# Patient Record
Sex: FEMALE | Race: BLACK OR AFRICAN AMERICAN | Employment: OTHER | URBAN - METROPOLITAN AREA
[De-identification: names, ages, dates, MRNs, and addresses within clinical notes are randomized per-mention and may not be internally consistent; named-entity substitution may affect disease eponyms.]

---

## 2017-06-15 PROBLEM — E11.8 CONTROLLED TYPE 2 DIABETES MELLITUS WITH COMPLICATION, WITHOUT LONG-TERM CURRENT USE OF INSULIN (HCC): Status: ACTIVE | Noted: 2017-06-15

## 2017-06-15 PROBLEM — L84 CALLUS: Status: ACTIVE | Noted: 2017-06-15

## 2017-06-15 PROBLEM — B35.1 ONYCHOMYCOSIS: Status: ACTIVE | Noted: 2017-06-15

## 2017-07-12 PROBLEM — E10.9 COMPREHENSIVE DIABETIC FOOT EXAMINATION, TYPE 1 DM, ENCOUNTER FOR (HCC): Status: ACTIVE | Noted: 2017-07-12

## 2017-07-12 PROBLEM — Q66.89 CLAW TOE: Status: ACTIVE | Noted: 2017-07-12

## 2017-07-19 ENCOUNTER — APPOINTMENT (OUTPATIENT)
Dept: CT IMAGING | Age: 82
End: 2017-07-19
Attending: EMERGENCY MEDICINE
Payer: COMMERCIAL

## 2017-07-19 ENCOUNTER — APPOINTMENT (OUTPATIENT)
Dept: GENERAL RADIOLOGY | Age: 82
End: 2017-07-19
Attending: EMERGENCY MEDICINE
Payer: COMMERCIAL

## 2017-07-19 ENCOUNTER — HOSPITAL ENCOUNTER (EMERGENCY)
Age: 82
Discharge: HOME | End: 2017-07-19
Attending: EMERGENCY MEDICINE
Payer: COMMERCIAL

## 2017-07-19 VITALS
TEMPERATURE: 98.4 F | WEIGHT: 184 LBS | HEIGHT: 67 IN | BODY MASS INDEX: 28.88 KG/M2 | DIASTOLIC BLOOD PRESSURE: 85 MMHG | HEART RATE: 68 BPM | OXYGEN SATURATION: 98 % | RESPIRATION RATE: 16 BRPM | SYSTOLIC BLOOD PRESSURE: 180 MMHG

## 2017-07-19 DIAGNOSIS — R10.84 ABDOMINAL PAIN, GENERALIZED: Primary | ICD-10-CM

## 2017-07-19 LAB
ALBUMIN SERPL BCP-MCNC: 3.5 G/DL (ref 3.2–4.6)
ALBUMIN/GLOB SERPL: 1.2 {RATIO} (ref 1.2–3.5)
ALP SERPL-CCNC: 59 U/L (ref 50–136)
ALT SERPL-CCNC: 23 U/L (ref 12–65)
ANION GAP BLD CALC-SCNC: 6 MMOL/L (ref 7–16)
AST SERPL W P-5'-P-CCNC: 16 U/L (ref 15–37)
ATRIAL RATE: 70 BPM
BASOPHILS # BLD AUTO: 0 K/UL (ref 0–0.2)
BASOPHILS # BLD: 0 % (ref 0–2)
BILIRUB SERPL-MCNC: 0.5 MG/DL (ref 0.2–1.1)
BUN SERPL-MCNC: 27 MG/DL (ref 8–23)
CALCIUM SERPL-MCNC: 10.2 MG/DL (ref 8.3–10.4)
CALCULATED P AXIS, ECG09: 99 DEGREES
CALCULATED R AXIS, ECG10: -46 DEGREES
CALCULATED T AXIS, ECG11: 36 DEGREES
CHLORIDE SERPL-SCNC: 108 MMOL/L (ref 98–107)
CO2 SERPL-SCNC: 29 MMOL/L (ref 21–32)
CREAT SERPL-MCNC: 0.99 MG/DL (ref 0.6–1)
DIAGNOSIS, 93000: NORMAL
DIFFERENTIAL METHOD BLD: ABNORMAL
EOSINOPHIL # BLD: 0.1 K/UL (ref 0–0.8)
EOSINOPHIL NFR BLD: 1 % (ref 0.5–7.8)
ERYTHROCYTE [DISTWIDTH] IN BLOOD BY AUTOMATED COUNT: 13.6 % (ref 11.9–14.6)
GLOBULIN SER CALC-MCNC: 2.9 G/DL (ref 2.3–3.5)
GLUCOSE SERPL-MCNC: 199 MG/DL (ref 65–100)
HCT VFR BLD AUTO: 33 % (ref 35.8–46.3)
HGB BLD-MCNC: 10.6 G/DL (ref 11.7–15.4)
IMM GRANULOCYTES # BLD: 0 K/UL (ref 0–0.5)
IMM GRANULOCYTES NFR BLD AUTO: 0.2 % (ref 0–5)
LACTATE BLD-SCNC: 1.6 MMOL/L (ref 0.5–1.9)
LIPASE SERPL-CCNC: 201 U/L (ref 73–393)
LYMPHOCYTES # BLD AUTO: 25 % (ref 13–44)
LYMPHOCYTES # BLD: 2.2 K/UL (ref 0.5–4.6)
MCH RBC QN AUTO: 30 PG (ref 26.1–32.9)
MCHC RBC AUTO-ENTMCNC: 32.1 G/DL (ref 31.4–35)
MCV RBC AUTO: 93.5 FL (ref 79.6–97.8)
MONOCYTES # BLD: 0.7 K/UL (ref 0.1–1.3)
MONOCYTES NFR BLD AUTO: 8 % (ref 4–12)
NEUTS SEG # BLD: 5.9 K/UL (ref 1.7–8.2)
NEUTS SEG NFR BLD AUTO: 66 % (ref 43–78)
P-R INTERVAL, ECG05: 188 MS
PLATELET # BLD AUTO: 187 K/UL (ref 150–450)
PMV BLD AUTO: 10.6 FL (ref 10.8–14.1)
POTASSIUM SERPL-SCNC: 3.7 MMOL/L (ref 3.5–5.1)
PROT SERPL-MCNC: 6.4 G/DL (ref 6.3–8.2)
Q-T INTERVAL, ECG07: 410 MS
QRS DURATION, ECG06: 132 MS
QTC CALCULATION (BEZET), ECG08: 442 MS
RBC # BLD AUTO: 3.53 M/UL (ref 4.05–5.25)
SODIUM SERPL-SCNC: 143 MMOL/L (ref 136–145)
TROPONIN I BLD-MCNC: 0 NG/ML (ref 0–0.08)
TROPONIN I BLD-MCNC: 0.02 NG/ML (ref 0–0.08)
VENTRICULAR RATE, ECG03: 70 BPM
WBC # BLD AUTO: 8.9 K/UL (ref 4.3–11.1)

## 2017-07-19 PROCEDURE — 96374 THER/PROPH/DIAG INJ IV PUSH: CPT | Performed by: EMERGENCY MEDICINE

## 2017-07-19 PROCEDURE — 93005 ELECTROCARDIOGRAM TRACING: CPT | Performed by: EMERGENCY MEDICINE

## 2017-07-19 PROCEDURE — 74011000258 HC RX REV CODE- 258: Performed by: EMERGENCY MEDICINE

## 2017-07-19 PROCEDURE — 84484 ASSAY OF TROPONIN QUANT: CPT

## 2017-07-19 PROCEDURE — 80053 COMPREHEN METABOLIC PANEL: CPT | Performed by: EMERGENCY MEDICINE

## 2017-07-19 PROCEDURE — 74177 CT ABD & PELVIS W/CONTRAST: CPT

## 2017-07-19 PROCEDURE — 83605 ASSAY OF LACTIC ACID: CPT

## 2017-07-19 PROCEDURE — 99285 EMERGENCY DEPT VISIT HI MDM: CPT | Performed by: EMERGENCY MEDICINE

## 2017-07-19 PROCEDURE — 85025 COMPLETE CBC W/AUTO DIFF WBC: CPT | Performed by: EMERGENCY MEDICINE

## 2017-07-19 PROCEDURE — 74011250636 HC RX REV CODE- 250/636: Performed by: EMERGENCY MEDICINE

## 2017-07-19 PROCEDURE — 74011636320 HC RX REV CODE- 636/320: Performed by: EMERGENCY MEDICINE

## 2017-07-19 PROCEDURE — 83690 ASSAY OF LIPASE: CPT | Performed by: EMERGENCY MEDICINE

## 2017-07-19 PROCEDURE — 71010 XR CHEST PORT: CPT

## 2017-07-19 RX ORDER — ONDANSETRON 2 MG/ML
4 INJECTION INTRAMUSCULAR; INTRAVENOUS
Status: COMPLETED | OUTPATIENT
Start: 2017-07-19 | End: 2017-07-19

## 2017-07-19 RX ORDER — SODIUM CHLORIDE 0.9 % (FLUSH) 0.9 %
10 SYRINGE (ML) INJECTION
Status: COMPLETED | OUTPATIENT
Start: 2017-07-19 | End: 2017-07-19

## 2017-07-19 RX ORDER — POLYETHYLENE GLYCOL 3350 17 G/17G
17 POWDER, FOR SOLUTION ORAL DAILY
Qty: 119 G | Refills: 0 | Status: SHIPPED | OUTPATIENT
Start: 2017-07-19

## 2017-07-19 RX ADMIN — SODIUM CHLORIDE 100 ML: 900 INJECTION, SOLUTION INTRAVENOUS at 10:21

## 2017-07-19 RX ADMIN — ONDANSETRON 4 MG: 2 INJECTION INTRAMUSCULAR; INTRAVENOUS at 07:05

## 2017-07-19 RX ADMIN — DIATRIZOATE MEGLUMINE AND DIATRIZOATE SODIUM 15 ML: 600; 100 SOLUTION ORAL; RECTAL at 06:43

## 2017-07-19 RX ADMIN — Medication 10 ML: at 10:21

## 2017-07-19 RX ADMIN — IOPAMIDOL 100 ML: 755 INJECTION, SOLUTION INTRAVENOUS at 10:21

## 2017-07-19 NOTE — ED PROVIDER NOTES
HPI Comments: Presents with complaint of nausea and abdominal pain and distention. No diarrhea no vomiting. Started last evening. Complains of burping a lot. Patient is a 80 y.o. female presenting with abdominal pain. The history is provided by the patient. Abdominal Pain    This is a new problem. Episode onset: 2330. The problem occurs constantly. The problem has not changed since onset. The pain is located in the generalized abdominal region. The quality of the pain is aching, dull and cramping. The pain is moderate. Associated symptoms include belching and nausea. Pertinent negatives include no fever, no diarrhea, no flatus and no vomiting. Nothing worsens the pain. The pain is relieved by nothing. Her past medical history is significant for small bowel obstruction. The patient's surgical history includes appendectomy, cholecystectomy, hysterectomy and colectomy.        Past Medical History:   Diagnosis Date    Arrhythmia     palpitations 2/10-went to [facility]    Arthritis     L leg- fell 2008    Asthma     adult onset x 20 yrs    B12 deficiency 8/24/2012    Body mass index 37.0-37.9, adult 2/5/2014    CAD (coronary artery disease)     cardiac work up-[id number]/0909-neg-Holter    Controlled type 2 diabetes mellitus with microalbuminuria, without long-term current use of insulin ([address] 11/22/2016    COPD     Corns and callosities 12/19/2016    CRI (chronic renal insufficiency) 8/24/2012    Depression 6/1/2012    Dermatophytosis of nail 12/19/2016    Diabetes ([address]     type II- home tests fasting-112    DJD (degenerative joint disease) of hip     DM (diabetes mellitus) type II controlled with renal manifestation ([address] 7/16/2010    Encounter for long-term (current) use of other medications 1/8/2013    Essential hypertension 7/16/2010    Gastrointestinal disorder     GERD (gastroesophageal reflux disease)     Heart failure ([address]     Hiatal hernia 8/24/2012    HLD (hyperlipidemia) 1/8/2013    Neuropathy ([facility] 8/24/2012    Lower limbs     Obesity (BMI 30.0-39. 9) BMI-43.8    Other and unspecified hyperlipidemia 7/16/2010    Other chest pain 7/16/2010    Other ill-defined conditions     high cholesterol    Other ill-defined conditions     incont. urine    PAD (peripheral artery disease) ([facility] 8/24/2012    Retinal hemorrhage        Past Surgical History:   Procedure Laterality Date    ABDOMEN SURGERY PROC UNLISTED      HX APPENDECTOMY      HX CHOLECYSTECTOMY      HX CHOLECYSTECTOMY  2000    HX GI      small intestine obstruction    HX GYN      hyst    HX HEART CATHETERIZATION      HX HYSTERECTOMY      prolapse    HX INTRACRANIAL ANEURYSM REPAIR      HX INTRACRANIAL ANEURYSM REPAIR      HX ORTHOPAEDIC      right wrist 2010    HX OTHER SURGICAL      aneurysm clip-cerebral-2000    HX OTHER SURGICAL      cerebral aneurysm   Dr. Vargas Schneider         Family History:   Problem Relation Age of Onset    Cancer Mother     Diabetes Father     Diabetes Paternal Grandfather        Social History     Social History    Marital status: SINGLE     Spouse name: N/A    Number of children: N/A    Years of education: N/A     Occupational History    Not on file. Social History Main Topics    Smoking status: Never Smoker    Smokeless tobacco: Never Used    Alcohol use No    Drug use: No    Sexual activity: No     Other Topics Concern    Not on file     Social History Narrative     with four children         ALLERGIES: Review of patient's allergies indicates no known allergies. Review of Systems   Constitutional: Negative for chills and fever. Gastrointestinal: Positive for abdominal pain and nausea. Negative for diarrhea, flatus and vomiting. All other systems reviewed and are negative.       Vitals:    07/19/17 0552   BP: 156/68   Pulse: 74   Resp: 18   Temp: 98.3 °F (36.8 °C)   SpO2: 96%   Weight: 83.5 kg (184 lb)   Height: 5' 7\" (1.702 m)            Physical Exam   Constitutional: She is oriented to person, place, and time. She appears well-developed and well-nourished. No distress. HENT:   Head: Normocephalic and atraumatic. Neck: Normal range of motion. Neck supple. Cardiovascular: Normal rate and regular rhythm. Pulmonary/Chest: Effort normal and breath sounds normal. No respiratory distress. She has no wheezes. She has no rales. Abdominal: She exhibits distension. There is tenderness. There is no rebound and no guarding. Multiple wound/hernia     Musculoskeletal: Normal range of motion. She exhibits no edema. Neurological: She is alert and oriented to person, place, and time. No cranial nerve deficit. Skin: Skin is warm and dry. No rash noted. She is not diaphoretic. No erythema. Nursing note and vitals reviewed. MDM  Number of Diagnoses or Management Options  Abdominal pain, generalized:   Diagnosis management comments: CT neg. Tn x2 neg. Pt had BM here.   Home with miralax       Amount and/or Complexity of Data Reviewed  Clinical lab tests: ordered and reviewed    Risk of Complications, Morbidity, and/or Mortality  Presenting problems: high  Diagnostic procedures: moderate  Management options: moderate    Patient Progress  Patient progress: improved    ED Course       Procedures

## 2017-07-19 NOTE — ED TRIAGE NOTES
PT arrived to ED c/o abdominal pain since 1030 pm. PT states she has felt nauseous but no vomiting. EMS states PT began having chest tightness en route.  PT was given 324 mg ASA, 4 mg Zofran, 0.4 mg NTG x2,

## 2017-07-19 NOTE — DISCHARGE INSTRUCTIONS

## 2017-07-19 NOTE — ED NOTES
Discharge instructions given to pt. Pt verbalized understanding. RX in hand. Pt moved to hallway [facility].

## 2017-08-30 PROBLEM — L84 CORN: Status: ACTIVE | Noted: 2017-08-30

## 2018-02-01 ENCOUNTER — HOSPITAL ENCOUNTER (OUTPATIENT)
Dept: ULTRASOUND IMAGING | Age: 83
Discharge: HOME | End: 2018-02-01
Attending: FAMILY MEDICINE
Payer: COMMERCIAL

## 2018-02-01 DIAGNOSIS — M79.89 RIGHT LEG SWELLING: ICD-10-CM

## 2018-02-01 PROBLEM — F41.9 ANXIETY: Status: ACTIVE | Noted: 2018-02-01

## 2018-02-01 PROCEDURE — 93971 EXTREMITY STUDY: CPT

## 2018-02-02 NOTE — PROGRESS NOTES
Pt notified that her ultrasound did not show a clot. And to keep her leg elevated. And to make a earlier appointment if swelling is getting worse.

## 2018-02-02 NOTE — PROGRESS NOTES
Please inform patient/POA  that venous ultrasound result(s) did not show a clot. Keep leg elevated. Make earlier appointment if swelling getting worse.

## 2018-05-16 PROBLEM — E11.40 TYPE 2 DIABETES MELLITUS WITH DIABETIC NEUROPATHY (HCC): Status: ACTIVE | Noted: 2018-05-16

## 2018-07-27 ENCOUNTER — HOSPITAL ENCOUNTER (EMERGENCY)
Age: 83
Discharge: HOME | End: 2018-07-27
Attending: EMERGENCY MEDICINE
Payer: COMMERCIAL

## 2018-07-27 ENCOUNTER — APPOINTMENT (OUTPATIENT)
Dept: GENERAL RADIOLOGY | Age: 83
End: 2018-07-27
Attending: EMERGENCY MEDICINE
Payer: COMMERCIAL

## 2018-07-27 VITALS
HEART RATE: 77 BPM | DIASTOLIC BLOOD PRESSURE: 67 MMHG | OXYGEN SATURATION: 99 % | RESPIRATION RATE: 16 BRPM | SYSTOLIC BLOOD PRESSURE: 158 MMHG | TEMPERATURE: 97.9 F

## 2018-07-27 DIAGNOSIS — R55 SYNCOPE AND COLLAPSE: Primary | ICD-10-CM

## 2018-07-27 LAB
ALBUMIN SERPL-MCNC: 3.1 G/DL (ref 3.2–4.6)
ALBUMIN/GLOB SERPL: 0.9 {RATIO} (ref 1.2–3.5)
ALP SERPL-CCNC: 59 U/L (ref 50–136)
ALT SERPL-CCNC: 22 U/L (ref 12–65)
ANION GAP SERPL CALC-SCNC: 7 MMOL/L (ref 7–16)
APTT PPP: 27.1 SEC (ref 23.2–35.3)
AST SERPL-CCNC: 22 U/L (ref 15–37)
BASOPHILS # BLD: 0 K/UL (ref 0–0.2)
BASOPHILS NFR BLD: 0 % (ref 0–2)
BILIRUB SERPL-MCNC: 0.4 MG/DL (ref 0.2–1.1)
BUN SERPL-MCNC: 27 MG/DL (ref 8–23)
CALCIUM SERPL-MCNC: 10.4 MG/DL (ref 8.3–10.4)
CHLORIDE SERPL-SCNC: 107 MMOL/L (ref 98–107)
CO2 SERPL-SCNC: 29 MMOL/L (ref 21–32)
CREAT SERPL-MCNC: 1.04 MG/DL (ref 0.6–1)
DIFFERENTIAL METHOD BLD: ABNORMAL
EOSINOPHIL # BLD: 0 K/UL (ref 0–0.8)
EOSINOPHIL NFR BLD: 0 % (ref 0.5–7.8)
ERYTHROCYTE [DISTWIDTH] IN BLOOD BY AUTOMATED COUNT: 13.2 % (ref 11.9–14.6)
GLOBULIN SER CALC-MCNC: 3.5 G/DL (ref 2.3–3.5)
GLUCOSE SERPL-MCNC: 120 MG/DL (ref 65–100)
HCT VFR BLD AUTO: 33.1 % (ref 35.8–46.3)
HGB BLD-MCNC: 10.4 G/DL (ref 11.7–15.4)
IMM GRANULOCYTES # BLD: 0 K/UL (ref 0–0.5)
IMM GRANULOCYTES NFR BLD AUTO: 0 % (ref 0–5)
INR PPP: 1
LYMPHOCYTES # BLD: 1.8 K/UL (ref 0.5–4.6)
LYMPHOCYTES NFR BLD: 21 % (ref 13–44)
MAGNESIUM SERPL-MCNC: 2 MG/DL (ref 1.8–2.4)
MCH RBC QN AUTO: 30.1 PG (ref 26.1–32.9)
MCHC RBC AUTO-ENTMCNC: 31.4 G/DL (ref 31.4–35)
MCV RBC AUTO: 95.7 FL (ref 79.6–97.8)
MONOCYTES # BLD: 0.5 K/UL (ref 0.1–1.3)
MONOCYTES NFR BLD: 6 % (ref 4–12)
NEUTS SEG # BLD: 6.2 K/UL (ref 1.7–8.2)
NEUTS SEG NFR BLD: 73 % (ref 43–78)
PLATELET # BLD AUTO: 186 K/UL (ref 150–450)
PMV BLD AUTO: 10 FL (ref 10.8–14.1)
POTASSIUM SERPL-SCNC: 4.2 MMOL/L (ref 3.5–5.1)
PROT SERPL-MCNC: 6.6 G/DL (ref 6.3–8.2)
PROTHROMBIN TIME: 13.1 SEC (ref 11.5–14.5)
RBC # BLD AUTO: 3.46 M/UL (ref 4.05–5.25)
SODIUM SERPL-SCNC: 143 MMOL/L (ref 136–145)
WBC # BLD AUTO: 8.5 K/UL (ref 4.3–11.1)

## 2018-07-27 PROCEDURE — 85025 COMPLETE CBC W/AUTO DIFF WBC: CPT | Performed by: EMERGENCY MEDICINE

## 2018-07-27 PROCEDURE — 96360 HYDRATION IV INFUSION INIT: CPT | Performed by: EMERGENCY MEDICINE

## 2018-07-27 PROCEDURE — 85730 THROMBOPLASTIN TIME PARTIAL: CPT | Performed by: EMERGENCY MEDICINE

## 2018-07-27 PROCEDURE — 96361 HYDRATE IV INFUSION ADD-ON: CPT | Performed by: EMERGENCY MEDICINE

## 2018-07-27 PROCEDURE — 71045 X-RAY EXAM CHEST 1 VIEW: CPT

## 2018-07-27 PROCEDURE — 74011250636 HC RX REV CODE- 250/636: Performed by: EMERGENCY MEDICINE

## 2018-07-27 PROCEDURE — 99284 EMERGENCY DEPT VISIT MOD MDM: CPT | Performed by: EMERGENCY MEDICINE

## 2018-07-27 PROCEDURE — 85610 PROTHROMBIN TIME: CPT | Performed by: EMERGENCY MEDICINE

## 2018-07-27 PROCEDURE — 83735 ASSAY OF MAGNESIUM: CPT | Performed by: EMERGENCY MEDICINE

## 2018-07-27 PROCEDURE — 80053 COMPREHEN METABOLIC PANEL: CPT | Performed by: EMERGENCY MEDICINE

## 2018-07-27 RX ADMIN — SODIUM CHLORIDE 1000 ML: 900 INJECTION, SOLUTION INTRAVENOUS at 21:15

## 2018-07-27 NOTE — ED TRIAGE NOTES
Pt arrives EMS due to a syncopal at [address] Pt found laying on floor. A&O x4. Right bundle branch on 12 lead. Unknown if this is new. Pt states cardiac hx. , /60, HR 70s. Temp 99.0F. No meds given en route.

## 2018-07-28 ENCOUNTER — APPOINTMENT (OUTPATIENT)
Dept: GENERAL RADIOLOGY | Age: 83
DRG: 378 | End: 2018-07-28
Attending: INTERNAL MEDICINE
Payer: COMMERCIAL

## 2018-07-28 ENCOUNTER — HOSPITAL ENCOUNTER (INPATIENT)
Age: 83
LOS: 5 days | Discharge: HOME HEALTH | DRG: 378 | End: 2018-08-02
Attending: EMERGENCY MEDICINE | Admitting: FAMILY MEDICINE
Payer: COMMERCIAL

## 2018-07-28 ENCOUNTER — APPOINTMENT (OUTPATIENT)
Dept: NUCLEAR MEDICINE | Age: 83
DRG: 378 | End: 2018-07-28
Attending: NURSE PRACTITIONER
Payer: COMMERCIAL

## 2018-07-28 DIAGNOSIS — K92.2 ACUTE LOWER GI BLEEDING: Primary | ICD-10-CM

## 2018-07-28 PROBLEM — R55 SYNCOPE: Status: ACTIVE | Noted: 2018-07-28

## 2018-07-28 LAB
ALBUMIN SERPL-MCNC: 2.9 G/DL (ref 3.2–4.6)
ALBUMIN/GLOB SERPL: 1 {RATIO} (ref 1.2–3.5)
ALP SERPL-CCNC: 57 U/L (ref 50–136)
ALT SERPL-CCNC: 20 U/L (ref 12–65)
ANION GAP SERPL CALC-SCNC: 8 MMOL/L (ref 7–16)
AST SERPL-CCNC: 15 U/L (ref 15–37)
BASOPHILS # BLD: 0 K/UL (ref 0–0.2)
BASOPHILS NFR BLD: 0 % (ref 0–2)
BILIRUB SERPL-MCNC: 0.4 MG/DL (ref 0.2–1.1)
BUN SERPL-MCNC: 29 MG/DL (ref 8–23)
CALCIUM SERPL-MCNC: 9.5 MG/DL (ref 8.3–10.4)
CHLORIDE SERPL-SCNC: 109 MMOL/L (ref 98–107)
CO2 SERPL-SCNC: 27 MMOL/L (ref 21–32)
CREAT SERPL-MCNC: 1.11 MG/DL (ref 0.6–1)
DIFFERENTIAL METHOD BLD: ABNORMAL
EOSINOPHIL # BLD: 0 K/UL (ref 0–0.8)
EOSINOPHIL NFR BLD: 0 % (ref 0.5–7.8)
ERYTHROCYTE [DISTWIDTH] IN BLOOD BY AUTOMATED COUNT: 13.2 % (ref 11.9–14.6)
ERYTHROCYTE [DISTWIDTH] IN BLOOD BY AUTOMATED COUNT: 13.2 % (ref 11.9–14.6)
ERYTHROCYTE [DISTWIDTH] IN BLOOD BY AUTOMATED COUNT: 15.8 % (ref 11.9–14.6)
ERYTHROCYTE [DISTWIDTH] IN BLOOD BY AUTOMATED COUNT: 16.3 % (ref 11.9–14.6)
GLOBULIN SER CALC-MCNC: 2.9 G/DL (ref 2.3–3.5)
GLUCOSE SERPL-MCNC: 289 MG/DL (ref 65–100)
HCT VFR BLD AUTO: 27.3 % (ref 35.8–46.3)
HCT VFR BLD AUTO: 28.4 % (ref 35.8–46.3)
HCT VFR BLD AUTO: 28.5 % (ref 35.8–46.3)
HCT VFR BLD AUTO: 29.3 % (ref 35.8–46.3)
HGB BLD-MCNC: 8.4 G/DL (ref 11.7–15.4)
HGB BLD-MCNC: 9.1 G/DL (ref 11.7–15.4)
HGB BLD-MCNC: 9.2 G/DL (ref 11.7–15.4)
HGB BLD-MCNC: 9.3 G/DL (ref 11.7–15.4)
IMM GRANULOCYTES # BLD: 0 K/UL (ref 0–0.5)
IMM GRANULOCYTES NFR BLD AUTO: 0 % (ref 0–5)
LYMPHOCYTES # BLD: 1.7 K/UL (ref 0.5–4.6)
LYMPHOCYTES NFR BLD: 15 % (ref 13–44)
MCH RBC QN AUTO: 29.5 PG (ref 26.1–32.9)
MCH RBC QN AUTO: 29.5 PG (ref 26.1–32.9)
MCH RBC QN AUTO: 29.6 PG (ref 26.1–32.9)
MCH RBC QN AUTO: 30.4 PG (ref 26.1–32.9)
MCHC RBC AUTO-ENTMCNC: 30.8 G/DL (ref 31.4–35)
MCHC RBC AUTO-ENTMCNC: 31.7 G/DL (ref 31.4–35)
MCHC RBC AUTO-ENTMCNC: 32 G/DL (ref 31.4–35)
MCHC RBC AUTO-ENTMCNC: 32.3 G/DL (ref 31.4–35)
MCV RBC AUTO: 91.6 FL (ref 79.6–97.8)
MCV RBC AUTO: 92.2 FL (ref 79.6–97.8)
MCV RBC AUTO: 95.8 FL (ref 79.6–97.8)
MCV RBC AUTO: 95.8 FL (ref 79.6–97.8)
MONOCYTES # BLD: 0.6 K/UL (ref 0.1–1.3)
MONOCYTES NFR BLD: 5 % (ref 4–12)
NEUTS SEG # BLD: 9.5 K/UL (ref 1.7–8.2)
NEUTS SEG NFR BLD: 80 % (ref 43–78)
PLATELET # BLD AUTO: 154 K/UL (ref 150–450)
PLATELET # BLD AUTO: 157 K/UL (ref 150–450)
PLATELET # BLD AUTO: 164 K/UL (ref 150–450)
PLATELET # BLD AUTO: 187 K/UL (ref 150–450)
PMV BLD AUTO: 10.1 FL (ref 10.8–14.1)
PMV BLD AUTO: 10.2 FL (ref 10.8–14.1)
PMV BLD AUTO: 10.6 FL (ref 10.8–14.1)
PMV BLD AUTO: 10.6 FL (ref 10.8–14.1)
POTASSIUM SERPL-SCNC: 4 MMOL/L (ref 3.5–5.1)
PROT SERPL-MCNC: 5.8 G/DL (ref 6.3–8.2)
RBC # BLD AUTO: 2.85 M/UL (ref 4.05–5.25)
RBC # BLD AUTO: 3.06 M/UL (ref 4.05–5.25)
RBC # BLD AUTO: 3.08 M/UL (ref 4.05–5.25)
RBC # BLD AUTO: 3.11 M/UL (ref 4.05–5.25)
SODIUM SERPL-SCNC: 144 MMOL/L (ref 136–145)
WBC # BLD AUTO: 11.9 K/UL (ref 4.3–11.1)
WBC # BLD AUTO: 7.4 K/UL (ref 4.3–11.1)
WBC # BLD AUTO: 8.1 K/UL (ref 4.3–11.1)
WBC # BLD AUTO: 9.2 K/UL (ref 4.3–11.1)

## 2018-07-28 PROCEDURE — 65270000029 HC RM PRIVATE

## 2018-07-28 PROCEDURE — P9016 RBC LEUKOCYTES REDUCED: HCPCS | Performed by: EMERGENCY MEDICINE

## 2018-07-28 PROCEDURE — 85025 COMPLETE CBC W/AUTO DIFF WBC: CPT | Performed by: EMERGENCY MEDICINE

## 2018-07-28 PROCEDURE — 86900 BLOOD TYPING SEROLOGIC ABO: CPT | Performed by: EMERGENCY MEDICINE

## 2018-07-28 PROCEDURE — 97530 THERAPEUTIC ACTIVITIES: CPT

## 2018-07-28 PROCEDURE — 85027 COMPLETE CBC AUTOMATED: CPT | Performed by: FAMILY MEDICINE

## 2018-07-28 PROCEDURE — 74011250637 HC RX REV CODE- 250/637: Performed by: FAMILY MEDICINE

## 2018-07-28 PROCEDURE — 94640 AIRWAY INHALATION TREATMENT: CPT

## 2018-07-28 PROCEDURE — 77030032490 HC SLV COMPR SCD KNE COVD -B

## 2018-07-28 PROCEDURE — 74011000250 HC RX REV CODE- 250: Performed by: FAMILY MEDICINE

## 2018-07-28 PROCEDURE — A9560 TC99M LABELED RBC: HCPCS

## 2018-07-28 PROCEDURE — 73562 X-RAY EXAM OF KNEE 3: CPT

## 2018-07-28 PROCEDURE — 97162 PT EVAL MOD COMPLEX 30 MIN: CPT

## 2018-07-28 PROCEDURE — 36430 TRANSFUSION BLD/BLD COMPNT: CPT

## 2018-07-28 PROCEDURE — 96360 HYDRATION IV INFUSION INIT: CPT | Performed by: EMERGENCY MEDICINE

## 2018-07-28 PROCEDURE — 86923 COMPATIBILITY TEST ELECTRIC: CPT | Performed by: EMERGENCY MEDICINE

## 2018-07-28 PROCEDURE — 74011250636 HC RX REV CODE- 250/636: Performed by: EMERGENCY MEDICINE

## 2018-07-28 PROCEDURE — 94760 N-INVAS EAR/PLS OXIMETRY 1: CPT

## 2018-07-28 PROCEDURE — 36415 COLL VENOUS BLD VENIPUNCTURE: CPT | Performed by: FAMILY MEDICINE

## 2018-07-28 PROCEDURE — 99284 EMERGENCY DEPT VISIT MOD MDM: CPT | Performed by: EMERGENCY MEDICINE

## 2018-07-28 PROCEDURE — 80053 COMPREHEN METABOLIC PANEL: CPT | Performed by: EMERGENCY MEDICINE

## 2018-07-28 PROCEDURE — 30233N1 TRANSFUSION OF NONAUTOLOGOUS RED BLOOD CELLS INTO PERIPHERAL VEIN, PERCUTANEOUS APPROACH: ICD-10-PCS | Performed by: EMERGENCY MEDICINE

## 2018-07-28 PROCEDURE — 87086 URINE CULTURE/COLONY COUNT: CPT | Performed by: NURSE PRACTITIONER

## 2018-07-28 RX ORDER — ALBUTEROL SULFATE 0.83 MG/ML
2.5 SOLUTION RESPIRATORY (INHALATION)
Status: DISCONTINUED | OUTPATIENT
Start: 2018-07-28 | End: 2018-08-02 | Stop reason: HOSPADM

## 2018-07-28 RX ORDER — SODIUM CHLORIDE 9 MG/ML
250 INJECTION, SOLUTION INTRAVENOUS AS NEEDED
Status: DISCONTINUED | OUTPATIENT
Start: 2018-07-28 | End: 2018-08-02 | Stop reason: HOSPADM

## 2018-07-28 RX ORDER — SODIUM CHLORIDE 0.9 % (FLUSH) 0.9 %
5-10 SYRINGE (ML) INJECTION AS NEEDED
Status: DISCONTINUED | OUTPATIENT
Start: 2018-07-28 | End: 2018-08-02 | Stop reason: HOSPADM

## 2018-07-28 RX ORDER — BENAZEPRIL HYDROCHLORIDE 10 MG/1
40 TABLET ORAL DAILY
Status: DISCONTINUED | OUTPATIENT
Start: 2018-07-28 | End: 2018-08-02 | Stop reason: HOSPADM

## 2018-07-28 RX ORDER — PRAVASTATIN SODIUM 20 MG/1
40 TABLET ORAL
Status: DISCONTINUED | OUTPATIENT
Start: 2018-07-28 | End: 2018-08-02 | Stop reason: HOSPADM

## 2018-07-28 RX ORDER — GABAPENTIN 100 MG/1
100 CAPSULE ORAL 2 TIMES DAILY
Status: DISCONTINUED | OUTPATIENT
Start: 2018-07-28 | End: 2018-08-02 | Stop reason: HOSPADM

## 2018-07-28 RX ORDER — BUDESONIDE 0.5 MG/2ML
500 INHALANT ORAL
Status: DISCONTINUED | OUTPATIENT
Start: 2018-07-28 | End: 2018-08-02 | Stop reason: HOSPADM

## 2018-07-28 RX ORDER — SODIUM CHLORIDE 0.9 % (FLUSH) 0.9 %
5-10 SYRINGE (ML) INJECTION EVERY 8 HOURS
Status: DISCONTINUED | OUTPATIENT
Start: 2018-07-28 | End: 2018-08-02 | Stop reason: HOSPADM

## 2018-07-28 RX ORDER — HYDRALAZINE HYDROCHLORIDE 50 MG/1
50 TABLET, FILM COATED ORAL 3 TIMES DAILY
Status: DISCONTINUED | OUTPATIENT
Start: 2018-07-28 | End: 2018-08-02 | Stop reason: HOSPADM

## 2018-07-28 RX ORDER — ACETAMINOPHEN 325 MG/1
650 TABLET ORAL
Status: DISCONTINUED | OUTPATIENT
Start: 2018-07-28 | End: 2018-08-02 | Stop reason: HOSPADM

## 2018-07-28 RX ORDER — SODIUM CHLORIDE 9 MG/ML
50 INJECTION, SOLUTION INTRAVENOUS CONTINUOUS
Status: DISCONTINUED | OUTPATIENT
Start: 2018-07-28 | End: 2018-08-02 | Stop reason: HOSPADM

## 2018-07-28 RX ORDER — SERTRALINE HYDROCHLORIDE 50 MG/1
50 TABLET, FILM COATED ORAL DAILY
Status: DISCONTINUED | OUTPATIENT
Start: 2018-07-28 | End: 2018-08-02 | Stop reason: HOSPADM

## 2018-07-28 RX ORDER — FUROSEMIDE 20 MG/1
20 TABLET ORAL DAILY
Status: DISCONTINUED | OUTPATIENT
Start: 2018-07-28 | End: 2018-08-02 | Stop reason: HOSPADM

## 2018-07-28 RX ORDER — ONDANSETRON 2 MG/ML
4 INJECTION INTRAMUSCULAR; INTRAVENOUS
Status: DISCONTINUED | OUTPATIENT
Start: 2018-07-28 | End: 2018-08-02 | Stop reason: HOSPADM

## 2018-07-28 RX ORDER — POTASSIUM CHLORIDE 20 MEQ/1
20 TABLET, EXTENDED RELEASE ORAL DAILY
Status: DISCONTINUED | OUTPATIENT
Start: 2018-07-28 | End: 2018-08-02 | Stop reason: HOSPADM

## 2018-07-28 RX ORDER — LORAZEPAM 2 MG/ML
1 INJECTION INTRAMUSCULAR
Status: DISCONTINUED | OUTPATIENT
Start: 2018-07-28 | End: 2018-08-02 | Stop reason: HOSPADM

## 2018-07-28 RX ORDER — AMLODIPINE BESYLATE 10 MG/1
10 TABLET ORAL DAILY
Status: DISCONTINUED | OUTPATIENT
Start: 2018-07-28 | End: 2018-08-02 | Stop reason: HOSPADM

## 2018-07-28 RX ADMIN — ACETAMINOPHEN 650 MG: 325 TABLET ORAL at 05:21

## 2018-07-28 RX ADMIN — HYDRALAZINE HYDROCHLORIDE 50 MG: 50 TABLET, FILM COATED ORAL at 09:25

## 2018-07-28 RX ADMIN — ALBUTEROL SULFATE 2.5 MG: 2.5 SOLUTION RESPIRATORY (INHALATION) at 13:21

## 2018-07-28 RX ADMIN — BUDESONIDE 500 MCG: 0.5 INHALANT RESPIRATORY (INHALATION) at 07:00

## 2018-07-28 RX ADMIN — SERTRALINE HYDROCHLORIDE 50 MG: 50 TABLET ORAL at 09:25

## 2018-07-28 RX ADMIN — GABAPENTIN 100 MG: 100 CAPSULE ORAL at 09:25

## 2018-07-28 RX ADMIN — ALBUTEROL SULFATE 2.5 MG: 2.5 SOLUTION RESPIRATORY (INHALATION) at 21:20

## 2018-07-28 RX ADMIN — PRAVASTATIN SODIUM 40 MG: 20 TABLET ORAL at 22:11

## 2018-07-28 RX ADMIN — GABAPENTIN 100 MG: 100 CAPSULE ORAL at 18:51

## 2018-07-28 RX ADMIN — SODIUM CHLORIDE 125 ML/HR: 900 INJECTION, SOLUTION INTRAVENOUS at 01:51

## 2018-07-28 RX ADMIN — FUROSEMIDE 20 MG: 20 TABLET ORAL at 09:25

## 2018-07-28 RX ADMIN — Medication 10 ML: at 22:00

## 2018-07-28 RX ADMIN — HYDRALAZINE HYDROCHLORIDE 50 MG: 50 TABLET, FILM COATED ORAL at 22:11

## 2018-07-28 RX ADMIN — HYDRALAZINE HYDROCHLORIDE 50 MG: 50 TABLET, FILM COATED ORAL at 18:51

## 2018-07-28 RX ADMIN — POTASSIUM CHLORIDE 20 MEQ: 20 TABLET, EXTENDED RELEASE ORAL at 09:25

## 2018-07-28 RX ADMIN — AMLODIPINE BESYLATE 10 MG: 10 TABLET ORAL at 09:25

## 2018-07-28 RX ADMIN — BUDESONIDE 500 MCG: 0.5 INHALANT RESPIRATORY (INHALATION) at 21:20

## 2018-07-28 RX ADMIN — ALBUTEROL SULFATE 2.5 MG: 2.5 SOLUTION RESPIRATORY (INHALATION) at 07:01

## 2018-07-28 RX ADMIN — BENAZEPRIL HYDROCHLORIDE 40 MG: 10 TABLET, FILM COATED ORAL at 09:24

## 2018-07-28 NOTE — PROGRESS NOTES
Julio C Lujan NP informed that patient was incontinent of large maroon bloody stool. Also informed that patient is weak and that PT had difficulty getting her to stand with walker and ortho bp may not be possible just yet. Will continue to monitor.

## 2018-07-28 NOTE — IP AVS SNAPSHOT
[facility] 
 
 
 [address] [facility] [address] [facility] 
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 IS AT [address] [address] 
[phone] Jerome Darnell MD On 2018 2:15pm [address] 
[phone] Lana Vale MD On 2018 at 8:30 am, appointment will be at [facility] 15-30 early,bring all medications with you [address] [facility] [address] [facility] [address] 
[phone] Your Scheduled Appointments 2018  2:15 PM EDT Office Visit with Jerome Darnell MD  
[address]) [address] [id number] [phone] 2018 10:10 AM EDT  
LAB with [facility] [address]) [address] [place] [address] Place [address] [phone] Wednesday August 15, 2018  2:15 PM EDT Office Visit with Jerome Darnell MD  
[address]) [address] [address] [phone] Monday September 10, 2018  3:40 PM EDT SHORT with EDGARDO Brownlee [facility] [address] [place] [address]  
[phone] Discharge Orders None A check katrina indicates which time of day the medication should be taken. My Medications CHANGE how you take these medications Instructions Each Dose to Equal  
 Morning Noon Evening Bedtime  
 hydrALAZINE 50 mg tablet Commonly known as:  APRESOLINE What changed:  how much to take Take 2 Tabs by mouth three (3) times daily. 100 mg CONTINUE taking these medications Instructions Each Dose to Equal  
 Morning Noon Evening Bedtime  
 albuterol 90 mcg/actuation inhaler Commonly known as:  PROVENTIL HFA, VENTOLIN HFA, PROAIR HFA Take 2 Puffs by inhalation every six (6) hours as needed for Wheezing. 2 Puff  
    
  
   
   
  
   
  
 amLODIPine 10 mg tablet Commonly known as:  Bob Matthew Take 1 Tab by mouth daily. 10 mg  
    
  
   
   
   
  
 benazepril 40 mg tablet Commonly known as:  LOTENSIN Take 1 Tab by mouth daily. 40 mg Blood-Glucose Meter monitoring kit Pt uses true metrix meter and tests once daily dx code E11.29 CENTRUM SILVER PO Take  by mouth.  
     
  
   
   
   
  
 cyanocobalamin (vitamin B-12) 1,000 mcg/mL Kit  
   
 1,000 mcg by Injection route every month. 1000 mcg  
    
  
   
   
   
  
 fluticasone 50 mcg/actuation nasal spray Commonly known as:  Yaov Merles INSTILL 1 SPRAY IN EACH NOSTRIL NIGHTLY AS DIRECTED  
     
  
   
   
   
  
 fluticasone-salmeterol 250-50 mcg/dose diskus inhaler Commonly known as:  ADVAIR DISKUS Take 1 Puff by inhalation every twelve (12) hours. 1 Puff  
    
  
   
   
  
   
  
 furosemide 20 mg tablet Commonly known as:  LASIX Take 1 Tab by mouth daily. 20 mg  
    
  
   
   
   
  
 gabapentin 100 mg capsule Commonly known as:  NEURONTIN  
   
 TAKE 1 CAPSULE BY MOUTH TWICE DAILY  
     
  
   
   
  
   
  
 glucose blood VI test strips strip Commonly known as:  blood glucose test  
   
 Pt uses true metrix test strips. Pt tests once daily. Dx code E11.29  
     
  
   
   
   
  
 GLUCOTROL XL 2.5 mg CR tablet Generic drug:  glipiZIDE SR Take 2.5 mg by mouth daily. 2.5 mg Lancets Misc Pt tests three times daily Diagnosis Code: 250.00 LORazepam 0.5 mg tablet Commonly known as:  ATIVAN Take 1 Tab by mouth daily as needed for Anxiety. 0.5 mg  
    
  
   
   
   
  
 metFORMIN 500 mg tablet Commonly known as:  GLUCOPHAGE Take 1 Tab by mouth two (2) times daily (with meals). 500 mg  
    
  
   
   
  
   
  
 OTHER One shower chair  
     
  
   
   
   
  
 polyethylene glycol 17 gram/dose powder Commonly known as:  Kristen  Take 17 g by mouth daily. 1 tablespoon with 8 oz of water daily 17 g  
    
  
   
   
   
  
 potassium chloride 20 mEq tablet Commonly known as:  K-DUR, KLOR-CON  
   
 TAKE 1 TABLET BY MOUTH EVERY MORNING  
     
  
   
   
   
  
 * pravastatin 40 mg tablet Commonly known as:  PRAVACHOL  
   
 TAKE 1 TABLET BY MOUTH EVERY NIGHT AT BEDTIME  
     
   
   
   
  
  
 * pravastatin 40 mg tablet Commonly known as:  PRAVACHOL  
   
 TAKE 1 TABLET BY MOUTH EVERY NIGHT AT BEDTIME  
     
   
   
   
  
  
 PRESERVISION AREDS PO Take 1 Tab by mouth two (2) times a day. 1 Tab  
    
  
   
   
  
   
  
 sertraline 100 mg tablet Commonly known as:  ZOLOFT Take 0.5 Tabs by mouth daily. Indications: major depressive disorder, am  
 50 mg  
    
  
   
   
   
  
 * Notice: This list has 2 medication(s) that are the same as other medications prescribed for you. Read the directions carefully, and ask your doctor or other care provider to review them with you. STOP taking these medications aspirin 81 mg tablet Where to Get Your Medications Information on where to get these meds will be given to you by the nurse or doctor. ! Ask your nurse or doctor about these medications  
  hydrALAZINE 50 mg tablet Discharge Instructions None [facility] Announcement We are excited to announce that we are making your provider's discharge notes available to you in [facility]. You will see these notes when they are completed and signed by the physician that discharged you from your recent hospital stay. If you have any questions or concerns about any information you see in [facility], please call the Health Information Department where you were seen or reach out to your Primary Care Provider for more information about your plan of care. Introducing [facility]! Romayne Duster introduces [facility] patient portal. Now you can access parts of your medical record, email your doctor's office, and request medication refills online. 1. In your internet browser, go to [email or web address] 2. Click on the First Time User? Click Here link in the Sign In box. You will see the New Member Sign Up page. 3. Enter your [facility] Access Code exactly as it appears below. You will not need to use this code after youve completed the sign-up process. If you do not sign up before the expiration date, you must request a new code. · [facility] Access Code: [email or web address] Expires: 10/25/2018  6:31 PM 
 
4. Enter the last four digits of your Social Security Number (xxxx) and Date of Birth (mm/dd/yyyy) as indicated and click Submit. You will be taken to the next sign-up page. 5. Create a Oohlyt ID. This will be your [facility]e login ID and cannot be changed, so think of one that is secure and easy to remember. 6. Create a [facility]e password. You can change your password at any time. 7. Enter your Password Reset Question and Answer.  This can be used at a later time if you forget your password. 8. Enter your e-mail address. You will receive e-mail notification when new information is available in [address] 9. Click Sign Up. You can now view and download portions of your medical record. 10. Click the Download Summary menu link to download a portable copy of your medical information. If you have questions, please visit the Frequently Asked Questions section of the [employer] website. Remember, [employer] is NOT to be used for urgent needs. For medical emergencies, dial 911. Now available from your iPhone and Android! Introducing Edward Dejesus As a [employer] patient, I wanted to make you aware of our electronic visit tool called Edward Dejesus. CrossCurrent 24/7 allows you to connect within minutes with a medical provider 24 hours a day, seven days a week via a mobile device or tablet or logging into a secure website from your computer. You can access Edward Dejesus from anywhere in the [place]. A virtual visit might be right for you when you have a simple condition and feel like you just dont want to get out of bed, or cant get away from work for an appointment, when your regular [facility] provider is not available (evenings, weekends or holidays), or when youre out of town and need minor care. Electronic visits cost only $49 and if the CrossCurrent 24/7 provider determines a prescription is needed to treat your condition, one can be electronically transmitted to a nearby pharmacy*. Please take a moment to enroll today if you have not already done so. The enrollment process is free and takes just a few minutes. To enroll, please download the CrossCurrent 24/7 nathanael to your tablet or phone, or visit [email or web address] to enroll on your computer.    
And, as an [address] [place] [address] patient with a Merku account, the results of your visits will be scanned into your electronic medical record and your primary care provider will be able to view the scanned results. We urge you to continue to see your regular [facility] provider for your ongoing medical care. And while your primary care provider may not be the one available when you seek a Package Concierge virtual visit, the peace of mind you get from getting a real diagnosis real time can be priceless. For more information on Package [facility]cierge, view our Frequently Asked Questions (FAQs) at [email or web address]. Sincerely, 
 
Bridgett Licona MD 
Chief Medical Officer [address] *:  certain medications cannot be prescribed via Package [facility]cierge Providers Seen During Your Hospitalization Provider Specialty Primary office phone Miriam Andrea MD Emergency Medicine [phone] Katey Dc MD Family Practice [phone] Immunizations Administered for This Admission Name Date  
 TB Skin Test (PPD) Intradermal  Deferred (), 7/31/2018 Your Primary Care Physician (PCP) Primary Care Physician Office Phone Office Fax Barbara Flakita [phone] You are allergic to the following No active allergies Recent Documentation Height Weight BMI OB Status Smoking Status 1.702 m 83 kg 28.66 kg/m2 Hysterectomy Never Smoker Emergency Contacts Name Discharge Info Relation Home Work Mobile [facility] [address] CAREGIVER [3] Child [2] [phone] Tracie Darrian  Son [22] [phone] Diallo Ball  Other Relative [6] [phone] Patient Belongings The following personal items are in your possession at time of discharge: 
  Dental Appliances: Partials, Uppers  Visual Aid: At bedside, Glasses      Home Medications: None   Jewelry: Ring  Clothing: None    Other Valuables: None Discharge Instructions Attachments/References GI BLEED (ENGLISH) Patient Handouts Gastrointestinal Bleeding: Care Instructions Your Care Instructions The digestive or gastrointestinal tract goes from the mouth to the anus. It is often called the GI tract. Bleeding can happen anywhere in the GI tract. It may be caused by an ulcer, an infection, or cancer. It may also be caused by medicines such as aspirin or ibuprofen. Light bleeding may not cause any symptoms at first. But if you continue to bleed for a while, you may feel very weak or tired. Sudden, heavy bleeding means you need to see a doctor right away. This kind of bleeding can be very dangerous. But it can usually be cured or controlled. The doctor may do some tests to find the cause of your bleeding. Follow-up care is a key part of your treatment and safety. Be sure to make and go to all appointments, and call your doctor if you are having problems. It's also a good idea to know your test results and keep a list of the medicines you take. How can you care for yourself at home? · Be safe with medicines. Take your medicines exactly as prescribed. Call your doctor if you think you are having a problem with your medicine. You will get more details on the specific medicines your doctor prescribes. · Do not take aspirin or other anti-inflammatory medicines, such as naproxen (Aleve) or ibuprofen (Advil, Motrin), without talking to your doctor first. Ask your doctor if it is okay to use acetaminophen (Tylenol). · Do not drink alcohol. · The bleeding may make you lose iron. So it's important to eat foods that have a lot of iron. These include red meat, shellfish, poultry, and eggs. They also include beans, raisins, whole-grain breads, and leafy green vegetables. If you want help planning meals, you can make an appointment with a dietitian. When should you call for help? Call 911 anytime you think you may need emergency care. For example, call if: 
  · You have sudden, severe belly pain.   · You vomit blood or what looks like coffee grounds.  
  · You passed out (lost consciousness).  
  · Your stools are maroon or very bloody.  
 Call your doctor now or seek immediate medical care if: 
  · You are dizzy or lightheaded, or you feel like you may faint.  
  · Your stools are black and look like tar, or they have streaks of blood.  
  · You have belly pain.  
  · You vomit or have nausea.  
  · You have trouble swallowing, or it hurts when you swallow.  
 Watch closely for changes in your health, and be sure to contact your doctor if: 
  · You do not get better as expected. Where can you learn more? Go to http://nelia-mandy.info/. Enter N945 in the search box to learn more about \"Gastrointestinal Bleeding: Care Instructions. \" Current as of: November 20, 2017 Content Version: 11.7 © 8134-0343 [employer]. Care instructions adapted under license by [employer] (which disclaims liability or warranty for this information). If you have questions about a medical condition or this instruction, always ask your healthcare professional. [address] any warranty or liability for your use of this information. Please provide this summary of care documentation to your next provider. Signatures-by signing, you are acknowledging that this After Visit Summary has been reviewed with you and you have received a copy. Patient Signature:  ____________________________________________________________ Date:  ____________________________________________________________  
  
Sebastian Sport Provider Signature:  ____________________________________________________________ Date:  ____________________________________________________________

## 2018-07-28 NOTE — PROGRESS NOTES
Problem: Mobility Impaired (Adult and Pediatric) Goal: *Therapy Goal (Edit Goal, Insert Text) STG: 
(1.)Ms. Tanya Parks will move from supine to sit and sit to supine , scoot up and down and roll side to side with STAND BY ASSIST within 4 treatment day(s). (2.)Ms. Tanya Parks will transfer from bed to chair and chair to bed with STAND BY ASSIST using the least restrictive device within 4 treatment day(s). (3.)Ms. Tanya Parks will ambulate with STAND BY ASSIST for 250 feet with the least restrictive device within 4 treatment day(s). LTG: 
(1.)Ms. Tanya Parks will move from supine to sit and sit to supine , scoot up and down and roll side to side in bed with MODIFIED INDEPENDENCE within 7 treatment day(s). (2.)Ms. Tanya Parks will transfer from bed to chair and chair to bed with MODIFIED INDEPENDENCE using the least restrictive device within 7 treatment day(s). (3.)Ms. Tanya Parks will ambulate with MODIFIED INDEPENDENCE for 500 feet with the least restrictive device within 7 treatment day(s). ________________________________________________________________________________________________ PHYSICAL THERAPY: Initial Assessment, Treatment Day: Day of Assessment 7/28/2018 INPATIENT: Hospital Day: 1 Payor: Mala Pillai MMP / Plan: [place] [employer] MMP / Product Type: [employer] Medicare /  
  
NAME/AGE/GENDER: Kathlene Jung is a 80 y.o. female PRIMARY DIAGNOSIS: GI bleed <principal problem not specified> <principal problem not specified> 
  
  
ICD-10: Treatment Diagnosis:  
 · Generalized Muscle Weakness (M62.81) · Difficulty in walking, Not elsewhere classified (R26.2) · Other abnormalities of gait and mobility (R26.89) Precaution/Allergies: 
Review of patient's allergies indicates no known allergies. ASSESSMENT:  
 
Ms. Tanya Parks presents with decreased transfers, ambulation and mobility with above diagnosis.     She demonstrates transfers of MIN A x 1 out of bed to sit and then MIN A x 1 to stand. She is experiencing pain in in the left knee and has an xray pending. Then, she ambulates for 3 feet without complaints of increased pain and mild SOB with sidestepping then returned to seated edge of bed with return to supine MIN A x 1 and positioned. Skilled PT is indicated for patient safety and mobility progression during current acute care episode. This section established at most recent assessment PROBLEM LIST (Impairments causing functional limitations): 1. Decreased Strength 2. Decreased ADL/Functional Activities 3. Decreased Transfer Abilities 4. Decreased Ambulation Ability/Technique 5. Decreased Balance 6. Decreased Activity Tolerance 7. Decreased Pacing Skills 8. Decreased Flexibility/Joint Mobility 9. Decreased [place] with Home Exercise Program 
 INTERVENTIONS PLANNED: (Benefits and precautions of physical therapy have been discussed with the patient.) 1. Balance Exercise 2. Bed Mobility 3. Family Education 4. Gait Training 5. Home Exercise Program (HEP) 6. Range of Motion (ROM) 7. Therapeutic Activites 8. Therapeutic Exercise/Strengthening 9. Transfer Training TREATMENT PLAN: Frequency/Duration: 3-5 times a week for duration of hospital stay Rehabilitation Potential For Stated Goals: Good RECOMMENDED REHABILITATION/EQUIPMENT: (at time of discharge pending progress): Due to the probability of continued deficits (see above) this patient will likely need continued skilled physical therapy after discharge. Equipment:  
 Walkers, Type: Rolling Rheta Hark HISTORY:  
History of Present Injury/Illness (Reason for Referral): PER MD PELAEZ&P 
79 yo F with PMH of Type 2 DM, COPD, HTN, HLD, Vit B12 deficiency, CAD and Depression presents to ER with reports of gerhard bloody stool. Patient was just dismissed from ER last night when she initially presented after syncopal episode thought to be due to hypoglycemia.  Lab work and imagine was unremarkable and she was discharged from ER to home. On arrival to home she hard large blood BM and again felt lightheaded but did not lose consciousness. She returned to ER and gerhard blood noted on rectal exam. Hgb 9.3(10.4 last night). She denies any abd pain or previous episodes of hematochezia. She had colonoscopy a few years ago that was unremarkable. (please note: admission nurse documented bloody emesis but patient denies) 
  
Past Medical History/Comorbidities: Ms. Mala Branch  has a past medical history of Anxiety (2/1/2018); Arrhythmia; Arthritis; Asthma; B12 deficiency (8/24/2012); Body mass index 37.0-37.9, adult (2/5/2014); CAD (coronary artery disease); Controlled type 2 diabetes mellitus with microalbuminuria, without long-term current use of insulin ([facility] [address] (11/22/2016); COPD; Corns and callosities (12/19/2016); CRI (chronic renal insufficiency) (8/24/2012); Depression (6/1/2012); Dermatophytosis of nail (12/19/2016); Diabetes ([address] DJD (degenerative joint disease) of hip; DM (diabetes mellitus) type II controlled with renal manifestation ([address] (7/16/2010); Encounter for long-term (current) use of other medications (1/8/2013); Essential hypertension (7/16/2010); Gastrointestinal disorder; GERD (gastroesophageal reflux disease); Heart failure ([address] Hiatal hernia (8/24/2012); HLD (hyperlipidemia) (1/8/2013); Neuropathy (8/24/2012); Obesity (BMI 30.0-39.9) (BMI-43.8); Other and unspecified hyperlipidemia (7/16/2010); Other chest pain (7/16/2010); Other ill-defined conditions(799.89); Other ill-defined conditions(799.89); PAD (peripheral artery disease) ([address] (8/24/2012); and Retinal hemorrhage.   Ms. Mala Branch  has a past surgical history that includes pr abdomen surgery proc unlisted; hx appendectomy; hx cholecystectomy; hx other surgical; hx gyn; hx gi; hx orthopaedic; hx hysterectomy; hx cholecystectomy (2000); hx intracranial aneurysm repair; hx intracranial aneurysm repair; hx other surgical; hx heart catheterization; and hx endoscopy (02/27/2018). Social History/Living Environment:  
  pending Prior Level of Function/Work/Activity: 
Has been using a two wheel rolling walker at times. Dominant Side:  
      RIGHT Personal Factors:   
      Sex:  female Age:  80 y.o. Number of Personal Factors/Comorbidities that affect the Plan of Care: 1-2: MODERATE COMPLEXITY EXAMINATION:  
Most Recent Physical Functioning:  
Gross Assessment: 
AROM: Generally decreased, functional 
Strength: Generally decreased, functional 
Coordination: Generally decreased, functional 
Tone: Normal 
Sensation: Impaired Posture: 
Posture (WDL): Exceptions to [facility] Posture Assessment: Cervical, Forward head Balance: 
Sitting: Impaired Sitting - Static: Fair (occasional) Sitting - Dynamic: Fair (occasional) Standing: Impaired Standing - Static: Fair Standing - Dynamic : Fair Bed Mobility: 
Rolling: Minimum assistance Supine to Sit: Minimum assistance Sit to Supine: Minimum assistance Scooting: Minimum assistance Wheelchair Mobility: 
  
Transfers: 
Sit to Stand: Minimum assistance Stand to Sit: Minimum assistance Bed to Chair: Minimum assistance Gait: 
  
Base of Support: Center of gravity altered Speed/Airam: Delayed; Fluctuations; Pace decreased (<100 feet/min); Shuffled; Slow Step Length: Left shortened Swing Pattern: Left asymmetrical 
Stance: Left decreased;Time;Weight shift Gait Abnormalities: Decreased step clearance;Shuffling gait; Steppage gait; Step to gait;Trunk sway increased Distance (ft): 3 Feet (ft) Assistive Device: Walker, rolling Ambulation - Level of Assistance: Minimal assistance Interventions: Manual cues; Safety awareness training; Tactile cues; Verbal cues; Visual/Demos Body Structures Involved: 1. Bones 2. Joints 3. Muscles 4. Ligaments Body Functions Affected: 1. Neuromusculoskeletal 
2. Movement Related 3. Skin Related 4.  Metobolic/Endocrine Activities and Participation Affected: 1. General Tasks and Demands 2. Communication 3. Mobility 4. Self Care Number of elements that affect the Plan of Care: 3: MODERATE COMPLEXITY CLINICAL PRESENTATION:  
Presentation: Evolving clinical presentation with changing clinical characteristics: MODERATE COMPLEXITY CLINICAL DECISION MAKIN [facility] [address] [address] AM-PAC 6 Clicks Basic Mobility Inpatient Short Form How much difficulty does the patient currently have. .. Unable A Lot A Little None 1. Turning over in bed (including adjusting bedclothes, sheets and blankets)? [] 1   [] 2   [x] 3   [] 4  
2. Sitting down on and standing up from a chair with arms ( e.g., wheelchair, bedside commode, etc.)   [] 1   [] 2   [x] 3   [] 4  
3. Moving from lying on back to sitting on the side of the bed? [] 1   [] 2   [x] 3   [] 4 How much help from another person does the patient currently need. .. Total A Lot A Little None 4. Moving to and from a bed to a chair (including a wheelchair)? [] 1   [] 2   [x] 3   [] 4  
5. Need to walk in hospital room? [] 1   [] 2   [x] 3   [] 4  
6. Climbing 3-5 steps with a railing? [] 1   [] 2   [x] 3   [] 4  
© , Trustees of [address] [place] [address] [address] [address], under license to [employer]. All rights reserved Score:  Initial: 18 Most Recent: X (Date: -- ) Interpretation of Tool:  Represents activities that are increasingly more difficult (i.e. Bed mobility, Transfers, Gait). Score 24 23 22-20 19-15 14-10 9-7 6 Modifier CH CI CJ CK CL CM CN   
 
? Mobility - Walking and Moving Around:  
  - CURRENT STATUS: CK - 40%-59% impaired, limited or restricted  - GOAL STATUS: CJ - 20%-39% impaired, limited or restricted  - D/C STATUS:  ---------------To be determined--------------- Payor: Cory Bruner MMP / Plan: [place] [employer] MMP / Product Type: Managed Care Medicare /   
 
Medical Necessity:    
· Patient demonstrates good rehab potential due to higher previous functional level. Reason for Services/Other Comments: 
· Patient continues to require skilled intervention due to medical complications, patient unable to attend/participate in therapy as expected and debility and decreased mobility. Use of outcome tool(s) and clinical judgement create a POC that gives a: Questionable prediction of patient's progress: MODERATE COMPLEXITY  
  
 
 
 
TREATMENT:  
(In addition to Assessment/Re-Assessment sessions the following treatments were rendered) Pre-treatment Symptoms/Complaints:  0/10 without pain reported. Pain: Initial:  
Pain Intensity 1: 0  Post Session: 0/10 without pain reported. Therapeutic Activity: (    8 mins ):  Therapeutic activities including Bed transfers to improve mobility, strength, balance and coordination. Required minimal Manual cues; Safety awareness training; Tactile cues; Verbal cues; Visual/Demos to promote motor control of bilateral, lower extremity(s). Date: 
 Date: 
 Date: Activity/Exercise Parameters Parameters Parameters Braces/Orthotics/Lines/Etc:  
· O2 Device: Room air Treatment/Session Assessment:   
· Response to Treatment:  Improved transfers, ambulation and mobility · Interdisciplinary Collaboration:  
o Physical Therapist 
o Registered Nurse · After treatment position/precautions:  
o Supine in bed 
o Bed alarm/tab alert on 
o Bed/Chair-wheels locked 
o Bed in low position 
o Call light within reach 
o Family at bedside 
o Side rails x 3  
· Compliance with Program/Exercises: Will assess as treatment progresses. · Recommendations/Intent for next treatment session: \"Next visit will focus on advancements to more challenging activities, reduction in assistance provided and transfers, ambulation and mobility. \". Total Treatment Duration: PT Patient Time In/Time Out Time In: 8983 Time Out: 1400 Mojgan Felton PT

## 2018-07-28 NOTE — ED TRIAGE NOTES
PT arrived to ED c/o vomiting blood and rectal bleeding after coming home from the hospital. PT was seen and given a cardiac work up. PT denies any blood thinners but takes 81 mg ASA.  PT was given 4 mg Zofran IM

## 2018-07-28 NOTE — ED PROVIDER NOTES
Patient is a 80 y.o. female presenting with hypoglycemia. The history is provided by the patient. No  was used. Low Blood Sugar This is a new problem. The current episode started less than 1 hour ago. The problem has been resolved. Associated symptoms include weakness. Pertinent negatives include no confusion, no somnolence, no seizures, no agitation, no delusions, no hallucinations, no self-injury and no violence. Mental status baseline is normal.  Risk factors: age. Her past medical history is significant for hypertension. 70-year-old female presenting after a syncopal episode at [facility]. She states she felt hungry and was looking for a pack of crackers she wanted to eat when she became suddenly lightheaded and passed out. She did not have chest pain or shortness of breath. This is happening her a couple times before. Past Medical History:  
Diagnosis Date  Anxiety 2/1/2018  Arrhythmia   
 palpitations 2/10-went to ER-[facility]  Arthritis L leg- fell 2008  Asthma   
 adult onset x 20 yrs  B12 deficiency 8/24/2012  Body mass index 37.0-37.9, adult 2/5/2014  CAD (coronary artery disease)   
 cardiac work up-9/0909-neg-Holter  Controlled type 2 diabetes mellitus with microalbuminuria, without long-term current use of insulin ([address] 11/22/2016  COPD  Corns and callosities 12/19/2016  CRI (chronic renal insufficiency) 8/24/2012  Depression 6/1/2012  Dermatophytosis of nail 12/19/2016  Diabetes ([address] type II- home tests fasting-112  DJD (degenerative joint disease) of hip  DM (diabetes mellitus) type II controlled with renal manifestation ([address] 7/16/2010  Encounter for long-term (current) use of other medications 1/8/2013  Essential hypertension 7/16/2010  Gastrointestinal disorder  GERD (gastroesophageal reflux disease)  Heart failure ([address]  Hiatal hernia 8/24/2012  HLD (hyperlipidemia) 1/8/2013  Neuropathy 8/24/2012 Lower limbs  Obesity (BMI 30.0-39. 9) BMI-43.8  Other and unspecified hyperlipidemia 7/16/2010  Other chest pain 7/16/2010  Other ill-defined conditions(799.89)   
 high cholesterol  Other ill-defined conditions(799.89)   
 incont. urine  PAD (peripheral artery disease) ([facility] [address] 8/24/2012  Retinal hemorrhage Past Surgical History:  
Procedure Laterality Date [address] UNLISTED  HX APPENDECTOMY  HX CHOLECYSTECTOMY [place]  HX ENDOSCOPY  02/27/2018 Dr Deana Mackay  HX GI    
 small intestine obstruction  HX GYN    
 hyst  
 HX HEART CATHETERIZATION    
 HX HYSTERECTOMY prolapse  HX INTRACRANIAL ANEURYSM REPAIR    
 HX INTRACRANIAL ANEURYSM REPAIR    
 HX ORTHOPAEDIC    
 right wrist 2010  HX OTHER SURGICAL    
 aneurysm clip-cerebral-2000  HX OTHER SURGICAL    
 cerebral aneurysm   Dr. Umesh Delatorre Family History:  
Problem Relation Age of Onset  Cancer Mother  Diabetes Father  Diabetes Paternal Grandfather Social History Social History  Marital status: SINGLE Spouse name: N/A  
 Number of children: N/A  
 Years of education: N/A Occupational History  Not on file. Social History Main Topics  Smoking status: Never Smoker  Smokeless tobacco: Never Used  Alcohol use No  
 Drug use: No  
 Sexual activity: No  
 
Other Topics Concern  Not on file Social History Narrative  with four children ALLERGIES: Review of patient's allergies indicates no known allergies. Review of Systems Constitutional: Negative for fatigue and fever. Neurological: Positive for weakness. Negative for seizures. Psychiatric/Behavioral: Negative for agitation, confusion, hallucinations and self-injury. All other systems reviewed and are negative. Vitals:  
 07/27/18 1910 BP: 130/68 Pulse: 74 Resp: 16 Temp: 97.6 °F (36.4 °C) SpO2: 97% Physical Exam  
Constitutional: She is oriented to person, place, and time. She appears well-developed and well-nourished. HENT:  
Head: Normocephalic and atraumatic. Eyes: Conjunctivae and EOM are normal. Pupils are equal, round, and reactive to light. Neck: Normal range of motion. Neck supple. Cardiovascular: Normal rate and regular rhythm. Pulmonary/Chest: Effort normal and breath sounds normal.  
Abdominal: Soft. Bowel sounds are normal.  
Musculoskeletal: Normal range of motion. Neurological: She is alert and oriented to person, place, and time. Skin: Skin is warm and dry. MDM Number of Diagnoses or Management Options Syncope and collapse:  
Diagnosis management comments: 80-year-old female presenting after a syncopal episode. It sounds longer induced. The patient is back to baseline and her vital signs are normal now Ekg was performed and shows. ormal sinus rhythm rate 65 and a right bundle branch block pattern otherwise no acute ischemic changes Amount and/or Complexity of Data Reviewed Clinical lab tests: ordered and reviewed Tests in the radiology section of CPT®: ordered and reviewed Tests in the medicine section of CPT®: ordered and reviewed Decide to obtain previous medical records or to obtain history from someone other than the patient: yes Independent visualization of images, tracings, or specimens: yes Risk of Complications, Morbidity, and/or Mortality Presenting problems: high Diagnostic procedures: high Management options: high Patient Progress Patient progress: improved ED Course Comment By Time Patient's labs are mostly normal except for slight elevation in the patient's creatinine over baseline. This consistent with mild dehydration. Other vital signs, physical exam, and EKG are reassuring. Shannon Bueno MD 07/27 9976 Procedures

## 2018-07-28 NOTE — ED PROVIDER NOTES
HPI Comments: 70-year-old lady presents with concerns about bloody bowel movements that started after she got home from her earlier ER visit. Patient was seen here earlier this evening for a syncopal episode. She said at that time she was not having any bloody bowel movements. However, after she got home she said she got out of her daughter's truck and started to have what felt like diarrhea. He then realized it was grossly bloody  And after sitting there for a short while trying to collect herself he decided to come back to the emergency department. She said that she has not had any actual vomiting, even though the triage chief complaints is vomiting blood. She says it has been bloody bowel movements and no vomiting blood. She denies any abdominal pain with this and says she has no prior history of similar problems. Elements of this note were created using speech recognition software. As such, errors of speech recognition may be present. Patient is a 80 y.o. female presenting with vomiting. The history is provided by the patient. Vomiting Blood Pertinent negatives include no chills, no fever, no abdominal pain, no diarrhea, no headaches, no arthralgias, no myalgias, no cough and no headaches. Past Medical History:  
Diagnosis Date  Anxiety 2/1/2018  Arrhythmia   
 palpitations 2/10-went to ER[facility]  Arthritis L leg- fell 2008  Asthma   
 adult onset x 20 yrs  B12 deficiency 8/24/2012  Body mass index 37.0-37.9, adult 2/5/2014  CAD (coronary artery disease)   
 cardiac work up-9/0909-neg-Holter  Controlled type 2 diabetes mellitus with microalbuminuria, without long-term current use of insulin ([address] 11/22/2016  COPD  Corns and callosities 12/19/2016  CRI (chronic renal insufficiency) 8/24/2012  Depression 6/1/2012  Dermatophytosis of nail 12/19/2016  Diabetes ([address] type II- home tests fasting-112  DJD (degenerative joint disease) of hip  DM (diabetes mellitus) type II controlled with renal manifestation ([facility] [address] 7/16/2010  Encounter for long-term (current) use of other medications 1/8/2013  Essential hypertension 7/16/2010  Gastrointestinal disorder  GERD (gastroesophageal reflux disease)  Heart failure ([facility] [address]  Hiatal hernia 8/24/2012  HLD (hyperlipidemia) 1/8/2013  Neuropathy 8/24/2012 Lower limbs  Obesity (BMI 30.0-39. 9) BMI-43.8  Other and unspecified hyperlipidemia 7/16/2010  Other chest pain 7/16/2010  Other ill-defined conditions(799.89)   
 high cholesterol  Other ill-defined conditions(799.89)   
 incont. urine  PAD [facility]peripheral artery disease) [facility] 8/24/2012  Retinal hemorrhage Past Surgical History:  
Procedure Laterality Date [address] [place] UNLISTED  HX APPENDECTOMY  [facility]STECTOMY [facility]  HX ENDOSCOPY  02/27/2018 Dr Porter De La Paz  HX GI    
 small intestine obstruction  HX GYN    
 hyst  
 HX HEART CATHETERIZATION    
 HX HYSTERECTOMY prolapse  HX INTRACRANIAL ANEURYSM REPAIR    
 HX INTRACRANIAL ANEURYSM REPAIR    
 HX ORTHOPAEDIC    
 right wrist 2010  HX OTHER SURGICAL    
 aneurysm clip-cerebral-2000  HX OTHER SURGICAL    
 cerebral aneurysm   Dr. Izzy Cervantes Family History:  
Problem Relation Age of Onset  Cancer Mother  Diabetes Father  Diabetes Paternal Grandfather Social History Social History  Marital status: SINGLE Spouse name: N/A  
 Number of children: N/A  
 Years of education: N/A Occupational History  Not on file. Social History Main Topics  Smoking status: Never Smoker  Smokeless tobacco: Never Used  Alcohol use No  
 Drug use: No  
 Sexual activity: No  
 
Other Topics Concern  Not on file Social History Narrative  with four children ALLERGIES: Review of patient's allergies indicates no known allergies.  
 
Review of Systems Constitutional: Negative for chills, diaphoresis and fever. HENT: Negative for congestion, rhinorrhea and sore throat. Eyes: Negative for redness and visual disturbance. Respiratory: Negative for cough, chest tightness, shortness of breath and wheezing. Cardiovascular: Negative for chest pain and palpitations. Gastrointestinal: Positive for blood in stool. Negative for abdominal pain, diarrhea, nausea and vomiting. Endocrine: Negative for polydipsia and polyuria. Genitourinary: Negative for dysuria and hematuria. Musculoskeletal: Negative for arthralgias, myalgias and neck stiffness. Skin: Negative for rash. Allergic/Immunologic: Negative for environmental allergies and food allergies. Neurological: Negative for dizziness, weakness and headaches. Hematological: Negative for adenopathy. Does not bruise/bleed easily. Psychiatric/Behavioral: Negative for confusion and sleep disturbance. The patient is not nervous/anxious. Vitals:  
 07/28/18 0126 BP: 149/67 Pulse: 76 Resp: 16 Temp: 97.9 °F (36.6 °C) SpO2: 92% Weight: 83 kg (183 lb) Height: 5' 7\" (1.702 m) Physical Exam  
Constitutional: She is oriented to person, place, and time. She appears well-developed and well-nourished. HENT:  
Head: Normocephalic and atraumatic. Eyes: Conjunctivae and EOM are normal. Pupils are equal, round, and reactive to light. Neck: Normal range of motion. Cardiovascular: Normal rate and regular rhythm. Pulmonary/Chest: Effort normal and breath sounds normal. No respiratory distress. She has no wheezes. She has no rales. She exhibits no tenderness. Abdominal: Soft. Bowel sounds are normal. There is no rebound and no guarding. Genitourinary:  
Genitourinary Comments: Bloody stool with red blood, no melena Musculoskeletal: Normal range of motion. She exhibits no edema or tenderness. Lymphadenopathy:  
  She has no cervical adenopathy.   
Neurological: She is alert and oriented to person, place, and time. Skin: Skin is warm and dry. Psychiatric: She has a normal mood and affect. Nursing note and vitals reviewed. MDM Number of Diagnoses or Management Options Diagnosis management comments: I will recheck her hemoglobin and metabolic panel. I will type and screen her and give her some IV fluids. Patient's hemoglobin is down by about 10%. Therefore I will give her a  Blood transfusion. 3:00 AM 
I discussed the case with the hospitalist who kindly agreed to see the patient. ED Course Procedures

## 2018-07-28 NOTE — DISCHARGE INSTRUCTIONS
Laboratory values look reassuring. Probably were slightly dehydrated. Be sure to drink and eat plentytonight. Please follow-up with her doctor the next week for reevaluation. Fainting: Care Instructions  Your Care Instructions    When you faint, or pass out, you lose consciousness for a short time. A brief drop in blood flow to the brain often causes it. When you fall or lie down, more blood flows to your brain and you regain consciousness. Emotional stress, pain, or overheating-especially if you have been standing-can make you faint. In these cases, fainting is usually not serious. But fainting can be a sign of a more serious problem. Your doctor may want you to have more tests to rule out other causes. The treatment you need depends on the reason why you fainted. The doctor has checked you carefully, but problems can develop later. If you notice any problems or new symptoms, get medical treatment right away. Follow-up care is a key part of your treatment and safety. Be sure to make and go to all appointments, and call your doctor if you are having problems. It's also a good idea to know your test results and keep a list of the medicines you take. How can you care for yourself at home? · Drink plenty of fluids to prevent dehydration. If you have kidney, heart, or liver disease and have to limit fluids, talk with your doctor before you increase your fluid intake. When should you call for help? Call 911 anytime you think you may need emergency care. For example, call if:    · You have symptoms of a heart problem. These may include:  ¨ Chest pain or pressure. ¨ Severe trouble breathing. ¨ A fast or irregular heartbeat. ¨ Lightheadedness or sudden weakness. ¨ Coughing up pink, foamy mucus. ¨ Passing out. After you call 911, the  may tell you to chew 1 adult-strength or 2 to 4 low-dose aspirin. Wait for an ambulance. Do not try to drive yourself.     · You have symptoms of a stroke.  These may include:  ¨ Sudden numbness, tingling, weakness, or loss of movement in your face, arm, or leg, especially on only one side of your body. ¨ Sudden vision changes. ¨ Sudden trouble speaking. ¨ Sudden confusion or trouble understanding simple statements. ¨ Sudden problems with walking or balance. ¨ A sudden, severe headache that is different from past headaches.     · You passed out (lost consciousness) again.    Watch closely for changes in your health, and be sure to contact your doctor if:    · You do not get better as expected. Where can you learn more? Go to http://nelia-mandy.info/. Enter X291 in the search box to learn more about \"Fainting: Care Instructions. \"  Current as of: November 20, 2017  Content Version: 11.7  © 4195-2589 [employer]. Care instructions adapted under license by [employer] (which disclaims liability or warranty for this information). If you have questions about a medical condition or this instruction, always ask your healthcare professional. [address] any warranty or liability for your use of this information.

## 2018-07-28 NOTE — PROGRESS NOTES
Hospitalist Progress Note Admit Date:  2018  1:22 AM  
Name:  Radha Brantley Age:  80 y.o. 
:  1929 MRN:  [id number] PCP:  Russ Moore MD 
Treatment Team: Attending Provider: Leeanne Rice MD; Consulting Provider: Arabella Madison MD 
 
Subjective:  
Patient is an 79yo female with PMH of DMII, COPD, HTN, HLD, CAD, Depression, and Vitamin B12 deficiency, who reported to the ER with reports of gerhard bleeding with stools. Patient was brought to the ER earlier in the day with a syncopal episode, was diagnosed with hypoglycemia and discharged home. When patient returned home she had a large maroon stool and felt light headed and returned to the ER. Patient Hgb dropped from 10.4 to 9.3 from previous visit. Today patient denies stools, n/v, dizziness, Abdominal pain, CP, SOB. GI consulted. No endoscopies planned at this time, but if patient has another BRB in stool, wants a stat NM bleed scan to localize bleed. Patient did complain of left knee pain to GI, xray ordered. Objective:  
Patient Vitals for the past 24 hrs: 
 Temp Pulse Resp BP SpO2  
18 1127 98.3 °F (36.8 °C) 79 16 138/65 96 %  
18 0826 - 89 16 171/67 97 %  
18 0808 98.4 °F (36.9 °C) 87 16 163/62 97 %  
18 0733 98.3 °F (36.8 °C) 77 16 154/68 97 %  
18 0701 - - - - 98 %  
18 0645 98.4 °F (36.9 °C) 73 16 149/69 99 % 18 0545 98 °F (36.7 °C) 76 16 162/78 97 %  
18 0530 98 °F (36.7 °C) 78 16 167/69 97 %  
18 0420 98.2 °F (36.8 °C) 84 16 177/70 97 %  
18 0321 - 75 - 172/74 92 %  
18 0300 - 76 - 174/75 93 % 18 0126 97.9 °F (36.6 °C) 76 16 149/67 92 % Oxygen Therapy O2 Sat (%): 96 % (18 1127) Pulse via Oximetry: 74 beats per minute (18) O2 Device: Room air (18) Intake/Output Summary (Last 24 hours) at 18 1312 Last data filed at 18 9642 Gross per 24 hour Intake            164.2 ml Output 0 ml  
Net            164.2 ml General:    Well nourished. Alert. CV:   RRR. No murmur, rub, or gallop. Lungs:   CTAB. No wheezing, rhonchi, or rales. Room air Abdomen:   Soft, nontender, nondistended. Bowel sounds present. Extremities: Warm and dry. No cyanosis or edema. Left knee tenderness Skin:     No rashes or jaundice. Data Review: 
I have reviewed all labs, meds, telemetry events, and studies from the last 24 hours. Recent Results (from the past 24 hour(s)) CBC WITH AUTOMATED DIFF Collection Time: 07/27/18  8:53 PM  
Result Value Ref Range WBC 8.5 4.3 - 11.1 K/uL  
 RBC 3.46 (L) 4.05 - 5.25 M/uL  
 HGB 10.4 (L) 11.7 - 15.4 g/dL HCT 33.1 (L) 35.8 - 46.3 % MCV 95.7 79.6 - 97.8 FL  
 MCH 30.1 26.1 - 32.9 PG  
 MCHC 31.4 31.4 - 35.0 g/dL  
 RDW 13.2 11.9 - 14.6 % PLATELET 747 003 - 129 K/uL MPV 10.0 (L) 10.8 - 14.1 FL  
 DF AUTOMATED NEUTROPHILS 73 43 - 78 % LYMPHOCYTES 21 13 - 44 % MONOCYTES 6 4.0 - 12.0 % EOSINOPHILS 0 (L) 0.5 - 7.8 % BASOPHILS 0 0.0 - 2.0 % IMMATURE GRANULOCYTES 0 0.0 - 5.0 %  
 ABS. NEUTROPHILS 6.2 1.7 - 8.2 K/UL  
 ABS. LYMPHOCYTES 1.8 0.5 - 4.6 K/UL  
 ABS. MONOCYTES 0.5 0.1 - 1.3 K/UL  
 ABS. EOSINOPHILS 0.0 0.0 - 0.8 K/UL  
 ABS. BASOPHILS 0.0 0.0 - 0.2 K/UL  
 ABS. IMM. GRANS. 0.0 0.0 - 0.5 K/UL PROTHROMBIN TIME + INR Collection Time: 07/27/18  8:53 PM  
Result Value Ref Range Prothrombin time 13.1 11.5 - 14.5 sec INR 1.0    
PTT Collection Time: 07/27/18  8:53 PM  
Result Value Ref Range aPTT 27.1 23.2 - 35.3 SEC MAGNESIUM Collection Time: 07/27/18  8:53 PM  
Result Value Ref Range Magnesium 2.0 1.8 - 2.4 mg/dL METABOLIC PANEL, COMPREHENSIVE Collection Time: 07/27/18  8:53 PM  
Result Value Ref Range Sodium 143 136 - 145 mmol/L Potassium 4.2 3.5 - 5.1 mmol/L Chloride 107 98 - 107 mmol/L  
 CO2 29 21 - 32 mmol/L Anion gap 7 7 - 16 mmol/L Glucose 120 (H) 65 - 100 mg/dL  BUN 27 (H) 8 - 23 MG/DL Creatinine 1.04 (H) 0.6 - 1.0 MG/DL  
 GFR est AA >60 >60 ml/min/1.73m2 GFR est non-AA 53 (L) >60 ml/min/1.73m2 Calcium 10.4 8.3 - 10.4 MG/DL Bilirubin, total 0.4 0.2 - 1.1 MG/DL  
 ALT (SGPT) 22 12 - 65 U/L  
 AST (SGOT) 22 15 - 37 U/L Alk. phosphatase 59 50 - 136 U/L Protein, total 6.6 6.3 - 8.2 g/dL Albumin 3.1 (L) 3.2 - 4.6 g/dL Globulin 3.5 2.3 - 3.5 g/dL A-G Ratio 0.9 (L) 1.2 - 3.5    
CBC WITH AUTOMATED DIFF Collection Time: 07/28/18  1:43 AM  
Result Value Ref Range WBC 11.9 (H) 4.3 - 11.1 K/uL  
 RBC 3.06 (L) 4.05 - 5.25 M/uL HGB 9.3 (L) 11.7 - 15.4 g/dL HCT 29.3 (L) 35.8 - 46.3 % MCV 95.8 79.6 - 97.8 FL  
 MCH 30.4 26.1 - 32.9 PG  
 MCHC 31.7 31.4 - 35.0 g/dL  
 RDW 13.2 11.9 - 14.6 % PLATELET 588 928 - 804 K/uL MPV 10.6 (L) 10.8 - 14.1 FL  
 DF AUTOMATED NEUTROPHILS 80 (H) 43 - 78 % LYMPHOCYTES 15 13 - 44 % MONOCYTES 5 4.0 - 12.0 % EOSINOPHILS 0 (L) 0.5 - 7.8 % BASOPHILS 0 0.0 - 2.0 % IMMATURE GRANULOCYTES 0 0.0 - 5.0 %  
 ABS. NEUTROPHILS 9.5 (H) 1.7 - 8.2 K/UL  
 ABS. LYMPHOCYTES 1.7 0.5 - 4.6 K/UL  
 ABS. MONOCYTES 0.6 0.1 - 1.3 K/UL  
 ABS. EOSINOPHILS 0.0 0.0 - 0.8 K/UL  
 ABS. BASOPHILS 0.0 0.0 - 0.2 K/UL  
 ABS. IMM. GRANS. 0.0 0.0 - 0.5 K/UL METABOLIC PANEL, COMPREHENSIVE Collection Time: 07/28/18  1:43 AM  
Result Value Ref Range Sodium 144 136 - 145 mmol/L Potassium 4.0 3.5 - 5.1 mmol/L Chloride 109 (H) 98 - 107 mmol/L  
 CO2 27 21 - 32 mmol/L Anion gap 8 7 - 16 mmol/L Glucose 289 (H) 65 - 100 mg/dL BUN 29 (H) 8 - 23 MG/DL Creatinine 1.11 (H) 0.6 - 1.0 MG/DL  
 GFR est AA 60 (L) >60 ml/min/1.73m2 GFR est non-AA 49 (L) >60 ml/min/1.73m2 Calcium 9.5 8.3 - 10.4 MG/DL Bilirubin, total 0.4 0.2 - 1.1 MG/DL  
 ALT (SGPT) 20 12 - 65 U/L  
 AST (SGOT) 15 15 - 37 U/L Alk. phosphatase 57 50 - 136 U/L Protein, total 5.8 (L) 6.3 - 8.2 g/dL Albumin 2.9 (L) 3.2 - 4.6 g/dL  Globulin 2.9 2.3 - 3.5 g/dL A-G Ratio 1.0 (L) 1.2 - 3.5 TYPE & SCREEN Collection Time: 07/28/18  1:43 AM  
Result Value Ref Range Crossmatch Expiration 07/31/2018 ABO/Rh(D) AB POSITIVE Antibody screen NEG Unit number [id number] Blood component type RC LR Unit division 00 Status of unit ISSUED Crossmatch result Compatible CBC W/O DIFF Collection Time: 07/28/18  4:59 AM  
Result Value Ref Range WBC 9.2 4.3 - 11.1 K/uL  
 RBC 2.85 (L) 4.05 - 5.25 M/uL HGB 8.4 (L) 11.7 - 15.4 g/dL HCT 27.3 (L) 35.8 - 46.3 % MCV 95.8 79.6 - 97.8 FL  
 MCH 29.5 26.1 - 32.9 PG  
 MCHC 30.8 (L) 31.4 - 35.0 g/dL  
 RDW 13.2 11.9 - 14.6 % PLATELET 550 080 - 184 K/uL MPV 10.1 (L) 10.8 - 14.1 FL  
CBC W/O DIFF Collection Time: 07/28/18 10:47 AM  
Result Value Ref Range WBC 7.4 4.3 - 11.1 K/uL  
 RBC 3.11 (L) 4.05 - 5.25 M/uL HGB 9.2 (L) 11.7 - 15.4 g/dL HCT 28.5 (L) 35.8 - 46.3 % MCV 91.6 79.6 - 97.8 FL  
 MCH 29.6 26.1 - 32.9 PG  
 MCHC 32.3 31.4 - 35.0 g/dL  
 RDW 15.8 (H) 11.9 - 14.6 % PLATELET 767 281 - 921 K/uL MPV 10.2 (L) 10.8 - 14.1 FL All Micro Results None Current Meds: 
Current Facility-Administered Medications Medication Dose Route Frequency  0.9% sodium chloride infusion  100 mL/hr IntraVENous CONTINUOUS  
 0.9% sodium chloride infusion 250 mL  250 mL IntraVENous PRN  
 sodium chloride (NS) flush 5-10 mL  5-10 mL IntraVENous Q8H  
 sodium chloride (NS) flush 5-10 mL  5-10 mL IntraVENous PRN  
 ondansetron (ZOFRAN) injection 4 mg  4 mg IntraVENous Q4H PRN  
 LORazepam (ATIVAN) injection 1 mg  1 mg IntraVENous Q6H PRN  
 0.9% sodium chloride infusion 250 mL  250 mL IntraVENous PRN  
 acetaminophen (TYLENOL) tablet 650 mg  650 mg Oral Q6H PRN  
 albuterol (PROVENTIL VENTOLIN) nebulizer solution 2.5 mg  2.5 mg Nebulization Q6H PRN  
 amLODIPine (NORVASC) tablet 10 mg  10 mg Oral DAILY  benazepril (LOTENSIN) tablet 40 mg 40 mg Oral DAILY  gabapentin (NEURONTIN) capsule 100 mg  100 mg Oral BID  hydrALAZINE (APRESOLINE) tablet 50 mg  50 mg Oral TID  pravastatin (PRAVACHOL) tablet 40 mg  40 mg Oral QHS  sertraline (ZOLOFT) tablet 50 mg  50 mg Oral DAILY  furosemide (LASIX) tablet 20 mg  20 mg Oral DAILY  potassium chloride (K-DUR, KLOR-CON) SR tablet 20 mEq  20 mEq Oral DAILY  budesonide (PULMICORT) 500 mcg/2 ml nebulizer suspension  500 mcg Nebulization BID RT And  
 albuterol (PROVENTIL VENTOLIN) nebulizer solution 2.5 mg  2.5 mg Nebulization Q6HWA RT Other Studies (last 24 hours): Xr Chest [facility] Result Date: 7/27/2018 Portable chest: History: Syncope. Comparison: 07/19/2017 Findings: A single view of the chest was obtained at 2008 hours. The cardiac and mediastinal silhouette are normal in size and configuration. The lungs and pleural spaces are clear. The pulmonary vascularity is within normal limits. Impression: Unremarkable portable chest radiograph Assessment and Plan:  
 
Hospital Problems as of 7/28/2018  Date Reviewed: 6/5/2018 Codes Class Noted - Resolved POA  
 GI bleed ICD-10-CM: K92.2 ICD-9-CM: 578.9  7/28/2018 - Present Unknown Syncope ICD-10-CM: R55 
ICD-9-CM: 780.2  7/28/2018 - Present Unknown Type 2 diabetes mellitus with diabetic neuropathy (HCC) ICD-10-CM: E11.40 ICD-9-CM: 250.60, 357.2  5/16/2018 - Present Yes Essential hypertension ICD-10-CM: I10 
ICD-9-CM: 401.9  7/16/2010 - Present Yes GERD (gastroesophageal reflux disease) (Chronic) ICD-10-CM: K21.9 ICD-9-CM: 530.81  7/16/2010 - Present Yes PLAN:   
 
GI Bleed -Monitor H&H 
-If BRB in stool, order stat NM scan 
-Clear liquid diet 
-1 unit PRBC 7/28 Syncope 
-telemetry 
-fall precautions -PT/OT 
-UA & CS 
-Orthostatic blood pressures -IVF DMII -HgbA1c 6.4 
-SSI 
 
HTN 
-Continue home medications DC planning/Dispo:  Home with daughter.  HH? 
DVT ppx: SCD's Plan of care discussed with Dr. Karri Reyna Signed: 
Phoenix De Leon NP

## 2018-07-28 NOTE — IP AVS SNAPSHOT
Yasmeen Acosta 
 
 
 [address] 
[phone] Patient: Royer Toledo MRN: [id number] :1929 A check katrina indicates which time of day the medication should be taken. My Medications CHANGE how you take these medications Instructions Each Dose to Equal  
 Morning Noon Evening Bedtime  
 hydrALAZINE 50 mg tablet Commonly known as:  APRESOLINE What changed:  how much to take Take 2 Tabs by mouth three (3) times daily. 100 mg CONTINUE taking these medications Instructions Each Dose to Equal  
 Morning Noon Evening Bedtime  
 albuterol 90 mcg/actuation inhaler Commonly known as:  PROVENTIL HFA, VENTOLIN HFA, PROAIR HFA Take 2 Puffs by inhalation every six (6) hours as needed for Wheezing. 2 Puff  
    
  
   
   
  
   
  
 amLODIPine 10 mg tablet Commonly known as:  Joshua Presser Take 1 Tab by mouth daily. 10 mg  
    
  
   
   
   
  
 benazepril 40 mg tablet Commonly known as:  LOTENSIN Take 1 Tab by mouth daily. 40 mg Blood-Glucose Meter monitoring kit Pt uses true metrix meter and tests once daily dx code E11.29 CENTRUM SILVER PO Take  by mouth.  
     
  
   
   
   
  
 cyanocobalamin (vitamin B-12) 1,000 mcg/mL Kit  
   
 1,000 mcg by Injection route every month. 1000 mcg  
    
  
   
   
   
  
 fluticasone 50 mcg/actuation nasal spray Commonly known as:  Euna Bradly INSTILL 1 SPRAY IN EACH NOSTRIL NIGHTLY AS DIRECTED  
     
  
   
   
   
  
 fluticasone-salmeterol 250-50 mcg/dose diskus inhaler Commonly known as:  ADVAIR DISKUS Take 1 Puff by inhalation every twelve (12) hours. 1 Puff  
    
  
   
   
  
   
  
 furosemide 20 mg tablet Commonly known as:  LASIX Take 1 Tab by mouth daily.   
 20 mg  
    
  
   
   
   
  
 gabapentin 100 mg capsule Commonly known as:  NEURONTIN  
   
 TAKE 1 CAPSULE BY MOUTH TWICE DAILY  
     
  
   
   
  
   
  
 glucose blood VI test strips strip Commonly known as:  blood glucose test  
   
 Pt uses true metrix test strips. Pt tests once daily. Dx code E11.29  
     
  
   
   
   
  
 GLUCOTROL XL 2.5 mg CR tablet Generic drug:  glipiZIDE SR Take 2.5 mg by mouth daily. 2.5 mg Lancets Misc Pt tests three times daily Diagnosis Code: 250.00 LORazepam 0.5 mg tablet Commonly known as:  ATIVAN Take 1 Tab by mouth daily as needed for Anxiety. 0.5 mg  
    
  
   
   
   
  
 metFORMIN 500 mg tablet Commonly known as:  GLUCOPHAGE Take 1 Tab by mouth two (2) times daily (with meals). 500 mg  
    
  
   
   
  
   
  
 OTHER One shower chair  
     
  
   
   
   
  
 polyethylene glycol 17 gram/dose powder Commonly known as:  Moya Alex Take 17 g by mouth daily. 1 tablespoon with 8 oz of water daily 17 g  
    
  
   
   
   
  
 potassium chloride 20 mEq tablet Commonly known as:  K-DUR, KLOR-CON  
   
 TAKE 1 TABLET BY MOUTH EVERY MORNING  
     
  
   
   
   
  
 * pravastatin 40 mg tablet Commonly known as:  PRAVACHOL  
   
 TAKE 1 TABLET BY MOUTH EVERY NIGHT AT BEDTIME  
     
   
   
   
  
  
 * pravastatin 40 mg tablet Commonly known as:  PRAVACHOL  
   
 TAKE 1 TABLET BY MOUTH EVERY NIGHT AT BEDTIME  
     
   
   
   
  
  
 PRESERVISION AREDS PO Take 1 Tab by mouth two (2) times a day. 1 Tab  
    
  
   
   
  
   
  
 sertraline 100 mg tablet Commonly known as:  ZOLOFT Take 0.5 Tabs by mouth daily. Indications: major depressive disorder, am  
 50 mg  
    
  
   
   
   
  
 * Notice: This list has 2 medication(s) that are the same as other medications prescribed for you.  Read the directions carefully, and ask your doctor or other care provider to review them with you. STOP taking these medications   
 aspirin 81 mg tablet Where to Get Your Medications Information on where to get these meds will be given to you by the nurse or doctor. ! Ask your nurse or doctor about these medications  
  hydrALAZINE 50 mg tablet

## 2018-07-28 NOTE — ED NOTES
Pt provided with pillow and blanket upon request. Family member at bedside. Vital signs stable and this time and no signs of distress noted. PT denies need for further comfort measures at this time.

## 2018-07-28 NOTE — CONSULTS
[facility] Consult Note       Primary GI Physician: Dr. Cornelio Ray    Referring Physician:  Dr. Cornelio Ray Date:  7/28/2018    Admit Date:  7/28/2018    Chief Complaint:  GIB    Subjective:     History of Present Illness:  Patient is a 80 y.o. female with PMH of Type 2 DM, COPD, HTN, HLD, Vit B12 deficiency, CAD, Depression, colon polyps with sigmoid resection, diverticulosis (and -itis), and esophageal strictures who is seen in consultation at the request of Dr. Yasmeen Rosales for LGIB. Patient had a syncopal episode and fell in [employer] and apparently was transported to the ER where she was diagnosed with hypoglycemia and discharged home. Unfortunately, a short time later she had a large bloody maroon stool associated with lightheadedness and returned to the ER  Where gerhard blood was noted on rectal exam. Hgb 9.3 (10.4 last night) on admission and 8.4 now. She had one episode of N/V without bleeding per pt and daughter at bedside. She denies abdominal pain and has not had any recent change in bowel habits prior to this event. She is an established pt of Dr. Dennie Francis and has hx of sigmoid resection due to large benign polyp. She also has hx of diverticulosis on last colonoscopy in 2007. She has chronic dysphagia and underwent EGD with dilation Feb 2018. She no longer has dysphagia. She denies any further N/V or bleeding.  BP is stable.        PMH:  Past Medical History:   Diagnosis Date    Anxiety 2/1/2018    Arrhythmia     palpitations 2/10-went to ER-[facility]    Arthritis     L leg- fell 2008    Asthma     adult onset x 20 yrs    B12 deficiency 8/24/2012    Body mass index 37.0-37.9, adult 2/5/2014    CAD (coronary artery disease)     cardiac work up-9/0909-neg-Holter    Controlled type 2 diabetes mellitus with microalbuminuria, without long-term current use of insulin ([facility] [address] 11/22/2016    COPD     Corns and callosities 12/19/2016    CRI (chronic renal insufficiency) 8/24/2012    Depression 6/1/2012    Dermatophytosis of nail 12/19/2016    Diabetes ([facility] [address]     type II- home tests fasting-112    DJD (degenerative joint disease) of hip     DM (diabetes mellitus) type II controlled with renal manifestation ([address] 7/16/2010    Encounter for long-term (current) use of other medications 1/8/2013    Essential hypertension 7/16/2010    Gastrointestinal disorder     GERD (gastroesophageal reflux disease)     Heart failure ([facility] [address]     Hiatal hernia 8/24/2012    HLD (hyperlipidemia) 1/8/2013    Neuropathy 8/24/2012    Lower limbs     Obesity (BMI 30.0-39. 9) BMI-43.8    Other and unspecified hyperlipidemia 7/16/2010    Other chest pain 7/16/2010    Other ill-defined conditions(799.89)     high cholesterol    Other ill-defined conditions(799.89)     incont. urine    PAD (peripheral artery disease) ([facility] [address] 8/24/2012    Retinal hemorrhage        PSH:  Past Surgical History:   Procedure Laterality Date    ABDOMEN SURGERY PROC UNLISTED      HX APPENDECTOMY      HX CHOLECYSTECTOMY      HX CHOLECYSTECTOMY  2000    HX ENDOSCOPY  02/27/2018    Dr Delta Fischer    HX GI      small intestine obstruction    HX GYN      hyst    HX HEART CATHETERIZATION      HX HYSTERECTOMY      prolapse    HX INTRACRANIAL ANEURYSM REPAIR      HX INTRACRANIAL ANEURYSM REPAIR      HX ORTHOPAEDIC      right wrist 2010    HX OTHER SURGICAL      aneurysm clip-cerebral-2000    HX OTHER SURGICAL      cerebral aneurysm   Dr. Jean-Claude Navarrete       Allergies:  No Known Allergies    Home Medications:  Prior to Admission medications    Medication Sig Start Date End Date Taking?  Authorizing Provider   fluticasone (FLONASE) 50 mcg/actuation nasal spray INSTILL 1 SPRAY IN EACH NOSTRIL NIGHTLY AS DIRECTED 7/25/18   Mary Fernando MD   potassium chloride (K-DUR, KLOR-CON) 20 mEq tablet TAKE 1 TABLET BY MOUTH EVERY MORNING 7/10/18   Mary Fernando MD   sertraline (ZOLOFT) 100 mg tablet TAKE 1/2 TABLET BY MOUTH DAILY 6/28/18   Brian Tovar MD cyanocobalamin, vitamin B-12, 1,000 mcg/mL kit 1,000 mcg by Injection route every month. 5/16/18   Mary Fernando MD   glipiZIDE SR (GLUCOTROL XL) 2.5 mg CR tablet TAKE 1 TABLET BY MOUTH EVERY DAY AFTER BREAKFAST 5/16/18   Mary Fernando MD   furosemide (LASIX) 20 mg tablet Take 1 Tab by mouth daily. 5/16/18   Mary Fernando MD   potassium chloride (K-DUR, KLOR-CON) 20 mEq tablet TAKE 1 TABLET BY MOUTH EVERY MORNING 5/16/18   Mary Fernando MD   pravastatin (PRAVACHOL) 40 mg tablet TAKE 1 TABLET BY MOUTH EVERY NIGHT AT BEDTIME 5/16/18   Mary Fernando MD   albuterol (PROVENTIL HFA, VENTOLIN HFA, PROAIR HFA) 90 mcg/actuation inhaler Take 2 Puffs by inhalation every six (6) hours as needed for Wheezing. 5/16/18   Mary Fernando MD   LORazepam (ATIVAN) 0.5 mg tablet Take 1 Tab by mouth daily as needed for Anxiety. 5/16/18   Jerome Darnell MD   sertraline (ZOLOFT) 100 mg tablet Take 0.5 Tabs by mouth daily. Indications: major depressive disorder, am 2/1/18   Jerome Darnell MD   hydrALAZINE (APRESOLINE) 50 mg tablet Take 1 Tab by mouth three (3) times daily. 2/1/18   Jerome Darnell MD   fluticasone-salmeterol (ADVAIR DISKUS) 250-50 mcg/dose diskus inhaler Take 1 Puff by inhalation every twelve (12) hours. 2/1/18   Mary Fernando MD   benazepril (LOTENSIN) 40 mg tablet Take 1 Tab by mouth daily. 2/1/18   Mary Fernando MD   amLODIPine (NORVASC) 10 mg tablet Take 1 Tab by mouth daily.  2/1/18   Jerome Darnell MD   pravastatin (PRAVACHOL) 40 mg tablet TAKE 1 TABLET BY MOUTH EVERY NIGHT AT BEDTIME 2/1/18   Mary Fernando MD   gabapentin (NEURONTIN) 100 mg capsule TAKE 1 CAPSULE BY MOUTH TWICE DAILY 2/1/18   Mary Fernando MD   fluticasone (FLONASE) 50 mcg/actuation nasal spray USE IN EACH NOSTRIL NIGHTLY AS DIRECTED 2/1/18   Mary Fernando MD   Blood-Glucose Meter monitoring kit Pt uses true metrix meter and tests once daily dx code E11.29 1/23/18   Jerome Darnell MD   metFORMIN (GLUCOPHAGE) 500 mg tablet Take 1 Tab by mouth two (2) times daily (with meals). 10/25/17   Mary Fernando MD   polyethylene glycol (MIRALAX) 17 gram/dose powder Take 17 g by mouth daily. 1 tablespoon with 8 oz of water daily 7/19/17   Babs Duran MD   FOLIC ACID/MULTIVIT-MIN/LUTEIN (CENTRUM SILVER PO) Take  by mouth. Historical Provider   OTHER One shower chair 4/19/17   Lupe Suarez MD   VIT A/VIT C/VIT E/ZINC/COPPER (PRESERVISION AREDS PO) Take 1 Tab by mouth two (2) times a day. Historical Provider   glucose blood VI test strips (BLOOD GLUCOSE TEST) strip Pt uses true metrix test strips. Pt tests once daily. Dx code E11.29 6/27/16   Lupe Suarez MD   Lancets Misc Pt tests three times daily Diagnosis Code: 250.00 7/15/13   Mary Fernando MD   ASPIRIN 81 mg Tab Take  by mouth daily.  Indications: 2am 2 pm-to stop now 5/27/10-    Historical Provider       JOSE ARMANDO IBARRA [facility] Medications:  Current Facility-Administered Medications   Medication Dose Route Frequency    0.9% sodium chloride infusion  100 mL/hr IntraVENous CONTINUOUS    0.9% sodium chloride infusion 250 mL  250 mL IntraVENous PRN    sodium chloride (NS) flush 5-10 mL  5-10 mL IntraVENous Q8H    sodium chloride (NS) flush 5-10 mL  5-10 mL IntraVENous PRN    ondansetron (ZOFRAN) injection 4 mg  4 mg IntraVENous Q4H PRN    LORazepam (ATIVAN) injection 1 mg  1 mg IntraVENous Q6H PRN    0.9% sodium chloride infusion 250 mL  250 mL IntraVENous PRN    acetaminophen (TYLENOL) tablet 650 mg  650 mg Oral Q6H PRN    albuterol (PROVENTIL VENTOLIN) nebulizer solution 2.5 mg  2.5 mg Nebulization Q6H PRN    amLODIPine (NORVASC) tablet 10 mg  10 mg Oral DAILY    benazepril (LOTENSIN) tablet 40 mg  40 mg Oral DAILY    gabapentin (NEURONTIN) capsule 100 mg  100 mg Oral BID    hydrALAZINE (APRESOLINE) tablet 50 mg  50 mg Oral TID    pravastatin (PRAVACHOL) tablet 40 mg  40 mg Oral QHS    sertraline (ZOLOFT) tablet 50 mg  50 mg Oral DAILY    furosemide (LASIX) tablet 20 mg  20 mg Oral DAILY    potassium chloride (K-DUR, KLOR-CON) SR tablet 20 mEq  20 mEq Oral DAILY    budesonide (PULMICORT) 500 mcg/2 ml nebulizer suspension  500 mcg Nebulization BID RT    And    albuterol (PROVENTIL VENTOLIN) nebulizer solution 2.5 mg  2.5 mg Nebulization Q6HWA RT       Social History:  Social History   Substance Use Topics    Smoking status: Never Smoker    Smokeless tobacco: Never Used    Alcohol use No       Pt denies any history of drug use, blood transfusions, or tattoos. Family History:  Family History   Problem Relation Age of Onset    Cancer Mother     Diabetes Father     Diabetes Paternal Grandfather        Review of Systems:  A detailed 10 system ROS is obtained, with pertinent positives as listed above. All others are negative. Diet:  NPO    Objective:     Physical Exam:  Vitals:  Visit Vitals    /62    Pulse 87    Temp 98.4 °F (36.9 °C)    Resp 16    Ht 5' 7\" (1.702 m)    Wt 83 kg (183 lb)    SpO2 97%    BMI 28.66 kg/m2     Gen:  Pt is alert, cooperative, no acute distress  Skin:  Extremities and face reveal no rashes. HEENT: Sclerae anicteric. Extra-occular muscles are intact. No oral ulcers. No abnormal pigmentation of the lips. The neck is supple. Cardiovascular: Regular rate and rhythm. No murmurs, gallops, or rubs. Respiratory:  Comfortable breathing with no accessory muscle use. Clear breath sounds anteriorly with no wheezes, rales, or rhonchi. GI:  Abdomen nondistended, soft, and minimally tender w/o rebound. Normal active bowel sounds. No enlargement of the liver or spleen. No masses palpable. Rectal:  Deferred  Musculoskeletal:  No pitting edema of the lower legs. Neurological:  Gross memory appears intact. Patient is alert and oriented. Psychiatric:  Mood appears appropriate with judgement intact. Lymphatic:  No cervical or supraclavicular adenopathy.     Laboratory:    Recent Labs      07/28/18   0459  07/28/18   0143  07/27/18 2053   WBC  9.2  11.9* 8. 5   HGB  8.4*  9.3*  10.4*   HCT  27.3*  29.3*  33.1*   PLT  164  187  186   MCV  95.8  95.8  95.7   NA   --   144  143   K   --   4.0  4.2   CL   --   109*  107   CO2   --   27  29   BUN   --   29*  27*   CREA   --   1.11*  1.04*   CA   --   9.5  10.4   MG   --    --   2.0   GLU   --   289*  120*   AP   --   57  59   SGOT   --   15  22   ALT   --   20  22   TBILI   --   0.4  0.4   ALB   --   2.9*  3.1*   TP   --   5.8*  6.6   PTP   --    --   13.1   INR   --    --   1.0   APTT   --    --   27.1          Assessment:     Active Problems:    Essential hypertension (7/16/2010)      GERD (gastroesophageal reflux disease) (7/16/2010)      Type 2 diabetes mellitus with diabetic neuropathy (HCC) (5/16/2018)      GI bleed (7/28/2018)      Syncope (7/28/2018)    80 y.o. female with PMH of Type 2 DM, COPD, HTN, HLD, Vit B12 deficiency, CAD, Depression, colon polyps with sigmoid resection, diverticulosis (and -itis), and esophageal strictures who is seen in consultation at the request of Dr. Kan Ramsey for probable diverticular bleed. Hgb 10.4 initially and now 8.4. No further overt bleed since admission. Plan:     - monitor H/H and transfuse prn  - if further marroon or BRB in stool, will need STAT NM bleed scan to localize bleed  - clear liquid diet for now. - no endoscopies planned at this time. [place], [place]      Patient is seen and examined in collaboration with Dr. Familia Vivar. Assessment and plan as per Dr. Familia Vivar.

## 2018-07-28 NOTE — H&P
HOSPITALIST H&P/CONSULT 
NAME:  Arnie Mccabe Age:  80 y.o. 
:   1929 MRN:   [id number] PCP: Mamta Dorsey MD 
Consulting MD: Treatment Team: Attending Provider: Soco Brown MD; Primary Nurse: Sina Reynoso HPI:  
81 yo F with PMH of Type 2 DM, COPD, HTN, HLD, Vit B12 deficiency, CAD and Depression presents to ER with reports of gerhard bloody stool. Patient was just dismissed from ER last night when she initially presented after syncopal episode thought to be due to hypoglycemia. Lab work and imagine was unremarkable and she was discharged from ER to home. On arrival to home she hard large blood BM and again felt lightheaded but did not lose consciousness. She returned to ER and gerhard blood noted on rectal exam. Hgb 9.3(10.4 last night). She denies any abd pain or previous episodes of hematochezia. She had colonoscopy a few years ago that was unremarkable. (please note: admission nurse documented bloody emesis but patient denies) Complete ROS done and is as stated in HPI or otherwise negative Past Medical History:  
Diagnosis Date  Anxiety 2018  Arrhythmia   
 palpitations 2/10-went to ER-[facility]  Arthritis L leg- fell   Asthma   
 adult onset x 20 yrs  B12 deficiency 2012  Body mass index 37.0-37.9, adult 2014  CAD (coronary artery disease)   
 cardiac work up-09-neg-Holter  Controlled type 2 diabetes mellitus with microalbuminuria, without long-term current use of insulin ([address] 2016  COPD  Corns and callosities 2016  CRI (chronic renal insufficiency) 2012  Depression 2012  Dermatophytosis of nail 2016  Diabetes ([address] type II- home tests fasting-112  DJD (degenerative joint disease) of hip  DM (diabetes mellitus) type II controlled with renal manifestation ([address] 2010  Encounter for long-term (current) use of other medications 2013  Essential hypertension 2010  Gastrointestinal disorder  GERD (gastroesophageal reflux disease)  Heart failure ([facility] [address]  Hiatal hernia 8/24/2012  HLD (hyperlipidemia) 1/8/2013  Neuropathy 8/24/2012 Lower limbs  Obesity (BMI 30.0-39. 9) BMI-43.8  Other and unspecified hyperlipidemia 7/16/2010  Other chest pain 7/16/2010  Other ill-defined conditions(799.89)   
 high cholesterol  Other ill-defined conditions(799.89)   
 incont. urine  PAD (peripheral artery disease) ([facility] [address] 8/24/2012  Retinal hemorrhage Past Surgical History:  
Procedure Laterality Date [address] UNLISTED  HX APPENDECTOMY  [facility]  HX ENDOSCOPY  02/27/2018 Dr Homer Melo  HX GI    
 small intestine obstruction  HX GYN    
 hyst  
 HX HEART CATHETERIZATION    
 HX HYSTERECTOMY prolapse  HX INTRACRANIAL ANEURYSM REPAIR    
 HX INTRACRANIAL ANEURYSM REPAIR    
 HX ORTHOPAEDIC    
 right wrist 2010  HX OTHER SURGICAL    
 aneurysm clip-cerebral-2000  HX OTHER SURGICAL    
 cerebral aneurysm   Dr. Tobin Hughes Prior to Admission Medications Prescriptions Last Dose Informant Patient Reported? Taking? ASPIRIN 81 mg Tab   Yes No  
Sig: Take  by mouth daily. Indications: 2am 2 pm-to stop now 5/27/10- Blood-Glucose Meter monitoring kit   No No  
Sig: Pt uses true metrix meter and tests once daily dx code G64.31  
FOLIC ACID/MULTIVIT-MIN/LUTEIN (CENTRUM SILVER PO)   Yes No  
Sig: Take  by mouth. LORazepam (ATIVAN) 0.5 mg tablet   No No  
Sig: Take 1 Tab by mouth daily as needed for Anxiety. Lancets Misc   No No  
Sig: Pt tests three times daily Diagnosis Code: 250.00 OTHER   No No  
Sig: One shower chair VIT A/VIT C/VIT E/ZINC/COPPER (PRESERVISION AREDS PO)   Yes No  
Sig: Take 1 Tab by mouth two (2) times a day.   
albuterol (PROVENTIL HFA, VENTOLIN HFA, PROAIR HFA) 90 mcg/actuation inhaler   No No  
Sig: Take 2 Puffs by inhalation every six (6) hours as needed for Wheezing. amLODIPine (NORVASC) 10 mg tablet   No No  
Sig: Take 1 Tab by mouth daily. benazepril (LOTENSIN) 40 mg tablet   No No  
Sig: Take 1 Tab by mouth daily. cyanocobalamin, vitamin B-12, 1,000 mcg/mL kit   No No  
Si,000 mcg by Injection route every month. fluticasone (FLONASE) 50 mcg/actuation nasal spray   No No  
Sig: USE IN EACH NOSTRIL NIGHTLY AS DIRECTED  
fluticasone (FLONASE) 50 mcg/actuation nasal spray   No No  
Sig: INSTILL 1 SPRAY IN EACH NOSTRIL NIGHTLY AS DIRECTED  
fluticasone-salmeterol (ADVAIR DISKUS) 250-50 mcg/dose diskus inhaler   No No  
Sig: Take 1 Puff by inhalation every twelve (12) hours. furosemide (LASIX) 20 mg tablet   No No  
Sig: Take 1 Tab by mouth daily. gabapentin (NEURONTIN) 100 mg capsule   No No  
Sig: TAKE 1 CAPSULE BY MOUTH TWICE DAILY  
glipiZIDE SR (GLUCOTROL XL) 2.5 mg CR tablet   No No  
Sig: TAKE 1 TABLET BY MOUTH EVERY DAY AFTER BREAKFAST  
glucose blood VI test strips (BLOOD GLUCOSE TEST) strip   No No  
Sig: Pt uses true metrix test strips. Pt tests once daily. Dx code E11.29  
hydrALAZINE (APRESOLINE) 50 mg tablet   No No  
Sig: Take 1 Tab by mouth three (3) times daily. metFORMIN (GLUCOPHAGE) 500 mg tablet   No No  
Sig: Take 1 Tab by mouth two (2) times daily (with meals). polyethylene glycol (MIRALAX) 17 gram/dose powder   No No  
Sig: Take 17 g by mouth daily. 1 tablespoon with 8 oz of water daily  
potassium chloride (K-DUR, KLOR-CON) 20 mEq tablet   No No  
Sig: TAKE 1 TABLET BY MOUTH EVERY MORNING  
potassium chloride (K-DUR, KLOR-CON) 20 mEq tablet   No No  
Sig: TAKE 1 TABLET BY MOUTH EVERY MORNING  
pravastatin (PRAVACHOL) 40 mg tablet   No No  
Sig: TAKE 1 TABLET BY MOUTH EVERY NIGHT AT BEDTIME  
pravastatin (PRAVACHOL) 40 mg tablet   No No  
Sig: TAKE 1 TABLET BY MOUTH EVERY NIGHT AT BEDTIME  
sertraline (ZOLOFT) 100 mg tablet   No No  
Sig: Take 0.5 Tabs by mouth daily.  Indications: major depressive disorder, am  
sertraline (ZOLOFT) 100 mg tablet   No No  
Sig: TAKE 1/2 TABLET BY MOUTH DAILY Facility-Administered Medications Last Administration Doses Remaining  
cyanocobalamin (VITAMIN B12) injection 1,000 mcg 2015 11:00 AM   
cyanocobalamin (VITAMIN B12) injection 1,000 mcg 2015 11:00 AM   
cyanocobalamin (VITAMIN B12) injection 1,000 mcg 12/10/2015  4:00 PM   
cyanocobalamin (VITAMIN B12) injection 1,000 mcg 2016 11:00 AM   
cyanocobalamin (VITAMIN B12) injection 1,000 mcg 2016  1:00 PM   
cyanocobalamin (VITAMIN B12) injection 1,000 mcg 2016  5:00 PM   
  
 
No Known Allergies Social History Substance Use Topics  Smoking status: Never Smoker  Smokeless tobacco: Never Used  Alcohol use No  
  
Family History Problem Relation Age of Onset  Cancer Mother  Diabetes Father  Diabetes Paternal Grandfather Objective:  
 
Visit Vitals  /75  Pulse 76  Temp 97.9 °F (36.6 °C)  Resp 16  
 Ht 5' 7\" (1.702 m)  Wt 83 kg (183 lb)  SpO2 93%  BMI 28.66 kg/m2 Temp (24hrs), Av.9 °F (36.6 °C), Min:97.9 °F (36.6 °C), Max:97.9 °F (36.6 °C) Oxygen Therapy O2 Sat (%): 93 % (18 0300) Pulse via Oximetry: 76 beats per minute (18 0300) O2 Device: Room air (18 0126) Physical Exam: 
General:    Alert, cooperative, no distress, appears stated age. Head:   Normocephalic, without obvious abnormality, atraumatic. Lungs:   Clear to auscultation bilaterally. No Wheezing or Rhonchi. No rales. Heart:   Regular rate and rhythm,  no murmur, rub or gallop. Abdomen:   Soft, non-tender. Not distended. Bowel sounds normal.  
Extremities: No cyanosis. Skin:     Texture, turgor normal.   
Neurologic: Alert and oriented x 3, no focal deficits Data Review:  
Recent Results (from the past 24 hour(s)) CBC WITH AUTOMATED DIFF Collection Time: 18  8:53 PM  
Result Value Ref Range  WBC 8.5 4.3 - 11.1 K/uL  
 RBC 3.46 (L) 4.05 - 5.25 M/uL  
 HGB 10.4 (L) 11.7 - 15.4 g/dL HCT 33.1 (L) 35.8 - 46.3 % MCV 95.7 79.6 - 97.8 FL  
 MCH 30.1 26.1 - 32.9 PG  
 MCHC 31.4 31.4 - 35.0 g/dL  
 RDW 13.2 11.9 - 14.6 % PLATELET 143 697 - 647 K/uL MPV 10.0 (L) 10.8 - 14.1 FL  
 DF AUTOMATED NEUTROPHILS 73 43 - 78 % LYMPHOCYTES 21 13 - 44 % MONOCYTES 6 4.0 - 12.0 % EOSINOPHILS 0 (L) 0.5 - 7.8 % BASOPHILS 0 0.0 - 2.0 % IMMATURE GRANULOCYTES 0 0.0 - 5.0 %  
 ABS. NEUTROPHILS 6.2 1.7 - 8.2 K/UL  
 ABS. LYMPHOCYTES 1.8 0.5 - 4.6 K/UL  
 ABS. MONOCYTES 0.5 0.1 - 1.3 K/UL  
 ABS. EOSINOPHILS 0.0 0.0 - 0.8 K/UL  
 ABS. BASOPHILS 0.0 0.0 - 0.2 K/UL  
 ABS. IMM. GRANS. 0.0 0.0 - 0.5 K/UL PROTHROMBIN TIME + INR Collection Time: 07/27/18  8:53 PM  
Result Value Ref Range Prothrombin time 13.1 11.5 - 14.5 sec INR 1.0    
PTT Collection Time: 07/27/18  8:53 PM  
Result Value Ref Range aPTT 27.1 23.2 - 35.3 SEC MAGNESIUM Collection Time: 07/27/18  8:53 PM  
Result Value Ref Range Magnesium 2.0 1.8 - 2.4 mg/dL METABOLIC PANEL, COMPREHENSIVE Collection Time: 07/27/18  8:53 PM  
Result Value Ref Range Sodium 143 136 - 145 mmol/L Potassium 4.2 3.5 - 5.1 mmol/L Chloride 107 98 - 107 mmol/L  
 CO2 29 21 - 32 mmol/L Anion gap 7 7 - 16 mmol/L Glucose 120 (H) 65 - 100 mg/dL BUN 27 (H) 8 - 23 MG/DL Creatinine 1.04 (H) 0.6 - 1.0 MG/DL  
 GFR est AA >60 >60 ml/min/1.73m2 GFR est non-AA 53 (L) >60 ml/min/1.73m2 Calcium 10.4 8.3 - 10.4 MG/DL Bilirubin, total 0.4 0.2 - 1.1 MG/DL  
 ALT (SGPT) 22 12 - 65 U/L  
 AST (SGOT) 22 15 - 37 U/L Alk. phosphatase 59 50 - 136 U/L Protein, total 6.6 6.3 - 8.2 g/dL Albumin 3.1 (L) 3.2 - 4.6 g/dL Globulin 3.5 2.3 - 3.5 g/dL A-G Ratio 0.9 (L) 1.2 - 3.5    
CBC WITH AUTOMATED DIFF Collection Time: 07/28/18  1:43 AM  
Result Value Ref Range WBC 11.9 (H) 4.3 - 11.1 K/uL  
 RBC 3.06 (L) 4.05 - 5.25 M/uL  HGB 9.3 (L) 11.7 - 15.4 g/dL  
 HCT 29.3 (L) 35.8 - 46.3 % MCV 95.8 79.6 - 97.8 FL  
 MCH 30.4 26.1 - 32.9 PG  
 MCHC 31.7 31.4 - 35.0 g/dL  
 RDW 13.2 11.9 - 14.6 % PLATELET 885 443 - 471 K/uL MPV 10.6 (L) 10.8 - 14.1 FL  
 DF AUTOMATED NEUTROPHILS 80 (H) 43 - 78 % LYMPHOCYTES 15 13 - 44 % MONOCYTES 5 4.0 - 12.0 % EOSINOPHILS 0 (L) 0.5 - 7.8 % BASOPHILS 0 0.0 - 2.0 % IMMATURE GRANULOCYTES 0 0.0 - 5.0 %  
 ABS. NEUTROPHILS 9.5 (H) 1.7 - 8.2 K/UL  
 ABS. LYMPHOCYTES 1.7 0.5 - 4.6 K/UL  
 ABS. MONOCYTES 0.6 0.1 - 1.3 K/UL  
 ABS. EOSINOPHILS 0.0 0.0 - 0.8 K/UL  
 ABS. BASOPHILS 0.0 0.0 - 0.2 K/UL  
 ABS. IMM. GRANS. 0.0 0.0 - 0.5 K/UL METABOLIC PANEL, COMPREHENSIVE Collection Time: 07/28/18  1:43 AM  
Result Value Ref Range Sodium 144 136 - 145 mmol/L Potassium 4.0 3.5 - 5.1 mmol/L Chloride 109 (H) 98 - 107 mmol/L  
 CO2 27 21 - 32 mmol/L Anion gap 8 7 - 16 mmol/L Glucose 289 (H) 65 - 100 mg/dL BUN 29 (H) 8 - 23 MG/DL Creatinine 1.11 (H) 0.6 - 1.0 MG/DL  
 GFR est AA 60 (L) >60 ml/min/1.73m2 GFR est non-AA 49 (L) >60 ml/min/1.73m2 Calcium 9.5 8.3 - 10.4 MG/DL Bilirubin, total 0.4 0.2 - 1.1 MG/DL  
 ALT (SGPT) 20 12 - 65 U/L  
 AST (SGOT) 15 15 - 37 U/L Alk. phosphatase 57 50 - 136 U/L Protein, total 5.8 (L) 6.3 - 8.2 g/dL Albumin 2.9 (L) 3.2 - 4.6 g/dL Globulin 2.9 2.3 - 3.5 g/dL A-G Ratio 1.0 (L) 1.2 - 3.5 Imaging [facility] [place] No results found. Assessment and Plan: Active Hospital Problems Diagnosis Date Noted  GI bleed 07/28/2018  Syncope 07/28/2018  Type 2 diabetes mellitus with diabetic neuropathy ([facility] [address] 05/16/2018  Essential hypertension 07/16/2010  GERD (gastroesophageal reflux disease) 07/16/2010 PLAN 
·  Admit to medical unit w/ Telemetry · Type and cross 1 unit PRBCs · Follow CBC q6 hrs · GI consult in am 
· NPO except meds and sips · PT/OT · Fall precautions Anticipated discharge: >2 midnights Signed By: Colt Kwon MD   
 July 28, 2018

## 2018-07-28 NOTE — ED NOTES
I have reviewed discharge instructions with the patient and caregiver. The patient and caregiver verbalized understanding. Patient left ED via Discharge Method: wheelchair to Home with family. Opportunity for questions and clarification provided. Patient given 0 scripts. To continue your aftercare when you leave the hospital, you may receive an automated call from our care team to check in on how you are doing. This is a free service and part of our promise to provide the best care and service to meet your aftercare needs.  If you have questions, or wish to unsubscribe from this service please call [phone]. Thank you for Choosing our [facility] Emergency Department.

## 2018-07-29 LAB
ANION GAP SERPL CALC-SCNC: 7 MMOL/L (ref 7–16)
BASOPHILS # BLD: 0 K/UL (ref 0–0.2)
BASOPHILS NFR BLD: 0 % (ref 0–2)
BUN SERPL-MCNC: 23 MG/DL (ref 8–23)
CALCIUM SERPL-MCNC: 8.6 MG/DL (ref 8.3–10.4)
CHLORIDE SERPL-SCNC: 115 MMOL/L (ref 98–107)
CO2 SERPL-SCNC: 26 MMOL/L (ref 21–32)
CREAT SERPL-MCNC: 0.84 MG/DL (ref 0.6–1)
DIFFERENTIAL METHOD BLD: ABNORMAL
EOSINOPHIL # BLD: 0.1 K/UL (ref 0–0.8)
EOSINOPHIL NFR BLD: 1 % (ref 0.5–7.8)
ERYTHROCYTE [DISTWIDTH] IN BLOOD BY AUTOMATED COUNT: 16 % (ref 11.9–14.6)
ERYTHROCYTE [DISTWIDTH] IN BLOOD BY AUTOMATED COUNT: 16 % (ref 11.9–14.6)
ERYTHROCYTE [DISTWIDTH] IN BLOOD BY AUTOMATED COUNT: 16.3 % (ref 11.9–14.6)
GLUCOSE SERPL-MCNC: 165 MG/DL (ref 65–100)
HCT VFR BLD AUTO: 25.5 % (ref 35.8–46.3)
HCT VFR BLD AUTO: 27.1 % (ref 35.8–46.3)
HCT VFR BLD AUTO: 28.4 % (ref 35.8–46.3)
HGB BLD-MCNC: 8.1 G/DL (ref 11.7–15.4)
HGB BLD-MCNC: 8.6 G/DL (ref 11.7–15.4)
HGB BLD-MCNC: 9 G/DL (ref 11.7–15.4)
IMM GRANULOCYTES # BLD: 0 K/UL (ref 0–0.5)
IMM GRANULOCYTES NFR BLD AUTO: 0 % (ref 0–5)
LYMPHOCYTES # BLD: 1.6 K/UL (ref 0.5–4.6)
LYMPHOCYTES NFR BLD: 30 % (ref 13–44)
MCH RBC QN AUTO: 29.3 PG (ref 26.1–32.9)
MCH RBC QN AUTO: 29.5 PG (ref 26.1–32.9)
MCH RBC QN AUTO: 29.6 PG (ref 26.1–32.9)
MCHC RBC AUTO-ENTMCNC: 31.7 G/DL (ref 31.4–35)
MCHC RBC AUTO-ENTMCNC: 31.7 G/DL (ref 31.4–35)
MCHC RBC AUTO-ENTMCNC: 31.8 G/DL (ref 31.4–35)
MCV RBC AUTO: 92.5 FL (ref 79.6–97.8)
MCV RBC AUTO: 92.8 FL (ref 79.6–97.8)
MCV RBC AUTO: 93.1 FL (ref 79.6–97.8)
MONOCYTES # BLD: 0.6 K/UL (ref 0.1–1.3)
MONOCYTES NFR BLD: 12 % (ref 4–12)
NEUTS SEG # BLD: 3 K/UL (ref 1.7–8.2)
NEUTS SEG NFR BLD: 57 % (ref 43–78)
PLATELET # BLD AUTO: 124 K/UL (ref 150–450)
PLATELET # BLD AUTO: 125 K/UL (ref 150–450)
PLATELET # BLD AUTO: 134 K/UL (ref 150–450)
PMV BLD AUTO: 10.1 FL (ref 10.8–14.1)
PMV BLD AUTO: 10.5 FL (ref 10.8–14.1)
PMV BLD AUTO: 9.7 FL (ref 10.8–14.1)
POTASSIUM SERPL-SCNC: 3.7 MMOL/L (ref 3.5–5.1)
RBC # BLD AUTO: 2.74 M/UL (ref 4.05–5.25)
RBC # BLD AUTO: 2.92 M/UL (ref 4.05–5.25)
RBC # BLD AUTO: 3.07 M/UL (ref 4.05–5.25)
SODIUM SERPL-SCNC: 148 MMOL/L (ref 136–145)
WBC # BLD AUTO: 5.2 K/UL (ref 4.3–11.1)
WBC # BLD AUTO: 6 K/UL (ref 4.3–11.1)
WBC # BLD AUTO: 6.6 K/UL (ref 4.3–11.1)

## 2018-07-29 PROCEDURE — 74011250637 HC RX REV CODE- 250/637: Performed by: FAMILY MEDICINE

## 2018-07-29 PROCEDURE — 36415 COLL VENOUS BLD VENIPUNCTURE: CPT | Performed by: FAMILY MEDICINE

## 2018-07-29 PROCEDURE — 77030020263 HC SOL INJ SOD CL0.9% LFCR 1000ML

## 2018-07-29 PROCEDURE — 74011000250 HC RX REV CODE- 250: Performed by: FAMILY MEDICINE

## 2018-07-29 PROCEDURE — 85025 COMPLETE CBC W/AUTO DIFF WBC: CPT | Performed by: FAMILY MEDICINE

## 2018-07-29 PROCEDURE — 94640 AIRWAY INHALATION TREATMENT: CPT

## 2018-07-29 PROCEDURE — C9113 INJ PANTOPRAZOLE SODIUM, VIA: HCPCS | Performed by: INTERNAL MEDICINE

## 2018-07-29 PROCEDURE — 94760 N-INVAS EAR/PLS OXIMETRY 1: CPT

## 2018-07-29 PROCEDURE — 80048 BASIC METABOLIC PNL TOTAL CA: CPT | Performed by: FAMILY MEDICINE

## 2018-07-29 PROCEDURE — 74011000250 HC RX REV CODE- 250: Performed by: INTERNAL MEDICINE

## 2018-07-29 PROCEDURE — 65270000029 HC RM PRIVATE

## 2018-07-29 PROCEDURE — 85027 COMPLETE CBC AUTOMATED: CPT | Performed by: FAMILY MEDICINE

## 2018-07-29 PROCEDURE — 74011250636 HC RX REV CODE- 250/636: Performed by: INTERNAL MEDICINE

## 2018-07-29 RX ADMIN — HYDRALAZINE HYDROCHLORIDE 50 MG: 50 TABLET, FILM COATED ORAL at 08:56

## 2018-07-29 RX ADMIN — SODIUM CHLORIDE 40 MG: 9 INJECTION, SOLUTION INTRAMUSCULAR; INTRAVENOUS; SUBCUTANEOUS at 08:55

## 2018-07-29 RX ADMIN — HYDRALAZINE HYDROCHLORIDE 50 MG: 50 TABLET, FILM COATED ORAL at 17:15

## 2018-07-29 RX ADMIN — POTASSIUM CHLORIDE 20 MEQ: 20 TABLET, EXTENDED RELEASE ORAL at 08:56

## 2018-07-29 RX ADMIN — GABAPENTIN 100 MG: 100 CAPSULE ORAL at 17:15

## 2018-07-29 RX ADMIN — SERTRALINE HYDROCHLORIDE 50 MG: 50 TABLET ORAL at 08:56

## 2018-07-29 RX ADMIN — AMLODIPINE BESYLATE 10 MG: 10 TABLET ORAL at 08:56

## 2018-07-29 RX ADMIN — ALBUTEROL SULFATE 2.5 MG: 2.5 SOLUTION RESPIRATORY (INHALATION) at 20:18

## 2018-07-29 RX ADMIN — BUDESONIDE 500 MCG: 0.5 INHALANT RESPIRATORY (INHALATION) at 20:17

## 2018-07-29 RX ADMIN — HYDRALAZINE HYDROCHLORIDE 50 MG: 50 TABLET, FILM COATED ORAL at 21:40

## 2018-07-29 RX ADMIN — GABAPENTIN 100 MG: 100 CAPSULE ORAL at 08:56

## 2018-07-29 RX ADMIN — BENAZEPRIL HYDROCHLORIDE 40 MG: 10 TABLET, FILM COATED ORAL at 08:56

## 2018-07-29 RX ADMIN — BUDESONIDE 500 MCG: 0.5 INHALANT RESPIRATORY (INHALATION) at 07:41

## 2018-07-29 RX ADMIN — PRAVASTATIN SODIUM 40 MG: 20 TABLET ORAL at 21:40

## 2018-07-29 RX ADMIN — FUROSEMIDE 20 MG: 20 TABLET ORAL at 08:57

## 2018-07-29 RX ADMIN — ALBUTEROL SULFATE 2.5 MG: 2.5 SOLUTION RESPIRATORY (INHALATION) at 07:41

## 2018-07-29 NOTE — PROGRESS NOTES
END OF SHIFT NOTE: 
 
INTAKE/OUTPUT 
07/28 0701 - 07/29 0700 In: 1098.2 [I.V.:934] Out: 200 [Urine:200] Voiding: YES Catheter: NO 
Drain:   
 
 
 
 
 
Flatus: Patient does have flatus present. Stool:  0 occurrences. Characteristics: 
Stool Assessment Stool Appearance: Bloody Emesis: 0 occurrences. Characteristics: VITAL SIGNS Patient Vitals for the past 12 hrs: 
 Temp Pulse Resp BP SpO2  
07/29/18 1458 98.8 °F (37.1 °C) 83 16 176/58 97 %  
07/29/18 1420 - - - - 98 %  
07/29/18 1054 98.8 °F (37.1 °C) 79 16 189/58 96 %  
07/29/18 0741 - - - - 98 % Pain Assessment Pain Intensity 1: 0 (07/28/18 2000) Pain Location 1: Knee Pain Intervention(s) 1: Medication (see MAR) Patient Stated Pain Goal: 0 Ambulating No 
 
Shift report given to oncoming nurse at the bedside. seen on consult by Ortho. Has voided large amount incont.  
 
Silverio Bruno RN

## 2018-07-29 NOTE — PROGRESS NOTES
Hospitalist Progress Note Admit Date:  2018  1:22 AM  
Name:  Angelica Eubanks Age:  80 y.o. 
:  1929 MRN:  [id number] PCP:  Mame Fletcher MD 
Treatment Team: Attending Provider: Heidi Cannon MD; Consulting Provider: Constantin Kendall MD 
 
Subjective:  
Patient is an 81yo female with PMH of DMII, COPD, HTN, HLD, CAD, Depression, and Vitamin B12 deficiency, who reported to the ER with reports of gerhard bleeding with stools. Patient was brought to the ER earlier in the day with a syncopal episode, was diagnosed with hypoglycemia and discharged home. When patient returned home she had a large maroon stool and felt light headed and returned to the ER. NM bleed scan  revealed positive GI bleed. GI continuing with conservative management. Continue to monitor H/H q 8 and transfuse to keep Hgb between 8-9. Hgb 8.6. Patient denies any stools overnight. Denies n/v/d, abdominal pain, CP, SOB. Complaints of left  knee pain. Left knee XR Showed nondisplaced patellar fracture. Ortho consulted. Objective:  
 
Patient Vitals for the past 24 hrs: 
 Temp Pulse Resp BP SpO2  
18 1054 98.8 °F (37.1 °C) 79 16 189/58 96 %  
18 0741 - - - - 98 %  
18 0640 98.2 °F (36.8 °C) 72 16 154/68 97 %  
18 0418 98.9 °F (37.2 °C) 75 18 133/68 97 %  
18 0003 98.4 °F (36.9 °C) 83 18 123/56 95 %  
18 2211 - 82 - 132/62 -  
18 2120 - - - - 94 %  
18 1321 - - - - 95 % Oxygen Therapy O2 Sat (%): 96 % (18 1054) Pulse via Oximetry: 73 beats per minute (18 0741) O2 Device: Room air (18 0741) Intake/Output Summary (Last 24 hours) at 18 1135 Last data filed at 18 7079 Gross per 24 hour Intake              934 ml Output              200 ml Net              734 ml General:    Well nourished. Alert.  elderly CV:   RRR. No murmur, rub, or gallop. Lungs:   CTAB. No wheezing, rhonchi, or rales. Room air Abdomen:   Soft, nontender, nondistended. Bowel sounds present. Extremities: Warm and dry. No cyanosis or edema. Left knee tenderness and edematous Skin:     No rashes or jaundice. Data Review: 
I have reviewed all labs, meds, telemetry events, and studies from the last 24 hours. Recent Results (from the past 24 hour(s)) CBC W/O DIFF Collection Time: 07/28/18  3:05 PM  
Result Value Ref Range WBC 8.1 4.3 - 11.1 K/uL  
 RBC 3.08 (L) 4.05 - 5.25 M/uL HGB 9.1 (L) 11.7 - 15.4 g/dL HCT 28.4 (L) 35.8 - 46.3 % MCV 92.2 79.6 - 97.8 FL  
 MCH 29.5 26.1 - 32.9 PG  
 MCHC 32.0 31.4 - 35.0 g/dL  
 RDW 16.3 (H) 11.9 - 14.6 % PLATELET 900 659 - 692 K/uL MPV 10.6 (L) 10.8 - 14.1 FL  
CULTURE, URINE Collection Time: 07/28/18 10:08 PM  
Result Value Ref Range Special Requests: NO SPECIAL REQUESTS Culture result:     
  NO GROWTH AFTER SHORT PERIOD OF INCUBATION. FURTHER RESULTS TO FOLLOW AFTER OVERNIGHT INCUBATION. METABOLIC PANEL, BASIC Collection Time: 07/29/18  5:01 AM  
Result Value Ref Range Sodium 148 (H) 136 - 145 mmol/L Potassium 3.7 3.5 - 5.1 mmol/L Chloride 115 (H) 98 - 107 mmol/L  
 CO2 26 21 - 32 mmol/L Anion gap 7 7 - 16 mmol/L Glucose 165 (H) 65 - 100 mg/dL BUN 23 8 - 23 MG/DL Creatinine 0.84 0.6 - 1.0 MG/DL  
 GFR est AA >60 >60 ml/min/1.73m2 GFR est non-AA >60 >60 ml/min/1.73m2 Calcium 8.6 8.3 - 10.4 MG/DL  
CBC WITH AUTOMATED DIFF Collection Time: 07/29/18  5:01 AM  
Result Value Ref Range WBC 5.2 4.3 - 11.1 K/uL  
 RBC 2.74 (L) 4.05 - 5.25 M/uL HGB 8.1 (L) 11.7 - 15.4 g/dL HCT 25.5 (L) 35.8 - 46.3 % MCV 93.1 79.6 - 97.8 FL  
 MCH 29.6 26.1 - 32.9 PG  
 MCHC 31.8 31.4 - 35.0 g/dL  
 RDW 16.3 (H) 11.9 - 14.6 % PLATELET 816 (L) 520 - 450 K/uL MPV 10.5 (L) 10.8 - 14.1 FL  
 DF AUTOMATED NEUTROPHILS 57 43 - 78 % LYMPHOCYTES 30 13 - 44 % MONOCYTES 12 4.0 - 12.0 % EOSINOPHILS 1 0.5 - 7.8 % BASOPHILS 0 0.0 - 2.0 %  IMMATURE GRANULOCYTES 0 0.0 - 5.0 %  
 ABS. NEUTROPHILS 3.0 1.7 - 8.2 K/UL  
 ABS. LYMPHOCYTES 1.6 0.5 - 4.6 K/UL  
 ABS. MONOCYTES 0.6 0.1 - 1.3 K/UL  
 ABS. EOSINOPHILS 0.1 0.0 - 0.8 K/UL  
 ABS. BASOPHILS 0.0 0.0 - 0.2 K/UL  
 ABS. IMM. GRANS. 0.0 0.0 - 0.5 K/UL  
CBC W/O DIFF Collection Time: 07/29/18  9:39 AM  
Result Value Ref Range WBC 6.0 4.3 - 11.1 K/uL  
 RBC 2.92 (L) 4.05 - 5.25 M/uL HGB 8.6 (L) 11.7 - 15.4 g/dL HCT 27.1 (L) 35.8 - 46.3 % MCV 92.8 79.6 - 97.8 FL  
 MCH 29.5 26.1 - 32.9 PG  
 MCHC 31.7 31.4 - 35.0 g/dL  
 RDW 16.0 (H) 11.9 - 14.6 % PLATELET 987 (L) 242 - 450 K/uL MPV 9.7 (L) 10.8 - 14.1 FL All Micro Results Procedure Component Value Units Date/Time Mark Ndiaye [[id number]] Collected:  07/28/18 2208 Order Status:  Completed Specimen:  Urine from Clean catch Updated:  07/29/18 5563 Special Requests: NO SPECIAL REQUESTS Culture result:      
  NO GROWTH AFTER SHORT PERIOD OF INCUBATION. FURTHER RESULTS TO FOLLOW AFTER OVERNIGHT INCUBATION. Current Meds: 
Current Facility-Administered Medications Medication Dose Route Frequency  0.9% sodium chloride infusion  100 mL/hr IntraVENous CONTINUOUS  
 0.9% sodium chloride infusion 250 mL  250 mL IntraVENous PRN  
 sodium chloride (NS) flush 5-10 mL  5-10 mL IntraVENous Q8H  
 sodium chloride (NS) flush 5-10 mL  5-10 mL IntraVENous PRN  
 ondansetron (ZOFRAN) injection 4 mg  4 mg IntraVENous Q4H PRN  
 LORazepam (ATIVAN) injection 1 mg  1 mg IntraVENous Q6H PRN  
 0.9% sodium chloride infusion 250 mL  250 mL IntraVENous PRN  
 acetaminophen (TYLENOL) tablet 650 mg  650 mg Oral Q6H PRN  
 albuterol (PROVENTIL VENTOLIN) nebulizer solution 2.5 mg  2.5 mg Nebulization Q6H PRN  
 amLODIPine (NORVASC) tablet 10 mg  10 mg Oral DAILY  benazepril (LOTENSIN) tablet 40 mg  40 mg Oral DAILY  gabapentin (NEURONTIN) capsule 100 mg  100 mg Oral BID  hydrALAZINE (APRESOLINE) tablet 50 mg  50 mg Oral TID  pravastatin (PRAVACHOL) tablet 40 mg  40 mg Oral QHS  sertraline (ZOLOFT) tablet 50 mg  50 mg Oral DAILY  furosemide (LASIX) tablet 20 mg  20 mg Oral DAILY  potassium chloride (K-DUR, KLOR-CON) SR tablet 20 mEq  20 mEq Oral DAILY  budesonide (PULMICORT) 500 mcg/2 ml nebulizer suspension  500 mcg Nebulization BID RT And  
 albuterol (PROVENTIL VENTOLIN) nebulizer solution 2.5 mg  2.5 mg Nebulization Q6HWA RT  
 pantoprazole (PROTONIX) 40 mg in sodium chloride 0.9% 10 mL injection  40 mg IntraVENous DAILY Other Studies (last 24 hours): 
Nm Acute Gi Bleed Scan Result Date: 7/28/2018 Nuclear medicine GI bleeding scan HISTORY: Blood in stool. TECHNIQUE: Patient was administered 26.0 mCi of technetium 99m labeled red blood cells. Imaging of the abdomen and pelvis was performed for 60 minutes. COMPARISON: None. Correlation is made to the CT scan of the abdomen and pelvis 07/19/2017. FINDINGS: There is abnormal accumulation of radiotracer activity immediately after imaging within the upper abdomen primarily at the midline. This faintly progresses antegrade within bowel loops. The anatomy based on recent CT scanning suggests that this is arising from the stomach although an additional possibility would be the transverse colon although this is less likely. IMPRESSION: Positive GI bleeding study likely involving the stomach with transverse colon a possible but less likely consideration. Xr Knee Lt 3 V Result Date: 7/28/2018 Left knee series HISTORY: Pain and difficulty weightbearing after fall. 3 views of the left knee were obtained. Comparison was made to the prior exam dated 12/23/2007. FINDINGS: There is a nondisplaced fracture at the midpole the patella. There is a moderate-sized joint effusion. The joint spaces are well-preserved. There is no bony destruction. There is diffuse osteopenia. Vascular calcifications are present.   
 
IMPRESSION: Nondisplaced patellar fracture. Assessment and Plan:  
 
Hospital Problems as of 7/29/2018  Date Reviewed: 6/5/2018 Codes Class Noted - Resolved POA  
 GI bleed ICD-10-CM: K92.2 ICD-9-CM: 578.9  7/28/2018 - Present Unknown Syncope ICD-10-CM: R55 
ICD-9-CM: 780.2  7/28/2018 - Present Unknown Type 2 diabetes mellitus with diabetic neuropathy (HCC) ICD-10-CM: E11.40 ICD-9-CM: 250.60, 357.2  5/16/2018 - Present Yes Essential hypertension ICD-10-CM: I10 
ICD-9-CM: 401.9  7/16/2010 - Present Yes GERD (gastroesophageal reflux disease) (Chronic) ICD-10-CM: K21.9 ICD-9-CM: 530.81  7/16/2010 - Present Yes PLAN:   
 
GI Bleed -Monitor H&H, keep Hgb 8-9 
-NM scan 7/28 
-Clear liquid diet 
-1 unit PRBC 7/28 Syncope 
-telemetry 
-fall precautions -PT/OT 
-UA & CS 
-Orthostatic blood pressures -IVF DMII -HgbA1c 6.4 
-SSI 
 
HTN 
-Continue home medications Left knee pain - nondisplaced patellar fracture 
-consult ortho DC planning/Dispo: STR? 
DVT ppx: SCD's  
 
Plan of care discussed with Dr. True Crane Signed: 
Basim Richardson NP

## 2018-07-29 NOTE — CONSULTS
[address]    Chucho Medeiros  MR#: [id number]  : 1929  ACCOUNT #: [de-identified]   DATE OF SERVICE: 2018    CONSULTING SERVICE:  Hospitalists. REASON FOR CONSULTATION:  Left nondisplaced patellar fracture. ACUTE COMPLAINT:  Left knee pain status post fall. HISTORY OF PRESENT ILLNESS:  The patient is an 31-year-old female who presented to [facility] ED yesterday after experiencing a syncopal episode. She was noted to have hypoglycemia and was discharged to home. She experienced a large maroon-colored stool when she returned home and felt lightheaded it prompted her to return to the ED where she was noted to have a significant interval drop in her hemoglobin from the day before. Nuclear medicine bleeding scan from  revealed positive GI bleed as well. The patient also reported upon admission that she had fell 2 days ago and landed on her left knee and subsequently developed left knee pain and swelling. Left knee x-rays were obtained, which revealed a nondisplaced mid pole patella fracture. Orthopedic service has been consulted for further evaluation and management of left patella fracture. The patient reports that the left knee pain is mild in severity at rest.  Denies any other new complaints. REVIEW OF SYSTEMS:  As per HPI, otherwise 10-point review of systems is negative. PAST MEDICAL HISTORY:  Significant for anxiety, arrhythmia, asthma, B12 deficiency, CAD, type 2 DM, COPD, depression, GERD, heart failure, hiatal hernia, dyslipidemia, neuropathy, PAD, retinal hemorrhage, obesity. PAST SURGICAL HISTORY:  Significant for appendectomy, cholecystectomy, bowel resection, hysterectomy, intracranial aneurysm repair. SOCIAL HISTORY:  Denies tobacco, alcohol or illicit drug use. ACTIVE MEDICATION LIST:  Reviewed. ALLERGIES:  NO KNOWN DRUG ALLERGIES. FAMILY HISTORY:  Noncontributory.     PHYSICAL EXAMINATION:    VITAL SIGNS:  Temperature is 98.8 degrees Fahrenheit, blood pressure is 176/58, pulse rate 83, respiratory rate 16, O2 saturation is 97% on room air. HEENT:  Normocephalic, atraumatic. Pupils are equally round and reactive to light. Extraocular motion intact. CARDIOVASCULAR:  No clubbing, cyanosis or edema present. MUSCULOSKELETAL:  Examination of left lower extremity reveals overlying skin intact and in good condition. No gross deformity present. The patient is able to actively raise left leg with the knee in full extension. There was palpable tenderness to palpation over the patella. There is no palpable defect present. Extensor mechanism is intact. Left tibial rotation is smooth and painless. NEUROLOGIC:  Alert and oriented x3. Cranial nerves II-XII are intact. Light touch sensation and motor function intact throughout bilateral lower extremities. SKIN:  Warm, dry, normal turgor. PSYCHIATRIC:  Appropriate mood and affect. LABORATORY DATA:  Left knee AP and lateral and oblique views obtained on 07/20/2018, which reveal acute nondisplaced transverse patellar fracture. CBC:  Hemoglobin is 9.0, hematocrit is 28.4. White blood cell count 6+6, platelets 204,090. BMP:  Sodium was 140, potassium is 3.7, chloride 115, CO2 26, BUN 23, creatinine 0.84, glucose 165. IMPRESSION:  Left nondisplaced transverse patellar fracture, left knee pain status post fall. PLAN:  Reviewed left knee x-ray findings with the patient, as well as with Dr. Lucas Fagan and it is felt that continued nonoperative management is appropriate at this time. We will have the patient's left lower extremity placed in a knee immobilizer which she is to wear to maintain knee in full extension when ambulating and weightbear as tolerated on the left lower extremity with the knee immobilizer in place. Continue acute pain management. Continue elevation of left lower extremity.   Apply cold therapy as needed to help alleviate knee joint swelling. The patient will need to follow up with Dr. Amber Robb in 2 weeks for recheck and repeat left knee x-rays and will sign off at this time but please reconsult if needed. We appreciate being allowed to participate in the care of this patient.       MICHAEL Jackson / FORD  D: 07/29/2018 17:44     T: 07/29/2018 19:51  JOB #: [id number]

## 2018-07-29 NOTE — PROGRESS NOTES
OCCUPATIONAL THERAPY NOTE: Held assessment/treatment this AM secondary to findings of \"nondisplaced patellar fracture\" on L and consult made to orthopedic surgery. Patient also with GI bleed per chart. Await recommendations from orthopedic surgery piror to initiating OT. Sheila Rodríguez.  OTTO Boo, OTR/L

## 2018-07-29 NOTE — PROGRESS NOTES
GI DAILY PROGRESS NOTE Admit Date:  7/28/2018 Today's Date:  7/29/2018 CC:  [facility] Subjective:  
 
Patient denies abdominal pain, N/V, or further bleeding to her knowledge. No overt bleeding overnight. hgb 8.1 down from 9.1 yesterday. NM bleed scan positive for bleed either stomach or transverse colon. Medications:  
Current Facility-Administered Medications Medication Dose Route Frequency  0.9% sodium chloride infusion  100 mL/hr IntraVENous CONTINUOUS  
 0.9% sodium chloride infusion 250 mL  250 mL IntraVENous PRN  
 sodium chloride (NS) flush 5-10 mL  5-10 mL IntraVENous Q8H  
 sodium chloride (NS) flush 5-10 mL  5-10 mL IntraVENous PRN  
 ondansetron (ZOFRAN) injection 4 mg  4 mg IntraVENous Q4H PRN  
 LORazepam (ATIVAN) injection 1 mg  1 mg IntraVENous Q6H PRN  
 0.9% sodium chloride infusion 250 mL  250 mL IntraVENous PRN  
 acetaminophen (TYLENOL) tablet 650 mg  650 mg Oral Q6H PRN  
 albuterol (PROVENTIL VENTOLIN) nebulizer solution 2.5 mg  2.5 mg Nebulization Q6H PRN  
 amLODIPine (NORVASC) tablet 10 mg  10 mg Oral DAILY  benazepril (LOTENSIN) tablet 40 mg  40 mg Oral DAILY  gabapentin (NEURONTIN) capsule 100 mg  100 mg Oral BID  hydrALAZINE (APRESOLINE) tablet 50 mg  50 mg Oral TID  pravastatin (PRAVACHOL) tablet 40 mg  40 mg Oral QHS  sertraline (ZOLOFT) tablet 50 mg  50 mg Oral DAILY  furosemide (LASIX) tablet 20 mg  20 mg Oral DAILY  potassium chloride (K-DUR, KLOR-CON) SR tablet 20 mEq  20 mEq Oral DAILY  budesonide (PULMICORT) 500 mcg/2 ml nebulizer suspension  500 mcg Nebulization BID RT And  
 albuterol (PROVENTIL VENTOLIN) nebulizer solution 2.5 mg  2.5 mg Nebulization Q6HWA RT  
 pantoprazole (PROTONIX) 40 mg in sodium chloride 0.9% 10 mL injection  40 mg IntraVENous DAILY Review of Systems: ROS was obtained, with pertinent positives as listed above. No chest pain or SOB. Diet:   
 
Objective:  
Vitals: 
Visit Vitals  /68  Pulse 72  Temp 98.2 °F (36.8 °C)  Resp 16  
 Ht 5' 7\" (1.702 m)  Wt 83 kg (183 lb)  SpO2 98%  BMI 28.66 kg/m2 Intake/Output: 
  
07/27 1901 - 07/29 0700 In: 1098.2 [I.V.:934] Out: 200 [Urine:200] Exam: 
General appearance: alert, cooperative, no distress Lungs: clear to auscultation bilaterally anteriorly Heart: regular rate and rhythm Abdomen: soft, non-tender. Bowel sounds normal. No masses, no organomegaly Extremities: extremities normal, atraumatic, no cyanosis or edema Neuro:  alert and oriented Data Review (Labs):   
Recent Labs  
   07/29/18 
 0501  07/28/18 
 1505  07/28/18 
 1047  07/28/18 
 0459  07/28/18 
 0143  07/27/18 
 2053 WBC  5.2  8.1  7.4  9.2  11.9*  8.5 HGB  8.1*  9.1*  9.2*  8.4*  9.3*  10.4* HCT  25.5*  28.4*  28.5*  27.3*  29.3*  33.1*  
PLT  124*  157  154  164  187  186 MCV  93.1  92.2  91.6  95.8  95.8  95.7 NA  148*   --    --    --   144  143  
K  3.7   --    --    --   4.0  4.2 CL  115*   --    --    --   109*  107 CO2  26   --    --    --   27  29 BUN  23   --    --    --   29*  27* CREA  0.84   --    --    --   1.11*  1.04* CA  8.6   --    --    --   9.5  10.4 MG   --    --    --    --    --   2.0  
GLU  165*   --    --    --   289*  120* AP   --    --    --    --   57  59 SGOT   --    --    --    --   15  22 ALT   --    --    --    --   20  22 TBILI   --    --    --    --   0.4  0.4 ALB   --    --    --    --   2.9*  3.1* TP   --    --    --    --   5.8*  6.6 PTP   --    --    --    --    --   13.1 INR   --    --    --    --    --   1.0 APTT   --    --    --    --    --   27.1 NM bleed scan 7/28: IMPRESSION: Positive GI bleeding study likely involving the stomach with transverse colon a possible but less likely consideration. Assessment:  
 
Active Problems: 
  Essential hypertension (7/16/2010) GERD (gastroesophageal reflux disease) (7/16/2010)   Type 2 diabetes mellitus with diabetic neuropathy ([facility] [address] (5/16/2018) GI bleed (7/28/2018) Syncope (7/28/2018) 80 y.o. female with PMH of Type 2 DM, COPD, HTN, HLD, Vit B12 deficiency, CAD, Depression, colon polyps with sigmoid resection, diverticulosis (and -itis), and esophageal strictures who is seen in consultation at the request of Dr. Janet Andino for probable diverticular bleed. Hgb 10.4 initially and now 8.1. No further overt bleed since admission. However, NM bleed scan positive as above - suggests source is stomach. Clinically, this is a diverticular bleed as she presented with painless hematochezia and has had no N/V/ hematemesis.  
  
 
Plan:  
 
- continue conservative management 
- monitor H/H q 8 
- transfuse prn to keep hgb  8-9 
- clear [facility] and examined in collaboration with Dr. Amelia Urena. Assessment and plan as per Dr. Amelia Urena.

## 2018-07-29 NOTE — PROGRESS NOTES
07/29/18 1420 Oxygen Therapy O2 Sat (%) 98 % Pulse via Oximetry 74 beats per minute O2 Device Room air

## 2018-07-30 ENCOUNTER — HOME HEALTH ADMISSION (OUTPATIENT)
Dept: HOME HEALTH SERVICES | Facility: HOME HEALTH | Age: 83
End: 2018-07-30
Payer: COMMERCIAL

## 2018-07-30 LAB
ANION GAP SERPL CALC-SCNC: 5 MMOL/L (ref 7–16)
BUN SERPL-MCNC: 13 MG/DL (ref 8–23)
CALCIUM SERPL-MCNC: 8.6 MG/DL (ref 8.3–10.4)
CHLORIDE SERPL-SCNC: 116 MMOL/L (ref 98–107)
CO2 SERPL-SCNC: 27 MMOL/L (ref 21–32)
CREAT SERPL-MCNC: 0.71 MG/DL (ref 0.6–1)
ERYTHROCYTE [DISTWIDTH] IN BLOOD BY AUTOMATED COUNT: 15.3 % (ref 11.9–14.6)
ERYTHROCYTE [DISTWIDTH] IN BLOOD BY AUTOMATED COUNT: 15.3 % (ref 11.9–14.6)
ERYTHROCYTE [DISTWIDTH] IN BLOOD BY AUTOMATED COUNT: 15.5 % (ref 11.9–14.6)
GLUCOSE SERPL-MCNC: 167 MG/DL (ref 65–100)
HCT VFR BLD AUTO: 26.2 % (ref 35.8–46.3)
HCT VFR BLD AUTO: 28.8 % (ref 35.8–46.3)
HCT VFR BLD AUTO: 30.5 % (ref 35.8–46.3)
HGB BLD-MCNC: 8.4 G/DL (ref 11.7–15.4)
HGB BLD-MCNC: 9.3 G/DL (ref 11.7–15.4)
HGB BLD-MCNC: 9.5 G/DL (ref 11.7–15.4)
MCH RBC QN AUTO: 29.3 PG (ref 26.1–32.9)
MCH RBC QN AUTO: 29.8 PG (ref 26.1–32.9)
MCH RBC QN AUTO: 30.1 PG (ref 26.1–32.9)
MCHC RBC AUTO-ENTMCNC: 31.1 G/DL (ref 31.4–35)
MCHC RBC AUTO-ENTMCNC: 32.1 G/DL (ref 31.4–35)
MCHC RBC AUTO-ENTMCNC: 32.3 G/DL (ref 31.4–35)
MCV RBC AUTO: 92.9 FL (ref 79.6–97.8)
MCV RBC AUTO: 93.2 FL (ref 79.6–97.8)
MCV RBC AUTO: 94.1 FL (ref 79.6–97.8)
PLATELET # BLD AUTO: 137 K/UL (ref 150–450)
PLATELET # BLD AUTO: 140 K/UL (ref 150–450)
PLATELET # BLD AUTO: 150 K/UL (ref 150–450)
PMV BLD AUTO: 10.5 FL (ref 10.8–14.1)
PMV BLD AUTO: 9.9 FL (ref 10.8–14.1)
PMV BLD AUTO: 9.9 FL (ref 10.8–14.1)
POTASSIUM SERPL-SCNC: 3 MMOL/L (ref 3.5–5.1)
POTASSIUM SERPL-SCNC: 3.5 MMOL/L (ref 3.5–5.1)
RBC # BLD AUTO: 2.82 M/UL (ref 4.05–5.25)
RBC # BLD AUTO: 3.09 M/UL (ref 4.05–5.25)
RBC # BLD AUTO: 3.24 M/UL (ref 4.05–5.25)
SODIUM SERPL-SCNC: 148 MMOL/L (ref 136–145)
WBC # BLD AUTO: 5.5 K/UL (ref 4.3–11.1)
WBC # BLD AUTO: 6.5 K/UL (ref 4.3–11.1)
WBC # BLD AUTO: 6.9 K/UL (ref 4.3–11.1)

## 2018-07-30 PROCEDURE — 84132 ASSAY OF SERUM POTASSIUM: CPT | Performed by: NURSE PRACTITIONER

## 2018-07-30 PROCEDURE — 74011250636 HC RX REV CODE- 250/636: Performed by: FAMILY MEDICINE

## 2018-07-30 PROCEDURE — 97530 THERAPEUTIC ACTIVITIES: CPT

## 2018-07-30 PROCEDURE — 65270000029 HC RM PRIVATE

## 2018-07-30 PROCEDURE — 85027 COMPLETE CBC AUTOMATED: CPT | Performed by: FAMILY MEDICINE

## 2018-07-30 PROCEDURE — 74011250637 HC RX REV CODE- 250/637: Performed by: FAMILY MEDICINE

## 2018-07-30 PROCEDURE — 97535 SELF CARE MNGMENT TRAINING: CPT

## 2018-07-30 PROCEDURE — 80048 BASIC METABOLIC PNL TOTAL CA: CPT | Performed by: NURSE PRACTITIONER

## 2018-07-30 PROCEDURE — 77030020263 HC SOL INJ SOD CL0.9% LFCR 1000ML

## 2018-07-30 PROCEDURE — 94640 AIRWAY INHALATION TREATMENT: CPT

## 2018-07-30 PROCEDURE — 36415 COLL VENOUS BLD VENIPUNCTURE: CPT | Performed by: NURSE PRACTITIONER

## 2018-07-30 PROCEDURE — 74011250636 HC RX REV CODE- 250/636: Performed by: NURSE PRACTITIONER

## 2018-07-30 PROCEDURE — 74011000250 HC RX REV CODE- 250: Performed by: INTERNAL MEDICINE

## 2018-07-30 PROCEDURE — C9113 INJ PANTOPRAZOLE SODIUM, VIA: HCPCS | Performed by: INTERNAL MEDICINE

## 2018-07-30 PROCEDURE — L1830 KO IMMOB CANVAS LONG PRE OTS: HCPCS

## 2018-07-30 PROCEDURE — 74011250636 HC RX REV CODE- 250/636: Performed by: INTERNAL MEDICINE

## 2018-07-30 PROCEDURE — 74011000250 HC RX REV CODE- 250: Performed by: FAMILY MEDICINE

## 2018-07-30 PROCEDURE — 94760 N-INVAS EAR/PLS OXIMETRY 1: CPT

## 2018-07-30 PROCEDURE — 97165 OT EVAL LOW COMPLEX 30 MIN: CPT

## 2018-07-30 RX ORDER — POTASSIUM CHLORIDE 14.9 MG/ML
20 INJECTION INTRAVENOUS
Status: COMPLETED | OUTPATIENT
Start: 2018-07-30 | End: 2018-07-31

## 2018-07-30 RX ORDER — GLIPIZIDE 2.5 MG/1
2.5 TABLET, EXTENDED RELEASE ORAL DAILY
COMMUNITY
End: 2018-12-13 | Stop reason: SDUPTHER

## 2018-07-30 RX ADMIN — BENAZEPRIL HYDROCHLORIDE 40 MG: 10 TABLET, FILM COATED ORAL at 08:54

## 2018-07-30 RX ADMIN — ONDANSETRON 4 MG: 2 INJECTION, SOLUTION INTRAMUSCULAR; INTRAVENOUS at 09:39

## 2018-07-30 RX ADMIN — ALBUTEROL SULFATE 2.5 MG: 2.5 SOLUTION RESPIRATORY (INHALATION) at 08:48

## 2018-07-30 RX ADMIN — POTASSIUM CHLORIDE 20 MEQ: 20 TABLET, EXTENDED RELEASE ORAL at 08:53

## 2018-07-30 RX ADMIN — PRAVASTATIN SODIUM 40 MG: 20 TABLET ORAL at 22:19

## 2018-07-30 RX ADMIN — SODIUM CHLORIDE 100 ML/HR: 900 INJECTION, SOLUTION INTRAVENOUS at 15:42

## 2018-07-30 RX ADMIN — Medication 5 ML: at 15:41

## 2018-07-30 RX ADMIN — SODIUM CHLORIDE 100 ML/HR: 900 INJECTION, SOLUTION INTRAVENOUS at 23:49

## 2018-07-30 RX ADMIN — AMLODIPINE BESYLATE 10 MG: 10 TABLET ORAL at 08:54

## 2018-07-30 RX ADMIN — GABAPENTIN 100 MG: 100 CAPSULE ORAL at 08:53

## 2018-07-30 RX ADMIN — HYDRALAZINE HYDROCHLORIDE 50 MG: 50 TABLET, FILM COATED ORAL at 22:19

## 2018-07-30 RX ADMIN — Medication 10 ML: at 06:08

## 2018-07-30 RX ADMIN — SERTRALINE HYDROCHLORIDE 50 MG: 50 TABLET ORAL at 08:54

## 2018-07-30 RX ADMIN — GABAPENTIN 100 MG: 100 CAPSULE ORAL at 17:46

## 2018-07-30 RX ADMIN — BUDESONIDE 500 MCG: 0.5 INHALANT RESPIRATORY (INHALATION) at 19:20

## 2018-07-30 RX ADMIN — SODIUM CHLORIDE 40 MG: 9 INJECTION, SOLUTION INTRAMUSCULAR; INTRAVENOUS; SUBCUTANEOUS at 08:53

## 2018-07-30 RX ADMIN — ALBUTEROL SULFATE 2.5 MG: 2.5 SOLUTION RESPIRATORY (INHALATION) at 15:02

## 2018-07-30 RX ADMIN — ALBUTEROL SULFATE 2.5 MG: 2.5 SOLUTION RESPIRATORY (INHALATION) at 19:20

## 2018-07-30 RX ADMIN — HYDRALAZINE HYDROCHLORIDE 50 MG: 50 TABLET, FILM COATED ORAL at 15:41

## 2018-07-30 RX ADMIN — POTASSIUM CHLORIDE 20 MEQ: 200 INJECTION, SOLUTION INTRAVENOUS at 08:53

## 2018-07-30 RX ADMIN — HYDRALAZINE HYDROCHLORIDE 50 MG: 50 TABLET, FILM COATED ORAL at 08:53

## 2018-07-30 RX ADMIN — POTASSIUM CHLORIDE 20 MEQ: 200 INJECTION, SOLUTION INTRAVENOUS at 11:19

## 2018-07-30 RX ADMIN — BUDESONIDE 500 MCG: 0.5 INHALANT RESPIRATORY (INHALATION) at 08:48

## 2018-07-30 RX ADMIN — FUROSEMIDE 20 MG: 20 TABLET ORAL at 08:53

## 2018-07-30 NOTE — PROGRESS NOTES
Order received, chart reviewed. Per ortho consult, pt is to have knee immobilizer for mobility. Knee immobilizer has been ordered but has not yet arrived; will follow up later for evaluation.   
 
Italo Kruse, OT

## 2018-07-30 NOTE — PROGRESS NOTES
Spoke to Ms. Rosa Garcia and her daughter Ms. Noris Hubbard (cell [phone]) in room 237 about discharge planning. Daughter lives with patient, and helps out with her care. Ms. Rosa Garcia uses a cane and also rolling walker at home (has both). Agreed to M[facility]  13. OT and PT. Order, referral, and face to face placed into [facility]/CC.

## 2018-07-30 NOTE — PROGRESS NOTES
Hospitalist Progress Note Admit Date:  2018  1:22 AM  
Name:  Ion Sullivan Age:  80 y.o. 
:  1929 MRN:  [id number] PCP:  Zofia Yu MD 
Treatment Team: Attending Provider: True Bernal MD; Consulting Provider: Brendan Titus MD; Consulting Provider: Xiomara Hart MD; Care Manager: Fay Killian RN Subjective:  
Patient is an 81yo female with PMH of DMII, COPD, HTN, HLD, CAD, Depression, and Vitamin B12 deficiency, who reported to the ER with reports of gerhard bleeding with stools. Patient was brought to the ER earlier in the day with a syncopal episode, was diagnosed with hypoglycemia and discharged home. When patient returned home she had a large maroon stool and felt light headed and returned to the ER. NM  scan  revealed positive GI bleed. GI continuing with conservative management. Continue to monitor H/H q 8 and transfuse to keep Hgb between 8-9. Hgb 9.3. Patient denies any stools overnight. Denies n/v/d, abdominal pain, CP, SOB. Complaints of left  knee pain. Left knee XR Showed nondisplaced patellar fracture. Ortho consulted. Conservative management with knee immobilizer. Patient to follow up as OP. Objective:  
 
Patient Vitals for the past 24 hrs: 
 Temp Pulse Resp BP SpO2  
18 0849 - - - - 98 % 18 0654 98.4 °F (36.9 °C) 68 16 162/72 96 % 18 0417 98.9 °F (37.2 °C) 73 17 158/68 99 % 18 2358 98.9 °F (37.2 °C) 74 16 142/55 95 %  
18 2140 - 85 - 148/62 -  
18 - - - - 97 %  
18 1959 99 °F (37.2 °C) 74 16 146/67 98 %  
18 1458 98.8 °F (37.1 °C) 83 16 176/58 97 %  
18 1420 - - - - 98 % Oxygen Therapy O2 Sat (%): 98 % (18 0849) Pulse via Oximetry: 85 beats per minute (18 0849) O2 Device: Room air (18 0850) Intake/Output Summary (Last 24 hours) at 18 1108 Last data filed at 18 1947 Gross per 24 hour Intake             2210 ml Output 0 ml  
Net             2210 ml General:    Well nourished. Alert.  elderly CV:   RRR. No murmur, rub, or gallop. Lungs:   CTAB. No wheezing, rhonchi, or rales. Room air Abdomen:   Soft, nontender, nondistended. Bowel sounds present. Extremities: Warm and dry. No cyanosis or edema. Left knee tenderness and edematous Skin:     No rashes or jaundice. Data Review: 
I have reviewed all labs, meds, telemetry events, and studies from the last 24 hours. Recent Results (from the past 24 hour(s)) CBC W/O DIFF Collection Time: 07/29/18  4:09 PM  
Result Value Ref Range WBC 6.6 4.3 - 11.1 K/uL  
 RBC 3.07 (L) 4.05 - 5.25 M/uL HGB 9.0 (L) 11.7 - 15.4 g/dL HCT 28.4 (L) 35.8 - 46.3 % MCV 92.5 79.6 - 97.8 FL  
 MCH 29.3 26.1 - 32.9 PG  
 MCHC 31.7 31.4 - 35.0 g/dL  
 RDW 16.0 (H) 11.9 - 14.6 % PLATELET 515 (L) 002 - 450 K/uL MPV 10.1 (L) 10.8 - 14.1 FL  
CBC W/O DIFF Collection Time: 07/30/18  4:55 AM  
Result Value Ref Range WBC 5.5 4.3 - 11.1 K/uL  
 RBC 2.82 (L) 4.05 - 5.25 M/uL HGB 8.4 (L) 11.7 - 15.4 g/dL HCT 26.2 (L) 35.8 - 46.3 % MCV 92.9 79.6 - 97.8 FL  
 MCH 29.8 26.1 - 32.9 PG  
 MCHC 32.1 31.4 - 35.0 g/dL  
 RDW 15.5 (H) 11.9 - 14.6 % PLATELET 855 (L) 588 - 450 K/uL MPV 9.9 (L) 10.8 - 16.1 FL  
METABOLIC PANEL, BASIC Collection Time: 07/30/18  4:55 AM  
Result Value Ref Range Sodium 148 (H) 136 - 145 mmol/L Potassium 3.0 (L) 3.5 - 5.1 mmol/L Chloride 116 (H) 98 - 107 mmol/L  
 CO2 27 21 - 32 mmol/L Anion gap 5 (L) 7 - 16 mmol/L Glucose 167 (H) 65 - 100 mg/dL BUN 13 8 - 23 MG/DL Creatinine 0.71 0.6 - 1.0 MG/DL  
 GFR est AA >60 >60 ml/min/1.73m2 GFR est non-AA >60 >60 ml/min/1.73m2 Calcium 8.6 8.3 - 10.4 MG/DL  
CBC W/O DIFF Collection Time: 07/30/18  9:48 AM  
Result Value Ref Range WBC 6.9 4.3 - 11.1 K/uL  
 RBC 3.09 (L) 4.05 - 5.25 M/uL HGB 9.3 (L) 11.7 - 15.4 g/dL HCT 28.8 (L) 35.8 - 46.3 %  MCV 93.2 79.6 - 97.8 FL  
 MCH 30.1 26.1 - 32.9 PG  
 MCHC 32.3 31.4 - 35.0 g/dL  
 RDW 15.3 (H) 11.9 - 14.6 % PLATELET 666 (L) 532 - 450 K/uL MPV 10.5 (L) 10.8 - 14.1 FL All Micro Results Procedure Component Value Units Date/Time Dominique Rollins [[id number]] Collected:  07/28/18 2203 Order Status:  Completed Specimen:  Urine from Clean catch Updated:  07/30/18 1222 Special Requests: NO SPECIAL REQUESTS Culture result:      
  >100,000 COLONIES/mL MIXED SKIN NGOZI ISOLATED Current Meds: 
Current Facility-Administered Medications Medication Dose Route Frequency  potassium chloride 20 mEq in 100 ml IVPB  20 mEq IntraVENous Q2H  
 0.9% sodium chloride infusion  100 mL/hr IntraVENous CONTINUOUS  
 0.9% sodium chloride infusion 250 mL  250 mL IntraVENous PRN  
 sodium chloride (NS) flush 5-10 mL  5-10 mL IntraVENous Q8H  
 sodium chloride (NS) flush 5-10 mL  5-10 mL IntraVENous PRN  
 ondansetron (ZOFRAN) injection 4 mg  4 mg IntraVENous Q4H PRN  
 LORazepam (ATIVAN) injection 1 mg  1 mg IntraVENous Q6H PRN  
 0.9% sodium chloride infusion 250 mL  250 mL IntraVENous PRN  
 acetaminophen (TYLENOL) tablet 650 mg  650 mg Oral Q6H PRN  
 albuterol (PROVENTIL VENTOLIN) nebulizer solution 2.5 mg  2.5 mg Nebulization Q6H PRN  
 amLODIPine (NORVASC) tablet 10 mg  10 mg Oral DAILY  benazepril (LOTENSIN) tablet 40 mg  40 mg Oral DAILY  gabapentin (NEURONTIN) capsule 100 mg  100 mg Oral BID  hydrALAZINE (APRESOLINE) tablet 50 mg  50 mg Oral TID  pravastatin (PRAVACHOL) tablet 40 mg  40 mg Oral QHS  sertraline (ZOLOFT) tablet 50 mg  50 mg Oral DAILY  furosemide (LASIX) tablet 20 mg  20 mg Oral DAILY  potassium chloride (K-DUR, KLOR-CON) SR tablet 20 mEq  20 mEq Oral DAILY  budesonide (PULMICORT) 500 mcg/2 ml nebulizer suspension  500 mcg Nebulization BID RT  And  
 albuterol (PROVENTIL VENTOLIN) nebulizer solution 2.5 mg  2.5 mg Nebulization Q6HWA RT  
 pantoprazole (PROTONIX) 40 mg in sodium chloride 0.9% 10 mL injection  40 mg IntraVENous DAILY Other Studies (last 24 hours): No results found. Assessment and Plan:  
 
Hospital Problems as of 7/30/2018  Date Reviewed: 6/5/2018 Codes Class Noted - Resolved POA  
 GI bleed ICD-10-CM: K92.2 ICD-9-CM: 578.9  7/28/2018 - Present Unknown Syncope ICD-10-CM: R55 
ICD-9-CM: 780.2  7/28/2018 - Present Unknown Type 2 diabetes mellitus with diabetic neuropathy (HCC) ICD-10-CM: E11.40 ICD-9-CM: 250.60, 357.2  5/16/2018 - Present Yes Essential hypertension ICD-10-CM: I10 
ICD-9-CM: 401.9  7/16/2010 - Present Yes GERD (gastroesophageal reflux disease) (Chronic) ICD-10-CM: K21.9 ICD-9-CM: 530.81  7/16/2010 - Present Yes PLAN:   
 
GI Bleed -Monitor H&H, keep Hgb 8-9 
-NM scan 7/28 
-Clear liquid diet 
-1 unit PRBC 7/28 
-GI consulting Syncope 
-telemetry 
-fall precautions -PT/OT 
-UA & CS UCx - NGTD 
-Orthostatic blood pressures -IVF DMII -HgbA1c 6.4 
-SSI 
 
HTN 
-Continue home medications Left knee pain - nondisplaced patellar fracture 
-consult ortho 
-Knee immobilizer ordered and patient to f/u with Dr. Ivan Don in 2 weeks for repeat xray and evaluation DC planning/Dispo: STR? 
DVT ppx: SCD's  
 
Plan of care discussed with Dr. Esau Matthews Signed: 
Isa Grace NP

## 2018-07-30 NOTE — PROGRESS NOTES
Problem: Self Care Deficits Care Plan (Adult) Goal: *Acute Goals and Plan of Care (Insert Text) 1. Pt will toilet with SBA 2. Pt will complete functional mobility for ADLs with SBA 3. Pt will complete lower body dressing with SBA using AE as needed 4. Pt will complete grooming and hygiene at sink with SBA 5. Pt will demonstrate independence with HEP to promote increased BUE strength and functional use for ADLs Timeframe: 7 days OCCUPATIONAL THERAPY: Initial Assessment, Treatment Day: Day of Assessment and AM 7/30/2018 INPATIENT: Hospital Day: 3 Payor: Idris Norton MMP / Plan: [place] [employer] MMP / Product Type: Managed Care Medicare /  
  
NAME/AGE/GENDER: Kelly Huang is a 80 y.o. female PRIMARY DIAGNOSIS:  GI bleed <principal problem not specified> <principal problem not specified> 
  
  
ICD-10: Treatment Diagnosis:  
 · Generalized Muscle Weakness (M62.81) · History of falling (Z91.81) Precautions/Allergies: 
  WBAT, knee immobilizer, falls Review of patient's allergies indicates no known allergies. ASSESSMENT:  
 
Ms. Jannette Law was admitted with weakness due to GI bleed and anemia, is s/p recent fall from syncopal episode resulting in knee pain. Orthopedic workup was + non-displaced L patella fracture, per ortho consult LLE is WBAT with immobilizer for mobility. Pt lives with her daughter in a one level house with a walk in shower with a shower chair, grab bars, and has an elevated toilet. Pt was independent with ADLs and with most IADLs although she does not drive, uses a cane for mobility, owns a RW. Pt's daughter does not work and is available to assist. This session, pt presented supine in bed with immobilizer in place, very pleasant and agreeable to OT session. Pt transferred to the edge of the bed with CGA, required min assistance to don socks due to decreased balance and LLE ROM with immobilizer.  Pt required min assistance to stand, CGA- min assist for mobility in room with cues for use of RW and overall transfer technique. Pt educated on technique to approach and leave sink using RW, on positioning at sink, and on safe hand placement for fall prevention during ADLs and brushed her teeth and washed her face standing at the sink with CGA for balance. At the end of the session, pt remained up in the chair with needs in reach. Pt presented below her functional baseline and would benefit from skilled OT services to address deficits. This section established at most recent assessment PROBLEM LIST (Impairments causing functional limitations): 1. Decreased Strength 2. Decreased ADL/Functional Activities 3. Decreased Transfer Abilities 4. Decreased Balance 5. Decreased Activity Tolerance INTERVENTIONS PLANNED: (Benefits and precautions of occupational therapy have been discussed with the patient.) 1. Activities of daily living training 2. Adaptive equipment training 3. Balance training 4. Donning&doffing training 5. Therapeutic activity 6. Therapeutic exercise TREATMENT PLAN: Frequency/Duration: Follow patient 3 times/ week to address above goals. Rehabilitation Potential For Stated Goals: Good RECOMMENDED REHABILITATION/EQUIPMENT: (at time of discharge pending progress): Due to the probability of continued deficits (see above) this patient will likely need continued skilled occupational therapy after discharge. Equipment:  
 Labels That Talk OCCUPATIONAL PROFILE AND HISTORY:  
History of Present Injury/Illness (Reason for Referral): 
See H&P Past Medical History/Comorbidities: Ms. Manasa Steel  has a past medical history of Anxiety (2/1/2018); Arrhythmia; Arthritis; Asthma; B12 deficiency (8/24/2012); Body mass index 37.0-37.9, adult (2/5/2014); CAD (coronary artery disease);  Controlled type 2 diabetes mellitus with microalbuminuria, without long-term current use of insulin ([facility] (11/22/2016); COPD; Corns and callosities (12/19/2016); CRI (chronic renal insufficiency) (8/24/2012); Depression (6/1/2012); Dermatophytosis of nail (12/19/2016); Diabetes ([facility] DJD (degenerative joint disease) of hip; DM (diabetes mellitus) type II controlled with renal manifestation ([facility] (7/16/2010); Encounter for long-term (current) use of other medications (1/8/2013); Essential hypertension (7/16/2010); Gastrointestinal disorder; GERD (gastroesophageal reflux disease); Heart failure ([facility] Hiatal hernia (8/24/2012); HLD (hyperlipidemia) (1/8/2013); Neuropathy (8/24/2012); Obesity (BMI 30.0-39.9) (BMI-43.8); Other and unspecified hyperlipidemia (7/16/2010); Other chest pain (7/16/2010); Other ill-defined conditions(799.89); Other ill-defined conditions(799.89); PAD (peripheral artery disease) ([facility] (8/24/2012); and Retinal hemorrhage. Ms. Rayray Gann  has a past surgical history that includes pr abdomen surgery proc unlisted; hx appendectomy; hx cholecystectomy; hx other surgical; hx gyn; hx gi; hx orthopaedic; hx hysterectomy; hx cholecystectomy (2000); hx intracranial aneurysm repair; hx intracranial aneurysm repair; hx other surgical; hx heart catheterization; and hx endoscopy (02/27/2018). Social History/Living Environment:  
Home Environment: Private residence # Steps to Enter: 2 One/Two Story Residence: One story Living Alone: No 
Support Systems: Child(edward), Family member(s) Patient Expects to be Discharged to[de-identified] Private residence Current DME Used/Available at Home: Cane, straight, Grab bars, Raised toilet seat, Shower chair, Walker, rolling Tub or Shower Type: Shower Prior Level of Function/Work/Activity: 
Pt lives with her daughter in a one level house with a walk in shower with a shower chair, grab bars, and has an elevated toilet.  Pt was independent with ADLs and with most IADLs although she does not drive, uses a cane for mobility, owns a RW. Pt's daughter does not work and is available to assist.  
  
Number of Personal Factors/Comorbidities that affect the Plan of Care: Brief history (0):  LOW COMPLEXITY ASSESSMENT OF OCCUPATIONAL PERFORMANCE[de-identified]  
Activities of Daily Living:  
Basic ADLs (From Assessment) Complex ADLs (From Assessment) Feeding: Independent Oral Facial Hygiene/Grooming: Contact guard assistance Bathing: Minimum assistance Upper Body Dressing: Setup Lower Body Dressing: Minimum assistance Toileting: Contact guard assistance Instrumental ADL Meal Preparation: Moderate assistance Homemaking: Maximum assistance Medication Management: Setup Grooming/Bathing/Dressing Activities of Daily Living Grooming Grooming Assistance:  (standing at sink) Washing Face: Contact guard assistance Brushing Teeth: Contact guard assistance Cognitive Retraining Safety/Judgement: Awareness of environment; Fall prevention Bed/Mat Mobility Supine to Sit: Contact guard assistance Sit to Stand: Minimum assistance Bed to Chair: Contact guard assistance;Minimum assistance Scooting: Contact guard assistance Most Recent Physical Functioning:  
Gross Assessment: 
AROM: Within functional limits Strength: Generally decreased, functional 
Coordination: Within functional limits Tone: Normal 
Sensation: Impaired (numbness at fingertips) Posture: 
Posture (WDL): Exceptions to [facility] Posture Assessment: Cervical, Forward head Balance: 
Sitting: Impaired Sitting - Static: Good (unsupported) Sitting - Dynamic: Fair (occasional) Standing: Impaired Standing - Static: Fair Standing - Dynamic : Fair Bed Mobility: 
Supine to Sit: Contact guard assistance Scooting: Contact guard assistance Wheelchair Mobility: 
  
Transfers: 
Sit to Stand: Minimum assistance Stand to Sit: Minimum assistance Bed to Chair: Contact guard assistance;Minimum assistance Patient Vitals for the past 6 hrs: 
 BP SpO2 Pulse 07/30/18 0849 - 98 % -  
07/30/18 1130 150/65 95 % 77 Mental Status Neurologic State: Alert Orientation Level: Oriented X4 Cognition: Appropriate decision making, Follows commands Perception: Appears intact Perseveration: No perseveration noted Safety/Judgement: Awareness of environment, Fall prevention Physical Skills Involved: 
1. Balance 2. Strength 3. Activity Tolerance Cognitive Skills Affected (resulting in the inability to perform in a timely and safe manner): 1. none Psychosocial Skills Affected: 1. Habits/Routines 2. Environmental Adaptation Number of elements that affect the Plan of Care: 3-5:  MODERATE COMPLEXITY CLINICAL DECISION MAKIN[address] [place] [address] AM-PAC 6 Clicks Daily Activity Inpatient Short Form How much help from another person does the patient currently need. .. Total A Lot A Little None 1. Putting on and taking off regular lower body clothing? [] 1   [] 2   [x] 3   [] 4  
2. Bathing (including washing, rinsing, drying)? [] 1   [] 2   [x] 3   [] 4  
3. Toileting, which includes using toilet, bedpan or urinal?   [] 1   [] 2   [x] 3   [] 4  
4. Putting on and taking off regular upper body clothing? [] 1   [] 2   [] 3   [x] 4  
5. Taking care of personal grooming such as brushing teeth? [] 1   [] 2   [] 3   [x] 4  
6. Eating meals? [] 1   [] 2   [] 3   [x] 4  
© , Trustees of [address] [place] [address], under license to [employer]. All rights reserved Score:  Initial: 21 Most Recent: X (Date: -- ) Interpretation of Tool:  Represents activities that are increasingly more difficult (i.e. Bed mobility, Transfers, Gait). Score 24 23 22-20 19-15 14-10 9-7 6 Modifier CH CI CJ CK CL CM CN   
 
? Self Care:  
  - CURRENT STATUS: CJ - 20%-39% impaired, limited or restricted  - GOAL STATUS: CI - 1%-19% impaired, limited or restricted  - D/C STATUS:  ---------------To be determined--------------- Payor: Anthony Becker MMP / Plan: [facility] [employer] MMP / Product Type: Managed Care Medicare /   
 
Medical Necessity:    
· Patient is expected to demonstrate progress in strength, balance and functional technique to increase independence with ADLs. Reason for Services/Other Comments: 
· Patient continues to require skilled intervention due to decreased ADLs and functinal mobility from baseline. Use of outcome tool(s) and clinical judgement create a POC that gives a: LOW COMPLEXITY  
 
 
 
TREATMENT:  
(In addition to Assessment/Re-Assessment sessions the following treatments were rendered) Pre-treatment Symptoms/Complaints:   
Pain: Initial:  
Pain Intensity 1: 0  Post Session:  0 Self Care: (10 min): Procedure(s) (per grid) utilized to improve and/or restore self-care/home management as related to dressing and grooming. Required minimal verbal and   cueing to facilitate activities of daily living skills and compensatory activities. Assessment/Reassessment Braces/Orthotics/Lines/Etc:  
· IV 
· O2 Device: Room air Treatment/Session Assessment:   
· Response to Treatment:  No adverse reaction · Interdisciplinary Collaboration:  
o Occupational Therapist 
o Registered Nurse · After treatment position/precautions:  
o Up in chair 
o Bed/Chair-wheels locked 
o Call light within reach 
o RN notified · Compliance with Program/Exercises: Will assess as treatment progresses. · Recommendations/Intent for next treatment session: \"Next visit will focus on advancements to more challenging activities and reduction in assistance provided\". Total Treatment Duration: OT Patient Time In/Time Out Time In: 2411 Time Out: 0313 Keith Washburn OT

## 2018-07-30 NOTE — PROGRESS NOTES
[facility] - INPATIENT Face to Face Encounter Patients Name: Sedonia Pallas    YOB: 1929 Ordering Physician: Dr. Daniela Vail Primary Diagnosis: GI bleed Date of Face to Face:   7/30/2018 Face to Face Encounter findings are related to primary reason for home care:   yes. 1. I certify that the patient needs intermittent care as follows: physical therapy: strengthening, stretching/ROM, gait/stair training, balance training and pt/caregiver education 
occupational therapy:  ADL safety (ie. cooking, bathing, dressing), ROM and pt/caregiver education 2. I certify that this patient is homebound, that is: 1) patient requires the use of a cane and walker device, special transportation, or assistance of another to leave the home; or 2) patient's condition makes leaving the home medically contraindicated; and 3) patient has a normal inability to leave the home and leaving the home requires considerable and taxing effort. Patient may leave the home for infrequent and short duration for medical reasons, and occasional absences for non-medical reasons. Homebound status is due to the following functional limitations: Patient with strength deficits limiting the performance of all ADL's without caregiver assistance or the use of an assistive device. 3. I certify that this patient is under my care and that I, or a nurse practitioner or  [id number], or clinical nurse specialist, or certified nurse midwife, working with me, had a Face-to-Face Encounter that meets the physician Face-to-Face Encounter requirements. The following are the clinical findings from the [address] [place] [address] encounter that support the need for skilled services and is a summary of the encounter: see hospital chart See hospital chart Kosta James RN 
7/30/2018 THE FOLLOWING TO BE COMPLETED BY THE COMMUNITY PHYSICIAN: 
 
I concur with the findings described above from the F2F encounter that this patient is homebound and in need of a skilled service. Certifying Physician: _____________________________________ Printed Certifying Physician Name: _____________________________________ Date: _________________

## 2018-07-30 NOTE — PROGRESS NOTES
Non displaced patella fracture. No extensor issues but needs a knee immobilizer for comfort. Appt Dr. Tanisha Pressley in follow up 2-3 weeks

## 2018-07-30 NOTE — PROGRESS NOTES
Problem: Mobility Impaired (Adult and Pediatric) Goal: *Therapy Goal (Edit Goal, Insert Text) STG: 
(1.)Ms. Mel Skinner will move from supine to sit and sit to supine , scoot up and down and roll side to side with STAND BY ASSIST within 4 treatment day(s). (2.)Ms. Mel Skinner will transfer from bed to chair and chair to bed with STAND BY ASSIST using the least restrictive device within 4 treatment day(s). (3.)Ms. Mel Skinner will ambulate with STAND BY ASSIST for 250 feet with the least restrictive device within 4 treatment day(s). LTG: 
(1.)Ms. Mel Skinner will move from supine to sit and sit to supine , scoot up and down and roll side to side in bed with MODIFIED INDEPENDENCE within 7 treatment day(s). (2.)Ms. Mel Skinner will transfer from bed to chair and chair to bed with MODIFIED INDEPENDENCE using the least restrictive device within 7 treatment day(s). (3.)Ms. Mel Skinner will ambulate with MODIFIED INDEPENDENCE for 500 feet with the least restrictive device within 7 treatment day(s). ________________________________________________________________________________________________ PHYSICAL THERAPY: Daily Note, Treatment Day: 1st, PM 7/30/2018 INPATIENT: Hospital Day: 3 Payor: Roger Briggs MMP / Plan: [place] [employer] MMP / Product Type: [employer] Medicare /  
  
NAME/AGE/GENDER: Monica Patel is a 80 y.o. female PRIMARY DIAGNOSIS: GI bleed <principal problem not specified> <principal problem not specified> 
  
  
ICD-10: Treatment Diagnosis:  
 · Generalized Muscle Weakness (M62.81) · Difficulty in walking, Not elsewhere classified (R26.2) · Other abnormalities of gait and mobility (R26.89) Precaution/Allergies: 
Review of patient's allergies indicates no known allergies. ASSESSMENT:  
 
Ms. Mel Skinner got her knee immobilizer this morning. Was diagnosed with a patellar fracture non displaced.   She was up in the chair on arrival.  She was able to perform sit to stand with verbal cues and contact guard. Gait 45 ft with contact guard to supervision. Sit to supine with supervision and verbal cues. She really is doing well and had no pain in her knee. She seems to think the knee immobilizer makes her feel better. She understands  That in order for her patella to heal she needs to keep is straight. Adjusted her KI once she was back to bed and suggested that it will slide she will just have to adjust it throughout the day. She tells me she has a rolling walker at home. She would benefit from home health PT at time of discharge. This section established at most recent assessment PROBLEM LIST (Impairments causing functional limitations): 1. Decreased Strength 2. Decreased ADL/Functional Activities 3. Decreased Transfer Abilities 4. Decreased Ambulation Ability/Technique 5. Decreased Balance 6. Decreased Activity Tolerance 7. Decreased Pacing Skills 8. Decreased Flexibility/Joint Mobility 9. Decreased [place] with Home Exercise Program 
 INTERVENTIONS PLANNED: (Benefits and precautions of physical therapy have been discussed with the patient.) 1. Balance Exercise 2. Bed Mobility 3. Family Education 4. Gait Training 5. Home Exercise Program (HEP) 6. Range of Motion (ROM) 7. Therapeutic Activites 8. Therapeutic Exercise/Strengthening 9. Transfer Training TREATMENT PLAN: Frequency/Duration: 3-5 times a week for duration of hospital stay Rehabilitation Potential For Stated Goals: Good RECOMMENDED REHABILITATION/EQUIPMENT: (at time of discharge pending progress): Due to the probability of continued deficits (see above) this patient will likely need continued skilled physical therapy after discharge. Equipment:  
 Walkers, Type: Rolling [address] HISTORY:  
History of Present Injury/Illness (Reason for Referral): PER MD H&P 
81 yo F with PMH of Type 2 DM, COPD, HTN, HLD, Vit B12 deficiency, CAD and Depression presents to ER with reports of gerhard bloody stool. Patient was just dismissed from ER last night when she initially presented after syncopal episode thought to be due to hypoglycemia. Lab work and imagine was unremarkable and she was discharged from ER to home. On arrival to home she hard large blood BM and again felt lightheaded but did not lose consciousness. She returned to ER and gehrard blood noted on rectal exam. Hgb 9.3(10.4 last night). She denies any abd pain or previous episodes of hematochezia. She had colonoscopy a few years ago that was unremarkable. (please note: admission nurse documented bloody emesis but patient denies) 
  
Past Medical History/Comorbidities: Ms. Dilcia Ruelas  has a past medical history of Anxiety (2/1/2018); Arrhythmia; Arthritis; Asthma; B12 deficiency (8/24/2012); Body mass index 37.0-37.9, adult (2/5/2014); CAD (coronary artery disease); Controlled type 2 diabetes mellitus with microalbuminuria, without long-term current use of insulin ([address] (11/22/2016); COPD; Corns and callosities (12/19/2016); CRI (chronic renal insufficiency) (8/24/2012); Depression (6/1/2012); Dermatophytosis of nail (12/19/2016); Diabetes ([address] DJD (degenerative joint disease) of hip; DM (diabetes mellitus) type II controlled with renal manifestation ([address] (7/16/2010); Encounter for long-term (current) use of other medications (1/8/2013); Essential hypertension (7/16/2010); Gastrointestinal disorder; GERD (gastroesophageal reflux disease); Heart failure ([address] Hiatal hernia (8/24/2012); HLD (hyperlipidemia) (1/8/2013); Neuropathy (8/24/2012); Obesity (BMI 30.0-39.9) (BMI-43.8); Other and unspecified hyperlipidemia (7/16/2010); Other chest pain (7/16/2010); Other ill-defined conditions(799.89); Other ill-defined conditions(799.89); PAD (peripheral artery disease) ([address] (8/24/2012); and Retinal hemorrhage.   Ms. Dilcia Ruelas  has a past surgical history that includes pr abdomen surgery proc unlisted; hx appendectomy; hx cholecystectomy; hx other surgical; hx gyn; hx gi; hx orthopaedic; hx hysterectomy; hx cholecystectomy (2000); hx intracranial aneurysm repair; hx intracranial aneurysm repair; hx other surgical; hx heart catheterization; and hx endoscopy (02/27/2018). Social History/Living Environment:  
Home Environment: Private residence # Steps to Enter: 2 One/Two Story Residence: One story Living Alone: No 
Support Systems: Child(edward), Family member(s) Patient Expects to be Discharged to[de-identified] Private residence Current DME Used/Available at Home: Cane, straight, Grab bars, Raised toilet seat, Shower chair, Walker, rolling Tub or Shower Type: Shower pending Prior Level of Function/Work/Activity: 
Has been using a two wheel rolling walker at times. Dominant Side:  
      RIGHT Personal Factors:   
      Sex:  female Age:  80 y.o. Number of Personal Factors/Comorbidities that affect the Plan of Care: 1-2: MODERATE COMPLEXITY EXAMINATION:  
Most Recent Physical Functioning:  
Gross Assessment: 
  
         
  
Posture: 
  
Balance: 
Sitting - Static: Good (unsupported) Sitting - Dynamic: Good (unsupported) Standing: With support Standing - Static: Good Standing - Dynamic : Fair Bed Mobility: 
Sit to Supine: Additional time; Adaptive equipment;Stand-by assistance Scooting: Supervision Wheelchair Mobility: 
  
Transfers: 
Sit to Stand: Stand-by assistance; Additional time (verbal cues for process) Stand to Sit: Stand-by assistance; Additional time (verbal cues) Gait: 
  
Speed/Airam: Slow Step Length: Right shortened;Left shortened Swing Pattern: Left asymmetrical 
Distance (ft): 45 Feet (ft) Assistive Device: Walker, rolling Ambulation - Level of Assistance: Contact guard assistance Body Structures Involved: 1. Bones 2. Joints 3. Muscles 4. Ligaments Body Functions Affected: 1. Neuromusculoskeletal 
2. Movement Related 3. Skin Related 4.  Metobolic/Endocrine Activities and Participation Affected: 1. General Tasks and Demands 2. Communication 3. Mobility 4. Self Care Number of elements that affect the Plan of Care: 3: MODERATE COMPLEXITY CLINICAL PRESENTATION:  
Presentation: Evolving clinical presentation with changing clinical characteristics: MODERATE COMPLEXITY CLINICAL DECISION MAKING:  
[facility] [employer] AM-PAC 6 Clicks Basic Mobility Inpatient Short Form How much difficulty does the patient currently have. .. Unable A Lot A Little None 1. Turning over in bed (including adjusting bedclothes, sheets and blankets)? [] 1   [] 2   [x] 3   [] 4  
2. Sitting down on and standing up from a chair with arms ( e.g., wheelchair, bedside commode, etc.)   [] 1   [] 2   [x] 3   [] 4  
3. Moving from lying on back to sitting on the side of the bed? [] 1   [] 2   [x] 3   [] 4 How much help from another person does the patient currently need. .. Total A Lot A Little None 4. Moving to and from a bed to a chair (including a wheelchair)? [] 1   [] 2   [x] 3   [] 4  
5. Need to walk in hospital room? [] 1   [] 2   [x] 3   [] 4  
6. Climbing 3-5 steps with a railing? [] 1   [] 2   [x] 3   [] 4  
© 2007, Trustees of [facility] [employer], under license to [employer]. All rights reserved Score:  Initial: 18 Most Recent: X (Date: -- ) Interpretation of Tool:  Represents activities that are increasingly more difficult (i.e. Bed mobility, Transfers, Gait). Score 24 23 22-20 19-15 14-10 9-7 6 Modifier CH CI CJ CK CL CM CN   
 
? Mobility - Walking and Moving Around:  
  - CURRENT STATUS: CK - 40%-59% impaired, limited or restricted  - GOAL STATUS: CJ - 20%-39% impaired, limited or restricted  - D/C STATUS:  ---------------To be determined--------------- Payor: Alejandro Read MMP / Plan: [place] [employer] MMP / Product Type: [employer] Medicare /   
 
Medical Necessity:    
· Patient demonstrates good rehab potential due to higher previous functional level. Reason for Services/Other Comments: 
· Patient continues to require skilled intervention due to medical complications, patient unable to attend/participate in therapy as expected and debility and decreased mobility. Use of outcome tool(s) and clinical judgement create a POC that gives a: Questionable prediction of patient's progress: MODERATE COMPLEXITY  
  
 
 
 
TREATMENT:  
(In addition to Assessment/Re-Assessment sessions the following treatments were rendered) Pre-treatment Symptoms/Complaints:  No complaints. Pain: Initial:  
Pain Intensity 1: 0  Post Session: tired from the day but otherwise no complaints. .    
 
Therapeutic Activity: (    25 mins ):  Therapeutic activities including Bed transfers to improve mobility, strength, balance and coordination. Required minimal   to promote motor control of bilateral, lower extremity(s). Date: 
 Date: 
 Date: Activity/Exercise Parameters Parameters Parameters Braces/Orthotics/Lines/Etc:  
· O2 Device: Room air Treatment/Session Assessment:   
· Response to Treatment:  Improved transfers, ambulation and mobility · Interdisciplinary Collaboration:  
o Physical Therapist 
o Registered Nurse · After treatment position/precautions:  
o Supine in bed 
o Bed alarm/tab alert on 
o Bed/Chair-wheels locked 
o Bed in low position 
o Call light within reach 
o Family at bedside 
o Side rails x 3  
· Compliance with Program/Exercises: Will assess as treatment progresses. · Recommendations/Intent for next treatment session: \"Next visit will focus on advancements to more challenging activities, reduction in assistance provided and transfers, ambulation and mobility. \". Total Treatment Duration: PT Patient Time In/Time Out Time In: 0117 Time Out: 1330 Jeffery Roach, PT

## 2018-07-30 NOTE — PROGRESS NOTES
GI DAILY PROGRESS NOTE Admit Date:  7/28/2018 Today's Date:  7/30/2018 CC:  [facility] Subjective:  
 
Patient denies any recurrent/ongoing GI bleeding. No c/o abdominal pain, N/V. She is tolerating clear liquid diet. Medications:  
Current Facility-Administered Medications Medication Dose Route Frequency  0.9% sodium chloride infusion  100 mL/hr IntraVENous CONTINUOUS  
 0.9% sodium chloride infusion 250 mL  250 mL IntraVENous PRN  
 sodium chloride (NS) flush 5-10 mL  5-10 mL IntraVENous Q8H  
 sodium chloride (NS) flush 5-10 mL  5-10 mL IntraVENous PRN  
 ondansetron (ZOFRAN) injection 4 mg  4 mg IntraVENous Q4H PRN  
 LORazepam (ATIVAN) injection 1 mg  1 mg IntraVENous Q6H PRN  
 0.9% sodium chloride infusion 250 mL  250 mL IntraVENous PRN  
 acetaminophen (TYLENOL) tablet 650 mg  650 mg Oral Q6H PRN  
 albuterol (PROVENTIL VENTOLIN) nebulizer solution 2.5 mg  2.5 mg Nebulization Q6H PRN  
 amLODIPine (NORVASC) tablet 10 mg  10 mg Oral DAILY  benazepril (LOTENSIN) tablet 40 mg  40 mg Oral DAILY  gabapentin (NEURONTIN) capsule 100 mg  100 mg Oral BID  hydrALAZINE (APRESOLINE) tablet 50 mg  50 mg Oral TID  pravastatin (PRAVACHOL) tablet 40 mg  40 mg Oral QHS  sertraline (ZOLOFT) tablet 50 mg  50 mg Oral DAILY  furosemide (LASIX) tablet 20 mg  20 mg Oral DAILY  potassium chloride (K-DUR, KLOR-CON) SR tablet 20 mEq  20 mEq Oral DAILY  budesonide (PULMICORT) 500 mcg/2 ml nebulizer suspension  500 mcg Nebulization BID RT And  
 albuterol (PROVENTIL VENTOLIN) nebulizer solution 2.5 mg  2.5 mg Nebulization Q6HWA RT  
 pantoprazole (PROTONIX) 40 mg in sodium chloride 0.9% 10 mL injection  40 mg IntraVENous DAILY Review of Systems: ROS was obtained, with pertinent positives as listed above. No chest pain or SOB. Diet:  Clear liquids Objective:  
Vitals: 
Visit Vitals  /71  Pulse 72  Temp 99.1 °F (37.3 °C)  Resp 16  
 Ht 5' 7\" (1.702 m)  Wt 83 kg (183 lb)  SpO2 96%  BMI 28.66 kg/m2 Intake/Output: 
07/30 0701 - 07/30 1900 In: 995 [I.V.:995] Out: -  
07/28 1901 - 07/30 0700 In: 3415 [I.V.:3144] Out: 200 [Urine:200] Exam: 
General appearance: alert, cooperative, no distress Lungs: clear to auscultation bilaterally anteriorly Heart: regular rate and rhythm Abdomen: soft, Non-tender. Bowel sounds normal. No masses, no organomegaly Neuro:  alert and oriented Data Review (Labs):   
Recent Labs  
   07/30/18 9633 0859  07/30/18 
 3502  07/30/18 
 9028  07/29/18 
 1609  07/29/18 
 7195  07/29/18 
 0501  07/28/18 
 1505  07/28/18 
 1047  07/28/18 
 0459  07/28/18 
 0143  07/27/18 
 2053 WBC  6.5  6.9  5.5  6.6  6.0  5.2  8.1  7.4  9.2  11.9*  8.5 HGB  9.5*  9.3*  8.4*  9.0*  8.6*  8.1*  9.1*  9.2*  8.4*  9.3*  10.4* HCT  30.5*  28.8*  26.2*  28.4*  27.1*  25.5*  28.4*  28.5*  27.3*  29.3*  33.1*  
PLT  150  140*  137*  134*  125*  124*  157  154  164  187  186 MCV  94.1  93.2  92.9  92.5  92.8  93.1  92.2  91.6  95.8  95.8  95.7 NA   --    --   148*   --    --   148*   --    --    --   144  143  
K  3.5   --   3.0*   --    --   3.7   --    --    --   4.0  4.2 CL   --    --   116*   --    --   115*   --    --    --   109*  107 CO2   --    --   27   --    --   26   --    --    --   27  29 BUN   --    --   13   --    --   23   --    --    --   29*  27* CREA   --    --   0.71   --    --   0.84   --    --    --   1.11*  1.04* CA   --    --   8.6   --    --   8.6   --    --    --   9.5  10.4 MG   --    --    --    --    --    --    --    --    --    --   2.0  
GLU   --    --   167*   --    --   165*   --    --    --   289*  120* AP   --    --    --    --    --    --    --    --    --   57  59 SGOT   --    --    --    --    --    --    --    --    --   15  22 ALT   --    --    --    --    --    --    --    --    --   20  22 TBILI   --    --    --    --    --    --    --    --    --   0.4  0.4 ALB   --    -- --    --    --    --    --    --    --   2.9*  3.1* TP   --    --    --    --    --    --    --    --    --   5.8*  6.6 PTP   --    --    --    --    --    --    --    --    --    --   13.1 INR   --    --    --    --    --    --    --    --    --    --   1.0 APTT   --    --    --    --    --    --    --    --    --    --   27.1 NM bleed scan 7/28: IMPRESSION: Positive GI bleeding study likely involving the stomach with transverse colon a possible but less likely consideration. Assessment:  
 
Active Problems: 
  Essential hypertension (7/16/2010) GERD (gastroesophageal reflux disease) (7/16/2010) Type 2 diabetes mellitus with diabetic neuropathy [address] [address] (5/16/2018) GI bleed (7/28/2018) Syncope (7/28/2018) 80 y.o. female with PMH of Type 2 DM, COPD, HTN, HLD, Vit B12 deficiency, CAD, Depression, colon polyps with sigmoid resection, diverticulosis (and -itis), and esophageal strictures who is seen in consultation at the request of Dr. Luc Mcconnell for probable diverticular bleed. Hgb 10.4 initially and now 8.1. No further overt bleed since admission. However, NM bleed scan positive as above - suggests source is stomach. Clinically, this is a diverticular bleed as she presented with painless hematochezia and has had no N/V/ hematemesis. H/H stable. Last colonoscopy in 2007 with diverticulosis of colon.  
  
 
Plan:  
 
-Continue to follow for recurrent GI bleeding 
-Continue to monitor H/H q 8 with transfusion pRBC prn to keep hgb  8-9 
-In setting of stable H/H without ongoing active GIB ok to advance diet gradually to full liquid diet. MICHAEL Mora 
 
GI--patient seen and examined. Hb stable without overt bleeding. Agree with advancing diet.   Hopefully home in the AM.  Thanks Catrina Lema MD

## 2018-07-31 LAB
ANION GAP SERPL CALC-SCNC: 9 MMOL/L (ref 7–16)
BACTERIA SPEC CULT: NORMAL
BACTERIA SPEC CULT: NORMAL
BUN SERPL-MCNC: 11 MG/DL (ref 8–23)
CALCIUM SERPL-MCNC: 8.3 MG/DL (ref 8.3–10.4)
CHLORIDE SERPL-SCNC: 114 MMOL/L (ref 98–107)
CO2 SERPL-SCNC: 25 MMOL/L (ref 21–32)
CREAT SERPL-MCNC: 0.83 MG/DL (ref 0.6–1)
ERYTHROCYTE [DISTWIDTH] IN BLOOD BY AUTOMATED COUNT: 15.2 % (ref 11.9–14.6)
ERYTHROCYTE [DISTWIDTH] IN BLOOD BY AUTOMATED COUNT: 15.3 % (ref 11.9–14.6)
ERYTHROCYTE [DISTWIDTH] IN BLOOD BY AUTOMATED COUNT: 15.4 % (ref 11.9–14.6)
GLUCOSE BLD STRIP.AUTO-MCNC: 145 MG/DL (ref 65–100)
GLUCOSE BLD STRIP.AUTO-MCNC: 219 MG/DL (ref 65–100)
GLUCOSE BLD STRIP.AUTO-MCNC: 68 MG/DL (ref 65–100)
GLUCOSE SERPL-MCNC: 185 MG/DL (ref 65–100)
HCT VFR BLD AUTO: 25 % (ref 35.8–46.3)
HCT VFR BLD AUTO: 29.1 % (ref 35.8–46.3)
HCT VFR BLD AUTO: 29.7 % (ref 35.8–46.3)
HGB BLD-MCNC: 7.8 G/DL (ref 11.7–15.4)
HGB BLD-MCNC: 9.1 G/DL (ref 11.7–15.4)
HGB BLD-MCNC: 9.5 G/DL (ref 11.7–15.4)
MAGNESIUM SERPL-MCNC: 1.5 MG/DL (ref 1.8–2.4)
MCH RBC QN AUTO: 29.4 PG (ref 26.1–32.9)
MCH RBC QN AUTO: 29.7 PG (ref 26.1–32.9)
MCH RBC QN AUTO: 29.7 PG (ref 26.1–32.9)
MCHC RBC AUTO-ENTMCNC: 31.2 G/DL (ref 31.4–35)
MCHC RBC AUTO-ENTMCNC: 31.3 G/DL (ref 31.4–35)
MCHC RBC AUTO-ENTMCNC: 32 G/DL (ref 31.4–35)
MCV RBC AUTO: 92.8 FL (ref 79.6–97.8)
MCV RBC AUTO: 94.3 FL (ref 79.6–97.8)
MCV RBC AUTO: 95.1 FL (ref 79.6–97.8)
PLATELET # BLD AUTO: 137 K/UL (ref 150–450)
PLATELET # BLD AUTO: 137 K/UL (ref 150–450)
PLATELET # BLD AUTO: 155 K/UL (ref 150–450)
PMV BLD AUTO: 10 FL (ref 10.8–14.1)
PMV BLD AUTO: 10.4 FL (ref 10.8–14.1)
PMV BLD AUTO: 10.4 FL (ref 10.8–14.1)
POTASSIUM SERPL-SCNC: 3.2 MMOL/L (ref 3.5–5.1)
RBC # BLD AUTO: 2.65 M/UL (ref 4.05–5.25)
RBC # BLD AUTO: 3.06 M/UL (ref 4.05–5.25)
RBC # BLD AUTO: 3.2 M/UL (ref 4.05–5.25)
SERVICE CMNT-IMP: NORMAL
SODIUM SERPL-SCNC: 148 MMOL/L (ref 136–145)
WBC # BLD AUTO: 4.6 K/UL (ref 4.3–11.1)
WBC # BLD AUTO: 5.9 K/UL (ref 4.3–11.1)
WBC # BLD AUTO: 6.5 K/UL (ref 4.3–11.1)

## 2018-07-31 PROCEDURE — 74011000302 HC RX REV CODE- 302: Performed by: FAMILY MEDICINE

## 2018-07-31 PROCEDURE — P9016 RBC LEUKOCYTES REDUCED: HCPCS | Performed by: EMERGENCY MEDICINE

## 2018-07-31 PROCEDURE — 36415 COLL VENOUS BLD VENIPUNCTURE: CPT | Performed by: FAMILY MEDICINE

## 2018-07-31 PROCEDURE — 83735 ASSAY OF MAGNESIUM: CPT | Performed by: NURSE PRACTITIONER

## 2018-07-31 PROCEDURE — 94640 AIRWAY INHALATION TREATMENT: CPT

## 2018-07-31 PROCEDURE — 82962 GLUCOSE BLOOD TEST: CPT

## 2018-07-31 PROCEDURE — 77030020263 HC SOL INJ SOD CL0.9% LFCR 1000ML

## 2018-07-31 PROCEDURE — 74011250636 HC RX REV CODE- 250/636: Performed by: NURSE PRACTITIONER

## 2018-07-31 PROCEDURE — 80048 BASIC METABOLIC PNL TOTAL CA: CPT | Performed by: NURSE PRACTITIONER

## 2018-07-31 PROCEDURE — 36430 TRANSFUSION BLD/BLD COMPNT: CPT

## 2018-07-31 PROCEDURE — 74011636637 HC RX REV CODE- 636/637: Performed by: NURSE PRACTITIONER

## 2018-07-31 PROCEDURE — 85027 COMPLETE CBC AUTOMATED: CPT | Performed by: FAMILY MEDICINE

## 2018-07-31 PROCEDURE — 77030039270 HC TU BLD FLTR CARD -A

## 2018-07-31 PROCEDURE — 74011250637 HC RX REV CODE- 250/637: Performed by: NURSE PRACTITIONER

## 2018-07-31 PROCEDURE — 65270000029 HC RM PRIVATE

## 2018-07-31 PROCEDURE — 94760 N-INVAS EAR/PLS OXIMETRY 1: CPT

## 2018-07-31 PROCEDURE — 74011250637 HC RX REV CODE- 250/637: Performed by: FAMILY MEDICINE

## 2018-07-31 PROCEDURE — C9113 INJ PANTOPRAZOLE SODIUM, VIA: HCPCS | Performed by: INTERNAL MEDICINE

## 2018-07-31 PROCEDURE — 74011250636 HC RX REV CODE- 250/636: Performed by: INTERNAL MEDICINE

## 2018-07-31 PROCEDURE — 74011000250 HC RX REV CODE- 250: Performed by: INTERNAL MEDICINE

## 2018-07-31 PROCEDURE — 86580 TB INTRADERMAL TEST: CPT | Performed by: FAMILY MEDICINE

## 2018-07-31 PROCEDURE — 74011000250 HC RX REV CODE- 250: Performed by: FAMILY MEDICINE

## 2018-07-31 RX ORDER — SODIUM CHLORIDE 9 MG/ML
250 INJECTION, SOLUTION INTRAVENOUS AS NEEDED
Status: DISCONTINUED | OUTPATIENT
Start: 2018-07-31 | End: 2018-08-02 | Stop reason: HOSPADM

## 2018-07-31 RX ORDER — POTASSIUM CHLORIDE 20 MEQ/1
40 TABLET, EXTENDED RELEASE ORAL
Status: COMPLETED | OUTPATIENT
Start: 2018-07-31 | End: 2018-07-31

## 2018-07-31 RX ORDER — MAGNESIUM SULFATE HEPTAHYDRATE 40 MG/ML
2 INJECTION, SOLUTION INTRAVENOUS ONCE
Status: COMPLETED | OUTPATIENT
Start: 2018-07-31 | End: 2018-07-31

## 2018-07-31 RX ORDER — INSULIN LISPRO 100 [IU]/ML
INJECTION, SOLUTION INTRAVENOUS; SUBCUTANEOUS
Status: DISCONTINUED | OUTPATIENT
Start: 2018-07-31 | End: 2018-08-02 | Stop reason: HOSPADM

## 2018-07-31 RX ORDER — POTASSIUM CHLORIDE 14.9 MG/ML
20 INJECTION INTRAVENOUS
Status: DISPENSED | OUTPATIENT
Start: 2018-07-31 | End: 2018-07-31

## 2018-07-31 RX ADMIN — FUROSEMIDE 20 MG: 20 TABLET ORAL at 09:17

## 2018-07-31 RX ADMIN — HYDRALAZINE HYDROCHLORIDE 50 MG: 50 TABLET, FILM COATED ORAL at 15:49

## 2018-07-31 RX ADMIN — POTASSIUM CHLORIDE 20 MEQ: 200 INJECTION, SOLUTION INTRAVENOUS at 15:48

## 2018-07-31 RX ADMIN — HYDRALAZINE HYDROCHLORIDE 50 MG: 50 TABLET, FILM COATED ORAL at 09:17

## 2018-07-31 RX ADMIN — Medication 10 ML: at 15:50

## 2018-07-31 RX ADMIN — ALBUTEROL SULFATE 2.5 MG: 2.5 SOLUTION RESPIRATORY (INHALATION) at 21:02

## 2018-07-31 RX ADMIN — SODIUM CHLORIDE 40 MG: 9 INJECTION, SOLUTION INTRAMUSCULAR; INTRAVENOUS; SUBCUTANEOUS at 09:17

## 2018-07-31 RX ADMIN — Medication 5 ML: at 22:46

## 2018-07-31 RX ADMIN — GABAPENTIN 100 MG: 100 CAPSULE ORAL at 09:17

## 2018-07-31 RX ADMIN — POTASSIUM CHLORIDE 40 MEQ: 20 TABLET, EXTENDED RELEASE ORAL at 09:16

## 2018-07-31 RX ADMIN — TUBERCULIN PURIFIED PROTEIN DERIVATIVE 5 UNITS: 5 INJECTION INTRADERMAL at 13:58

## 2018-07-31 RX ADMIN — BUDESONIDE 500 MCG: 0.5 INHALANT RESPIRATORY (INHALATION) at 21:02

## 2018-07-31 RX ADMIN — PRAVASTATIN SODIUM 40 MG: 20 TABLET ORAL at 22:45

## 2018-07-31 RX ADMIN — SERTRALINE HYDROCHLORIDE 50 MG: 50 TABLET ORAL at 09:17

## 2018-07-31 RX ADMIN — ALBUTEROL SULFATE 2.5 MG: 2.5 SOLUTION RESPIRATORY (INHALATION) at 07:44

## 2018-07-31 RX ADMIN — MAGNESIUM SULFATE IN WATER 2 G: 40 INJECTION, SOLUTION INTRAVENOUS at 14:04

## 2018-07-31 RX ADMIN — BUDESONIDE 500 MCG: 0.5 INHALANT RESPIRATORY (INHALATION) at 07:44

## 2018-07-31 RX ADMIN — GABAPENTIN 100 MG: 100 CAPSULE ORAL at 17:26

## 2018-07-31 RX ADMIN — ALBUTEROL SULFATE 2.5 MG: 2.5 SOLUTION RESPIRATORY (INHALATION) at 13:46

## 2018-07-31 RX ADMIN — INSULIN LISPRO 4 UNITS: 100 INJECTION, SOLUTION INTRAVENOUS; SUBCUTANEOUS at 17:30

## 2018-07-31 RX ADMIN — AMLODIPINE BESYLATE 10 MG: 10 TABLET ORAL at 09:17

## 2018-07-31 RX ADMIN — HYDRALAZINE HYDROCHLORIDE 50 MG: 50 TABLET, FILM COATED ORAL at 22:45

## 2018-07-31 RX ADMIN — BENAZEPRIL HYDROCHLORIDE 40 MG: 10 TABLET, FILM COATED ORAL at 09:17

## 2018-07-31 NOTE — PROGRESS NOTES
Problem: Mobility Impaired (Adult and Pediatric) Goal: *Therapy Goal (Edit Goal, Insert Text) STG: 
(1.)Ms. Cardenasdric Cones will move from supine to sit and sit to supine , scoot up and down and roll side to side with STAND BY ASSIST within 4 treatment day(s). (2.)Ms. Cardenasdric Cones will transfer from bed to chair and chair to bed with STAND BY ASSIST using the least restrictive device within 4 treatment day(s). (3.)Ms. Cardenasdric Cones will ambulate with STAND BY ASSIST for 250 feet with the least restrictive device within 4 treatment day(s). LTG: 
(1.)Ms. Scott Cones will move from supine to sit and sit to supine , scoot up and down and roll side to side in bed with MODIFIED INDEPENDENCE within 7 treatment day(s). (2.)Ms. Cardenasdric Cones will transfer from bed to chair and chair to bed with MODIFIED INDEPENDENCE using the least restrictive device within 7 treatment day(s). (3.)Ms. Cardenasdric Cones will ambulate with MODIFIED INDEPENDENCE for 500 feet with the least restrictive device within 7 treatment day(s). ________________________________________________________________________________________________ Physical Therapy Note: 
 
RN stated patient is getting blood this morning to hold PT. Will check back on patient later as time and schedule permit. Thank you.  
 
Yaritza Cuenca, PTA

## 2018-07-31 NOTE — PROGRESS NOTES
END OF SHIFT NOTE: 
 
INTAKE/OUTPUT 
07/29 0701 - 07/30 0700 In: 7269 [I.V.:2210] Out: -  
Voiding: YES Catheter: NO 
Drain:   
 
 
 
 
 
Flatus: Patient does have flatus present. Stool:  0 occurrences. Characteristics: 
Stool Assessment Stool Appearance: Bloody Emesis: 0 occurrences. Characteristics: VITAL SIGNS Patient Vitals for the past 12 hrs: 
 Temp Pulse Resp BP SpO2  
07/30/18 1920 - - - - 97 % 07/30/18 1503 - - - - 96 % 07/30/18 1458 99.1 °F (37.3 °C) 72 16 148/71 97 % 07/30/18 1130 99.1 °F (37.3 °C) 77 16 150/65 95 % 07/30/18 0849 - - - - 98 % Pain Assessment Pain Intensity 1: 0 (07/30/18 1540) Pain Location 1: Knee Pain Intervention(s) 1: Medication (see MAR) Patient Stated Pain Goal: 0 Ambulating Yes with left knee immobilizer on Shift report given to oncoming nurse at the bedside. Willam Moreno

## 2018-07-31 NOTE — PROGRESS NOTES
Hospitalist Progress Note Admit Date:  2018  1:22 AM  
Name:  Kelly Huang Age:  80 y.o. 
:  1929 MRN:  [id number] PCP:  Eugenie Benítez MD 
Treatment Team: Attending Provider: Antonina Madrid MD; Consulting Provider: Lidia Chang MD; Consulting Provider: Audrey Lopez MD; Care Manager: Cheyenne Pedraza RN; Utilization Review: Sydnee Conklin Subjective:  
Patient is an 81yo female with PMH of DMII, COPD, HTN, HLD, CAD, Depression, and Vitamin B12 deficiency, who reported to the ER with reports of gerhard bleeding with stools. Patient was brought to the ER earlier in the day with a syncopal episode, was diagnosed with hypoglycemia and discharged home. When patient returned home she had a large maroon stool and felt light headed and returned to the ER. NM  scan  revealed positive GI bleed. GI continuing with conservative management. Continue to monitor H/H q 8 and transfuse to keep Hgb between 8-9. Hgb 7.8. 1 unit PRBC ordered. Patient denies having any stools, n/v, abdominal pain. Ortho consulted  for patella fracuture. Knee immobilizer in place. Patient to f/u Dr. Yves Guzman as OP. Objective:  
 
Patient Vitals for the past 24 hrs: 
 Temp Pulse Resp BP SpO2  
18 1145 98.9 °F (37.2 °C) 90 16 151/60 97 % 18 1047 98.9 °F (37.2 °C) 84 16 158/62 97 % 18 0744 - - - - 97 % 18 0646 99.1 °F (37.3 °C) 76 16 159/53 97 % 18 0300 98.5 °F (36.9 °C) 75 16 129/60 96 % 18 2300 98.2 °F (36.8 °C) 85 16 148/55 96 % 18 2219 - (!) 114 - 130/60 -  
18 1950 98.4 °F (36.9 °C) 82 16 146/67 95 % 18 1920 - - - - 97 % 18 1503 - - - - 96 % 18 1458 99.1 °F (37.3 °C) 72 16 148/71 97 % Oxygen Therapy O2 Sat (%): 97 % (18 1145) Pulse via Oximetry: 69 beats per minute (18 0744) O2 Device: Room air (18 0744) Intake/Output Summary (Last 24 hours) at 18 1224 Last data filed at 18 9135 
 Gross per 24 hour Intake             1810 ml Output                0 ml Net             1810 ml General:    Well nourished. Alert.  elderly CV:   RRR. No murmur, rub, or gallop. Lungs:   CTAB. No wheezing, rhonchi, or rales. Room air Abdomen:   Soft, nontender, nondistended. Bowel sounds present. Extremities: Warm and dry. No cyanosis or edema. Left knee tenderness and edematous Skin:     No rashes or jaundice. Data Review: 
I have reviewed all labs, meds, telemetry events, and studies from the last 24 hours. Recent Results (from the past 24 hour(s)) CBC W/O DIFF Collection Time: 07/30/18  2:39 PM  
Result Value Ref Range WBC 6.5 4.3 - 11.1 K/uL  
 RBC 3.24 (L) 4.05 - 5.25 M/uL HGB 9.5 (L) 11.7 - 15.4 g/dL HCT 30.5 (L) 35.8 - 46.3 % MCV 94.1 79.6 - 97.8 FL  
 MCH 29.3 26.1 - 32.9 PG  
 MCHC 31.1 (L) 31.4 - 35.0 g/dL  
 RDW 15.3 (H) 11.9 - 14.6 % PLATELET 500 833 - 994 K/uL MPV 9.9 (L) 10.8 - 14.1 FL  
POTASSIUM Collection Time: 07/30/18  2:39 PM  
Result Value Ref Range Potassium 3.5 3.5 - 5.1 mmol/L  
CBC W/O DIFF Collection Time: 07/31/18  3:44 AM  
Result Value Ref Range WBC 4.6 4.3 - 11.1 K/uL  
 RBC 2.65 (L) 4.05 - 5.25 M/uL HGB 7.8 (L) 11.7 - 15.4 g/dL HCT 25.0 (L) 35.8 - 46.3 % MCV 94.3 79.6 - 97.8 FL  
 MCH 29.4 26.1 - 32.9 PG  
 MCHC 31.2 (L) 31.4 - 35.0 g/dL  
 RDW 15.2 (H) 11.9 - 14.6 % PLATELET 458 (L) 979 - 450 K/uL MPV 10.4 (L) 10.8 - 08.5 FL  
METABOLIC PANEL, BASIC Collection Time: 07/31/18  3:44 AM  
Result Value Ref Range Sodium 148 (H) 136 - 145 mmol/L Potassium 3.2 (L) 3.5 - 5.1 mmol/L Chloride 114 (H) 98 - 107 mmol/L  
 CO2 25 21 - 32 mmol/L Anion gap 9 7 - 16 mmol/L Glucose 185 (H) 65 - 100 mg/dL BUN 11 8 - 23 MG/DL Creatinine 0.83 0.6 - 1.0 MG/DL  
 GFR est AA >60 >60 ml/min/1.73m2 GFR est non-AA >60 >60 ml/min/1.73m2 Calcium 8.3 8.3 - 10.4 MG/DL MAGNESIUM  Collection Time: 07/31/18  3:44 AM  
Result Value Ref Range Magnesium 1.5 (L) 1.8 - 2.4 mg/dL CBC W/O DIFF Collection Time: 07/31/18  9:59 AM  
Result Value Ref Range WBC 6.5 4.3 - 11.1 K/uL  
 RBC 3.06 (L) 4.05 - 5.25 M/uL HGB 9.1 (L) 11.7 - 15.4 g/dL HCT 29.1 (L) 35.8 - 46.3 % MCV 95.1 79.6 - 97.8 FL  
 MCH 29.7 26.1 - 32.9 PG  
 MCHC 31.3 (L) 31.4 - 35.0 g/dL  
 RDW 15.3 (H) 11.9 - 14.6 % PLATELET 742 340 - 738 K/uL MPV 10.0 (L) 10.8 - 14.1 FL All Micro Results Procedure Component Value Units Date/Time Lawerence Lies [[id number]] Collected:  07/28/18 2208 Order Status:  Completed Specimen:  Urine from Clean catch Updated:  07/31/18 9579 Special Requests: NO SPECIAL REQUESTS Culture result:      
  >100,000 COLONIES/mL MIXED SKIN NGOZI ISOLATED THREE OR MORE TYPES OF ORGANISMS ARE PRESENT. THIS IS INDICATIVE OF CONTAMINATION DUE TO IMPROPER COLLECTION TECHNIQUE. PLEASE REPEAT COLLECTION UNLESS PATIENT HAS STARTED ANTIBIOTIC TREATMENT. Current Meds: 
Current Facility-Administered Medications Medication Dose Route Frequency  0.9% sodium chloride infusion 250 mL  250 mL IntraVENous PRN  
 tuberculin injection 5 Units  5 Units IntraDERMal ONCE  
 0.9% sodium chloride infusion 250 mL  250 mL IntraVENous PRN  
 0.9% sodium chloride infusion  100 mL/hr IntraVENous CONTINUOUS  
 0.9% sodium chloride infusion 250 mL  250 mL IntraVENous PRN  
 sodium chloride (NS) flush 5-10 mL  5-10 mL IntraVENous Q8H  
 sodium chloride (NS) flush 5-10 mL  5-10 mL IntraVENous PRN  
 ondansetron (ZOFRAN) injection 4 mg  4 mg IntraVENous Q4H PRN  
 LORazepam (ATIVAN) injection 1 mg  1 mg IntraVENous Q6H PRN  
 0.9% sodium chloride infusion 250 mL  250 mL IntraVENous PRN  
 acetaminophen (TYLENOL) tablet 650 mg  650 mg Oral Q6H PRN  
 albuterol (PROVENTIL VENTOLIN) nebulizer solution 2.5 mg  2.5 mg Nebulization Q6H PRN  
 amLODIPine (NORVASC) tablet 10 mg  10 mg Oral DAILY  benazepril (LOTENSIN) tablet 40 mg  40 mg Oral DAILY  gabapentin (NEURONTIN) capsule 100 mg  100 mg Oral BID  hydrALAZINE (APRESOLINE) tablet 50 mg  50 mg Oral TID  pravastatin (PRAVACHOL) tablet 40 mg  40 mg Oral QHS  sertraline (ZOLOFT) tablet 50 mg  50 mg Oral DAILY  furosemide (LASIX) tablet 20 mg  20 mg Oral DAILY  potassium chloride (K-DUR, KLOR-CON) SR tablet 20 mEq  20 mEq Oral DAILY  budesonide (PULMICORT) 500 mcg/2 ml nebulizer suspension  500 mcg Nebulization BID RT And  
 albuterol (PROVENTIL VENTOLIN) nebulizer solution 2.5 mg  2.5 mg Nebulization Q6HWA RT  
 pantoprazole (PROTONIX) 40 mg in sodium chloride 0.9% 10 mL injection  40 mg IntraVENous DAILY Other Studies (last 24 hours): No results found. Assessment and Plan:  
 
Hospital Problems as of 7/31/2018  Date Reviewed: 6/5/2018 Codes Class Noted - Resolved POA  
 GI bleed ICD-10-CM: K92.2 ICD-9-CM: 578.9  7/28/2018 - Present Unknown Syncope ICD-10-CM: R55 
ICD-9-CM: 780.2  7/28/2018 - Present Unknown Type 2 diabetes mellitus with diabetic neuropathy (HCC) ICD-10-CM: E11.40 ICD-9-CM: 250.60, 357.2  5/16/2018 - Present Yes Essential hypertension ICD-10-CM: I10 
ICD-9-CM: 401.9  7/16/2010 - Present Yes GERD (gastroesophageal reflux disease) (Chronic) ICD-10-CM: K21.9 ICD-9-CM: 530.81  7/16/2010 - Present Yes PLAN:   
 
GI Bleed -Monitor H&H, keep Hgb 8-9 
-NM scan 7/28 
-Clear liquid diet 
-1 unit PRBC 7/28, 7/31 
-GI consulting- conservative management Syncope 
-telemetry 
-fall precautions -PT/OT 
-UA & CS UCx - NGTD 
-Orthostatic blood pressures 
-decrease IVF 
 
DMII -HgbA1c 6.4 
-SSI 
 
HTN 
-Continue home medications Left knee pain - nondisplaced patellar fracture 
-consult ortho 
-Knee immobilizer ordered and patient to f/u with Dr. Nely Vargas in 2 weeks for repeat xray and evaluation DC planning/Dispo: STR? 
DVT ppx: SCD's Plan of care discussed with Dr. José Miguel Villafuerte Signed: 
Lisette Keenan NP

## 2018-07-31 NOTE — PROGRESS NOTES
GI DAILY PROGRESS NOTE Admit Date:  7/28/2018 Today's Date:  7/31/2018 CC:  LGIB Subjective:  
 
Patient denies any recurrent/ongoing GI bleeding. No BM since this past Friday. No c/o abdominal pain, N/V. She is tolerating full liquid diet. Medications:  
Current Facility-Administered Medications Medication Dose Route Frequency  0.9% sodium chloride infusion 250 mL  250 mL IntraVENous PRN  
 tuberculin injection 5 Units  5 Units IntraDERMal ONCE  
 0.9% sodium chloride infusion  100 mL/hr IntraVENous CONTINUOUS  
 0.9% sodium chloride infusion 250 mL  250 mL IntraVENous PRN  
 sodium chloride (NS) flush 5-10 mL  5-10 mL IntraVENous Q8H  
 sodium chloride (NS) flush 5-10 mL  5-10 mL IntraVENous PRN  
 ondansetron (ZOFRAN) injection 4 mg  4 mg IntraVENous Q4H PRN  
 LORazepam (ATIVAN) injection 1 mg  1 mg IntraVENous Q6H PRN  
 0.9% sodium chloride infusion 250 mL  250 mL IntraVENous PRN  
 acetaminophen (TYLENOL) tablet 650 mg  650 mg Oral Q6H PRN  
 albuterol (PROVENTIL VENTOLIN) nebulizer solution 2.5 mg  2.5 mg Nebulization Q6H PRN  
 amLODIPine (NORVASC) tablet 10 mg  10 mg Oral DAILY  benazepril (LOTENSIN) tablet 40 mg  40 mg Oral DAILY  gabapentin (NEURONTIN) capsule 100 mg  100 mg Oral BID  hydrALAZINE (APRESOLINE) tablet 50 mg  50 mg Oral TID  pravastatin (PRAVACHOL) tablet 40 mg  40 mg Oral QHS  sertraline (ZOLOFT) tablet 50 mg  50 mg Oral DAILY  furosemide (LASIX) tablet 20 mg  20 mg Oral DAILY  potassium chloride (K-DUR, KLOR-CON) SR tablet 20 mEq  20 mEq Oral DAILY  budesonide (PULMICORT) 500 mcg/2 ml nebulizer suspension  500 mcg Nebulization BID RT And  
 albuterol (PROVENTIL VENTOLIN) nebulizer solution 2.5 mg  2.5 mg Nebulization Q6HWA RT  
 pantoprazole (PROTONIX) 40 mg in sodium chloride 0.9% 10 mL injection  40 mg IntraVENous DAILY Review of Systems: ROS was obtained, with pertinent positives as listed above.   No chest pain or SOB. 
 
Diet:  Full liquids Objective:  
Vitals: 
Visit Vitals  /53  Pulse 76  Temp 99.1 °F (37.3 °C)  Resp 16  
 Ht 5' 7\" (1.702 m)  Wt 83 kg (183 lb)  SpO2 97%  BMI 28.66 kg/m2 Intake/Output: 
  
07/29 1901 - 07/31 0700 In: 7470 [I.V.:3223] Out: 0 Exam: 
General appearance: alert, cooperative, no distress Lungs: clear to auscultation bilaterally anteriorly Heart: regular rate and rhythm Abdomen: soft, Non-tender. Bowel sounds normal. No masses, no organomegaly Neuro:  alert and oriented Data Review (Labs):   
Recent Labs  
   07/31/18 
 0344  07/30/18 9633 0859  07/30/18 
 4967  07/30/18 
 0455  07/29/18 
 1609  07/29/18 
 0939  07/29/18 
 0501  07/28/18 
 1505  07/28/18 
 1047 WBC  4.6  6.5  6.9  5.5  6.6  6.0  5.2  8.1  7.4 HGB  7.8*  9.5*  9.3*  8.4*  9.0*  8.6*  8.1*  9.1*  9.2* HCT  25.0*  30.5*  28.8*  26.2*  28.4*  27.1*  25.5*  28.4*  28.5*  
PLT  137*  150  140*  137*  134*  125*  124*  157  154 MCV  94.3  94.1  93.2  92.9  92.5  92.8  93.1  92.2  91.6 NA  148*   --    --   148*   --    --   148*   --    --   
K  3.2*  3.5   --   3.0*   --    --   3.7   --    --   
CL  114*   --    --   116*   --    --   115*   --    --   
CO2  25   --    --   27   --    --   26   --    --   
BUN  11   --    --   13   --    --   23   --    --   
CREA  0.83   --    --   0.71   --    --   0.84   --    --   
CA  8.3   --    --   8.6   --    --   8.6   --    --   
MG  1.5*   --    --    --    --    --    --    --    --   
GLU  185*   --    --   167*   --    --   165*   --    --   
 
NM bleed scan 7/28: IMPRESSION: Positive GI bleeding study likely involving the stomach with transverse colon a possible but less likely consideration. Assessment:  
 
Active Problems: 
  Essential hypertension (7/16/2010) GERD (gastroesophageal reflux disease) (7/16/2010) Type 2 diabetes mellitus with diabetic neuropathy ([facility] (5/16/2018) GI bleed (7/28/2018)   Syncope (7/28/2018) 80 y.o. female with PMH of Type 2 DM, COPD, HTN, HLD, Vit B12 deficiency, CAD, Depression, colon polyps with sigmoid resection, diverticulosis (and -itis), and esophageal strictures who was seen in consultation at the request of Dr. Kevin Rees for probable diverticular bleed. Hgb 10.4 initially and has been overall stable since with Hgb ranging from mid 8 to low/mid 9s. This AM Hgb dropped to 7.8 from 9.5 though without recurrent overt GI bleeding. ?Dilutional effect vs. Slow steady blood loss. NM bleed scan positive as above - suggests source is stomach. Clinically, this is a diverticular bleed as she presented with painless hematochezia and has had no N/V/ hematemesis. Last colonoscopy in 2007 with diverticulosis of colon.  
  
 
Plan:  
 
-Continue to follow for recurrent GI bleeding 
-Transfuse 1u pRBC and follow response. Continue to monitor H/H q 8 with transfusion pRBC prn to keep hgb  8-9 
-Full liquid diet for now. If H/H stable after transfusion and no recurrent overt GIB would gradually advance as tolerated. Yuliya Rojo [place] 
 
GI--No overt bleeding; Hb improved after transfusion. Hopefully home soon.   Thanks Isatu Clifford MD

## 2018-08-01 LAB
ABO + RH BLD: NORMAL
ANION GAP SERPL CALC-SCNC: 10 MMOL/L (ref 7–16)
BLD PROD TYP BPU: NORMAL
BLD PROD TYP BPU: NORMAL
BLOOD GROUP ANTIBODIES SERPL: NORMAL
BPU ID: NORMAL
BPU ID: NORMAL
BUN SERPL-MCNC: 9 MG/DL (ref 8–23)
CALCIUM SERPL-MCNC: 8.5 MG/DL (ref 8.3–10.4)
CHLORIDE SERPL-SCNC: 115 MMOL/L (ref 98–107)
CO2 SERPL-SCNC: 25 MMOL/L (ref 21–32)
CREAT SERPL-MCNC: 0.79 MG/DL (ref 0.6–1)
CROSSMATCH RESULT,%XM: NORMAL
CROSSMATCH RESULT,%XM: NORMAL
ERYTHROCYTE [DISTWIDTH] IN BLOOD BY AUTOMATED COUNT: 15.7 % (ref 11.9–14.6)
ERYTHROCYTE [DISTWIDTH] IN BLOOD BY AUTOMATED COUNT: 15.7 % (ref 11.9–14.6)
ERYTHROCYTE [DISTWIDTH] IN BLOOD BY AUTOMATED COUNT: 15.8 % (ref 11.9–14.6)
ERYTHROCYTE [DISTWIDTH] IN BLOOD BY AUTOMATED COUNT: 15.8 % (ref 11.9–14.6)
GLUCOSE BLD STRIP.AUTO-MCNC: 110 MG/DL (ref 65–100)
GLUCOSE BLD STRIP.AUTO-MCNC: 163 MG/DL (ref 65–100)
GLUCOSE BLD STRIP.AUTO-MCNC: 170 MG/DL (ref 65–100)
GLUCOSE BLD STRIP.AUTO-MCNC: 178 MG/DL (ref 65–100)
GLUCOSE SERPL-MCNC: 146 MG/DL (ref 65–100)
HCT VFR BLD AUTO: 28.9 % (ref 35.8–46.3)
HCT VFR BLD AUTO: 29.2 % (ref 35.8–46.3)
HCT VFR BLD AUTO: 31.7 % (ref 35.8–46.3)
HCT VFR BLD AUTO: 34.2 % (ref 35.8–46.3)
HGB BLD-MCNC: 10.4 G/DL (ref 11.7–15.4)
HGB BLD-MCNC: 11 G/DL (ref 11.7–15.4)
HGB BLD-MCNC: 9.2 G/DL (ref 11.7–15.4)
HGB BLD-MCNC: 9.3 G/DL (ref 11.7–15.4)
MAGNESIUM SERPL-MCNC: 2 MG/DL (ref 1.8–2.4)
MCH RBC QN AUTO: 29.4 PG (ref 26.1–32.9)
MCH RBC QN AUTO: 29.5 PG (ref 26.1–32.9)
MCH RBC QN AUTO: 29.7 PG (ref 26.1–32.9)
MCH RBC QN AUTO: 30.3 PG (ref 26.1–32.9)
MCHC RBC AUTO-ENTMCNC: 31.8 G/DL (ref 31.4–35)
MCHC RBC AUTO-ENTMCNC: 31.8 G/DL (ref 31.4–35)
MCHC RBC AUTO-ENTMCNC: 32.2 G/DL (ref 31.4–35)
MCHC RBC AUTO-ENTMCNC: 32.8 G/DL (ref 31.4–35)
MCV RBC AUTO: 92.4 FL (ref 79.6–97.8)
MCV RBC AUTO: 92.6 FL (ref 79.6–97.8)
MM INDURATION POC: NORMAL 0 MM (ref 0–5)
PLATELET # BLD AUTO: 127 K/UL (ref 150–450)
PLATELET # BLD AUTO: 130 K/UL (ref 150–450)
PLATELET # BLD AUTO: 135 K/UL (ref 150–450)
PLATELET # BLD AUTO: 150 K/UL (ref 150–450)
PMV BLD AUTO: 10 FL (ref 10.8–14.1)
PMV BLD AUTO: 10 FL (ref 10.8–14.1)
PMV BLD AUTO: 10.4 FL (ref 10.8–14.1)
PMV BLD AUTO: 9.8 FL (ref 10.8–14.1)
POTASSIUM SERPL-SCNC: 3.3 MMOL/L (ref 3.5–5.1)
PPD POC: NORMAL NEGATIVE
RBC # BLD AUTO: 3.12 M/UL (ref 4.05–5.25)
RBC # BLD AUTO: 3.16 M/UL (ref 4.05–5.25)
RBC # BLD AUTO: 3.43 M/UL (ref 4.05–5.25)
RBC # BLD AUTO: 3.7 M/UL (ref 4.05–5.25)
SODIUM SERPL-SCNC: 150 MMOL/L (ref 136–145)
SPECIMEN EXP DATE BLD: NORMAL
STATUS OF UNIT,%ST: NORMAL
STATUS OF UNIT,%ST: NORMAL
UNIT DIVISION, %UDIV: 0
UNIT DIVISION, %UDIV: 0
WBC # BLD AUTO: 5.8 K/UL (ref 4.3–11.1)
WBC # BLD AUTO: 6.8 K/UL (ref 4.3–11.1)

## 2018-08-01 PROCEDURE — 74011000250 HC RX REV CODE- 250: Performed by: INTERNAL MEDICINE

## 2018-08-01 PROCEDURE — 82962 GLUCOSE BLOOD TEST: CPT

## 2018-08-01 PROCEDURE — 74011000250 HC RX REV CODE- 250: Performed by: FAMILY MEDICINE

## 2018-08-01 PROCEDURE — 74011250636 HC RX REV CODE- 250/636: Performed by: INTERNAL MEDICINE

## 2018-08-01 PROCEDURE — 97530 THERAPEUTIC ACTIVITIES: CPT

## 2018-08-01 PROCEDURE — 83735 ASSAY OF MAGNESIUM: CPT | Performed by: NURSE PRACTITIONER

## 2018-08-01 PROCEDURE — C9113 INJ PANTOPRAZOLE SODIUM, VIA: HCPCS | Performed by: INTERNAL MEDICINE

## 2018-08-01 PROCEDURE — 74011250637 HC RX REV CODE- 250/637: Performed by: INTERNAL MEDICINE

## 2018-08-01 PROCEDURE — 74011636637 HC RX REV CODE- 636/637: Performed by: NURSE PRACTITIONER

## 2018-08-01 PROCEDURE — 65270000029 HC RM PRIVATE

## 2018-08-01 PROCEDURE — 36415 COLL VENOUS BLD VENIPUNCTURE: CPT | Performed by: FAMILY MEDICINE

## 2018-08-01 PROCEDURE — 74011250636 HC RX REV CODE- 250/636: Performed by: NURSE PRACTITIONER

## 2018-08-01 PROCEDURE — 80048 BASIC METABOLIC PNL TOTAL CA: CPT | Performed by: NURSE PRACTITIONER

## 2018-08-01 PROCEDURE — 94640 AIRWAY INHALATION TREATMENT: CPT

## 2018-08-01 PROCEDURE — 97110 THERAPEUTIC EXERCISES: CPT

## 2018-08-01 PROCEDURE — 94760 N-INVAS EAR/PLS OXIMETRY 1: CPT

## 2018-08-01 PROCEDURE — 85027 COMPLETE CBC AUTOMATED: CPT | Performed by: FAMILY MEDICINE

## 2018-08-01 PROCEDURE — 74011250637 HC RX REV CODE- 250/637: Performed by: FAMILY MEDICINE

## 2018-08-01 PROCEDURE — 77030020263 HC SOL INJ SOD CL0.9% LFCR 1000ML

## 2018-08-01 RX ORDER — POTASSIUM CHLORIDE 20 MEQ/1
20 TABLET, EXTENDED RELEASE ORAL
Status: COMPLETED | OUTPATIENT
Start: 2018-08-01 | End: 2018-08-01

## 2018-08-01 RX ADMIN — ALBUTEROL SULFATE 2.5 MG: 2.5 SOLUTION RESPIRATORY (INHALATION) at 14:09

## 2018-08-01 RX ADMIN — SERTRALINE HYDROCHLORIDE 50 MG: 50 TABLET ORAL at 08:20

## 2018-08-01 RX ADMIN — POTASSIUM CHLORIDE 20 MEQ: 20 TABLET, EXTENDED RELEASE ORAL at 08:19

## 2018-08-01 RX ADMIN — GABAPENTIN 100 MG: 100 CAPSULE ORAL at 08:20

## 2018-08-01 RX ADMIN — HYDRALAZINE HYDROCHLORIDE 50 MG: 50 TABLET, FILM COATED ORAL at 21:09

## 2018-08-01 RX ADMIN — AMLODIPINE BESYLATE 10 MG: 10 TABLET ORAL at 08:20

## 2018-08-01 RX ADMIN — INSULIN LISPRO 2 UNITS: 100 INJECTION, SOLUTION INTRAVENOUS; SUBCUTANEOUS at 08:20

## 2018-08-01 RX ADMIN — HYDRALAZINE HYDROCHLORIDE 50 MG: 50 TABLET, FILM COATED ORAL at 16:16

## 2018-08-01 RX ADMIN — INSULIN LISPRO 2 UNITS: 100 INJECTION, SOLUTION INTRAVENOUS; SUBCUTANEOUS at 17:28

## 2018-08-01 RX ADMIN — ALBUTEROL SULFATE 2.5 MG: 2.5 SOLUTION RESPIRATORY (INHALATION) at 07:11

## 2018-08-01 RX ADMIN — BUDESONIDE 500 MCG: 0.5 INHALANT RESPIRATORY (INHALATION) at 20:54

## 2018-08-01 RX ADMIN — Medication 5 ML: at 06:32

## 2018-08-01 RX ADMIN — POTASSIUM CHLORIDE 20 MEQ: 20 TABLET, EXTENDED RELEASE ORAL at 14:19

## 2018-08-01 RX ADMIN — HYDRALAZINE HYDROCHLORIDE 50 MG: 50 TABLET, FILM COATED ORAL at 08:19

## 2018-08-01 RX ADMIN — FUROSEMIDE 20 MG: 20 TABLET ORAL at 08:19

## 2018-08-01 RX ADMIN — PRAVASTATIN SODIUM 40 MG: 20 TABLET ORAL at 21:09

## 2018-08-01 RX ADMIN — BUDESONIDE 500 MCG: 0.5 INHALANT RESPIRATORY (INHALATION) at 07:11

## 2018-08-01 RX ADMIN — ALBUTEROL SULFATE 2.5 MG: 2.5 SOLUTION RESPIRATORY (INHALATION) at 20:54

## 2018-08-01 RX ADMIN — SODIUM CHLORIDE 50 ML/HR: 900 INJECTION, SOLUTION INTRAVENOUS at 08:23

## 2018-08-01 RX ADMIN — BENAZEPRIL HYDROCHLORIDE 40 MG: 10 TABLET, FILM COATED ORAL at 08:20

## 2018-08-01 RX ADMIN — INSULIN LISPRO 2 UNITS: 100 INJECTION, SOLUTION INTRAVENOUS; SUBCUTANEOUS at 14:19

## 2018-08-01 RX ADMIN — Medication 10 ML: at 14:26

## 2018-08-01 RX ADMIN — GABAPENTIN 100 MG: 100 CAPSULE ORAL at 17:28

## 2018-08-01 RX ADMIN — SODIUM CHLORIDE 40 MG: 9 INJECTION, SOLUTION INTRAMUSCULAR; INTRAVENOUS; SUBCUTANEOUS at 08:25

## 2018-08-01 NOTE — PROGRESS NOTES
END OF SHIFT NOTE: 
 
INTAKE/OUTPUT 
07/31 0701 - 08/01 0700 In: 1722.8 [I.V.:1379] Out: -  
Voiding: YES, incontinent briefs Catheter: NO 
Drain:   
 
 
 
 
 
Flatus: Patient does have flatus present. Stool:  0 occurrences. Characteristics: 
Stool Assessment Stool Appearance: Bloody Emesis: 0 occurrences. Characteristics: VITAL SIGNS Patient Vitals for the past 12 hrs: 
 Temp Pulse Resp BP SpO2  
08/01/18 1505 98.2 °F (36.8 °C) 80 18 172/80 98 % 08/01/18 1409 - - - - 96 % 08/01/18 1205 98.4 °F (36.9 °C) 82 18 184/70 97 % Pain Assessment Pain Intensity 1: 0 (08/01/18 1015) Pain Location 1: Knee Pain Intervention(s) 1: Medication (see MAR) Patient Stated Pain Goal: 0 Ambulating Yes, with PT today Shift report given to oncoming nurse at the bedside.  
 
Carolin Livingston, RN

## 2018-08-01 NOTE — PROGRESS NOTES
Problem: Mobility Impaired (Adult and Pediatric) Goal: *Therapy Goal (Edit Goal, Insert Text) STG: 
(1.)Ms. Eliana Cunningham will move from supine to sit and sit to supine , scoot up and down and roll side to side with STAND BY ASSIST within 4 treatment day(s). (2.)Ms. Eliana Cunningham will transfer from bed to chair and chair to bed with STAND BY ASSIST using the least restrictive device within 4 treatment day(s). (3.)Ms. Eliana Cunningham will ambulate with STAND BY ASSIST for 250 feet with the least restrictive device within 4 treatment day(s). LTG: 
(1.)Ms. Eliana Cunningham will move from supine to sit and sit to supine , scoot up and down and roll side to side in bed with MODIFIED INDEPENDENCE within 7 treatment day(s). (2.)Ms. Eliana Cunningham will transfer from bed to chair and chair to bed with MODIFIED INDEPENDENCE using the least restrictive device within 7 treatment day(s). (3.)Ms. Eliana Cunningham will ambulate with MODIFIED INDEPENDENCE for 500 feet with the least restrictive device within 7 treatment day(s). ________________________________________________________________________________________________ PHYSICAL THERAPY: Daily Note, Treatment Day: 2nd, AM 8/1/2018 INPATIENT: Hospital Day: 5 Payor: Sadie Jane MMP / Plan: [place] [employer] MMP / Product Type: [employer] Care Medicare /  
  
NAME/AGE/GENDER: Samanta Kamara is a 80 y.o. female PRIMARY DIAGNOSIS: GI bleed <principal problem not specified> <principal problem not specified> 
  
  
ICD-10: Treatment Diagnosis:  
 · Generalized Muscle Weakness (M62.81) · Difficulty in walking, Not elsewhere classified (R26.2) · Other abnormalities of gait and mobility (R26.89) Precaution/Allergies: 
Review of patient's allergies indicates no known allergies. ASSESSMENT:  
 
Ms. Eliana Cunningham presents lying supine in bed with her knee immobilizer on. She is agreeable to treatment.  She rolled left and right to be cleaned up prior to performing supine to sit transfer with verbal cues and SBA. She then ambulated into hallway with rolling walker and additional time. She increased her ambulation distance this treatment with slow yolanda and an antalgic gait pattern. She returned to room and was instructed in seated exercises. Overall good progress noted with mobility. Will continue PT efforts. This section established at most recent assessment PROBLEM LIST (Impairments causing functional limitations): 1. Decreased Strength 2. Decreased ADL/Functional Activities 3. Decreased Transfer Abilities 4. Decreased Ambulation Ability/Technique 5. Decreased Balance 6. Decreased Activity Tolerance 7. Decreased Pacing Skills 8. Decreased Flexibility/Joint Mobility 9. Decreased [place] with Home Exercise Program 
 INTERVENTIONS PLANNED: (Benefits and precautions of physical therapy have been discussed with the patient.) 1. Balance Exercise 2. Bed Mobility 3. Family Education 4. Gait Training 5. Home Exercise Program (HEP) 6. Range of Motion (ROM) 7. Therapeutic Activites 8. Therapeutic Exercise/Strengthening 9. Transfer Training TREATMENT PLAN: Frequency/Duration: 3-5 times a week for duration of hospital stay Rehabilitation Potential For Stated Goals: Good RECOMMENDED REHABILITATION/EQUIPMENT: (at time of discharge pending progress): Due to the probability of continued deficits (see above) this patient will likely need continued skilled physical therapy after discharge. Equipment:  
 Walkers, Type: Rolling Brenda Chon HISTORY:  
History of Present Injury/Illness (Reason for Referral): PER MD HIvoneP 
79 yo F with PMH of Type 2 DM, COPD, HTN, HLD, Vit B12 deficiency, CAD and Depression presents to ER with reports of gerhard bloody stool. Patient was just dismissed from ER last night when she initially presented after syncopal episode thought to be due to hypoglycemia.  Lab work and imagine was unremarkable and she was discharged from ER to home. On arrival to home she hard large blood BM and again felt lightheaded but did not lose consciousness. She returned to ER and gerhard blood noted on rectal exam. Hgb 9.3(10.4 last night). She denies any abd pain or previous episodes of hematochezia. She had colonoscopy a few years ago that was unremarkable. (please note: admission nurse documented bloody emesis but patient denies) 
  
Past Medical History/Comorbidities: Ms. Amaya Fam  has a past medical history of Anxiety (2/1/2018); Arrhythmia; Arthritis; Asthma; B12 deficiency (8/24/2012); Body mass index 37.0-37.9, adult (2/5/2014); CAD (coronary artery disease); Controlled type 2 diabetes mellitus with microalbuminuria, without long-term current use of insulin ([address] (11/22/2016); COPD; Corns and callosities (12/19/2016); CRI (chronic renal insufficiency) (8/24/2012); Depression (6/1/2012); Dermatophytosis of nail (12/19/2016); Diabetes ([address] DJD (degenerative joint disease) of hip; DM (diabetes mellitus) type II controlled with renal manifestation ([address] (7/16/2010); Encounter for long-term (current) use of other medications (1/8/2013); Essential hypertension (7/16/2010); Gastrointestinal disorder; GERD (gastroesophageal reflux disease); Heart failure ([address] Hiatal hernia (8/24/2012); HLD (hyperlipidemia) (1/8/2013); Neuropathy (8/24/2012); Obesity (BMI 30.0-39.9) (BMI-43.8); Other and unspecified hyperlipidemia (7/16/2010); Other chest pain (7/16/2010); Other ill-defined conditions(799.89); Other ill-defined conditions(799.89); PAD (peripheral artery disease) ([address] (8/24/2012); and Retinal hemorrhage.   Ms. Amaya Fam  has a past surgical history that includes pr abdomen surgery proc unlisted; hx appendectomy; hx cholecystectomy; hx other surgical; hx gyn; hx gi; hx orthopaedic; hx hysterectomy; hx cholecystectomy (2000); hx intracranial aneurysm repair; hx intracranial aneurysm repair; hx other surgical; hx heart catheterization; and hx endoscopy (02/27/2018). Social History/Living Environment:  
Home Environment: Private residence # Steps to Enter: 2 One/Two Story Residence: One story Living Alone: No 
Support Systems: Child(edward), Family member(s) Patient Expects to be Discharged to[de-identified] Private residence Current DME Used/Available at Home: Cane, straight, Grab bars, Raised toilet seat, Shower chair, Walker, rolling Tub or Shower Type: Shower pending Prior Level of Function/Work/Activity: 
Has been using a two wheel rolling walker at times. Dominant Side:  
      RIGHT Personal Factors:   
      Sex:  female Age:  80 y.o. Number of Personal Factors/Comorbidities that affect the Plan of Care: 1-2: MODERATE COMPLEXITY EXAMINATION:  
Most Recent Physical Functioning:  
Gross Assessment: 
  
         
  
Posture: 
Posture (WDL): Exceptions to [facility] Posture Assessment: Forward head, Rounded shoulders Balance: 
Sitting: Intact Sitting - Static: Good (unsupported) Sitting - Dynamic: Good (unsupported) Standing: Impaired Standing - Static: Good Standing - Dynamic : Fair Bed Mobility: 
Supine to Sit: Stand-by assistance; Additional time Sit to Supine: Stand-by assistance; Additional time Scooting: Supervision Wheelchair Mobility: 
  
Transfers: 
Sit to Stand: Stand-by assistance; Additional time Stand to Sit: Stand-by assistance; Additional time Bed to Chair: Contact guard assistance Gait: 
  
   
  
Body Structures Involved: 1. Bones 2. Joints 3. Muscles 4. Ligaments Body Functions Affected: 1. Neuromusculoskeletal 
2. Movement Related 3. Skin Related 4. Metobolic/Endocrine Activities and Participation Affected: 1. General Tasks and Demands 2. Communication 3. Mobility 4. Self Care Number of elements that affect the Plan of Care: 3: MODERATE COMPLEXITY CLINICAL PRESENTATION:  
Presentation: Evolving clinical presentation with changing clinical characteristics: MODERATE COMPLEXITY CLINICAL DECISION MAKING:  
[employer] AM-[employer]AC 6 Clicks Basic Mobility Inpatient Short Form How much difficulty does the patient currently have. .. Unable A Lot A Little None 1. Turning over in bed (including adjusting bedclothes, sheets and blankets)? [] 1   [] 2   [x] 3   [] 4  
2. Sitting down on and standing up from a chair with arms ( e.g., wheelchair, bedside commode, etc.)   [] 1   [] 2   [x] 3   [] 4  
3. Moving from lying on back to sitting on the side of the bed? [] 1   [] 2   [x] 3   [] 4 How much help from another person does the patient currently need. .. Total A Lot A Little None 4. Moving to and from a bed to a chair (including a wheelchair)? [] 1   [] 2   [x] 3   [] 4  
5. Need to walk in hospital room? [] 1   [] 2   [x] 3   [] 4  
6. Climbing 3-5 steps with a railing? [] 1   [] 2   [x] 3   [] 4  
© 2007, Trustees of [facility] [employer], under license to [employer]. All rights reserved Score:  Initial: 18 Most Recent: X (Date: -- ) Interpretation of Tool:  Represents activities that are increasingly more difficult (i.e. Bed mobility, Transfers, Gait). Score 24 23 22-20 19-15 14-10 9-7 6 Modifier CH CI CJ CK CL CM CN   
 
? Mobility - Walking and Moving Around:  
  - CURRENT STATUS: CK - 40%-59% impaired, limited or restricted  - GOAL STATUS: CJ - 20%-39% impaired, limited or restricted  - D/C STATUS:  ---------------To be determined--------------- Payor: Sabino Alvar[employer] MMP / Plan: [email or web address] MMP / Product Type: [employer] Medicare /   
 
Medical Necessity:    
· Patient demonstrates good rehab potential due to higher previous functional level. Reason for Services/Other Comments: 
· Patient continues to require skilled intervention due to medical complications, patient unable to attend/participate in therapy as expected and debility and decreased mobility.   
Use of outcome tool(s) and clinical judgement create a POC that gives a: Questionable prediction of patient's progress: MODERATE COMPLEXITY  
  
 
 
 
TREATMENT:  
(In addition to Assessment/Re-Assessment sessions the following treatments were rendered) Pre-treatment Symptoms/Complaints:  No complaints. Pain: Initial:  
Pain Intensity 1: 0  Post Session: No complaints noted this treatment Therapeutic Activity: (    15 mins ):  Therapeutic activities including Bed transfers, Chair transfers and ambulation on level surfaces to improve mobility, strength, balance and coordination. Required minimal verbal cues   to promote motor control of bilateral, lower extremity(s). Therapeutic Exercise: (15 Minutes):  Exercises per grid below to improve mobility, strength and balance. Required minimal visual and verbal cues to promote proper body breathing techniques. Progressed repetitions and complexity of movement as indicated. Date: 
8-1-18 Date: 
 Date: Activity/Exercise Parameters Parameters Parameters Seated ankle pumps with feet elevated x10 Quad set x10 Hip abduction with feet elevated x10 Shoulder flexion x10 Shoulder horizontal abduction x10 SLR x5    
     
 
 
 
Braces/Orthotics/Lines/Etc:  
· O2 Device: Room air Treatment/Session Assessment:   
· Response to Treatment:  Improved transfers, ambulation and mobility · Interdisciplinary Collaboration:  
o Physical Therapy Assistant 
o Registered Nurse · After treatment position/precautions:  
o Up in chair 
o Bed/Chair-wheels locked 
o Call light within reach 
o RN notified · Compliance with Program/Exercises: Will assess as treatment progresses. · Recommendations/Intent for next treatment session: \"Next visit will focus on advancements to more challenging activities, reduction in assistance provided and transfers, ambulation and mobility. \". Total Treatment Duration: PT Patient Time In/Time Out Time In: 5298 Time Out: 1045 Sushant Cortes PTA

## 2018-08-01 NOTE — PROGRESS NOTES
END OF SHIFT NOTE: 
 
INTAKE/OUTPUT 
07/30 0701 - 07/31 0700 In: 5664 [I.V.:1810] Out: 0 Voiding: YES Catheter: NO 
Drain:   
 
 
 
 
 
Flatus: Patient does have flatus present. Stool:  0 occurrences. Characteristics: 
Stool Assessment Stool Appearance: Bloody Emesis: 0 occurrences. Characteristics: VITAL SIGNS Patient Vitals for the past 12 hrs: 
 Temp Pulse Resp BP SpO2  
07/31/18 1516 98.5 °F (36.9 °C) 79 16 147/60 94 % 07/31/18 1346 - - - - 97 % 07/31/18 1345 98.5 °F (36.9 °C) 80 16 164/67 97 % 07/31/18 1257 99.6 °F (37.6 °C) 82 16 159/73 97 % 07/31/18 1145 98.9 °F (37.2 °C) 90 16 151/60 97 % 07/31/18 1047 98.9 °F (37.2 °C) 84 16 158/62 97 % Pain Assessment Pain Intensity 1: 0 (07/30/18 1540) Pain Location 1: Knee Pain Intervention(s) 1: Medication (see MAR) Patient Stated Pain Goal: 0 Ambulating No 
 
Shift report given to oncoming nurse at the bedside. Received one unit of prbc's without difficulty today. Family at bedside.  
 
Shakir Christine RN

## 2018-08-01 NOTE — DISCHARGE SUMMARY
Hospitalist Discharge Summary     Patient ID:  Thu Ann  [id number]  42 y.o.  1/28/1929  Admit date: 7/28/2018  1:22 AM  Discharge date and time: 8/1/2018  Attending: Dilcia Fountain MD  PCP:  Mercedes Montgomery MD  Treatment Team: Attending Provider: Dilcia Fountain MD; Consulting Provider: Liz Christine MD; Consulting Provider: Nathanael Nissen, MD; Care Manager: Case Duarte RN; Utilization Review: Aura Erickson    Principal Diagnosis <principal problem not specified>   Active Problems:    Essential hypertension (7/16/2010)      GERD (gastroesophageal reflux disease) (7/16/2010)      Type 2 diabetes mellitus with diabetic neuropathy [address] (5/16/2018)      GI bleed (7/28/2018)      Syncope (7/28/2018)             Hospital Course:  Please refer to the admission H&P for details of presentation. In summary, the patient is a 79 yo female with PMH of DMII, COPD, HTN, HLD, CAD, Depression, and Vitamin B12 deficiency, who presented to the ER 07/28 with reports of gerhard bleeding with stools. Pt was brought to the ER earlier in the day with a syncopal episode, was diagnosed with hypoglycemia and discharged home. When patient returned home she had a large maroon stool and felt light headed so she returned to the ER. NM scan 07/28 revealed positive GI bleed. GI felt likely diverticular bleed and recommended monitoring. Pt transfused 1 unit PRBC. Patient denies having any stools, n/v, abdominal pain and is tolerating meals without problems. She is now stable for d/c home, stable H&H. Her ASA remains on hold and she should follow up with GI in 1-2 weeks. Additionally, ortho consulted 7/30 for patella fracuture. Knee immobilizer in place. Patient to f/u Dr. Amrita Chadwick in 1-2 weeks. She will have home health PT, OT, RN. Significant Diagnostic Studies:     Left knee x ray IMPRESSION: Nondisplaced patellar fracture.     Push endoscopy IMPRESSION: Positive GI bleeding study likely involving the stomach with  transverse colon a possible but less likely consideration. Labs: Results:       Chemistry Recent Labs      08/01/18   0323  07/31/18   0344  07/30/18   1439  07/30/18   0455   GLU  146*  185*   --   167*   NA  150*  148*   --   148*   K  3.3*  3.2*  3.5  3.0*   CL  115*  114*   --   116*   CO2  25  25   --   27   BUN  9  11   --   13   CREA  0.79  0.83   --   0.71   CA  8.5  8.3   --   8.6   AGAP  10  9   --   5*      CBC w/Diff Recent Labs      08/01/18   1504  08/01/18   0856  08/01/18   0323   WBC  6.8  5.8  5.8   RBC  3.70*  3.43*  3.12*   HGB  11.0*  10.4*  9.2*   HCT  34.2*  31.7*  28.9*   PLT  150  127*  135*      Cardiac Enzymes No results for input(s): CPK, CKND1, SIRIA in the last 72 hours. No lab exists for component: CKRMB, TROIP   Coagulation No results for input(s): PTP, INR, APTT in the last 72 hours. No lab exists for component: INREXT    Lipid Panel Lab Results   Component Value Date/Time    Cholesterol, total 153 05/02/2018 01:30 PM    HDL Cholesterol 63 05/02/2018 01:30 PM    LDL, calculated 74 05/02/2018 01:30 PM    VLDL, calculated 16 05/02/2018 01:30 PM    Triglyceride 80 05/02/2018 01:30 PM    CHOL/HDL Ratio 2.8 02/22/2017 09:54 AM      BNP No results for input(s): BNPP in the last 72 hours. Liver Enzymes No results for input(s): TP, ALB, TBIL, AP, SGOT, GPT in the last 72 hours. No lab exists for component: DBIL   Thyroid Studies Lab Results   Component Value Date/Time    TSH 2.470 10/18/2017 10:07 AM            Discharge Exam:  Visit Vitals    /70    Pulse 82    Temp 98.4 °F (36.9 °C)    Resp 18    Ht 5' 7\" (1.702 m)    Wt 83 kg (183 lb)    SpO2 96%    BMI 28.66 kg/m2     General appearance: alert, cooperative, no distress, appears stated age  Lungs: clear to auscultation bilaterally  Heart: regular rate and rhythm, S1, S2 normal, no murmur, click, rub or gallop  Abdomen: soft, non-tender.  Bowel sounds normal. No masses,  no organomegaly  Extremities: no cyanosis or edema  Neurologic: Grossly normal    Disposition: Home   Discharge Condition: stable  Patient Instructions:   Current Discharge Medication List      CONTINUE these medications which have NOT CHANGED    Details   glipiZIDE SR (GLUCOTROL XL) 2.5 mg CR tablet Take 2.5 mg by mouth daily. fluticasone (FLONASE) 50 mcg/actuation nasal spray INSTILL 1 SPRAY IN EACH NOSTRIL NIGHTLY AS DIRECTED  Qty: 1 Bottle, Refills: 3    Comments: **Patient requests 90 days supply**      potassium chloride (K-DUR, KLOR-CON) 20 mEq tablet TAKE 1 TABLET BY MOUTH EVERY MORNING  Qty: 90 Tab, Refills: 0      cyanocobalamin, vitamin B-12, 1,000 mcg/mL kit 1,000 mcg by Injection route every month. Qty: 1 Kit, Refills: 12      furosemide (LASIX) 20 mg tablet Take 1 Tab by mouth daily. Qty: 90 Tab, Refills: 3      !! pravastatin (PRAVACHOL) 40 mg tablet TAKE 1 TABLET BY MOUTH EVERY NIGHT AT BEDTIME  Qty: 90 Tab, Refills: 3      albuterol (PROVENTIL HFA, VENTOLIN HFA, PROAIR HFA) 90 mcg/actuation inhaler Take 2 Puffs by inhalation every six (6) hours as needed for Wheezing. Qty: 1 Inhaler, Refills: 3      LORazepam (ATIVAN) 0.5 mg tablet Take 1 Tab by mouth daily as needed for Anxiety. Qty: 30 Tab, Refills: 2    Associated Diagnoses: Anxiety      sertraline (ZOLOFT) 100 mg tablet Take 0.5 Tabs by mouth daily. Indications: major depressive disorder, am  Qty: 90 Tab, Refills: 3      hydrALAZINE (APRESOLINE) 50 mg tablet Take 1 Tab by mouth three (3) times daily. Qty: 270 Tab, Refills: 3      fluticasone-salmeterol (ADVAIR DISKUS) 250-50 mcg/dose diskus inhaler Take 1 Puff by inhalation every twelve (12) hours. Qty: 3 Inhaler, Refills: 3      benazepril (LOTENSIN) 40 mg tablet Take 1 Tab by mouth daily. Qty: 90 Tab, Refills: 3      amLODIPine (NORVASC) 10 mg tablet Take 1 Tab by mouth daily.   Qty: 90 Tab, Refills: 3      !! pravastatin (PRAVACHOL) 40 mg tablet TAKE 1 TABLET BY MOUTH EVERY NIGHT AT BEDTIME  Qty: 90 Tab, Refills: 3      gabapentin (NEURONTIN) 100 mg capsule TAKE 1 CAPSULE BY MOUTH TWICE DAILY  Qty: 180 Cap, Refills: 3      Blood-Glucose Meter monitoring kit Pt uses true metrix meter and tests once daily dx code E11.29  Qty: 1 Kit, Refills: 0      metFORMIN (GLUCOPHAGE) 500 mg tablet Take 1 Tab by mouth two (2) times daily (with meals). Qty: 180 Tab, Refills: 3      polyethylene glycol (MIRALAX) 17 gram/dose powder Take 17 g by mouth daily. 1 tablespoon with 8 oz of water daily  Qty: 119 g, Refills: 0      FOLIC ACID/MULTIVIT-MIN/LUTEIN (CENTRUM SILVER PO) Take  by mouth. OTHER One shower chair  Qty: 1 Each, Refills: 0      VIT A/VIT C/VIT E/ZINC/COPPER (PRESERVISION AREDS PO) Take 1 Tab by mouth two (2) times a day. glucose blood VI test strips (BLOOD GLUCOSE TEST) strip Pt uses true metrix test strips. Pt tests once daily. Dx code E11.29  Qty: 100 Strip, Refills: 11      Lancets Misc Pt tests three times daily Diagnosis Code: 250.00  Qty: 1 Package, Refills: 11       !! - Potential duplicate medications found. Please discuss with provider.       STOP taking these medications       ASPIRIN 81 mg Tab Comments:   Reason for Stopping:             Activity: Regular, as tolerated   Diet: DIET FULL LIQUID    Follow-up    PCP in 1-2 weeks for hospital follow up including H&H check, patellar fx follow up  Dr. Desirae Anderson for patellar fx follow up  [facility] in 1[facility]2 weeks       Follow-up Information     Follow up With Details Comments Annita Torres MD  [place] [address] [place] [address]      Geraldo Peñaloza MD   [address] [place] [address]  [phone]              Time spent to discharge patient greater than 30 minutes  Signed:  Earlene Jauregui NP  8/1/2018  3:40 PM

## 2018-08-01 NOTE — PROGRESS NOTES
GI DAILY PROGRESS NOTE Admit Date:  7/28/2018 Today's Date:  8/1/2018 CC:  [facility] Subjective:  
 
Patient feels well. Still denies any recurrent/ongoing GI bleeding. No BM since this past Friday though passing good amount of flatus. No c/o abdominal pain, N/V. She is tolerating po intake. Discharge home planned for today though held due to some complaint of weakness- now planning d/c in AM. Medications:  
Current Facility-Administered Medications Medication Dose Route Frequency  0.9% sodium chloride infusion 250 mL  250 mL IntraVENous PRN  
 0.9% sodium chloride infusion 250 mL  250 mL IntraVENous PRN  
 insulin lispro (HUMALOG) injection   SubCUTAneous AC&HS  
 0.9% sodium chloride infusion  50 mL/hr IntraVENous CONTINUOUS  
 0.9% sodium chloride infusion 250 mL  250 mL IntraVENous PRN  
 sodium chloride (NS) flush 5-10 mL  5-10 mL IntraVENous Q8H  
 sodium chloride (NS) flush 5-10 mL  5-10 mL IntraVENous PRN  
 ondansetron (ZOFRAN) injection 4 mg  4 mg IntraVENous Q4H PRN  
 LORazepam (ATIVAN) injection 1 mg  1 mg IntraVENous Q6H PRN  
 0.9% sodium chloride infusion 250 mL  250 mL IntraVENous PRN  
 acetaminophen (TYLENOL) tablet 650 mg  650 mg Oral Q6H PRN  
 albuterol (PROVENTIL VENTOLIN) nebulizer solution 2.5 mg  2.5 mg Nebulization Q6H PRN  
 amLODIPine (NORVASC) tablet 10 mg  10 mg Oral DAILY  benazepril (LOTENSIN) tablet 40 mg  40 mg Oral DAILY  gabapentin (NEURONTIN) capsule 100 mg  100 mg Oral BID  hydrALAZINE (APRESOLINE) tablet 50 mg  50 mg Oral TID  pravastatin (PRAVACHOL) tablet 40 mg  40 mg Oral QHS  sertraline (ZOLOFT) tablet 50 mg  50 mg Oral DAILY  furosemide (LASIX) tablet 20 mg  20 mg Oral DAILY  potassium chloride (K-DUR, KLOR-CON) SR tablet 20 mEq  20 mEq Oral DAILY  budesonide (PULMICORT) 500 mcg/2 ml nebulizer suspension  500 mcg Nebulization BID RT  And  
 albuterol (PROVENTIL VENTOLIN) nebulizer solution 2.5 mg  2.5 mg Nebulization Q6HWA RT  
 pantoprazole (PROTONIX) 40 mg in sodium chloride 0.9% 10 mL injection  40 mg IntraVENous DAILY Review of Systems: ROS was obtained, with pertinent positives as listed above. No chest pain or SOB. Diet:  Full liquids Objective:  
Vitals: 
Visit Vitals  /80  Pulse 80  Temp 98.2 °F (36.8 °C)  Resp 18  Ht 5' 7\" (1.702 m)  Wt 83 kg (183 lb)  SpO2 98%  BMI 28.66 kg/m2 Intake/Output: 
08/01 0701 - 08/01 1900 In: 187 [I.V.:187] Out: -  
07/30 1901 - 08/01 0700 In: 2537.8 [I.V.:2194] Out: 0 Exam: 
General appearance: alert, cooperative, no distress Lungs: clear to auscultation bilaterally anteriorly Heart: regular rate and rhythm Abdomen: soft, Non-tender. Bowel sounds normal. No masses, no organomegaly Neuro:  alert and oriented Data Review (Labs):   
Recent Labs 08/01/18 
 1504  08/01/18 
 5800  08/01/18 
 3514  08/01/18 
 0008  07/31/18 
 1544  07/31/18 
 5815  07/31/18 
 0344  07/30/18 9633 0859  07/30/18 
 3632  07/30/18 
 5300 WBC  6.8  5.8  5.8  5.8  5.9  6.5  4.6  6.5  6.9  5.5 HGB  11.0*  10.4*  9.2*  9.3*  9.5*  9.1*  7.8*  9.5*  9.3*  8.4* HCT  34.2*  31.7*  28.9*  29.2*  29.7*  29.1*  25.0*  30.5*  28.8*  26.2*  
PLT  150  127*  135*  130*  137*  155  137*  150  140*  137* MCV  92.4  92.4  92.6  92.4  92.8  95.1  94.3  94.1  93.2  92.9 NA   --    --   150*   --    --    --   148*   --    --   148* K   --    --   3.3*   --    --    --   3.2*  3.5   --   3.0*  
CL   --    --   115*   --    --    --   114*   --    --   116* CO2   --    --   25   --    --    --   25   --    --   27 BUN   --    --   9   --    --    --   11   --    --   13  
CREA   --    --   0.79   --    --    --   0.83   --    --   0.71 CA   --    --   8.5   --    --    --   8.3   --    --   8.6 MG   --    --   2.0   --    --    --   1.5*   --    --    --   
GLU   --    --   146*   --    --    --   185*   --    --   167* NM bleed scan 7/28: IMPRESSION: Positive GI bleeding study likely involving the stomach with transverse colon a possible but less likely consideration. Assessment:  
 
Active Problems: 
  Essential hypertension (7/16/2010) GERD (gastroesophageal reflux disease) (7/16/2010) Type 2 diabetes mellitus with diabetic neuropathy ([facility] [address] (5/16/2018) GI bleed (7/28/2018) Syncope (7/28/2018) 80 y.o. female with PMH of Type 2 DM, COPD, HTN, HLD, Vit B12 deficiency, CAD, Depression, colon polyps with sigmoid resection, diverticulosis (and -itis), and esophageal strictures who was seen in consultation at the request of Dr. Urbano Yun for probable diverticular bleed. Hgb 10.4 initially and has been overall stable since with Hgb ranging from mid 8 to low/mid 9s. This AM Hgb dropped to 7.8 from 9.5 on 7/31 though on repeat was back up to 9.1. She was transfused 2u pRBC 7/31 and Hgb now improved at 11.0. No recurrent GI bleeding noted. NM bleed scan positive as above - suggests source is stomach. Clinically, this is a diverticular bleed as she presented with painless hematochezia and has had no N/V/ hematemesis. Last colonoscopy in 2007 with diverticulosis of colon.  
  
 
Plan:  
 
-Agree with discharge home in AM 
-In the meantime continue to follow for overt GI bleeding and monitor trend of H/H 
-OK to continue gradual advancement in diet. Nothing further from GI standpoint. MICHAEL Patel 
 
GI--agree with above. Discharge plans noted. Please call if needed.   Thanks Major Sanderson MD

## 2018-08-01 NOTE — PROGRESS NOTES
Bedside shift change report given to Joshua Ortez RN (oncoming nurse) by Bishop Andres RN (offgoing nurse). Report included the following information SBAR, Kardex, Procedure Summary, Intake/Output, MAR, Recent Results and Med Rec Status.

## 2018-08-01 NOTE — PROGRESS NOTES
OT NOTE: 
 
Charts reviewed. Pt just finished working with PT and politely declined OT tx session. Will re-attempt at later time/date as schedule allows. Thanks, [facility], OTR/L

## 2018-08-02 VITALS
HEART RATE: 77 BPM | BODY MASS INDEX: 28.72 KG/M2 | TEMPERATURE: 98.9 F | OXYGEN SATURATION: 97 % | SYSTOLIC BLOOD PRESSURE: 155 MMHG | DIASTOLIC BLOOD PRESSURE: 64 MMHG | RESPIRATION RATE: 17 BRPM | WEIGHT: 183 LBS | HEIGHT: 67 IN

## 2018-08-02 LAB
ANION GAP SERPL CALC-SCNC: 8 MMOL/L (ref 7–16)
BUN SERPL-MCNC: 6 MG/DL (ref 8–23)
CALCIUM SERPL-MCNC: 9.4 MG/DL (ref 8.3–10.4)
CHLORIDE SERPL-SCNC: 113 MMOL/L (ref 98–107)
CO2 SERPL-SCNC: 28 MMOL/L (ref 21–32)
CREAT SERPL-MCNC: 0.72 MG/DL (ref 0.6–1)
GLUCOSE BLD STRIP.AUTO-MCNC: 254 MG/DL (ref 65–100)
GLUCOSE SERPL-MCNC: 138 MG/DL (ref 65–100)
HCT VFR BLD AUTO: 32.7 % (ref 35.8–46.3)
HGB BLD-MCNC: 10.4 G/DL (ref 11.7–15.4)
POTASSIUM SERPL-SCNC: 3.5 MMOL/L (ref 3.5–5.1)
SODIUM SERPL-SCNC: 149 MMOL/L (ref 136–145)

## 2018-08-02 PROCEDURE — 74011250637 HC RX REV CODE- 250/637: Performed by: FAMILY MEDICINE

## 2018-08-02 PROCEDURE — 74011000250 HC RX REV CODE- 250: Performed by: INTERNAL MEDICINE

## 2018-08-02 PROCEDURE — 36415 COLL VENOUS BLD VENIPUNCTURE: CPT

## 2018-08-02 PROCEDURE — C9113 INJ PANTOPRAZOLE SODIUM, VIA: HCPCS | Performed by: INTERNAL MEDICINE

## 2018-08-02 PROCEDURE — 74011250636 HC RX REV CODE- 250/636: Performed by: INTERNAL MEDICINE

## 2018-08-02 PROCEDURE — 74011250636 HC RX REV CODE- 250/636: Performed by: NURSE PRACTITIONER

## 2018-08-02 PROCEDURE — 82962 GLUCOSE BLOOD TEST: CPT

## 2018-08-02 PROCEDURE — 94640 AIRWAY INHALATION TREATMENT: CPT

## 2018-08-02 PROCEDURE — 77030020263 HC SOL INJ SOD CL0.9% LFCR 1000ML

## 2018-08-02 PROCEDURE — 85018 HEMOGLOBIN: CPT

## 2018-08-02 PROCEDURE — 80048 BASIC METABOLIC PNL TOTAL CA: CPT

## 2018-08-02 PROCEDURE — 74011636637 HC RX REV CODE- 636/637: Performed by: NURSE PRACTITIONER

## 2018-08-02 PROCEDURE — 74011000250 HC RX REV CODE- 250: Performed by: FAMILY MEDICINE

## 2018-08-02 PROCEDURE — 94760 N-INVAS EAR/PLS OXIMETRY 1: CPT

## 2018-08-02 RX ORDER — HYDRALAZINE HYDROCHLORIDE 50 MG/1
100 TABLET, FILM COATED ORAL 3 TIMES DAILY
Qty: 60 TAB | Refills: 3 | Status: SHIPPED | OUTPATIENT
Start: 2018-08-02 | End: 2019-04-18 | Stop reason: SDUPTHER

## 2018-08-02 RX ORDER — HYDRALAZINE HYDROCHLORIDE 25 MG/1
25 TABLET, FILM COATED ORAL ONCE
Status: DISCONTINUED | OUTPATIENT
Start: 2018-08-02 | End: 2018-08-02 | Stop reason: HOSPADM

## 2018-08-02 RX ADMIN — BENAZEPRIL HYDROCHLORIDE 40 MG: 10 TABLET, FILM COATED ORAL at 08:48

## 2018-08-02 RX ADMIN — FUROSEMIDE 20 MG: 20 TABLET ORAL at 08:47

## 2018-08-02 RX ADMIN — HYDRALAZINE HYDROCHLORIDE 50 MG: 50 TABLET, FILM COATED ORAL at 08:46

## 2018-08-02 RX ADMIN — BUDESONIDE 500 MCG: 0.5 INHALANT RESPIRATORY (INHALATION) at 07:31

## 2018-08-02 RX ADMIN — AMLODIPINE BESYLATE 10 MG: 10 TABLET ORAL at 08:47

## 2018-08-02 RX ADMIN — SODIUM CHLORIDE 50 ML/HR: 900 INJECTION, SOLUTION INTRAVENOUS at 05:42

## 2018-08-02 RX ADMIN — POTASSIUM CHLORIDE 20 MEQ: 20 TABLET, EXTENDED RELEASE ORAL at 08:47

## 2018-08-02 RX ADMIN — SERTRALINE HYDROCHLORIDE 50 MG: 50 TABLET ORAL at 08:47

## 2018-08-02 RX ADMIN — SODIUM CHLORIDE 40 MG: 9 INJECTION, SOLUTION INTRAMUSCULAR; INTRAVENOUS; SUBCUTANEOUS at 08:48

## 2018-08-02 RX ADMIN — GABAPENTIN 100 MG: 100 CAPSULE ORAL at 08:47

## 2018-08-02 RX ADMIN — INSULIN LISPRO 6 UNITS: 100 INJECTION, SOLUTION INTRAVENOUS; SUBCUTANEOUS at 12:12

## 2018-08-02 RX ADMIN — ALBUTEROL SULFATE 2.5 MG: 2.5 SOLUTION RESPIRATORY (INHALATION) at 07:31

## 2018-08-02 NOTE — PROGRESS NOTES
Hospitalist Progress Note 2018 Admit Date: 2018  1:22 AM  
NAME: Kelsy German :  1929 MRN:  [id number] Attending: Rhiannon Siegel MD 
PCP:  Kavon Bauer MD 
 
SUBJECTIVE:  
 
Pt is a 81 yo female with PMH of DMII, COPD, HTN, HLD, CAD, Depression, and Vitamin B12 deficiency, who presented to the ER  with reports of gerhard bleeding with stools. Pt was brought to the ER earlier in the day with a syncopal episode, was diagnosed with hypoglycemia and discharged home. When patient returned home she had a large maroon stool and felt light headed so she returned to the ER. NM scan  revealed positive GI bleed. GI consulted, felt likely diverticular bleed and recommended monitoring. Pt transfused 1 unit PRBC. Patient denies having any stools, n/v, abdominal pain and is tolerating meals without problems. She is now stable for d/c home, stable H&H. Her ASA remains on hold and she should follow up with GI in 1-2 weeks. Additionally, ortho consulted  for patella fracuture. Knee immobilizer in place. Patient to f/u Dr. Marcia Bro in 1-2 weeks. She will have home health PT, OT, RN. Pt reports she is ready for d/c home. Her daughter lives with her and helps her around the house. Hemodynamically stable. Review of Systems negative with exception of pertinent positives noted above PHYSICAL EXAM  
 
Visit Vitals  /66  Pulse 76  Temp 98.3 °F (36.8 °C)  Resp 17  Ht 5' 7\" (1.702 m)  Wt 83 kg (183 lb)  SpO2 97%  BMI 28.66 kg/m2 Temp (24hrs), Av.3 °F (36.8 °C), Min:97.9 °F (36.6 °C), Max:98.4 °F (36.9 °C) Oxygen Therapy O2 Sat (%): 97 % (18) Pulse via Oximetry: 73 beats per minute (18) O2 Device: Room air (18) Intake/Output Summary (Last 24 hours) at 18 Last data filed at 18 [id number] Gross per 24 hour Intake              816 ml Output                0 ml Net              816 ml  
 General: No acute distress   
Lungs:  CTA Bilaterally. Heart:  Regular rate and rhythm,  No murmur, rub, or gallop Abdomen: Soft, Non distended, Non tender, Positive bowel sounds Extremities: No cyanosis, clubbing or edema Neurologic:  No focal deficits ASSESSMENT Active Hospital Problems Diagnosis Date Noted  GI bleed 07/28/2018  Syncope 07/28/2018  Type 2 diabetes mellitus with diabetic neuropathy ([facility] [address] 05/16/2018  Essential hypertension 07/16/2010  GERD (gastroesophageal reflux disease) 07/16/2010 A/P: 
 
GI Bleed- [place] diverticular. Resolved, Hgb now stable. ASA on hold. Pt to follow up with GI in 2 weeks. Left patellar fx- Knee immobilzer in place. Follow up with Tyron Knox. HTN- SBP 140s-170s in past 24 hours. Cont Norvasc, Lotensin (both maxed out) and increase Apresoline to 100 mg TID  
 
DC planning- Okay to d/c home today Signed By: Chance Silva NP August 2, 2018

## 2018-08-03 ENCOUNTER — HOME CARE VISIT (OUTPATIENT)
Dept: SCHEDULING | Facility: HOME HEALTH | Age: 83
End: 2018-08-03
Payer: COMMERCIAL

## 2018-08-03 VITALS
TEMPERATURE: 98 F | OXYGEN SATURATION: 98 % | SYSTOLIC BLOOD PRESSURE: 158 MMHG | RESPIRATION RATE: 19 BRPM | HEART RATE: 72 BPM | DIASTOLIC BLOOD PRESSURE: 78 MMHG

## 2018-08-03 PROCEDURE — G0151 HHCP-SERV OF PT,EA 15 MIN: HCPCS

## 2018-08-03 PROCEDURE — 3331090001 HH PPS REVENUE CREDIT

## 2018-08-03 PROCEDURE — 400013 HH SOC

## 2018-08-03 PROCEDURE — 3331090002 HH PPS REVENUE DEBIT

## 2018-08-04 PROCEDURE — 3331090002 HH PPS REVENUE DEBIT

## 2018-08-04 PROCEDURE — 3331090001 HH PPS REVENUE CREDIT

## 2018-08-05 PROCEDURE — 3331090001 HH PPS REVENUE CREDIT

## 2018-08-05 PROCEDURE — 3331090002 HH PPS REVENUE DEBIT

## 2018-08-06 ENCOUNTER — HOME CARE VISIT (OUTPATIENT)
Dept: SCHEDULING | Facility: HOME HEALTH | Age: 83
End: 2018-08-06
Payer: COMMERCIAL

## 2018-08-06 VITALS
HEART RATE: 79 BPM | TEMPERATURE: 98.1 F | RESPIRATION RATE: 16 BRPM | DIASTOLIC BLOOD PRESSURE: 70 MMHG | SYSTOLIC BLOOD PRESSURE: 144 MMHG

## 2018-08-06 PROCEDURE — 3331090002 HH PPS REVENUE DEBIT

## 2018-08-06 PROCEDURE — 3331090001 HH PPS REVENUE CREDIT

## 2018-08-06 PROCEDURE — G0152 HHCP-SERV OF OT,EA 15 MIN: HCPCS

## 2018-08-07 PROCEDURE — 3331090001 HH PPS REVENUE CREDIT

## 2018-08-07 PROCEDURE — 3331090002 HH PPS REVENUE DEBIT

## 2018-08-08 ENCOUNTER — HOME CARE VISIT (OUTPATIENT)
Dept: SCHEDULING | Facility: HOME HEALTH | Age: 83
End: 2018-08-08
Payer: COMMERCIAL

## 2018-08-08 VITALS
HEART RATE: 80 BPM | RESPIRATION RATE: 16 BRPM | DIASTOLIC BLOOD PRESSURE: 62 MMHG | OXYGEN SATURATION: 94 % | SYSTOLIC BLOOD PRESSURE: 170 MMHG | TEMPERATURE: 98.2 F

## 2018-08-08 PROCEDURE — G0157 HHC PT ASSISTANT EA 15: HCPCS

## 2018-08-08 PROCEDURE — 3331090002 HH PPS REVENUE DEBIT

## 2018-08-08 PROCEDURE — 3331090001 HH PPS REVENUE CREDIT

## 2018-08-09 PROCEDURE — 3331090001 HH PPS REVENUE CREDIT

## 2018-08-09 PROCEDURE — 3331090002 HH PPS REVENUE DEBIT

## 2018-08-10 ENCOUNTER — HOME CARE VISIT (OUTPATIENT)
Dept: SCHEDULING | Facility: HOME HEALTH | Age: 83
End: 2018-08-10
Payer: COMMERCIAL

## 2018-08-10 VITALS
RESPIRATION RATE: 16 BRPM | SYSTOLIC BLOOD PRESSURE: 152 MMHG | TEMPERATURE: 97.9 F | OXYGEN SATURATION: 97 % | HEART RATE: 80 BPM | DIASTOLIC BLOOD PRESSURE: 58 MMHG

## 2018-08-10 PROCEDURE — 3331090001 HH PPS REVENUE CREDIT

## 2018-08-10 PROCEDURE — 3331090002 HH PPS REVENUE DEBIT

## 2018-08-10 PROCEDURE — G0157 HHC PT ASSISTANT EA 15: HCPCS

## 2018-08-11 PROCEDURE — 3331090001 HH PPS REVENUE CREDIT

## 2018-08-11 PROCEDURE — 3331090002 HH PPS REVENUE DEBIT

## 2018-08-12 PROCEDURE — 3331090002 HH PPS REVENUE DEBIT

## 2018-08-12 PROCEDURE — 3331090001 HH PPS REVENUE CREDIT

## 2018-08-13 ENCOUNTER — HOME CARE VISIT (OUTPATIENT)
Dept: SCHEDULING | Facility: HOME HEALTH | Age: 83
End: 2018-08-13
Payer: COMMERCIAL

## 2018-08-13 VITALS
SYSTOLIC BLOOD PRESSURE: 175 MMHG | DIASTOLIC BLOOD PRESSURE: 80 MMHG | OXYGEN SATURATION: 96 % | TEMPERATURE: 98.1 F | HEART RATE: 85 BPM

## 2018-08-13 PROCEDURE — G0158 HHC OT ASSISTANT EA 15: HCPCS

## 2018-08-13 PROCEDURE — 3331090001 HH PPS REVENUE CREDIT

## 2018-08-13 PROCEDURE — 3331090002 HH PPS REVENUE DEBIT

## 2018-08-14 PROCEDURE — 3331090001 HH PPS REVENUE CREDIT

## 2018-08-14 PROCEDURE — 3331090002 HH PPS REVENUE DEBIT

## 2018-08-15 ENCOUNTER — HOME CARE VISIT (OUTPATIENT)
Dept: SCHEDULING | Facility: HOME HEALTH | Age: 83
End: 2018-08-15
Payer: COMMERCIAL

## 2018-08-15 ENCOUNTER — HOME CARE VISIT (OUTPATIENT)
Dept: HOME HEALTH SERVICES | Facility: HOME HEALTH | Age: 83
End: 2018-08-15
Payer: COMMERCIAL

## 2018-08-15 PROCEDURE — 3331090001 HH PPS REVENUE CREDIT

## 2018-08-15 PROCEDURE — 3331090002 HH PPS REVENUE DEBIT

## 2018-08-15 PROCEDURE — G0152 HHCP-SERV OF OT,EA 15 MIN: HCPCS

## 2018-08-16 ENCOUNTER — HOME CARE VISIT (OUTPATIENT)
Dept: SCHEDULING | Facility: HOME HEALTH | Age: 83
End: 2018-08-16
Payer: COMMERCIAL

## 2018-08-16 PROCEDURE — 3331090002 HH PPS REVENUE DEBIT

## 2018-08-16 PROCEDURE — 3331090001 HH PPS REVENUE CREDIT

## 2018-08-16 PROCEDURE — 3331090003 HH PPS REVENUE ADJ

## 2018-08-21 ENCOUNTER — APPOINTMENT (OUTPATIENT)
Dept: GENERAL RADIOLOGY | Age: 83
DRG: 683 | End: 2018-08-21
Attending: EMERGENCY MEDICINE
Payer: COMMERCIAL

## 2018-08-21 ENCOUNTER — HOSPITAL ENCOUNTER (INPATIENT)
Age: 83
LOS: 5 days | Discharge: HOME HEALTH | DRG: 683 | End: 2018-08-26
Attending: EMERGENCY MEDICINE | Admitting: HOSPITALIST
Payer: COMMERCIAL

## 2018-08-21 DIAGNOSIS — D64.9 SYMPTOMATIC ANEMIA: ICD-10-CM

## 2018-08-21 DIAGNOSIS — Z60.9 DECREASED SOCIAL INTERACTION: ICD-10-CM

## 2018-08-21 DIAGNOSIS — N17.9 ACUTE KIDNEY INJURY (HCC): Primary | ICD-10-CM

## 2018-08-21 LAB
ALBUMIN SERPL-MCNC: 3 G/DL (ref 3.2–4.6)
ALBUMIN/GLOB SERPL: 0.7 {RATIO} (ref 1.2–3.5)
ALP SERPL-CCNC: 83 U/L (ref 50–136)
ALT SERPL-CCNC: 30 U/L (ref 12–65)
ANION GAP SERPL CALC-SCNC: 13 MMOL/L (ref 7–16)
APPEARANCE UR: ABNORMAL
AST SERPL-CCNC: 58 U/L (ref 15–37)
BACTERIA URNS QL MICRO: ABNORMAL /HPF
BASOPHILS # BLD: 0 K/UL
BASOPHILS NFR BLD: 0 % (ref 0–2)
BILIRUB SERPL-MCNC: 0.4 MG/DL (ref 0.2–1.1)
BILIRUB UR QL: NEGATIVE
BUN SERPL-MCNC: 24 MG/DL (ref 8–23)
CALCIUM SERPL-MCNC: 9.9 MG/DL (ref 8.3–10.4)
CASTS URNS QL MICRO: 0 /LPF
CHLORIDE SERPL-SCNC: 109 MMOL/L (ref 98–107)
CK SERPL-CCNC: 64 U/L (ref 21–215)
CO2 SERPL-SCNC: 22 MMOL/L (ref 21–32)
COLOR UR: YELLOW
CREAT SERPL-MCNC: 1.73 MG/DL (ref 0.6–1)
DIFFERENTIAL METHOD BLD: ABNORMAL
EOSINOPHIL # BLD: 0 K/UL
EOSINOPHIL NFR BLD: 1 % (ref 0.5–7.8)
EPI CELLS #/AREA URNS HPF: 0 /HPF
ERYTHROCYTE [DISTWIDTH] IN BLOOD BY AUTOMATED COUNT: 14.4 %
FERRITIN SERPL-MCNC: 371 NG/ML (ref 8–388)
GLOBULIN SER CALC-MCNC: 4.1 G/DL (ref 2.3–3.5)
GLUCOSE BLD STRIP.AUTO-MCNC: 115 MG/DL (ref 65–100)
GLUCOSE SERPL-MCNC: 263 MG/DL (ref 65–100)
GLUCOSE UR STRIP.AUTO-MCNC: NEGATIVE MG/DL
HCT VFR BLD AUTO: 28.1 % (ref 35.8–46.3)
HCT VFR BLD AUTO: 28.4 % (ref 35.8–46.3)
HGB BLD-MCNC: 8.6 G/DL (ref 11.7–15.4)
HGB BLD-MCNC: 8.7 G/DL (ref 11.7–15.4)
HGB RETIC QN AUTO: 33 PG (ref 29–35)
HGB UR QL STRIP: NEGATIVE
IMM GRANULOCYTES # BLD: 0.1 K/UL
IMM GRANULOCYTES NFR BLD AUTO: 1 % (ref 0–5)
IMM RETICS NFR: 15 % (ref 3–15.9)
IRON SATN MFR SERPL: 24 %
IRON SERPL-MCNC: 58 UG/DL (ref 35–150)
IRON SERPL-MCNC: 60 UG/DL (ref 35–150)
KETONES UR QL STRIP.AUTO: NEGATIVE MG/DL
LEUKOCYTE ESTERASE UR QL STRIP.AUTO: ABNORMAL
LYMPHOCYTES # BLD: 1.8 K/UL
LYMPHOCYTES NFR BLD: 30 % (ref 13–44)
MAGNESIUM SERPL-MCNC: 2.1 MG/DL (ref 1.8–2.4)
MCH RBC QN AUTO: 31.6 PG (ref 26.1–32.9)
MCHC RBC AUTO-ENTMCNC: 31 G/DL (ref 31.4–35)
MCV RBC AUTO: 102.2 FL (ref 79.6–97.8)
MONOCYTES # BLD: 0.5 K/UL
MONOCYTES NFR BLD: 8 % (ref 4–12)
NEUTS SEG # BLD: 3.6 K/UL
NEUTS SEG NFR BLD: 60 % (ref 43–78)
NITRITE UR QL STRIP.AUTO: NEGATIVE
NRBC # BLD: 0 K/UL (ref 0–0.2)
PH UR STRIP: 7 [PH] (ref 5–9)
PHOSPHATE SERPL-MCNC: 1.8 MG/DL (ref 2.3–3.7)
PLATELET # BLD AUTO: 195 K/UL (ref 150–450)
PMV BLD AUTO: 11.6 FL (ref 9.4–12.3)
POTASSIUM SERPL-SCNC: 5.5 MMOL/L (ref 3.5–5.1)
PROT SERPL-MCNC: 7.1 G/DL (ref 6.3–8.2)
PROT UR STRIP-MCNC: NEGATIVE MG/DL
RBC # BLD AUTO: 2.75 M/UL (ref 4.05–5.2)
RBC #/AREA URNS HPF: ABNORMAL /HPF
RETICS # AUTO: 0.12 M/UL (ref 0.03–0.1)
RETICS/RBC NFR AUTO: 4.2 % (ref 0.3–2)
SODIUM SERPL-SCNC: 144 MMOL/L (ref 136–145)
SP GR UR REFRACTOMETRY: 1.01 (ref 1–1.02)
TIBC SERPL-MCNC: 244 UG/DL (ref 250–450)
TRANSFERRIN SERPL-MCNC: 135 MG/DL (ref 202–364)
TROPONIN I SERPL-MCNC: <0.02 NG/ML (ref 0.02–0.05)
UROBILINOGEN UR QL STRIP.AUTO: 0.2 EU/DL (ref 0.2–1)
WBC # BLD AUTO: 6 K/UL (ref 4.3–11.1)
WBC URNS QL MICRO: >100 /HPF

## 2018-08-21 PROCEDURE — C9113 INJ PANTOPRAZOLE SODIUM, VIA: HCPCS | Performed by: HOSPITALIST

## 2018-08-21 PROCEDURE — 74011636637 HC RX REV CODE- 636/637: Performed by: HOSPITALIST

## 2018-08-21 PROCEDURE — 86870 RBC ANTIBODY IDENTIFICATION: CPT

## 2018-08-21 PROCEDURE — 83540 ASSAY OF IRON: CPT

## 2018-08-21 PROCEDURE — 36415 COLL VENOUS BLD VENIPUNCTURE: CPT

## 2018-08-21 PROCEDURE — 81001 URINALYSIS AUTO W/SCOPE: CPT

## 2018-08-21 PROCEDURE — 84484 ASSAY OF TROPONIN QUANT: CPT

## 2018-08-21 PROCEDURE — 80053 COMPREHEN METABOLIC PANEL: CPT

## 2018-08-21 PROCEDURE — 84466 ASSAY OF TRANSFERRIN: CPT

## 2018-08-21 PROCEDURE — 74011000302 HC RX REV CODE- 302: Performed by: HOSPITALIST

## 2018-08-21 PROCEDURE — 85018 HEMOGLOBIN: CPT

## 2018-08-21 PROCEDURE — 74011000250 HC RX REV CODE- 250: Performed by: EMERGENCY MEDICINE

## 2018-08-21 PROCEDURE — 86920 COMPATIBILITY TEST SPIN: CPT

## 2018-08-21 PROCEDURE — 83735 ASSAY OF MAGNESIUM: CPT

## 2018-08-21 PROCEDURE — 82962 GLUCOSE BLOOD TEST: CPT

## 2018-08-21 PROCEDURE — C9113 INJ PANTOPRAZOLE SODIUM, VIA: HCPCS | Performed by: EMERGENCY MEDICINE

## 2018-08-21 PROCEDURE — 82728 ASSAY OF FERRITIN: CPT

## 2018-08-21 PROCEDURE — 74011000250 HC RX REV CODE- 250: Performed by: HOSPITALIST

## 2018-08-21 PROCEDURE — 86905 BLOOD TYPING RBC ANTIGENS: CPT

## 2018-08-21 PROCEDURE — 93005 ELECTROCARDIOGRAM TRACING: CPT | Performed by: EMERGENCY MEDICINE

## 2018-08-21 PROCEDURE — 71046 X-RAY EXAM CHEST 2 VIEWS: CPT

## 2018-08-21 PROCEDURE — 74011250636 HC RX REV CODE- 250/636: Performed by: HOSPITALIST

## 2018-08-21 PROCEDURE — 85046 RETICYTE/HGB CONCENTRATE: CPT

## 2018-08-21 PROCEDURE — 86901 BLOOD TYPING SEROLOGIC RH(D): CPT

## 2018-08-21 PROCEDURE — 74011250637 HC RX REV CODE- 250/637: Performed by: HOSPITALIST

## 2018-08-21 PROCEDURE — 94640 AIRWAY INHALATION TREATMENT: CPT

## 2018-08-21 PROCEDURE — 82550 ASSAY OF CK (CPK): CPT

## 2018-08-21 PROCEDURE — 94760 N-INVAS EAR/PLS OXIMETRY 1: CPT

## 2018-08-21 PROCEDURE — 65660000000 HC RM CCU STEPDOWN

## 2018-08-21 PROCEDURE — 96374 THER/PROPH/DIAG INJ IV PUSH: CPT | Performed by: EMERGENCY MEDICINE

## 2018-08-21 PROCEDURE — 85025 COMPLETE CBC W/AUTO DIFF WBC: CPT

## 2018-08-21 PROCEDURE — 74011250636 HC RX REV CODE- 250/636: Performed by: EMERGENCY MEDICINE

## 2018-08-21 PROCEDURE — 99285 EMERGENCY DEPT VISIT HI MDM: CPT | Performed by: EMERGENCY MEDICINE

## 2018-08-21 PROCEDURE — 84100 ASSAY OF PHOSPHORUS: CPT

## 2018-08-21 PROCEDURE — 86580 TB INTRADERMAL TEST: CPT | Performed by: HOSPITALIST

## 2018-08-21 RX ORDER — ZOLPIDEM TARTRATE 5 MG/1
5 TABLET ORAL
Status: DISCONTINUED | OUTPATIENT
Start: 2018-08-21 | End: 2018-08-26 | Stop reason: HOSPADM

## 2018-08-21 RX ORDER — SODIUM CHLORIDE 0.9 % (FLUSH) 0.9 %
5-10 SYRINGE (ML) INJECTION AS NEEDED
Status: DISCONTINUED | OUTPATIENT
Start: 2018-08-21 | End: 2018-08-26 | Stop reason: HOSPADM

## 2018-08-21 RX ORDER — ALBUTEROL SULFATE 0.83 MG/ML
2.5 SOLUTION RESPIRATORY (INHALATION)
Status: DISCONTINUED | OUTPATIENT
Start: 2018-08-21 | End: 2018-08-26 | Stop reason: HOSPADM

## 2018-08-21 RX ORDER — ADHESIVE BANDAGE
30 BANDAGE TOPICAL DAILY PRN
Status: DISCONTINUED | OUTPATIENT
Start: 2018-08-21 | End: 2018-08-26 | Stop reason: HOSPADM

## 2018-08-21 RX ORDER — PRAVASTATIN SODIUM 20 MG/1
40 TABLET ORAL
Status: DISCONTINUED | OUTPATIENT
Start: 2018-08-21 | End: 2018-08-26 | Stop reason: HOSPADM

## 2018-08-21 RX ORDER — SODIUM,POTASSIUM PHOSPHATES 280-250MG
1 POWDER IN PACKET (EA) ORAL ONCE
Status: COMPLETED | OUTPATIENT
Start: 2018-08-21 | End: 2018-08-21

## 2018-08-21 RX ORDER — ALBUTEROL SULFATE 90 UG/1
2 AEROSOL, METERED RESPIRATORY (INHALATION)
Status: DISCONTINUED | OUTPATIENT
Start: 2018-08-21 | End: 2018-08-21

## 2018-08-21 RX ORDER — INSULIN GLARGINE 100 [IU]/ML
10 INJECTION, SOLUTION SUBCUTANEOUS
Status: DISCONTINUED | OUTPATIENT
Start: 2018-08-21 | End: 2018-08-26 | Stop reason: HOSPADM

## 2018-08-21 RX ORDER — GABAPENTIN 100 MG/1
100 CAPSULE ORAL 2 TIMES DAILY
Status: DISCONTINUED | OUTPATIENT
Start: 2018-08-21 | End: 2018-08-26 | Stop reason: HOSPADM

## 2018-08-21 RX ORDER — AMLODIPINE BESYLATE 10 MG/1
10 TABLET ORAL DAILY
Status: DISCONTINUED | OUTPATIENT
Start: 2018-08-22 | End: 2018-08-26 | Stop reason: HOSPADM

## 2018-08-21 RX ORDER — BUDESONIDE 0.5 MG/2ML
500 INHALANT ORAL
Status: DISCONTINUED | OUTPATIENT
Start: 2018-08-21 | End: 2018-08-26 | Stop reason: HOSPADM

## 2018-08-21 RX ORDER — INSULIN LISPRO 100 [IU]/ML
INJECTION, SOLUTION INTRAVENOUS; SUBCUTANEOUS
Status: DISCONTINUED | OUTPATIENT
Start: 2018-08-21 | End: 2018-08-26 | Stop reason: HOSPADM

## 2018-08-21 RX ORDER — FLUTICASONE PROPIONATE 50 MCG
1 SPRAY, SUSPENSION (ML) NASAL DAILY
Status: DISCONTINUED | OUTPATIENT
Start: 2018-08-22 | End: 2018-08-26 | Stop reason: HOSPADM

## 2018-08-21 RX ORDER — HYDRALAZINE HYDROCHLORIDE 25 MG/1
25 TABLET, FILM COATED ORAL
Status: DISCONTINUED | OUTPATIENT
Start: 2018-08-21 | End: 2018-08-26 | Stop reason: HOSPADM

## 2018-08-21 RX ORDER — POLYETHYLENE GLYCOL 3350 17 G/17G
17 POWDER, FOR SOLUTION ORAL DAILY
Status: DISCONTINUED | OUTPATIENT
Start: 2018-08-22 | End: 2018-08-22 | Stop reason: CLARIF

## 2018-08-21 RX ORDER — SODIUM CHLORIDE 0.9 % (FLUSH) 0.9 %
5-10 SYRINGE (ML) INJECTION EVERY 8 HOURS
Status: DISCONTINUED | OUTPATIENT
Start: 2018-08-21 | End: 2018-08-26 | Stop reason: HOSPADM

## 2018-08-21 RX ORDER — LORAZEPAM 0.5 MG/1
0.5 TABLET ORAL
Status: DISCONTINUED | OUTPATIENT
Start: 2018-08-21 | End: 2018-08-26 | Stop reason: HOSPADM

## 2018-08-21 RX ORDER — SODIUM CHLORIDE 9 MG/ML
100 INJECTION, SOLUTION INTRAVENOUS CONTINUOUS
Status: DISCONTINUED | OUTPATIENT
Start: 2018-08-21 | End: 2018-08-22

## 2018-08-21 RX ORDER — HYDRALAZINE HYDROCHLORIDE 20 MG/ML
10 INJECTION INTRAMUSCULAR; INTRAVENOUS
Status: DISCONTINUED | OUTPATIENT
Start: 2018-08-21 | End: 2018-08-26 | Stop reason: HOSPADM

## 2018-08-21 RX ORDER — HYDROCODONE BITARTRATE AND ACETAMINOPHEN 5; 325 MG/1; MG/1
1 TABLET ORAL
Status: DISCONTINUED | OUTPATIENT
Start: 2018-08-21 | End: 2018-08-26 | Stop reason: HOSPADM

## 2018-08-21 RX ORDER — ACETAMINOPHEN 325 MG/1
650 TABLET ORAL
Status: DISCONTINUED | OUTPATIENT
Start: 2018-08-21 | End: 2018-08-26 | Stop reason: HOSPADM

## 2018-08-21 RX ORDER — HYDRALAZINE HYDROCHLORIDE 50 MG/1
100 TABLET, FILM COATED ORAL 3 TIMES DAILY
Status: DISCONTINUED | OUTPATIENT
Start: 2018-08-21 | End: 2018-08-26 | Stop reason: HOSPADM

## 2018-08-21 RX ORDER — SERTRALINE HYDROCHLORIDE 50 MG/1
50 TABLET, FILM COATED ORAL DAILY
Status: DISCONTINUED | OUTPATIENT
Start: 2018-08-22 | End: 2018-08-26 | Stop reason: HOSPADM

## 2018-08-21 RX ORDER — DIPHENHYDRAMINE HCL 25 MG
25 CAPSULE ORAL
Status: DISCONTINUED | OUTPATIENT
Start: 2018-08-21 | End: 2018-08-26 | Stop reason: HOSPADM

## 2018-08-21 RX ORDER — ONDANSETRON 2 MG/ML
4 INJECTION INTRAMUSCULAR; INTRAVENOUS
Status: DISCONTINUED | OUTPATIENT
Start: 2018-08-21 | End: 2018-08-26 | Stop reason: HOSPADM

## 2018-08-21 RX ORDER — NALOXONE HYDROCHLORIDE 0.4 MG/ML
0.4 INJECTION, SOLUTION INTRAMUSCULAR; INTRAVENOUS; SUBCUTANEOUS AS NEEDED
Status: DISCONTINUED | OUTPATIENT
Start: 2018-08-21 | End: 2018-08-26 | Stop reason: HOSPADM

## 2018-08-21 RX ADMIN — INSULIN GLARGINE 10 UNITS: 100 INJECTION, SOLUTION SUBCUTANEOUS at 21:09

## 2018-08-21 RX ADMIN — Medication 10 ML: at 21:12

## 2018-08-21 RX ADMIN — Medication 10 ML: at 21:23

## 2018-08-21 RX ADMIN — POTASSIUM & SODIUM PHOSPHATES POWDER PACK 280-160-250 MG 1 PACKET: 280-160-250 PACK at 21:12

## 2018-08-21 RX ADMIN — TUBERCULIN PURIFIED PROTEIN DERIVATIVE 5 UNITS: 5 INJECTION, SOLUTION INTRADERMAL at 21:19

## 2018-08-21 RX ADMIN — BUDESONIDE 500 MCG: 0.5 INHALANT RESPIRATORY (INHALATION) at 21:12

## 2018-08-21 RX ADMIN — SODIUM CHLORIDE 1000 ML: 900 INJECTION, SOLUTION INTRAVENOUS at 17:47

## 2018-08-21 RX ADMIN — SODIUM CHLORIDE 40 MG: 9 INJECTION, SOLUTION INTRAMUSCULAR; INTRAVENOUS; SUBCUTANEOUS at 21:10

## 2018-08-21 RX ADMIN — SODIUM CHLORIDE 100 ML/HR: 900 INJECTION, SOLUTION INTRAVENOUS at 19:06

## 2018-08-21 RX ADMIN — PRAVASTATIN SODIUM 40 MG: 20 TABLET ORAL at 21:11

## 2018-08-21 RX ADMIN — ALBUTEROL SULFATE 2.5 MG: 2.5 SOLUTION RESPIRATORY (INHALATION) at 21:12

## 2018-08-21 RX ADMIN — HYDRALAZINE HYDROCHLORIDE 100 MG: 50 TABLET, FILM COATED ORAL at 21:10

## 2018-08-21 RX ADMIN — GABAPENTIN 100 MG: 100 CAPSULE ORAL at 21:11

## 2018-08-21 RX ADMIN — SODIUM CHLORIDE 40 MG: 9 INJECTION, SOLUTION INTRAMUSCULAR; INTRAVENOUS; SUBCUTANEOUS at 17:48

## 2018-08-21 SDOH — SOCIAL STABILITY - SOCIAL INSECURITY: PROBLEM RELATED TO SOCIAL ENVIRONMENT, UNSPECIFIED: Z60.9

## 2018-08-21 NOTE — IP AVS SNAPSHOT
You Alberto 
 
 
 [address] [facility] 
[phone] Patient: Kelsy German MRN: [id number] About your hospitalization You were admitted on:  2018 You last received care in the:  [facility] 6 MED SURG You were discharged on:  2018 Why you were hospitalized Your primary diagnosis was:  Davis (Acute Kidney Injury) (Hcc) Your diagnoses also included:  Type 2 Diabetes Mellitus With Diabetic Neuropathy (Hcc), Essential Hypertension, Copd (Chronic Obstructive Pulmonary Disease) (Hcc), Dnr (Do Not Resuscitate) Follow-up Information Follow up With Details Comments Contact Info Kavon Bauer MD  Call on Monday to schedule a follow up for labs this week  [address] [facility] [address],[address] 
[phone] Orson Primrose, MD  follow up as instructed  [address] [facility] [address] [facility] [address] 
[phone] [facility]  please call the office on monday and schedule a follow up appt to be seen in a week or [address] [place] [address] Dakota Pablowoo Mcdonnell [address] [phone] Your Scheduled Appointments 2018 10:00 AM EDT  
LAB with Democracia [address] [facility]) [address] [id number] [phone] 2018  2:00 PM EDT Office Visit with Kavon Bauer MD  
[facility]) [facility] [id number] [phone] Monday September 10, 2018  3:40 PM EDT SHORT with EDGARDO Diallo [facility]) [address]  
[phone] Discharge Orders None A check katrina indicates which time of day the medication should be taken. My Medications START taking these medications Instructions Each Dose to Equal  
 Morning Noon Evening Bedtime  
 cefpodoxime 100 mg tablet Commonly known as:  Polo Angles Your next dose is: Take as directed Take 1 Tab by mouth two (2) times a day for 5 doses. 100 mg  
    
   
   
   
  
 pantoprazole 20 mg tablet Commonly known as:  PROTONIX Your next dose is: Take as directed Take 1 Tab by mouth daily. 20 mg CHANGE how you take these medications Instructions Each Dose to Equal  
 Morning Noon Evening Bedtime  
 pravastatin 40 mg tablet Commonly known as:  PRAVACHOL What changed:  Another medication with the same name was removed. Continue taking this medication, and follow the directions you see here. TAKE 1 TABLET BY MOUTH EVERY NIGHT AT BEDTIME CONTINUE taking these medications Instructions Each Dose to Equal  
 Morning Noon Evening Bedtime  
 amLODIPine 10 mg tablet Commonly known as:  Elizabeth Fraction Take 1 Tab by mouth daily. 10 mg  
    
  
   
   
   
  
 benazepril 40 mg tablet Commonly known as:  LOTENSIN Your next dose is: Take as directed Take 1 Tab by mouth daily. 40 mg Blood-Glucose Meter monitoring kit Your next dose is: Take as directed Pt uses true metrix meter and tests once daily dx code E11.29 CENTRUM SILVER PO Your next dose is: Take as directed Take  by mouth.  
     
   
   
   
  
 cyanocobalamin (vitamin B-12) 1,000 mcg/mL Kit Your next dose is: Take as directed 1,000 mcg by Injection route every month. 1000 mcg  
    
   
   
   
  
 fluticasone 50 mcg/actuation nasal spray Commonly known as:  Joshua Sacks Your next dose is: Take as directed INSTILL 1 SPRAY IN EACH NOSTRIL NIGHTLY AS DIRECTED  
     
   
   
   
  
 fluticasone-salmeterol 250-50 mcg/dose diskus inhaler Commonly known as:  ADVAIR DISKUS  
 Your next dose is: Take as directed Take 1 Puff by inhalation every twelve (12) hours. 1 Puff  
    
   
   
   
  
 furosemide 20 mg tablet Commonly known as:  LASIX Your next dose is: Take as directed Take 1 Tab by mouth daily. 20 mg  
    
   
   
   
  
 gabapentin 100 mg capsule Commonly known as:  NEURONTIN Your next dose is: Take as directed TAKE 1 CAPSULE BY MOUTH TWICE DAILY  
     
   
   
   
  
 glucose blood VI test strips strip Commonly known as:  blood glucose test  
Your next dose is: Take as directed Pt uses true metrix test strips. Pt tests once daily. Dx code E11.29  
     
   
   
   
  
 GLUCOTROL XL 2.5 mg CR tablet Generic drug:  glipiZIDE SR Your next dose is: Take as directed Take 2.5 mg by mouth daily. 2.5 mg  
    
   
   
   
  
 hydrALAZINE 50 mg tablet Commonly known as:  APRESOLINE Take 2 Tabs by mouth three (3) times daily. 100 mg Lancets Misc Your next dose is: Take as directed Pt tests three times daily Diagnosis Code: 250.00 LORazepam 0.5 mg tablet Commonly known as:  ATIVAN Your next dose is: Take on as needed schedule Take 1 Tab by mouth daily as needed for Anxiety. 0.5 mg  
    
   
   
   
  
 metFORMIN 500 mg tablet Commonly known as:  GLUCOPHAGE Your next dose is: Take as directed Take 1 Tab by mouth two (2) times daily (with meals). 500 mg  
    
   
   
   
  
 OTHER Your next dose is: Take as directed One shower chair  
     
   
   
   
  
 polyethylene glycol 17 gram/dose powder Commonly known as:  Nils Hemphill Your next dose is: Take as directed Take 17 g by mouth daily. 1 tablespoon with 8 oz of water daily 17 g  
    
   
   
   
  
 potassium chloride 20 mEq tablet Commonly known as:  K-DUR, KLOR-CON  
   
 TAKE 1 TABLET BY MOUTH EVERY MORNING  
     
   
   
   
  
  
 PRESERVISION AREDS PO Your next dose is: Take as directed Take 1 Tab by mouth two (2) times a day. 1 Tab  
    
   
   
   
  
 sertraline 100 mg tablet Commonly known as:  ZOLOFT Take 0.5 Tabs by mouth daily. Indications: major depressive disorder, am  
 50 mg VENTOLIN HFA 90 mcg/actuation inhaler Generic drug:  albuterol Your next dose is: Take as directed INHALE 2 PUFFS BY MOUTH EVERY 6 HOURS AS NEEDED FOR WHEEZING Where to Get Your Medications Information on where to get these meds will be given to you by the nurse or doctor. ! Ask your nurse or doctor about these medications  
  cefpodoxime 100 mg tablet  
 pantoprazole 20 mg tablet Discharge Instructions DISCHARGE SUMMARY from Nurse PATIENT INSTRUCTIONS: 
 
 
F-face looks uneven A-arms unable to move or move unevenly S-speech slurred or non-existent T-time-call 911 as soon as signs and symptoms begin-DO NOT go Back to bed or wait to see if you get better-TIME IS BRAIN. Warning Signs of HEART ATTACK Call 911 if you have these symptoms: 
? Chest discomfort. Most heart attacks involve discomfort in the center of the chest that lasts more than a few minutes, or that goes away and comes back. It can feel like uncomfortable pressure, squeezing, fullness, or pain. ? Discomfort in other areas of the upper body. Symptoms can include pain or discomfort in one or both arms, the back, neck, jaw, or stomach. ? Shortness of breath with or without chest discomfort. ? Other signs may include breaking out in a cold sweat, nausea, or lightheadedness. Don't wait more than five minutes to call [address]! Fast action can save your life. Calling 911 is almost always the fastest way to get lifesaving treatment. Emergency Medical Services staff can begin treatment when they arrive  up to an hour sooner than if someone gets to the hospital by car. The discharge information has been reviewed with the patient. The patient verbalized understanding. Discharge medications reviewed with the patient and appropriate educational materials and side effects teaching were provided. ___________________________________________________________________________________________________________________________________ Acute Kidney Injury: Care Instructions Your Care Instructions Acute kidney injury (MIHAELA) is a sudden decrease in kidney function. This can happen over a period of hours, days or, in some cases, weeks. MIHAELA used to be called acute renal failure, but kidney failure doesn't always happen with MIHAELA. Common causes of MIHAELA are dehydration, blood loss, and medicines. When MIHAELA happens, the kidneys have trouble removing waste and excess fluids from the body. The waste and fluids build up and become harmful. Kidney function may return to normal if the cause of MIHAELA is treated quickly. Your chance of a full recovery depends on what caused the problem, how quickly the cause was treated, and what other medical problems you have. A machine may be used to help your kidneys remove waste and fluids for a short period of time. This is called dialysis. Follow-up care is a key part of your treatment and safety. Be sure to make and go to all appointments, and call your doctor if you are having problems. It's also a good idea to know your test results and keep a list of the medicines you take. How can you care for yourself at home? · Talk to your doctor about how much fluid you should drink. · Eat a balanced diet.  Talk to your doctor or a dietitian about what type of diet may be best for you. You may need to limit sodium, potassium, and phosphorus. · If you need dialysis, follow the instructions and schedule for dialysis that your doctor gives you. · Do not smoke. Smoking can make your condition worse. If you need help quitting, talk to your doctor about stop-smoking programs and medicines. These can increase your chances of quitting for good. · Do not drink alcohol. · Review all of your medicines with your doctor. Do not take any medicines, including nonsteroidal anti-inflammatory drugs (NSAIDs) such as ibuprofen (Advil, Motrin) or naproxen (Aleve), unless your doctor says it is safe for you to do so. · Make sure that anyone treating you for any health problem knows that you have had MIHAELA. When should you call for help? Call 911 anytime you think you may need emergency care. For example, call if: 
  · You passed out (lost consciousness).  
 Call your doctor now or seek immediate medical care if: 
  · You have new or worse nausea and vomiting.  
  · You have much less urine than normal, or you have no urine.  
  · You are feeling confused or cannot think clearly.  
  · You have new or more blood in your urine.  
  · You have new swelling.  
  · You are dizzy or lightheaded, or feel like you may faint.  
 Watch closely for changes in your health, and be sure to contact your doctor if: 
  · You do not get better as expected. Where can you learn more? Go to http://nelia-mandy.info/. Enter V334 in the search box to learn more about \"Acute Kidney Injury: Care Instructions. \" Current as of: May 12, 2017 Content Version: 11.7 © 1785-6212 [employer]. Care instructions adapted under license by [employer] (which disclaims liability or warranty for this information).  If you have questions about a medical condition or this instruction, always ask your healthcare professional. [employer] disclaims any warranty or liability for your use of this information. [facility] Announcement We are excited to announce that we are making your provider's discharge notes available to you in [facility]. You will see these notes when they are completed and signed by the physician that discharged you from your recent hospital stay. If you have any questions or concerns about any information you see in [facility], please call the Health Information Department where you were seen or reach out to your Primary Care Provider for more information about your plan of care. Introducing [facility]! Michelle Ledbetter introduces [facility] patient portal. Now you can access parts of your medical record, email your doctor's office, and request medication refills online. 1. In your internet browser, go to [email or web address] 2. Click on the First Time User? Click Here link in the Sign In box. You will see the New Member Sign Up page. 3. Enter your [facility] Access Code exactly as it appears below. You will not need to use this code after youve completed the sign-up process. If you do not sign up before the expiration date, you must request a new code. · [facility] Access Code: [email or web address] Expires: 10/25/2018  6:31 PM 
 
4. Enter the last four digits of your Social Security Number (xxxx) and Date of Birth (mm/dd/yyyy) as indicated and click Submit. You will be taken to the next sign-up page. 5. Create a [facility] ID. This will be your [facility] login ID and cannot be changed, so think of one that is secure and easy to remember. 6. Create a [facility] password. You can change your password at any time. 7. Enter your Password Reset Question and Answer. This can be used at a later time if you forget your password. 8. Enter your e-mail address. You will receive e-mail notification when new information is available in [address] 9. Click Sign Up. You can now view and download portions of your medical record. 10. Click the Download Summary menu link to download a portable copy of your medical information. If you have questions, please visit the Frequently Asked Questions section of the [email or web address] website. Remember, Invup is NOT to be used for urgent needs. For medical emergencies, dial 911. Now available from your iPhone and Android! Introducing Edward Dejesus As a [facility] patient, I wanted to make you aware of our electronic visit tool called Edward Dejesus. [facility] 24/7 allows you to connect within minutes with a medical provider 24 hours a day, seven days a week via a mobile device or tablet or logging into a secure website from your computer. You can access Edward Dejesus from anywhere in the [place]. A virtual visit might be right for you when you have a simple condition and feel like you just dont want to get out of bed, or cant get away from work for an appointment, when your regular [facility] provider is not available (evenings, weekends or holidays), or when youre out of town and need minor care. Electronic visits cost only $49 and if the [facility] 24/7 provider determines a prescription is needed to treat your condition, one can be electronically transmitted to a nearby pharmacy*. Please take a moment to enroll today if you have not already done so. The enrollment process is free and takes just a few minutes. To enroll, please download the [facility] 24/7 [email or web address] to your tablet or phone, or visit [email or web address] to enroll on your computer. And, as an [address] [place] [address] patient with a [employer] account, the results of your visits will be scanned into your electronic medical record and your primary care provider will be able to view the scanned results.    
We urge you to continue to see your regular [facility] provider for your ongoing medical care. And while your primary care provider may not be the one available when you seek a Edward Mendozafin virtual visit, the peace of mind you get from getting a real diagnosis real time can be priceless. For more information on Edward Mendozafin, view our Frequently Asked Questions (FAQs) at [email or web address]. Sincerely, 
 
Shilpa Hoffmann MD 
Chief Medical Officer Jenny Avila *:  certain medications cannot be prescribed via clickTRUEdangeloPazien Unresulted Labs-Please follow up with your PCP about these lab tests Order Current Status SOLUBLE TRANSFERRIN RECEPTOR In process Providers Seen During Your Hospitalization Provider Specialty Primary office phone Palmer Fish MD Emergency Medicine [phone] Delbert Hernandez MD Internal Medicine [phone] Immunizations Administered for This Admission Name Date  
 TB Skin Test (PPD) Intradermal  Deferred (), 8/21/2018 Your Primary Care Physician (PCP) Primary Care Physician Office Phone Office Fax Zakia Schwartz [phone] You are allergic to the following No active allergies Recent Documentation Height Weight BMI OB Status Smoking Status 1.702 m 85.6 kg 29.55 kg/m2 Hysterectomy Never Smoker Emergency Contacts Name Discharge Info Relation Home Work Mobile [facility] [address] CAREGIVER [3] Child [2] [phone] Parag Martines  Son [22] [phone] Garfield Francis  Other Relative [6] [phone] Patient Belongings The following personal items are in your possession at time of discharge: 
     Visual Aid: Glasses             Clothing: With patient Please provide this summary of care documentation to your next provider. Signatures-by signing, you are acknowledging that this After Visit Summary has been reviewed with you and you have received a copy. Patient Signature:  ____________________________________________________________ Date:  ____________________________________________________________  
  
Ruston Charter Provider Signature:  ____________________________________________________________ Date:  ____________________________________________________________

## 2018-08-21 NOTE — H&P
H&P      Patient: Thu Ann               Sex: female             MRN: [id number]      YOB: 1929      Age:  80 y.o. Chief Complaint:  Dizziness    HPI     This is a 66-year-old lady with history of multiple medical problems listed below in past medical history, including hypertension, diabetes, COPD, CAD, anxiety disorder, B12 deficiency, coronary artery disease, came to the emergency room with complaints of dizziness. Patient has not been eating or drinking much over the last 1 week. She was also having some pressure and burning with urination over the last 1 week. No fevers or chills or chest pain or shortness of breath. No diarrhea. No nausea or vomitings. Has decreased appetite and has not been eating much. She started having dizziness since this morning, moderate in severity, worse with standing and walking, somewhat better with lying down. No associated chest pain or shortness of breath. Patient did not fall. On initial evaluation in the emergency room she was found to be in acute renal failure, also has anemia, so she was being admitted for further evaluation and treatment. Patient denies any bleeding per rectum over the last few days. Also denies black stools. She has been taking Lasix even though she was not drinking enough. Review of Systems  Comprehensive 10 point ROS is done, and pertinent positives & negatives per HPI, rest of them are negative.     Past Medical History:   Diagnosis Date    Anxiety 2/1/2018    Arrhythmia     palpitations 2/10-went to [facility]    Arthritis     L leg- fell 2008    Asthma     adult onset x 20 yrs    B12 deficiency 8/24/2012    Body mass index 37.0-37.9, adult 2/5/2014    CAD (coronary artery disease)     cardiac work up-9/0909-neg-Holter    Controlled type 2 diabetes mellitus with microalbuminuria, without long-term current use of insulin ([facility] [address] 11/22/2016    COPD     Corns and callosities 12/19/2016    CRI (chronic renal insufficiency) 8/24/2012    Depression 6/1/2012    Dermatophytosis of nail 12/19/2016    Diabetes ([facility] [address]     type II- home tests fasting-112    DJD (degenerative joint disease) of hip     DM (diabetes mellitus) type II controlled with renal manifestation ([facility] [address] 7/16/2010    Encounter for long-term (current) use of other medications 1/8/2013    Essential hypertension 7/16/2010    Gastrointestinal disorder     GERD (gastroesophageal reflux disease)     Heart failure ([facility] [address]     Hiatal hernia 8/24/2012    HLD (hyperlipidemia) 1/8/2013    Neuropathy 8/24/2012    Lower limbs     Obesity (BMI 30.0-39. 9) BMI-43.8    Other and unspecified hyperlipidemia 7/16/2010    Other chest pain 7/16/2010    Other ill-defined conditions(799.89)     high cholesterol    Other ill-defined conditions(799.89)     incont.  urine    PAD (peripheral artery disease) ([facility] [address] 8/24/2012    Retinal hemorrhage        Past Surgical History:   Procedure Laterality Date    ABDOMEN SURGERY PROC UNLISTED      HX APPENDECTOMY      HX CHOLECYSTECTOMY      HX CHOLECYSTECTOMY  2000    HX ENDOSCOPY  02/27/2018    Dr Dwight Mata HX GI      small intestine obstruction    HX GYN      hyst    HX HEART CATHETERIZATION      HX HYSTERECTOMY      prolapse    HX INTRACRANIAL ANEURYSM REPAIR      HX INTRACRANIAL ANEURYSM REPAIR      HX ORTHOPAEDIC      right wrist 2010    HX OTHER SURGICAL      aneurysm clip-cerebral-2000    HX OTHER SURGICAL      cerebral aneurysm   Dr. Shivani Lee       Family History   Problem Relation Age of Onset    Cancer Mother     Diabetes Father     Diabetes Paternal Grandfather        Social History     Social History    Marital status: SINGLE     Spouse name: N/A    Number of children: N/A    Years of education: N/A     Social History Main Topics    Smoking status: Never Smoker    Smokeless tobacco: Never Used    Alcohol use No    Drug use: No    Sexual activity: No     Other Topics Concern    Not on file     Social History Narrative     with four children       No Known Allergies    Prior to Admission medications    Medication Sig Start Date End Date Taking? Authorizing Provider   VENTOLIN HFA 90 mcg/actuation inhaler INHALE 2 PUFFS BY MOUTH EVERY 6 HOURS AS NEEDED FOR WHEEZING 8/20/18   Mary Fernando MD   LORazepam (ATIVAN) 0.5 mg tablet Take 1 Tab by mouth daily as needed for Anxiety. 8/20/18   Shantell Melara MD   hydrALAZINE (APRESOLINE) 50 mg tablet Take 2 Tabs by mouth three (3) times daily. 8/2/18   Renan Pham NP   glipiZIDE SR (GLUCOTROL XL) 2.5 mg CR tablet Take 2.5 mg by mouth daily. Historical Provider   fluticasone (FLONASE) 50 mcg/actuation nasal spray INSTILL 1 SPRAY IN EACH NOSTRIL NIGHTLY AS DIRECTED 7/25/18   Mary Fernando MD   potassium chloride (K-DUR, KLOR-CON) 20 mEq tablet TAKE 1 TABLET BY MOUTH EVERY MORNING 7/10/18   Mary Fernando MD   cyanocobalamin, vitamin B-12, 1,000 mcg/mL kit 1,000 mcg by Injection route every month. 5/16/18   Shantell Melara MD   furosemide (LASIX) 20 mg tablet Take 1 Tab by mouth daily. 5/16/18   Mary Fernando MD   pravastatin (PRAVACHOL) 40 mg tablet TAKE 1 TABLET BY MOUTH EVERY NIGHT AT BEDTIME 5/16/18   Mary Fernando MD   sertraline (ZOLOFT) 100 mg tablet Take 0.5 Tabs by mouth daily. Indications: major depressive disorder, am 2/1/18   Shantell Melara MD   fluticasone-salmeterol (ADVAIR DISKUS) 250-50 mcg/dose diskus inhaler Take 1 Puff by inhalation every twelve (12) hours. 2/1/18   Mary Fernando MD   benazepril (LOTENSIN) 40 mg tablet Take 1 Tab by mouth daily. 2/1/18   Mary Fernando MD   amLODIPine (NORVASC) 10 mg tablet Take 1 Tab by mouth daily.  2/1/18   Shantell Melara MD   pravastatin (PRAVACHOL) 40 mg tablet TAKE 1 TABLET BY MOUTH EVERY NIGHT AT BEDTIME  Patient not taking: Reported on 8/3/2018 2/1/18   Shantell Melara MD   gabapentin (NEURONTIN) 100 mg capsule TAKE 1 CAPSULE BY MOUTH TWICE DAILY 2/1/18   Shantell Melara MD Blood-Glucose Meter monitoring kit Pt uses true metrix meter and tests once daily dx code E11.29 18   Mary Fernando MD   metFORMIN (GLUCOPHAGE) 500 mg tablet Take 1 Tab by mouth two (2) times daily (with meals). 10/25/17   Mary Fernando MD   polyethylene glycol (MIRALAX) 17 gram/dose powder Take 17 g by mouth daily. 1 tablespoon with 8 oz of water daily 17   Moreno Betancur MD   FOLIC ACID/MULTIVIT-MIN/LUTEIN (CENTRUM SILVER PO) Take  by mouth. Historical Provider   OTHER One shower chair 17   Adrien Corrales MD   VIT A/VIT C/VIT E/ZINC/COPPER (PRESERVISION AREDS PO) Take 1 Tab by mouth two (2) times a day. Historical Provider   glucose blood VI test strips (BLOOD GLUCOSE TEST) strip Pt uses true metrix test strips. Pt tests once daily. Dx code E11.29 16   Adrien Corrales MD   Lancets Misc Pt tests three times daily Diagnosis Code: 250.00 7/15/13   Adrien Corrales MD         Physical Exam     Visit Vitals    /69 (BP 1 Location: Left arm)    Pulse 74    Temp 98.4 °F (36.9 °C)    Resp 16    Ht 5' 7\" (1.702 m)    Wt 83 kg (183 lb)    SpO2 97%    BMI 28.66 kg/m2      Temp (24hrs), Av.2 °F (36.8 °C), Min:98 °F (36.7 °C), Max:98.4 °F (36.9 °C)    Oxygen Therapy  O2 Sat (%): 97 % (18)  Pulse via Oximetry: 74 beats per minute (18)  O2 Device: Room air (18)  No intake or output data in the 24 hours ending 18     General: Conscious, No acute distress  Eyes:  MERRY, No pallor/icterus    HENT:             Oral Mucosa is dry, No sinus tenderness  Neck:               Supple, No JVD  Lungs:  CTA Bilaterally, No significant wheezing  Heart:  S1 S2 regular  Abdomen: Soft, Positive bowel sounds, NTND, No guarding/rigidity/rebound tend.   Extremities:    pedal edema+  Neurologic:  AAOX3, No acute FND, Motor: LUE: 5/5, LLE: 5/5, RUE: 5/5, RLE: 5/5  Skin:                No acute rashes  Musculoskeletal: No Acute findings  Psych: Appropriate mood    LAB  Recent Results (from the past 24 hour(s))   CBC WITH AUTOMATED DIFF    Collection Time: 08/21/18  3:09 PM   Result Value Ref Range    WBC 6.0 4.3 - 11.1 K/uL    RBC 2.75 (L) 4.05 - 5.2 M/uL    HGB 8.7 (L) 11.7 - 15.4 g/dL    HCT 28.1 (L) 35.8 - 46.3 %    .2 (H) 79.6 - 97.8 FL    MCH 31.6 26.1 - 32.9 PG    MCHC 31.0 (L) 31.4 - 35.0 g/dL    RDW 14.4 %    PLATELET 039 389 - 597 K/uL    MPV 11.6 9.4 - 12.3 FL    ABSOLUTE NRBC 0.00 0.0 - 0.2 K/uL    DF AUTOMATED      NEUTROPHILS 60 43 - 78 %    LYMPHOCYTES 30 13 - 44 %    MONOCYTES 8 4.0 - 12.0 %    EOSINOPHILS 1 0.5 - 7.8 %    BASOPHILS 0 0.0 - 2.0 %    IMMATURE GRANULOCYTES 1 0.0 - 5.0 %    ABS. NEUTROPHILS 3.6 K/UL    ABS. LYMPHOCYTES 1.8 K/UL    ABS. MONOCYTES 0.5 K/UL    ABS. EOSINOPHILS 0.0 K/UL    ABS. BASOPHILS 0.0 K/UL    ABS. IMM. GRANS. 0.1 K/UL   METABOLIC PANEL, COMPREHENSIVE    Collection Time: 08/21/18  3:09 PM   Result Value Ref Range    Sodium 144 136 - 145 mmol/L    Potassium 5.5 (H) 3.5 - 5.1 mmol/L    Chloride 109 (H) 98 - 107 mmol/L    CO2 22 21 - 32 mmol/L    Anion gap 13 7 - 16 mmol/L    Glucose 263 (H) 65 - 100 mg/dL    BUN 24 (H) 8 - 23 MG/DL    Creatinine 1.73 (H) 0.6 - 1.0 MG/DL    GFR est AA 36 (L) >60 ml/min/1.73m2    GFR est non-AA 29 (L) >60 ml/min/1.73m2    Calcium 9.9 8.3 - 10.4 MG/DL    Bilirubin, total 0.4 0.2 - 1.1 MG/DL    ALT (SGPT) 30 12 - 65 U/L    AST (SGOT) 58 (H) 15 - 37 U/L    Alk.  phosphatase 83 50 - 136 U/L    Protein, total 7.1 6.3 - 8.2 g/dL    Albumin 3.0 (L) 3.2 - 4.6 g/dL    Globulin 4.1 (H) 2.3 - 3.5 g/dL    A-G Ratio 0.7 (L) 1.2 - 3.5     TROPONIN I    Collection Time: 08/21/18  3:09 PM   Result Value Ref Range    Troponin-I, Qt. <0.02 (L) 0.02 - 0.05 NG/ML   EKG, 12 LEAD, INITIAL    Collection Time: 08/21/18  4:44 PM   Result Value Ref Range    Ventricular Rate 84 BPM    Atrial Rate 84 BPM    P-R Interval 198 ms    QRS Duration 128 ms    Q-T Interval 364 ms    QTC Calculation (Bezet) 430 ms    Calculated P Axis 74 degrees    Calculated R Axis -36 degrees    Calculated T Axis 76 degrees    Diagnosis       !! AGE AND GENDER SPECIFIC ECG ANALYSIS !! Sinus rhythm with occasional and consecutive Premature ventricular complexes  Left axis deviation  Right bundle branch block  Inferior infarct (cited on or before 12-JUL-2014)  Anterior infarct (cited on or before 12-JUL-2014)  Abnormal ECG  When compared with ECG of 19-JUL-2017 05:59,  Premature ventricular complexes are now Present     MAGNESIUM    Collection Time: 08/21/18  5:09 PM   Result Value Ref Range    Magnesium 2.1 1.8 - 2.4 mg/dL   PHOSPHORUS    Collection Time: 08/21/18  5:09 PM   Result Value Ref Range    Phosphorus 1.8 (L) 2.3 - 3.7 MG/DL       IMAGING:     No results found. All Micro Results     None          EKG: personally reviewed and shows NSR with chronic RBBB  CXR: Personally reviewed and shows No acute findings    Assessment/Plan   1. Dizziness, likely secondary to hypovolemia and dehydration. Will stop diuretics, started on normal saline, monitor. Check orthostatics. 2.  Acute kidney injury, likely secondary to decreased p.o. intake, dehydration and use of Lasix and benazepril might have contributed also. Stop Lasix and benazepril for now, keep her on IV fluids and monitor creatinine. 3.  Type 2 diabetes, non-insulin-dependent, with hyperglycemia, hold p.o. medications including metformin and glipizide. Place her on Lantus and insulin sliding scale, check A1c and adjust insulin dose according to the blood sugars. 4.  Chronic COPD, no acute exacerbation. 5.  Mild hyperkalemia, keep her on IV fluids and monitor. 6.  Anemia, concern for GI bleed monitor hemoglobin. ,  Transfuse as needed. For now will keep her on IV Protonix once a day as she denies any bleeding per rectum. Also check iron studies.     7.  Physical deconditioning and debility: PT/OT evaluation in a.m.    8.  Hypertension, continue blood pressure medications except for Lasix and benazepril, use as needed hydralazine. 9.  History of coronary artery disease: Hold aspirin secondary to concern for GI bleed. 10.  Recent left patella fracture. Conservative management as per orthopedic surgery on last admission. Physical therapy, knee immobilizer and pain management. Patient is admitted to inpatient status, needs more than 2 midnights of hospital stay considering acute renal failure, possible GI bleed and multiple comorbid conditions in a elderly patient. All the pertinent initiated studies and treatment plan was discussed with the patient. High-risk patient secondary to multiple comorbid conditions and acute renal failure, DNR status. SCDs for DVT prophylaxis. Patient is DNR.     Nima Clemens MD  August 21, 2018

## 2018-08-21 NOTE — PROGRESS NOTES
TRANSFER - IN REPORT:    Verbal report received from [place] (name) on Antwan Moya  being received from ED(unit) for routine progression of care      Report consisted of patients Situation, Background, Assessment and   Recommendations(SBAR). Information from the following report(s) SBAR was reviewed with the receiving nurse. Opportunity for questions and clarification was provided. Assessment completed upon patients arrival to unit and care assumed.

## 2018-08-21 NOTE — PROGRESS NOTES
08/21/18 1852   Dual Skin Pressure Injury Assessment   Dual Skin Pressure Injury Assessment WDL   Second Care Provider (Based on [address] [place] [address]) Malaika Cary RN   pt morgan no breakdown at the sacrum or heels. Scar on the front abdomen from hernia repair. Pt states she fell one week ago and hurt left knee but no scars noted. Glasses present, top partial teeth present.

## 2018-08-21 NOTE — ED PROVIDER NOTES
Patient is a 80 y.o. female presenting with fatigue. The history is provided by the patient and the EMS personnel. Fatigue   This is a recurrent problem. The current episode started more than 2 days ago. The problem has been gradually worsening. There was no focality noted. Primary symptoms include loss of balance and memory loss. Pertinent negatives include no focal weakness, no loss of sensation, no slurred speech, no movement disorder, no mental status change and no unresponsiveness. There has been no fever. Associated symptoms include nausea. Pertinent negatives include no shortness of breath, no chest pain, no vomiting, no altered mental status and no headaches. Associated medical issues do not include trauma or seizures. Past Medical History:   Diagnosis Date    Anxiety 2/1/2018    Arrhythmia     palpitations 2/10-went to [facility]    Arthritis     L leg- fell 2008    Asthma     adult onset x 20 yrs    B12 deficiency 8/24/2012    Body mass index 37.0-37.9, adult 2/5/2014    CAD (coronary artery disease)     cardiac work up-9/0909-neg-Holter    Controlled type 2 diabetes mellitus with microalbuminuria, without long-term current use of insulin ([address] 11/22/2016    COPD     Corns and callosities 12/19/2016    CRI (chronic renal insufficiency) 8/24/2012    Depression 6/1/2012    Dermatophytosis of nail 12/19/2016    Diabetes ([address]     type II- home tests fasting-112    DJD (degenerative joint disease) of hip     DM (diabetes mellitus) type II controlled with renal manifestation ([address] 7/16/2010    Encounter for long-term (current) use of other medications 1/8/2013    Essential hypertension 7/16/2010    Gastrointestinal disorder     GERD (gastroesophageal reflux disease)     Heart failure ([address]     Hiatal hernia 8/24/2012    HLD (hyperlipidemia) 1/8/2013    Neuropathy 8/24/2012    Lower limbs     Obesity (BMI 30.0-39. 9) BMI-43.8    Other and unspecified hyperlipidemia 7/16/2010    Other chest pain 7/16/2010    Other ill-defined conditions(799.89)     high cholesterol    Other ill-defined conditions(799.89)     incont. urine    PAD (peripheral artery disease) ([facility] [address] 8/24/2012    Retinal hemorrhage        Past Surgical History:   Procedure Laterality Date    ABDOMEN SURGERY PROC UNLISTED      HX APPENDECTOMY      HX CHOLECYSTECTOMY      HX CHOLECYSTECTOMY  2000    HX ENDOSCOPY  02/27/2018    Dr Jordon Jett    HX GI      small intestine obstruction    HX GYN      hyst    HX HEART CATHETERIZATION      HX HYSTERECTOMY      prolapse    HX INTRACRANIAL ANEURYSM REPAIR      HX INTRACRANIAL ANEURYSM REPAIR      HX ORTHOPAEDIC      right wrist 2010    HX OTHER SURGICAL      aneurysm clip-cerebral-2000    HX OTHER SURGICAL      cerebral aneurysm   Dr. Fuad Leal         Family History:   Problem Relation Age of Onset    Cancer Mother     Diabetes Father     Diabetes Paternal Grandfather        Social History     Social History    Marital status: SINGLE     Spouse name: N/A    Number of children: N/A    Years of education: N/A     Occupational History    Not on file. Social History Main Topics    Smoking status: Never Smoker    Smokeless tobacco: Never Used    Alcohol use No    Drug use: No    Sexual activity: No     Other Topics Concern    Not on file     Social History Narrative     with four children         ALLERGIES: Review of patient's allergies indicates no known allergies. Review of Systems   Constitutional: Positive for fatigue. Negative for chills and fever. HENT: Negative for congestion, ear pain and rhinorrhea. Eyes: Negative for photophobia and discharge. Respiratory: Negative for cough and shortness of breath. Cardiovascular: Negative for chest pain and palpitations. Gastrointestinal: Positive for nausea. Negative for abdominal pain, constipation, diarrhea and vomiting. Endocrine: Negative for cold intolerance and heat intolerance. Genitourinary: Negative for dysuria and flank pain. Musculoskeletal: Negative for arthralgias, myalgias and neck pain. Skin: Negative for rash and wound. Allergic/Immunologic: Negative for environmental allergies and food allergies. Neurological: Positive for loss of balance. Negative for focal weakness, syncope and headaches. Hematological: Negative for adenopathy. Does not bruise/bleed easily. Psychiatric/Behavioral: Positive for memory loss. Negative for dysphoric mood. The patient is not nervous/anxious. All other systems reviewed and are negative. Vitals:    08/21/18 1500 08/21/18 1635   BP: (!) 207/72 160/71   Pulse: 95 78   Resp: 18    Temp: 98 °F (36.7 °C)    SpO2: 100% 97%   Weight: 83 kg (183 lb)    Height: 5' 7\" (1.702 m)             Physical Exam   Constitutional: She is oriented to person, place, and time. She appears well-developed and well-nourished. She appears distressed. HENT:   Head: Normocephalic and atraumatic. Mouth/Throat: Oropharynx is clear and moist.   Eyes: Conjunctivae and EOM are normal. Pupils are equal, round, and reactive to light. Right eye exhibits no discharge. Left eye exhibits no discharge. No scleral icterus. Neck: Normal range of motion. Neck supple. No thyromegaly present. Cardiovascular: Normal rate, regular rhythm, normal heart sounds and intact distal pulses. Exam reveals no gallop and no friction rub. No murmur heard. Pulmonary/Chest: Effort normal and breath sounds normal. No respiratory distress. She has no wheezes. She exhibits no tenderness. Abdominal: Soft. Bowel sounds are normal. She exhibits no distension. There is no hepatosplenomegaly. There is no tenderness. There is no rebound and no guarding. No hernia. Musculoskeletal: Normal range of motion. She exhibits no edema or tenderness. Neurological: She is alert and oriented to person, place, and time. No cranial nerve deficit. She exhibits normal muscle tone.    Skin: Skin is warm and dry. No rash noted. She is not diaphoretic. No erythema. Psychiatric: She has a normal mood and affect. Her speech is normal. Judgment and thought content normal. She is slowed. She exhibits abnormal recent memory. Nursing note and vitals reviewed. MDM  Number of Diagnoses or Management Options  Acute kidney injury [facility]): new and requires workup  Decreased social interaction: new and requires workup  Symptomatic anemia: new and requires workup  Diagnosis management comments: Differential diagnosis  Dehydration, GI bleed, recurrent, esophagitis, gastritis,, peptic ulcer disease         Amount and/or Complexity of Data Reviewed  Clinical lab tests: ordered and reviewed  Tests in the radiology section of CPT®: ordered and reviewed  Tests in the medicine section of CPT®: ordered and reviewed  Review and summarize past medical records: yes  Discuss the patient with other providers: yes  Independent visualization of images, tracings, or specimens: yes    Risk of Complications, Morbidity, and/or Mortality  Presenting problems: moderate  Diagnostic procedures: moderate  Management options: moderate  General comments: Elements of this note have been dictated via voice recognition software. Text and phrases may be limited by the accuracy of the software. The chart has been reviewed, but errors may still be present.       Patient Progress  Patient progress: stable        ED Course       Procedures

## 2018-08-21 NOTE — ED NOTES
TRANSFER - OUT REPORT:    Verbal report given to 6th floor RN  on Ramon Torres  being transferred to [facility]  (unit) for routine progression of care       Report consisted of patients Situation, Background, Assessment and   Recommendations(SBAR). Information from the following report(s) SBAR, Kardex, ED Summary, Procedure Summary, Intake/Output, MAR, Recent Results and Cardiac Rhythm NSR w/ BBB was reviewed with the receiving nurse. Lines:   Peripheral IV 08/21/18 Left Hand (Active)   Site Assessment Clean, dry, & intact 8/21/2018  4:37 PM   Phlebitis Assessment 0 8/21/2018  4:37 PM   Infiltration Assessment 0 8/21/2018  4:37 PM   Dressing Status Clean, dry, & intact 8/21/2018  4:37 PM   Hub Color/Line Status Blue 8/21/2018  4:37 PM        Opportunity for questions and clarification was provided.       Patient transported with:   [employer]
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
## 2018-08-22 PROBLEM — E11.40 TYPE 2 DIABETES MELLITUS WITH DIABETIC NEUROPATHY (HCC): Chronic | Status: ACTIVE | Noted: 2018-05-16

## 2018-08-22 PROBLEM — J44.9 COPD (CHRONIC OBSTRUCTIVE PULMONARY DISEASE) (HCC): Chronic | Status: ACTIVE | Noted: 2018-08-22

## 2018-08-22 PROBLEM — Z66 DNR (DO NOT RESUSCITATE): Chronic | Status: ACTIVE | Noted: 2018-08-22

## 2018-08-22 LAB
ANION GAP SERPL CALC-SCNC: 11 MMOL/L (ref 7–16)
ATRIAL RATE: 84 BPM
BASOPHILS # BLD: 0 K/UL
BASOPHILS NFR BLD: 0 % (ref 0–2)
BUN SERPL-MCNC: 19 MG/DL (ref 8–23)
CALCIUM SERPL-MCNC: 9.3 MG/DL (ref 8.3–10.4)
CALCULATED P AXIS, ECG09: 74 DEGREES
CALCULATED R AXIS, ECG10: -36 DEGREES
CALCULATED T AXIS, ECG11: 76 DEGREES
CHLORIDE SERPL-SCNC: 112 MMOL/L (ref 98–107)
CO2 SERPL-SCNC: 24 MMOL/L (ref 21–32)
CREAT SERPL-MCNC: 1.29 MG/DL (ref 0.6–1)
DIAGNOSIS, 93000: NORMAL
DIFFERENTIAL METHOD BLD: ABNORMAL
EOSINOPHIL # BLD: 0 K/UL
EOSINOPHIL NFR BLD: 0 % (ref 0.5–7.8)
ERYTHROCYTE [DISTWIDTH] IN BLOOD BY AUTOMATED COUNT: 14.2 %
EST. AVERAGE GLUCOSE BLD GHB EST-MCNC: 108 MG/DL
GLUCOSE BLD STRIP.AUTO-MCNC: 105 MG/DL (ref 65–100)
GLUCOSE BLD STRIP.AUTO-MCNC: 145 MG/DL (ref 65–100)
GLUCOSE BLD STRIP.AUTO-MCNC: 256 MG/DL (ref 65–100)
GLUCOSE BLD STRIP.AUTO-MCNC: 287 MG/DL (ref 65–100)
GLUCOSE SERPL-MCNC: 106 MG/DL (ref 65–100)
HBA1C MFR BLD: 5.4 % (ref 4.8–6)
HCT VFR BLD AUTO: 25 % (ref 35.8–46.3)
HCT VFR BLD AUTO: 25 % (ref 35.8–46.3)
HCT VFR BLD AUTO: 25.4 % (ref 35.8–46.3)
HGB BLD-MCNC: 7.5 G/DL (ref 11.7–15.4)
HGB BLD-MCNC: 7.6 G/DL (ref 11.7–15.4)
HGB BLD-MCNC: 7.8 G/DL (ref 11.7–15.4)
IMM GRANULOCYTES # BLD: 0 K/UL
IMM GRANULOCYTES NFR BLD AUTO: 0 % (ref 0–5)
LYMPHOCYTES # BLD: 1.3 K/UL
LYMPHOCYTES NFR BLD: 25 % (ref 13–44)
MAGNESIUM SERPL-MCNC: 2 MG/DL (ref 1.8–2.4)
MCH RBC QN AUTO: 30.5 PG (ref 26.1–32.9)
MCHC RBC AUTO-ENTMCNC: 30 G/DL (ref 31.4–35)
MCV RBC AUTO: 101.6 FL (ref 79.6–97.8)
MM INDURATION POC: 0 MM (ref 0–5)
MONOCYTES # BLD: 0.5 K/UL
MONOCYTES NFR BLD: 10 % (ref 4–12)
NEUTS SEG # BLD: 3.3 K/UL
NEUTS SEG NFR BLD: 64 % (ref 43–78)
NRBC # BLD: 0 K/UL (ref 0–0.2)
P-R INTERVAL, ECG05: 198 MS
PHOSPHATE SERPL-MCNC: 2.9 MG/DL (ref 2.3–3.7)
PLATELET # BLD AUTO: 207 K/UL (ref 150–450)
PMV BLD AUTO: 10.6 FL (ref 9.4–12.3)
POTASSIUM SERPL-SCNC: 3.5 MMOL/L (ref 3.5–5.1)
PPD POC: NEGATIVE NEGATIVE
Q-T INTERVAL, ECG07: 364 MS
QRS DURATION, ECG06: 128 MS
QTC CALCULATION (BEZET), ECG08: 430 MS
RBC # BLD AUTO: 2.46 M/UL (ref 4.05–5.2)
SODIUM SERPL-SCNC: 147 MMOL/L (ref 136–145)
TSH SERPL DL<=0.005 MIU/L-ACNC: 0.89 UIU/ML (ref 0.36–3.74)
VENTRICULAR RATE, ECG03: 84 BPM
VIT B12 SERPL-MCNC: 2068 PG/ML (ref 193–986)
WBC # BLD AUTO: 5.1 K/UL (ref 4.3–11.1)

## 2018-08-22 PROCEDURE — 82607 VITAMIN B-12: CPT

## 2018-08-22 PROCEDURE — 97165 OT EVAL LOW COMPLEX 30 MIN: CPT

## 2018-08-22 PROCEDURE — 83735 ASSAY OF MAGNESIUM: CPT

## 2018-08-22 PROCEDURE — C9113 INJ PANTOPRAZOLE SODIUM, VIA: HCPCS | Performed by: HOSPITALIST

## 2018-08-22 PROCEDURE — 87088 URINE BACTERIA CULTURE: CPT

## 2018-08-22 PROCEDURE — 97161 PT EVAL LOW COMPLEX 20 MIN: CPT

## 2018-08-22 PROCEDURE — 74011250636 HC RX REV CODE- 250/636: Performed by: HOSPITALIST

## 2018-08-22 PROCEDURE — 94760 N-INVAS EAR/PLS OXIMETRY 1: CPT

## 2018-08-22 PROCEDURE — 77030020250 HC SOL INJ D 5% LFCR 1000ML BG LF

## 2018-08-22 PROCEDURE — 94640 AIRWAY INHALATION TREATMENT: CPT

## 2018-08-22 PROCEDURE — 74011250637 HC RX REV CODE- 250/637: Performed by: HOSPITALIST

## 2018-08-22 PROCEDURE — 65660000000 HC RM CCU STEPDOWN

## 2018-08-22 PROCEDURE — 77030032490 HC SLV COMPR SCD KNE COVD -B

## 2018-08-22 PROCEDURE — 84443 ASSAY THYROID STIM HORMONE: CPT

## 2018-08-22 PROCEDURE — 85025 COMPLETE CBC W/AUTO DIFF WBC: CPT

## 2018-08-22 PROCEDURE — 74011000258 HC RX REV CODE- 258: Performed by: NURSE PRACTITIONER

## 2018-08-22 PROCEDURE — 97535 SELF CARE MNGMENT TRAINING: CPT

## 2018-08-22 PROCEDURE — 74011250636 HC RX REV CODE- 250/636: Performed by: NURSE PRACTITIONER

## 2018-08-22 PROCEDURE — 84100 ASSAY OF PHOSPHORUS: CPT

## 2018-08-22 PROCEDURE — 87186 SC STD MICRODIL/AGAR DIL: CPT

## 2018-08-22 PROCEDURE — 97110 THERAPEUTIC EXERCISES: CPT

## 2018-08-22 PROCEDURE — 80048 BASIC METABOLIC PNL TOTAL CA: CPT

## 2018-08-22 PROCEDURE — 82962 GLUCOSE BLOOD TEST: CPT

## 2018-08-22 PROCEDURE — 74011636637 HC RX REV CODE- 636/637: Performed by: HOSPITALIST

## 2018-08-22 PROCEDURE — 36415 COLL VENOUS BLD VENIPUNCTURE: CPT

## 2018-08-22 PROCEDURE — 85014 HEMATOCRIT: CPT

## 2018-08-22 PROCEDURE — 83036 HEMOGLOBIN GLYCOSYLATED A1C: CPT

## 2018-08-22 PROCEDURE — 87086 URINE CULTURE/COLONY COUNT: CPT

## 2018-08-22 PROCEDURE — 74011000250 HC RX REV CODE- 250: Performed by: HOSPITALIST

## 2018-08-22 RX ORDER — DEXTROSE MONOHYDRATE 50 MG/ML
100 INJECTION, SOLUTION INTRAVENOUS CONTINUOUS
Status: DISCONTINUED | OUTPATIENT
Start: 2018-08-22 | End: 2018-08-26 | Stop reason: HOSPADM

## 2018-08-22 RX ORDER — POLYETHYLENE GLYCOL 3350 17 G/17G
17 POWDER, FOR SOLUTION ORAL DAILY
Status: DISCONTINUED | OUTPATIENT
Start: 2018-08-22 | End: 2018-08-26 | Stop reason: HOSPADM

## 2018-08-22 RX ADMIN — ALBUTEROL SULFATE 2.5 MG: 2.5 SOLUTION RESPIRATORY (INHALATION) at 15:53

## 2018-08-22 RX ADMIN — CEFTRIAXONE SODIUM 1 G: 1 INJECTION, POWDER, FOR SOLUTION INTRAMUSCULAR; INTRAVENOUS at 10:00

## 2018-08-22 RX ADMIN — Medication 10 ML: at 14:00

## 2018-08-22 RX ADMIN — Medication 10 ML: at 06:00

## 2018-08-22 RX ADMIN — BUDESONIDE 500 MCG: 0.5 INHALANT RESPIRATORY (INHALATION) at 19:54

## 2018-08-22 RX ADMIN — HYDRALAZINE HYDROCHLORIDE 100 MG: 50 TABLET, FILM COATED ORAL at 20:53

## 2018-08-22 RX ADMIN — FLUTICASONE PROPIONATE 1 SPRAY: 50 SPRAY, METERED NASAL at 09:18

## 2018-08-22 RX ADMIN — POLYETHYLENE GLYCOL 3350 17 G: 17 POWDER, FOR SOLUTION ORAL at 09:17

## 2018-08-22 RX ADMIN — INSULIN LISPRO 6 UNITS: 100 INJECTION, SOLUTION INTRAVENOUS; SUBCUTANEOUS at 16:46

## 2018-08-22 RX ADMIN — BUDESONIDE 500 MCG: 0.5 INHALANT RESPIRATORY (INHALATION) at 07:14

## 2018-08-22 RX ADMIN — HYDRALAZINE HYDROCHLORIDE 100 MG: 50 TABLET, FILM COATED ORAL at 16:17

## 2018-08-22 RX ADMIN — INSULIN GLARGINE 10 UNITS: 100 INJECTION, SOLUTION SUBCUTANEOUS at 20:55

## 2018-08-22 RX ADMIN — INSULIN LISPRO 4 UNITS: 100 INJECTION, SOLUTION INTRAVENOUS; SUBCUTANEOUS at 20:56

## 2018-08-22 RX ADMIN — ALBUTEROL SULFATE 2.5 MG: 2.5 SOLUTION RESPIRATORY (INHALATION) at 19:54

## 2018-08-22 RX ADMIN — SODIUM CHLORIDE 40 MG: 9 INJECTION, SOLUTION INTRAMUSCULAR; INTRAVENOUS; SUBCUTANEOUS at 20:25

## 2018-08-22 RX ADMIN — ALBUTEROL SULFATE 2.5 MG: 2.5 SOLUTION RESPIRATORY (INHALATION) at 07:14

## 2018-08-22 RX ADMIN — DEXTROSE MONOHYDRATE 100 ML/HR: 5 INJECTION, SOLUTION INTRAVENOUS at 20:26

## 2018-08-22 RX ADMIN — SERTRALINE HYDROCHLORIDE 50 MG: 50 TABLET ORAL at 09:16

## 2018-08-22 RX ADMIN — GABAPENTIN 100 MG: 100 CAPSULE ORAL at 09:16

## 2018-08-22 RX ADMIN — AMLODIPINE BESYLATE 10 MG: 10 TABLET ORAL at 09:14

## 2018-08-22 RX ADMIN — HYDRALAZINE HYDROCHLORIDE 100 MG: 50 TABLET, FILM COATED ORAL at 09:16

## 2018-08-22 RX ADMIN — GABAPENTIN 100 MG: 100 CAPSULE ORAL at 18:02

## 2018-08-22 RX ADMIN — DEXTROSE MONOHYDRATE 100 ML/HR: 5 INJECTION, SOLUTION INTRAVENOUS at 12:11

## 2018-08-22 RX ADMIN — PRAVASTATIN SODIUM 40 MG: 20 TABLET ORAL at 20:52

## 2018-08-22 NOTE — PROGRESS NOTES
Hourly rounds performed on this pt. All pt needs are met at this time. Bed in lowest position and locked, call light in reach, side railsx2. Pt slept all throughout night. Report given to oncoming nurse. 24hr PPD needs to be read today at [id number].

## 2018-08-22 NOTE — MED STUDENT NOTES
*ATTENTION:  This note has been created by a medical student for educational purposes only. Please do not refer to the content of this note for clinical decision-making, billing, or other purposes. Please see attending physicians note to obtain clinical information on this patient. *       Medical Student Consult Note    Admit Date: 8/20/18  Consult Date: 8/21/18  Chief Complaint: Anemia, GIB    HPI      From previous note: The pt is an 80-year-old lady with history of multiple medical problems listed below in past medical history, including hypertension, diabetes, COPD, CAD, anxiety disorder, B12 deficiency, coronary artery disease, came to the emergency room with complaints of dizziness. Patient has not been eating or drinking much over the last 1 week. She was also having some pressure and burning with urination over the last 1 week. Urinalysis has since come + for bacteria. No fevers or chills or chest pain or shortness of breath. No diarrhea. No nausea or vomiting. Has decreased appetite and has not been eating much. She started having dizziness since this morning, moderate in severity, worse with standing and walking, somewhat better with lying down. No associated chest pain or shortness of breath. Patient did not fall. On initial evaluation in the emergency room she was found to be in acute renal failure, also has anemia, so she was being admitted for further evaluation and treatment. Patient denies any bleeding per rectum over the last few days. Also denies black stools. She had been taking Lasix even though she had been drinking less.  She has no abdominal pain, or tenderness, excepting some pressure felt over the bladder on admission.        Review of Systems  Comprehensive 10 point ROS is done, and pertinent positives & negatives per HPI, rest of them are negative.          Past Medical History:   Diagnosis Date    Anxiety 2/1/2018    Arrhythmia       palpitations 2/10-went to [facility]   [facility] [place] Arth[facility]       L leg- fell 2008    Asthma       adult onset x 20 yrs    B12 deficiency 8/24/2012    Body mass index 37.0-37.9, adult 2/5/2014    CAD (coronary artery disease)       cardiac work [id number]    Controlled type 2 diabetes mellitus with microalbuminuria, without long-term current use of insulin ([address] 11/22/2016    COPD      Corns and callosities 12/19/2016    CRI (chronic renal insufficiency) 8/24/2012    Depression 6/1/2012    Dermatophytosis of nail 12/19/2016    Diabetes ([address]       type II- home tests fasting-112    DJD (degenerative joint disease) of hip      DM (diabetes mellitus) type II controlled with renal manifestation ([address] 7/16/2010    Encounter for long-term (current) use of other medications 1/8/2013    Essential hypertension 7/16/2010    Gastrointestinal disorder      GERD (gastroesophageal reflux disease)      Heart failure (HCC)      Hiatal hernia 8/24/2012    HLD (hyperlipidemia) 1/8/2013    Neuropathy 8/24/2012     Lower limbs     Obesity (BMI 30.0-39. 9) BMI-43.8    Other and unspecified hyperlipidemia 7/16/2010    Other chest pain 7/16/2010    Other ill-defined conditions(799.89)       high cholesterol    Other ill-defined conditions(799.89)       incont.  urine    PAD (peripheral artery disease) ([address] 8/24/2012    Retinal hemorrhage                 Past Surgical History:   Procedure Laterality Date    ABDOMEN SURGERY PROC UNLISTED        HX APPENDECTOMY        HX CHOLECYSTECTOMY        HX CHOLECYSTECTOMY   2000    HX ENDOSCOPY   02/27/2018     Dr Barillas Shoulder    HX GI         small intestine obstruction    HX GYN         hyst    HX HEART CATHETERIZATION        HX HYSTERECTOMY         prolapse    HX INTRACRANIAL ANEURYSM REPAIR        HX INTRACRANIAL ANEURYSM REPAIR        HX ORTHOPAEDIC         right wrist 2010    HX OTHER SURGICAL         aneurysm clip-cerebral-2000    HX OTHER SURGICAL         cerebral aneurysm   Dr. Jan Monge       Family History   Problem Relation Age of Onset    Cancer Mother      Diabetes Father      Diabetes Paternal Grandfather            Social History                Social History    Marital status: SINGLE       Spouse name: N/A    Number of children: N/A    Years of education: N/A           Social History Main Topics    Smoking status: Never Smoker    Smokeless tobacco: Never Used    Alcohol use No    Drug use: No    Sexual activity: No      Other Topics Concern    Not on file          Social History Narrative      with four children            No Known Allergies             Prior to Admission medications    Medication Sig Start Date End Date Taking? Authorizing Provider   VENTOLIN HFA 90 mcg/actuation inhaler INHALE 2 PUFFS BY MOUTH EVERY 6 HOURS AS NEEDED FOR WHEEZING 8/20/18     Mary Fernando MD   LORazepam (ATIVAN) 0.5 mg tablet Take 1 Tab by mouth daily as needed for Anxiety. 8/20/18     Mary Melendrez MD   hydrALAZINE (APRESOLINE) 50 mg tablet Take 2 Tabs by mouth three (3) times daily. 8/2/18     Aide De La Rosa NP   glipiZIDE SR (GLUCOTROL XL) 2.5 mg CR tablet Take 2.5 mg by mouth daily.       Historical Provider   fluticasone (FLONASE) 50 mcg/actuation nasal spray INSTILL 1 SPRAY IN EACH NOSTRIL NIGHTLY AS DIRECTED 7/25/18     Mary Fernando MD   potassium chloride (K-DUR, KLOR-CON) 20 mEq tablet TAKE 1 TABLET BY MOUTH EVERY MORNING 7/10/18     Mary Fernando MD   cyanocobalamin, vitamin B-12, 1,000 mcg/mL kit 1,000 mcg by Injection route every month. 5/16/18     Mary Melendrez MD   furosemide (LASIX) 20 mg tablet Take 1 Tab by mouth daily. 5/16/18     Mary Fernando MD   pravastatin (PRAVACHOL) 40 mg tablet TAKE 1 TABLET BY MOUTH EVERY NIGHT AT BEDTIME 5/16/18     Mary Fernando MD   sertraline (ZOLOFT) 100 mg tablet Take 0.5 Tabs by mouth daily.  Indications: major depressive disorder, am 2/1/18     Mary Melendrez MD   fluticasone-salmeterol (ADVAIR DISKUS) 250-50 mcg/dose diskus inhaler Take 1 Puff by inhalation every twelve (12) hours. 18     Mary Fernando MD   benazepril (LOTENSIN) 40 mg tablet Take 1 Tab by mouth daily. 18     Mary Fernando MD   amLODIPine (NORVASC) 10 mg tablet Take 1 Tab by mouth daily. 18     Mary Fernando MD   pravastatin (PRAVACHOL) 40 mg tablet TAKE 1 TABLET BY MOUTH EVERY NIGHT AT BEDTIME  Patient not taking: Reported on 8/3/2018 2/1/18     Mary Fernando MD   gabapentin (NEURONTIN) 100 mg capsule TAKE 1 CAPSULE BY MOUTH TWICE DAILY 18     Mary Fernando MD   Blood-Glucose Meter monitoring kit Pt uses true metrix meter and tests once daily dx code E11.29 18     Mary Fernando MD   metFORMIN (GLUCOPHAGE) 500 mg tablet Take 1 Tab by mouth two (2) times daily (with meals). 10/25/17     Mary Fernando MD   polyethylene glycol (MIRALAX) 17 gram/dose powder Take 17 g by mouth daily. 1 tablespoon with 8 oz of water daily 17     Toney Douglas MD   FOLIC ACID/MULTIVIT-MIN/LUTEIN (CENTRUM SILVER PO) Take  by mouth.       Historical Provider   OTHER One shower chair 17     Serena Fernando MD   VIT A/VIT C/VIT E/ZINC/COPPER (PRESERVISION AREDS PO) Take 1 Tab by mouth two (2) times a day.       Historical Provider   glucose blood VI test strips (BLOOD GLUCOSE TEST) strip Pt uses true metrix test strips. Pt tests once daily.  Dx code E11.29 16     Jennifer Casarez MD   Lancets Misc Pt tests three times daily Diagnosis Code: 250.00 7/15/13     Mary Dawn MD            Physical Exam           Visit Vitals    /69 (BP 1 Location: Left arm)    Pulse 74    Temp 98.4 °F (36.9 °C)    Resp 16    Ht 5' 7\" (1.702 m)    Wt 83 kg (183 lb)    SpO2 97%    BMI 28.66 kg/m2      Temp (24hrs), Av.2 °F (36.8 °C), Min:98 °F (36.7 °C), Max:98.4 °F (36.9 °C)     Oxygen Therapy  O2 Sat (%): 97 % (18)  Pulse via Oximetry: 74 beats per minute (18)  O2 Device: Room air (18)  No intake or output data in the 24 hours ending 08/21/18 1837      General:                    Conscious, No acute distress  Eyes:                                   MERRY, No pallor/icterus                                          HENT:             Oral Mucosa is dry, No sinus tenderness  Neck:               Supple, No JVD  Lungs:                                 CTA Bilaterally, No significant wheezing  Heart:                                  S1 S2 regular  Abdomen:                  Soft, Positive bowel sounds, NTND, No guarding/rigidity/rebound tend. Extremities:    pedal edema+  Neurologic:                AAOX3  Skin:                No acute rashes  Musculoskeletal: No Acute findings, left patellar fracture healing well. Psych:             Appropriate mood     LAB   Recent Results    Recent Results (from the past 24 hour(s))   CBC WITH AUTOMATED DIFF     Collection Time: 08/21/18  3:09 PM   Result Value Ref Range     WBC 6.0 4.3 - 11.1 K/uL     RBC 2.75 (L) 4.05 - 5.2 M/uL     HGB 8.7 (L) 11.7 - 15.4 g/dL     HCT 28.1 (L) 35.8 - 46.3 %     .2 (H) 79.6 - 97.8 FL     MCH 31.6 26.1 - 32.9 PG     MCHC 31.0 (L) 31.4 - 35.0 g/dL     RDW 14.4 %     PLATELET 137 414 - 972 K/uL     MPV 11.6 9.4 - 12.3 FL     ABSOLUTE NRBC 0.00 0.0 - 0.2 K/uL     DF AUTOMATED        NEUTROPHILS 60 43 - 78 %     LYMPHOCYTES 30 13 - 44 %     MONOCYTES 8 4.0 - 12.0 %     EOSINOPHILS 1 0.5 - 7.8 %     BASOPHILS 0 0.0 - 2.0 %     IMMATURE GRANULOCYTES 1 0.0 - 5.0 %     ABS. NEUTROPHILS 3.6 K/UL     ABS. LYMPHOCYTES 1.8 K/UL     ABS. MONOCYTES 0.5 K/UL     ABS. EOSINOPHILS 0.0 K/UL     ABS. BASOPHILS 0.0 K/UL     ABS. IMM.  GRANS. 0.1 K/UL   METABOLIC PANEL, COMPREHENSIVE     Collection Time: 08/21/18  3:09 PM   Result Value Ref Range     Sodium 144 136 - 145 mmol/L     Potassium 5.5 (H) 3.5 - 5.1 mmol/L     Chloride 109 (H) 98 - 107 mmol/L     CO2 22 21 - 32 mmol/L     Anion gap 13 7 - 16 mmol/L     Glucose 263 (H) 65 - 100 mg/dL     BUN 24 (H) 8 - 23 MG/DL     Creatinine 1.73 (H) 0.6 - 1.0 MG/DL     GFR est AA 36 (L) >60 ml/min/1.73m2     GFR est non-AA 29 (L) >60 ml/min/1.73m2     Calcium 9.9 8.3 - 10.4 MG/DL     Bilirubin, total 0.4 0.2 - 1.1 MG/DL     ALT (SGPT) 30 12 - 65 U/L     AST (SGOT) 58 (H) 15 - 37 U/L     Alk. phosphatase 83 50 - 136 U/L     Protein, total 7.1 6.3 - 8.2 g/dL     Albumin 3.0 (L) 3.2 - 4.6 g/dL     Globulin 4.1 (H) 2.3 - 3.5 g/dL     A-G Ratio 0.7 (L) 1.2 - 3.5     TROPONIN I     Collection Time: 08/21/18  3:09 PM   Result Value Ref Range     Troponin-I, Qt. <0.02 (L) 0.02 - 0.05 NG/ML   EKG, 12 LEAD, INITIAL     Collection Time: 08/21/18  4:44 PM   Result Value Ref Range     Ventricular Rate 84 BPM     Atrial Rate 84 BPM     P-R Interval 198 ms     QRS Duration 128 ms     Q-T Interval 364 ms     QTC Calculation (Bezet) 430 ms     Calculated P Axis 74 degrees     Calculated R Axis -36 degrees     Calculated T Axis 76 degrees     Diagnosis           !! AGE AND GENDER SPECIFIC ECG ANALYSIS !! Sinus rhythm with occasional and consecutive Premature ventricular complexes  Left axis deviation  Right bundle branch block  Inferior infarct (cited on or before 12-JUL-2014)  Anterior infarct (cited on or before 12-JUL-2014)  Abnormal ECG  When compared with ECG of 19-JUL-2017 05:59,  Premature ventricular complexes are now Present   MAGNESIUM     Collection Time: 08/21/18  5:09 PM   Result Value Ref Range     Magnesium 2.1 1.8 - 2.4 mg/dL   PHOSPHORUS     Collection Time: 08/21/18  5:09 PM   Result Value Ref Range     Phosphorus 1.8 (L) 2.3 - 3.7 MG/DL            IMAGING: (from previous recent hospitalization) 7/28/18     Nuclear medicine GI bleeding scan     HISTORY: Blood in stool.     TECHNIQUE: Patient was administered 26.0 mCi of technetium 99m labeled red blood  cells. Imaging of the abdomen and pelvis was performed for 60 minutes.     COMPARISON: None.  Correlation is made to the CT scan of the abdomen and pelvis  07/19/2017.     FINDINGS: There is abnormal accumulation of radiotracer activity immediately  after imaging within the upper abdomen primarily at the midline. This faintly  progresses antegrade within bowel loops. The anatomy based on recent CT scanning  suggests that this is arising from the stomach although an additional  possibility would be the transverse colon although this is less likely. IMPRESSION: Positive GI bleeding study likely involving the stomach with  transverse colon a possible but less likely consideration.     EKG: personally reviewed and shows NSR with chronic RBBB  CXR: Personally reviewed and shows No acute findings     Assessment/Plan      Anemia, continuing issue: Hospitalized 2 weeks ago: clinically presented as diverticular bleed, but nuclear med suggested stomach as source (see above)  1. Monitor H/H,  Consider transfusion if Hgb <8, possible GI rebleed. *8/22/18 morning labs show Hgb 7.5, transfusion likely needed. 2. Monitor for signs of acute bleed. 3. Agree with IV protonix and d/c of blood thinners. 4. If concern for acute bleed continues, consider EGD, although pt is noted to be at high risk with cardiopulmonary history. Maggi Poster VCOM-CC, OMS-III       Patient seen and examined. No gross bleeding. Has a macrocytic anemia. Monitor hgb. If no improvement then consider EGD etc, but patient would be high risk for endoscopy.   Stan Ceron MD

## 2018-08-22 NOTE — PROGRESS NOTES
CM met with pt at bedside. She lives in her own home-her daughter lives with her. Pt reports no stairs in the home. DME in the home include a cane and walker. Pt reports being independent but no longer drives. Her daughter transports her to all necessary appts. Pt agreeable to working with PT/OT while in the hospital;; however, pt declined [facility] services. CM will follow recommendations and readdress if needed. PCP and insurance verified. Best times for appts: any day during the afternoons. [phone]. DC date not known at this time. CM will follow. Pt plans to DC home. Care Management Interventions  PCP Verified by CM:  Yes  Transition of Care Consult (CM Consult): Discharge Planning  Physical Therapy Consult: Yes  Occupational Therapy Consult: Yes  Current Support Network: Own Home  Confirm Follow Up Transport: Family  Plan discussed with Pt/Family/Caregiver: Yes  Freedom of Choice Offered: Yes  Discharge Location  Discharge Placement: Home

## 2018-08-22 NOTE — PROGRESS NOTES
Progress Note    2018  Admit Date: 2018  4:19 PM   NAME: Nelson Munoz   :  1929   MRN:  [id number]   Attending: Morgan Johnson MD  PCP:  Robyn Sosa MD  Treatment Team: Attending Provider: Morgan Johnson MD; Consulting Provider: Lynda oRsa MD; Care Manager: Andrew Paget, [facility] [place]    DNR   SUBJECTIVE:   Ms. Chante Curiel is a 79 yo lady with PMH of HTN, asthma, CKD, DM II, GI bleed, CAD who presented with c/o dizziness, decreased oral intake X1 week, still taking her lasix, pressure and burning with urination. Reports dizziness is worse with standing and better lying down. She was found to have a UTI, MIHAELA. Also found to have anemia with hbg 8.7 down from 10.4 approx 3 weeks ago. Does denies rectal bleeding or black stools GI consulted. Hgb down to 7.5 today. Hypernatremic today. Creatinine improved after IVF. Hyperkalemia improved. Denies CP, SOB, n/v/d, abd pain.           Past Medical History:   Diagnosis Date    Anxiety 2018    Arrhythmia     palpitations 2/10-went to [facility]    Arthritis     L leg- fell     Asthma     adult onset x 20 yrs    B12 deficiency 2012    Body mass index 37.0-37.9, adult 2014    CAD (coronary artery disease)     cardiac work up-[id number]    Controlled type 2 diabetes mellitus with microalbuminuria, without long-term current use of insulin ([address] 2016    COPD     Corns and callosities 2016    CRI (chronic renal insufficiency) 2012    Depression 2012    Dermatophytosis of nail 2016    Diabetes ([address]     type II- home tests fasting-112    DJD (degenerative joint disease) of hip     DM (diabetes mellitus) type II controlled with renal manifestation ([address] 2010    Encounter for long-term (current) use of other medications 2013    Essential hypertension 2010    Gastrointestinal disorder     GERD (gastroesophageal reflux disease)     Heart failure ([address]     Hiatal hernia 8/24/2012    HLD (hyperlipidemia) 1/8/2013    Neuropathy 8/24/2012    Lower limbs     Obesity (BMI 30.0-39. 9) BMI-43.8    Other and unspecified hyperlipidemia 7/16/2010    Other chest pain 7/16/2010    Other ill-defined conditions(799.89)     high cholesterol    Other ill-defined conditions(799.89)     incont. urine    PAD (peripheral artery disease) ([facility] 8/24/2012    Retinal hemorrhage      Recent Results (from the past 24 hour(s))   CBC WITH AUTOMATED DIFF    Collection Time: 08/21/18  3:09 PM   Result Value Ref Range    WBC 6.0 4.3 - 11.1 K/uL    RBC 2.75 (L) 4.05 - 5.2 M/uL    HGB 8.7 (L) 11.7 - 15.4 g/dL    HCT 28.1 (L) 35.8 - 46.3 %    .2 (H) 79.6 - 97.8 FL    MCH 31.6 26.1 - 32.9 PG    MCHC 31.0 (L) 31.4 - 35.0 g/dL    RDW 14.4 %    PLATELET 509 495 - 758 K/uL    MPV 11.6 9.4 - 12.3 FL    ABSOLUTE NRBC 0.00 0.0 - 0.2 K/uL    DF AUTOMATED      NEUTROPHILS 60 43 - 78 %    LYMPHOCYTES 30 13 - 44 %    MONOCYTES 8 4.0 - 12.0 %    EOSINOPHILS 1 0.5 - 7.8 %    BASOPHILS 0 0.0 - 2.0 %    IMMATURE GRANULOCYTES 1 0.0 - 5.0 %    ABS. NEUTROPHILS 3.6 K/UL    ABS. LYMPHOCYTES 1.8 K/UL    ABS. MONOCYTES 0.5 K/UL    ABS. EOSINOPHILS 0.0 K/UL    ABS. BASOPHILS 0.0 K/UL    ABS. IMM. GRANS. 0.1 K/UL   METABOLIC PANEL, COMPREHENSIVE    Collection Time: 08/21/18  3:09 PM   Result Value Ref Range    Sodium 144 136 - 145 mmol/L    Potassium 5.5 (H) 3.5 - 5.1 mmol/L    Chloride 109 (H) 98 - 107 mmol/L    CO2 22 21 - 32 mmol/L    Anion gap 13 7 - 16 mmol/L    Glucose 263 (H) 65 - 100 mg/dL    BUN 24 (H) 8 - 23 MG/DL    Creatinine 1.73 (H) 0.6 - 1.0 MG/DL    GFR est AA 36 (L) >60 ml/min/1.73m2    GFR est non-AA 29 (L) >60 ml/min/1.73m2    Calcium 9.9 8.3 - 10.4 MG/DL    Bilirubin, total 0.4 0.2 - 1.1 MG/DL    ALT (SGPT) 30 12 - 65 U/L    AST (SGOT) 58 (H) 15 - 37 U/L    Alk.  phosphatase 83 50 - 136 U/L    Protein, total 7.1 6.3 - 8.2 g/dL    Albumin 3.0 (L) 3.2 - 4.6 g/dL    Globulin 4.1 (H) 2.3 - 3.5 g/dL    A-G Ratio 0.7 (L) 1.2 - 3.5     TROPONIN I    Collection Time: 08/21/18  3:09 PM   Result Value Ref Range    Troponin-I, Qt. <0.02 (L) 0.02 - 0.05 NG/ML   EKG, 12 LEAD, INITIAL    Collection Time: 08/21/18  4:44 PM   Result Value Ref Range    Ventricular Rate 84 BPM    Atrial Rate 84 BPM    P-R Interval 198 ms    QRS Duration 128 ms    Q-T Interval 364 ms    QTC Calculation (Bezet) 430 ms    Calculated P Axis 74 degrees    Calculated R Axis -36 degrees    Calculated T Axis 76 degrees    Diagnosis       !! AGE AND GENDER SPECIFIC ECG ANALYSIS !!   Sinus rhythm with occasional and consecutive Premature ventricular complexes  Left axis deviation  Right bundle branch block  Inferior infarct (cited on or before 12-JUL-2014)  Anterior infarct (cited on or before 12-JUL-2014)  Abnormal ECG  When compared with ECG of 19-JUL-2017 05:59,  Premature ventricular complexes are now Present     MAGNESIUM    Collection Time: 08/21/18  5:09 PM   Result Value Ref Range    Magnesium 2.1 1.8 - 2.4 mg/dL   PHOSPHORUS    Collection Time: 08/21/18  5:09 PM   Result Value Ref Range    Phosphorus 1.8 (L) 2.3 - 3.7 MG/DL   TYPE & SCREEN    Collection Time: 08/21/18  5:09 PM   Result Value Ref Range    Crossmatch Expiration 08/24/2018     ABO/Rh(D) AB POSITIVE     Antibody screen POS     Antibody ID ANTI-E     ANTIGEN TYPING E NEGATIVE,    URINALYSIS W/ RFLX MICROSCOPIC    Collection Time: 08/21/18  6:00 PM   Result Value Ref Range    Color YELLOW      Appearance CLOUDY      Specific gravity 1.008 1.001 - 1.023      pH (UA) 7.0 5.0 - 9.0      Protein NEGATIVE  NEG mg/dL    Glucose NEGATIVE  mg/dL    Ketone NEGATIVE  NEG mg/dL    Bilirubin NEGATIVE  NEG      Blood NEGATIVE  NEG      Urobilinogen 0.2 0.2 - 1.0 EU/dL    Nitrites NEGATIVE  NEG      Leukocyte Esterase LARGE (A) NEG      WBC >100 (H) 0 /hpf    RBC 0-3 0 /hpf    Epithelial cells 0 0 /hpf    Bacteria 4+ (H) 0 /hpf    Casts 0 0 /lpf   GLUCOSE, POC    Collection Time: 08/21/18  7:24 PM Result Value Ref Range    Glucose (POC) 115 (H) 65 - 100 mg/dL   CK    Collection Time: 08/21/18  9:25 PM   Result Value Ref Range    CK 64 21 - 215 U/L   FERRITIN    Collection Time: 08/21/18  9:25 PM   Result Value Ref Range    Ferritin 371 8 - 388 NG/ML   IRON    Collection Time: 08/21/18  9:25 PM   Result Value Ref Range    Iron 60 35 - 150 ug/dL   TRANSFERRIN SATURATION    Collection Time: 08/21/18  9:25 PM   Result Value Ref Range    Iron 58 35 - 150 ug/dL    TIBC 244 (L) 250 - 450 ug/dL    Transferrin Saturation 24 >20 %   TRANSFERRIN    Collection Time: 08/21/18  9:25 PM   Result Value Ref Range    Transferrin 135 (L) 202 - 364 mg/dL   RETICULOCYTE COUNT    Collection Time: 08/21/18  9:25 PM   Result Value Ref Range    Reticulocyte count 4.2 (H) 0.3 - 2.0 %    Absolute Retic Cnt. 0.1162 (H) 0.026 - 0.095 M/ul    Immature Retic Fraction 15.0 3.0 - 15.9 %    Retic Hgb Conc. 33 29 - 35 pg   HGB & HCT    Collection Time: 08/21/18  9:25 PM   Result Value Ref Range    HGB 8.6 (L) 11.7 - 15.4 g/dL    HCT 28.4 (L) 35.8 - 46.3 %   CBC WITH AUTOMATED DIFF    Collection Time: 08/22/18  6:01 AM   Result Value Ref Range    WBC 5.1 4.3 - 11.1 K/uL    RBC 2.46 (L) 4.05 - 5.2 M/uL    HGB 7.5 (L) 11.7 - 15.4 g/dL    HCT 25.0 (L) 35.8 - 46.3 %    .6 (H) 79.6 - 97.8 FL    MCH 30.5 26.1 - 32.9 PG    MCHC 30.0 (L) 31.4 - 35.0 g/dL    RDW 14.2 %    PLATELET 363 627 - 536 K/uL    MPV 10.6 9.4 - 12.3 FL    ABSOLUTE NRBC 0.00 0.0 - 0.2 K/uL    DF AUTOMATED      NEUTROPHILS 64 43 - 78 %    LYMPHOCYTES 25 13 - 44 %    MONOCYTES 10 4.0 - 12.0 %    EOSINOPHILS 0 (L) 0.5 - 7.8 %    BASOPHILS 0 0.0 - 2.0 %    IMMATURE GRANULOCYTES 0 0.0 - 5.0 %    ABS. NEUTROPHILS 3.3 K/UL    ABS. LYMPHOCYTES 1.3 K/UL    ABS. MONOCYTES 0.5 K/UL    ABS. EOSINOPHILS 0.0 K/UL    ABS. BASOPHILS 0.0 K/UL    ABS. IMM.  GRANS. 0.0 K/UL   METABOLIC PANEL, BASIC    Collection Time: 08/22/18  6:01 AM   Result Value Ref Range    Sodium 147 (H) 136 - 145 mmol/L    Potassium 3.5 3.5 - 5.1 mmol/L    Chloride 112 (H) 98 - 107 mmol/L    CO2 24 21 - 32 mmol/L    Anion gap 11 7 - 16 mmol/L    Glucose 106 (H) 65 - 100 mg/dL    BUN 19 8 - 23 MG/DL    Creatinine 1.29 (H) 0.6 - 1.0 MG/DL    GFR est AA 50 (L) >60 ml/min/1.73m2    GFR est non-AA 41 (L) >60 ml/min/1.73m2    Calcium 9.3 8.3 - 10.4 MG/DL   MAGNESIUM    Collection Time: 08/22/18  6:01 AM   Result Value Ref Range    Magnesium 2.0 1.8 - 2.4 mg/dL   PHOSPHORUS    Collection Time: 08/22/18  6:01 AM   Result Value Ref Range    Phosphorus 2.9 2.3 - 3.7 MG/DL   TSH 3RD GENERATION    Collection Time: 08/22/18  6:01 AM   Result Value Ref Range    TSH 0.888 0.358 - 3.740 uIU/mL   VITAMIN B12    Collection Time: 08/22/18  6:01 AM   Result Value Ref Range    Vitamin B12 2068 (H) 193 - 986 pg/mL   HEMOGLOBIN A1C WITH EAG    Collection Time: 08/22/18  6:03 AM   Result Value Ref Range    Hemoglobin A1c 5.4 4.8 - 6.0 %    Est. average glucose 108 mg/dL   GLUCOSE, POC    Collection Time: 08/22/18  7:42 AM   Result Value Ref Range    Glucose (POC) 105 (H) 65 - 100 mg/dL   HGB & HCT    Collection Time: 08/22/18 10:33 AM   Result Value Ref Range    HGB 7.8 (L) 11.7 - 15.4 g/dL    HCT 25.4 (L) 35.8 - 46.3 %   GLUCOSE, POC    Collection Time: 08/22/18 11:53 AM   Result Value Ref Range    Glucose (POC) 145 (H) 65 - 100 mg/dL     No Known Allergies  Current Facility-Administered Medications   Medication Dose Route Frequency Provider Last Rate Last Dose    polyethylene glycol (MIRALAX) packet 17 g  17 g Oral DAILY Delbert Hernandez MD   17 g at 08/22/18 6393    cefTRIAXone (ROCEPHIN) 1 g in 0.9% sodium chloride (MBP/ADV) 50 mL  1 g IntraVENous Q24H Andrea Stark  mL/hr at 08/22/18 1000 1 g at 08/22/18 1000    dextrose 5% infusion  100 mL/hr IntraVENous CONTINUOUS Andrea Stark,  mL/hr at 08/22/18 1211 100 mL/hr at 08/22/18 1211    amLODIPine (NORVASC) tablet 10 mg  10 mg Oral DAILY Delbert Hernanedz MD   10 mg at 08/22/18 0914    fluticasone (FLONASE) 50 mcg/actuation nasal spray 1 Spray  1 Spray Both Nostrils DAILY Walter Nair MD   1 [place] at 08/22/18 0490    gabapentin (NEURONTIN) capsule 100 mg  100 mg Oral BID Walter Nair MD   100 mg at 08/22/18 5178    hydrALAZINE (APRESOLINE) tablet 100 mg  100 mg Oral TID Walter Nair MD   100 mg at 08/22/18 0916    LORazepam (ATIVAN) tablet 0.5 mg  0.5 mg Oral DAILY PRN Walter Nair MD        pravastatin (PRAVACHOL) tablet 40 mg  40 mg Oral QHS Walter Nair MD   40 mg at 08/21/18 2111    sertraline (ZOLOFT) tablet 50 mg  50 mg Oral DAILY Walter Nair MD   50 mg at 08/22/18 0916    hydrALAZINE (APRESOLINE) 20 mg/mL injection 10 mg  10 mg IntraVENous Q6H PRN Walter Nair MD        hydrALAZINE (APRESOLINE) tablet 25 mg  25 mg Oral Q6H PRN Walter Nair MD        pantoprazole (PROTONIX) 40 mg in sodium chloride 0.9% 10 mL injection  40 mg IntraVENous Q24H Walter Nair MD   40 mg at 08/21/18 2110    sodium chloride (NS) flush 5-10 mL  5-10 mL IntraVENous Q8H Walter Nair MD   10 mL at 08/22/18 0600    sodium chloride (NS) flush 5-10 mL  5-10 mL IntraVENous PRN Walter aNir MD   10 mL at 08/21/18 2123    tuberculin injection 5 Units  5 Units IntraDERMal ONCE Walter Nair MD   5 Units at 08/21/18 2119    acetaminophen (TYLENOL) tablet 650 mg  650 mg Oral Q4H PRN Walter Nair MD        HYDROcodone-acetaminophen (NORCO) 5-325 mg per tablet 1 Tab  1 Tab Oral Q4H PRSANDEE Nair MD        naloxone [facility]) injection 0.4 mg  0.4 mg IntraVENous PRSANDEE Nair MD        diphenhydrAMINE (BENADRYL) capsule 25 mg  25 mg Oral Q4H PRN Walter Nair MD        ondansetron [facility]) injection 4 mg  4 mg IntraVENous Q4H PRN Walter Nair MD        magnesium hydroxide (MILK OF MAGNESIA) 400 mg/5 mL oral suspension 30 mL  30 mL Oral DAILY PRN Walter Nair MD        zolpidem (AMBIEN) tablet 5 mg  5 mg Oral QHS PRSANDEE Nair MD        insulin lispro (HUMALOG) injection   SubCUTAneous AC&HS Nnamdi Crowder MD   Stopped at 18 2200    insulin glargine (LANTUS) injection 10 Units  10 Units SubCUTAneous QHS Nnamdi Crowder MD   10 Units at 18 210    budesonide (PULMICORT) 500 mcg/2 ml nebulizer suspension  500 mcg Nebulization BID RT Nnamdi Crowder MD   500 mcg at 18 6387    And    albuterol (PROVENTIL VENTOLIN) nebulizer solution 2.5 mg  2.5 mg Nebulization Q6HWA RT Nnamdi Crowder MD   2.5 mg at 18 6095       Review of Systems negative with exception of pertinent positives noted above  PHYSICAL EXAM     Visit Vitals    /65 (BP 1 Location: Left arm, BP Patient Position: Sitting)    Pulse 74    Temp 98.7 °F (37.1 °C)    Resp 17    Ht 5' 7\" (1.702 m)    Wt 84.6 kg (186 lb 6.4 oz)    SpO2 97%    BMI 29.19 kg/m2      Temp (24hrs), Av.2 °F (36.8 °C), Min:97.6 °F (36.4 °C), Max:98.8 °F (37.1 °C)    Oxygen Therapy  O2 Sat (%): 97 % (18 1155)  Pulse via Oximetry: 79 beats per minute (18 0718)  O2 Device: Room air (18 1155)    Intake/Output Summary (Last 24 hours) at 18 1227  Last data filed at 18 1908   Gross per 24 hour   Intake                0 ml   Output              300 ml   Net             -300 ml      General: No acute distress    Lungs: CTA bilaterally. Resp even and non labored  Heart:  S1S2 present without murmurs rubs gallops. RRR. No edema  Abdomen: Soft, non tender, non distended. BS present  Extremities: Moves ext spontaneously. No cyanosis  Neurologic:  A/O X4. No focal deficits    Results summary of Diagnostic Studies/Procedures copied from within [facility] EMR:        [address] Problems    Diagnosis Date Noted    MIHAELA (acute kidney injury) ([address] 2018     Plan:    Dizziness secondary to hypovolemia and dehydration:  Holding lasix. Stop NS and start D5 due to hypernatremia. MIHAELA:  Improving. Stop NS, start D5 due to hypernatremia.   Holding lasix, ACE-I, metformin. BMP in AM    DM II:  Holding metformin and glipizide. On lantus and SSI. A1C 5.4    Anemia:  GI consulted. Transfuse as needed. Continue protonix.  hemoccult stool     HTN:  Chronic, controlled. Continue current regimen    CAD Hx:  Holding ASA due to concern for GI bleed    Recent left patella fx:  Conservative management per Ortho Surgery last admission. PT, knee immobilizer, pain management    Hypernatremia:  Stop NS, start D5. BMP in AM    Hyperkalemia:  Resolved. BMP in AM    UTI:  Send urine cx.  Start rocephin       Notes, labs, VS, diagnostic testing reviewed  Time spent with pt 20 min      DVT Prophylaxis:   Plan of Care Discussed with: Supervising MD  Dr. Sherman Salas, care team, pt   Micaela Wellington NP

## 2018-08-22 NOTE — PROGRESS NOTES
Problem: Mobility Impaired (Adult and Pediatric)  Goal: *Acute Goals and Plan of Care (Insert Text)  LTG:  (1.)Ms. Lex Mcmahon will move from supine to sit and sit to supine , scoot up and down and roll side to side INDEPENDENTLY with bed flat within 7 treatment day(s). (2.)Ms. Lex Mcmahon will transfer from bed to chair and chair to bed with MODIFIED INDEPENDENCE using the least restrictive device within 7 treatment day(s). (3.)Ms. Lex Mcmahon will ambulate with MODIFIED INDEPENDENCE for 200 feet with the least restrictive device within 7 treatment day(s). (4.)Ms. Lex Mcmahon will independently perform LE exercises per HEP for 15 minutes to improve strength and mobility within 7 days. ________________________________________________________________________________________________    PHYSICAL THERAPY: Initial Assessment, AM 8/22/2018  INPATIENT: Hospital Day: 2  Payor: Анна [employer] MMP / Plan: [place] [employer] MMP / Product Type: Managed Care Medicare /      NAME/AGE/GENDER: Radha Brantley is a 80 y.o. female   PRIMARY DIAGNOSIS: MIHAELA (acute kidney injury) ([facility] <principal problem not specified> <principal problem not specified>        ICD-10: Treatment Diagnosis:    · Generalized Muscle Weakness (M62.81)  · Difficulty in walking, Not elsewhere classified (R26.2)  · Other abnormalities of gait and mobility (R26.89)  · History of falling (Z91.81)   Precaution/Allergies:  Review of patient's allergies indicates no known allergies. ASSESSMENT:     Ms. Lex Mcmahon is a very pleasant 80year old female admitted from home for acute kidney injury. She lives with daughter and is typically fairly mobile with cane; has been using walker for the past few weeks after fall and left patella fx (non surgical). She presents sitting up in chair without complaints and is agreeable to therapy assessment. Is current with [facility] PT and has great understanding of transfer techniques and safety with all mobility.  Ambulates 100 ft in room and hallway with CGA and walker, min cueing for upright posture and walker management. Returned to room after activity and up to chair for break then performs below LE exercises with great participation. Ms. Dilcia Ruelas appears to be functioning close to baseline with strength and mobility; possibly slightly weaker due to hospital stay and decreased activity. Would benefit from continued therapy during acute care stay address below deficits and maximize strength, safety, and independence. This section established at most recent assessment   PROBLEM LIST (Impairments causing functional limitations):  1. Decreased Strength  2. Decreased Transfer Abilities  3. Decreased Ambulation Ability/Technique  4. Decreased Balance  5. Decreased Activity Tolerance   INTERVENTIONS PLANNED: (Benefits and precautions of physical therapy have been discussed with the patient.)  1. Balance Exercise  2. Bed Mobility  3. Gait Training  4. Home Exercise Program (HEP)  5. Therapeutic Activites  6. Therapeutic Exercise/Strengthening  7. Transfer Training     TREATMENT PLAN: Frequency/Duration: 3 times a week for duration of hospital stay  Rehabilitation Potential For Stated Goals: Good     RECOMMENDED REHABILITATION/EQUIPMENT: (at time of discharge pending progress): Due to the probability of continued deficits (see above) this patient will likely need continued skilled physical therapy after discharge. Equipment:    None at this time              HISTORY:   History of Present Injury/Illness (Reason for Referral):  Per H&P, \"This is a 80-year-old lady with history of multiple medical problems listed below in past medical history, including hypertension, diabetes, COPD, CAD, anxiety disorder, B12 deficiency, coronary artery disease, came to the emergency room with complaints of dizziness. Patient has not been eating or drinking much over the last 1 week.   She was also having some pressure and burning with urination over the last 1 week. No fevers or chills or chest pain or shortness of breath. No diarrhea. No nausea or vomitings. Has decreased appetite and has not been eating much. She started having dizziness since this morning, moderate in severity, worse with standing and walking, somewhat better with lying down. No associated chest pain or shortness of breath. Patient did not fall. On initial evaluation in the emergency room she was found to be in acute renal failure, also has anemia, so she was being admitted for further evaluation and treatment. Patient denies any bleeding per rectum over the last few days. Also denies black stools. She has been taking Lasix even though she was not drinking enough\"    Past Medical History/Comorbidities:   Ms. Jacquelyn Cronin  has a past medical history of Anxiety (2/1/2018); Arrhythmia; Arthritis; Asthma; B12 deficiency (8/24/2012); Body mass index 37.0-37.9, adult (2/5/2014); CAD (coronary artery disease); Controlled type 2 diabetes mellitus with microalbuminuria, without long-term current use of insulin ([address] (11/22/2016); COPD; Corns and callosities (12/19/2016); CRI (chronic renal insufficiency) (8/24/2012); Depression (6/1/2012); Dermatophytosis of nail (12/19/2016); Diabetes ([address] DJD (degenerative joint disease) of hip; DM (diabetes mellitus) type II controlled with renal manifestation ([address] (7/16/2010); Encounter for long-term (current) use of other medications (1/8/2013); Essential hypertension (7/16/2010); Gastrointestinal disorder; GERD (gastroesophageal reflux disease); Heart failure ([address] Hiatal hernia (8/24/2012); HLD (hyperlipidemia) (1/8/2013); Neuropathy (8/24/2012); Obesity (BMI 30.0-39.9) (BMI-43.8); Other and unspecified hyperlipidemia (7/16/2010); Other chest pain (7/16/2010); Other ill-defined conditions(799.89); Other ill-defined conditions(799.89); PAD (peripheral artery disease) ([address] (8/24/2012); and Retinal hemorrhage.   Ms. Jacquelyn Cronin  has a past surgical history that includes pr abdomen surgery proc unlisted; hx appendectomy; hx cholecystectomy; hx other surgical; hx gyn; hx gi; hx orthopaedic; hx hysterectomy; hx cholecystectomy (2000); hx intracranial aneurysm repair; hx intracranial aneurysm repair; hx other surgical; hx heart catheterization; and hx endoscopy (02/27/2018). Social History/Living Environment:   Home Environment: Private residence  # Steps to Enter: 1  Rails to Enter: Yes  Hand Rails : Right  One/Two Story Residence: One story  Living Alone: No  Support Systems: Child(edward), Family member(s)  Patient Expects to be Discharged to[de-identified] Private residence  Current DME Used/Available at Home: [employer] Grumbling, straight, Walker, rolling  Tub or Shower Type: Shower  Prior Level of Function/Work/Activity:  Lives with daughter in single story home with 1 step. Mod I with cane or walker; walker mostly after recent L patella fx. Daughter with pt during the day. Number of Personal Factors/Comorbidities that affect the Plan of Care: 1-2: MODERATE COMPLEXITY   EXAMINATION:   Most Recent Physical Functioning:   Gross Assessment:  AROM: Within functional limits  Strength: Generally decreased, functional  Coordination: Within functional limits  Sensation: Intact               Posture:  Posture (WDL): Exceptions to WDL  Posture Assessment: Forward head, Rounded shoulders, Trunk flexion  Balance:  Sitting: Intact  Standing: Impaired  Standing - Static: Fair (+)  Standing - Dynamic : Fair (+) Bed Mobility:     Wheelchair Mobility:     Transfers:  Sit to Stand: Stand-by assistance  Stand to Sit: Stand-by assistance  Bed to Chair: Contact guard assistance;Stand-by assistance  Interventions: Verbal cues; Safety awareness training  Gait:     Base of Support: Center of gravity altered  Speed/Airam: Pace decreased (<100 feet/min)  Step Length: Left shortened;Right shortened  Gait Abnormalities: Trunk sway increased  Distance (ft): 100 Feet (ft)  Assistive Device: Walker, rolling  Ambulation - Level of Assistance: Contact guard assistance  Interventions: Verbal cues; Safety awareness training      Body Structures Involved:  1. Muscles Body Functions Affected:  1. Movement Related Activities and Participation Affected:  1. General Tasks and Demands  2. Mobility  3. Domestic Life  4. Community, Social and [place]   Number of elements that affect the Plan of Care: 4+: HIGH COMPLEXITY   CLINICAL PRESENTATION:   Presentation: Stable and uncomplicated: LOW COMPLEXITY   CLINICAL DECISION MAKIN [facility] Inpatient Short Form  How much difficulty does the patient currently have. .. Unable A Lot A Little None   1. Turning over in bed (including adjusting bedclothes, sheets and blankets)? [] 1   [] 2   [] 3   [x] 4   2. Sitting down on and standing up from a chair with arms ( e.g., wheelchair, bedside commode, etc.)   [] 1   [] 2   [] 3   [x] 4   3. Moving from lying on back to sitting on the side of the bed? [] 1   [] 2   [] 3   [x] 4   How much help from another person does the patient currently need. .. Total A Lot A Little None   4. Moving to and from a bed to a chair (including a wheelchair)? [] 1   [] 2   [x] 3   [] 4   5. Need to walk in hospital room? [] 1   [] 2   [x] 3   [] 4   6. Climbing 3-5 steps with a railing? [] 1   [x] 2   [] 3   [] 4   © , Trustees of [address] [place] [address], under license to [employer]. All rights reserved      Score:  Initial: 20 Most Recent: X (Date: -- )    Interpretation of Tool:  Represents activities that are increasingly more difficult (i.e. Bed mobility, Transfers, Gait). Score 24 23 22-20 19-15 14-10 9-7 6     Modifier CH CI CJ CK CL CM CN      ?  Mobility - Walking and Moving Around:     - CURRENT STATUS: CJ - 20%-39% impaired, limited or restricted    - GOAL STATUS: CI - 1%-19% impaired, limited or restricted    - D/C STATUS:  ---------------To be determined---------------  Payor: Preeth[employer]eller [employer] MMP / Plan: [place] [employer] MMP / Product Type: Managed Care Medicare /      Medical Necessity:     · Patient demonstrates good rehab potential due to higher previous functional level. Reason for Services/Other Comments:  · Patient continues to demonstrate capacity to improve strength, balance, activity tolerance which will increase independence and increase safety. Use of outcome tool(s) and clinical judgement create a POC that gives a: Clear prediction of patient's progress: LOW COMPLEXITY            TREATMENT:   (In addition to Assessment/Re-Assessment sessions the following treatments were rendered)   Pre-treatment Symptoms/Complaints:  \"thank you so much \"  Pain: Initial:   Pain Intensity 1: 0  Post Session:  0/10     Therapeutic Exercise: (10 Minutes):  Exercises per grid below to improve mobility, strength and balance. Required minimal visual and verbal cues to promote proper body mechanics and exercise form. Progressed range, repetitions and complexity of movement as indicated. Date:  8/22/18 Date:   Date:     Activity/Exercise Parameters Parameters Parameters   Ankle pumps 15x AB     Seated marching 15x AB     LAQ 15x AB     Seated hip aBd 15x AB                             Braces/Orthotics/Lines/Etc:   · IV  · O2 Device: Room air  Treatment/Session Assessment:    · Response to Treatment:  Pt performs mobility with SBA  · Interdisciplinary Collaboration:   o Physical Therapist  o Registered Nurse  · After treatment position/precautions:   o Up in chair  o Bed/Chair-wheels locked  o Bed in low position  o Call light within reach   · Compliance with Program/Exercises: Will assess as treatment progresses. · Recommendations/Intent for next treatment session: \"Next visit will focus on advancements to more challenging activities and reduction in assistance provided\".   Total Treatment Duration:  PT Patient Time In/Time Out  Time In: 1015  Time Out: [address], [facility]

## 2018-08-22 NOTE — CONSULTS
[facility] Consult Note       Primary GI Physician: Nicole Humphrey    Referring Physician: Esthela Robins    Consult Date:  8/22/2018    Admit Date:  8/21/2018    Chief Complaint:  anemia    Subjective:     41-year-old female with history of multiple medical problems as listed admitted for  dizziness. Frida Clarke associated chest pain or shortness of breath. On initial evaluation in the emergency room she was found to be in acute renal failure, also was anemic. Patient denies any bleeding per rectum over the last few days. Steffi Kilpatrick denies black stools.   She has no abdominal pain, or tenderness, excepting some pressure felt over the bladder.     Has a hx of  colon polyps with sigmoid resection, diverticulosis (and -itis), and esophageal strictures. Seen in July by Dr Loren Arredondo and felt to be a diverticular bleed even though the tagged study was positive in the stomach. Last colonoscopy was in 2007. Felt given her age and medical hx that an EGD and colonoscopy were not warranted as she stabilized.     PMH:  Past Medical History:   Diagnosis Date    Anxiety 2/1/2018    Arrhythmia     palpitations 2/10-went to [facility]    Arthritis     L leg- fell 2008    Asthma     adult onset x 20 yrs    B12 deficiency 8/24/2012    Body mass index 37.0-37.9, adult 2/5/2014    CAD (coronary artery disease)     cardiac work [id number]    Controlled type 2 diabetes mellitus with microalbuminuria, without long-term current use of insulin ([address] 11/22/2016    COPD     Corns and callosities 12/19/2016    CRI (chronic renal insufficiency) 8/24/2012    Depression 6/1/2012    Dermatophytosis of nail 12/19/2016    Diabetes ([address]     type II- home tests fasting-112    DJD (degenerative joint disease) of hip     DM (diabetes mellitus) type II controlled with renal manifestation ([address] 7/16/2010    Encounter for long-term (current) use of other medications 1/8/2013    Essential hypertension 7/16/2010    Gastrointestinal disorder     GERD (gastroesophageal reflux disease)     Heart failure ([facility]     Hiatal hernia 8/24/2012    HLD (hyperlipidemia) 1/8/2013    Neuropathy 8/24/2012    Lower limbs     Obesity (BMI 30.0-39. 9) BMI-43.8    Other and unspecified hyperlipidemia 7/16/2010    Other chest pain 7/16/2010    Other ill-defined conditions(799.89)     high cholesterol    Other ill-defined conditions(799.89)     incont. urine    PAD (peripheral artery disease) ([facility] [address] [facility] 8/24/2012    Retinal hemorrhage        PSH:  Past Surgical History:   Procedure Laterality Date    ABDOMEN SURGERY PROC UNLISTED      HX APPENDECTOMY      HX CHOLECYSTECTOMY      HX CHOLECYSTECTOMY  2000    HX ENDOSCOPY  02/27/2018    Dr Claudette Hunting    HX GI      small intestine obstruction    HX GYN      hyst    HX HEART CATHETERIZATION      HX HYSTERECTOMY      prolapse    HX INTRACRANIAL ANEURYSM REPAIR      HX INTRACRANIAL ANEURYSM REPAIR      HX ORTHOPAEDIC      right wrist 2010    HX OTHER SURGICAL      aneurysm clip-cerebral-2000    HX OTHER SURGICAL      cerebral aneurysm   Dr. Jonathon Suazo       Allergies:  No Known Allergies    Home Medications:  Prior to Admission medications    Medication Sig Start Date End Date Taking? Authorizing Provider   VENTOLIN HFA 90 mcg/actuation inhaler INHALE 2 PUFFS BY MOUTH EVERY 6 HOURS AS NEEDED FOR WHEEZING 8/20/18   Mary Fernando MD   LORazepam (ATIVAN) 0.5 mg tablet Take 1 Tab by mouth daily as needed for Anxiety. 8/20/18   Amirah Echols MD   hydrALAZINE (APRESOLINE) 50 mg tablet Take 2 Tabs by mouth three (3) times daily. 8/2/18   Maribel Bailon, NP   glipiZIDE SR (GLUCOTROL XL) 2.5 mg CR tablet Take 2.5 mg by mouth daily.     Historical Provider   fluticasone (FLONASE) 50 mcg/actuation nasal spray INSTILL 1 SPRAY IN EACH NOSTRIL NIGHTLY AS DIRECTED 7/25/18   Mary Fernando MD   potassium chloride (K-DUR, KLOR-CON) 20 mEq tablet TAKE 1 TABLET BY MOUTH EVERY MORNING 7/10/18   Amirah Echols MD cyanocobalamin, vitamin B-12, 1,000 mcg/mL kit 1,000 mcg by Injection route every month. 5/16/18   Russ Moore MD   furosemide (LASIX) 20 mg tablet Take 1 Tab by mouth daily. 5/16/18   Mary Fernando MD   pravastatin (PRAVACHOL) 40 mg tablet TAKE 1 TABLET BY MOUTH EVERY NIGHT AT BEDTIME 5/16/18   Mary Fernando MD   sertraline (ZOLOFT) 100 mg tablet Take 0.5 Tabs by mouth daily. Indications: major depressive disorder, am 2/1/18   Russ Moore MD   fluticasone-salmeterol (ADVAIR DISKUS) 250-50 mcg/dose diskus inhaler Take 1 Puff by inhalation every twelve (12) hours. 2/1/18   Mary Fernando MD   benazepril (LOTENSIN) 40 mg tablet Take 1 Tab by mouth daily. 2/1/18   Mary Fernando MD   amLODIPine (NORVASC) 10 mg tablet Take 1 Tab by mouth daily. 2/1/18   Russ Moore MD   pravastatin (PRAVACHOL) 40 mg tablet TAKE 1 TABLET BY MOUTH EVERY NIGHT AT BEDTIME  Patient not taking: Reported on 8/3/2018 2/1/18   Mary Fernando MD   gabapentin (NEURONTIN) 100 mg capsule TAKE 1 CAPSULE BY MOUTH TWICE DAILY 2/1/18   Russ Moore MD   Blood-Glucose Meter monitoring kit Pt uses true metrix meter and tests once daily dx code E11.29 1/23/18   Russ Moore MD   metFORMIN (GLUCOPHAGE) 500 mg tablet Take 1 Tab by mouth two (2) times daily (with meals). 10/25/17   Mary Fernando MD   polyethylene glycol (MIRALAX) 17 gram/dose powder Take 17 g by mouth daily. 1 tablespoon with 8 oz of water daily 7/19/17   Jaylen Styles MD   FOLIC ACID/MULTIVIT-MIN/LUTEIN (CENTRUM SILVER PO) Take  by mouth. Historical Provider   OTHER One shower chair 4/19/17   Russ Moore MD   VIT A/VIT C/VIT E/ZINC/COPPER (PRESERVISION AREDS PO) Take 1 Tab by mouth two (2) times a day. Historical Provider   glucose blood VI test strips (BLOOD GLUCOSE TEST) strip Pt uses true metrix test strips. Pt tests once daily.  Dx code E11.29 6/27/16   Russ Moore MD   Lancets [facility] Pt tests three times daily Diagnosis Code: 250.00 7/15/13   Mary Fernando, MD       [facility] Medications:  Current Facility-Administered Medications   Medication Dose Route Frequency    polyethylene glycol (MIRALAX) packet 17 g  17 g Oral DAILY    cefTRIAXone (ROCEPHIN) 1 g in 0.9% sodium chloride (MBP/ADV) 50 mL  1 g IntraVENous Q24H    dextrose 5% infusion  100 mL/hr IntraVENous CONTINUOUS    amLODIPine (NORVASC) tablet 10 mg  10 mg Oral DAILY    fluticasone (FLONASE) 50 mcg/actuation nasal spray 1 Spray  1 Spray Both Nostrils DAILY    gabapentin (NEURONTIN) capsule 100 mg  100 mg Oral BID    hydrALAZINE (APRESOLINE) tablet 100 mg  100 mg Oral TID    LORazepam (ATIVAN) tablet 0.5 mg  0.5 mg Oral DAILY PRN    pravastatin (PRAVACHOL) tablet 40 mg  40 mg Oral QHS    sertraline (ZOLOFT) tablet 50 mg  50 mg Oral DAILY    hydrALAZINE (APRESOLINE) 20 mg/mL injection 10 mg  10 mg IntraVENous Q6H PRN    hydrALAZINE (APRESOLINE) tablet 25 mg  25 mg Oral Q6H PRN    pantoprazole (PROTONIX) 40 mg in sodium chloride 0.9% 10 mL injection  40 mg IntraVENous Q24H    sodium chloride (NS) flush 5-10 mL  5-10 mL IntraVENous Q8H    sodium chloride (NS) flush 5-10 mL  5-10 mL IntraVENous PRN    tuberculin injection 5 Units  5 Units IntraDERMal ONCE    acetaminophen (TYLENOL) tablet 650 mg  650 mg Oral Q4H PRN    HYDROcodone-acetaminophen (NORCO) 5-325 mg per tablet 1 Tab  1 Tab Oral Q4H PRN    naloxone (NARCAN) injection 0.4 mg  0.4 mg IntraVENous PRN    diphenhydrAMINE (BENADRYL) capsule 25 mg  25 mg Oral Q4H PRN    ondansetron (ZOFRAN) injection 4 mg  4 mg IntraVENous Q4H PRN    magnesium hydroxide (MILK OF MAGNESIA) 400 mg/5 mL oral suspension 30 mL  30 mL Oral DAILY PRN    zolpidem (AMBIEN) tablet 5 mg  5 mg Oral QHS PRN    insulin lispro (HUMALOG) injection   SubCUTAneous AC&HS    insulin glargine (LANTUS) injection 10 Units  10 Units SubCUTAneous QHS    budesonide (PULMICORT) 500 mcg/2 ml nebulizer suspension  500 mcg Nebulization BID RT    And    albuterol (PROVENTIL VENTOLIN) nebulizer solution 2.5 mg  2.5 mg Nebulization Q6HWA RT       Social History:  Social History   Substance Use Topics    Smoking status: Never Smoker    Smokeless tobacco: Never Used    Alcohol use No       Pt denies any history of drug use, blood transfusions, or tattoos. Family History:  Family History   Problem Relation Age of Onset    Cancer Mother     Diabetes Father     Diabetes Paternal Grandfather        Review of Systems:  A detailed 10 system ROS is obtained, with pertinent positives as listed above. All others are negative. Diet:      Objective:     Physical Exam:  Vitals:  Visit Vitals    /66 (BP 1 Location: Left arm, BP Patient Position: At rest)    Pulse 81    Temp 98.6 °F (37 °C)    Resp 18    Ht 5' 7\" (1.702 m)    Wt 84.6 kg (186 lb 6.4 oz)    SpO2 97%    BMI 29.19 kg/m2     Gen:  Pt is alert, cooperative, no acute distress  Skin:  Extremities and face reveal no rashes. HEENT: Sclerae anicteric. Extra-occular muscles are intact. No oral ulcers. No abnormal pigmentation of the lips. The neck is supple. Cardiovascular: Regular rate and rhythm. No murmurs, gallops, or rubs. Respiratory:  Comfortable breathing with no accessory muscle use. Clear breath sounds anteriorly with no wheezes, rales, or rhonchi. GI:  Abdomen nondistended, soft, and nontender. Normal active bowel sounds. No enlargement of the liver or spleen. No masses palpable. Rectal:  Deferred  Musculoskeletal:  No pitting edema of the lower legs. Neurological:  Gross memory appears intact. Patient is alert and oriented. Psychiatric:  Mood appears appropriate with judgement intact.     Laboratory:    Recent Labs      08/22/18   1808  08/22/18   1033  08/22/18   0601  08/21/18   2125  08/21/18   1709  08/21/18   1509   WBC   --    --   5.1   --    --   6.0   HGB  7.6*  7.8*  7.5*  8.6*   --   8.7*   HCT  25.0*  25.4*  25.0*  28.4*   --   28.1*   PLT   --    --   207   --    --   195   MCV   -- --   101.6*   --    --   102.2*   NA   --    --   147*   --    --   144   K   --    --   3.5   --    --   5.5*   CL   --    --   112*   --    --   109*   CO2   --    --   24   --    --   22   BUN   --    --   19   --    --   24*   CREA   --    --   1.29*   --    --   1.73*   CA   --    --   9.3   --    --   9.9   MG   --    --   2.0   --   2.1   --    GLU   --    --   106*   --    --   263*   AP   --    --    --    --    --   83   SGOT   --    --    --    --    --   58*   ALT   --    --    --    --    --   30   TBILI   --    --    --    --    --   0.4   ALB   --    --    --    --    --   3.0*   TP   --    --    --    --    --   7.1          Assessment:     Principal Problem:    MIHAELA (acute kidney injury) ([facility] (8/21/2018)    Active Problems:    Essential hypertension (7/16/2010)      Type 2 diabetes mellitus with diabetic neuropathy ([facility] (5/16/2018)      COPD (chronic obstructive pulmonary disease) ([facility] (8/22/2018)      DNR (do not resuscitate) (8/22/2018)        Plan:     Heme test stools  Consider non gi sources for her macrocytic anemia  Re evaluate sx and labs in am and consider EGD initially if indicated    CHRISTIANO Mckeon MD

## 2018-08-22 NOTE — PROGRESS NOTES
Interdisciplinary Rounds completed 08/22/18. Nursing, Case Management, Physician and PT present. Plan of care reviewed and updated.

## 2018-08-22 NOTE — PROGRESS NOTES
Problem: Self Care Deficits Care Plan (Adult)  Goal: *Acute Goals and Plan of Care (Insert Text)  1. Patient will complete lower body bathing and dressing with supervision and adaptive equipment as needed. 2. Patient will complete toileting with supervision. 3. Patient will tolerate 30 minutes of OT treatment with 1-2 rest breaks to increase activity tolerance for ADLs. 4. Patient will complete functional transfers with supervision and adaptive equipment as needed. 5. Patient will complete functional mobility for household distances with supervision and appropriate safety awareness. Timeframe: 7 visits       OCCUPATIONAL THERAPY: Initial Assessment, Treatment Day: 1st and AM 8/22/2018  INPATIENT: Hospital Day: 2  Payor: Josr Tavares MMP / Plan: [place] [employer] MMP / Product Type: Managed Care Medicare /      NAME/AGE/GENDER: Jorge Hearn is a 80 y.o. female   PRIMARY DIAGNOSIS:  MIHAELA (acute kidney injury) ([facility] [address] <principal problem not specified> <principal problem not specified>        ICD-10: Treatment Diagnosis:    · Generalized Muscle Weakness (M62.81)  · Other lack of cordination (R27.8)  · History of falling (Z91.81)  · Dizziness and Giddiness (R42)   Precautions/Allergies:     Review of patient's allergies indicates no known allergies. ASSESSMENT:     Ms. Dannie Givens was admitted with MIHAELA. Pt lives with her daughter in a single story home with a walk-in shower and modified independent at baseline. This session, pt presented supine in bed. Pt is oriented x 4 and is extremely pleasant. Pt reports she is feeling much better this am. Pt reports that her daughter is home with her during the day. Pt admits to one recent fall in July in which she sustained a R \"knee cap\" fx. Pt reports occasional swelling in her knee but states she elevates it and that helps. Pt has no c/o pain today. Pt needed CGA for bed mobility.  Pt stood with minimal assistance with walker and completed functional mobility in the room. Pt stood at sink to brush teeth with supervision to CGA. Pt completed toileting using BSC needing moderate assistance for clothing management. Pt demonstrated deficits in activity tolerance and dynamic balance impacting ADLs. At the end of the session, pt was left up in the chair with needs in reach. Pt presented below functional baseline and would benefit from skilled OT services to address deficits. This section established at most recent assessment   PROBLEM LIST (Impairments causing functional limitations):  1. Decreased Strength  2. Decreased ADL/Functional Activities  3. Decreased Transfer Abilities  4. Decreased Ambulation Ability/Technique  5. Decreased Balance  6. Decreased Activity Tolerance  7. Decreased Flexibility/Joint Mobility  8. Edema/Girth  9. Decreased [place] with Home Exercise Program   INTERVENTIONS PLANNED: (Benefits and precautions of occupational therapy have been discussed with the patient.)  1. Activities of daily living training  2. Adaptive equipment training  3. Balance training  4. Clothing management  5. Donning&doffing training  6. Neuromuscular re-eduation  7. Therapeutic activity  8. Therapeutic exercise     TREATMENT PLAN: Frequency/Duration: Follow patient 3 times per week to address above goals. Rehabilitation Potential For Stated Goals: Excellent     RECOMMENDED REHABILITATION/EQUIPMENT: (at time of discharge pending progress): Due to the probability of continued deficits (see above) this patient will likely need continued skilled occupational therapy after discharge. (TBD on progress)  Equipment:    TBD              OCCUPATIONAL PROFILE AND HISTORY:   History of Present Injury/Illness (Reason for Referral):  See H&P  Past Medical History/Comorbidities:   Ms. Tarik Hirsch  has a past medical history of Anxiety (2/1/2018); Arrhythmia; Arthritis; Asthma; B12 deficiency (8/24/2012);  Body mass index 37.0-37.9, adult (2/5/2014); CAD (coronary artery disease); Controlled type 2 diabetes mellitus with microalbuminuria, without long-term current use of insulin ([facility] [address] (11/22/2016); COPD; Corns and callosities (12/19/2016); CRI (chronic renal insufficiency) (8/24/2012); Depression (6/1/2012); Dermatophytosis of nail (12/19/2016); Diabetes ([facility] [address] DJD (degenerative joint disease) of hip; DM (diabetes mellitus) type II controlled with renal manifestation ([facility] [address] (7/16/2010); Encounter for long-term (current) use of other medications (1/8/2013); Essential hypertension (7/16/2010); Gastrointestinal disorder; GERD (gastroesophageal reflux disease); Heart failure ([facility] [address] Hiatal hernia (8/24/2012); HLD (hyperlipidemia) (1/8/2013); Neuropathy (8/24/2012); Obesity (BMI 30.0-39.9) (BMI-43.8); Other and unspecified hyperlipidemia (7/16/2010); Other chest pain (7/16/2010); Other ill-defined conditions(799.89); Other ill-defined conditions(799.89); PAD (peripheral artery disease) ([facility] (8/24/2012); and Retinal hemorrhage. Ms. Miles Dillon  has a past surgical history that includes pr abdomen surgery proc unlisted; hx appendectomy; hx cholecystectomy; hx other surgical; hx gyn; hx gi; hx orthopaedic; hx hysterectomy; hx cholecystectomy (2000); hx intracranial aneurysm repair; hx intracranial aneurysm repair; hx other surgical; hx heart catheterization; and hx endoscopy (02/27/2018). Social History/Living Environment:   Home Environment: Private residence  # Steps to Enter: 1  Rails to Enter: Yes  Hand Rails : Right  One/Two Story Residence: One story  Living Alone: No  Support Systems: Child(edward), Family member(s)  Patient Expects to be Discharged to[de-identified] Private residence  Current DME Used/Available at Home: Khadijah Maria, shimon, Commode, bedside, Shower chair, Walker, rollator  Tub or Shower Type: Shower  Prior Level of Function/Work/Activity:  Pt's daughter lives with her. Pt reports she is modified independent with ADL and uses a cane for functional mobility. Pt had one recent fall. Personal Factors:          Age:  80 y.o. Social Background:  Hx of recent fall        Other factors that influence how disability is experienced by the patient:  Multiple co-morbidities    Number of Personal Factors/Comorbidities that affect the Plan of Care: Extensive review of physical, cognitive, and psychosocial performance (3+):  HIGH COMPLEXITY   ASSESSMENT OF OCCUPATIONAL PERFORMANCE[de-identified]   Activities of Daily Living:   Basic ADLs (From Assessment) Complex ADLs (From Assessment)   Feeding: Setup  Oral Facial Hygiene/Grooming: Supervision  Bathing: Minimum assistance  Upper Body Dressing: Stand-by assistance  Lower Body Dressing: Moderate assistance  Toileting: Moderate assistance Instrumental ADL  Meal Preparation: Moderate assistance  Homemaking: Maximum assistance   Grooming/Bathing/Dressing Activities of Daily Living   Grooming  Brushing Teeth: Supervision/set-up Cognitive Retraining  Safety/Judgement: Awareness of environment; Fall prevention           Toileting  Toileting Assistance:  Moderate assistance  Bladder Hygiene: Contact guard assistance  Clothing Management: Moderate assistance  Cues: Tactile cues provided;Verbal cues provided     Functional Transfers  Toilet Transfer : Minimum assistance     Bed/Mat Mobility  Rolling: Contact guard assistance  Supine to Sit: Contact guard assistance  Sit to Stand: Minimum assistance  Bed to Chair: Contact guard assistance  Scooting: Contact guard assistance       Most Recent Physical Functioning:   Gross Assessment:  AROM: Generally decreased, functional  Strength: Generally decreased, functional  Coordination: Generally decreased, functional  Sensation: Intact               Posture:     Balance:  Sitting: Intact  Standing: Impaired  Standing - Static: Fair  Standing - Dynamic : Fair Bed Mobility:  Rolling: Contact guard assistance  Supine to Sit: Contact guard assistance  Scooting: Contact guard assistance  Wheelchair Mobility:     Transfers:  Sit to Stand: Minimum assistance  Stand to Sit: Minimum assistance  Bed to Chair: Contact guard assistance            Patient Vitals for the past 6 hrs:   BP BP Patient Position SpO2 Pulse   18 0718 - - 97 % -   18 0744 152/63 At rest 95 % 90       Mental Status  Neurologic State: Alert  Orientation Level: Oriented X4  Cognition: Appropriate decision making, Appropriate for age attention/concentration, Follows commands  Perception: Appears intact  Perseveration: No perseveration noted  Safety/Judgement: Awareness of environment, Fall prevention                          Physical Skills Involved:  1. Range of Motion  2. Balance  3. Strength  4. Activity Tolerance  5. Edema Cognitive Skills Affected (resulting in the inability to perform in a timely and safe manner):  1. None Psychosocial Skills Affected:  1. Habits/Routines  2. Self-Awareness   Number of elements that affect the Plan of Care: 5+:  HIGH COMPLEXITY   CLINICAL DECISION MAKIN[address] [place] [address] AM-PAC 6 Clicks   Daily Activity Inpatient Short Form  How much help from another person does the patient currently need. .. Total A Lot A Little None   1. Putting on and taking off regular lower body clothing? [] 1   [x] 2   [] 3   [] 4   2. Bathing (including washing, rinsing, drying)? [] 1   [] 2   [x] 3   [] 4   3. Toileting, which includes using toilet, bedpan or urinal?   [] 1   [x] 2   [] 3   [] 4   4. Putting on and taking off regular upper body clothing? [] 1   [] 2   [x] 3   [] 4   5. Taking care of personal grooming such as brushing teeth? [] 1   [] 2   [x] 3   [] 4   6. Eating meals? [] 1   [] 2   [x] 3   [] 4   © , Trustees of [address] [place] [address], under license to [employer]. All rights reserved      Score:  Initial: 16 Most Recent: X (Date: -- )    Interpretation of Tool:  Represents activities that are increasingly more difficult (i.e. Bed mobility, Transfers, Gait).    Score 24 23 22-20 19-15 14-10 9-7 6     Modifier CH CI CJ CK CL CM CN      ? Self Care:     - CURRENT STATUS: CK - 40%-59% impaired, limited or restricted    - GOAL STATUS: CJ - 20%-39% impaired, limited or restricted    - D/C STATUS:  ---------------To be determined---------------  Payor: [employer] MMP / Plan[place] [employer] MMP / Product Type: REAL [place] Care Medicare /      Medical Necessity:     · Patient demonstrates excellent rehab potential due to higher previous functional level. Reason for Services/Other Comments:  · Patient continues to require skilled intervention due to decreased independence with ADL. Use of outcome tool(s) and clinical judgement create a POC that gives a: LOW COMPLEXITY         TREATMENT:   (In addition to Assessment/Re-Assessment sessions the following treatments were rendered)     Pre-treatment Symptoms/Complaints:    Pain: Initial:   Pain Intensity 1: 0  Post Session:  0/10     Self Care: (15): Procedure(s) (per grid) utilized to improve and/or restore self-care/home management as related to toileting and grooming. Required minimal to moderate visual, verbal and tactile cueing to facilitate activities of daily living skills and compensatory activities. Braces/Orthotics/Lines/Etc:   · IV  · O2 Device: Room air  Treatment/Session Assessment:    · Response to Treatment:  Pt tolerated well without complaint. · Interdisciplinary Collaboration:   o Occupational Therapist  o Registered Nurse  · After treatment position/precautions:   o Up in chair  o Bed/Chair-wheels locked  o Call light within reach  o RN notified  o Nurse at bedside   · Compliance with Program/Exercises: Will assess as treatment progresses. · Recommendations/Intent for next treatment session: \"Next visit will focus on advancements to more challenging activities and reduction in assistance provided\".   Total Treatment Duration:  OT Patient Time In/Time Out  Time In: 0937  Time Out: [address], OT

## 2018-08-23 ENCOUNTER — ANESTHESIA (OUTPATIENT)
Dept: ENDOSCOPY | Age: 83
DRG: 683 | End: 2018-08-23
Payer: COMMERCIAL

## 2018-08-23 ENCOUNTER — ANESTHESIA EVENT (OUTPATIENT)
Dept: ENDOSCOPY | Age: 83
DRG: 683 | End: 2018-08-23
Payer: COMMERCIAL

## 2018-08-23 ENCOUNTER — HOME HEALTH ADMISSION (OUTPATIENT)
Dept: HOME HEALTH SERVICES | Facility: HOME HEALTH | Age: 83
End: 2018-08-23
Payer: COMMERCIAL

## 2018-08-23 LAB
ANION GAP SERPL CALC-SCNC: 10 MMOL/L (ref 7–16)
BASOPHILS # BLD: 0 K/UL (ref 0–0.2)
BASOPHILS NFR BLD: 0 % (ref 0–2)
BUN SERPL-MCNC: 16 MG/DL (ref 8–23)
CALCIUM SERPL-MCNC: 9.1 MG/DL (ref 8.3–10.4)
CHLORIDE SERPL-SCNC: 111 MMOL/L (ref 98–107)
CO2 SERPL-SCNC: 26 MMOL/L (ref 21–32)
CREAT SERPL-MCNC: 1.24 MG/DL (ref 0.6–1)
DIFFERENTIAL METHOD BLD: ABNORMAL
EOSINOPHIL # BLD: 0.1 K/UL (ref 0–0.8)
EOSINOPHIL NFR BLD: 1 % (ref 0.5–7.8)
ERYTHROCYTE [DISTWIDTH] IN BLOOD BY AUTOMATED COUNT: 13.9 %
GLUCOSE BLD STRIP.AUTO-MCNC: 122 MG/DL (ref 65–100)
GLUCOSE BLD STRIP.AUTO-MCNC: 132 MG/DL (ref 65–100)
GLUCOSE BLD STRIP.AUTO-MCNC: 141 MG/DL (ref 65–100)
GLUCOSE BLD STRIP.AUTO-MCNC: 146 MG/DL (ref 65–100)
GLUCOSE BLD STRIP.AUTO-MCNC: 255 MG/DL (ref 65–100)
GLUCOSE SERPL-MCNC: 137 MG/DL (ref 65–100)
HCT VFR BLD AUTO: 23.5 % (ref 35.8–46.3)
HCT VFR BLD AUTO: 25 % (ref 35.8–46.3)
HCT VFR BLD AUTO: 25.9 % (ref 35.8–46.3)
HCT VFR BLD AUTO: 27.8 % (ref 35.8–46.3)
HGB BLD-MCNC: 7.1 G/DL (ref 11.7–15.4)
HGB BLD-MCNC: 7.7 G/DL (ref 11.7–15.4)
HGB BLD-MCNC: 8 G/DL (ref 11.7–15.4)
HGB BLD-MCNC: 8.3 G/DL (ref 11.7–15.4)
IMM GRANULOCYTES # BLD: 0 K/UL (ref 0–0.5)
IMM GRANULOCYTES NFR BLD AUTO: 0 % (ref 0–5)
LYMPHOCYTES # BLD: 1.4 K/UL (ref 0.5–4.6)
LYMPHOCYTES NFR BLD: 23 % (ref 13–44)
MCH RBC QN AUTO: 31.3 PG (ref 26.1–32.9)
MCHC RBC AUTO-ENTMCNC: 30.9 G/DL (ref 31.4–35)
MCV RBC AUTO: 101.2 FL (ref 79.6–97.8)
MM INDURATION POC: NORMAL 0MM (ref 0–5)
MONOCYTES # BLD: 0.6 K/UL (ref 0.1–1.3)
MONOCYTES NFR BLD: 11 % (ref 4–12)
NEUTS SEG # BLD: 3.9 K/UL (ref 1.7–8.2)
NEUTS SEG NFR BLD: 65 % (ref 43–78)
NRBC # BLD: 0 K/UL (ref 0–0.2)
PLATELET # BLD AUTO: 204 K/UL (ref 150–450)
PMV BLD AUTO: 10.3 FL (ref 9.4–12.3)
POTASSIUM SERPL-SCNC: 3.4 MMOL/L (ref 3.5–5.1)
PPD POC: NORMAL NEGATIVE
RBC # BLD AUTO: 2.56 M/UL (ref 4.05–5.2)
SODIUM SERPL-SCNC: 147 MMOL/L (ref 136–145)
WBC # BLD AUTO: 6 K/UL (ref 4.3–11.1)

## 2018-08-23 PROCEDURE — 94640 AIRWAY INHALATION TREATMENT: CPT

## 2018-08-23 PROCEDURE — 65660000000 HC RM CCU STEPDOWN

## 2018-08-23 PROCEDURE — 85014 HEMATOCRIT: CPT

## 2018-08-23 PROCEDURE — 94760 N-INVAS EAR/PLS OXIMETRY 1: CPT

## 2018-08-23 PROCEDURE — 74011000258 HC RX REV CODE- 258: Performed by: NURSE PRACTITIONER

## 2018-08-23 PROCEDURE — 0DJ08ZZ INSPECTION OF UPPER INTESTINAL TRACT, VIA NATURAL OR ARTIFICIAL OPENING ENDOSCOPIC: ICD-10-PCS | Performed by: INTERNAL MEDICINE

## 2018-08-23 PROCEDURE — 74011000250 HC RX REV CODE- 250: Performed by: HOSPITALIST

## 2018-08-23 PROCEDURE — 85025 COMPLETE CBC W/AUTO DIFF WBC: CPT

## 2018-08-23 PROCEDURE — 74011250637 HC RX REV CODE- 250/637: Performed by: HOSPITALIST

## 2018-08-23 PROCEDURE — 77030019605

## 2018-08-23 PROCEDURE — 74011250636 HC RX REV CODE- 250/636: Performed by: HOSPITALIST

## 2018-08-23 PROCEDURE — 74011636637 HC RX REV CODE- 636/637: Performed by: HOSPITALIST

## 2018-08-23 PROCEDURE — 80048 BASIC METABOLIC PNL TOTAL CA: CPT

## 2018-08-23 PROCEDURE — 82962 GLUCOSE BLOOD TEST: CPT

## 2018-08-23 PROCEDURE — 74011250636 HC RX REV CODE- 250/636: Performed by: NURSE PRACTITIONER

## 2018-08-23 PROCEDURE — 74011250636 HC RX REV CODE- 250/636

## 2018-08-23 PROCEDURE — 77030020250 HC SOL INJ D 5% LFCR 1000ML BG LF

## 2018-08-23 PROCEDURE — 76040000025: Performed by: INTERNAL MEDICINE

## 2018-08-23 PROCEDURE — 74011250636 HC RX REV CODE- 250/636: Performed by: INTERNAL MEDICINE

## 2018-08-23 PROCEDURE — 74011250637 HC RX REV CODE- 250/637: Performed by: INTERNAL MEDICINE

## 2018-08-23 PROCEDURE — 77030011256 HC DRSG MEPILEX <16IN NO BORD MOLN -A

## 2018-08-23 PROCEDURE — 36415 COLL VENOUS BLD VENIPUNCTURE: CPT

## 2018-08-23 PROCEDURE — C9113 INJ PANTOPRAZOLE SODIUM, VIA: HCPCS | Performed by: HOSPITALIST

## 2018-08-23 PROCEDURE — 76060000031 HC ANESTHESIA FIRST 0.5 HR: Performed by: INTERNAL MEDICINE

## 2018-08-23 RX ORDER — PROPOFOL 10 MG/ML
INJECTION, EMULSION INTRAVENOUS AS NEEDED
Status: DISCONTINUED | OUTPATIENT
Start: 2018-08-23 | End: 2018-08-23 | Stop reason: HOSPADM

## 2018-08-23 RX ORDER — SODIUM CHLORIDE, SODIUM LACTATE, POTASSIUM CHLORIDE, CALCIUM CHLORIDE 600; 310; 30; 20 MG/100ML; MG/100ML; MG/100ML; MG/100ML
1000 INJECTION, SOLUTION INTRAVENOUS CONTINUOUS
Status: DISCONTINUED | OUTPATIENT
Start: 2018-08-23 | End: 2018-08-23 | Stop reason: HOSPADM

## 2018-08-23 RX ORDER — POTASSIUM CHLORIDE 20 MEQ/1
40 TABLET, EXTENDED RELEASE ORAL
Status: COMPLETED | OUTPATIENT
Start: 2018-08-23 | End: 2018-08-23

## 2018-08-23 RX ORDER — PROPOFOL 10 MG/ML
INJECTION, EMULSION INTRAVENOUS
Status: DISCONTINUED | OUTPATIENT
Start: 2018-08-23 | End: 2018-08-23 | Stop reason: HOSPADM

## 2018-08-23 RX ADMIN — CEFTRIAXONE SODIUM 1 G: 1 INJECTION, POWDER, FOR SOLUTION INTRAMUSCULAR; INTRAVENOUS at 10:15

## 2018-08-23 RX ADMIN — Medication 10 ML: at 13:16

## 2018-08-23 RX ADMIN — DEXTROSE MONOHYDRATE 100 ML/HR: 5 INJECTION, SOLUTION INTRAVENOUS at 10:13

## 2018-08-23 RX ADMIN — Medication 10 ML: at 05:27

## 2018-08-23 RX ADMIN — SODIUM CHLORIDE, SODIUM LACTATE, POTASSIUM CHLORIDE, AND CALCIUM CHLORIDE 1000 ML: 600; 310; 30; 20 INJECTION, SOLUTION INTRAVENOUS at 16:11

## 2018-08-23 RX ADMIN — BUDESONIDE 500 MCG: 0.5 INHALANT RESPIRATORY (INHALATION) at 07:34

## 2018-08-23 RX ADMIN — ALBUTEROL SULFATE 2.5 MG: 2.5 SOLUTION RESPIRATORY (INHALATION) at 20:51

## 2018-08-23 RX ADMIN — PROPOFOL 100 MCG/KG/MIN: 10 INJECTION, EMULSION INTRAVENOUS at 16:43

## 2018-08-23 RX ADMIN — PRAVASTATIN SODIUM 40 MG: 20 TABLET ORAL at 21:07

## 2018-08-23 RX ADMIN — HYDRALAZINE HYDROCHLORIDE 100 MG: 50 TABLET, FILM COATED ORAL at 10:16

## 2018-08-23 RX ADMIN — GABAPENTIN 100 MG: 100 CAPSULE ORAL at 10:16

## 2018-08-23 RX ADMIN — FLUTICASONE PROPIONATE 1 SPRAY: 50 SPRAY, METERED NASAL at 10:14

## 2018-08-23 RX ADMIN — POTASSIUM CHLORIDE 40 MEQ: 20 TABLET, EXTENDED RELEASE ORAL at 18:38

## 2018-08-23 RX ADMIN — SODIUM CHLORIDE 40 MG: 9 INJECTION, SOLUTION INTRAMUSCULAR; INTRAVENOUS; SUBCUTANEOUS at 20:59

## 2018-08-23 RX ADMIN — PROPOFOL 20 MG: 10 INJECTION, EMULSION INTRAVENOUS at 16:43

## 2018-08-23 RX ADMIN — HYDRALAZINE HYDROCHLORIDE 100 MG: 50 TABLET, FILM COATED ORAL at 21:08

## 2018-08-23 RX ADMIN — PROPOFOL 30 MG: 10 INJECTION, EMULSION INTRAVENOUS at 16:44

## 2018-08-23 RX ADMIN — INSULIN LISPRO 6 UNITS: 100 INJECTION, SOLUTION INTRAVENOUS; SUBCUTANEOUS at 21:09

## 2018-08-23 RX ADMIN — ALBUTEROL SULFATE 2.5 MG: 2.5 SOLUTION RESPIRATORY (INHALATION) at 07:34

## 2018-08-23 RX ADMIN — INSULIN GLARGINE 10 UNITS: 100 INJECTION, SOLUTION SUBCUTANEOUS at 21:08

## 2018-08-23 RX ADMIN — BUDESONIDE 500 MCG: 0.5 INHALANT RESPIRATORY (INHALATION) at 20:51

## 2018-08-23 RX ADMIN — Medication 10 ML: at 21:04

## 2018-08-23 RX ADMIN — GABAPENTIN 100 MG: 100 CAPSULE ORAL at 18:39

## 2018-08-23 RX ADMIN — HYDRALAZINE HYDROCHLORIDE 100 MG: 50 TABLET, FILM COATED ORAL at 18:38

## 2018-08-23 RX ADMIN — ALBUTEROL SULFATE 2.5 MG: 2.5 SOLUTION RESPIRATORY (INHALATION) at 13:57

## 2018-08-23 RX ADMIN — AMLODIPINE BESYLATE 10 MG: 10 TABLET ORAL at 10:17

## 2018-08-23 NOTE — ANESTHESIA PREPROCEDURE EVALUATION
Anesthetic History   No history of anesthetic complications            Review of Systems / Medical History  Patient summary reviewed and pertinent labs reviewed    Pulmonary    COPD      Smoker  Asthma        Neuro/Psych              Cardiovascular    Hypertension      CHF    CAD, PAD and hyperlipidemia    Exercise tolerance: <4 METS  Comments: Nl stress test and EF (68%) in 2010   GI/Hepatic/Renal     GERD: well controlled    Renal disease: CRI  Hiatal hernia     Endo/Other    Diabetes: well controlled, type 2    Morbid obesity, arthritis and anemia (7.7)     Other Findings   Comments: DJD  Depression  Anxiety    Admitted with dizziness/anemia. Hx diverticulosis and GI bleed. Physical Exam    Airway  Mallampati: II  TM Distance: 4 - 6 cm  Neck ROM: normal range of motion   Mouth opening: Normal     Cardiovascular    Rhythm: regular  Rate: normal    Murmur: Grade 3, Aortic area     Dental    Dentition: Upper partial plate and Poor dentition     Pulmonary  Breath sounds clear to auscultation               Abdominal  GI exam deferred       Other Findings            Anesthetic Plan    ASA: 3  Anesthesia type: total IV anesthesia          Induction: Intravenous  Anesthetic plan and risks discussed with: Patient      Will treat as if severe AS.

## 2018-08-23 NOTE — PROGRESS NOTES
Hourly rounds performed;all pt needs met. Pt reports no pain this shift, however noticed abrasion to buttocks; Allevyn and zinc paste applied to area. Will continue to monitor. Pt sat on toilet for a little while, but had no success with having a BM. Will monitor for am hgb results. Bed in low position and call light/ personal items within reach. Will continue to monitor and give bedside shift report to oncoming day shift nurse.

## 2018-08-23 NOTE — PROGRESS NOTES
Interdisciplinary Rounds completed 08/23/18. Nursing, Case Management, Physician and PT present. Plan of care reviewed and updated.     Should be ready for discharge in the am.

## 2018-08-23 NOTE — PROGRESS NOTES
TRANSFER - OUT REPORT:    Verbal report given to NEMO Pozo(name) on Aide Lewis  being transferred to GI(unit) for ordered procedure       Report consisted of patients Situation, Background, Assessment and   Recommendations(SBAR). Information from the following report(s) SBAR, Kardex, [facility] and Recent Results was reviewed with the receiving nurse. Lines:   Peripheral IV 08/21/18 Left Hand (Active)   Site Assessment Clean, dry, & intact 8/23/2018  3:10 AM   Phlebitis Assessment 0 8/23/2018  3:10 AM   Infiltration Assessment 0 8/23/2018  3:10 AM   Dressing Status Clean, dry, & intact 8/23/2018  3:10 AM   Dressing Type Tape;Transparent 8/23/2018  3:10 AM   Hub Color/Line Status Infusing;Flushed;Patent 8/23/2018  3:10 AM        Opportunity for questions and clarification was provided.       Patient transported with:   [place]

## 2018-08-23 NOTE — PROGRESS NOTES
TRANSFER - OUT REPORT:    Verbal report given to Lety Schmitz on [address]  being transferred to [address] [place] [address] for routine post - op       Report consisted of patients Situation, Background, Assessment and   Recommendations(SBAR). Information from the following report(s) SBAR was reviewed with the receiving nurse. Lines:   Peripheral IV 08/21/18 Left Hand (Active)   Site Assessment Clean, dry, & intact 8/23/2018  1:00 PM   Phlebitis Assessment 0 8/23/2018  1:00 PM   Infiltration Assessment 0 8/23/2018  1:00 PM   Dressing Status Clean, dry, & intact 8/23/2018  1:00 PM   Dressing Type Transparent 8/23/2018  1:00 PM   Hub Color/Line Status Capped 8/23/2018  1:00 PM   Alcohol Cap Used No 8/23/2018  1:00 PM        Opportunity for questions and clarification was provided.

## 2018-08-23 NOTE — PROGRESS NOTES
Doctor notified hgb at 7.1 no orders to cross match or to transfuse at this time. Will monitor hgb and hct for now per doctor.  CBC ordered for am.

## 2018-08-23 NOTE — H&P
Date of Surgery Update:  Ramon Torres was seen and examined. History and physical has been reviewed. The patient has been examined.  There have been no significant clinical changes since the completion of the originally dated History and Physical.    Signed By: Ricardo Daniels MD     August 23, 2018 4:35 PM

## 2018-08-23 NOTE — PROGRESS NOTES
Pt tolerated soft GI diet after Egd. Resting in bed, no distress noted. Hourly rounds completed this shift.

## 2018-08-23 NOTE — PROGRESS NOTES
[facility]  Face to Face Encounter    Patients Name: Tova Kearney    YOB: 1929    Ordering Physician: Dr. Page Mo    Primary Diagnosis: Anemia, unspecified type [D64.9]    Date of Face to Face:   8/23/2018                                  Face to Face Encounter findings are related to primary reason for home care:   yes. 1. I certify that the patient needs intermittent care as follows: skilled nursing care:  skilled observation/assessment, patient education  physical therapy: strengthening, stretching/ROM, transfer training, gait/stair training, balance training and pt/caregiver education  occupational therapy:  ADL safety (ie. cooking, bathing, dressing), ROM and pt/caregiver education    2. I certify that this patient is homebound, that is: 1) patient requires the use of a walker device, special transportation, or assistance of another to leave the home; or 2) patient's condition makes leaving the home medically contraindicated; and 3) patient has a normal inability to leave the home and leaving the home requires considerable and taxing effort. Patient may leave the home for infrequent and short duration for medical reasons, and occasional absences for non-medical reasons. Homebound status is due to the following functional limitations: Patient with strength deficits limiting the performance of all ADL's without caregiver assistance or the use of an assistive device. 3. I certify that this patient is under my care and that I, or a nurse practitioner or  [id number], or clinical nurse specialist, or certified nurse midwife, working with me, had a Face-to-Face Encounter that meets the physician Face-to-Face Encounter requirements.   The following are the clinical findings from the [address] [place] [address] encounter that support the need for skilled services and is a summary of the encounter: see hospital chart      See summary of the patient's illness      Joy Castano LMSW  8/23/2018      THE FOLLOWING TO BE COMPLETED BY THE COMMUNITY PHYSICIAN:    I concur with the findings described above from the F2F encounter that this patient is homebound and in need of a skilled service.     Certifying Physician: _____________________________________      Printed Certifying Physician Name: _____________________________________    Date: _________________

## 2018-08-23 NOTE — ANESTHESIA POSTPROCEDURE EVALUATION
Post-Anesthesia Evaluation and Assessment    Patient: Choco Blakely MRN: [id number]  SSN: [id number]    YOB: 1929  Age: 80 y.o. Sex: female       Cardiovascular Function/Vital Signs  Visit Vitals    /63    Pulse 73    Temp 36 °C (96.8 °F)    Resp 16    Ht 5' 7\" (1.702 m)    Wt 84.3 kg (185 lb 14.4 oz)    SpO2 95%    BMI 29.12 kg/m2       Patient is status post No value filed. anesthesia for Procedure(s):  ESOPHAGOGASTRODUODENOSCOPY (EGD) . Nausea/Vomiting: None    Postoperative hydration reviewed and adequate. Pain:  Pain Scale 1: Numeric (0 - 10) (08/23/18 1704)  Pain Intensity 1: 0 (08/23/18 1704)   Managed    Neurological Status:   Neuro  Neurologic State: Alert (08/23/18 0800)  Orientation Level: Oriented X4 (08/23/18 0800)  Cognition: Appropriate decision making; Appropriate for age attention/concentration; Follows commands (08/22/18 1000)   At baseline    Mental Status and Level of Consciousness: Arousable    Pulmonary Status:   O2 Device: Nasal cannula (08/23/18 1704)   Adequate oxygenation and airway patent    Complications related to anesthesia: None    Post-anesthesia assessment completed.  No concerns    Signed By: Irvin Hagan MD     August 23, 2018

## 2018-08-23 NOTE — PROGRESS NOTES
TRANSFER - IN REPORT:    Verbal report received from Ida(name) on Royer [place]edo  being received from 1(unit) for routine progression of care      Report consisted of patients Situation, Background, Assessment and   Recommendations(SBAR). Information from the following report(s) Kardex was reviewed with the receiving nurse. Opportunity for questions and clarification was provided. Assessment completed upon patients arrival to unit and care assumed.

## 2018-08-23 NOTE — PROGRESS NOTES
GI DAILY PROGRESS NOTE    Admit Date:  8/21/2018    Today's Date:  8/23/2018    CC:  Anemia    Subjective:     Patient : Patient denies any N&V, GERD, or abdominal pain. Denies any overt bleeding.      Medications:   Current Facility-Administered Medications   Medication Dose Route Frequency    polyethylene glycol (MIRALAX) packet 17 g  17 g Oral DAILY    cefTRIAXone (ROCEPHIN) 1 g in 0.9% sodium chloride (MBP/ADV) 50 mL  1 g IntraVENous Q24H    dextrose 5% infusion  100 mL/hr IntraVENous CONTINUOUS    amLODIPine (NORVASC) tablet 10 mg  10 mg Oral DAILY    fluticasone (FLONASE) 50 mcg/actuation nasal spray 1 Spray  1 Spray Both Nostrils DAILY    gabapentin (NEURONTIN) capsule 100 mg  100 mg Oral BID    hydrALAZINE (APRESOLINE) tablet 100 mg  100 mg Oral TID    LORazepam (ATIVAN) tablet 0.5 mg  0.5 mg Oral DAILY PRN    pravastatin (PRAVACHOL) tablet 40 mg  40 mg Oral QHS    sertraline (ZOLOFT) tablet 50 mg  50 mg Oral DAILY    hydrALAZINE (APRESOLINE) 20 mg/mL injection 10 mg  10 mg IntraVENous Q6H PRN    hydrALAZINE (APRESOLINE) tablet 25 mg  25 mg Oral Q6H PRN    pantoprazole (PROTONIX) 40 mg in sodium chloride 0.9% 10 mL injection  40 mg IntraVENous Q24H    sodium chloride (NS) flush 5-10 mL  5-10 mL IntraVENous Q8H    sodium chloride (NS) flush 5-10 mL  5-10 mL IntraVENous PRN    acetaminophen (TYLENOL) tablet 650 mg  650 mg Oral Q4H PRN    HYDROcodone-acetaminophen (NORCO) 5-325 mg per tablet 1 Tab  1 Tab Oral Q4H PRN    naloxone (NARCAN) injection 0.4 mg  0.4 mg IntraVENous PRN    diphenhydrAMINE (BENADRYL) capsule 25 mg  25 mg Oral Q4H PRN    ondansetron (ZOFRAN) injection 4 mg  4 mg IntraVENous Q4H PRN    magnesium hydroxide (MILK OF MAGNESIA) 400 mg/5 mL oral suspension 30 mL  30 mL Oral DAILY PRN    zolpidem (AMBIEN) tablet 5 mg  5 mg Oral QHS PRN    insulin lispro (HUMALOG) injection   SubCUTAneous AC&HS    insulin glargine (LANTUS) injection 10 Units  10 Units SubCUTAneous QHS    budesonide (PULMICORT) 500 mcg/2 ml nebulizer suspension  500 mcg Nebulization BID RT    And    albuterol (PROVENTIL VENTOLIN) nebulizer solution 2.5 mg  2.5 mg Nebulization Q6HWA RT       Review of Systems:  ROS was obtained, with pertinent positives as listed above. No chest pain or SOB. Diet:  NPO    Objective:   Vitals:  Visit Vitals    /68 (BP 1 Location: Left arm, BP Patient Position: At rest)    Pulse 74    Temp 98.3 °F (36.8 °C)    Resp 20    Ht 5' 7\" (1.702 m)    Wt 84.3 kg (185 lb 14.4 oz)    SpO2 96%    BMI 29.12 kg/m2     Intake/Output:     08/21 1901 - 08/23 0700  In: -   Out: 300 [Urine:300]  Exam:  General appearance: alert, cooperative, no distress  Lungs: clear to auscultation bilaterally anteriorly SLIGHTLY DIMINISHED IN BASES  Heart: regular rate and rhythm GRADE II/VI MURMUR  Abdomen: soft, non-tender.  Bowel sounds normal. No masses, no organomegaly  Extremities: extremities normal, atraumatic, no cyanosis or edema  Neuro:  alert and oriented x4    Data Review (Labs):    Recent Labs      08/23/18   0618  08/23/18   0233  08/22/18   1808  08/22/18   1033  08/22/18   0601  08/21/18   2125  08/21/18   1709  08/21/18   1509   WBC  6.0   --    --    --   5.1   --    --   6.0   HGB  8.0*  7.1*  7.6*  7.8*  7.5*  8.6*   --   8.7*   HCT  25.9*  23.5*  25.0*  25.4*  25.0*  28.4*   --   28.1*   PLT  204   --    --    --   207   --    --   195   MCV  101.2*   --    --    --   101.6*   --    --   102.2*   NA  147*   --    --    --   147*   --    --   144   K  3.4*   --    --    --   3.5   --    --   5.5*   CL  111*   --    --    --   112*   --    --   109*   CO2  26   --    --    --   24   --    --   22   BUN  16   --    --    --   19   --    --   24*   CREA  1.24*   --    --    --   1.29*   --    --   1.73*   CA  9.1   --    --    --   9.3   --    --   9.9   MG   --    --    --    --   2.0   --   2.1   --    GLU  137*   --    --    --   106*   --    --   263*   AP   --    --    --    -- --    --    --   83   SGOT   --    --    --    --    --    --    --   58*   ALT   --    --    --    --    --    --    --   30   TBILI   --    --    --    --    --    --    --   0.4   ALB   --    --    --    --    --    --    --   3.0*   TP   --    --    --    --    --    --    --   7.1       Assessment:     Principal Problem:    MIHAELA (acute kidney injury) ([facility] (8/21/2018)    Active Problems:    Essential hypertension (7/16/2010)      Type 2 diabetes mellitus with diabetic neuropathy ([facility] (5/16/2018)      COPD (chronic obstructive pulmonary disease) ([facility] (8/22/2018)      DNR (do not resuscitate) (8/22/2018)    80year old female patient we are following for macrocytic anemia, continuing issue: Hospitalized 2 weeks ago: clinically presented as diverticular bleed, but nuclear med suggested stomach as source. Hgb stable today at 8.0 (7.1) without overt bleeding. Plan for EGD to assess for possible PUD, AVM, Dieulafoy lesion, etc.     Plan: 1. Plan for EGD today with Dr. Danita High. Risks and benefits discussed with patient to include risk of infection, bleeding, perforation, anesthesia, and possible mortality. Patient verbalized understanding and wishes to proceed with EGD. 2.Keep NPO until procedure. 3.continue Pantoprazole 40mg IV every 24 hours  4. Serial H&H and transfuse as needed. Rafal Benton.  Saira Landry in collaboration with Dr. Dre Mcknight  [facility]

## 2018-08-23 NOTE — PROCEDURES
ESOPHAGOGASTRODUODENOSCOPY    ESOPHAGOGASTRODUODENOSCOPY    DATE of PROCEDURE: 8/23/2018    PT NAME: Merle Gil     [id number]  Indication:  Anemia, recent ? GIB, RBC tagged study with + tracer  in stomach  MEDICATION:   TIVA    INSTRUMENT: GIF  H190    SPECIAL PROCEDURE: none  BLOOD LOSS- 0   SPEC- none    PROCEDURE:  Standard EGD to the second portion of the duodenum. ASSESSMENT:  1. No bleeding or source identified  2. Hiatal hernia, 3 cm  3. Very mild gastritis    PLAN:   1. Consider non gi sources for her macrocytic anemia. ? Mixed anemia. 2. Unless bleeding would favor not doing colonoscopy secondary to medical problems.     Jayla Mason MD

## 2018-08-24 LAB
ANION GAP SERPL CALC-SCNC: 9 MMOL/L (ref 7–16)
BILIRUB DIRECT SERPL-MCNC: <0.1 MG/DL
BILIRUB INDIRECT SERPL-MCNC: NORMAL MG/DL (ref 0–1.1)
BILIRUB SERPL-MCNC: 0.2 MG/DL (ref 0.2–1.1)
BUN SERPL-MCNC: 16 MG/DL (ref 8–23)
CALCIUM SERPL-MCNC: 9 MG/DL (ref 8.3–10.4)
CHLORIDE SERPL-SCNC: 110 MMOL/L (ref 98–107)
CO2 SERPL-SCNC: 26 MMOL/L (ref 21–32)
CREAT SERPL-MCNC: 1.4 MG/DL (ref 0.6–1)
FOLATE SERPL-MCNC: 29.5 NG/ML (ref 3.1–17.5)
GLUCOSE BLD STRIP.AUTO-MCNC: 146 MG/DL (ref 65–100)
GLUCOSE BLD STRIP.AUTO-MCNC: 190 MG/DL (ref 65–100)
GLUCOSE BLD STRIP.AUTO-MCNC: 205 MG/DL (ref 65–100)
GLUCOSE BLD STRIP.AUTO-MCNC: 275 MG/DL (ref 65–100)
GLUCOSE BLD STRIP.AUTO-MCNC: 63 MG/DL (ref 65–100)
GLUCOSE SERPL-MCNC: 194 MG/DL (ref 65–100)
HAPTOGLOB SERPL-MCNC: 146 MG/DL (ref 30–200)
HCT VFR BLD AUTO: 25.2 % (ref 35.8–46.3)
HCT VFR BLD AUTO: 25.8 % (ref 35.8–46.3)
HGB BLD-MCNC: 7.7 G/DL (ref 11.7–15.4)
HGB BLD-MCNC: 8 G/DL (ref 11.7–15.4)
HGB RETIC QN AUTO: 33 PG (ref 29–35)
IMM RETICS NFR: 18.4 % (ref 3–15.9)
LDH SERPL L TO P-CCNC: 358 U/L (ref 110–210)
POTASSIUM SERPL-SCNC: 3.6 MMOL/L (ref 3.5–5.1)
RETICS # AUTO: 0.11 M/UL (ref 0.03–0.1)
RETICS/RBC NFR AUTO: 3.9 % (ref 0.3–2)
SODIUM SERPL-SCNC: 145 MMOL/L (ref 136–145)

## 2018-08-24 PROCEDURE — 94760 N-INVAS EAR/PLS OXIMETRY 1: CPT

## 2018-08-24 PROCEDURE — C9113 INJ PANTOPRAZOLE SODIUM, VIA: HCPCS | Performed by: HOSPITALIST

## 2018-08-24 PROCEDURE — 82746 ASSAY OF FOLIC ACID SERUM: CPT

## 2018-08-24 PROCEDURE — 97530 THERAPEUTIC ACTIVITIES: CPT

## 2018-08-24 PROCEDURE — 74011250637 HC RX REV CODE- 250/637: Performed by: HOSPITALIST

## 2018-08-24 PROCEDURE — 74011250636 HC RX REV CODE- 250/636: Performed by: HOSPITALIST

## 2018-08-24 PROCEDURE — 30233N1 TRANSFUSION OF NONAUTOLOGOUS RED BLOOD CELLS INTO PERIPHERAL VEIN, PERCUTANEOUS APPROACH: ICD-10-PCS | Performed by: NURSE PRACTITIONER

## 2018-08-24 PROCEDURE — 82248 BILIRUBIN DIRECT: CPT

## 2018-08-24 PROCEDURE — 74011636637 HC RX REV CODE- 636/637: Performed by: HOSPITALIST

## 2018-08-24 PROCEDURE — 83615 LACTATE (LD) (LDH) ENZYME: CPT

## 2018-08-24 PROCEDURE — 99222 1ST HOSP IP/OBS MODERATE 55: CPT | Performed by: INTERNAL MEDICINE

## 2018-08-24 PROCEDURE — 85046 RETICYTE/HGB CONCENTRATE: CPT

## 2018-08-24 PROCEDURE — 80048 BASIC METABOLIC PNL TOTAL CA: CPT

## 2018-08-24 PROCEDURE — 82962 GLUCOSE BLOOD TEST: CPT

## 2018-08-24 PROCEDURE — 74011250636 HC RX REV CODE- 250/636: Performed by: NURSE PRACTITIONER

## 2018-08-24 PROCEDURE — 74011000250 HC RX REV CODE- 250: Performed by: HOSPITALIST

## 2018-08-24 PROCEDURE — 65660000000 HC RM CCU STEPDOWN

## 2018-08-24 PROCEDURE — 82668 ASSAY OF ERYTHROPOIETIN: CPT

## 2018-08-24 PROCEDURE — P9016 RBC LEUKOCYTES REDUCED: HCPCS

## 2018-08-24 PROCEDURE — 86902 BLOOD TYPE ANTIGEN DONOR EA: CPT

## 2018-08-24 PROCEDURE — 36430 TRANSFUSION BLD/BLD COMPNT: CPT

## 2018-08-24 PROCEDURE — 97110 THERAPEUTIC EXERCISES: CPT

## 2018-08-24 PROCEDURE — 85014 HEMATOCRIT: CPT

## 2018-08-24 PROCEDURE — 36415 COLL VENOUS BLD VENIPUNCTURE: CPT

## 2018-08-24 PROCEDURE — 77030020250 HC SOL INJ D 5% LFCR 1000ML BG LF

## 2018-08-24 PROCEDURE — 74011000258 HC RX REV CODE- 258: Performed by: NURSE PRACTITIONER

## 2018-08-24 PROCEDURE — 84238 ASSAY NONENDOCRINE RECEPTOR: CPT

## 2018-08-24 PROCEDURE — 83010 ASSAY OF HAPTOGLOBIN QUANT: CPT

## 2018-08-24 PROCEDURE — 94640 AIRWAY INHALATION TREATMENT: CPT

## 2018-08-24 RX ORDER — SODIUM CHLORIDE 9 MG/ML
250 INJECTION, SOLUTION INTRAVENOUS AS NEEDED
Status: DISCONTINUED | OUTPATIENT
Start: 2018-08-24 | End: 2018-08-26 | Stop reason: HOSPADM

## 2018-08-24 RX ADMIN — INSULIN LISPRO 4 UNITS: 100 INJECTION, SOLUTION INTRAVENOUS; SUBCUTANEOUS at 17:13

## 2018-08-24 RX ADMIN — HYDRALAZINE HYDROCHLORIDE 100 MG: 50 TABLET, FILM COATED ORAL at 21:40

## 2018-08-24 RX ADMIN — HYDRALAZINE HYDROCHLORIDE 100 MG: 50 TABLET, FILM COATED ORAL at 17:08

## 2018-08-24 RX ADMIN — INSULIN LISPRO 6 UNITS: 100 INJECTION, SOLUTION INTRAVENOUS; SUBCUTANEOUS at 22:56

## 2018-08-24 RX ADMIN — ALBUTEROL SULFATE 2.5 MG: 2.5 SOLUTION RESPIRATORY (INHALATION) at 08:41

## 2018-08-24 RX ADMIN — Medication 10 ML: at 15:30

## 2018-08-24 RX ADMIN — ALBUTEROL SULFATE 2.5 MG: 2.5 SOLUTION RESPIRATORY (INHALATION) at 13:31

## 2018-08-24 RX ADMIN — PRAVASTATIN SODIUM 40 MG: 20 TABLET ORAL at 21:40

## 2018-08-24 RX ADMIN — INSULIN LISPRO 2 UNITS: 100 INJECTION, SOLUTION INTRAVENOUS; SUBCUTANEOUS at 11:21

## 2018-08-24 RX ADMIN — POLYETHYLENE GLYCOL 3350 17 G: 17 POWDER, FOR SOLUTION ORAL at 09:09

## 2018-08-24 RX ADMIN — BUDESONIDE 500 MCG: 0.5 INHALANT RESPIRATORY (INHALATION) at 19:45

## 2018-08-24 RX ADMIN — HYDRALAZINE HYDROCHLORIDE 100 MG: 50 TABLET, FILM COATED ORAL at 09:10

## 2018-08-24 RX ADMIN — GABAPENTIN 100 MG: 100 CAPSULE ORAL at 17:08

## 2018-08-24 RX ADMIN — FLUTICASONE PROPIONATE 1 SPRAY: 50 SPRAY, METERED NASAL at 09:09

## 2018-08-24 RX ADMIN — SODIUM CHLORIDE 40 MG: 9 INJECTION, SOLUTION INTRAMUSCULAR; INTRAVENOUS; SUBCUTANEOUS at 21:40

## 2018-08-24 RX ADMIN — SERTRALINE HYDROCHLORIDE 50 MG: 50 TABLET ORAL at 09:10

## 2018-08-24 RX ADMIN — LORAZEPAM 0.5 MG: 0.5 TABLET ORAL at 16:02

## 2018-08-24 RX ADMIN — CEFTRIAXONE SODIUM 1 G: 1 INJECTION, POWDER, FOR SOLUTION INTRAMUSCULAR; INTRAVENOUS at 09:09

## 2018-08-24 RX ADMIN — INSULIN GLARGINE 10 UNITS: 100 INJECTION, SOLUTION SUBCUTANEOUS at 21:41

## 2018-08-24 RX ADMIN — BUDESONIDE 500 MCG: 0.5 INHALANT RESPIRATORY (INHALATION) at 08:41

## 2018-08-24 RX ADMIN — ALBUTEROL SULFATE 2.5 MG: 2.5 SOLUTION RESPIRATORY (INHALATION) at 19:45

## 2018-08-24 RX ADMIN — GABAPENTIN 100 MG: 100 CAPSULE ORAL at 09:10

## 2018-08-24 RX ADMIN — AMLODIPINE BESYLATE 10 MG: 10 TABLET ORAL at 09:10

## 2018-08-24 RX ADMIN — Medication 10 ML: at 21:47

## 2018-08-24 RX ADMIN — Medication 10 ML: at 06:08

## 2018-08-24 NOTE — PROGRESS NOTES
Pt did not have BM this shift. Hourly rounds performed during this shift; all needs met at this time. Bed in low/locked position and call light, personal items within reach. Will continue to monitor and give bedside shift report to oncoming day shift nurse.

## 2018-08-24 NOTE — PROGRESS NOTES
GI DAILY PROGRESS NOTE    Admit Date:  8/21/2018    Today's Date:  8/24/2018    CC:  Anemia    Subjective:     Patient: Denies any melena, N&V, GERD, abdominal pain. No previous hematology evaluation. ESOPHAGOGASTRODUODENOSCOPY     DATE of PROCEDURE: 8/23/2018     PT NAME: Antwan Moya     [id number]  Indication:  Anemia, recent ? GIB, RBC tagged study with + tracer  in stomach  MEDICATION:   TIVA     INSTRUMENT: GIF  H190     SPECIAL PROCEDURE: none  BLOOD LOSS- 0   SPEC- none     PROCEDURE:  Standard EGD to the second portion of the duodenum.     ASSESSMENT:  1. No bleeding or source identified  2. Hiatal hernia, 3 cm  3. Very mild gastritis     PLAN:   1. Consider non gi sources for her macrocytic anemia. ? Mixed anemia.    2. Unless bleeding would favor not doing colonoscopy secondary to medical problems.     Tayo Flores MD       Medications:   Current Facility-Administered Medications   Medication Dose Route Frequency    polyethylene glycol (MIRALAX) packet 17 g  17 g Oral DAILY    cefTRIAXone (ROCEPHIN) 1 g in 0.9% sodium chloride (MBP/ADV) 50 mL  1 g IntraVENous Q24H    dextrose 5% infusion  100 mL/hr IntraVENous CONTINUOUS    amLODIPine (NORVASC) tablet 10 mg  10 mg Oral DAILY    fluticasone (FLONASE) 50 mcg/actuation nasal spray 1 Spray  1 Spray Both Nostrils DAILY    gabapentin (NEURONTIN) capsule 100 mg  100 mg Oral BID    hydrALAZINE (APRESOLINE) tablet 100 mg  100 mg Oral TID    LORazepam (ATIVAN) tablet 0.5 mg  0.5 mg Oral DAILY PRN    pravastatin (PRAVACHOL) tablet 40 mg  40 mg Oral QHS    sertraline (ZOLOFT) tablet 50 mg  50 mg Oral DAILY    hydrALAZINE (APRESOLINE) 20 mg/mL injection 10 mg  10 mg IntraVENous Q6H PRN    hydrALAZINE (APRESOLINE) tablet 25 mg  25 mg Oral Q6H PRN    pantoprazole (PROTONIX) 40 mg in sodium chloride 0.9% 10 mL injection  40 mg IntraVENous Q24H    sodium chloride (NS) flush 5-10 mL  5-10 mL IntraVENous Q8H    sodium chloride (NS) flush 5-10 mL 5-10 mL IntraVENous PRN    acetaminophen (TYLENOL) tablet 650 mg  650 mg Oral Q4H PRN    HYDROcodone-acetaminophen (NORCO) 5-325 mg per tablet 1 Tab  1 Tab Oral Q4H PRN    naloxone (NARCAN) injection 0.4 mg  0.4 mg IntraVENous PRN    diphenhydrAMINE (BENADRYL) capsule 25 mg  25 mg Oral Q4H PRN    ondansetron (ZOFRAN) injection 4 mg  4 mg IntraVENous Q4H PRN    magnesium hydroxide (MILK OF MAGNESIA) 400 mg/5 mL oral suspension 30 mL  30 mL Oral DAILY PRN    zolpidem (AMBIEN) tablet 5 mg  5 mg Oral QHS PRN    insulin lispro (HUMALOG) injection   SubCUTAneous AC&HS    insulin glargine (LANTUS) injection 10 Units  10 Units SubCUTAneous QHS    budesonide (PULMICORT) 500 mcg/2 ml nebulizer suspension  500 mcg Nebulization BID RT    And    albuterol (PROVENTIL VENTOLIN) nebulizer solution 2.5 mg  2.5 mg Nebulization Q6HWA RT       Review of Systems:  ROS was obtained, with pertinent positives as listed above. No chest pain or SOB. Diet:  GI soft diet    Objective:   Vitals:  Visit Vitals    /79    Pulse 81    Temp 98.5 °F (36.9 °C)    Resp 20    Ht 5' 7\" (1.702 m)    Wt 85.4 kg (188 lb 3.2 oz)    SpO2 100%    BMI 29.48 kg/m2     Intake/Output:  08/24 0701 - 08/24 1900  In: 360 [P.O.:360]  Out: -   08/22 1901 - 08/24 0700  In: 340 [P.O.:240; I.V.:100]  Out: 0   Exam:  General appearance: alert, cooperative, no distress SITTING UP IN CHAIR  Lungs: clear to auscultation bilaterally anteriorly  Heart: GRADE II/VI MURMUR  Abdomen: soft, non-tender.  Bowel sounds normal. No masses, no organomegaly  Extremities: extremities normal, atraumatic, no cyanosis or edema  Neuro:  alert and oriented X4    Data Review (Labs):    Recent Labs      08/24/18   0543  08/23/18   1826  08/23/18   1058  08/23/18   0618  08/23/18   0233  08/22/18   1808  08/22/18   1033  08/22/18   0601  08/21/18   2125  08/21/18   1709  08/21/18   1509   WBC   --    --    --   6.0   --    --    --   5.1   --    --   6.0   HGB  7.7* 8. 3*  7.7*  8.0*  7.1*  7.6*  7.8*  7.5*  8.6*   --   8.7*   HCT  25.2*  27.8*  25.0*  25.9*  23.5*  25.0*  25.4*  25.0*  28.4*   --   28.1*   PLT   --    --    --   204   --    --    --   207   --    --   195   MCV   --    --    --   101.2*   --    --    --   101.6*   --    --   102.2*   NA   --    --    --   147*   --    --    --   147*   --    --   144   K   --    --    --   3.4*   --    --    --   3.5   --    --   5.5*   CL   --    --    --   111*   --    --    --   112*   --    --   109*   CO2   --    --    --   26   --    --    --   24   --    --   22   BUN   --    --    --   16   --    --    --   19   --    --   24*   CREA   --    --    --   1.24*   --    --    --   1.29*   --    --   1.73*   CA   --    --    --   9.1   --    --    --   9.3   --    --   9.9   MG   --    --    --    --    --    --    --   2.0   --   2.1   --    GLU   --    --    --   137*   --    --    --   106*   --    --   263*   AP   --    --    --    --    --    --    --    --    --    --   83   SGOT   --    --    --    --    --    --    --    --    --    --   58*   ALT   --    --    --    --    --    --    --    --    --    --   30   TBILI   --    --    --    --    --    --    --    --    --    --   0.4   ALB   --    --    --    --    --    --    --    --    --    --   3.0*   TP   --    --    --    --    --    --    --    --    --    --   7.1       Assessment:     Principal Problem:    MIHAELA (acute kidney injury) ([facility] (8/21/2018)    Active Problems:    Essential hypertension (7/16/2010)      Type 2 diabetes mellitus with diabetic neuropathy (HCC) (5/16/2018)      COPD (chronic obstructive pulmonary disease) ([facility] (8/22/2018)      DNR (do not resuscitate) (8/22/2018)       80year old female patient we are following for macrocytic anemia, continuing issue: Hospitalized 2 weeks ago: clinically presented as diverticular bleed, but nuclear med suggested stomach as source. Hgb down again today without any overt bleeding at 7.7 (8.3).  EGD on 8/23/18 by Dr. Teresa Montgomery revealed mild gastritis and 3cm hiatal hernia, but no obvious source of bleeding. Her anemia is macrocytic so likely not a GI source. Will consult hematology. Plan: 1. Continue serial H&H and transfuse as needed  2. Hematology consult  3. Change pantoprazole to 40 mg one po daily  4. We will sign off. Please call for any questions. Teofilo Moise. Zuly Sanchez in collaboration with Dr. Teresa Montgomery  [facility]    No active gi bleeding. Wonder if anemia is non gi related. Hematology seeing. We will s/o and be available if needed.   Teresa Montgomery MD

## 2018-08-24 NOTE — PROGRESS NOTES
1) In accordance with Medicare guidelines, the Medicare Outpatient Observation Notice was provided to the patient. Oral explanation was provided and all questions answered. Signed document placed in the medical record under media tab. Copy provided to patient. Care manager notified. 2) In accordance with Medicare Guidelines, a copy of the Important Letter from Medicare was presented to the patient in anticipation of expected discharge within 48 hours. An oral explanation was provided and all questions were answered. Patient signed one copy of the Important Letter from Medicare which was placed in the patient's medical chart, and a copy of the Important Letter from Medicare was provided to the patient. Care Managers were notified. Care manager notified.

## 2018-08-24 NOTE — CONSULTS
[facility]        Inpatient Hematology / Oncology Consult Note    Reason for Consult:  Anemia, unspecified type [D64.9]  Referring Physician:  Renetta Dominguez MD    History of Present Illness:  Ms. Marilu Mancia is a 80 y.o. female admitted on 8/21/2018 with a primary diagnosis of The primary encounter diagnosis was Acute kidney injury ([address] Diagnoses of Symptomatic anemia and Decreased social interaction were also pertinent to this visit. Imtiaz Tineo Her PMH includes HTN, DM, COPD, CAD, anxiety, and vitamin B12 deficiency. She presented to ED with c/o dizziness. She reported that she had not been eating or drinking well over the past week. She continued to take Lasix despite this. She also c/o dysuria x 1 week. On admission, Hgb 8.7, down from 10.4 three weeks ago. Cr 1.73 (b/l around 0.7-0.8). UCx +Klebsiella, on Rocephin. GI was consulted d/t her history of colon polyps with sigmoid resection, diverticulosis, diverticulitis, and esophageal strictures. Tagged RBC scan + in stomach. EGD was performed yesterday, 8/23 with no bleeding or source identified. Iron 60, TIBC 244, TS 24, Ferr 371, Vit B12 2068. We were consulted for evaluation of her macrocytic anemia. Review of Systems:  Constitutional Denies fever, chills, weight loss, appetite changes, fatigue, night sweats. HEENT Denies trauma, blurry vision, hearing loss, ear pain, nosebleeds, sore throat, neck pain and ear discharge. Skin Denies lesions or rashes. Lungs Denies dyspnea, cough, sputum production or hemoptysis. Cardiovascular Denies chest pain, palpitations, or lower extremity edema. Gastrointestinal Denies nausea, vomiting, changes in bowel habits, bloody or black stools, abdominal pain.  +dysuria. Denies frequency or hesitancy of urination. Neuro +dizziness. Denies headaches, visual changes or ataxia. Denies tingling, tremors, sensory change, speech change, focal weakness or headaches.      Hematology Denies easy bruising or bleeding, denies gingival bleeding or epistaxis. Endo +DM. Denies heat/cold intolerance, denies thyroid abnormalities. MSK Denies back pain, arthralgias, myalgias or frequent falls. Psychiatric/Behavioral +Hx anxiety/depression. Denies substance abuse. The patient is not nervous/anxious. No Known Allergies  Past Medical History:   Diagnosis Date    Anxiety 2/1/2018    Arrhythmia     palpitations 2/10-went to [facility]    Arthritis     L leg- fell 2008    Asthma     adult onset x 20 yrs    B12 deficiency 8/24/2012    Body mass index 37.0-37.9, adult 2/5/2014    CAD (coronary artery disease)     cardiac work up-9/0909-neg-Holter    Controlled type 2 diabetes mellitus with microalbuminuria, without long-term current use of insulin ([address] 11/22/2016    COPD     Corns and callosities 12/19/2016    CRI (chronic renal insufficiency) 8/24/2012    Depression 6/1/2012    Dermatophytosis of nail 12/19/2016    Diabetes ([address]     type II- home tests fasting-112    DJD (degenerative joint disease) of hip     DM (diabetes mellitus) type II controlled with renal manifestation ([address] 7/16/2010    Encounter for long-term (current) use of other medications 1/8/2013    Essential hypertension 7/16/2010    Gastrointestinal disorder     GERD (gastroesophageal reflux disease)     Heart failure ([address]     Hiatal hernia 8/24/2012    HLD (hyperlipidemia) 1/8/2013    Neuropathy 8/24/2012    Lower limbs     Obesity (BMI 30.0-39. 9) BMI-43.8    Other and unspecified hyperlipidemia 7/16/2010    Other chest pain 7/16/2010    Other ill-defined conditions(799.89)     high cholesterol    Other ill-defined conditions(799.89)     incont.  urine    PAD (peripheral artery disease) ([address] 8/24/2012    Retinal hemorrhage      Past Surgical History:   Procedure Laterality Date    ABDOMEN SURGERY PROC UNLISTED      HX APPENDECTOMY      HX CHOLECYSTECTOMY      HX CHOLECYSTECTOMY  2000    HX ENDOSCOPY  02/27/2018    Dr Allegra Tilley    HX GI      small intestine obstruction    HX GYN      hyst    HX HEART CATHETERIZATION      HX HYSTERECTOMY      prolapse    HX INTRACRANIAL ANEURYSM REPAIR      HX INTRACRANIAL ANEURYSM REPAIR      HX ORTHOPAEDIC      right wrist 2010    HX OTHER SURGICAL      aneurysm clip-cerebral-2000    HX OTHER SURGICAL      cerebral aneurysm   Dr. Meño Cano     Family History   Problem Relation Age of Onset    Cancer Mother     Diabetes Father     Diabetes Paternal Grandfather      Social History     Social History    Marital status: SINGLE     Spouse name: N/A    Number of children: N/A    Years of education: N/A     Occupational History    Not on file.      Social History Main Topics    Smoking status: Never Smoker    Smokeless tobacco: Never Used    Alcohol use No    Drug use: No    Sexual activity: No     Other Topics Concern    Not on file     Social History Narrative     with four children     Current Facility-Administered Medications   Medication Dose Route Frequency Provider Last Rate Last Dose    polyethylene glycol (MIRALAX) packet 17 g  17 g Oral DAILY Ata Nielsen MD   17 g at 08/24/18 0909    cefTRIAXone (ROCEPHIN) 1 g in 0.9% sodium chloride (MBP/ADV) 50 mL  1 g IntraVENous Q24H Lydia Hodge  mL/hr at 08/24/18 0909 1 g at 08/24/18 5130    dextrose 5% infusion  100 mL/hr IntraVENous CONTINUOUS Lydia Hodge NP   Stopped at 08/23/18 1700    amLODIPine (NORVASC) tablet 10 mg  10 mg Oral DAILY Ata Nielsen MD   10 mg at 08/24/18 0910    fluticasone (FLONASE) 50 mcg/actuation nasal spray 1 Spray  1 Spray Both Nostrils DAILY Ata Nielsen MD   1 Binger at 08/24/18 0909    gabapentin (NEURONTIN) capsule 100 mg  100 mg Oral BID Ata Nielsen MD   100 mg at 08/24/18 0910    hydrALAZINE (APRESOLINE) tablet 100 mg  100 mg Oral TID Ata Nielsen MD   100 mg at 08/24/18 0910    LORazepam (ATIVAN) tablet 0.5 mg  0.5 mg Oral DAILY PRN Magaly Hughes MD        pravastatin (PRAVACHOL) tablet 40 mg  40 mg Oral QHS Magaly Hughes MD   40 mg at 08/23/18 2107    sertraline (ZOLOFT) tablet 50 mg  50 mg Oral DAILY Magaly Hughes MD   50 mg at 08/24/18 0910    hydrALAZINE (APRESOLINE) 20 mg/mL injection 10 mg  10 mg IntraVENous Q6H PRN Magaly Hughes MD        hydrALAZINE (APRESOLINE) tablet 25 mg  25 mg Oral Q6H PRN Magaly Hughes MD        pantoprazole (PROTONIX) 40 mg in sodium chloride 0.9% 10 mL injection  40 mg IntraVENous Q24H Magaly Hughes MD   40 mg at 08/23/18 2059    sodium chloride (NS) flush 5-10 mL  5-10 mL IntraVENous Q8H Magaly Hughes MD   10 mL at 08/24/18 6309    sodium chloride (NS) flush 5-10 mL  5-10 mL IntraVENous PRN Magaly Hughes MD   10 mL at 08/21/18 2123    acetaminophen (TYLENOL) tablet 650 mg  650 mg Oral Q4H PRN Magaly Hughes MD        HYDROcodone-acetaminophen (NORCO) 5-325 mg per tablet 1 Tab  1 Tab Oral Q4H PRN Magaly Hughes MD        naloxone [facility]) injection 0.4 mg  0.4 mg IntraVENous PRN Magaly Hughes MD        diphenhydrAMINE (BENADRYL) capsule 25 mg  25 mg Oral Q4H PRN Magaly Hughes MD        ondansetron [facility]) injection 4 mg  4 mg IntraVENous Q4H PRN Magaly Hughes MD        magnesium hydroxide (MILK OF MAGNESIA) 400 mg/5 mL oral suspension 30 mL  30 mL Oral DAILY PRN Magaly Hughes MD        zolpidem (AMBIEN) tablet 5 mg  5 mg Oral QHS PRN Magaly Hughes MD        insulin lispro (HUMALOG) injection   SubCUTAneous AC&HS Magaly Hughes MD   2 Units at 08/24/18 1121    insulin glargine (LANTUS) injection 10 Units  10 Units SubCUTAneous QHS Magaly Hughes MD   10 Units at 08/23/18 2108    budesonide (PULMICORT) 500 mcg/2 ml nebulizer suspension  500 mcg Nebulization BID RT Magaly Hughes MD   500 mcg at 08/24/18 0841    And    albuterol (PROVENTIL VENTOLIN) nebulizer solution 2.5 mg  2.5 mg Nebulization Q6HWA RT Magaly Hughes MD   2.5 mg at 08/24/18 0841       OBJECTIVE:  Patient Nirav Staff for the past 8 hrs:   BP Temp Pulse Resp SpO2   18 1124 154/69 98.2 °F (36.8 °C) 85 19 94 %   18 0841 - - - - 100 %   18 0714 155/79 98.5 °F (36.9 °C) 81 20 95 %     Temp (24hrs), Av.2 °F (36.8 °C), Min:96.8 °F (36 °C), Max:98.8 °F (37.1 °C)    701 -  190  In: 360 [P.O.:360]  Out: 1     Physical Exam:  Constitutional: Well developed, well nourished female in no acute distress, sitting comfortably in the bedside chair. HEENT: Normocephalic and atraumatic. Oropharynx is clear, mucous membranes are moist.  Extraocular muscles are intact. Sclerae anicteric. Neck supple without JVD. No thyromegaly present. Skin Warm and dry. No bruising and no rash noted. No erythema. No pallor. Respiratory Lungs are clear to auscultation bilaterally without wheezes, rales or rhonchi, normal air exchange without accessory muscle use. CVS Normal rate, regular rhythm and normal S1 and S2. No murmurs, gallops, or rubs. Abdomen Soft, nontender and nondistended, normoactive bowel sounds. No palpable mass. No hepatosplenomegaly. Neuro Grossly nonfocal with no obvious sensory or motor deficits. MSK Normal range of motion in general.  No edema and no tenderness. Psych Appropriate mood and affect.         Labs:    Recent Results (from the past 24 hour(s))   GLUCOSE, POC    Collection Time: 18  3:00 PM   Result Value Ref Range    Glucose (POC) 132 (H) 65 - 100 mg/dL   GLUCOSE, POC    Collection Time: 18  3:53 PM   Result Value Ref Range    Glucose (POC) 122 (H) 65 - 100 mg/dL   HGB & HCT    Collection Time: 18  6:26 PM   Result Value Ref Range    HGB 8.3 (L) 11.7 - 15.4 g/dL    HCT 27.8 (L) 35.8 - 46.3 %   GLUCOSE, POC    Collection Time: 18  8:31 PM   Result Value Ref Range    Glucose (POC) 255 (H) 65 - 100 mg/dL   HGB & HCT    Collection Time: 18  5:43 AM   Result Value Ref Range    HGB 7.7 (L) 11.7 - 15.4 g/dL    HCT 25.2 (L) 35.8 - 46.3 %   GLUCOSE, POC    Collection Time: 08/24/18  7:17 AM   Result Value Ref Range    Glucose (POC) 63 (L) 65 - 100 mg/dL   GLUCOSE, POC    Collection Time: 08/24/18  8:03 AM   Result Value Ref Range    Glucose (POC) 146 (H) 65 - 100 mg/dL   GLUCOSE, POC    Collection Time: 08/24/18 11:08 AM   Result Value Ref Range    Glucose (POC) 190 (H) 65 - 100 mg/dL       Imaging:  XR CHEST PA LAT [[id number]] Collected: 08/21/18 1811      Order Status: Completed Updated: 08/21/18 1944     Narrative:       History: Chest Pain    Two views chest    COMPARISON: 7/27/2018    Findings: COPD changes again noted with flattening of the diaphragms on the  lateral projection. The cardiac silhouette, and mediastinal contour, and osseous  structures are stable.       Impression:       Impression: Stable two-view chest. No acute abnormality.          ASSESSMENT:  Problem List  Date Reviewed: 6/5/2018          Codes Class Noted    COPD (chronic obstructive pulmonary disease) ([facility] (Chronic) ICD-10-CM: J44.9  ICD-9-CM: 247  8/22/2018        DNR (do not resuscitate) (Chronic) ICD-10-CM: Z66  ICD-9-CM: V49.86  8/22/2018        * (Principal)MIHAELA (acute kidney injury) ([facility] ICD-10-CM: N17.9  ICD-9-CM: 584.9  8/21/2018        GI bleed ICD-10-CM: K92.2  ICD-9-CM: 578.9  7/28/2018        Syncope ICD-10-CM: R55  ICD-9-CM: 780.2  7/28/2018        Type 2 diabetes mellitus with diabetic neuropathy (HCC) (Chronic) ICD-10-CM: E11.40  ICD-9-CM: 250.60, 357.2  5/16/2018        Anxiety ICD-10-CM: F41.9  ICD-9-CM: 300.00  2/1/2018        Corn ICD-10-CM: L84  ICD-9-CM: 564  8/30/2017        Claw toe ICD-10-CM: F87.02  ICD-9-CM: 754.71  7/12/2017        Comprehensive diabetic foot examination, type 1 DM, encounter for [facility]) ICD-10-CM: E10.9  ICD-9-CM: 250.01  7/12/2017        Onychomycosis ICD-10-CM: B35.1  ICD-9-CM: 110.1  6/15/2017        Callus ICD-10-CM: L84  ICD-9-CM: 700  6/15/2017        Controlled type 2 diabetes mellitus with microalbuminuria, without long-term current use of insulin ([facility] ICD-10-CM: E11.29, R80.9  ICD-9-CM: 250.40, 791.0  11/22/2016        Hearing loss ICD-10-CM: H91.90  ICD-9-CM: 389.9  10/19/2015        DJD (degenerative joint disease) of hip ICD-10-CM: M16.9  ICD-9-CM: 715.95  Unknown        Body mass index 37.0-37.9, adult ICD-10-CM: Z68.37  ICD-9-CM: V85.37  2/5/2014        HLD (hyperlipidemia) ICD-10-CM: E78.5  ICD-9-CM: 272.4  1/8/2013        Encounter for long-term (current) use of other medications ICD-10-CM: Z79.899  ICD-9-CM: V58.69  1/8/2013        B12 deficiency ICD-10-CM: E53.8  ICD-9-CM: 266.2  8/24/2012        Hiatal hernia ICD-10-CM: K44.9  ICD-9-CM: 553.3  8/24/2012        Neuropathy ICD-10-CM: G62.9  ICD-9-CM: 355.9  8/24/2012    Overview Signed 8/24/2012 12:56 PM by Gerardo Joyce     Lower limbs             PAD (peripheral artery disease) ([facility] ICD-10-CM: I73.9  ICD-9-CM: 443.9  8/24/2012        Depression ICD-10-CM: F32.9  ICD-9-CM: 110  6/1/2012        Asthma ICD-10-CM: J45.909  ICD-9-CM: 493.90  6/1/2012        Other chest pain ICD-10-CM: R07.89  ICD-9-CM: 786.59  7/16/2010        Essential hypertension (Chronic) ICD-10-CM: I10  ICD-9-CM: 401.9  7/16/2010        GERD (gastroesophageal reflux disease) (Chronic) ICD-10-CM: K21.9  ICD-9-CM: 530.81  7/16/2010        Other and unspecified hyperlipidemia (Chronic) ICD-10-CM: E78.5  ICD-9-CM: 272.4  7/16/2010                RECOMMENDATIONS:  Anemia  - Tagged RBC scan + in stomach. EGD did not reveal bleeding or source. No plans for colonoscopy unless actively bleeding.  -Check sTfR, EPO, folate, and hemolysis labs    UTI  - UCx +Klebsiella, on Rocephin    Lab studies and imaging studies were personally reviewed. Thank you for allowing us to participate in the care of Ms. Ashley Winn.          Asya Vega NP   [facility]  [address] [place] [address]  Office : [phone]  Fax : [phone]

## 2018-08-24 NOTE — PROGRESS NOTES
Problem: Mobility Impaired (Adult and Pediatric)  Goal: *Acute Goals and Plan of Care (Insert Text)  LTG:  (1.)Ms. Verlin Koyanagi will move from supine to sit and sit to supine , scoot up and down and roll side to side INDEPENDENTLY with bed flat within 7 treatment day(s). (2.)Ms. Verlin Koyanagi will transfer from bed to chair and chair to bed with MODIFIED INDEPENDENCE using the least restrictive device within 7 treatment day(s). (3.)Ms. Verlin Koyanagi will ambulate with MODIFIED INDEPENDENCE for 200 feet with the least restrictive device within 7 treatment day(s). (4.)Ms. Verlin Koyanagi will independently perform LE exercises per HEP for 15 minutes to improve strength and mobility within 7 days. ________________________________________________________________________________________________    PHYSICAL THERAPY: Daily Note, Treatment Day: 1st, AM 8/24/2018  INPATIENT: Hospital Day: 4  Payor: Tarsha Shahid MMP / Plan: [place] [employer] MMP / Product Type: [employer] Medicare /      NAME/AGE/GENDER: Ramon Torres is a 80 y.o. female   PRIMARY DIAGNOSIS: Anemia, unspecified type [D64.9] MIHAELA (acute kidney injury) ([facility] MIHAELA (acute kidney injury) ([facility]  Procedure(s) (LRB):  ESOPHAGOGASTRODUODENOSCOPY (EGD)  (N/A)  1 Day Post-Op  ICD-10: Treatment Diagnosis:    · Generalized Muscle Weakness (M62.81)  · Difficulty in walking, Not elsewhere classified (R26.2)  · Other abnormalities of gait and mobility (R26.89)  · History of falling (Z91.81)   Precaution/Allergies:  Review of patient's allergies indicates no known allergies. ASSESSMENT:     Ms. Verlin Koyanagi is a very pleasant 80year old female admitted from home for acute kidney injury. She lives with daughter and is typically fairly mobile with cane; has been using walker for the past few weeks after fall and left patella fx (non surgical). 8/24/18: Presents sitting up in chair with no complaints; feels well and is agreeable to therapy treatment.  Performs sit-stand transfers with SBA. Great transfer mechanics and safety awareness. Ambulates 100 ft in room and hallway with walker, CGA-SBA. Step-to pattern at times due to left patella fx however no loss of balance noted or assistance needed. Returned to room for rest break then performs below exercises with great participation and feels well. Left sitting up with needs in reach. Progressing well with mobility and activity tolerance. Anticipate return home with [facility] PT. This section established at most recent assessment   PROBLEM LIST (Impairments causing functional limitations):  1. Decreased Strength  2. Decreased Transfer Abilities  3. Decreased Ambulation Ability/Technique  4. Decreased Balance  5. Decreased Activity Tolerance   INTERVENTIONS PLANNED: (Benefits and precautions of physical therapy have been discussed with the patient.)  1. Balance Exercise  2. Bed Mobility  3. Gait Training  4. Home Exercise Program (HEP)  5. Therapeutic Activites  6. Therapeutic Exercise/Strengthening  7. Transfer Training     TREATMENT PLAN: Frequency/Duration: 3 times a week for duration of hospital stay  Rehabilitation Potential For Stated Goals: Good     RECOMMENDED REHABILITATION/EQUIPMENT: (at time of discharge pending progress): Due to the probability of continued deficits (see above) this patient will likely need continued skilled physical therapy after discharge. Equipment:    None at this time              HISTORY:   History of Present Injury/Illness (Reason for Referral):  Per H&P, \"This is a 80-year-old lady with history of multiple medical problems listed below in past medical history, including hypertension, diabetes, COPD, CAD, anxiety disorder, B12 deficiency, coronary artery disease, came to the emergency room with complaints of dizziness. Patient has not been eating or drinking much over the last 1 week. She was also having some pressure and burning with urination over the last 1 week.   No fevers or chills or chest pain or shortness of breath. No diarrhea. No nausea or vomitings. Has decreased appetite and has not been eating much. She started having dizziness since this morning, moderate in severity, worse with standing and walking, somewhat better with lying down. No associated chest pain or shortness of breath. Patient did not fall. On initial evaluation in the emergency room she was found to be in acute renal failure, also has anemia, so she was being admitted for further evaluation and treatment. Patient denies any bleeding per rectum over the last few days. Also denies black stools. She has been taking Lasix even though she was not drinking enough\"    Past Medical History/Comorbidities:   Ms. Jacquelyn Cronin  has a past medical history of Anxiety (2/1/2018); Arrhythmia; Arthritis; Asthma; B12 deficiency (8/24/2012); Body mass index 37.0-37.9, adult (2/5/2014); CAD (coronary artery disease); Controlled type 2 diabetes mellitus with microalbuminuria, without long-term current use of insulin ([address] (11/22/2016); COPD; Corns and callosities (12/19/2016); CRI (chronic renal insufficiency) (8/24/2012); Depression (6/1/2012); Dermatophytosis of nail (12/19/2016); Diabetes ([address] DJD (degenerative joint disease) of hip; DM (diabetes mellitus) type II controlled with renal manifestation ([address] (7/16/2010); Encounter for long-term (current) use of other medications (1/8/2013); Essential hypertension (7/16/2010); Gastrointestinal disorder; GERD (gastroesophageal reflux disease); Heart failure ([address] Hiatal hernia (8/24/2012); HLD (hyperlipidemia) (1/8/2013); Neuropathy (8/24/2012); Obesity (BMI 30.0-39.9) (BMI-43.8); Other and unspecified hyperlipidemia (7/16/2010); Other chest pain (7/16/2010); Other ill-defined conditions(799.89); Other ill-defined conditions(799.89); PAD (peripheral artery disease) ([address] (8/24/2012); and Retinal hemorrhage.   Ms. Jacquelyn Cronin  has a past surgical history that includes pr abdomen surgery proc unlisted; hx appendectomy; hx cholecystectomy; hx other surgical; hx gyn; hx gi; hx orthopaedic; hx hysterectomy; hx cholecystectomy (2000); hx intracranial aneurysm repair; hx intracranial aneurysm repair; hx other surgical; hx heart catheterization; and hx endoscopy (02/27/2018). Social History/Living Environment:   Home Environment: Private residence  # Steps to Enter: 1  Rails to Enter: Yes  Hand Rails : Right  One/Two Story Residence: One story  Living Alone: No  Support Systems: Child(edward), Family member(s)  Patient Expects to be Discharged to[de-identified] Private residence  Current DME Used/Available at Home: Walker, rollator, Cane, straight  Tub or Shower Type: Shower  Prior Level of Function/Work/Activity:  Lives with daughter in single story home with 1 step. Mod I with cane or walker; walker mostly after recent L patella fx. Daughter with pt during the day. Number of Personal Factors/Comorbidities that affect the Plan of Care: 1-2: MODERATE COMPLEXITY   EXAMINATION:   Most Recent Physical Functioning:   Gross Assessment:  AROM: Within functional limits  Strength: Generally decreased, functional  Coordination: Within functional limits  Sensation: Intact               Posture:  Posture (WDL): Exceptions to WDL  Posture Assessment: Forward head, Rounded shoulders, Trunk flexion  Balance:  Sitting: Intact  Standing: Impaired  Standing - Static: Good  Standing - Dynamic : Fair Bed Mobility:     Wheelchair Mobility:     Transfers:  Sit to Stand: Stand-by assistance  Stand to Sit: Stand-by assistance  Bed to Chair: Contact guard assistance  Interventions: Verbal cues; Safety awareness training  Duration: 15 Minutes  Gait:     Base of Support: Center of gravity altered  Speed/Airam: Pace decreased (<100 feet/min); Slow  Step Length: Left shortened;Right shortened  Gait Abnormalities: Trunk sway increased; Step to gait  Distance (ft): 100 Feet (ft)  Assistive Device: Walker, rolling  Ambulation - Level of Assistance: Contact guard assistance;Stand-by assistance  Interventions: Verbal cues; Safety awareness training      Body Structures Involved:  1. Muscles Body Functions Affected:  1. Movement Related Activities and Participation Affected:  1. General Tasks and Demands  2. Mobility  3. Domestic Life  4. Community, Social and [place]   Number of elements that affect the Plan of Care: 4+: HIGH COMPLEXITY   CLINICAL PRESENTATION:   Presentation: Stable and uncomplicated: LOW COMPLEXITY   CLINICAL DECISION MAKIN [facility] Inpatient Short Form  How much difficulty does the patient currently have. .. Unable A Lot A Little None   1. Turning over in bed (including adjusting bedclothes, sheets and blankets)? [] 1   [] 2   [] 3   [x] 4   2. Sitting down on and standing up from a chair with arms ( e.g., wheelchair, bedside commode, etc.)   [] 1   [] 2   [] 3   [x] 4   3. Moving from lying on back to sitting on the side of the bed? [] 1   [] 2   [] 3   [x] 4   How much help from another person does the patient currently need. .. Total A Lot A Little None   4. Moving to and from a bed to a chair (including a wheelchair)? [] 1   [] 2   [x] 3   [] 4   5. Need to walk in hospital room? [] 1   [] 2   [x] 3   [] 4   6. Climbing 3-5 steps with a railing? [] 1   [x] 2   [] 3   [] 4   © , Trustees of [address] [place] [address], under license to [employer]. All rights reserved      Score:  Initial: 20 Most Recent: X (Date: -- )    Interpretation of Tool:  Represents activities that are increasingly more difficult (i.e. Bed mobility, Transfers, Gait). Score 24 23 22-20 19-15 14-10 9-7 6     Modifier CH CI CJ CK CL CM CN      ?  Mobility - Walking and Moving Around:     - CURRENT STATUS: CJ - 20%-39% impaired, limited or restricted    - GOAL STATUS: CI - 1%-19% impaired, limited or restricted    - D/C STATUS:  ---------------To be determined---------------  Payor: [employer]CARE MMP / Plan[place] [employer]ARE MMP / Product Type: Managed Care Medicare /      Medical Necessity:     · Patient demonstrates good rehab potential due to higher previous functional level. Reason for Services/Other Comments:  · Patient continues to demonstrate capacity to improve strength, balance, activity tolerance which will increase independence and increase safety. Use of outcome tool(s) and clinical judgement create a POC that gives a: Clear prediction of patient's progress: LOW COMPLEXITY            TREATMENT:   (In addition to Assessment/Re-Assessment sessions the following treatments were rendered)   Pre-treatment Symptoms/Complaints:  \"that was very good, I feel better\"  Pain: Initial:   Pain Intensity 1: 0  Post Session:  0/10     Therapeutic Activity: (  15 Minutes ):  Therapeutic activities including Chair transfers and Ambulation on level ground to improve mobility, strength, balance and activity tolerance. Required minimal Verbal cues; Safety awareness training to promote static and dynamic balance in standing and promote motor control of bilateral, lower extremity(s). Therapeutic Exercise: (9 Minutes):  Exercises per grid below to improve mobility, strength and balance. Required minimal visual and verbal cues to promote proper body mechanics and exercise form. Progressed range, repetitions and complexity of movement as indicated.      Date:  8/22/18 Date:  8/24/18 Date:     Activity/Exercise Parameters Parameters Parameters   Ankle pumps 15x AB 15x AB    Seated marching 15x AB 15x AB    LAQ 15x AB 15x AB    Seated hip aBd 15x AB 15x AB    Seated UE punches  15x AB    Bicep curls  15x AB                Braces/Orthotics/Lines/Etc:   · O2 Device: Room air  Treatment/Session Assessment:    · Response to Treatment:  Good progress and participation with mobility, exercises  · Interdisciplinary Collaboration:   o Physical Therapist  o Registered Nurse  · After treatment position/precautions:   o Up in chair  o Bed/Chair-wheels locked  o Bed in low position  o Call light within reach   · Compliance with Program/Exercises: Will assess as treatment progresses. · Recommendations/Intent for next treatment session: \"Next visit will focus on advancements to more challenging activities and reduction in assistance provided\".   Total Treatment Duration:  PT Patient Time In/Time Out  Time In: 0906  Time Out: [address]

## 2018-08-24 NOTE — PROGRESS NOTES
Hospitalist Progress Note    2018  Admit Date: 2018  4:19 PM   NAME: Radha Brantley   :  1929   MRN:  [id number]   Attending: Matty Nielsen MD  PCP:  Russ Moore MD    SUBJECTIVE:     Pt is a 81 yo lady with PMH of HTN, asthma, CKD, DM II, GI bleed, CAD who presented with c/o dizziness, decreased oral intake x 1 week, still taking her lasix, pressure and burning with urination. Reports dizziness is worse with standing and better lying down. She was found to have a UTI, MIHAELA, hypernatremic. Pt started on D5 with improvement. Also found to have anemia with hbg now 7.7 down from 10.4 approx 3 weeks ago. Does denies rectal bleeding or black stools GI consulted. Benign abd- eating and having BMs without issues. Recent NM scan showed gastric bleed but EGD without any findings. Hematology now following. This afternoon pt is sitting up in the chair. She denies abd tenderness, reports eating without problems. Discussed Hgb 7.7 and blood transfusion. We discussed home once Hgb stable. Review of Systems negative with exception of pertinent positives noted above  PHYSICAL EXAM     Visit Vitals    /69    Pulse 85    Temp 98.2 °F (36.8 °C)    Resp 19    Ht 5' 7\" (1.702 m)    Wt 85.4 kg (188 lb 3.2 oz)    SpO2 94%    BMI 29.48 kg/m2      Temp (24hrs), Av.2 °F (36.8 °C), Min:96.8 °F (36 °C), Max:98.8 °F (37.1 °C)    Oxygen Therapy  O2 Sat (%): 94 % (18 1124)  Pulse via Oximetry: 71 beats per minute (18 0841)  O2 Device: Room air (18 0906)    Intake/Output Summary (Last 24 hours) at 18 1255  Last data filed at 18 1125   Gross per 24 hour   Intake              700 ml   Output                1 ml   Net              699 ml      General: No acute distress    Lungs:  CTA Bilaterally.    Heart:  Regular rate and rhythm,  No murmur, rub, or gallop  Abdomen: Soft, Non distended, Non tender, Positive bowel sounds  Extremities: No cyanosis, clubbing or edema  Neurologic:  No focal deficits    ASSESSMENT      Active Hospital Problems    Diagnosis Date Noted    COPD (chronic obstructive pulmonary disease) ([facility] 08/22/2018    DNR (do not resuscitate) 08/22/2018    MIHAELA (acute kidney injury) ([facility] 08/21/2018    Type 2 diabetes mellitus with diabetic neuropathy ([facility] 05/16/2018    Essential hypertension 07/16/2010     A/P:     MIHAELA- Improving. Lasix remains on hold. Daily BMP. UTI- Culture with Klebsiella pneumoniae, susceptibility repeating. Cont Rocephin D 4. Hypernatremia- Cont D5. BMP pending. Anemia- Recent NM scan pos for gastric bleed but EGD without findings of source of bleed. Cont Protonix. Hematology consulted to help work up non GI causes of anemia. Retic count elevated- hemolytic anemia? This can likely be continued as outpatient work up if Hgb can stabilize. Hgb 7.7, transfuse 1 unit PRBCs. Recent patellar fracture- Cont PT/OT.        DC planning- SW following for home with hh (appears pt has declinded this but I recommend home health PT and nursing)     DVT Prophylaxis: SCDs      Signed By: Rona Do NP     August 24, 2018

## 2018-08-24 NOTE — PROGRESS NOTES
Hourly rounds completed, all needs met. Pt is currently in bed with blood transfusion administering. Will report to oncoming nurse.

## 2018-08-24 NOTE — PROGRESS NOTES
OT note:  Pt back in bed with RN at her side to insert IV. Will re-attempt at a later date.   Modesto Adam

## 2018-08-24 NOTE — PROGRESS NOTES
Interdisciplinary Rounds completed 08/24/18. Nursing, Case Management, Physician and PT present. Plan of care reviewed and updated. Unit of blood today and possible discharge in am with follow up with hematology outpatient, although pt said \"no rush\" on discharge.

## 2018-08-25 LAB
ABO + RH BLD: NORMAL
ANION GAP SERPL CALC-SCNC: 8 MMOL/L (ref 7–16)
ANTIGENS PRESENT RBC DONR: NORMAL
BACTERIA SPEC CULT: ABNORMAL
BASOPHILS # BLD: 0 K/UL (ref 0–0.2)
BASOPHILS NFR BLD: 0 % (ref 0–2)
BLD PROD TYP BPU: NORMAL
BLOOD BANK CMNT PATIENT-IMP: NORMAL
BLOOD GROUP ANTIBODIES SERPL: NORMAL
BLOOD GROUP ANTIBODIES SERPL: NORMAL
BPU ID: NORMAL
BUN SERPL-MCNC: 18 MG/DL (ref 8–23)
CALCIUM SERPL-MCNC: 9 MG/DL (ref 8.3–10.4)
CHLORIDE SERPL-SCNC: 110 MMOL/L (ref 98–107)
CO2 SERPL-SCNC: 26 MMOL/L (ref 21–32)
CREAT SERPL-MCNC: 1.2 MG/DL (ref 0.6–1)
CROSSMATCH RESULT,%XM: NORMAL
DIFFERENTIAL METHOD BLD: ABNORMAL
EOSINOPHIL # BLD: 0.1 K/UL (ref 0–0.8)
EOSINOPHIL NFR BLD: 2 % (ref 0.5–7.8)
EPO SERPL-ACNC: 27.5 MIU/ML (ref 2.6–18.5)
ERYTHROCYTE [DISTWIDTH] IN BLOOD BY AUTOMATED COUNT: 14.4 %
GLUCOSE BLD STRIP.AUTO-MCNC: 120 MG/DL (ref 65–100)
GLUCOSE BLD STRIP.AUTO-MCNC: 221 MG/DL (ref 65–100)
GLUCOSE BLD STRIP.AUTO-MCNC: 239 MG/DL (ref 65–100)
GLUCOSE BLD STRIP.AUTO-MCNC: 268 MG/DL (ref 65–100)
GLUCOSE SERPL-MCNC: 161 MG/DL (ref 65–100)
HCT VFR BLD AUTO: 27.9 % (ref 35.8–46.3)
HGB BLD-MCNC: 8.7 G/DL (ref 11.7–15.4)
IMM GRANULOCYTES # BLD: 0 K/UL (ref 0–0.5)
IMM GRANULOCYTES NFR BLD AUTO: 0 % (ref 0–5)
LYMPHOCYTES # BLD: 1.2 K/UL (ref 0.5–4.6)
LYMPHOCYTES NFR BLD: 20 % (ref 13–44)
MCH RBC QN AUTO: 31.2 PG (ref 26.1–32.9)
MCHC RBC AUTO-ENTMCNC: 31.2 G/DL (ref 31.4–35)
MCV RBC AUTO: 100 FL (ref 79.6–97.8)
MONOCYTES # BLD: 0.8 K/UL (ref 0.1–1.3)
MONOCYTES NFR BLD: 13 % (ref 4–12)
NEUTS SEG # BLD: 3.7 K/UL (ref 1.7–8.2)
NEUTS SEG NFR BLD: 64 % (ref 43–78)
NRBC # BLD: 0 K/UL (ref 0–0.2)
PLATELET # BLD AUTO: 177 K/UL (ref 150–450)
PMV BLD AUTO: 10.4 FL (ref 9.4–12.3)
POTASSIUM SERPL-SCNC: 3.7 MMOL/L (ref 3.5–5.1)
RBC # BLD AUTO: 2.79 M/UL (ref 4.05–5.2)
SERVICE CMNT-IMP: ABNORMAL
SODIUM SERPL-SCNC: 144 MMOL/L (ref 136–145)
SPECIMEN EXP DATE BLD: NORMAL
STATUS OF UNIT,%ST: NORMAL
UNIT DIVISION, %UDIV: 0
WBC # BLD AUTO: 5.8 K/UL (ref 4.3–11.1)

## 2018-08-25 PROCEDURE — 82962 GLUCOSE BLOOD TEST: CPT

## 2018-08-25 PROCEDURE — 74011000258 HC RX REV CODE- 258: Performed by: NURSE PRACTITIONER

## 2018-08-25 PROCEDURE — 77030020250 HC SOL INJ D 5% LFCR 1000ML BG LF

## 2018-08-25 PROCEDURE — 74011636637 HC RX REV CODE- 636/637: Performed by: HOSPITALIST

## 2018-08-25 PROCEDURE — 36415 COLL VENOUS BLD VENIPUNCTURE: CPT

## 2018-08-25 PROCEDURE — 65660000000 HC RM CCU STEPDOWN

## 2018-08-25 PROCEDURE — 74011250636 HC RX REV CODE- 250/636: Performed by: HOSPITALIST

## 2018-08-25 PROCEDURE — 74011250636 HC RX REV CODE- 250/636: Performed by: NURSE PRACTITIONER

## 2018-08-25 PROCEDURE — 80048 BASIC METABOLIC PNL TOTAL CA: CPT

## 2018-08-25 PROCEDURE — 74011000250 HC RX REV CODE- 250: Performed by: HOSPITALIST

## 2018-08-25 PROCEDURE — 94640 AIRWAY INHALATION TREATMENT: CPT

## 2018-08-25 PROCEDURE — C9113 INJ PANTOPRAZOLE SODIUM, VIA: HCPCS | Performed by: HOSPITALIST

## 2018-08-25 PROCEDURE — 94760 N-INVAS EAR/PLS OXIMETRY 1: CPT

## 2018-08-25 PROCEDURE — 99232 SBSQ HOSP IP/OBS MODERATE 35: CPT | Performed by: INTERNAL MEDICINE

## 2018-08-25 PROCEDURE — 74011250637 HC RX REV CODE- 250/637: Performed by: HOSPITALIST

## 2018-08-25 PROCEDURE — 85025 COMPLETE CBC W/AUTO DIFF WBC: CPT

## 2018-08-25 RX ADMIN — INSULIN GLARGINE 10 UNITS: 100 INJECTION, SOLUTION SUBCUTANEOUS at 21:36

## 2018-08-25 RX ADMIN — POLYETHYLENE GLYCOL 3350 17 G: 17 POWDER, FOR SOLUTION ORAL at 09:14

## 2018-08-25 RX ADMIN — DEXTROSE MONOHYDRATE 100 ML/HR: 5 INJECTION, SOLUTION INTRAVENOUS at 12:11

## 2018-08-25 RX ADMIN — Medication 10 ML: at 14:47

## 2018-08-25 RX ADMIN — BUDESONIDE 500 MCG: 0.5 INHALANT RESPIRATORY (INHALATION) at 07:30

## 2018-08-25 RX ADMIN — SODIUM CHLORIDE 40 MG: 9 INJECTION, SOLUTION INTRAMUSCULAR; INTRAVENOUS; SUBCUTANEOUS at 21:37

## 2018-08-25 RX ADMIN — ALBUTEROL SULFATE 2.5 MG: 2.5 SOLUTION RESPIRATORY (INHALATION) at 20:10

## 2018-08-25 RX ADMIN — INSULIN LISPRO 6 UNITS: 100 INJECTION, SOLUTION INTRAVENOUS; SUBCUTANEOUS at 21:32

## 2018-08-25 RX ADMIN — INSULIN LISPRO 4 UNITS: 100 INJECTION, SOLUTION INTRAVENOUS; SUBCUTANEOUS at 12:07

## 2018-08-25 RX ADMIN — CEFTRIAXONE SODIUM 1 G: 1 INJECTION, POWDER, FOR SOLUTION INTRAMUSCULAR; INTRAVENOUS at 09:15

## 2018-08-25 RX ADMIN — ALBUTEROL SULFATE 2.5 MG: 2.5 SOLUTION RESPIRATORY (INHALATION) at 13:41

## 2018-08-25 RX ADMIN — DEXTROSE MONOHYDRATE 100 ML/HR: 5 INJECTION, SOLUTION INTRAVENOUS at 03:25

## 2018-08-25 RX ADMIN — INSULIN LISPRO 4 UNITS: 100 INJECTION, SOLUTION INTRAVENOUS; SUBCUTANEOUS at 17:10

## 2018-08-25 RX ADMIN — PRAVASTATIN SODIUM 40 MG: 20 TABLET ORAL at 21:28

## 2018-08-25 RX ADMIN — ALBUTEROL SULFATE 2.5 MG: 2.5 SOLUTION RESPIRATORY (INHALATION) at 07:31

## 2018-08-25 RX ADMIN — BUDESONIDE 500 MCG: 0.5 INHALANT RESPIRATORY (INHALATION) at 20:10

## 2018-08-25 RX ADMIN — Medication 10 ML: at 21:40

## 2018-08-25 RX ADMIN — SERTRALINE HYDROCHLORIDE 50 MG: 50 TABLET ORAL at 09:14

## 2018-08-25 RX ADMIN — Medication 10 ML: at 05:12

## 2018-08-25 RX ADMIN — DEXTROSE MONOHYDRATE 100 ML/HR: 5 INJECTION, SOLUTION INTRAVENOUS at 23:09

## 2018-08-25 RX ADMIN — FLUTICASONE PROPIONATE 1 SPRAY: 50 SPRAY, METERED NASAL at 09:20

## 2018-08-25 RX ADMIN — HYDRALAZINE HYDROCHLORIDE 100 MG: 50 TABLET, FILM COATED ORAL at 21:27

## 2018-08-25 RX ADMIN — GABAPENTIN 100 MG: 100 CAPSULE ORAL at 09:14

## 2018-08-25 RX ADMIN — GABAPENTIN 100 MG: 100 CAPSULE ORAL at 17:07

## 2018-08-25 RX ADMIN — HYDRALAZINE HYDROCHLORIDE 100 MG: 50 TABLET, FILM COATED ORAL at 09:14

## 2018-08-25 RX ADMIN — AMLODIPINE BESYLATE 10 MG: 10 TABLET ORAL at 09:14

## 2018-08-25 RX ADMIN — HYDRALAZINE HYDROCHLORIDE 100 MG: 50 TABLET, FILM COATED ORAL at 17:07

## 2018-08-25 NOTE — PROGRESS NOTES
[address] Hematology Oncology    Inpatient Hematology / Oncology Progress Note      Admission Date: 2018  4:19 PM  Reason for Admission/Hospital Course: Anemia, unspecified type [D64.9]      24 Hour Events:  Feeling well overall. No new complaints. ROS:  Constitutional: Negative for fever, chills, weakness, malaise, fatigue. CV: Negative for chest pain, palpitations, edema. Respiratory: Negative for dyspnea, cough, wheezing. GI: Negative for nausea, abdominal pain, diarrhea. 10 point review of systems is otherwise negative with the exception of the elements mentioned above in the HPI. No Known Allergies    OBJECTIVE:  Patient Vitals for the past 8 hrs:   BP Temp Pulse Resp SpO2 Height Weight   18 0731 - - - - 97 % - -   18 0720 141/88 98.5 °F (36.9 °C) 76 18 96 % - -   18 0536 - - - - - 5' 7\" (1.702 m) 188 lb 3.2 oz (85.4 kg)   18 0405 143/55 98.3 °F (36.8 °C) 77 18 100 % - -     Temp (24hrs), Av.4 °F (36.9 °C), Min:98.1 °F (36.7 °C), Max:98.9 °F (37.2 °C)     0701 -  1900  In: Torres Ray [P.O.:240; I.V.:1650]  Out: -     Physical Exam:  Constitutional: Well developed, well nourished female in no acute distress, sitting comfortably in the hospital bed. HEENT: Normocephalic and atraumatic. Oropharynx is clear, mucous membranes are moist.  Pupils are equal, round, and reactive to light. Extraocular muscles are intact. Sclerae anicteric. Neck supple without JVD. No thyromegaly present. Lymph node   No palpable submandibular, cervical, supraclavicular, axillary or inguinal lymph nodes. Skin Warm and dry. No bruising and no rash noted. No erythema. No pallor. Respiratory Lungs are clear to auscultation bilaterally without wheezes, rales or rhonchi, normal air exchange without accessory muscle use. CVS Normal rate, regular rhythm and normal S1 and S2. No murmurs, gallops, or rubs.    Abdomen Soft, nontender and nondistended, normoactive bowel sounds. No palpable mass. No hepatosplenomegaly. Neuro Grossly nonfocal with no obvious sensory or motor deficits. MSK Normal range of motion in general.  No edema and no tenderness. Psych Appropriate mood and affect. Labs:    Recent Labs      08/25/18   0530  08/24/18   1449  08/24/18   0543   08/23/18   0618   WBC  5.8   --    --    --   6.0   RBC  2.79*   --    --    --   2.56*   HGB  8.7*  8.0*  7.7*   < >  8.0*   HCT  27.9*  25.8*  25.2*   < >  25.9*   MCV  100.0*   --    --    --   101.2*   MCH  31.2   --    --    --   31.3   MCHC  31.2*   --    --    --   30.9*   RDW  14.4   --    --    --   13.9   PLT  177   --    --    --   204   GRANS  64   --    --    --   65   LYMPH  20   --    --    --   23   MONOS  13*   --    --    --   11   EOS  2   --    --    --   1   BASOS  0   --    --    --   0   IG  0   --    --    --   0   DF  AUTOMATED   --    --    --   AUTOMATED   ANEU  3.7   --    --    --   3.9   ABL  1.2   --    --    --   1.4   ABM  0.8   --    --    --   0.6   DELL  0.1   --    --    --   0.1   ABB  0.0   --    --    --   0.0   AIG  0.0   --    --    --   0.0    < > = values in this interval not displayed. Recent Labs      08/25/18   0530  08/24/18   1449  08/23/18   0618   NA  144  145  147*   K  3.7  3.6  3.4*   CL  110*  110*  111*   CO2  26  26  26   AGAP  8  9  10   GLU  161*  194*  137*   BUN  18  16  16   CREA  1.20*  1.40*  1.24*   GFRAA  54*  46*  52*   GFRNA  45*  38*  43*   CA  9.0  9.0  9.1         Imaging:  XR CHEST PA LAT [[id number]] Collected: 08/21/18 1811      Order Status: Completed Updated: 08/21/18 1944     Narrative:       History: Chest Pain    Two views chest    COMPARISON: 7/27/2018    Findings: COPD changes again noted with flattening of the diaphragms on the  lateral projection.  The cardiac silhouette, and mediastinal contour, and osseous  structures are stable.       Impression:       Impression: Stable two-view chest. No acute abnormality.          ASSESSMENT:    Problem List  Date Reviewed: 6/5/2018          Codes Class Noted    COPD (chronic obstructive pulmonary disease) ([facility] (Chronic) ICD-10-CM: J44.9  ICD-9-CM: 671  8/22/2018        DNR (do not resuscitate) (Chronic) ICD-10-CM: Z66  ICD-9-CM: V49.86  8/22/2018        * (Principal)MIHAELA (acute kidney injury) ([facility] ICD-10-CM: N17.9  ICD-9-CM: 584.9  8/21/2018        GI bleed ICD-10-CM: K92.2  ICD-9-CM: 578.9  7/28/2018        Syncope ICD-10-CM: R55  ICD-9-CM: 780.2  7/28/2018        Type 2 diabetes mellitus with diabetic neuropathy (HCC) (Chronic) ICD-10-CM: E11.40  ICD-9-CM: 250.60, 357.2  5/16/2018        Anxiety ICD-10-CM: F41.9  ICD-9-CM: 300.00  2/1/2018        Corn ICD-10-CM: L84  ICD-9-CM: 232  8/30/2017        Claw toe ICD-10-CM: T63.52  ICD-9-CM: 754.71  7/12/2017        Comprehensive diabetic foot examination, type 1 DM, encounter for [facility]) ICD-10-CM: E10.9  ICD-9-CM: 250.01  7/12/2017        Onychomycosis ICD-10-CM: B35.1  ICD-9-CM: 110.1  6/15/2017        Callus ICD-10-CM: L84  ICD-9-CM: 700  6/15/2017        Controlled type 2 diabetes mellitus with microalbuminuria, without long-term current use of insulin (HCC) ICD-10-CM: E11.29, R80.9  ICD-9-CM: 250.40, 791.0  11/22/2016        Hearing loss ICD-10-CM: H91.90  ICD-9-CM: 389.9  10/19/2015        DJD (degenerative joint disease) of hip ICD-10-CM: M16.9  ICD-9-CM: 715.95  Unknown        Body mass index 37.0-37.9, adult ICD-10-CM: Z68.37  ICD-9-CM: V85.37  2/5/2014        HLD (hyperlipidemia) ICD-10-CM: E78.5  ICD-9-CM: 272.4  1/8/2013        Encounter for long-term (current) use of other medications ICD-10-CM: Z79.899  ICD-9-CM: V58.69  1/8/2013        B12 deficiency ICD-10-CM: E53.8  ICD-9-CM: 266.2  8/24/2012        Hiatal hernia ICD-10-CM: K44.9  ICD-9-CM: 553.3  8/24/2012        Neuropathy ICD-10-CM: G62.9  ICD-9-CM: 355.9  8/24/2012    Overview Signed 8/24/2012 12:56 PM by Haily Joyce     Lower limbs             PAD (peripheral artery disease) ([facility] ICD-10-CM: I73.9  ICD-9-CM: 443.9  8/24/2012        Depression ICD-10-CM: F32.9  ICD-9-CM: 589  6/1/2012        Asthma ICD-10-CM: J45.909  ICD-9-CM: 493.90  6/1/2012        Other chest pain ICD-10-CM: R07.89  ICD-9-CM: 786.59  7/16/2010        Essential hypertension (Chronic) ICD-10-CM: I10  ICD-9-CM: 401.9  7/16/2010        GERD (gastroesophageal reflux disease) (Chronic) ICD-10-CM: K21.9  ICD-9-CM: 530.81  7/16/2010        Other and unspecified hyperlipidemia (Chronic) ICD-10-CM: E78.5  ICD-9-CM: 272.4  7/16/2010              81 y/o with history of dizziness admitted for MIHAELA, Hgb 8.0 from 10-11 at baseline. Tagged RBC scan showed gastric bleed but EGD negative, currently on serial H/H per GI. Anemia likely multifactorial including GI blood loss, renal impairment, possibly OSITO - check sTfR. No hemolysis, B12/folate adequate. PLAN:  Anemia  - Tagged RBC scan + in stomach. EGD did not reveal bleeding or source. No plans for colonoscopy unless actively bleeding.  -Check sTfR, EPO, folate, and hemolysis labs. 08/25 Hgb improved to 8.7. No hemolysis, B12/folate adequate. Awaiting sTfR.      UTI  - UCx +Klebsiella, on Rocephin      Lab studies and imaging studies were personally reviewed. Thank you for allowing us to participate in the care of Ms. Gallo Hernandez.     [facility] LISA Fernandez [place] [facility]  [address] [address]  Office : [phone]  Fax : [phone]

## 2018-08-25 NOTE — PROGRESS NOTES
Hospitalist Progress Note    Subjective:   Daily Progress Note: 8/25/2018 8:57 AM    Patient presented to ER 8/21 with dizziness, anorexia, urinary burning, fatigue, nausea,  x 2 days, loss of balance and memory loss. Blood pressure on admission 207/72. Found in MIHAELA, anemia. UTI began rocephin.   8/22:  GI in, no black stools. History of colon polyps, sigmoid resection, diverticulosis, esophageal strictures. Tagged study in July per [facility], felt to be diverticular bleed though the tagged study was positive in the stomach. Colonoscopy 2007.    8/23:  Hgb: 7.1. EGD with Karli, hiatal hernia 3 cm, very mild gastritis. No bleeding source identified. 8/24: Tolerated GI soft. RBC tagged study with + tracer in stomach. Hematology in. Urine culture with Klebsiella. 8/25:  Continue rocephin or po antibiotics for total of 7 days. Feeling much better. Home tomorrow if ok with Hematology and GI. Heme Onc waiting for sTfR    ADDITIONAL HISTORY:  Anxiety, palpitations, B12 deficiency, asthma, CAD, NIDDM, COPD, depression, hypertension, GERD, heart failure, hyperlipidemia, PAD, intracranial aneurysm repair.    Current Facility-Administered Medications   Medication Dose Route Frequency    0.9% sodium chloride infusion 250 mL  250 mL IntraVENous PRN    polyethylene glycol (MIRALAX) packet 17 g  17 g Oral DAILY    cefTRIAXone (ROCEPHIN) 1 g in 0.9% sodium chloride (MBP/ADV) 50 mL  1 g IntraVENous Q24H    dextrose 5% infusion  100 mL/hr IntraVENous CONTINUOUS    amLODIPine (NORVASC) tablet 10 mg  10 mg Oral DAILY    fluticasone (FLONASE) 50 mcg/actuation nasal spray 1 Spray  1 Spray Both Nostrils DAILY    gabapentin (NEURONTIN) capsule 100 mg  100 mg Oral BID    hydrALAZINE (APRESOLINE) tablet 100 mg  100 mg Oral TID    LORazepam (ATIVAN) tablet 0.5 mg  0.5 mg Oral DAILY PRN    pravastatin (PRAVACHOL) tablet 40 mg  40 mg Oral QHS    sertraline (ZOLOFT) tablet 50 mg  50 mg Oral DAILY    hydrALAZINE (APRESOLINE) 20 mg/mL injection 10 mg  10 mg IntraVENous Q6H PRN    hydrALAZINE (APRESOLINE) tablet 25 mg  25 mg Oral Q6H PRN    pantoprazole (PROTONIX) 40 mg in sodium chloride 0.9% 10 mL injection  40 mg IntraVENous Q24H    sodium chloride (NS) flush 5-10 mL  5-10 mL IntraVENous Q8H    sodium chloride (NS) flush 5-10 mL  5-10 mL IntraVENous PRN    acetaminophen (TYLENOL) tablet 650 mg  650 mg Oral Q4H PRN    HYDROcodone-acetaminophen (NORCO) 5-325 mg per tablet 1 Tab  1 Tab Oral Q4H PRN    naloxone (NARCAN) injection 0.4 mg  0.4 mg IntraVENous PRN    diphenhydrAMINE (BENADRYL) capsule 25 mg  25 mg Oral Q4H PRN    ondansetron (ZOFRAN) injection 4 mg  4 mg IntraVENous Q4H PRN    magnesium hydroxide (MILK OF MAGNESIA) 400 mg/5 mL oral suspension 30 mL  30 mL Oral DAILY PRN    zolpidem (AMBIEN) tablet 5 mg  5 mg Oral QHS PRN    insulin lispro (HUMALOG) injection   SubCUTAneous AC&HS    insulin glargine (LANTUS) injection 10 Units  10 Units SubCUTAneous QHS    budesonide (PULMICORT) 500 mcg/2 ml nebulizer suspension  500 mcg Nebulization BID RT    And    albuterol (PROVENTIL VENTOLIN) nebulizer solution 2.5 mg  2.5 mg Nebulization Q6HWA RT        Review of Systems  A comprehensive review of systems was negative except for that written in the HPI.     Objective:     Visit Vitals    /88    Pulse 76    Temp 98.5 °F (36.9 °C)    Resp 18    Ht 5' 7\" (1.702 m)    Wt 85.4 kg (188 lb 3.2 oz)    SpO2 97%    BMI 29.48 kg/m2      O2 Device: Room air    Temp (24hrs), Av.4 °F (36.9 °C), Min:98.1 °F (36.7 °C), Max:98.9 °F (37.2 °C)          1901 -  0700  In: 600 [P.O.:600]  Out: 3 [Urine:2]    General appearance: oriented and alert, cooperative, feeling much better  Head: Normocephalic, without obvious abnormality, atraumatic  Neck: supple, symmetrical, trachea midline, no adenopathy,   Lungs: clear to auscultation bilaterally  Heart: regular rate and rhythm, S1, S2 normal, no murmur, click, rub or gallop  Abdomen: soft, non-tender.  Bowel sounds normal. No masses,  no organomegaly  Extremities: extremities normal, atraumatic, no cyanosis or edema  Skin: Skin color, texture, turgor normal. No rashes or lesions  Neurologic: Grossly normal    Additional comments: Notes,orders, test results, vitals reviewed    Data Review    Recent Results (from the past 24 hour(s))   GLUCOSE, POC    Collection Time: 08/24/18 11:08 AM   Result Value Ref Range    Glucose (POC) 190 (H) 65 - 100 mg/dL   ERYTHROPOIETIN    Collection Time: 08/24/18 12:46 PM   Result Value Ref Range    Erythropoietin 27.5 (H) 2.6 - 18.5 mIU/mL   LD    Collection Time: 08/24/18 12:46 PM   Result Value Ref Range     (H) 110 - 210 U/L   BILIRUBIN, FRACTIONATED    Collection Time: 08/24/18 12:46 PM   Result Value Ref Range    Bilirubin, total 0.2 0.2 - 1.1 MG/DL    Bilirubin, direct <0.1 <0.4 MG/DL    Bilirubin, indirect Cannot be calculated 0.0 - 1.1 MG/DL   RETICULOCYTE COUNT    Collection Time: 08/24/18 12:46 PM   Result Value Ref Range    Reticulocyte count 3.9 (H) 0.3 - 2.0 %    Absolute Retic Cnt. 0.1057 (H) 0.026 - 0.095 M/ul    Immature Retic Fraction 18.4 (H) 3.0 - 15.9 %    Retic Hgb Conc. 33 29 - 35 pg   HAPTOGLOBIN    Collection Time: 08/24/18 12:46 PM   Result Value Ref Range    Haptoglobin 146 30 - 200 mg/dL   FOLATE    Collection Time: 08/24/18 12:46 PM   Result Value Ref Range    Folate 29.5 (H) 3.1 - 17.5 ng/mL   HGB & HCT    Collection Time: 08/24/18  2:49 PM   Result Value Ref Range    HGB 8.0 (L) 11.7 - 15.4 g/dL    HCT 25.8 (L) 35.8 - 02.1 %   METABOLIC PANEL, BASIC    Collection Time: 08/24/18  2:49 PM   Result Value Ref Range    Sodium 145 136 - 145 mmol/L    Potassium 3.6 3.5 - 5.1 mmol/L    Chloride 110 (H) 98 - 107 mmol/L    CO2 26 21 - 32 mmol/L    Anion gap 9 7 - 16 mmol/L    Glucose 194 (H) 65 - 100 mg/dL    BUN 16 8 - 23 MG/DL    Creatinine 1.40 (H) 0.6 - 1.0 MG/DL    GFR est AA 46 (L) >60 ml/min/1.73m2    GFR est non-AA 38 (L) >60 ml/min/1.73m2    Calcium 9.0 8.3 - 10.4 MG/DL   GLUCOSE, POC    Collection Time: 08/24/18  3:34 PM   Result Value Ref Range    Glucose (POC) 205 (H) 65 - 100 mg/dL   GLUCOSE, POC    Collection Time: 08/24/18  9:33 PM   Result Value Ref Range    Glucose (POC) 275 (H) 65 - 100 mg/dL   CBC WITH AUTOMATED DIFF    Collection Time: 08/25/18  5:30 AM   Result Value Ref Range    WBC 5.8 4.3 - 11.1 K/uL    RBC 2.79 (L) 4.05 - 5.2 M/uL    HGB 8.7 (L) 11.7 - 15.4 g/dL    HCT 27.9 (L) 35.8 - 46.3 %    .0 (H) 79.6 - 97.8 FL    MCH 31.2 26.1 - 32.9 PG    MCHC 31.2 (L) 31.4 - 35.0 g/dL    RDW 14.4 %    PLATELET 331 364 - 874 K/uL    MPV 10.4 9.4 - 12.3 FL    ABSOLUTE NRBC 0.00 0.0 - 0.2 K/uL    DF AUTOMATED      NEUTROPHILS 64 43 - 78 %    LYMPHOCYTES 20 13 - 44 %    MONOCYTES 13 (H) 4.0 - 12.0 %    EOSINOPHILS 2 0.5 - 7.8 %    BASOPHILS 0 0.0 - 2.0 %    IMMATURE GRANULOCYTES 0 0.0 - 5.0 %    ABS. NEUTROPHILS 3.7 1.7 - 8.2 K/UL    ABS. LYMPHOCYTES 1.2 0.5 - 4.6 K/UL    ABS. MONOCYTES 0.8 0.1 - 1.3 K/UL    ABS. EOSINOPHILS 0.1 0.0 - 0.8 K/UL    ABS. BASOPHILS 0.0 0.0 - 0.2 K/UL    ABS. IMM. GRANS. 0.0 0.0 - 0.5 K/UL   METABOLIC PANEL, BASIC    Collection Time: 08/25/18  5:30 AM   Result Value Ref Range    Sodium 144 136 - 145 mmol/L    Potassium 3.7 3.5 - 5.1 mmol/L    Chloride 110 (H) 98 - 107 mmol/L    CO2 26 21 - 32 mmol/L    Anion gap 8 7 - 16 mmol/L    Glucose 161 (H) 65 - 100 mg/dL    BUN 18 8 - 23 MG/DL    Creatinine 1.20 (H) 0.6 - 1.0 MG/DL    GFR est AA 54 (L) >60 ml/min/1.73m2    GFR est non-AA 45 (L) >60 ml/min/1.73m2    Calcium 9.0 8.3 - 10.4 MG/DL   GLUCOSE, POC    Collection Time: 08/25/18  7:24 AM   Result Value Ref Range    Glucose (POC) 120 (H) 65 - 100 mg/dL       8/21:  CXR:  COPD changes again noted with flattening of the diaphragms on the lateral projection. The cardiac silhouette, and mediastinal contour, and osseous structures are stable.    Impression: Stable two-view chest. No acute abnormality.      Assessment/Plan:   MIHAELA : Multifactorial     Improving, lasix on hold   Essential hypertension    Home meds  Acute UTI with Klebsiella   Day 5 Rocephin, continue abx total of 7 days  NIDDM: A1C: 5.4   Continue SSI   Continue lantus  COPD    aerosols  DNR     Home when ok with specialists  Not a good candidate for home insulin    Care Plan discussed with: Patient/Family and Nurse    Signed By: Amara Maria NP     August 25, 2018

## 2018-08-26 VITALS
HEART RATE: 75 BPM | RESPIRATION RATE: 18 BRPM | WEIGHT: 188.7 LBS | HEIGHT: 67 IN | BODY MASS INDEX: 29.62 KG/M2 | DIASTOLIC BLOOD PRESSURE: 64 MMHG | OXYGEN SATURATION: 97 % | TEMPERATURE: 98.2 F | SYSTOLIC BLOOD PRESSURE: 184 MMHG

## 2018-08-26 LAB
ANION GAP SERPL CALC-SCNC: 9 MMOL/L (ref 7–16)
BASOPHILS # BLD: 0 K/UL (ref 0–0.2)
BASOPHILS NFR BLD: 1 % (ref 0–2)
BUN SERPL-MCNC: 17 MG/DL (ref 8–23)
CALCIUM SERPL-MCNC: 9.2 MG/DL (ref 8.3–10.4)
CHLORIDE SERPL-SCNC: 110 MMOL/L (ref 98–107)
CO2 SERPL-SCNC: 26 MMOL/L (ref 21–32)
CREAT SERPL-MCNC: 1.22 MG/DL (ref 0.6–1)
DIFFERENTIAL METHOD BLD: ABNORMAL
EOSINOPHIL # BLD: 0.1 K/UL (ref 0–0.8)
EOSINOPHIL NFR BLD: 2 % (ref 0.5–7.8)
ERYTHROCYTE [DISTWIDTH] IN BLOOD BY AUTOMATED COUNT: 13.9 %
GLUCOSE BLD STRIP.AUTO-MCNC: 135 MG/DL (ref 65–100)
GLUCOSE BLD STRIP.AUTO-MCNC: 250 MG/DL (ref 65–100)
GLUCOSE SERPL-MCNC: 146 MG/DL (ref 65–100)
HCT VFR BLD AUTO: 28 % (ref 35.8–46.3)
HGB BLD-MCNC: 8.7 G/DL (ref 11.7–15.4)
IMM GRANULOCYTES # BLD: 0 K/UL (ref 0–0.5)
IMM GRANULOCYTES NFR BLD AUTO: 1 % (ref 0–5)
LYMPHOCYTES # BLD: 1 K/UL (ref 0.5–4.6)
LYMPHOCYTES NFR BLD: 24 % (ref 13–44)
MAGNESIUM SERPL-MCNC: 2.2 MG/DL (ref 1.8–2.4)
MCH RBC QN AUTO: 31.4 PG (ref 26.1–32.9)
MCHC RBC AUTO-ENTMCNC: 31.1 G/DL (ref 31.4–35)
MCV RBC AUTO: 101.1 FL (ref 79.6–97.8)
MONOCYTES # BLD: 0.6 K/UL (ref 0.1–1.3)
MONOCYTES NFR BLD: 14 % (ref 4–12)
NEUTS SEG # BLD: 2.5 K/UL (ref 1.7–8.2)
NEUTS SEG NFR BLD: 59 % (ref 43–78)
NRBC # BLD: 0 K/UL (ref 0–0.2)
PLATELET # BLD AUTO: 149 K/UL (ref 150–450)
PMV BLD AUTO: 10.2 FL (ref 9.4–12.3)
POTASSIUM SERPL-SCNC: 3.5 MMOL/L (ref 3.5–5.1)
RBC # BLD AUTO: 2.77 M/UL (ref 4.05–5.2)
SODIUM SERPL-SCNC: 145 MMOL/L (ref 136–145)
WBC # BLD AUTO: 4.2 K/UL (ref 4.3–11.1)

## 2018-08-26 PROCEDURE — 74011250637 HC RX REV CODE- 250/637: Performed by: HOSPITALIST

## 2018-08-26 PROCEDURE — 83735 ASSAY OF MAGNESIUM: CPT

## 2018-08-26 PROCEDURE — 85025 COMPLETE CBC W/AUTO DIFF WBC: CPT

## 2018-08-26 PROCEDURE — 82962 GLUCOSE BLOOD TEST: CPT

## 2018-08-26 PROCEDURE — 36415 COLL VENOUS BLD VENIPUNCTURE: CPT

## 2018-08-26 PROCEDURE — 74011000250 HC RX REV CODE- 250: Performed by: HOSPITALIST

## 2018-08-26 PROCEDURE — 74011250636 HC RX REV CODE- 250/636: Performed by: NURSE PRACTITIONER

## 2018-08-26 PROCEDURE — 74011000258 HC RX REV CODE- 258: Performed by: NURSE PRACTITIONER

## 2018-08-26 PROCEDURE — 97530 THERAPEUTIC ACTIVITIES: CPT

## 2018-08-26 PROCEDURE — 80048 BASIC METABOLIC PNL TOTAL CA: CPT

## 2018-08-26 PROCEDURE — 97110 THERAPEUTIC EXERCISES: CPT

## 2018-08-26 RX ORDER — PANTOPRAZOLE SODIUM 20 MG/1
20 TABLET, DELAYED RELEASE ORAL DAILY
Qty: 30 TAB | Refills: 0 | Status: SHIPPED | OUTPATIENT
Start: 2018-08-26 | End: 2018-10-09 | Stop reason: SDUPTHER

## 2018-08-26 RX ORDER — CEFPODOXIME PROXETIL 200 MG/1
100 TABLET, FILM COATED ORAL
Status: DISCONTINUED | OUTPATIENT
Start: 2018-08-26 | End: 2018-08-26 | Stop reason: HOSPADM

## 2018-08-26 RX ORDER — CEFPODOXIME PROXETIL 100 MG/1
100 TABLET, FILM COATED ORAL 2 TIMES DAILY
Qty: 5 TAB | Refills: 0 | Status: SHIPPED | OUTPATIENT
Start: 2018-08-26 | End: 2018-08-29

## 2018-08-26 RX ADMIN — GABAPENTIN 100 MG: 100 CAPSULE ORAL at 09:01

## 2018-08-26 RX ADMIN — CEFTRIAXONE SODIUM 1 G: 1 INJECTION, POWDER, FOR SOLUTION INTRAMUSCULAR; INTRAVENOUS at 09:01

## 2018-08-26 RX ADMIN — FLUTICASONE PROPIONATE 1 SPRAY: 50 SPRAY, METERED NASAL at 09:00

## 2018-08-26 RX ADMIN — Medication 10 ML: at 05:23

## 2018-08-26 RX ADMIN — SERTRALINE HYDROCHLORIDE 50 MG: 50 TABLET ORAL at 09:02

## 2018-08-26 RX ADMIN — AMLODIPINE BESYLATE 10 MG: 10 TABLET ORAL at 09:01

## 2018-08-26 RX ADMIN — POLYETHYLENE GLYCOL 3350 17 G: 17 POWDER, FOR SOLUTION ORAL at 09:01

## 2018-08-26 RX ADMIN — HYDRALAZINE HYDROCHLORIDE 100 MG: 50 TABLET, FILM COATED ORAL at 09:01

## 2018-08-26 NOTE — PROGRESS NOTES
Care Management Interventions  PCP Verified by CM:  Eleuterio Dos Santos MD)  Mode of Transport at Discharge:  (family)  Transition of Care Consult (CM Consult): [address]: Yes  Discharge Durable Medical Equipment: No  Physical Therapy Consult: Yes  Occupational Therapy Consult: Yes  Speech Therapy Consult: No  Current Support Network: Own Home, Family Lives Nearby  Confirm Follow Up Transport: Family  Plan discussed with Pt/Family/Caregiver: Yes  Freedom of Choice Offered: Yes  Discharge Location  Discharge Placement: Home with home health

## 2018-08-26 NOTE — DISCHARGE INSTRUCTIONS
DISCHARGE SUMMARY from Nurse    PATIENT INSTRUCTIONS:    After general anesthesia or intravenous sedation, for 24 hours or while taking prescription Narcotics:  · Limit your activities  · Do not drive and operate hazardous machinery  · Do not make important personal or business decisions  · Do  not drink alcoholic beverages  · If you have not urinated within 8 hours after discharge, please contact your surgeon on call. Report the following to your surgeon:  · Excessive pain, swelling, redness or odor of or around the surgical area  · Temperature over 100.5  · Nausea and vomiting lasting longer than 4 hours or if unable to take medications  · Any signs of decreased circulation or nerve impairment to extremity: change in color, persistent  numbness, tingling, coldness or increase pain  · Any questions    What to do at Home:  Recommended activity: Activity as tolerated,     If you experience any of the following symptoms fever greater then 100.5, pain unrelieved by medication, increase in shortness of breath, please follow up with primary care doctor. *  Please give a list of your current medications to your Primary Care Provider. *  Please update this list whenever your medications are discontinued, doses are      changed, or new medications (including over-the-counter products) are added. *  Please carry medication information at all times in case of emergency situations. These are general instructions for a healthy lifestyle:    No smoking/ No tobacco products/ Avoid exposure to second hand smoke  Surgeon General's Warning:  Quitting smoking now greatly reduces serious risk to your health.     Obesity, smoking, and sedentary lifestyle greatly increases your risk for illness    A healthy diet, regular physical exercise & weight monitoring are important for maintaining a healthy lifestyle    You may be retaining fluid if you have a history of heart failure or if you experience any of the following symptoms:  Weight gain of 3 pounds or more overnight or 5 pounds in a week, increased swelling in our hands or feet or shortness of breath while lying flat in bed. Please call your doctor as soon as you notice any of these symptoms; do not wait until your next office visit. Recognize signs and symptoms of STROKE:    F-face looks uneven    A-arms unable to move or move unevenly    S-speech slurred or non-existent    T-time-call 911 as soon as signs and symptoms begin-DO NOT go       Back to bed or wait to see if you get better-TIME IS BRAIN. Warning Signs of HEART ATTACK     Call 911 if you have these symptoms:   Chest discomfort. Most heart attacks involve discomfort in the center of the chest that lasts more than a few minutes, or that goes away and comes back. It can feel like uncomfortable pressure, squeezing, fullness, or pain.  Discomfort in other areas of the upper body. Symptoms can include pain or discomfort in one or both arms, the back, neck, jaw, or stomach.  Shortness of breath with or without chest discomfort.  Other signs may include breaking out in a cold sweat, nausea, or lightheadedness. Don't wait more than five minutes to call 911 - MINUTES MATTER! Fast action can save your life. Calling 911 is almost always the fastest way to get lifesaving treatment. Emergency Medical Services staff can begin treatment when they arrive -- up to an hour sooner than if someone gets to the hospital by car. The discharge information has been reviewed with the patient. The patient verbalized understanding. Discharge medications reviewed with the patient and appropriate educational materials and side effects teaching were provided. ___________________________________________________________________________________________________________________________________     Acute Kidney Injury: Care Instructions  Your Care Instructions    Acute kidney injury (MIHAELA) is a sudden decrease in kidney function. This can happen over a period of hours, days or, in some cases, weeks. MIHAELA used to be called acute renal failure, but kidney failure doesn't always happen with MIHAELA. Common causes of MIHAELA are dehydration, blood loss, and medicines. When MIHAELA happens, the kidneys have trouble removing waste and excess fluids from the body. The waste and fluids build up and become harmful. Kidney function may return to normal if the cause of MIHAELA is treated quickly. Your chance of a full recovery depends on what caused the problem, how quickly the cause was treated, and what other medical problems you have. A machine may be used to help your kidneys remove waste and fluids for a short period of time. This is called dialysis. Follow-up care is a key part of your treatment and safety. Be sure to make and go to all appointments, and call your doctor if you are having problems. It's also a good idea to know your test results and keep a list of the medicines you take. How can you care for yourself at home? · Talk to your doctor about how much fluid you should drink. · Eat a balanced diet. Talk to your doctor or a dietitian about what type of diet may be best for you. You may need to limit sodium, potassium, and phosphorus. · If you need dialysis, follow the instructions and schedule for dialysis that your doctor gives you. · Do not smoke. Smoking can make your condition worse. If you need help quitting, talk to your doctor about stop-smoking programs and medicines. These can increase your chances of quitting for good. · Do not drink alcohol. · Review all of your medicines with your doctor. Do not take any medicines, including nonsteroidal anti-inflammatory drugs (NSAIDs) such as ibuprofen (Advil, Motrin) or naproxen (Aleve), unless your doctor says it is safe for you to do so. · Make sure that anyone treating you for any health problem knows that you have had MIHAELA. When should you call for help?   Call 911 anytime you think you may need emergency care. For example, call if:    · You passed out (lost consciousness).    Call your doctor now or seek immediate medical care if:    · You have new or worse nausea and vomiting.     · You have much less urine than normal, or you have no urine.     · You are feeling confused or cannot think clearly.     · You have new or more blood in your urine.     · You have new swelling.     · You are dizzy or lightheaded, or feel like you may faint.    Watch closely for changes in your health, and be sure to contact your doctor if:    · You do not get better as expected. Where can you learn more? Go to http://nelia-mandy.info/. Enter C616 in the search box to learn more about \"Acute Kidney Injury: Care Instructions. \"  Current as of: May 12, 2017  Content Version: 11.7  © 7161-1550 [employer]. Care instructions adapted under license by [employer] (which disclaims liability or warranty for this information). If you have questions about a medical condition or this instruction, always ask your healthcare professional. [address] any warranty or liability for your use of this information.

## 2018-08-26 NOTE — DISCHARGE SUMMARY
Hospitalist Discharge Summary     Admit Date:  2018  4:19 PM   Name:  Merle Gil   Age:  80 y.o.  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 PAD    Hospital Course:  Patient presented to ER  with dizziness, anorexia, urinary burning, fatigue, nausea,  x 2 days, loss of balance and memory loss. Blood pressure on admission 207/72. Found in MIHAELA, anemia. UTI discovered,  began rocephin. On , GI in. History of colon polyps, sigmoid resection, diverticulosis, esophageal strictures. Tagged study in July per [facility], felt to be diverticular bleed though the tagged study was positive in the stomach. Colonoscopy . On  Hgb down to 7.1, transfused with one unit PRBC, general condition improved. EGD with [place], hiatal hernia 3 cm, very mild gastritis. No bleeding source identified. : Tolerated GI soft diet. RBC tagged study with + tracer in stomach. Hematology in. Urine culture with Klebsiella, lind susceptible. Will begin vantin, first dose given prior to discharge. Continue anttibiotics for 7 days total. Feeling much better, wants to go home. Phoned Oncology, they will follow up outpatient, have made appointment.   Patient is cautioned to rise from seated position slowly and have labs rechecked this week with Dr Nic Correia for follow up and labs.       Follow up instructions and discharge meds at bottom of this note. Plan was discussed with RN, patient, family, Oncology. All questions answered. Patient was stable at time of discharge. Diagnostic Imaging/Tests:   CXR:  8/21/2018:  COPD changes again noted with flattening of the diaphragm on the lateral projection. The cardiac silhouette, and mediastinal contour, and osseous structures are stable. Impression: Stable two-view chest. No acute abnormality.      All Micro Results     Procedure Component Value Units Date/Time    CULTURE, URINE [[id number]]  (Abnormal)  (Susceptibility) Collected:  08/22/18 0930    Order Status:  Completed Specimen:  Urine from Clean catch Updated:  08/25/18 0631     Special Requests: NO SPECIAL REQUESTS        Culture result:         >100,000 COLONIES/mL KLEBSIELLA PNEUMONIAE (A)        Labs: Results:       BMP, Mg, Phos Recent Labs      08/26/18   0536  08/25/18   0530  08/24/18   1449   NA  145  144  145   K  3.5  3.7  3.6   CL  110*  110*  110*   CO2  26  26  26   AGAP  9  8  9   BUN  17  18  16   CREA  1.22*  1.20*  1.40*   CA  9.2  9.0  9.0   GLU  146*  161*  194*   MG  2.2   --    --       CBC Recent Labs      08/26/18   0536  08/25/18   0530  08/24/18   1449   WBC  4.2*  5.8   --    RBC  2.77*  2.79*   --    HGB  8.7*  8.7*  8.0*   HCT  28.0*  27.9*  25.8*   PLT  149*  177   --    GRANS  59  64   --    LYMPH  24  20   --    EOS  2  2   --    MONOS  14*  13*   --    BASOS  1  0   --    IG  1  0   --    ANEU  2.5  3.7   --    ABL  1.0  1.2   --    DELL  0.1  0.1   --    ABM  0.6  0.8   --    ABB  0.0  0.0   --    AIG  0.0  0.0   --       Cardiac Testing Lab Results   Component Value Date/Time    BNP 38 09/10/2016 10:15 AM    BNP 54 04/19/2016 03:30 PM    BNP 22 03/01/2012 02:53 PM    CK 64 08/21/2018 09:25 PM    CK 38 07/16/2010 04:05 PM    CK - MB 0.7 07/16/2010 04:05 PM    CK-MB Index 1.8 07/16/2010 04:05 PM    Troponin-I <0.05 03/01/2012 02:53 PM    Troponin-I, Qt. <0.02 (L) 08/21/2018 03:09 PM    Troponin-I, Qt. <0.04 (L) 07/17/2010 06:52 AM    Troponin-I, Qt. <0.04 (L) 07/16/2010 04:05 PM      Coagulation Tests Lab Results   Component Value Date/Time    Prothrombin time 13.1 07/27/2018 08:53 PM    Prothrombin time 10.4 03/01/2012 02:08 PM    Prothrombin time 9.6 09/06/2009 12:55 PM    INR 1.0 07/27/2018 08:53 PM    INR 1.0 03/01/2012 02:08 PM    INR 0.9 09/06/2009 12:55 PM    aPTT 27.1 07/27/2018 08:53 PM    aPTT 27.3 07/12/2014 02:30 PM    aPTT 23.3 (L) 03/01/2012 02:08 PM      A1c Lab Results   Component Value Date/Time    Hemoglobin A1c 5.4 08/22/2018 06:03 AM    Hemoglobin A1c 6.4 (H) 05/02/2018 01:30 PM    Hemoglobin A1c CANCELED 05/02/2018 11:10 AM      Lipid Panel Lab Results   Component Value Date/Time    Cholesterol, total 153 05/02/2018 01:30 PM    HDL Cholesterol 63 05/02/2018 01:30 PM    LDL, calculated 74 05/02/2018 01:30 PM    VLDL, calculated 16 05/02/2018 01:30 PM    Triglyceride 80 05/02/2018 01:30 PM    CHOL/HDL Ratio 2.8 02/22/2017 09:54 AM      Thyroid Panel Lab Results   Component Value Date/Time    TSH 0.888 08/22/2018 06:01 AM    TSH 2.470 10/18/2017 10:07 AM        Most Recent UA Lab Results   Component Value Date/Time    Color YELLOW 08/21/2018 06:00 PM    Appearance CLOUDY 08/21/2018 06:00 PM    Specific gravity 1.008 08/21/2018 06:00 PM    pH (UA) 7.0 08/21/2018 06:00 PM    Protein NEGATIVE  08/21/2018 06:00 PM    Glucose NEGATIVE  08/21/2018 06:00 PM    Ketone NEGATIVE  08/21/2018 06:00 PM    Bilirubin NEGATIVE  08/21/2018 06:00 PM    Blood NEGATIVE  08/21/2018 06:00 PM    Urobilinogen 0.2 08/21/2018 06:00 PM    Nitrites NEGATIVE  08/21/2018 06:00 PM    Leukocyte Esterase LARGE (A) 08/21/2018 06:00 PM        No Known Allergies  Immunization History   Administered Date(s) Administered    Influenza High Dose Vaccine PF 09/14/2016, 10/25/2017    Influenza Vaccine 11/05/2013, 09/16/2014, 10/19/2015    Influenza Vaccine PF 09/22/2015    Influenza Vaccine Whole 01/01/2011    Pneumococcal Conjugate (PCV-13) 07/14/2015    Pneumococcal Vaccine (Unspecified Type) 01/01/2006    TB Skin Test (PPD) Intradermal 07/31/2018, 08/21/2018    Tdap 11/04/2014    ZZZ-RETIRED (DO NOT USE) Pneumococcal Vaccine (Unspecified Type) 01/01/2006       All Labs from Last 24 Hrs:  Recent Results (from the past 24 hour(s))   GLUCOSE, POC    Collection Time: 08/25/18 11:12 AM   Result Value Ref Range    Glucose (POC) 221 (H) 65 - 100 mg/dL   GLUCOSE, POC    Collection Time: 08/25/18  3:31 PM   Result Value Ref Range    Glucose (POC) 239 (H) 65 - 100 mg/dL   GLUCOSE, POC    Collection Time: 08/25/18  8:29 PM   Result Value Ref Range    Glucose (POC) 268 (H) 65 - 100 mg/dL   CBC WITH AUTOMATED DIFF    Collection Time: 08/26/18  5:36 AM   Result Value Ref Range    WBC 4.2 (L) 4.3 - 11.1 K/uL    RBC 2.77 (L) 4.05 - 5.2 M/uL    HGB 8.7 (L) 11.7 - 15.4 g/dL    HCT 28.0 (L) 35.8 - 46.3 %    .1 (H) 79.6 - 97.8 FL    MCH 31.4 26.1 - 32.9 PG    MCHC 31.1 (L) 31.4 - 35.0 g/dL    RDW 13.9 %    PLATELET 148 (L) 786 - 450 K/uL    MPV 10.2 9.4 - 12.3 FL    ABSOLUTE NRBC 0.00 0.0 - 0.2 K/uL    DF AUTOMATED      NEUTROPHILS 59 43 - 78 %    LYMPHOCYTES 24 13 - 44 %    MONOCYTES 14 (H) 4.0 - 12.0 %    EOSINOPHILS 2 0.5 - 7.8 %    BASOPHILS 1 0.0 - 2.0 %    IMMATURE GRANULOCYTES 1 0.0 - 5.0 %    ABS. NEUTROPHILS 2.5 1.7 - 8.2 K/UL    ABS. LYMPHOCYTES 1.0 0.5 - 4.6 K/UL    ABS. MONOCYTES 0.6 0.1 - 1.3 K/UL    ABS. EOSINOPHILS 0.1 0.0 - 0.8 K/UL    ABS. BASOPHILS 0.0 0.0 - 0.2 K/UL    ABS. IMM.  GRANS. 0.0 0.0 - 0.5 K/UL   METABOLIC PANEL, BASIC    Collection Time: 08/26/18  5:36 AM   Result Value Ref Range    Sodium 145 136 - 145 mmol/L    Potassium 3.5 3.5 - 5.1 mmol/L    Chloride 110 (H) 98 - 107 mmol/L    CO2 26 21 - 32 mmol/L    Anion gap 9 7 - 16 mmol/L    Glucose 146 (H) 65 - 100 mg/dL    BUN 17 8 - 23 MG/DL    Creatinine 1.22 (H) 0.6 - 1.0 MG/DL    GFR est AA 53 (L) >60 ml/min/1.73m2    GFR est non-AA 44 (L) >60 ml/min/1.73m2    Calcium 9.2 8.3 - 10.4 MG/DL   MAGNESIUM    Collection Time: 08/26/18  5:36 AM   Result Value Ref Range    Magnesium 2.2 1.8 - 2.4 mg/dL   GLUCOSE, POC    Collection Time: 08/26/18  7:08 AM   Result Value Ref Range    Glucose (POC) 135 (H) 65 - 100 mg/dL       Discharge Exam:  Patient Vitals for the past 24 hrs:   Temp Pulse Resp BP SpO2   08/26/18 0706 98.3 °F (36.8 °C) 69 18 147/56 94 %   08/26/18 0424 98.8 °F (37.1 °C) 80 18 137/68 96 %   08/25/18 2306 99.3 °F (37.4 °C) 76 19 164/66 94 %   08/25/18 2010 - - - - 95 %   08/25/18 1907 99.2 °F (37.3 °C) 81 18 158/57 97 %   08/25/18 1445 98.6 °F (37 °C) 79 18 127/52 95 %   08/25/18 1341 - - - - 95 %   08/25/18 1128 98.7 °F (37.1 °C) 71 18 138/66 96 %     Oxygen Therapy  O2 Sat (%): 94 % (08/26/18 0706)  Pulse via Oximetry: 72 beats per minute (08/25/18 2010)  O2 Device: Room air (08/25/18 2010)    Intake/Output Summary (Last 24 hours) at 08/26/18 1027  Last data filed at 08/25/18 2306   Gross per 24 hour   Intake             1519 ml   Output                0 ml   Net             1519 ml       General:    Well nourished. Alert. Eyes:   Normal sclera. Extraocular movements intact. ENT:  Normocephalic, atraumatic. Hard of hearing. Moist mucous membranes  CV:   Regular rate and rhythm. No murmur, rub, or gallop. Lungs:  Clear to auscultation bilaterally. No wheezing, rhonchi, or rales. Abdomen: Soft, nontender, nondistended. Bowel sounds normal.   Extremities: Warm and dry. No cyanosis or edema. Neurologic: CN II-XII grossly intact. Sensation intact. Skin:     No rashes or jaundice. Psych:  Normal mood and affect. Discharge Info:   Current Discharge Medication List      START taking these medications    Details   cefpodoxime (VANTIN) 100 mg tablet Take 1 Tab by mouth two (2) times a day for 5 doses.   Qty: 5 Tab, Refills: 0      pantoprazole (PROTONIX) 20 mg tablet Take 1 Tab by mouth daily. Qty: 30 Tab, Refills: 0         CONTINUE these medications which have NOT CHANGED    Details   VENTOLIN HFA 90 mcg/actuation inhaler INHALE 2 PUFFS BY MOUTH EVERY 6 HOURS AS NEEDED FOR WHEEZING  Qty: 1 Inhaler, Refills: 12      LORazepam (ATIVAN) 0.5 mg tablet Take 1 Tab by mouth daily as needed for Anxiety. Qty: 16 Tab, Refills: 0    Associated Diagnoses: Anxiety      hydrALAZINE (APRESOLINE) 50 mg tablet Take 2 Tabs by mouth three (3) times daily. Qty: 60 Tab, Refills: 3      glipiZIDE SR (GLUCOTROL XL) 2.5 mg CR tablet Take 2.5 mg by mouth daily. fluticasone (FLONASE) 50 mcg/actuation nasal spray INSTILL 1 SPRAY IN EACH NOSTRIL NIGHTLY AS DIRECTED  Qty: 1 Bottle, Refills: 3    Comments: **Patient requests 90 days supply**      potassium chloride (K-DUR, KLOR-CON) 20 mEq tablet TAKE 1 TABLET BY MOUTH EVERY MORNING  Qty: 90 Tab, Refills: 0      cyanocobalamin, vitamin B-12, 1,000 mcg/mL kit 1,000 mcg by Injection route every month. Qty: 1 Kit, Refills: 12      furosemide (LASIX) 20 mg tablet Take 1 Tab by mouth daily. Qty: 90 Tab, Refills: 3      sertraline (ZOLOFT) 100 mg tablet Take 0.5 Tabs by mouth daily. Indications: major depressive disorder, am  Qty: 90 Tab, Refills: 3      fluticasone-salmeterol (ADVAIR DISKUS) 250-50 mcg/dose diskus inhaler Take 1 Puff by inhalation every twelve (12) hours. Qty: 3 Inhaler, Refills: 3      benazepril (LOTENSIN) 40 mg tablet Take 1 Tab by mouth daily. Qty: 90 Tab, Refills: 3      amLODIPine (NORVASC) 10 mg tablet Take 1 Tab by mouth daily.   Qty: 90 Tab, Refills: 3      pravastatin (PRAVACHOL) 40 mg tablet TAKE 1 TABLET BY MOUTH EVERY NIGHT AT BEDTIME  Qty: 90 Tab, Refills: 3      gabapentin (NEURONTIN) 100 mg capsule TAKE 1 CAPSULE BY MOUTH TWICE DAILY  Qty: 180 Cap, Refills: 3      Blood-Glucose Meter monitoring kit Pt uses true metrix meter and tests once daily dx code E11.29  Qty: 1 Kit, Refills: 0      metFORMIN (GLUCOPHAGE) 500 mg tablet Take 1 Tab by mouth two (2) times daily (with meals). Qty: 180 Tab, Refills: 3      polyethylene glycol (MIRALAX) 17 gram/dose powder Take 17 g by mouth daily. 1 tablespoon with 8 oz of water daily  Qty: 119 g, Refills: 0      FOLIC ACID/MULTIVIT-MIN/LUTEIN (CENTRUM SILVER PO) Take  by mouth. OTHER One shower chair  Qty: 1 Each, Refills: 0      VIT A/VIT C/VIT E/ZINC/COPPER (PRESERVISION AREDS PO) Take 1 Tab by mouth two (2) times a day. glucose blood VI test strips (BLOOD GLUCOSE TEST) strip Pt uses true metrix test strips. Pt tests once daily. Dx code E11.29  Qty: 100 Strip, Refills: 11      Lancets Misc Pt tests three times daily Diagnosis Code: 250.00  Qty: 1 Package, Refills: 11           Disposition: Home with home health    Activity:   As tolerated and discussed with PT  Diet: DIET GI SOFT No options chosen    Follow-up Appointments   Procedures    FOLLOW UP VISIT Appointment in: One Month Will schedule a one month apt with Dr. Marilyn Nogueira at the cancer center.  FOLLOW UP VISIT Appointment in: Other (Specify) FOLLOW UP WITH DR LOREDO THIS WEEK FOR LABS. CALL ON Monday FOR APPOINTMENT . FOLLOW UP WITH HEMATOLOGY/ONCOLOGY AS INDICATED BY THEM. FOLLOW UP WITH DR LOREDO THIS WEEK FOR LABS. CALL ON Monday FOR APPOINTMENT . FOLLOW UP WITH HEMATOLOGY/ONCOLOGY AS INDICATED BY THEM.  FOLLOW UP VISIT Appointment in: Other (Specify) Follow up with Gi as indicated by them. If they did not specify, call on Monday, tell them you were here and need a follow up . Time spent in patient discharge planning and coordination 45 minutes.     Signed:  Maikol Lopez NP

## 2018-08-26 NOTE — PROGRESS NOTES
Problem: Mobility Impaired (Adult and Pediatric)  Goal: *Acute Goals and Plan of Care (Insert Text)  LTG:  (1.)Ms. Gallo Hernandez will move from supine to sit and sit to supine , scoot up and down and roll side to side INDEPENDENTLY with bed flat within 7 treatment day(s). (2.)Ms. Gallo Hernandez will transfer from bed to chair and chair to bed with MODIFIED INDEPENDENCE using the least restrictive device within 7 treatment day(s). (3.)Ms. Gallo Hernandez will ambulate with MODIFIED INDEPENDENCE for 200 feet with the least restrictive device within 7 treatment day(s). (4.)Ms. Gallo Hernandez will independently perform LE exercises per HEP for 15 minutes to improve strength and mobility within 7 days. ________________________________________________________________________________________________    PHYSICAL THERAPY: Daily Note, Treatment Day: 2nd, AM 8/26/2018  INPATIENT: Hospital Day: 6  Payor: [employer] MMP / Plan: [place] [employer] / Product Type: Managed Care Medicare /      NAME/AGE/GENDER: Kelsy German is a 80 y.o. female   PRIMARY DIAGNOSIS: Anemia, unspecified type [D64.9] MIHAELA (acute kidney injury) ([facility] [address] MIHAELA (acute kidney injury) ([facility] [address]  Procedure(s) (LRB):  ESOPHAGOGASTRODUODENOSCOPY (EGD)  (N/A)  3 Days Post-Op  ICD-10: Treatment Diagnosis:    · Generalized Muscle Weakness (M62.81)  · Difficulty in walking, Not elsewhere classified (R26.2)  · Other abnormalities of gait and mobility (R26.89)  · History of falling (Z91.81)   Precaution/Allergies:  Review of patient's allergies indicates no known allergies. ASSESSMENT:     Ms. Gallo Hernandez is a very pleasant 80year old female admitted from home for acute kidney injury. She lives with daughter and is typically fairly mobile with cane; has been using walker for the past few weeks after fall and left patella fx (non surgical). 8/26/18: Pt sitting up in chair without complaints. Eager to walk.  Able to increase gait distance today, ambulating 170 ft in hallway with walker and SBA-supervision. Slow but steady gait noted with one standing rest break. Pt feels much improved compared to previous treatment. Returned to room for a short seated rest break then performs below LE exercises and was able to increase reps with great participation. Anticipate return home with [facility]. This section established at most recent assessment   PROBLEM LIST (Impairments causing functional limitations):  1. Decreased Strength  2. Decreased Transfer Abilities  3. Decreased Ambulation Ability/Technique  4. Decreased Balance  5. Decreased Activity Tolerance   INTERVENTIONS PLANNED: (Benefits and precautions of physical therapy have been discussed with the patient.)  1. Balance Exercise  2. Bed Mobility  3. Gait Training  4. Home Exercise Program (HEP)  5. Therapeutic Activites  6. Therapeutic Exercise/Strengthening  7. Transfer Training     TREATMENT PLAN: Frequency/Duration: 3 times a week for duration of hospital stay  Rehabilitation Potential For Stated Goals: Good     RECOMMENDED REHABILITATION/EQUIPMENT: (at time of discharge pending progress): Due to the probability of continued deficits (see above) this patient will likely need continued skilled physical therapy after discharge. Equipment:    None at this time              HISTORY:   History of Present Injury/Illness (Reason for Referral):  Per H&P, \"This is a 80-year-old lady with history of multiple medical problems listed below in past medical history, including hypertension, diabetes, COPD, CAD, anxiety disorder, B12 deficiency, coronary artery disease, came to the emergency room with complaints of dizziness. Patient has not been eating or drinking much over the last 1 week. She was also having some pressure and burning with urination over the last 1 week. No fevers or chills or chest pain or shortness of breath. No diarrhea. No nausea or vomitings. Has decreased appetite and has not been eating much.   She started having dizziness since this morning, moderate in severity, worse with standing and walking, somewhat better with lying down. No associated chest pain or shortness of breath. Patient did not fall. On initial evaluation in the emergency room she was found to be in acute renal failure, also has anemia, so she was being admitted for further evaluation and treatment. Patient denies any bleeding per rectum over the last few days. Also denies black stools. She has been taking Lasix even though she was not drinking enough\"    Past Medical History/Comorbidities:   Ms. Callie Skinner  has a past medical history of Anxiety (2/1/2018); Arrhythmia; Arthritis; Asthma; B12 deficiency (8/24/2012); Body mass index 37.0-37.9, adult (2/5/2014); CAD (coronary artery disease); Controlled type 2 diabetes mellitus with microalbuminuria, without long-term current use of insulin ([facility] [address] (11/22/2016); COPD; Corns and callosities (12/19/2016); CRI (chronic renal insufficiency) (8/24/2012); Depression (6/1/2012); Dermatophytosis of nail (12/19/2016); Diabetes ([address] DJD (degenerative joint disease) of hip; DM (diabetes mellitus) type II controlled with renal manifestation ([address] (7/16/2010); Encounter for long-term (current) use of other medications (1/8/2013); Essential hypertension (7/16/2010); Gastrointestinal disorder; GERD (gastroesophageal reflux disease); Heart failure ([address] Hiatal hernia (8/24/2012); HLD (hyperlipidemia) (1/8/2013); Neuropathy (8/24/2012); Obesity (BMI 30.0-39.9) (BMI-43.8); Other and unspecified hyperlipidemia (7/16/2010); Other chest pain (7/16/2010); Other ill-defined conditions(799.89); Other ill-defined conditions(799.89); PAD (peripheral artery disease) ([address] (8/24/2012); and Retinal hemorrhage.   Ms. Callie Skinner  has a past surgical history that includes pr abdomen surgery proc unlisted; hx appendectomy; hx cholecystectomy; hx other surgical; hx gyn; hx gi; hx orthopaedic; hx hysterectomy; hx cholecystectomy (2000); hx intracranial aneurysm repair; hx intracranial aneurysm repair; hx other surgical; hx heart catheterization; and hx endoscopy (02/27/2018). Social History/Living Environment:   Home Environment: Private residence  # Steps to Enter: 1  Rails to Enter: Yes  Hand Rails : Right  One/Two Story Residence: One story  Living Alone: No  Support Systems: Child(edward), Family member(s)  Patient Expects to be Discharged to[de-identified] Private residence  Current DME Used/Available at Home: Walker, rollator, Cane, straight  Tub or Shower Type: Shower  Prior Level of Function/Work/Activity:  Lives with daughter in single story home with 1 step. Mod I with cane or walker; walker mostly after recent L patella fx. Daughter with pt during the day. Number of Personal Factors/Comorbidities that affect the Plan of Care: 1-2: MODERATE COMPLEXITY   EXAMINATION:   Most Recent Physical Functioning:   Gross Assessment:  AROM: Within functional limits  Strength: Generally decreased, functional  Coordination: Within functional limits  Sensation: Intact               Posture:  Posture (WDL): Exceptions to WDL  Posture Assessment: Forward head, Rounded shoulders, Trunk flexion  Balance:  Sitting: Intact  Standing: Impaired  Standing - Static: Good  Standing - Dynamic : Fair (+ to good with walker) Bed Mobility:     Wheelchair Mobility:     Transfers:  Sit to Stand: Supervision  Stand to Sit: Supervision  Bed to Chair: Supervision  Interventions: Verbal cues; Safety awareness training  Duration: 15 Minutes  Gait:     Base of Support: Center of gravity altered  Speed/Airam: Pace decreased (<100 feet/min); Slow  Step Length: Left shortened;Right shortened  Gait Abnormalities: Trunk sway increased  Distance (ft): 170 Feet (ft)  Assistive Device: Walker, rolling  Ambulation - Level of Assistance: Stand-by assistance  Interventions: Verbal cues; Safety awareness training      Body Structures Involved:  1. Muscles Body Functions Affected:  1.  Movement Related Activities and Participation Affected:  1. General Tasks and Demands  2. Mobility  3. Domestic Life  4. Community, Social and [place]   Number of elements that affect the Plan of Care: 4+: HIGH COMPLEXITY   CLINICAL PRESENTATION:   Presentation: Stable and uncomplicated: LOW COMPLEXITY   CLINICAL DECISION MAKIN [facility] Mobility Inpatient Short Form  How much difficulty does the patient currently have. .. Unable A Lot A Little None   1. Turning over in bed (including adjusting bedclothes, sheets and blankets)? [] 1   [] 2   [] 3   [x] 4   2. Sitting down on and standing up from a chair with arms ( e.g., wheelchair, bedside commode, etc.)   [] 1   [] 2   [] 3   [x] 4   3. Moving from lying on back to sitting on the side of the bed? [] 1   [] 2   [] 3   [x] 4   How much help from another person does the patient currently need. .. Total A Lot A Little None   4. Moving to and from a bed to a chair (including a wheelchair)? [] 1   [] 2   [x] 3   [] 4   5. Need to walk in hospital room? [] 1   [] 2   [x] 3   [] 4   6. Climbing 3-5 steps with a railing? [] 1   [x] 2   [] 3   [] 4   © , Trustees of [facility] [employer], under license to [employer]. All rights reserved      Score:  Initial: 20 Most Recent: X (Date: -- )    Interpretation of Tool:  Represents activities that are increasingly more difficult (i.e. Bed mobility, Transfers, Gait). Score 24 23 22-20 19-15 14-10 9-7 6     Modifier CH CI CJ CK CL CM CN      ?  Mobility - Walking and Moving Around:     - CURRENT STATUS: CJ - 20%-39% impaired, limited or restricted    - GOAL STATUS: CI - 1%-19% impaired, limited or restricted    - D/C STATUS:  ---------------To be determined---------------  Payor: [employer] / Plan: [place] [employer] MMP / Product Type: Managed Care Medicare /      Medical Necessity:     · Patient demonstrates good rehab potential due to higher previous functional level. Reason for Services/Other Comments:  · Patient continues to demonstrate capacity to improve strength, balance, activity tolerance which will increase independence and increase safety. Use of outcome tool(s) and clinical judgement create a POC that gives a: Clear prediction of patient's progress: LOW COMPLEXITY            TREATMENT:   (In addition to Assessment/Re-Assessment sessions the following treatments were rendered)   Pre-treatment Symptoms/Complaints:  \"that was very good, I feel better\"  Pain: Initial:   Pain Intensity 1: 0  Post Session:  0/10     Therapeutic Activity: (  15 Minutes ):  Therapeutic activities including Chair transfers and Ambulation on level ground to improve mobility, strength, balance and activity tolerance. Required minimal Verbal cues; Safety awareness training to promote static and dynamic balance in standing and promote motor control of bilateral, lower extremity(s). Therapeutic Exercise: (10 Minutes):  Exercises per grid below to improve mobility, strength and balance. Required minimal visual and verbal cues to promote proper body mechanics and exercise form. Progressed range, repetitions and complexity of movement as indicated. Date:  8/22/18 Date:  8/24/18 Date:  8/26/18   Activity/Exercise Parameters Parameters Parameters   Ankle pumps 15x AB 15x AB 20x AB   Seated marching 15x AB 15x AB 20x AB   LAQ 15x AB 15x AB 20x AB   Seated hip aBd 15x AB 15x AB 20x AB   Seated UE punches  15x AB    Bicep curls  15x AB                Braces/Orthotics/Lines/Etc:   · O2 Device: Room air  Treatment/Session Assessment:    · Response to Treatment:  Good progress and participation  · Interdisciplinary Collaboration:   o Physical Therapist  o Registered Nurse  · After treatment position/precautions:   o Up in chair  o Bed/Chair-wheels locked  o Bed in low position  o Call light within reach   · Compliance with Program/Exercises:  Will assess as treatment progresses. · Recommendations/Intent for next treatment session: \"Next visit will focus on advancements to more challenging activities and reduction in assistance provided\".   Total Treatment Duration:  PT Patient Time In/Time Out  Time In: 1025  Time Out: 89570 Jah Garcia DPT

## 2018-08-26 NOTE — PROGRESS NOTES
Gave discharge instructions to the pt. The pt voiced a clear understanding. Iv removed and site dressed. Pt has been instructed to call ride.

## 2018-08-27 ENCOUNTER — HOME CARE VISIT (OUTPATIENT)
Dept: SCHEDULING | Facility: HOME HEALTH | Age: 83
End: 2018-08-27
Payer: COMMERCIAL

## 2018-08-27 ENCOUNTER — PATIENT OUTREACH (OUTPATIENT)
Dept: CASE MANAGEMENT | Age: 83
End: 2018-08-27

## 2018-08-27 LAB — TRANSFERRIN SERPL-SCNC: 27.1 NMOL/L (ref 12.2–27.3)

## 2018-08-27 PROCEDURE — 3331090001 HH PPS REVENUE CREDIT

## 2018-08-27 PROCEDURE — 400013 HH SOC

## 2018-08-27 PROCEDURE — G0299 HHS/HOSPICE OF RN EA 15 MIN: HCPCS

## 2018-08-27 PROCEDURE — 3331090002 HH PPS REVENUE DEBIT

## 2018-08-27 NOTE — PROGRESS NOTES
Care Coordinator spoke with patient's daughter Mrs. Trixie Jorge who informed Care Coordinator that patient Mrs. Gabrielle Reinoso is not available due to home health nurse visit. Care Coordinator will plan follow up call within 24 hours. This note will not be viewable in [address]

## 2018-08-28 ENCOUNTER — PATIENT OUTREACH (OUTPATIENT)
Dept: CASE MANAGEMENT | Age: 83
End: 2018-08-28

## 2018-08-28 PROCEDURE — 3331090002 HH PPS REVENUE DEBIT

## 2018-08-28 PROCEDURE — 3331090001 HH PPS REVENUE CREDIT

## 2018-08-28 NOTE — PROGRESS NOTES
Care Coordinator left voicemail message on home # [phone], requesting a return call from patient. Care Coordinator will make final attempt to reach patient for Transitions of Care Outreach in 5 business days. This note will not be viewable in [address]

## 2018-08-29 ENCOUNTER — HOME CARE VISIT (OUTPATIENT)
Dept: SCHEDULING | Facility: HOME HEALTH | Age: 83
End: 2018-08-29
Payer: COMMERCIAL

## 2018-08-29 VITALS
DIASTOLIC BLOOD PRESSURE: 80 MMHG | TEMPERATURE: 97.8 F | HEART RATE: 83 BPM | OXYGEN SATURATION: 96 % | SYSTOLIC BLOOD PRESSURE: 174 MMHG

## 2018-08-29 VITALS
DIASTOLIC BLOOD PRESSURE: 70 MMHG | HEART RATE: 75 BPM | OXYGEN SATURATION: 99 % | TEMPERATURE: 99 F | RESPIRATION RATE: 20 BRPM | SYSTOLIC BLOOD PRESSURE: 154 MMHG

## 2018-08-29 PROCEDURE — G0152 HHCP-SERV OF OT,EA 15 MIN: HCPCS

## 2018-08-29 PROCEDURE — 3331090001 HH PPS REVENUE CREDIT

## 2018-08-29 PROCEDURE — 3331090002 HH PPS REVENUE DEBIT

## 2018-08-30 ENCOUNTER — HOME CARE VISIT (OUTPATIENT)
Dept: SCHEDULING | Facility: HOME HEALTH | Age: 83
End: 2018-08-30
Payer: COMMERCIAL

## 2018-08-30 PROCEDURE — 3331090002 HH PPS REVENUE DEBIT

## 2018-08-30 PROCEDURE — 3331090001 HH PPS REVENUE CREDIT

## 2018-08-31 ENCOUNTER — HOME CARE VISIT (OUTPATIENT)
Dept: SCHEDULING | Facility: HOME HEALTH | Age: 83
End: 2018-08-31
Payer: COMMERCIAL

## 2018-08-31 VITALS
RESPIRATION RATE: 18 BRPM | SYSTOLIC BLOOD PRESSURE: 128 MMHG | DIASTOLIC BLOOD PRESSURE: 68 MMHG | TEMPERATURE: 97.7 F | HEART RATE: 76 BPM

## 2018-08-31 PROCEDURE — 3331090001 HH PPS REVENUE CREDIT

## 2018-08-31 PROCEDURE — G0151 HHCP-SERV OF PT,EA 15 MIN: HCPCS

## 2018-08-31 PROCEDURE — 3331090002 HH PPS REVENUE DEBIT

## 2018-09-01 PROCEDURE — 3331090001 HH PPS REVENUE CREDIT

## 2018-09-01 PROCEDURE — 3331090002 HH PPS REVENUE DEBIT

## 2018-09-02 PROCEDURE — 3331090001 HH PPS REVENUE CREDIT

## 2018-09-02 PROCEDURE — 3331090002 HH PPS REVENUE DEBIT

## 2018-09-03 PROCEDURE — 3331090001 HH PPS REVENUE CREDIT

## 2018-09-03 PROCEDURE — 3331090002 HH PPS REVENUE DEBIT

## 2018-09-04 ENCOUNTER — HOME CARE VISIT (OUTPATIENT)
Dept: SCHEDULING | Facility: HOME HEALTH | Age: 83
End: 2018-09-04
Payer: COMMERCIAL

## 2018-09-04 VITALS
SYSTOLIC BLOOD PRESSURE: 164 MMHG | OXYGEN SATURATION: 98 % | HEART RATE: 72 BPM | DIASTOLIC BLOOD PRESSURE: 62 MMHG | TEMPERATURE: 97.7 F | RESPIRATION RATE: 14 BRPM

## 2018-09-04 PROCEDURE — 3331090002 HH PPS REVENUE DEBIT

## 2018-09-04 PROCEDURE — 3331090001 HH PPS REVENUE CREDIT

## 2018-09-04 PROCEDURE — G0157 HHC PT ASSISTANT EA 15: HCPCS

## 2018-09-05 ENCOUNTER — HOME CARE VISIT (OUTPATIENT)
Dept: HOME HEALTH SERVICES | Facility: HOME HEALTH | Age: 83
End: 2018-09-05
Payer: COMMERCIAL

## 2018-09-05 ENCOUNTER — HOME CARE VISIT (OUTPATIENT)
Dept: SCHEDULING | Facility: HOME HEALTH | Age: 83
End: 2018-09-05
Payer: COMMERCIAL

## 2018-09-05 PROCEDURE — 3331090002 HH PPS REVENUE DEBIT

## 2018-09-05 PROCEDURE — 3331090001 HH PPS REVENUE CREDIT

## 2018-09-06 ENCOUNTER — HOME CARE VISIT (OUTPATIENT)
Dept: SCHEDULING | Facility: HOME HEALTH | Age: 83
End: 2018-09-06
Payer: COMMERCIAL

## 2018-09-06 ENCOUNTER — HOME CARE VISIT (OUTPATIENT)
Dept: HOME HEALTH SERVICES | Facility: HOME HEALTH | Age: 83
End: 2018-09-06
Payer: COMMERCIAL

## 2018-09-06 PROCEDURE — 3331090002 HH PPS REVENUE DEBIT

## 2018-09-06 PROCEDURE — 3331090001 HH PPS REVENUE CREDIT

## 2018-09-07 ENCOUNTER — HOME CARE VISIT (OUTPATIENT)
Dept: HOME HEALTH SERVICES | Facility: HOME HEALTH | Age: 83
End: 2018-09-07
Payer: COMMERCIAL

## 2018-09-07 PROCEDURE — 3331090002 HH PPS REVENUE DEBIT

## 2018-09-07 PROCEDURE — 3331090001 HH PPS REVENUE CREDIT

## 2018-09-08 PROCEDURE — 3331090002 HH PPS REVENUE DEBIT

## 2018-09-08 PROCEDURE — 3331090001 HH PPS REVENUE CREDIT

## 2018-09-09 PROCEDURE — 3331090001 HH PPS REVENUE CREDIT

## 2018-09-09 PROCEDURE — 3331090002 HH PPS REVENUE DEBIT

## 2018-09-10 PROCEDURE — 3331090002 HH PPS REVENUE DEBIT

## 2018-09-10 PROCEDURE — 3331090001 HH PPS REVENUE CREDIT

## 2018-09-11 ENCOUNTER — HOME CARE VISIT (OUTPATIENT)
Dept: SCHEDULING | Facility: HOME HEALTH | Age: 83
End: 2018-09-11

## 2018-09-11 PROCEDURE — 3331090002 HH PPS REVENUE DEBIT

## 2018-09-11 PROCEDURE — 3331090001 HH PPS REVENUE CREDIT

## 2018-09-11 PROCEDURE — 3331090003 HH PPS REVENUE ADJ

## 2018-09-12 PROCEDURE — 3331090001 HH PPS REVENUE CREDIT

## 2018-09-12 PROCEDURE — 3331090002 HH PPS REVENUE DEBIT

## 2018-09-26 ENCOUNTER — HOSPITAL ENCOUNTER (OUTPATIENT)
Dept: LAB | Age: 83
Discharge: HOME | End: 2018-09-26
Payer: COMMERCIAL

## 2018-09-26 DIAGNOSIS — D64.9 ANEMIA, UNSPECIFIED TYPE: ICD-10-CM

## 2018-09-26 LAB
ALBUMIN SERPL-MCNC: 3.4 G/DL (ref 3.2–4.6)
ALBUMIN/GLOB SERPL: 1 {RATIO} (ref 1.2–3.5)
ALP SERPL-CCNC: 75 U/L (ref 50–136)
ALT SERPL-CCNC: 19 U/L (ref 12–65)
ANION GAP SERPL CALC-SCNC: 8 MMOL/L (ref 7–16)
AST SERPL-CCNC: 12 U/L (ref 15–37)
BASOPHILS # BLD: 0 K/UL (ref 0–0.2)
BASOPHILS NFR BLD: 1 % (ref 0–2)
BILIRUB SERPL-MCNC: 0.3 MG/DL (ref 0.2–1.1)
BUN SERPL-MCNC: 26 MG/DL (ref 8–23)
CALCIUM SERPL-MCNC: 10.3 MG/DL (ref 8.3–10.4)
CHLORIDE SERPL-SCNC: 109 MMOL/L (ref 98–107)
CO2 SERPL-SCNC: 28 MMOL/L (ref 21–32)
CREAT SERPL-MCNC: 1.28 MG/DL (ref 0.6–1)
DIFFERENTIAL METHOD BLD: ABNORMAL
EOSINOPHIL # BLD: 0.1 K/UL (ref 0–0.8)
EOSINOPHIL NFR BLD: 2 % (ref 0.5–7.8)
ERYTHROCYTE [DISTWIDTH] IN BLOOD BY AUTOMATED COUNT: 12.9 % (ref 11.9–14.6)
FERRITIN SERPL-MCNC: 119 NG/ML (ref 8–388)
GLOBULIN SER CALC-MCNC: 3.4 G/DL (ref 2.3–3.5)
GLUCOSE SERPL-MCNC: 111 MG/DL (ref 65–100)
HCT VFR BLD AUTO: 30.6 % (ref 35.8–46.3)
HGB BLD-MCNC: 9.5 G/DL (ref 11.7–15.4)
IMM GRANULOCYTES # BLD: 0 K/UL (ref 0–0.5)
IMM GRANULOCYTES NFR BLD AUTO: 0 % (ref 0–5)
IRON SATN MFR SERPL: 22 %
IRON SERPL-MCNC: 52 UG/DL (ref 35–150)
LYMPHOCYTES # BLD: 1.5 K/UL (ref 0.5–4.6)
LYMPHOCYTES NFR BLD: 24 % (ref 13–44)
MCH RBC QN AUTO: 30.4 PG (ref 26.1–32.9)
MCHC RBC AUTO-ENTMCNC: 31 G/DL (ref 31.4–35)
MCV RBC AUTO: 98.1 FL (ref 79.6–97.8)
MONOCYTES # BLD: 0.5 K/UL (ref 0.1–1.3)
MONOCYTES NFR BLD: 9 % (ref 4–12)
NEUTS SEG # BLD: 4 K/UL (ref 1.7–8.2)
NEUTS SEG NFR BLD: 65 % (ref 43–78)
NRBC # BLD: 0 K/UL (ref 0–0.2)
PLATELET # BLD AUTO: 149 K/UL (ref 150–450)
PMV BLD AUTO: 10.6 FL (ref 9.4–12.3)
POTASSIUM SERPL-SCNC: 3.8 MMOL/L (ref 3.5–5.1)
PROT SERPL-MCNC: 6.8 G/DL (ref 6.3–8.2)
RBC # BLD AUTO: 3.12 M/UL (ref 4.05–5.25)
SODIUM SERPL-SCNC: 145 MMOL/L (ref 136–145)
TIBC SERPL-MCNC: 232 UG/DL (ref 250–450)
WBC # BLD AUTO: 6.2 K/UL (ref 4.3–11.1)

## 2018-09-26 PROCEDURE — 85025 COMPLETE CBC W/AUTO DIFF WBC: CPT

## 2018-09-26 PROCEDURE — 80053 COMPREHEN METABOLIC PANEL: CPT

## 2018-09-26 PROCEDURE — 36415 COLL VENOUS BLD VENIPUNCTURE: CPT

## 2018-09-26 PROCEDURE — 83540 ASSAY OF IRON: CPT

## 2018-09-26 PROCEDURE — 82728 ASSAY OF FERRITIN: CPT

## 2019-01-08 ENCOUNTER — HOSPITAL ENCOUNTER (OUTPATIENT)
Dept: LAB | Age: 84
Discharge: HOME | End: 2019-01-08
Payer: COMMERCIAL

## 2019-01-08 DIAGNOSIS — D64.9 ANEMIA, UNSPECIFIED TYPE: ICD-10-CM

## 2019-01-08 LAB
ALBUMIN SERPL-MCNC: 3.5 G/DL (ref 3.2–4.6)
ALBUMIN/GLOB SERPL: 1 {RATIO} (ref 1.2–3.5)
ALP SERPL-CCNC: 70 U/L (ref 50–136)
ALT SERPL-CCNC: 22 U/L (ref 12–65)
ANION GAP SERPL CALC-SCNC: 7 MMOL/L (ref 7–16)
AST SERPL-CCNC: 12 U/L (ref 15–37)
BASOPHILS # BLD: 0 K/UL (ref 0–0.2)
BASOPHILS NFR BLD: 0 % (ref 0–2)
BILIRUB SERPL-MCNC: 0.5 MG/DL (ref 0.2–1.1)
BUN SERPL-MCNC: 27 MG/DL (ref 8–23)
CALCIUM SERPL-MCNC: 9.8 MG/DL (ref 8.3–10.4)
CHLORIDE SERPL-SCNC: 107 MMOL/L (ref 98–107)
CO2 SERPL-SCNC: 29 MMOL/L (ref 21–32)
CREAT SERPL-MCNC: 1.14 MG/DL (ref 0.6–1)
DIFFERENTIAL METHOD BLD: ABNORMAL
EOSINOPHIL # BLD: 0.1 K/UL (ref 0–0.8)
EOSINOPHIL NFR BLD: 2 % (ref 0.5–7.8)
ERYTHROCYTE [DISTWIDTH] IN BLOOD BY AUTOMATED COUNT: 12.9 % (ref 11.9–14.6)
FERRITIN SERPL-MCNC: 83 NG/ML (ref 8–388)
GLOBULIN SER CALC-MCNC: 3.5 G/DL (ref 2.3–3.5)
GLUCOSE SERPL-MCNC: 118 MG/DL (ref 65–100)
HCT VFR BLD AUTO: 32 % (ref 35.8–46.3)
HGB BLD-MCNC: 9.9 G/DL (ref 11.7–15.4)
IMM GRANULOCYTES # BLD: 0 K/UL (ref 0–0.5)
IMM GRANULOCYTES NFR BLD AUTO: 0 % (ref 0–5)
IRON SATN MFR SERPL: 31 %
IRON SERPL-MCNC: 87 UG/DL (ref 35–150)
LYMPHOCYTES # BLD: 1.8 K/UL (ref 0.5–4.6)
LYMPHOCYTES NFR BLD: 26 % (ref 13–44)
MCH RBC QN AUTO: 30.4 PG (ref 26.1–32.9)
MCHC RBC AUTO-ENTMCNC: 30.9 G/DL (ref 31.4–35)
MCV RBC AUTO: 98.2 FL (ref 79.6–97.8)
MONOCYTES # BLD: 0.6 K/UL (ref 0.1–1.3)
MONOCYTES NFR BLD: 9 % (ref 4–12)
NEUTS SEG # BLD: 4.4 K/UL (ref 1.7–8.2)
NEUTS SEG NFR BLD: 63 % (ref 43–78)
NRBC # BLD: 0 K/UL (ref 0–0.2)
PLATELET # BLD AUTO: 178 K/UL (ref 150–450)
PMV BLD AUTO: 10.1 FL (ref 9.4–12.3)
POTASSIUM SERPL-SCNC: 3.9 MMOL/L (ref 3.5–5.1)
PROT SERPL-MCNC: 7 G/DL (ref 6.3–8.2)
RBC # BLD AUTO: 3.26 M/UL (ref 4.05–5.25)
SODIUM SERPL-SCNC: 143 MMOL/L (ref 136–145)
TIBC SERPL-MCNC: 282 UG/DL (ref 250–450)
WBC # BLD AUTO: 6.9 K/UL (ref 4.3–11.1)

## 2019-01-08 PROCEDURE — 83540 ASSAY OF IRON: CPT

## 2019-01-08 PROCEDURE — 36415 COLL VENOUS BLD VENIPUNCTURE: CPT

## 2019-01-08 PROCEDURE — 80053 COMPREHEN METABOLIC PANEL: CPT

## 2019-01-08 PROCEDURE — 82728 ASSAY OF FERRITIN: CPT

## 2019-01-08 PROCEDURE — 85025 COMPLETE CBC W/AUTO DIFF WBC: CPT

## 2019-04-18 PROBLEM — N18.30 CKD (CHRONIC KIDNEY DISEASE) STAGE 3, GFR 30-59 ML/MIN (HCC): Status: ACTIVE | Noted: 2019-04-18

## 2019-07-16 ENCOUNTER — HOSPITAL ENCOUNTER (OUTPATIENT)
Dept: LAB | Age: 84
Discharge: HOME | End: 2019-07-16
Payer: COMMERCIAL

## 2019-07-16 DIAGNOSIS — D64.9 ANEMIA, UNSPECIFIED TYPE: ICD-10-CM

## 2019-07-16 LAB
ALBUMIN SERPL-MCNC: 3.5 G/DL (ref 3.2–4.6)
ALBUMIN/GLOB SERPL: 1.2 {RATIO} (ref 1.2–3.5)
ALP SERPL-CCNC: 64 U/L (ref 50–136)
ALT SERPL-CCNC: 22 U/L (ref 12–65)
ANION GAP SERPL CALC-SCNC: 6 MMOL/L (ref 7–16)
AST SERPL-CCNC: 12 U/L (ref 15–37)
BASOPHILS # BLD: 0 K/UL (ref 0–0.2)
BASOPHILS NFR BLD: 0 % (ref 0–2)
BILIRUB SERPL-MCNC: 0.3 MG/DL (ref 0.2–1.1)
BUN SERPL-MCNC: 41 MG/DL (ref 8–23)
CALCIUM SERPL-MCNC: 10.3 MG/DL (ref 8.3–10.4)
CHLORIDE SERPL-SCNC: 109 MMOL/L (ref 98–107)
CO2 SERPL-SCNC: 27 MMOL/L (ref 21–32)
CREAT SERPL-MCNC: 1.57 MG/DL (ref 0.6–1)
DIFFERENTIAL METHOD BLD: ABNORMAL
EOSINOPHIL # BLD: 0 K/UL (ref 0–0.8)
EOSINOPHIL NFR BLD: 0 % (ref 0.5–7.8)
ERYTHROCYTE [DISTWIDTH] IN BLOOD BY AUTOMATED COUNT: 13 % (ref 11.9–14.6)
FERRITIN SERPL-MCNC: 65 NG/ML (ref 8–388)
GLOBULIN SER CALC-MCNC: 2.9 G/DL (ref 2.3–3.5)
GLUCOSE SERPL-MCNC: 161 MG/DL (ref 65–100)
HCT VFR BLD AUTO: 29.7 % (ref 35.8–46.3)
HGB BLD-MCNC: 9.2 G/DL (ref 11.7–15.4)
IMM GRANULOCYTES # BLD AUTO: 0 K/UL (ref 0–0.5)
IMM GRANULOCYTES NFR BLD AUTO: 0 % (ref 0–5)
IRON SATN MFR SERPL: 13 %
IRON SERPL-MCNC: 36 UG/DL (ref 35–150)
LYMPHOCYTES # BLD: 1.7 K/UL (ref 0.5–4.6)
LYMPHOCYTES NFR BLD: 24 % (ref 13–44)
MCH RBC QN AUTO: 30.6 PG (ref 26.1–32.9)
MCHC RBC AUTO-ENTMCNC: 31 G/DL (ref 31.4–35)
MCV RBC AUTO: 98.7 FL (ref 79.6–97.8)
MONOCYTES # BLD: 0.6 K/UL (ref 0.1–1.3)
MONOCYTES NFR BLD: 8 % (ref 4–12)
NEUTS SEG # BLD: 4.7 K/UL (ref 1.7–8.2)
NEUTS SEG NFR BLD: 67 % (ref 43–78)
NRBC # BLD: 0 K/UL (ref 0–0.2)
PLATELET # BLD AUTO: 170 K/UL (ref 150–450)
PMV BLD AUTO: 9.8 FL (ref 9.4–12.3)
POTASSIUM SERPL-SCNC: 4.5 MMOL/L (ref 3.5–5.1)
PROT SERPL-MCNC: 6.4 G/DL (ref 6.3–8.2)
RBC # BLD AUTO: 3.01 M/UL (ref 4.05–5.25)
SODIUM SERPL-SCNC: 142 MMOL/L (ref 136–145)
TIBC SERPL-MCNC: 272 UG/DL (ref 250–450)
WBC # BLD AUTO: 7 K/UL (ref 4.3–11.1)

## 2019-07-16 PROCEDURE — 82668 ASSAY OF ERYTHROPOIETIN: CPT

## 2019-07-16 PROCEDURE — 36415 COLL VENOUS BLD VENIPUNCTURE: CPT

## 2019-07-16 PROCEDURE — 80053 COMPREHEN METABOLIC PANEL: CPT

## 2019-07-16 PROCEDURE — 82728 ASSAY OF FERRITIN: CPT

## 2019-07-16 PROCEDURE — 86334 IMMUNOFIX E-PHORESIS SERUM: CPT

## 2019-07-16 PROCEDURE — 84238 ASSAY NONENDOCRINE RECEPTOR: CPT

## 2019-07-16 PROCEDURE — 84165 PROTEIN E-PHORESIS SERUM: CPT

## 2019-07-16 PROCEDURE — 85025 COMPLETE CBC W/AUTO DIFF WBC: CPT

## 2019-07-16 PROCEDURE — 83540 ASSAY OF IRON: CPT

## 2019-07-17 LAB
ALBUMIN SERPL ELPH-MCNC: 3.62 G/DL (ref 3.2–5.6)
ALBUMIN/GLOB SERPL: 1.3 {RATIO}
ALPHA1 GLOB SERPL ELPH-MCNC: 0.19 G/DL (ref 0.1–0.4)
ALPHA2 GLOB SERPL ELPH-MCNC: 0.72 G/DL (ref 0.4–1.2)
B-GLOBULIN SERPL QL ELPH: 1.06 G/DL (ref 0.6–1.3)
EPO SERPL-ACNC: 8.6 MIU/ML (ref 2.6–18.5)
GAMMA GLOB MFR SERPL ELPH: 0.82 G/DL (ref 0.5–1.6)
IGA SERPL-MCNC: 233 MG/DL (ref 85–499)
IGG SERPL-MCNC: 731 MG/DL (ref 610–1616)
IGM SERPL-MCNC: 41 MG/DL (ref 35–242)
M PROTEIN SERPL ELPH-MCNC: NORMAL G/DL
PROT PATTERN SERPL ELPH-IMP: NORMAL
PROT PATTERN SPEC IFE-IMP: NORMAL
PROT SERPL-MCNC: 6.4 G/DL (ref 6.3–8.2)
TRANSFERRIN SERPL-SCNC: 20.3 NMOL/L (ref 12.2–27.3)

## 2019-08-06 ENCOUNTER — APPOINTMENT (OUTPATIENT)
Dept: CT IMAGING | Age: 84
End: 2019-08-06
Attending: EMERGENCY MEDICINE
Payer: COMMERCIAL

## 2019-08-06 ENCOUNTER — APPOINTMENT (OUTPATIENT)
Dept: GENERAL RADIOLOGY | Age: 84
End: 2019-08-06
Attending: EMERGENCY MEDICINE
Payer: COMMERCIAL

## 2019-08-06 ENCOUNTER — HOSPITAL ENCOUNTER (EMERGENCY)
Age: 84
Discharge: HOME | End: 2019-08-07
Attending: EMERGENCY MEDICINE
Payer: COMMERCIAL

## 2019-08-06 DIAGNOSIS — R53.83 FATIGUE, UNSPECIFIED TYPE: ICD-10-CM

## 2019-08-06 DIAGNOSIS — R51.9 NONINTRACTABLE HEADACHE, UNSPECIFIED CHRONICITY PATTERN, UNSPECIFIED HEADACHE TYPE: Primary | ICD-10-CM

## 2019-08-06 LAB
ALBUMIN SERPL-MCNC: 3.7 G/DL (ref 3.2–4.6)
ALBUMIN/GLOB SERPL: 1.1 {RATIO} (ref 1.2–3.5)
ALP SERPL-CCNC: 74 U/L (ref 50–136)
ALT SERPL-CCNC: 18 U/L (ref 12–65)
ANION GAP SERPL CALC-SCNC: 9 MMOL/L (ref 7–16)
AST SERPL-CCNC: 13 U/L (ref 15–37)
BACTERIA URNS QL MICRO: NORMAL /HPF
BASOPHILS # BLD: 0 K/UL (ref 0–0.2)
BASOPHILS NFR BLD: 0 % (ref 0–2)
BILIRUB SERPL-MCNC: 0.3 MG/DL (ref 0.2–1.1)
BUN SERPL-MCNC: 36 MG/DL (ref 8–23)
CALCIUM SERPL-MCNC: 10.1 MG/DL (ref 8.3–10.4)
CASTS URNS QL MICRO: NORMAL /LPF
CHLORIDE SERPL-SCNC: 107 MMOL/L (ref 98–107)
CO2 SERPL-SCNC: 26 MMOL/L (ref 21–32)
CREAT SERPL-MCNC: 1.47 MG/DL (ref 0.6–1)
DIFFERENTIAL METHOD BLD: ABNORMAL
EOSINOPHIL # BLD: 0 K/UL (ref 0–0.8)
EOSINOPHIL NFR BLD: 0 % (ref 0.5–7.8)
EPI CELLS #/AREA URNS HPF: 0 /HPF
ERYTHROCYTE [DISTWIDTH] IN BLOOD BY AUTOMATED COUNT: 12.6 % (ref 11.9–14.6)
GLOBULIN SER CALC-MCNC: 3.4 G/DL (ref 2.3–3.5)
GLUCOSE SERPL-MCNC: 245 MG/DL (ref 65–100)
HCT VFR BLD AUTO: 33.1 % (ref 35.8–46.3)
HGB BLD-MCNC: 10.3 G/DL (ref 11.7–15.4)
IMM GRANULOCYTES # BLD AUTO: 0.1 K/UL (ref 0–0.5)
IMM GRANULOCYTES NFR BLD AUTO: 1 % (ref 0–5)
LACTATE BLD-SCNC: 1.8 MMOL/L (ref 0.5–1.9)
LYMPHOCYTES # BLD: 1.9 K/UL (ref 0.5–4.6)
LYMPHOCYTES NFR BLD: 29 % (ref 13–44)
MCH RBC QN AUTO: 30.9 PG (ref 26.1–32.9)
MCHC RBC AUTO-ENTMCNC: 31.1 G/DL (ref 31.4–35)
MCV RBC AUTO: 99.4 FL (ref 79.6–97.8)
MONOCYTES # BLD: 0.6 K/UL (ref 0.1–1.3)
MONOCYTES NFR BLD: 9 % (ref 4–12)
NEUTS SEG # BLD: 4.1 K/UL (ref 1.7–8.2)
NEUTS SEG NFR BLD: 61 % (ref 43–78)
NRBC # BLD: 0 K/UL (ref 0–0.2)
PLATELET # BLD AUTO: 167 K/UL (ref 150–450)
PMV BLD AUTO: 10.4 FL (ref 9.4–12.3)
POTASSIUM SERPL-SCNC: 4 MMOL/L (ref 3.5–5.1)
PROT SERPL-MCNC: 7.1 G/DL (ref 6.3–8.2)
RBC # BLD AUTO: 3.33 M/UL (ref 4.05–5.2)
RBC #/AREA URNS HPF: NORMAL /HPF
SODIUM SERPL-SCNC: 142 MMOL/L (ref 136–145)
TROPONIN I SERPL-MCNC: <0.02 NG/ML (ref 0.02–0.05)
WBC # BLD AUTO: 6.7 K/UL (ref 4.3–11.1)
WBC URNS QL MICRO: NORMAL /HPF

## 2019-08-06 PROCEDURE — 87088 URINE BACTERIA CULTURE: CPT

## 2019-08-06 PROCEDURE — 87086 URINE CULTURE/COLONY COUNT: CPT

## 2019-08-06 PROCEDURE — 74011250637 HC RX REV CODE- 250/637: Performed by: EMERGENCY MEDICINE

## 2019-08-06 PROCEDURE — 81003 URINALYSIS AUTO W/O SCOPE: CPT | Performed by: EMERGENCY MEDICINE

## 2019-08-06 PROCEDURE — 71046 X-RAY EXAM CHEST 2 VIEWS: CPT

## 2019-08-06 PROCEDURE — 83605 ASSAY OF LACTIC ACID: CPT

## 2019-08-06 PROCEDURE — 84484 ASSAY OF TROPONIN QUANT: CPT

## 2019-08-06 PROCEDURE — 93005 ELECTROCARDIOGRAM TRACING: CPT | Performed by: EMERGENCY MEDICINE

## 2019-08-06 PROCEDURE — 85025 COMPLETE CBC W/AUTO DIFF WBC: CPT

## 2019-08-06 PROCEDURE — 87186 SC STD MICRODIL/AGAR DIL: CPT

## 2019-08-06 PROCEDURE — 70450 CT HEAD/BRAIN W/O DYE: CPT

## 2019-08-06 PROCEDURE — 99285 EMERGENCY DEPT VISIT HI MDM: CPT | Performed by: EMERGENCY MEDICINE

## 2019-08-06 PROCEDURE — 80053 COMPREHEN METABOLIC PANEL: CPT

## 2019-08-06 PROCEDURE — 81015 MICROSCOPIC EXAM OF URINE: CPT

## 2019-08-06 RX ORDER — CEPHALEXIN 500 MG/1
500 CAPSULE ORAL 4 TIMES DAILY
Qty: 28 CAP | Refills: 0 | Status: SHIPPED | OUTPATIENT
Start: 2019-08-06 | End: 2019-08-13

## 2019-08-06 RX ORDER — CEPHALEXIN 500 MG/1
500 CAPSULE ORAL
Status: COMPLETED | OUTPATIENT
Start: 2019-08-06 | End: 2019-08-06

## 2019-08-06 RX ADMIN — CEPHALEXIN 500 MG: 500 CAPSULE ORAL at 22:07

## 2019-08-06 NOTE — ED TRIAGE NOTES
Patient arrives via [facility] from home. Patient called out for headache. EMS found patient tachypneic. RACE negative. Hx anxiety. Patient reports \"daughter stresses her out. \"  160/68, initially 220/78. Patient reports daughter Tyron He not treat her with compassion and she is unhappy at home. \"  Patient alert and oriented x4, appears in no acute distress. Patient denies headache, denies shortness of breath, does not appear anxious at this time. Patient reports neuropathy in legs.

## 2019-08-06 NOTE — ED PROVIDER NOTES
Patient is a 79 yo female who presents with headache. States she had a headache at home and is tired. States really she just does not get along with her daughter and that she stresses her out and that this plays into why she came to the emergency department. She denies any chest pain, no SOB, no abdominal pain, no nausea or vomiting, no further complaints. States headache has improved, however states now just tired. Past Medical History:   Diagnosis Date    Anxiety 2/1/2018    Arrhythmia     palpitations 2/10-went to [facility]    Arthritis     L leg- fell 2008    Asthma     adult onset x 20 yrs    B12 deficiency 8/24/2012    Body mass index 37.0-37.9, adult 2/5/2014    CAD (coronary artery disease)     cardiac work up-9/0909-neg-Holter    Controlled type 2 diabetes mellitus with microalbuminuria, without long-term current use of insulin ([address] 11/22/2016    COPD     Corns and callosities 12/19/2016    CRI (chronic renal insufficiency) 8/24/2012    Depression 6/1/2012    Dermatophytosis of nail 12/19/2016    Diabetes ([address]     type II- home tests fasting-112    DJD (degenerative joint disease) of hip     DM (diabetes mellitus) type II controlled with renal manifestation ([address] 7/16/2010    Encounter for long-term (current) use of other medications 1/8/2013    Essential hypertension 7/16/2010    Gastrointestinal disorder     GERD (gastroesophageal reflux disease)     Heart failure ([address]     Hiatal hernia 8/24/2012    HLD (hyperlipidemia) 1/8/2013    Neuropathy 8/24/2012    Lower limbs     Obesity (BMI 30.0-39. 9) BMI-43.8    Other and unspecified hyperlipidemia 7/16/2010    Other chest pain 7/16/2010    Other ill-defined conditions(799.89)     high cholesterol    Other ill-defined conditions(799.89)     incont.  urine    PAD (peripheral artery disease) ([address] 8/24/2012    Retinal hemorrhage        Past Surgical History:   Procedure Laterality Date    ABDOMEN SURGERY PROC UNLISTED  HX APPENDECTOMY      HX CHOLECYSTECTOMY      HX CHOLECYSTECTOMY  2000    HX ENDOSCOPY  02/27/2018    Dr Rene Morrell HX GI      small intestine obstruction    HX GYN      hyst    HX HEART CATHETERIZATION      HX HYSTERECTOMY      prolapse    HX INTRACRANIAL ANEURYSM REPAIR      HX INTRACRANIAL ANEURYSM REPAIR      HX ORTHOPAEDIC      right wrist 2010    HX OTHER SURGICAL      aneurysm clip-cerebral-2000    HX OTHER SURGICAL      cerebral aneurysm   Dr. Jean-Claude Wong         Family History:   Problem Relation Age of Onset   Yeni Larch Cancer Mother     Diabetes Father     Diabetes Paternal Grandfather        Social History     Socioeconomic History    Marital status: SINGLE     Spouse name: Not on file    Number of children: Not on file    Years of education: Not on file    Highest education level: Not on file   Occupational History    Not on file   Social Needs    Financial resource strain: Not on file    Food insecurity:     Worry: Not on file     Inability: Not on file    Transportation needs:     Medical: Not on file     Non-medical: Not on file   Tobacco Use    Smoking status: Never Smoker    Smokeless tobacco: Never Used   Substance and Sexual Activity    Alcohol use: No    Drug use: No    Sexual activity: Never   Lifestyle    Physical activity:     Days per week: Not on file     Minutes per session: Not on file    Stress: Not on file   Relationships    Social connections:     Talks on phone: Not on file     Gets together: Not on file     Attends Restorationist service: Not on file     Active member of club or organization: Not on file     Attends meetings of clubs or organizations: Not on file     Relationship status: Not on file    Intimate partner violence:     Fear of current or ex partner: Not on file     Emotionally abused: Not on file     Physically abused: Not on file     Forced sexual activity: Not on file   Other Topics Concern    Not on file   Social History Narrative     with four children         ALLERGIES: Patient has no known allergies. Review of Systems   Constitutional: Positive for fatigue. Negative for chills and fever. HENT: Negative for rhinorrhea and sore throat. Eyes: Negative for visual disturbance. Respiratory: Negative for cough and shortness of breath. Cardiovascular: Negative for chest pain and leg swelling. Gastrointestinal: Negative for abdominal pain, diarrhea, nausea and vomiting. Genitourinary: Negative for dysuria. Musculoskeletal: Negative for back pain and neck pain. Skin: Negative for rash. Neurological: Positive for headaches. Negative for weakness. Psychiatric/Behavioral: The patient is not nervous/anxious. Vitals:    08/06/19 1813 08/06/19 1818 08/06/19 1821   BP: (!) 208/101 197/84 197/84   Pulse:  73    Resp:  16    Temp:  98.6 °F (37 °C)    SpO2: 97% 97%    Weight:  82.1 kg (181 lb)    Height:  5' 3.75\" (1.619 m)             Physical Exam   Constitutional: She is oriented to person, place, and time. She appears well-developed and well-nourished. HENT:   Head: Normocephalic. Right Ear: External ear normal.   Left Ear: External ear normal.   Eyes: Pupils are equal, round, and reactive to light. Conjunctivae and EOM are normal.   Neck: Normal range of motion. Neck supple. No tracheal deviation present. Cardiovascular: Normal rate, regular rhythm, normal heart sounds and intact distal pulses. No murmur heard. Pulmonary/Chest: Effort normal and breath sounds normal. No respiratory distress. Abdominal: Soft. There is no tenderness. Musculoskeletal: Normal range of motion. Neurological: She is alert and oriented to person, place, and time. No cranial nerve deficit. Cn 2-12 fully intact, strength and sensation 5/5 in all extremities, negative pronator drift, visual fields fully intact, EOMI, finger to nose normal. no focal deficits appreciated. Skin: No rash noted. Nursing note and vitals reviewed. MDM  Number of Diagnoses or Management Options     Amount and/or Complexity of Data Reviewed  Clinical lab tests: ordered and reviewed  Tests in the radiology section of CPT®: ordered and reviewed  Tests in the medicine section of CPT®: ordered and reviewed  Review and summarize past medical records: yes    Risk of Complications, Morbidity, and/or Mortality  Presenting problems: high  Diagnostic procedures: high  Management options: high    Patient Progress  Patient progress: stable         Procedures  Recent Results (from the past 12 hour(s))   TROPONIN I    Collection Time: 08/06/19  6:25 PM   Result Value Ref Range    Troponin-I, Qt. <0.02 (L) 0.02 - 0.05 NG/ML   CBC WITH AUTOMATED DIFF    Collection Time: 08/06/19  6:27 PM   Result Value Ref Range    WBC 6.7 4.3 - 11.1 K/uL    RBC 3.33 (L) 4.05 - 5.2 M/uL    HGB 10.3 (L) 11.7 - 15.4 g/dL    HCT 33.1 (L) 35.8 - 46.3 %    MCV 99.4 (H) 79.6 - 97.8 FL    MCH 30.9 26.1 - 32.9 PG    MCHC 31.1 (L) 31.4 - 35.0 g/dL    RDW 12.6 11.9 - 14.6 %    PLATELET 513 358 - 489 K/uL    MPV 10.4 9.4 - 12.3 FL    ABSOLUTE NRBC 0.00 0.0 - 0.2 K/uL    DF AUTOMATED      NEUTROPHILS 61 43 - 78 %    LYMPHOCYTES 29 13 - 44 %    MONOCYTES 9 4.0 - 12.0 %    EOSINOPHILS 0 (L) 0.5 - 7.8 %    BASOPHILS 0 0.0 - 2.0 %    IMMATURE GRANULOCYTES 1 0.0 - 5.0 %    ABS. NEUTROPHILS 4.1 1.7 - 8.2 K/UL    ABS. LYMPHOCYTES 1.9 0.5 - 4.6 K/UL    ABS. MONOCYTES 0.6 0.1 - 1.3 K/UL    ABS. EOSINOPHILS 0.0 0.0 - 0.8 K/UL    ABS. BASOPHILS 0.0 0.0 - 0.2 K/UL    ABS. IMM.  GRANS. 0.1 0.0 - 0.5 K/UL   METABOLIC PANEL, COMPREHENSIVE    Collection Time: 08/06/19  6:27 PM   Result Value Ref Range    Sodium 142 136 - 145 mmol/L    Potassium 4.0 3.5 - 5.1 mmol/L    Chloride 107 98 - 107 mmol/L    CO2 26 21 - 32 mmol/L    Anion gap 9 7 - 16 mmol/L    Glucose 245 (H) 65 - 100 mg/dL    BUN 36 (H) 8 - 23 MG/DL    Creatinine 1.47 (H) 0.6 - 1.0 MG/DL    GFR est AA 43 (L) >60 ml/min/1.73m2    GFR est non-AA 35 (L) >60 ml/min/1.73m2    Calcium 10.1 8.3 - 10.4 MG/DL    Bilirubin, total 0.3 0.2 - 1.1 MG/DL    ALT (SGPT) 18 12 - 65 U/L    AST (SGOT) 13 (L) 15 - 37 U/L    Alk. phosphatase 74 50 - 136 U/L    Protein, total 7.1 6.3 - 8.2 g/dL    Albumin 3.7 3.2 - 4.6 g/dL    Globulin 3.4 2.3 - 3.5 g/dL    A-G Ratio 1.1 (L) 1.2 - 3.5     URINE MICROSCOPIC    Collection Time: 08/06/19  7:13 PM   Result Value Ref Range    WBC 20-50 0 /hpf    RBC 0-3 0 /hpf    Epithelial cells 0 0 /hpf    Bacteria TRACE 0 /hpf    Casts 0-3 0 /lpf   POC LACTIC ACID    Collection Time: 08/06/19  9:18 PM   Result Value Ref Range    Lactic Acid (POC) 1.80 0.5 - 1.9 mmol/L     Xr Chest Pa Lat    Result Date: 8/6/2019  EXAM: XR CHEST PA LAT INDICATION: fatigue COMPARISON: None. FINDINGS: PA and lateral radiographs of the chest demonstrate COPD with minimal linear atelectasis of the left lower lobe. The cardiac and mediastinal contours and pulmonary vascularity are normal. The bones and soft tissues are within normal limits. IMPRESSION: COPD with minimal linear atelectasis of left lower lobe. Ct Head Wo Cont    Result Date: 8/6/2019  CT of the head without contrast. CLINICAL INDICATION: Headache, anxiety PROCEDURE: Serial thin section axial images are obtained from the cranial vertex through the skull base without the administration of intravenous contrast. Radiation dose reduction techniques were used for this study. Our CT scanners use one or all of the following: Automated exposure control, adjusted of the mA and/or kV according to patient size, iterative reconstruction COMPARISON: Head CT dated 4/19/2016. FINDINGS: There is no acute intracranial hemorrhage, mass, or mass effect. No abnormal extra-axial fluid collections identified. There is no hydrocephalus. The basilar cisterns are widely patent. Encephalomalacia is again noted in the right frontal lobe in keeping with old infarct.  Moderate patchy subcortical, deep, and paravertebral white matter hypoattenuation indicative of chronic microangiopathic disease with mild bilateral atrophy. The patient is status post a right pterional craniotomy. No skull fracture or aggressive osseous lesion noted. The mastoid air cells and included paranasal sinuses are clear. IMPRESSION: 1. No acute intracranial abnormality. 2. 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 3. Moderate chronic white matter microangiopathic disease. Note, acute/subacute CVA cannot be excluded given the severity of the underlying white matter disease. 79 yo female with headache and fatigue:    Patient is VERY well appearing, and in NAD and states reason she came primarily is due to not feeling safe with her daughter who has \"no compassion\". She does have mild UTI however no admission criteria so will hold in ED and social work will evaluate tomorrow for placement. Will place on keflex for UTI.

## 2019-08-07 VITALS
TEMPERATURE: 98.6 F | DIASTOLIC BLOOD PRESSURE: 77 MMHG | RESPIRATION RATE: 16 BRPM | OXYGEN SATURATION: 98 % | HEART RATE: 68 BPM | WEIGHT: 181 LBS | HEIGHT: 64 IN | BODY MASS INDEX: 30.9 KG/M2 | SYSTOLIC BLOOD PRESSURE: 175 MMHG

## 2019-08-07 LAB
ATRIAL RATE: 68 BPM
CALCULATED P AXIS, ECG09: 56 DEGREES
CALCULATED R AXIS, ECG10: -41 DEGREES
CALCULATED T AXIS, ECG11: 36 DEGREES
DIAGNOSIS, 93000: NORMAL
P-R INTERVAL, ECG05: 198 MS
Q-T INTERVAL, ECG07: 394 MS
QRS DURATION, ECG06: 140 MS
QTC CALCULATION (BEZET), ECG08: 418 MS
VENTRICULAR RATE, ECG03: 68 BPM
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
## 2019-08-09 LAB
BACTERIA SPEC CULT: ABNORMAL
SERVICE CMNT-IMP: ABNORMAL

## 2019-08-13 ENCOUNTER — HOSPITAL ENCOUNTER (EMERGENCY)
Age: 84
Discharge: HOME | End: 2019-08-13
Attending: EMERGENCY MEDICINE
Payer: COMMERCIAL

## 2019-08-13 ENCOUNTER — APPOINTMENT (OUTPATIENT)
Dept: GENERAL RADIOLOGY | Age: 84
End: 2019-08-13
Attending: EMERGENCY MEDICINE
Payer: COMMERCIAL

## 2019-08-13 ENCOUNTER — APPOINTMENT (OUTPATIENT)
Dept: CT IMAGING | Age: 84
End: 2019-08-13
Attending: EMERGENCY MEDICINE
Payer: COMMERCIAL

## 2019-08-13 VITALS
DIASTOLIC BLOOD PRESSURE: 68 MMHG | RESPIRATION RATE: 18 BRPM | BODY MASS INDEX: 30.9 KG/M2 | OXYGEN SATURATION: 97 % | HEIGHT: 64 IN | WEIGHT: 181 LBS | SYSTOLIC BLOOD PRESSURE: 166 MMHG | HEART RATE: 83 BPM | TEMPERATURE: 98.1 F

## 2019-08-13 DIAGNOSIS — S09.90XA INJURY OF HEAD, INITIAL ENCOUNTER: Primary | ICD-10-CM

## 2019-08-13 DIAGNOSIS — S20.211A CONTUSION OF RIBS, RIGHT, INITIAL ENCOUNTER: ICD-10-CM

## 2019-08-13 PROCEDURE — 70450 CT HEAD/BRAIN W/O DYE: CPT

## 2019-08-13 PROCEDURE — 71046 X-RAY EXAM CHEST 2 VIEWS: CPT

## 2019-08-13 PROCEDURE — 99283 EMERGENCY DEPT VISIT LOW MDM: CPT | Performed by: EMERGENCY MEDICINE

## 2019-08-13 RX ORDER — TRAMADOL HYDROCHLORIDE 50 MG/1
50 TABLET ORAL
Qty: 12 TAB | Refills: 0 | Status: SHIPPED | OUTPATIENT
Start: 2019-08-13 | End: 2019-08-16

## 2019-08-13 NOTE — DISCHARGE INSTRUCTIONS
Be very careful when taking Ultram for pain. It can cause drowsiness and lightheadedness. It can increase your risks for fall. Please follow-up with your primary care doctor for checkup. Use incentive spirometer to keep your lungs expanded to decrease risk of pneumonia and collapsed lungs. Return if any emergency.

## 2019-08-13 NOTE — ED NOTES
I have reviewed discharge instructions with the patient. The patient verbalized understanding. Patient left ED via Discharge Method: wheelchair to Home with son. Opportunity for questions and clarification provided. Patient given 1 scripts. To continue your aftercare when you leave the hospital, you may receive an automated call from our care team to check in on how you are doing. This is a free service and part of our promise to provide the best care and service to meet your aftercare needs.  If you have questions, or wish to unsubscribe from this service please call [phone]. Thank you for Choosing our [facility] Emergency Department.

## 2019-08-13 NOTE — ED NOTES
Report received from Dani Holbrook [facility] and care assumed of pt at this time.

## 2019-08-13 NOTE — ED TRIAGE NOTES
Pt arrives via EMS from PCP. Pt complains of right sided rib pain following a slip and fall at home on Friday. States she hit right side of head. Denies LOC blood thinners.

## 2019-08-13 NOTE — ED PROVIDER NOTES
HPI:  22-year-old female here status post a fall on Friday. Stated she was getting up from her chair to reach for her cane when she lost balance and fell over hitting the right side of the head right arm right shoulder the right hip on the ground. Since then has had some achiness in the right side of the head as well as pain in the right lateral ribs. Worsened with movement of the shoulder in the chest.  Rotating body also cause it to hurt. Denies any nausea vomiting. Denies any chest pain or shortness of breath before or after the fall. She is not on a blood thinner. Has taken Tylenol at home without improvement. No abdominal pain, bloody stool. ROS  Constitutional: No fever, no chills  Skin: no rash  Eye: No vision changes  ENMT: No sore throat  Respiratory: No shortness of breath, no cough  Cardiovascular: + chest pain, no palpitations  Gastrointestinal: No vomiting, no nausea, no diarrhea, no abdominal pain  : No dysuria  MSK: No back pain, no muscle pain, no joint pain  Neuro: + headache, no change in mental status, no numbness, no tingling, no weakness  Psych:   Endocrine:   All other review of systems positive per history of present illness and the above otherwise negative or noncontributory.     Visit Vitals  /68   Pulse 83   Temp 98.1 °F (36.7 °C)   Resp 18   Ht 5' 4\" (1.626 m)   Wt 82.1 kg (181 lb)   SpO2 97%   BMI 31.07 kg/m²     Past Medical History:   Diagnosis Date    Anxiety 2/1/2018    Arrhythmia     palpitations 2/10-went to [facility]    Arthritis     L leg- fell 2008    Asthma     adult onset x 20 yrs    B12 deficiency 8/24/2012    Body mass index 37.0-37.9, adult 2/5/2014    CAD (coronary artery disease)     cardiac work up-9/0909-neg-Holter    Controlled type 2 diabetes mellitus with microalbuminuria, without long-term current use of insulin ([facility] 11/22/2016    COPD     Corns and callosities 12/19/2016    CRI (chronic renal insufficiency) 8/24/2012    Depression 6/1/2012  Dermatophytosis of nail 12/19/2016    Diabetes ([facility]     type II- home tests fasting-112    DJD (degenerative joint disease) of hip     DM (diabetes mellitus) type II controlled with renal manifestation ([facility] 7/16/2010    Encounter for long-term (current) use of other medications 1/8/2013    Essential hypertension 7/16/2010    Gastrointestinal disorder     GERD (gastroesophageal reflux disease)     Heart failure ([facility]     Hiatal hernia 8/24/2012    HLD (hyperlipidemia) 1/8/2013    Neuropathy 8/24/2012    Lower limbs     Obesity (BMI 30.0-39. 9) BMI-43.8    Other and unspecified hyperlipidemia 7/16/2010    Other chest pain 7/16/2010    Other ill-defined conditions(799.89)     high cholesterol    Other ill-defined conditions(799.89)     incont. urine    PAD (peripheral artery disease) ([facility] 8/24/2012    Retinal hemorrhage      Past Surgical History:   Procedure Laterality Date    ABDOMEN SURGERY PROC UNLISTED      HX APPENDECTOMY      HX CHOLECYSTECTOMY      HX CHOLECYSTECTOMY  2000    HX ENDOSCOPY  02/27/2018    Dr Nandini Horner    HX GI      small intestine obstruction    HX GYN      hyst    HX HEART CATHETERIZATION      HX HYSTERECTOMY      prolapse    HX INTRACRANIAL ANEURYSM REPAIR      HX INTRACRANIAL ANEURYSM REPAIR      HX ORTHOPAEDIC      right wrist 2010    HX OTHER SURGICAL      aneurysm clip-cerebral-2000    HX OTHER SURGICAL      cerebral aneurysm   Dr. Kenna Lara     Prior to Admission Medications   Prescriptions Last Dose Informant Patient Reported? Taking? Blood-Glucose Meter monitoring kit   No No   Sig: Pt uses true metrix meter and tests once daily dx code N05.62   FOLIC ACID/MULTIVIT-MIN/LUTEIN (CENTRUM SILVER PO)   Yes No   Sig: Take  by mouth. LORazepam (ATIVAN) 0.5 mg tablet   No No   Sig: Take 1 Tab by mouth daily as needed for Anxiety.    Lancets Misc   No No   Sig: Pt tests three times daily Diagnosis Code: 250.00   OTHER   No No   Sig: One shower chair VENTOLIN HFA 90 mcg/actuation inhaler   No No   Sig: INHALE 2 PUFFS BY MOUTH EVERY 6 HOURS AS NEEDED FOR WHEEZING   VIT A/VIT C/VIT E/ZINC/COPPER (PRESERVISION AREDS PO)   Yes No   Sig: Take 1 Tab by mouth two (2) times a day. amLODIPine (NORVASC) 10 mg tablet   No No   Sig: Take 1 Tab by mouth daily. benazepril (LOTENSIN) 40 mg tablet   No No   Sig: TAKE 1 TABLET BY MOUTH DAILY   cephALEXin (KEFLEX) 500 mg capsule   No No   Sig: Take 1 Cap by mouth four (4) times daily for 7 days. cephALEXin (KEFLEX) 500 mg capsule   No No   Sig: Take 1 Cap by mouth four (4) times daily for 7 days. cyanocobalamin, vitamin B-12, 1,000 mcg/mL kit   No No   Si,000 mcg by Injection route every month. fluticasone (FLONASE) 50 mcg/actuation nasal spray   No No   Sig: INSTILL 1 SPRAY IN EACH NOSTRIL NIGHTLY AS DIRECTED   fluticasone propion-salmeterol (ADVAIR DISKUS) 250-50 mcg/dose diskus inhaler   No No   Sig: Take 1 Puff by inhalation every twelve (12) hours. furosemide (LASIX) 20 mg tablet   No No   Sig: Take 1 Tab by mouth daily. gabapentin (NEURONTIN) 100 mg capsule   No No   Sig: TAKE 1 CAPSULE BY MOUTH TWICE DAILY   glipiZIDE SR (GLUCOTROL XL) 2.5 mg CR tablet   No No   Sig: TAKE 1 TABLET BY MOUTH EVERY DAY AFTER BREAKFAST   glucose blood VI test strips (TRUE METRIX GLUCOSE TEST STRIP) strip   No No   Sig: TEST ONCE DAILY dx code E11.29   hydrALAZINE (APRESOLINE) 50 mg tablet   No No   Sig: Take 2 Tabs by mouth three (3) times daily. metFORMIN (GLUCOPHAGE) 500 mg tablet   No No   Sig: Take 1 Tab by mouth two (2) times daily (with meals). metFORMIN (GLUCOPHAGE) 500 mg tablet   No No   Sig: TAKE 1 TABLET BY MOUTH TWICE DAILY WITH MEALS   metFORMIN (GLUCOPHAGE) 500 mg tablet   No No   Sig: TAKE 1 TABLET BY MOUTH TWICE DAILY WITH MEALS   pantoprazole (PROTONIX) 20 mg tablet   No No   Sig: Take 1 Tab by mouth daily. polyethylene glycol (MIRALAX) 17 gram/dose powder   No No   Sig: Take 17 g by mouth daily.  1 tablespoon with 8 oz of water daily   potassium chloride (K-DUR, KLOR-CON) 20 mEq tablet   No No   Sig: TAKE 1 TABLET BY MOUTH EVERY MORNING   pravastatin (PRAVACHOL) 40 mg tablet   No No   Sig: TAKE 1 TABLET BY MOUTH EVERY NIGHT AT BEDTIME   sertraline (ZOLOFT) 100 mg tablet   No No   Sig: Take 0.5 Tabs by mouth daily. Facility-Administered Medications: None         Adult Exam   General: alert, no acute distress  Head: normocephalic, atraumatic. No hematoma  ENT: moist mucous membranes  Neck: supple, non-tender; full range of motion  Cardiovascular: regular rate and rhythm, normal peripheral perfusion, no edema. Equal pulses   Chest wall: No paradoxical chest wall movement. Tenderness to palpation over the inferior lateral right ribs with tenderness to palpation. Respiratory:  normal respirations; no wheezing, rales or rhonchi  Gastrointestinal: soft, non-tender; no rebound or guarding, no peritoneal signs, no distension  Back: non-tender, full range of motion  Musculoskeletal: normal range of motion, normal strength, no gross deformities  Neurological: alert and oriented x 4, no gross focal deficits; normal speech  Psychiatric: cooperative; appropriate mood and affect    MDM:  Lungs are clear. Pain worsened with deep inspiration. Pain reproducible in the right inferior ribs. Likely contusion. Chest x-ray obtained without signs of pneumothorax. No obvious fracture noted on chest x-ray. Awaiting CT scan of the head due to age group with some mild persistent headache status post head injury 3 days ago. No pain at the C, T, L-spine. 4:50 PM  CT unremarkable. No acute intracranial hemorrhage. Old and chronic changes noted. We will give Ultram.  Spoke with patient about increased risk of falling while taking Ultram.  She is aware. She is otherwise stable for discharge at this time with recommendation of follow-up with primary care physician. Short course of Ultram will be given.   Incentive spirometer teaching will be given by nursing staff. CT Results  (Last 48 hours)               08/13/19 1635  CT HEAD WO CONT Final result    Impression:  IMPRESSION:  Negative for acute intracranial abnormality. Chronic changes. Narrative:  CT HEAD WITHOUT CONTRAST. INDICATION: Head injury sustained in fall. COMPARISON: August 2019. TECHNIQUE:   5 mm axial scans from the skull base to the vertex. Our CT   scanners use one or more of the following:  Automated exposure control,   adjustment of the mA and or kV according to patient size, iterative   reconstruction. FINDINGS:  No acute intraparenchymal hemorrhage or abnormal extra-axial fluid   collection. The ventricles are normal size. 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 Extensive white matter low attenuation is present, nonspecific, likely chronic   small vessel disease. No midline shift or mass effect. Included portion of the   paranasal sinuses and orbits grossly unremarkable. Old right craniotomy. Xr Chest Pa Lat    Result Date: 8/13/2019  PA LATERAL CHEST  8/13/2019 2:43 PM HISTORY:  fall COMPARISON: August 6, 2019 FINDINGS:  The heart size is mildly enlarged. There is no lobar consolidation, pleural effusions or pulmonary edema. Mild thoracic scoliosis is present. There are old right-sided fracture deformities. Lower thoracic and lumbar degenerative spondylosis are present. IMPRESSION: No consolidation. No results found for this or any previous visit (from the past 24 hour(s)). Dragon voice recognition software was used to create this note. Although the note has been reviewed and corrected where necessary, additional errors may have been overlooked and remain in the text.

## 2019-10-17 ENCOUNTER — PATIENT OUTREACH (OUTPATIENT)
Dept: CASE MANAGEMENT | Age: 84
End: 2019-10-17

## 2019-10-17 NOTE — PROGRESS NOTES
TYLER CUEVAS in receipt of referral from Dr. Lyssa Hinton. TYLER CUEVAS outreached to pt today. She was not able to speak freely. Daughter in the home. Pt has TYLER CUEVAS's phone # and will call back when she can talk. This note will not be viewable in [address]

## 2019-10-18 ENCOUNTER — HOSPITAL ENCOUNTER (OUTPATIENT)
Dept: LAB | Age: 84
Discharge: HOME | End: 2019-10-18
Payer: COMMERCIAL

## 2019-10-18 DIAGNOSIS — D64.9 ANEMIA, UNSPECIFIED TYPE: ICD-10-CM

## 2019-10-18 LAB
ALBUMIN SERPL-MCNC: 3.6 G/DL (ref 3.2–4.6)
ALBUMIN/GLOB SERPL: 1.1 {RATIO} (ref 1.2–3.5)
ALP SERPL-CCNC: 72 U/L (ref 50–136)
ALT SERPL-CCNC: 18 U/L (ref 12–65)
ANION GAP SERPL CALC-SCNC: 8 MMOL/L (ref 7–16)
AST SERPL-CCNC: 13 U/L (ref 15–37)
BASOPHILS # BLD: 0 K/UL (ref 0–0.2)
BASOPHILS NFR BLD: 0 % (ref 0–2)
BILIRUB SERPL-MCNC: 0.4 MG/DL (ref 0.2–1.1)
BUN SERPL-MCNC: 27 MG/DL (ref 8–23)
CALCIUM SERPL-MCNC: 10 MG/DL (ref 8.3–10.4)
CHLORIDE SERPL-SCNC: 111 MMOL/L (ref 98–107)
CO2 SERPL-SCNC: 26 MMOL/L (ref 21–32)
CREAT SERPL-MCNC: 1.29 MG/DL (ref 0.6–1)
DIFFERENTIAL METHOD BLD: ABNORMAL
EOSINOPHIL # BLD: 0 K/UL (ref 0–0.8)
EOSINOPHIL NFR BLD: 0 % (ref 0.5–7.8)
ERYTHROCYTE [DISTWIDTH] IN BLOOD BY AUTOMATED COUNT: 12.8 % (ref 11.9–14.6)
FERRITIN SERPL-MCNC: 54 NG/ML (ref 8–388)
GLOBULIN SER CALC-MCNC: 3.2 G/DL (ref 2.3–3.5)
GLUCOSE SERPL-MCNC: 205 MG/DL (ref 65–100)
HCT VFR BLD AUTO: 32 % (ref 35.8–46.3)
HGB BLD-MCNC: 9.7 G/DL (ref 11.7–15.4)
IMM GRANULOCYTES # BLD AUTO: 0 K/UL (ref 0–0.5)
IMM GRANULOCYTES NFR BLD AUTO: 0 % (ref 0–5)
IRON SATN MFR SERPL: 38 %
IRON SERPL-MCNC: 105 UG/DL (ref 35–150)
LYMPHOCYTES # BLD: 2 K/UL (ref 0.5–4.6)
LYMPHOCYTES NFR BLD: 22 % (ref 13–44)
MCH RBC QN AUTO: 30 PG (ref 26.1–32.9)
MCHC RBC AUTO-ENTMCNC: 30.3 G/DL (ref 31.4–35)
MCV RBC AUTO: 99.1 FL (ref 79.6–97.8)
MONOCYTES # BLD: 0.5 K/UL (ref 0.1–1.3)
MONOCYTES NFR BLD: 6 % (ref 4–12)
NEUTS SEG # BLD: 6.4 K/UL (ref 1.7–8.2)
NEUTS SEG NFR BLD: 71 % (ref 43–78)
NRBC # BLD: 0 K/UL (ref 0–0.2)
PLATELET # BLD AUTO: 169 K/UL (ref 150–450)
PMV BLD AUTO: 10.1 FL (ref 9.4–12.3)
POTASSIUM SERPL-SCNC: 4 MMOL/L (ref 3.5–5.1)
PROT SERPL-MCNC: 6.8 G/DL (ref 6.3–8.2)
RBC # BLD AUTO: 3.23 M/UL (ref 4.05–5.25)
SODIUM SERPL-SCNC: 145 MMOL/L (ref 136–145)
TIBC SERPL-MCNC: 280 UG/DL (ref 250–450)
WBC # BLD AUTO: 9 K/UL (ref 4.3–11.1)

## 2019-10-18 PROCEDURE — 85025 COMPLETE CBC W/AUTO DIFF WBC: CPT

## 2019-10-18 PROCEDURE — 36415 COLL VENOUS BLD VENIPUNCTURE: CPT

## 2019-10-18 PROCEDURE — 83540 ASSAY OF IRON: CPT

## 2019-10-18 PROCEDURE — 82728 ASSAY OF FERRITIN: CPT

## 2019-10-18 PROCEDURE — 80053 COMPREHEN METABOLIC PANEL: CPT

## 2019-10-21 ENCOUNTER — PATIENT OUTREACH (OUTPATIENT)
Dept: CASE MANAGEMENT | Age: 84
End: 2019-10-21

## 2019-10-21 NOTE — Clinical Note
Choco to reach pt today or leave message for call back. The only other numbers I have are for the daughter who lives with her and a son. Will try to reach pt again this week. Samaria Townsend

## 2019-10-21 NOTE — PROGRESS NOTES
TYLER CUEVAS attempted outreach with pt. The voicemail box is full. Unable to leave message. Will try later in the week. Dr. Hiren Rubio informed. This note will not be viewable in [address]

## 2019-10-23 ENCOUNTER — PATIENT OUTREACH (OUTPATIENT)
Dept: CASE MANAGEMENT | Age: 84
End: 2019-10-23

## 2019-10-23 NOTE — PROGRESS NOTES
TYLER CUEVAS phone outreach with pt. Not a good time to talk, daughter present. Pt has TYLER CUEVAS's contact # and will call when she is able to talk freely. No further outreaches planned . Will await call from pt. Dr. Ruth Balderrama updated. This note will not be viewable in [address]

## 2019-12-02 ENCOUNTER — PATIENT OUTREACH (OUTPATIENT)
Dept: CASE MANAGEMENT | Age: 84
End: 2019-12-02

## 2019-12-02 NOTE — PROGRESS NOTES
TYLER CUEVAS has had no contact with pt since the end of October. At that time, pt stated that she had TYLER CUEVAS's contact # and would call back when she could talk freely. Will close case for now. Happy to assist in future. Dr. Tejas Tapia updated. This note will not be viewable in [address]

## 2020-01-30 ENCOUNTER — PATIENT OUTREACH (OUTPATIENT)
Dept: CASE MANAGEMENT | Age: 85
End: 2020-01-30
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
## 2020-01-31 ENCOUNTER — PATIENT OUTREACH (OUTPATIENT)
Dept: CASE MANAGEMENT | Age: 85
End: 2020-01-31
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
## 2020-02-04 ENCOUNTER — PATIENT OUTREACH (OUTPATIENT)
Dept: CASE MANAGEMENT | Age: 85
End: 2020-02-04

## 2020-02-04 NOTE — PROGRESS NOTES
TYLER CUEVAS [facility], Chelsea Egan. Yuan Granda has requested level of care from Dr. Kassy Stallings to determine if pt is JONE vs SNF appropriate. Upon receipt of LOC, Yuan Granda will proceed with looking for placement options in the area. [facility] will continue to follow. This note will not be viewable in [address]

## 2020-02-04 NOTE — PROGRESS NOTES
TYLER CM left  for Feli Rene, [facility],[address] [place] [address], to inquire about investigation status for pt. Awaiting response. This note will not be viewable in [address]

## 2020-02-12 ENCOUNTER — PATIENT OUTREACH (OUTPATIENT)
Dept: CASE MANAGEMENT | Age: 85
End: 2020-02-12

## 2020-02-12 NOTE — PROGRESS NOTES
TYLER CUEVAS call to David Talavera, LIZBETH . Pt has decided she wants to remain in her home. Daughter will be moving out in three weeks. APS  will make arrangements for pt to go to [facility] three days/week for socialization and activities. Will order her a life alert, and make a referral to [facility] for in home care aides. CLTC aide process could take a couple of months. DSS will remain involved, make weekly well checks with pt at least until daughter moves out of the home. Pt has MOWs and Medicaid [facility] for transportation to MD appts. TYLER CUEVAS outreach with pt today. She is in agreement with plan, as above. Reports that the home situation is stable at this time. Dr. Ansari July updated    PLAN  TYLER will contact DSS  and pt in 2 weeks. This note will not be viewable in [address]

## 2020-02-22 ENCOUNTER — HOSPITAL ENCOUNTER (INPATIENT)
Age: 85
LOS: 1 days | Discharge: HOME HEALTH | DRG: 378 | End: 2020-02-26
Attending: STUDENT IN AN ORGANIZED HEALTH CARE EDUCATION/TRAINING PROGRAM | Admitting: INTERNAL MEDICINE
Payer: COMMERCIAL

## 2020-02-22 ENCOUNTER — APPOINTMENT (OUTPATIENT)
Dept: NUCLEAR MEDICINE | Age: 85
DRG: 378 | End: 2020-02-22
Attending: INTERNAL MEDICINE
Payer: COMMERCIAL

## 2020-02-22 ENCOUNTER — APPOINTMENT (OUTPATIENT)
Dept: CT IMAGING | Age: 85
DRG: 378 | End: 2020-02-22
Attending: STUDENT IN AN ORGANIZED HEALTH CARE EDUCATION/TRAINING PROGRAM
Payer: COMMERCIAL

## 2020-02-22 DIAGNOSIS — K62.5 RECTAL BLEEDING: Primary | ICD-10-CM

## 2020-02-22 DIAGNOSIS — D64.9 ANEMIA, UNSPECIFIED TYPE: ICD-10-CM

## 2020-02-22 LAB
ALBUMIN SERPL-MCNC: 3.5 G/DL (ref 3.2–4.6)
ALBUMIN/GLOB SERPL: 1 {RATIO} (ref 1.2–3.5)
ALP SERPL-CCNC: 73 U/L (ref 50–136)
ALT SERPL-CCNC: 22 U/L (ref 12–65)
ANION GAP SERPL CALC-SCNC: 10 MMOL/L (ref 7–16)
APPEARANCE UR: ABNORMAL
AST SERPL-CCNC: 11 U/L (ref 15–37)
BACTERIA URNS QL MICRO: ABNORMAL /HPF
BACTERIA URNS QL MICRO: ABNORMAL /HPF
BASOPHILS # BLD: 0 K/UL (ref 0–0.2)
BASOPHILS NFR BLD: 0 % (ref 0–2)
BILIRUB SERPL-MCNC: 0.3 MG/DL (ref 0.2–1.1)
BILIRUB UR QL: NEGATIVE
BUN SERPL-MCNC: 28 MG/DL (ref 8–23)
CALCIUM SERPL-MCNC: 10.3 MG/DL (ref 8.3–10.4)
CASTS URNS QL MICRO: ABNORMAL /LPF
CASTS URNS QL MICRO: ABNORMAL /LPF
CHLORIDE SERPL-SCNC: 109 MMOL/L (ref 98–107)
CO2 SERPL-SCNC: 25 MMOL/L (ref 21–32)
COLOR UR: YELLOW
CREAT SERPL-MCNC: 1.28 MG/DL (ref 0.6–1)
DIFFERENTIAL METHOD BLD: ABNORMAL
EOSINOPHIL # BLD: 0.1 K/UL (ref 0–0.8)
EOSINOPHIL NFR BLD: 1 % (ref 0.5–7.8)
EPI CELLS #/AREA URNS HPF: ABNORMAL /HPF
EPI CELLS #/AREA URNS HPF: ABNORMAL /HPF
ERYTHROCYTE [DISTWIDTH] IN BLOOD BY AUTOMATED COUNT: 13.3 % (ref 11.9–14.6)
GLOBULIN SER CALC-MCNC: 3.5 G/DL (ref 2.3–3.5)
GLUCOSE BLD STRIP.AUTO-MCNC: 149 MG/DL (ref 65–100)
GLUCOSE SERPL-MCNC: 248 MG/DL (ref 65–100)
GLUCOSE UR STRIP.AUTO-MCNC: NEGATIVE MG/DL
HCT VFR BLD AUTO: 25.5 % (ref 35.8–46.3)
HCT VFR BLD AUTO: 30.2 % (ref 35.8–46.3)
HGB BLD-MCNC: 6.8 G/DL (ref 11.7–15.4)
HGB BLD-MCNC: 7.4 G/DL (ref 11.7–15.4)
HGB BLD-MCNC: 7.7 G/DL (ref 11.7–15.4)
HGB BLD-MCNC: 9.3 G/DL (ref 11.7–15.4)
HGB UR QL STRIP: ABNORMAL
IMM GRANULOCYTES # BLD AUTO: 0 K/UL (ref 0–0.5)
IMM GRANULOCYTES NFR BLD AUTO: 0 % (ref 0–5)
KETONES UR QL STRIP.AUTO: NEGATIVE MG/DL
LEUKOCYTE ESTERASE UR QL STRIP.AUTO: ABNORMAL
LYMPHOCYTES # BLD: 2.5 K/UL (ref 0.5–4.6)
LYMPHOCYTES NFR BLD: 26 % (ref 13–44)
MCH RBC QN AUTO: 30.5 PG (ref 26.1–32.9)
MCHC RBC AUTO-ENTMCNC: 30.8 G/DL (ref 31.4–35)
MCV RBC AUTO: 99 FL (ref 79.6–97.8)
MONOCYTES # BLD: 0.7 K/UL (ref 0.1–1.3)
MONOCYTES NFR BLD: 8 % (ref 4–12)
NEUTS SEG # BLD: 6 K/UL (ref 1.7–8.2)
NEUTS SEG NFR BLD: 65 % (ref 43–78)
NITRITE UR QL STRIP.AUTO: NEGATIVE
NRBC # BLD: 0 K/UL (ref 0–0.2)
PH UR STRIP: 5.5 [PH] (ref 5–9)
PLATELET # BLD AUTO: 193 K/UL (ref 150–450)
PMV BLD AUTO: 10.9 FL (ref 9.4–12.3)
POTASSIUM SERPL-SCNC: 3.9 MMOL/L (ref 3.5–5.1)
PROT SERPL-MCNC: 7 G/DL (ref 6.3–8.2)
PROT UR STRIP-MCNC: NEGATIVE MG/DL
RBC # BLD AUTO: 3.05 M/UL (ref 4.05–5.2)
RBC #/AREA URNS HPF: ABNORMAL /HPF
RBC #/AREA URNS HPF: ABNORMAL /HPF
SODIUM SERPL-SCNC: 144 MMOL/L (ref 136–145)
SP GR UR REFRACTOMETRY: 1.03 (ref 1–1.02)
UROBILINOGEN UR QL STRIP.AUTO: 0.2 EU/DL (ref 0.2–1)
WBC # BLD AUTO: 9.3 K/UL (ref 4.3–11.1)
WBC URNS QL MICRO: >100 /HPF
WBC URNS QL MICRO: ABNORMAL /HPF

## 2020-02-22 PROCEDURE — 96365 THER/PROPH/DIAG IV INF INIT: CPT

## 2020-02-22 PROCEDURE — A9560 TC99M LABELED RBC: HCPCS

## 2020-02-22 PROCEDURE — C9113 INJ PANTOPRAZOLE SODIUM, VIA: HCPCS | Performed by: STUDENT IN AN ORGANIZED HEALTH CARE EDUCATION/TRAINING PROGRAM

## 2020-02-22 PROCEDURE — 74011000250 HC RX REV CODE- 250: Performed by: INTERNAL MEDICINE

## 2020-02-22 PROCEDURE — 30233N1 TRANSFUSION OF NONAUTOLOGOUS RED BLOOD CELLS INTO PERIPHERAL VEIN, PERCUTANEOUS APPROACH: ICD-10-PCS | Performed by: INTERNAL MEDICINE

## 2020-02-22 PROCEDURE — 36415 COLL VENOUS BLD VENIPUNCTURE: CPT

## 2020-02-22 PROCEDURE — 96376 TX/PRO/DX INJ SAME DRUG ADON: CPT

## 2020-02-22 PROCEDURE — 94760 N-INVAS EAR/PLS OXIMETRY 1: CPT

## 2020-02-22 PROCEDURE — P9016 RBC LEUKOCYTES REDUCED: HCPCS

## 2020-02-22 PROCEDURE — 87088 URINE BACTERIA CULTURE: CPT

## 2020-02-22 PROCEDURE — 74011636320 HC RX REV CODE- 636/320: Performed by: STUDENT IN AN ORGANIZED HEALTH CARE EDUCATION/TRAINING PROGRAM

## 2020-02-22 PROCEDURE — 36430 TRANSFUSION BLD/BLD COMPNT: CPT

## 2020-02-22 PROCEDURE — 85018 HEMOGLOBIN: CPT

## 2020-02-22 PROCEDURE — 82962 GLUCOSE BLOOD TEST: CPT

## 2020-02-22 PROCEDURE — 81001 URINALYSIS AUTO W/SCOPE: CPT

## 2020-02-22 PROCEDURE — 81015 MICROSCOPIC EXAM OF URINE: CPT

## 2020-02-22 PROCEDURE — 87086 URINE CULTURE/COLONY COUNT: CPT

## 2020-02-22 PROCEDURE — 74011000250 HC RX REV CODE- 250: Performed by: STUDENT IN AN ORGANIZED HEALTH CARE EDUCATION/TRAINING PROGRAM

## 2020-02-22 PROCEDURE — 94761 N-INVAS EAR/PLS OXIMETRY MLT: CPT

## 2020-02-22 PROCEDURE — 99218 HC RM OBSERVATION: CPT

## 2020-02-22 PROCEDURE — 74011000258 HC RX REV CODE- 258: Performed by: STUDENT IN AN ORGANIZED HEALTH CARE EDUCATION/TRAINING PROGRAM

## 2020-02-22 PROCEDURE — 86900 BLOOD TYPING SEROLOGIC ABO: CPT

## 2020-02-22 PROCEDURE — 94640 AIRWAY INHALATION TREATMENT: CPT

## 2020-02-22 PROCEDURE — 74011250636 HC RX REV CODE- 250/636: Performed by: INTERNAL MEDICINE

## 2020-02-22 PROCEDURE — 86920 COMPATIBILITY TEST SPIN: CPT

## 2020-02-22 PROCEDURE — C9113 INJ PANTOPRAZOLE SODIUM, VIA: HCPCS | Performed by: INTERNAL MEDICINE

## 2020-02-22 PROCEDURE — 96375 TX/PRO/DX INJ NEW DRUG ADDON: CPT

## 2020-02-22 PROCEDURE — 74011250637 HC RX REV CODE- 250/637: Performed by: INTERNAL MEDICINE

## 2020-02-22 PROCEDURE — 80053 COMPREHEN METABOLIC PANEL: CPT

## 2020-02-22 PROCEDURE — 74011250636 HC RX REV CODE- 250/636: Performed by: STUDENT IN AN ORGANIZED HEALTH CARE EDUCATION/TRAINING PROGRAM

## 2020-02-22 PROCEDURE — 86902 BLOOD TYPE ANTIGEN DONOR EA: CPT

## 2020-02-22 PROCEDURE — 74011000258 HC RX REV CODE- 258: Performed by: INTERNAL MEDICINE

## 2020-02-22 PROCEDURE — 96374 THER/PROPH/DIAG INJ IV PUSH: CPT

## 2020-02-22 PROCEDURE — 86921 COMPATIBILITY TEST INCUBATE: CPT

## 2020-02-22 PROCEDURE — 86922 COMPATIBILITY TEST ANTIGLOB: CPT

## 2020-02-22 PROCEDURE — 99285 EMERGENCY DEPT VISIT HI MDM: CPT

## 2020-02-22 PROCEDURE — 86905 BLOOD TYPING RBC ANTIGENS: CPT

## 2020-02-22 PROCEDURE — 87186 SC STD MICRODIL/AGAR DIL: CPT

## 2020-02-22 PROCEDURE — 74177 CT ABD & PELVIS W/CONTRAST: CPT

## 2020-02-22 PROCEDURE — 85025 COMPLETE CBC W/AUTO DIFF WBC: CPT

## 2020-02-22 PROCEDURE — 86870 RBC ANTIBODY IDENTIFICATION: CPT

## 2020-02-22 RX ORDER — SERTRALINE HYDROCHLORIDE 50 MG/1
50 TABLET, FILM COATED ORAL DAILY
Status: DISCONTINUED | OUTPATIENT
Start: 2020-02-22 | End: 2020-02-26 | Stop reason: HOSPADM

## 2020-02-22 RX ORDER — ALBUTEROL SULFATE 0.83 MG/ML
2.5 SOLUTION RESPIRATORY (INHALATION)
Status: DISCONTINUED | OUTPATIENT
Start: 2020-02-22 | End: 2020-02-26 | Stop reason: HOSPADM

## 2020-02-22 RX ORDER — SODIUM CHLORIDE 0.9 % (FLUSH) 0.9 %
5-40 SYRINGE (ML) INJECTION AS NEEDED
Status: DISCONTINUED | OUTPATIENT
Start: 2020-02-22 | End: 2020-02-26 | Stop reason: HOSPADM

## 2020-02-22 RX ORDER — GABAPENTIN 100 MG/1
100 CAPSULE ORAL 2 TIMES DAILY
Status: DISCONTINUED | OUTPATIENT
Start: 2020-02-22 | End: 2020-02-26 | Stop reason: HOSPADM

## 2020-02-22 RX ORDER — ONDANSETRON 2 MG/ML
4 INJECTION INTRAMUSCULAR; INTRAVENOUS
Status: DISCONTINUED | OUTPATIENT
Start: 2020-02-22 | End: 2020-02-26 | Stop reason: HOSPADM

## 2020-02-22 RX ORDER — LORAZEPAM 0.5 MG/1
0.5 TABLET ORAL
Status: DISCONTINUED | OUTPATIENT
Start: 2020-02-22 | End: 2020-02-26 | Stop reason: HOSPADM

## 2020-02-22 RX ORDER — ONDANSETRON 2 MG/ML
4 INJECTION INTRAMUSCULAR; INTRAVENOUS
Status: COMPLETED | OUTPATIENT
Start: 2020-02-22 | End: 2020-02-22

## 2020-02-22 RX ORDER — MORPHINE SULFATE 4 MG/ML
4 INJECTION INTRAVENOUS
Status: COMPLETED | OUTPATIENT
Start: 2020-02-22 | End: 2020-02-22

## 2020-02-22 RX ORDER — OXYCODONE AND ACETAMINOPHEN 5; 325 MG/1; MG/1
1 TABLET ORAL
Status: DISCONTINUED | OUTPATIENT
Start: 2020-02-22 | End: 2020-02-26 | Stop reason: HOSPADM

## 2020-02-22 RX ORDER — BUDESONIDE AND FORMOTEROL FUMARATE DIHYDRATE 160; 4.5 UG/1; UG/1
2 AEROSOL RESPIRATORY (INHALATION) 2 TIMES DAILY
Status: DISCONTINUED | OUTPATIENT
Start: 2020-02-22 | End: 2020-02-22

## 2020-02-22 RX ORDER — SODIUM CHLORIDE 9 MG/ML
75 INJECTION, SOLUTION INTRAVENOUS CONTINUOUS
Status: DISCONTINUED | OUTPATIENT
Start: 2020-02-22 | End: 2020-02-25

## 2020-02-22 RX ORDER — SODIUM CHLORIDE 0.9 % (FLUSH) 0.9 %
10 SYRINGE (ML) INJECTION
Status: COMPLETED | OUTPATIENT
Start: 2020-02-22 | End: 2020-02-22

## 2020-02-22 RX ORDER — DEXTROMETHORPHAN HYDROBROMIDE, GUAIFENESIN 5; 100 MG/5ML; MG/5ML
650 LIQUID ORAL EVERY 8 HOURS
COMMUNITY

## 2020-02-22 RX ORDER — BUDESONIDE AND FORMOTEROL FUMARATE DIHYDRATE 160; 4.5 UG/1; UG/1
2 AEROSOL RESPIRATORY (INHALATION)
Status: DISCONTINUED | OUTPATIENT
Start: 2020-02-23 | End: 2020-02-26 | Stop reason: HOSPADM

## 2020-02-22 RX ORDER — SODIUM CHLORIDE 9 MG/ML
250 INJECTION, SOLUTION INTRAVENOUS AS NEEDED
Status: DISCONTINUED | OUTPATIENT
Start: 2020-02-22 | End: 2020-02-23 | Stop reason: SDUPTHER

## 2020-02-22 RX ORDER — PRAVASTATIN SODIUM 20 MG/1
40 TABLET ORAL
Status: DISCONTINUED | OUTPATIENT
Start: 2020-02-22 | End: 2020-02-26 | Stop reason: HOSPADM

## 2020-02-22 RX ORDER — PRAVASTATIN SODIUM 20 MG/1
40 TABLET ORAL
Status: DISCONTINUED | OUTPATIENT
Start: 2020-02-22 | End: 2020-02-22 | Stop reason: SDUPTHER

## 2020-02-22 RX ORDER — SODIUM CHLORIDE 9 MG/ML
250 INJECTION, SOLUTION INTRAVENOUS AS NEEDED
Status: DISCONTINUED | OUTPATIENT
Start: 2020-02-22 | End: 2020-02-26 | Stop reason: HOSPADM

## 2020-02-22 RX ORDER — ACETAMINOPHEN 325 MG/1
650 TABLET ORAL
Status: DISCONTINUED | OUTPATIENT
Start: 2020-02-22 | End: 2020-02-26 | Stop reason: HOSPADM

## 2020-02-22 RX ORDER — SODIUM CHLORIDE 0.9 % (FLUSH) 0.9 %
5-40 SYRINGE (ML) INJECTION EVERY 8 HOURS
Status: DISCONTINUED | OUTPATIENT
Start: 2020-02-22 | End: 2020-02-26 | Stop reason: HOSPADM

## 2020-02-22 RX ORDER — FLUTICASONE PROPIONATE 50 MCG
2 SPRAY, SUSPENSION (ML) NASAL DAILY
Status: DISCONTINUED | OUTPATIENT
Start: 2020-02-22 | End: 2020-02-26 | Stop reason: HOSPADM

## 2020-02-22 RX ADMIN — Medication 10 ML: at 03:40

## 2020-02-22 RX ADMIN — SODIUM CHLORIDE 75 ML/HR: 900 INJECTION, SOLUTION INTRAVENOUS at 21:13

## 2020-02-22 RX ADMIN — SERTRALINE HYDROCHLORIDE 50 MG: 50 TABLET ORAL at 09:21

## 2020-02-22 RX ADMIN — FLUTICASONE PROPIONATE 2 SPRAY: 50 SPRAY, METERED NASAL at 09:21

## 2020-02-22 RX ADMIN — SODIUM CHLORIDE 80 MG: 9 INJECTION, SOLUTION INTRAMUSCULAR; INTRAVENOUS; SUBCUTANEOUS at 05:12

## 2020-02-22 RX ADMIN — IOPAMIDOL 100 ML: 755 INJECTION, SOLUTION INTRAVENOUS at 03:40

## 2020-02-22 RX ADMIN — CEFTRIAXONE 1 G: 1 INJECTION, POWDER, FOR SOLUTION INTRAMUSCULAR; INTRAVENOUS at 14:38

## 2020-02-22 RX ADMIN — MORPHINE SULFATE 4 MG: 4 INJECTION INTRAVENOUS at 05:12

## 2020-02-22 RX ADMIN — BUDESONIDE AND FORMOTEROL FUMARATE DIHYDRATE 2 PUFF: 160; 4.5 AEROSOL RESPIRATORY (INHALATION) at 08:00

## 2020-02-22 RX ADMIN — BUDESONIDE AND FORMOTEROL FUMARATE DIHYDRATE 2 PUFF: 160; 4.5 AEROSOL RESPIRATORY (INHALATION) at 19:05

## 2020-02-22 RX ADMIN — SODIUM CHLORIDE 250 ML: 900 INJECTION, SOLUTION INTRAVENOUS at 23:08

## 2020-02-22 RX ADMIN — SODIUM CHLORIDE 100 ML: 900 INJECTION, SOLUTION INTRAVENOUS at 03:40

## 2020-02-22 RX ADMIN — Medication 10 ML: at 14:38

## 2020-02-22 RX ADMIN — Medication 10 ML: at 11:24

## 2020-02-22 RX ADMIN — ONDANSETRON 4 MG: 2 INJECTION INTRAMUSCULAR; INTRAVENOUS at 05:12

## 2020-02-22 RX ADMIN — PRAVASTATIN SODIUM 40 MG: 20 TABLET ORAL at 21:08

## 2020-02-22 RX ADMIN — SODIUM CHLORIDE 40 MG: 9 INJECTION, SOLUTION INTRAMUSCULAR; INTRAVENOUS; SUBCUTANEOUS at 17:22

## 2020-02-22 RX ADMIN — GABAPENTIN 100 MG: 100 CAPSULE ORAL at 17:26

## 2020-02-22 RX ADMIN — GABAPENTIN 100 MG: 100 CAPSULE ORAL at 09:21

## 2020-02-22 RX ADMIN — LORAZEPAM 0.5 MG: 0.5 TABLET ORAL at 21:55

## 2020-02-22 NOTE — PROGRESS NOTES
Returned to room from having tagged red cell study. incont of large bloody stool with clots.   Dr. Luke Wing informed via perfect serve

## 2020-02-22 NOTE — H&P
Hospitalist Note     Admit Date:  2020  1:58 AM   Name:  Yasmani Adams   Age:  80 y.o.  :  1929   MRN:  [id number]   PCP:  Mariajose King MD  Treatment Team: Attending Provider: Tam Rush; Primary Nurse: Heydi Goss RN    HPI/Subjective: The patient is a 60-year-old female with a past medical history of HTN, GERD, diabetes mellitus type 2, COPD (not on home O2), asthma, CKD, and previous GI bleed who presents to the ED with a chief complaint of bright red blood per rectum. Patient reports at 10:30 PM she experienced a dark bowel movement with some visible blood. She reports after that she had a another bowel movement with formed more blood and blood clots mixed in. Reportedly passed bloody BM while in ED. In addition patient complains of abdominal pain located around her hernia. This pain is reportedly chronic and started prior to Akil. Pain described as tender in nature, comes and goes, with no radiation. No aggravating or alleviating factors reported. Patient denies any chest pain, shortness of breath, dysuria, myalgias or arthralgias. 10 systems reviewed and negative except as noted in HPI.   Past Medical History:   Diagnosis Date    Anxiety 2018    Arrhythmia     palpitations 2/10-went to [facility]    Arthritis     L leg- fell     Asthma     adult onset x 20 yrs    B12 deficiency 2012    Body mass index 37.0-37.9, adult 2014    CAD (coronary artery disease)     cardiac work [id number]    Controlled type 2 diabetes mellitus with microalbuminuria, without long-term current use of insulin ([address] 2016    COPD     Corns and callosities 2016    CRI (chronic renal insufficiency) 2012    Depression 2012    Dermatophytosis of nail 2016    Diabetes ([address]     type II- home tests fasting-112    DJD (degenerative joint disease) of hip     DM (diabetes mellitus) type II controlled with renal manifestation ([facility] 7/16/2010    Encounter for long-term (current) use of other medications 1/8/2013    Essential hypertension 7/16/2010    Gastrointestinal disorder     GERD (gastroesophageal reflux disease)     Heart failure ([facility]     Hiatal hernia 8/24/2012    HLD (hyperlipidemia) 1/8/2013    Neuropathy 8/24/2012    Lower limbs     Obesity (BMI 30.0-39. 9) BMI-43.8    Other and unspecified hyperlipidemia 7/16/2010    Other chest pain 7/16/2010    Other ill-defined conditions(799.89)     high cholesterol    Other ill-defined conditions(799.89)     incont.  urine    PAD (peripheral artery disease) ([facility] 8/24/2012    Retinal hemorrhage       Past Surgical History:   Procedure Laterality Date    ABDOMEN SURGERY PROC UNLISTED      HX APPENDECTOMY      HX CHOLECYSTECTOMY      HX CHOLECYSTECTOMY  2000    HX ENDOSCOPY  02/27/2018    Dr Deana Mackay    HX GI      small intestine obstruction    HX GYN      hyst    HX HEART CATHETERIZATION      HX HYSTERECTOMY      prolapse    HX INTRACRANIAL ANEURYSM REPAIR      HX INTRACRANIAL ANEURYSM REPAIR      HX ORTHOPAEDIC      right wrist 2010    HX OTHER SURGICAL      aneurysm clip-cerebral-2000    HX OTHER SURGICAL      cerebral aneurysm   Dr. Umesh Delatorre      No Known Allergies   Social History     Tobacco Use    Smoking status: Never Smoker    Smokeless tobacco: Never Used   Substance Use Topics    Alcohol use: No      Family History   Problem Relation Age of Onset    Cancer Mother     Diabetes Father     Diabetes Paternal Grandfather       Immunization History   Administered Date(s) Administered    (RETIRED) Pneumococcal Vaccine (Unspecified Type) 01/01/2006    Influenza High Dose Vaccine PF 09/14/2016, 10/25/2017, 12/13/2018    Influenza Vaccine 11/05/2013, 09/16/2014, 10/19/2015    Influenza Vaccine (Tri) Adjuvanted 10/14/2019    Influenza Vaccine PF 09/22/2015    Influenza Vaccine Whole 01/01/2011    Pneumococcal Conjugate (PCV-13) 07/14/2015    Pneumococcal Vaccine (Unspecified Type) 2006    TB Skin Test (PPD) Intradermal 2018, 2018    Tdap 2014     PTA Medications:  Prior to Admission Medications   Prescriptions Last Dose Informant Patient Reported? Taking? Blood-Glucose Meter monitoring kit   No No   Sig: Pt uses true metrix meter and tests once daily dx code S78.81   FOLIC ACID/MULTIVIT-MIN/LUTEIN (CENTRUM SILVER PO)   Yes No   Sig: Take  by mouth. LORazepam (ATIVAN) 0.5 mg tablet   No No   Sig: Take 1 Tab by mouth daily as needed for Anxiety. Lancets Misc   No No   Sig: Pt tests three times daily Diagnosis Code: 250.00   OTHER   No No   Sig: One shower chair   VENTOLIN HFA 90 mcg/actuation inhaler   No No   Sig: INHALE 2 PUFFS BY MOUTH EVERY 6 HOURS AS NEEDED FOR WHEEZING   VIT A/VIT C/VIT E/ZINC/COPPER (PRESERVISION AREDS PO)   Yes No   Sig: Take 1 Tab by mouth two (2) times a day. amLODIPine (NORVASC) 10 mg tablet   No No   Sig: Take 1 Tab by mouth daily. benazepriL (LOTENSIN) 40 mg tablet   No No   Sig: TAKE 1 TABLET BY MOUTH DAILY   cyanocobalamin, vitamin B-12, 1,000 mcg/mL kit   No No   Si,000 mcg by Injection route every month. fluticasone propion-salmeteroL (ADVAIR DISKUS) 250-50 mcg/dose diskus inhaler   No No   Sig: Take 1 Puff by inhalation every twelve (12) hours. fluticasone propionate (FLONASE) 50 mcg/actuation nasal spray   No No   Sig: INSTILL 1 SPRAY IN EACH NOSTRIL NIGHTLY AS DIRECTED   furosemide (LASIX) 20 mg tablet   No No   Sig: Take 1 Tab by mouth daily.    gabapentin (NEURONTIN) 100 mg capsule   No No   Sig: TAKE 1 CAPSULE BY MOUTH TWICE DAILY   glipiZIDE SR (GLUCOTROL XL) 2.5 mg CR tablet   No No   Sig: TAKE 1 TABLET BY MOUTH EVERY DAY AFTER BREAKFAST   glucose blood VI test strips (TRUE METRIX GLUCOSE TEST STRIP) strip   No No   Sig: TEST ONCE DAILY dx code E11.29   hydrALAZINE (APRESOLINE) 50 mg tablet   No No   Sig: TAKE 2 TABLETS BY MOUTH THREE TIMES DAILY   metFORMIN (GLUCOPHAGE) 500 mg tablet   No No   Sig: Take 1 Tab by mouth two (2) times daily (with meals). pantoprazole (PROTONIX) 20 mg tablet   No No   Sig: TAKE 1 TABLET BY MOUTH DAILY   polyethylene glycol (MIRALAX) 17 gram/dose powder   No No   Sig: Take 17 g by mouth daily. 1 tablespoon with 8 oz of water daily   potassium chloride (K-DUR, KLOR-CON) 20 mEq tablet   No No   Sig: TAKE 1 TABLET BY MOUTH EVERY MORNING   pravastatin (PRAVACHOL) 40 mg tablet   No No   Sig: TAKE 1 TABLET BY MOUTH EVERY NIGHT AT BEDTIME   sertraline (ZOLOFT) 100 mg tablet   No No   Sig: Take 0.5 Tabs by mouth daily. Indications: major depressive disorder, am      Facility-Administered Medications: None       Objective:     Patient Vitals for the past 24 hrs:   Temp Pulse Resp BP SpO2   02/22/20 0505  79  147/68 96 %   02/22/20 0425    (!) 216/86    02/22/20 0231  81 17 195/82 97 %   02/22/20 0213  90  (!) 205/79 99 %   02/22/20 0204 98.4 °F (36.9 °C) 90 16 (!) 205/79 99 %     Oxygen Therapy  O2 Sat (%): 96 % (02/22/20 0505)  Pulse via Oximetry: 79 beats per minute (02/22/20 0505)  O2 Device: Room air (02/22/20 0204)    Estimated body mass index is 30.48 kg/m² as calculated from the following:    Height as of this encounter: 5' 4\" (1.626 m). Weight as of this encounter: 80.6 kg (177 lb 9.6 oz). Intake/Output Summary (Last 24 hours) at 2/22/2020 0551  Last data filed at 2/22/2020 0500  Gross per 24 hour   Intake 100 ml   Output    Net 100 ml       *Note that automatically entered I/Os may not be accurate; dependent on patient compliance with collection and accurate  by assistants.     Physical Exam:  General: Elderly female no acute distress  Eyes: EOMI, nonicteric  ENT: Moist mucous membranes  Cardiovascular: RRR, no significant rubs or murmurs appreciated  Respiratory: No wheezes, rales, or rhonchi; clear to auscultation bilaterally  Gastrointestinal: Soft, palpable mass below epigastric region, tender to palpation RLQ and LLQ, bowel sounds present  Genitourinary: No suprapubic tenderness  Musculoskeletal: No joint swelling appreciated  Skin: No lesions on examined area  Neurological: Alert and oriented, no focal deficits noted  Psychiatric: Normal mood and affect    I reviewed the labs, imaging, EKGs, telemetry, and other studies done this admission. Data Review:   Recent Results (from the past 24 hour(s))   CBC WITH AUTOMATED DIFF    Collection Time: 02/22/20  2:30 AM   Result Value Ref Range    WBC 9.3 4.3 - 11.1 K/uL    RBC 3.05 (L) 4.05 - 5.2 M/uL    HGB 9.3 (L) 11.7 - 15.4 g/dL    HCT 30.2 (L) 35.8 - 46.3 %    MCV 99.0 (H) 79.6 - 97.8 FL    MCH 30.5 26.1 - 32.9 PG    MCHC 30.8 (L) 31.4 - 35.0 g/dL    RDW 13.3 11.9 - 14.6 %    PLATELET 086 928 - 166 K/uL    MPV 10.9 9.4 - 12.3 FL    ABSOLUTE NRBC 0.00 0.0 - 0.2 K/uL    DF AUTOMATED      NEUTROPHILS 65 43 - 78 %    LYMPHOCYTES 26 13 - 44 %    MONOCYTES 8 4.0 - 12.0 %    EOSINOPHILS 1 0.5 - 7.8 %    BASOPHILS 0 0.0 - 2.0 %    IMMATURE GRANULOCYTES 0 0.0 - 5.0 %    ABS. NEUTROPHILS 6.0 1.7 - 8.2 K/UL    ABS. LYMPHOCYTES 2.5 0.5 - 4.6 K/UL    ABS. MONOCYTES 0.7 0.1 - 1.3 K/UL    ABS. EOSINOPHILS 0.1 0.0 - 0.8 K/UL    ABS. BASOPHILS 0.0 0.0 - 0.2 K/UL    ABS. IMM. GRANS. 0.0 0.0 - 0.5 K/UL   METABOLIC PANEL, COMPREHENSIVE    Collection Time: 02/22/20  2:30 AM   Result Value Ref Range    Sodium 144 136 - 145 mmol/L    Potassium 3.9 3.5 - 5.1 mmol/L    Chloride 109 (H) 98 - 107 mmol/L    CO2 25 21 - 32 mmol/L    Anion gap 10 7 - 16 mmol/L    Glucose 248 (H) 65 - 100 mg/dL    BUN 28 (H) 8 - 23 MG/DL    Creatinine 1.28 (H) 0.6 - 1.0 MG/DL    GFR est AA 50 (L) >60 ml/min/1.73m2    GFR est non-AA 42 (L) >60 ml/min/1.73m2    Calcium 10.3 8.3 - 10.4 MG/DL    Bilirubin, total 0.3 0.2 - 1.1 MG/DL    ALT (SGPT) 22 12 - 65 U/L    AST (SGOT) 11 (L) 15 - 37 U/L    Alk.  phosphatase 73 50 - 136 U/L    Protein, total 7.0 6.3 - 8.2 g/dL    Albumin 3.5 3.2 - 4.6 g/dL    Globulin 3.5 2.3 - 3.5 g/dL    A-G Ratio 1.0 (L) 1.2 - 3.5         All Micro Results     None          Current Facility-Administered Medications   Medication Dose Route Frequency    pantoprazole (PROTONIX) 40 mg in 0.9% sodium chloride 10 mL injection  40 mg IntraVENous Q12H    budesonide-formoteroL (SYMBICORT) 160-4.5 mcg/actuation HFA inhaler 2 Puff  2 Puff Inhalation BID    fluticasone propionate (FLONASE) 50 mcg/actuation nasal spray 2 Spray  2 Spray Both Nostrils DAILY    gabapentin (NEURONTIN) capsule 100 mg  100 mg Oral BID    LORazepam (ATIVAN) tablet 0.5 mg  0.5 mg Oral DAILY PRN    pravastatin (PRAVACHOL) tablet 40 mg  40 mg Oral QHS    sertraline (ZOLOFT) tablet 50 mg  50 mg Oral DAILY    albuterol (PROVENTIL VENTOLIN) nebulizer solution 2.5 mg  2.5 mg Nebulization Q4H PRN    sodium chloride (NS) flush 5-40 mL  5-40 mL IntraVENous Q8H    sodium chloride (NS) flush 5-40 mL  5-40 mL IntraVENous PRN    ondansetron (ZOFRAN) injection 4 mg  4 mg IntraVENous Q4H PRN    0.9% sodium chloride infusion 250 mL  250 mL IntraVENous PRN    0.9% sodium chloride infusion  75 mL/hr IntraVENous CONTINUOUS     Current Outpatient Medications   Medication Sig    hydrALAZINE (APRESOLINE) 50 mg tablet TAKE 2 TABLETS BY MOUTH THREE TIMES DAILY    pantoprazole (PROTONIX) 20 mg tablet TAKE 1 TABLET BY MOUTH DAILY    potassium chloride (K-DUR, KLOR-CON) 20 mEq tablet TAKE 1 TABLET BY MOUTH EVERY MORNING    sertraline (ZOLOFT) 100 mg tablet Take 0.5 Tabs by mouth daily. Indications: major depressive disorder, am    benazepriL (LOTENSIN) 40 mg tablet TAKE 1 TABLET BY MOUTH DAILY    gabapentin (NEURONTIN) 100 mg capsule TAKE 1 CAPSULE BY MOUTH TWICE DAILY    glipiZIDE SR (GLUCOTROL XL) 2.5 mg CR tablet TAKE 1 TABLET BY MOUTH EVERY DAY AFTER BREAKFAST    furosemide (LASIX) 20 mg tablet Take 1 Tab by mouth daily.  fluticasone propion-salmeteroL (ADVAIR DISKUS) 250-50 mcg/dose diskus inhaler Take 1 Puff by inhalation every twelve (12) hours.     amLODIPine (NORVASC) 10 mg tablet Take 1 Tab by mouth daily.  pravastatin (PRAVACHOL) 40 mg tablet TAKE 1 TABLET BY MOUTH EVERY NIGHT AT BEDTIME    LORazepam (ATIVAN) 0.5 mg tablet Take 1 Tab by mouth daily as needed for Anxiety.  glucose blood VI test strips (TRUE METRIX GLUCOSE TEST STRIP) strip TEST ONCE DAILY dx code E11.29    fluticasone propionate (FLONASE) 50 mcg/actuation nasal spray INSTILL 1 SPRAY IN EACH NOSTRIL NIGHTLY AS DIRECTED    cyanocobalamin, vitamin B-12, 1,000 mcg/mL kit 1,000 mcg by Injection route every month.  metFORMIN (GLUCOPHAGE) 500 mg tablet Take 1 Tab by mouth two (2) times daily (with meals).  VENTOLIN HFA 90 mcg/actuation inhaler INHALE 2 PUFFS BY MOUTH EVERY 6 HOURS AS NEEDED FOR WHEEZING    Blood-Glucose Meter monitoring kit Pt uses true metrix meter and tests once daily dx code E11.29    polyethylene glycol (MIRALAX) 17 gram/dose powder Take 17 g by mouth daily. 1 tablespoon with 8 oz of water daily    FOLIC ACID/MULTIVIT-MIN/LUTEIN (CENTRUM SILVER PO) Take  by mouth.  OTHER One shower chair    VIT A/VIT C/VIT E/ZINC/COPPER (PRESERVISION AREDS PO) Take 1 Tab by mouth two (2) times a day.  Lancets Misc Pt tests three times daily Diagnosis Code: 250.00       Other Studies:  Ct Abd Pelv W Cont    Result Date: 2/22/2020  CT abdomen and pelvis with contrast COMPARISON: July 19, 2017. INDICATION: Left lower quadrant abdominal pain, rectal bleeding diverticulitis. . TECHNIQUE: CT imaging was performed of the abdomen and pelvis following the uncomplicated administration of intravenous contrast (Isovue 370, 100 mL). Oral contrast was administered. Radiation dose reduction techniques were used for this study:  Our CT scanners use one or all of the following: Automated exposure control, adjustment of the mA and/or kVp according to patient's size, iterative reconstruction. FINDINGS: Mild dependent subsegmental atelectasis at the lung bases. Heart is enlarged.  Abdomen findings: Gallbladder is surgically absent. There is micronodular contour of the liver. No focal liver lesion. Pancreas is atrophic. The spleen is unremarkable. Few small cystic lesions are noted in the left kidney. No renal calculus or hydronephrosis. Abdominal aorta is normal in course and caliber with prominent atherosclerotic calcifications. Small bilateral adrenal nodules, similar dating back to 2019 study and likely adenomas. There is small hiatal hernia. Stomach is otherwise normal in contour. Small bowel loops are normal in caliber. No small bowel obstruction. There is no evidence of lymphadenopathy. Pelvic findings: Urinary bladder is normal in contour. There is sigmoid diverticulosis without diverticulitis. Additional scattered colon diverticula There is moderate fluid volume in the colon. Appendix is absent. There is no free air or free fluid. Bones are osteopenic. There are degenerative changes of the spine. IMPRESSION: 1. Diffuse colon diverticulosis. No evidence of diverticulitis, colitis or bowel obstruction. 2. No hydronephrosis.        Assessment and Plan:     Hospital Problems as of 2/22/2020 Date Reviewed: 10/18/2019          Codes Class Noted - Resolved POA    GI bleed ICD-10-CM: K92.2  ICD-9-CM: 578.9  7/28/2018 - Present Unknown              GI bleed  Reportedly several bowel movements with bright red blood visible  Hemoglobin: 9.3 which appears to be patient's baseline Hgb  Hemodynamically stable    Plan:  -H&H every 8  -Protonix 40 mg IV every 12  -Transfuse for hemoglobin <7  -N.p.o.  -IVF  -Gastroenterology consult    Abdominal hernia  CT abdomen pelvis: No signs of incarceration    Plan:  -Monitor    COPD  No wheezing on examination    Plan:  -Continue home medications  -Maintain O2 saturation > 88%    Hypertension  BP stable    Plan:  -Hold home meds given possible GI bleed    CKD  At baseline serum creatinine    Plan:  -Trend BMP  -Avoid nephrotoxic medications      DVT ppx ordered  Code status:  Full  Estimated LOS:  Greater than 2 midnights  Risk:  high    Signed:  Josh Morrison MD

## 2020-02-22 NOTE — CONSULTS
[facility] Consult Note       Primary GI Physician: Dr Kirit Kent    Referring Provider:  Dr Candi May Date:  2/22/2020    Admit Date:  2/22/2020    Chief Complaint:  Anemia, GIB    Subjective:     History of Present Illness:  Patient is a 80 y.o. female with PMH of HTN, GERD, diabetes mellitus type 2, COPD (not on home O2), asthma, CKD, and previous GI bleed, who is seen in consultation at the request of Dr. Karin Hermosillo for anemia/GIB. She presented to the ED with BRRB. Patient reports at 10:30 PM 2/21 she experienced a dark bowel movement with some visible blood. She reports after that she had a another bowel movement with formed more blood and blood clots mixed in. She complains of abdominal pain located around her hernia. This pain is reportedly chronic and started prior to Akil. Pain described as tender in nature, comes and goes, with no radiation. No aggravating or alleviating factors reported. Patient denies any chest pain, shortness of breath, dysuria, myalgias or arthralgias. She has some tarry stool and bright red blood in brief this am.  Hgb today appears to be at her baseline at 9.3, hct 30.2, MCV 99.0, BUN 28, Cr 1.28 (in this CKD patient). UA 4+ bacteria. Pt denies epigastric pain, N/V, significant GERD (on Protonix 20 mg every day), NSAID or ETOH use. She reports some lower abd discomfort but denies pain. Prior to last evening she reports chronic constipation for which she utilized Mirlax daily. She is followed by Dr Tommie Lozano and last saw him in our office 12/17/18 in [facility] for anemia. She was also being followed by Dr Marilee Valentine, hematology for [facility] [address] [place] [address]. At that time she reports small dot of rectal bleed x1, no other overt signs of GIB. Family wished to avoid colonoscopy unless absolutely necessary.   Her last colo was 8/16/07 for rectal bleed and hx of colon polyps with intestinal anastomosis, diverticulosis, IH and right focal colitis (right colon bx with no sign histopathologic abnormality). She is s/p colectomy for diverticulitis. She underwent EGD 2/27/18 with gastritis, HH and empiric dilation. EGD 8/23/18 for anemia with tagged scan with +tracer in stomach with no bleeding source ID'd, 3 cm HH and very mild gastritis. Labs from 9/26/18:  hgb 9.5, MCV 98.1, , Cr 1.28, normal iron 52, TIBC 232 and normal Ferritin 119. Was last seen by hematology 10/18/19 in [facility]. Ct Abd Pelv W Cont 2/22/2020  CT abdomen and pelvis with contrast COMPARISON: July 19, 2017. INDICATION: Left lower quadrant abdominal pain, rectal bleeding diverticulitis. . TECHNIQUE: CT imaging was performed of the abdomen and pelvis following the uncomplicated administration of intravenous contrast (Isovue 370, 100 mL). Oral contrast was administered. Radiation dose reduction techniques were used for this study:  Our CT scanners use one or all of the following: Automated exposure control, adjustment of the mA and/or kVp according to patient's size, iterative reconstruction. FINDINGS: Mild dependent subsegmental atelectasis at the lung bases. Heart is enlarged. Abdomen findings: Gallbladder is surgically absent. There is micronodular contour of the liver. No focal liver lesion. Pancreas is atrophic. The spleen is unremarkable. Few small cystic lesions are noted in the left kidney. No renal calculus or hydronephrosis. Abdominal aorta is normal in course and caliber with prominent atherosclerotic calcifications. Small bilateral adrenal nodules, similar dating back to 2019 study and likely adenomas. There is small hiatal hernia. Stomach is otherwise normal in contour. Small bowel loops are normal in caliber. No small bowel obstruction. There is no evidence of lymphadenopathy. Pelvic findings: Urinary bladder is normal in contour. There is sigmoid diverticulosis without diverticulitis. Additional scattered colon diverticula There is moderate fluid volume in the colon. Appendix is absent. There is no free air or free fluid. Bones are osteopenic. There are degenerative changes of the spine.    IMPRESSION: 1. Diffuse colon diverticulosis. No evidence of diverticulitis, colitis or bowel obstruction. 2. No hydronephrosis. PMH:  Past Medical History:   Diagnosis Date    Anxiety 2/1/2018    Arrhythmia     palpitations 2/10-went to [facility]    Arthritis     L leg- fell 2008    Asthma     adult onset x 20 yrs    B12 deficiency 8/24/2012    Body mass index 37.0-37.9, adult 2/5/2014    CAD (coronary artery disease)     cardiac work [id number]    Controlled type 2 diabetes mellitus with microalbuminuria, without long-term current use of insulin ([address] 11/22/2016    COPD     Corns and callosities 12/19/2016    CRI (chronic renal insufficiency) 8/24/2012    Depression 6/1/2012    Dermatophytosis of nail 12/19/2016    Diabetes ([address]     type II- home tests fasting-112    DJD (degenerative joint disease) of hip     DM (diabetes mellitus) type II controlled with renal manifestation ([address] 7/16/2010    Encounter for long-term (current) use of other medications 1/8/2013    Essential hypertension 7/16/2010    Gastrointestinal disorder     GERD (gastroesophageal reflux disease)     Heart failure ([address]     Hiatal hernia 8/24/2012    HLD (hyperlipidemia) 1/8/2013    Neuropathy 8/24/2012    Lower limbs     Obesity (BMI 30.0-39. 9) BMI-43.8    Other and unspecified hyperlipidemia 7/16/2010    Other chest pain 7/16/2010    Other ill-defined conditions(799.89)     high cholesterol    Other ill-defined conditions(799.89)     incont.  urine    PAD (peripheral artery disease) ([address] 8/24/2012    Retinal hemorrhage        PSH:  Past Surgical History:   Procedure Laterality Date    ABDOMEN SURGERY PROC UNLISTED      HX APPENDECTOMY      HX CHOLECYSTECTOMY      HX CHOLECYSTECTOMY  2000    HX ENDOSCOPY  02/27/2018    Dr Josue Cornea    HX GI      small intestine obstruction    HX GYN      hyst  HX HEART CATHETERIZATION      HX HYSTERECTOMY      prolapse    HX INTRACRANIAL ANEURYSM REPAIR      HX INTRACRANIAL ANEURYSM REPAIR      HX ORTHOPAEDIC      right wrist 2010    HX OTHER SURGICAL      aneurysm clip-cerebral-2000    HX OTHER SURGICAL      cerebral aneurysm   Dr. Terrance Burkett       Allergies:  No Known Allergies    Home Medications:  Prior to Admission medications    Medication Sig Start Date End Date Taking? Authorizing Provider   acetaminophen (TYLENOL ARTHRITIS PAIN) 650 mg TbER Take 650 mg by mouth every eight (8) hours. Tylenol arthritis as needed   Yes Provider, Historical   hydrALAZINE (APRESOLINE) 50 mg tablet TAKE 2 TABLETS BY MOUTH THREE TIMES DAILY 1/28/20  Yes Mary Fernando MD   pantoprazole (PROTONIX) 20 mg tablet TAKE 1 TABLET BY MOUTH DAILY 1/20/20  Yes Mary Fernando MD   potassium chloride (K-DUR, KLOR-CON) 20 mEq tablet TAKE 1 TABLET BY MOUTH EVERY MORNING 1/20/20  Yes Mary Fernando MD   sertraline (ZOLOFT) 100 mg tablet Take 0.5 Tabs by mouth daily. Indications: major depressive disorder, am 1/20/20  Yes Heath Frost MD   benazepriL (LOTENSIN) 40 mg tablet TAKE 1 TABLET BY MOUTH DAILY 1/20/20  Yes Mary Fernando MD   gabapentin (NEURONTIN) 100 mg capsule TAKE 1 CAPSULE BY MOUTH TWICE DAILY 1/20/20  Yes Mary Fernando MD   glipiZIDE SR (GLUCOTROL XL) 2.5 mg CR tablet TAKE 1 TABLET BY MOUTH EVERY DAY AFTER BREAKFAST 1/20/20  Yes Mary Fernando MD   furosemide (LASIX) 20 mg tablet Take 1 Tab by mouth daily. 1/20/20  Yes Mary Fernando MD   fluticasone propion-salmeteroL (ADVAIR DISKUS) 250-50 mcg/dose diskus inhaler Take 1 Puff by inhalation every twelve (12) hours. 1/20/20  Yes Mary Fernando MD   amLODIPine (NORVASC) 10 mg tablet Take 1 Tab by mouth daily.  1/20/20  Yes Heath Frost MD   pravastatin (PRAVACHOL) 40 mg tablet TAKE 1 TABLET BY MOUTH EVERY NIGHT AT BEDTIME 1/20/20  Yes Mary Fernando MD   LORazepam (ATIVAN) 0.5 mg tablet Take 1 Tab by mouth daily as needed for Anxiety. 1/20/20  Yes Sophie Montejo MD   glucose blood VI test strips (TRUE METRIX GLUCOSE TEST STRIP) strip TEST ONCE DAILY dx code E11.29 9/16/19  Yes Mary Fernando MD   fluticasone propionate (FLONASE) 50 mcg/actuation nasal spray INSTILL 1 SPRAY IN EACH NOSTRIL NIGHTLY AS DIRECTED 8/21/19  Yes Mary Fernando MD   cyanocobalamin, vitamin B-12, 1,000 mcg/mL kit 1,000 mcg by Injection route every month. 7/15/19  Yes Sophie Montejo MD   metFORMIN (GLUCOPHAGE) 500 mg tablet Take 1 Tab by mouth two (2) times daily (with meals). 5/24/19  Yes Venice Trimble MD   Blood-Glucose Meter monitoring kit Pt uses true metrix meter and tests once daily dx code E11.29 1/23/18  Yes Mary Fernando MD   polyethylene glycol (MIRALAX) 17 gram/dose powder Take 17 g by mouth daily. 1 tablespoon with 8 oz of water daily 7/19/17  Yes Antonia Bourgeois MD   FOLIC ACID/MULTIVIT-MIN/LUTEIN (CENTRUM SILVER PO) Take  by mouth. Yes Provider, Historical   VIT A/VIT C/VIT E/ZINC/COPPER (PRESERVISION AREDS PO) Take 1 Tab by mouth two (2) times a day.    Yes Provider, Historical   Lancets Misc Pt tests three times daily Diagnosis Code: 250.00 7/15/13  Yes Sophie Montejo MD   VENTOLIN HFA 90 mcg/actuation inhaler INHALE 2 PUFFS BY MOUTH EVERY 6 HOURS AS NEEDED FOR WHEEZING 8/20/18   Sophie Montejo MD   OTHER One shower chair 4/19/17   Silvana Fernando MD       [facility] Medications:  Current Facility-Administered Medications   Medication Dose Route Frequency    pantoprazole (PROTONIX) 40 mg in 0.9% sodium chloride 10 mL injection  40 mg IntraVENous Q12H    budesonide-formoteroL (SYMBICORT) 160-4.5 mcg/actuation HFA inhaler 2 Puff  2 Puff Inhalation BID    fluticasone propionate (FLONASE) 50 mcg/actuation nasal spray 2 Spray  2 Spray Both Nostrils DAILY    gabapentin (NEURONTIN) capsule 100 mg  100 mg Oral BID    LORazepam (ATIVAN) tablet 0.5 mg  0.5 mg Oral DAILY PRN    sertraline (ZOLOFT) tablet 50 mg  50 mg Oral DAILY  albuterol (PROVENTIL VENTOLIN) nebulizer solution 2.5 mg  2.5 mg Nebulization Q4H PRN    sodium chloride (NS) flush 5-40 mL  5-40 mL IntraVENous Q8H    sodium chloride (NS) flush 5-40 mL  5-40 mL IntraVENous PRN    ondansetron (ZOFRAN) injection 4 mg  4 mg IntraVENous Q4H PRN    0.9% sodium chloride infusion 250 mL  250 mL IntraVENous PRN    0.9% sodium chloride infusion  75 mL/hr IntraVENous CONTINUOUS    acetaminophen (TYLENOL) tablet 650 mg  650 mg Oral Q4H PRN    oxyCODONE-acetaminophen (PERCOCET) 5-325 mg per tablet 1 Tab  1 Tab Oral Q4H PRN    pravastatin (PRAVACHOL) tablet 40 mg  40 mg Oral QHS       Social History:  Social History     Tobacco Use    Smoking status: Never Smoker    Smokeless tobacco: Never Used   Substance Use Topics    Alcohol use: No       Pt denies any history of drug use, blood transfusions, or tattoos. Family History:  Family History   Problem Relation Age of Onset    Cancer Mother     Diabetes Father     Diabetes Paternal Grandfather        Review of Systems:  A detailed 10 system ROS is obtained, with pertinent positives as listed above. All others are negative. Diet:  NPO    Objective:     Physical Exam:  Vitals:  Visit Vitals  /56   Pulse 81   Temp 98.3 °F (36.8 °C)   Resp 20   Ht 5' 4\" (1.626 m)   Wt 80.6 kg (177 lb 9.6 oz)   SpO2 97%   BMI 30.48 kg/m²     Gen:  Pt is alert, cooperative, no acute distress  Skin:  Extremities and face reveal no rashes. HEENT: Sclerae anicteric. Extra-occular muscles are intact. No oral ulcers. No abnormal pigmentation of the lips. The neck is supple. Cardiovascular: Regular rate and rhythm. No murmurs, gallops, or rubs. Respiratory:  Comfortable breathing with no accessory muscle use. Clear breath sounds anteriorly with no wheezes, rales, or rhonchi. GI:  Abdomen nondistended, soft, and nontender. Normal active bowel sounds. No enlargement of the liver or spleen. No masses palpable.   Rectal:  Maroon and BRB in brief   Musculoskeletal:  No pitting edema of the lower legs. Neurological:  Gross memory appears intact. Patient is alert and oriented. Psychiatric:  Mood appears appropriate with judgement intact. Laboratory:    Recent Labs     02/22/20  0230   WBC 9.3   HGB 9.3*   HCT 30.2*      MCV 99.0*      K 3.9   *   CO2 25   BUN 28*   CREA 1.28*   CA 10.3   *   AP 73   SGOT 11*   ALT 22   TBILI 0.3   ALB 3.5   TP 7.0          Assessment:     Principal Problem:    GI bleed (7/28/2018)      80 y.o. female with PMH of HTN, GERD, diabetes mellitus type 2, COPD (not on home O2), asthma, CKD, and previous GI bleed admitted w melena and BRRB. Similar episode in 2018 at which time NM scan with possible bleed in [facility] but EGD negative for source. Last COLO in 2007 with tic, colitis (neg bx) and s/o partal colectomy (for diverticulitis). She reports BRRB and dark stool starting last night. Takes Protonix 20 mg daily. Denies CP, NSAID or ETOH. Labs from 9/26/18:  hgb 9.5, MCV 98.1, , Cr 1.28, normal iron 52, TIBC 232 and normal Ferritin 119. Was last seen by hematology 10/18/19 in [facility]. Plan:     - Monitor H&H this am is at baseline 9.3  - Trasfuse prn for hgb 7-8 goal  - Active melena so will get NG GIB scan  - Continue PPI BID IV for now  - May need EGD pending above    Aleisha Kiran NP  Patient is seen and examined in collaboration with Dr. Petty Smalls. Assessment and plan as per Dr. Sylvester Ha.

## 2020-02-22 NOTE — PROGRESS NOTES
Staff providing care at this time  Will follow up    Sury Johnson, staff [address], [address] [facility] Miguel  /   [email or web address]

## 2020-02-22 NOTE — ED NOTES
TRANSFER - OUT REPORT:    Verbal report given to Luiz Aguirre RN(name) on Abhijit Langston  being transferred to [address]  (unit) for routine progression of care       Report consisted of patients Situation, Background, Assessment and   Recommendations(SBAR). Information from the following report(s) ED Summary was reviewed with the receiving nurse. Lines:   Peripheral IV 02/22/20 Right Antecubital (Active)   Site Assessment Clean, dry, & intact 2/22/2020  3:18 AM   Phlebitis Assessment 0 2/22/2020  3:18 AM   Infiltration Assessment 0 2/22/2020  3:18 AM   Dressing Status Clean, dry, & intact 2/22/2020  3:18 AM   Dressing Type Transparent 2/22/2020  3:18 AM   Hub Color/Line Status Pink 2/22/2020  3:18 AM        Opportunity for questions and clarification was provided.       Patient transported with:  none

## 2020-02-22 NOTE — PROGRESS NOTES
Nutrition  Reason for assessment: Referral received from nursing admission Malnutrition Screening Tool   Recently Lost Weight Without Trying: Yes  If Yes, How Much Weight Loss: 2-13 lbs  Eating Poorly Due to Decreased Appetite: No   BPA for EN/PN PTA (pt drinks Glucerna)  Assessment:   Diet: DIET NPO    Food/Nutrition Patient History:  The patient is noted to have a h/o HTN, GERD, Diabetes, COPD, CKD, CAD and previous GI Bleed. She is admitted due to bright red blood per rectum. She states that her appetite has not been good over the years. She reports that as she gets older she eats less and less. RD counseled patient that this happens because we don't need as much as we get older. The patient states that she has been using Glucerna shakes at home. RD to add Glucerna to meals once the patient's diet has been upgraded to full liquids or greater. Weight history in the EMR cannot be verified as accurate due to unknown weight source (pt stated vs estimated vs measured). Weight Loss Metrics 2/22/2020 1/20/2020 10/18/2019 10/14/2019   Today's Wt 177 lb 9.6 oz 177 lb 9.6 oz 176 lb 8 oz 175 lb   BMI 30.48 kg/m2 30.48 kg/m2 30.3 kg/m2 30.04 kg/m2     Weight Loss Metrics 8/22/2019   Today's Wt 174 lb 3.2 oz   BMI 29.9 kg/m2   According to the EMR over the past ~6 months the patient has gained weight. Anthropometrics:  Height: 5' 4\" (162.6 cm), Weight: 80.6 kg (177 lb 9.6 oz), Weight Source: Patient stated, Body mass index is 30.48 kg/m². BMI class of obese. Macronutrient needs: (using Stated body weight 80.6 kg)  EER: 9429-2577 kcal/day 15-20 kcal/kg   EPR: 48-65 g/day 0.6-0.8 g/kg    Intake/Comparative Standards: Current NPO status does not meet estimated needs This meets <25% of kcal needs and <25% of protein needs. Nutrition Diagnosis:  Inadequate oral intake related to decreased ability to consume sufficient energy as evidenced by GI bleed and NPO.     Nutrition Intervention:  Meals and snacks: Advance diet as medically appropriate. Medical food supplement therapy: Add Glucerna TID once diet is advanced to full liquids or greater  Discharge Nutrition Plan: Too soon to determine. Stefano Severance Monika Salines, [address], [address],    [phone]

## 2020-02-22 NOTE — ED TRIAGE NOTES
Pt in via EMS from home with c/o rectal bleeding x 2 hours. No h/o GI bleeds. Colonoscopy 4 years ago which showed some polyps, but otherwise normal. Also has hernia with no issues. Pt has been off of anticoagulant therapy since 2018. Pt states she saw blood in brief and toilet after waking and going to bathroom to have bowel movement, pt states blood was bright red with dark clots. States she did not have bowel movement. Pt denies n/v, states some dizziness upon standing.  BP initially 204/98 on EMS arrival.

## 2020-02-22 NOTE — ED PROVIDER NOTES
75-year-old female patient presents via EMS with reports of abdominal pain and rectal bleeding. Patient states she felt as though she needed to have a bowel movement, went to the restroom and noted bright red blood with blood clots in the toilet. This happened a second time per her report. She reports aching pain over the left lower quadrant that she associates with an abdominal hernia she has had. This is does not hurt but she describes cramping pain over the left lower quadrant today. She denies any hemoptysis, hematemesis, dysuria or hematuria. She has a history of anticoagulant use but is been off of blood thinners for approximately 2 years. Patient denies associated fever, chills, nausea, vomiting, chest pain pressure or tightness. No significant shortness of breath. Past Medical History:   Diagnosis Date    Anxiety 2/1/2018    Arrhythmia     palpitations 2/10-went to [facility]    Arthritis     L leg- fell 2008    Asthma     adult onset x 20 yrs    B12 deficiency 8/24/2012    Body mass index 37.0-37.9, adult 2/5/2014    CAD (coronary artery disease)     cardiac work [id number]    Controlled type 2 diabetes mellitus with microalbuminuria, without long-term current use of insulin ([address] 11/22/2016    COPD     Corns and callosities 12/19/2016    CRI (chronic renal insufficiency) 8/24/2012    Depression 6/1/2012    Dermatophytosis of nail 12/19/2016    Diabetes ([address]     type II- home tests fasting-112    DJD (degenerative joint disease) of hip     DM (diabetes mellitus) type II controlled with renal manifestation ([address] 7/16/2010    Encounter for long-term (current) use of other medications 1/8/2013    Essential hypertension 7/16/2010    Gastrointestinal disorder     GERD (gastroesophageal reflux disease)     Heart failure ([address]     Hiatal hernia 8/24/2012    HLD (hyperlipidemia) 1/8/2013    Neuropathy 8/24/2012    Lower limbs     Obesity (BMI 30.0-39. 9) BMI-43.8    Other and unspecified hyperlipidemia 7/16/2010    Other chest pain 7/16/2010    Other ill-defined conditions(799.89)     high cholesterol    Other ill-defined conditions(799.89)     incont.  urine    PAD (peripheral artery disease) ([facility] 8/24/2012    Retinal hemorrhage        Past Surgical History:   Procedure Laterality Date    ABDOMEN SURGERY PROC UNLISTED      HX APPENDECTOMY      HX CHOLECYSTECTOMY      HX CHOLECYSTECTOMY  2000    HX ENDOSCOPY  02/27/2018    Dr Toya Wood HX GI      small intestine obstruction    HX GYN      hyst    HX HEART CATHETERIZATION      HX HYSTERECTOMY      prolapse    HX INTRACRANIAL ANEURYSM REPAIR      HX INTRACRANIAL ANEURYSM REPAIR      HX ORTHOPAEDIC      right wrist 2010    HX OTHER SURGICAL      aneurysm clip-cerebral-2000    HX OTHER SURGICAL      cerebral aneurysm   Dr. Terrance Burkett         Family History:   Problem Relation Age of Onset   Alie Kulkarniita Cancer Mother     Diabetes Father     Diabetes Paternal Grandfather        Social History     Socioeconomic History    Marital status: SINGLE     Spouse name: Not on file    Number of children: Not on file    Years of education: Not on file    Highest education level: Not on file   Occupational History    Not on file   Social Needs    Financial resource strain: Not on file    Food insecurity:     Worry: Not on file     Inability: Not on file    Transportation needs:     Medical: Not on file     Non-medical: Not on file   Tobacco Use    Smoking status: Never Smoker    Smokeless tobacco: Never Used   Substance and Sexual Activity    Alcohol use: No    Drug use: No    Sexual activity: Never   Lifestyle    Physical activity:     Days per week: Not on file     Minutes per session: Not on file    Stress: Not on file   Relationships    Social connections:     Talks on phone: Not on file     Gets together: Not on file     Attends Zoroastrian service: Not on file     Active member of club or organization: Not on file Attends meetings of clubs or organizations: Not on file     Relationship status: Not on file    Intimate partner violence:     Fear of current or ex partner: Not on file     Emotionally abused: Not on file     Physically abused: Not on file     Forced sexual activity: Not on file   Other Topics Concern    Not on file   Social History Narrative     with four children         ALLERGIES: Patient has no known allergies. Review of Systems   Constitutional: Negative for chills, diaphoresis and fever. HENT: Negative for congestion, sneezing and sore throat. Eyes: Negative for visual disturbance. Respiratory: Negative for cough, chest tightness, shortness of breath and wheezing. Cardiovascular: Negative for chest pain and leg swelling. Gastrointestinal: Positive for abdominal pain and blood in stool. Negative for diarrhea, nausea and vomiting. Endocrine: Negative for polyuria. Genitourinary: Negative for difficulty urinating, dysuria, flank pain, hematuria and urgency. Musculoskeletal: Negative for back pain, myalgias, neck pain and neck stiffness. Skin: Negative for color change and rash. Neurological: Negative for dizziness, syncope, speech difficulty, weakness, light-headedness, numbness and headaches. Psychiatric/Behavioral: Negative for behavioral problems. All other systems reviewed and are negative. There were no vitals filed for this visit. Physical Exam  Vitals signs and nursing note reviewed. Constitutional:       General: She is not in acute distress. Appearance: She is well-developed. She is not diaphoretic. Comments: Alert and oriented to person place and time. No acute distress, speaks in clear, fluid sentences. HENT:      Head: Normocephalic and atraumatic. Right Ear: External ear normal.      Left Ear: External ear normal.      Nose: Nose normal.   Eyes:      Pupils: Pupils are equal, round, and reactive to light.    Neck: Musculoskeletal: Normal range of motion. Cardiovascular:      Rate and Rhythm: Normal rate and regular rhythm. Heart sounds: Normal heart sounds. No murmur. No friction rub. No gallop. Pulmonary:      Effort: Pulmonary effort is normal. No respiratory distress. Breath sounds: Normal breath sounds. No stridor. No decreased breath sounds, wheezing, rhonchi or rales. Chest:      Chest wall: No tenderness. Abdominal:      General: There is no distension. Palpations: Abdomen is soft. There is no mass. Tenderness: There is abdominal tenderness in the left lower quadrant. There is no guarding or rebound. Hernia: A hernia is present. Hernia is present in the ventral area. Comments: There is some distention noted to the left lower quadrant of the abdomen consistent with patient's known hernia. This area is tender however to palpation. Some mild guarding with palpation of this area. Otherwise unremarkable exam.   Musculoskeletal: Normal range of motion. General: No tenderness or deformity. Skin:     General: Skin is warm and dry. Neurological:      Mental Status: She is alert and oriented to person, place, and time. Cranial Nerves: No cranial nerve deficit. MDM  Number of Diagnoses or Management Options  Diagnosis management comments: Patient has bright red blood in her brief on arrival.  Will obtain CT imaging secondary to reported left lower quadrant pain and history of diverticulitis. Patient does not use anticoagulants at this time.        Amount and/or Complexity of Data Reviewed  Clinical lab tests: ordered and reviewed  Tests in the radiology section of CPT®: ordered and reviewed  Tests in the medicine section of CPT®: ordered and reviewed  Independent visualization of images, tracings, or specimens: yes    Risk of Complications, Morbidity, and/or Mortality  Presenting problems: moderate  Diagnostic procedures: low  Management options: moderate    Patient Progress  Patient progress: stable         Procedures

## 2020-02-22 NOTE — PROGRESS NOTES
Informed by RN regarding pt's large bloody stool with clots in the diaper after returning from NM study. Evaluated patient at bedside. Patient is resting comfortable. Reports no dizziness, lightheadedness, palpitation. States she feels better than before. Discussed with GI ( LISA Weller ). Supportive care and transfusion PRN with goal hb 7-8 per .

## 2020-02-22 NOTE — PROGRESS NOTES
Patient is seen and examined at bedside. Reports 2 BMs that is dark stool mixed with blood in the ED this AM but has not have any BMs since being on 2nd floor. Has lower abdomen soreness around her hernia but otherwise feeling ok. No other complaints. Hb has been stable so far and at baseline. Physical exam is remarkable for soft but distended abdomen with slight TTP in lower abdomen. GI consulted by admitting MD. GI is planning for NM bleeding scan. Monitor H&H q8h and transfuse to goal Hb of 7-8 as per GI. Protonix 40mg IV BID. This encounter is not billed.      Signed:  Susanne Ordonez MD.

## 2020-02-22 NOTE — PROGRESS NOTES
TRANSFER - IN REPORT:    Verbal report received from [place], [address] [facility] on [address]  being received from ER for routine progression of care      Report consisted of patients Situation, Background, Assessment and   Recommendations(SBAR). Information from the following report(s) SBAR was reviewed with the receiving nurse. Opportunity for questions and clarification was provided. Assessment completed upon patients arrival to unit and care assumed.

## 2020-02-23 PROBLEM — K43.9 ABDOMINAL WALL HERNIA: Status: ACTIVE | Noted: 2020-02-23

## 2020-02-23 LAB
HGB BLD-MCNC: 7.4 G/DL (ref 11.7–15.4)
HGB BLD-MCNC: 7.6 G/DL (ref 11.7–15.4)
HGB BLD-MCNC: 7.9 G/DL (ref 11.7–15.4)

## 2020-02-23 PROCEDURE — 74011000258 HC RX REV CODE- 258: Performed by: INTERNAL MEDICINE

## 2020-02-23 PROCEDURE — 85018 HEMOGLOBIN: CPT

## 2020-02-23 PROCEDURE — 36415 COLL VENOUS BLD VENIPUNCTURE: CPT

## 2020-02-23 PROCEDURE — 74011250636 HC RX REV CODE- 250/636: Performed by: INTERNAL MEDICINE

## 2020-02-23 PROCEDURE — 74011000250 HC RX REV CODE- 250: Performed by: INTERNAL MEDICINE

## 2020-02-23 PROCEDURE — 94640 AIRWAY INHALATION TREATMENT: CPT

## 2020-02-23 PROCEDURE — C9113 INJ PANTOPRAZOLE SODIUM, VIA: HCPCS | Performed by: INTERNAL MEDICINE

## 2020-02-23 PROCEDURE — 76937 US GUIDE VASCULAR ACCESS: CPT

## 2020-02-23 PROCEDURE — 96376 TX/PRO/DX INJ SAME DRUG ADON: CPT

## 2020-02-23 PROCEDURE — 96366 THER/PROPH/DIAG IV INF ADDON: CPT

## 2020-02-23 PROCEDURE — 94760 N-INVAS EAR/PLS OXIMETRY 1: CPT

## 2020-02-23 PROCEDURE — 36430 TRANSFUSION BLD/BLD COMPNT: CPT

## 2020-02-23 PROCEDURE — 99218 HC RM OBSERVATION: CPT

## 2020-02-23 PROCEDURE — 74011250637 HC RX REV CODE- 250/637: Performed by: INTERNAL MEDICINE

## 2020-02-23 RX ORDER — SODIUM CHLORIDE 9 MG/ML
250 INJECTION, SOLUTION INTRAVENOUS AS NEEDED
Status: DISCONTINUED | OUTPATIENT
Start: 2020-02-23 | End: 2020-02-26 | Stop reason: HOSPADM

## 2020-02-23 RX ADMIN — BUDESONIDE AND FORMOTEROL FUMARATE DIHYDRATE 2 PUFF: 160; 4.5 AEROSOL RESPIRATORY (INHALATION) at 20:47

## 2020-02-23 RX ADMIN — GABAPENTIN 100 MG: 100 CAPSULE ORAL at 08:51

## 2020-02-23 RX ADMIN — LORAZEPAM 0.5 MG: 0.5 TABLET ORAL at 21:59

## 2020-02-23 RX ADMIN — SODIUM CHLORIDE 40 MG: 9 INJECTION, SOLUTION INTRAMUSCULAR; INTRAVENOUS; SUBCUTANEOUS at 21:44

## 2020-02-23 RX ADMIN — SODIUM CHLORIDE 75 ML/HR: 900 INJECTION, SOLUTION INTRAVENOUS at 20:30

## 2020-02-23 RX ADMIN — GABAPENTIN 100 MG: 100 CAPSULE ORAL at 17:23

## 2020-02-23 RX ADMIN — BUDESONIDE AND FORMOTEROL FUMARATE DIHYDRATE 2 PUFF: 160; 4.5 AEROSOL RESPIRATORY (INHALATION) at 07:54

## 2020-02-23 RX ADMIN — FLUTICASONE PROPIONATE 2 SPRAY: 50 SPRAY, METERED NASAL at 08:52

## 2020-02-23 RX ADMIN — SODIUM CHLORIDE 40 MG: 9 INJECTION, SOLUTION INTRAMUSCULAR; INTRAVENOUS; SUBCUTANEOUS at 08:51

## 2020-02-23 RX ADMIN — Medication 10 ML: at 21:45

## 2020-02-23 RX ADMIN — Medication 10 ML: at 14:00

## 2020-02-23 RX ADMIN — PRAVASTATIN SODIUM 40 MG: 20 TABLET ORAL at 21:45

## 2020-02-23 RX ADMIN — CEFTRIAXONE 1 G: 1 INJECTION, POWDER, FOR SOLUTION INTRAMUSCULAR; INTRAVENOUS at 11:49

## 2020-02-23 RX ADMIN — SERTRALINE HYDROCHLORIDE 50 MG: 50 TABLET ORAL at 08:51

## 2020-02-23 NOTE — PROGRESS NOTES
Gvien Ativan for sleep. Critical Hgb called to hospitalists with orders to transfuse 1 unit. Has had 2 marroon stools thus far tonight.

## 2020-02-23 NOTE — PROGRESS NOTES
20 gauge 1.75 inch IV established in left upper forearm using ultrasound guidance. Patient tolerated well.

## 2020-02-23 NOTE — PROGRESS NOTES
END OF SHIFT NOTE:    INTAKE/OUTPUT  02/22 0701 - 02/23 0700  In: 8456 [I.V.:880]  Out: 300 [Urine:300]  Voiding: YES  Catheter: NO  Drain:              Flatus: Patient does have flatus present. Stool:  3 occurrences. Characteristics:  Stool Assessment  Stool Color: Maroon  Stool Appearance: Bloody  Stool Amount: Medium  Stool Source/Status: Rectum    Emesis: 0 occurrences. Characteristics:        VITAL SIGNS  Patient Vitals for the past 12 hrs:   Temp Pulse Resp BP SpO2   02/23/20 0200 98.6 °F (37 °C) 72 16 140/54 96 %   02/23/20 0102 98.4 °F (36.9 °C) 70 16 138/65 98 %   02/23/20 0005 98.4 °F (36.9 °C) 70 16 145/66 95 %   02/22/20 2316 98.6 °F (37 °C) 71 16 142/62 96 %   02/22/20 2304 98.5 °F (36.9 °C) 70 16 142/60 98 %   02/22/20 1948 98.3 °F (36.8 °C) 73 16 146/67 99 %   02/22/20 1905     95 %       Pain Assessment  Pain Intensity 1: 0 (02/22/20 0736)        Patient Stated Pain Goal: 0    Ambulating  Not oob this shift. Tolerated transfusion without any apparent transfusion reaction. Shift report given to oncoming nurse at the bedside.     Julia Celestin RN

## 2020-02-23 NOTE — PROGRESS NOTES
GI DAILY PROGRESS NOTE    Admit Date: 2/22/2020    CC: GI bleed    Subjective:     Patient had bloody BM last night, no abd pains. No BM this AM so far. Medications:  Current Facility-Administered Medications   Medication Dose Route Frequency    pantoprazole (PROTONIX) 40 mg in 0.9% sodium chloride 10 mL injection  40 mg IntraVENous Q12H    fluticasone propionate (FLONASE) 50 mcg/actuation nasal spray 2 Spray  2 Spray Both Nostrils DAILY    gabapentin (NEURONTIN) capsule 100 mg  100 mg Oral BID    LORazepam (ATIVAN) tablet 0.5 mg  0.5 mg Oral DAILY PRN    sertraline (ZOLOFT) tablet 50 mg  50 mg Oral DAILY    albuterol (PROVENTIL VENTOLIN) nebulizer solution 2.5 mg  2.5 mg Nebulization Q4H PRN    sodium chloride (NS) flush 5-40 mL  5-40 mL IntraVENous Q8H    sodium chloride (NS) flush 5-40 mL  5-40 mL IntraVENous PRN    ondansetron (ZOFRAN) injection 4 mg  4 mg IntraVENous Q4H PRN    0.9% sodium chloride infusion  75 mL/hr IntraVENous CONTINUOUS    acetaminophen (TYLENOL) tablet 650 mg  650 mg Oral Q4H PRN    oxyCODONE-acetaminophen (PERCOCET) 5-325 mg per tablet 1 Tab  1 Tab Oral Q4H PRN    pravastatin (PRAVACHOL) tablet 40 mg  40 mg Oral QHS    cefTRIAXone (ROCEPHIN) 1 g in 0.9% sodium chloride (MBP/ADV) 50 mL  1 g IntraVENous Q24H    budesonide-formoteroL (SYMBICORT) 160-4.5 mcg/actuation HFA inhaler 2 Puff  2 Puff Inhalation BID RT    0.9% sodium chloride infusion 250 mL  250 mL IntraVENous PRN       Objective:   Vitals:  Visit Vitals  /63   Pulse 67   Temp 98.5 °F (36.9 °C)   Resp 16   Ht 5' 4\" (1.626 m)   Wt 79.4 kg (175 lb)   SpO2 94%   BMI 30.04 kg/m²       Intake/Output:  No intake/output data recorded.   02/21 1901 - 02/23 0700  In: 6739 [I.V.:1438]  Out: 300 [Urine:300]    Exam: soft nontender abdomen    Data Review (Labs):    Recent Results (from the past 24 hour(s))   HGB & HCT    Collection Time: 02/22/20 10:56 AM   Result Value Ref Range    HGB 7.7 (L) 11.7 - 15.4 g/dL    HCT 25.5 (L) 35.8 - 46.3 %   TYPE + CROSSMATCH    Collection Time: 02/22/20  2:12 PM   Result Value Ref Range    Crossmatch Expiration 02/25/2020     ABO/Rh(D) AB POSITIVE     Antibody screen POS     ANTIGEN TYPING E NEGATIVE,     Antibody ID [id number]     Unit number [id number]     Blood component type [facility]     Unit division 00     Status of unit TRANSFUSED     ANTIGEN/ANTIBODY INFO E NEGATIVE,     Crossmatch result Compatible     Unit number [id number]     Blood component type [facility]     Unit division 00     Status of unit ALLOCATED     ANTIGEN/ANTIBODY INFO E NEGATIVE,     Crossmatch result Compatible    HEMOGLOBIN    Collection Time: 02/22/20  2:12 PM   Result Value Ref Range    HGB 7.4 (L) 11.7 - 15.4 g/dL   URINALYSIS W/ RFLX MICROSCOPIC    Collection Time: 02/22/20  5:19 PM   Result Value Ref Range    Color YELLOW      Appearance CLOUDY      Specific gravity 1.035 (H) 1.001 - 1.023      pH (UA) 5.5 5.0 - 9.0      Protein NEGATIVE  NEG mg/dL    Glucose NEGATIVE  mg/dL    Ketone NEGATIVE  NEG mg/dL    Bilirubin NEGATIVE  NEG      Blood MODERATE (A) NEG      Urobilinogen 0.2 0.2 - 1.0 EU/dL    Nitrites NEGATIVE  NEG      Leukocyte Esterase LARGE (A) NEG      WBC >100 (H) 0 /hpf    RBC 3-5 0 /hpf    Epithelial cells 0-3 0 /hpf    Bacteria 4+ (H) 0 /hpf    Casts 3-5 0 /lpf   HEMOGLOBIN    Collection Time: 02/22/20  9:15 PM   Result Value Ref Range    HGB 6.8 (LL) 11.7 - 15.4 g/dL   GLUCOSE, POC    Collection Time: 02/22/20  9:27 PM   Result Value Ref Range    Glucose (POC) 149 (H) 65 - 100 mg/dL   HEMOGLOBIN    Collection Time: 02/23/20  5:33 AM   Result Value Ref Range    HGB 7.9 (L) 11.7 - 15.4 g/dL     NM ACUTE GI BLEED SCAN 2/22/2020     INDICATION:  Dark maroon stool. Bright red clots. Started last night.  History of  GI bleed and partial colectomy.     COMPARISON: CT abdomen and pelvis with contrast 2/22/2020.     TRACER: 25.8 mCi of Tc-99m UltraTag labeled red blood cells.     TECHNIQUE: Imaging of the abdomen was performed from the anterior projection  following intravenous administration of Tc-99m UltraTag labeled red blood cells  through 55 minutes.      FINDINGS: Abnormal activity is seen early in the examination over the right  abdomen. This subsequently progresses in a colonic pattern subsequently seen in  the rectum.     IMPRESSION  IMPRESSION:   1. Abnormal radiotracer activity suggesting an active colon bleed. This appears  to originate in the right colon and therefore is favored to arise in the  ascending colon.     Assessment:     Principal Problem:    GI bleed (7/28/2018)        Plan:   Gi bleed from right colon with drop in Hgb to 6.8 for which she received one unit PRBC. However since no ongoing bleeding this AM and given her age and numerous medical problems, I am still very hesitant to have her undergo colonoscopy. Treat symptomatically for now, transfuse as necessary. Keep her on CL diet.

## 2020-02-23 NOTE — PROGRESS NOTES
END OF SHIFT NOTE:    INTAKE/OUTPUT  02/21 0701 - 02/22 0700  In: 100 [I.V.:100]  Out: -   Voiding: YES  Catheter: NO  Drain:              Flatus: Patient does have flatus present. Stool:  2 occurrences. Characteristics:  Stool Assessment  Stool Color: Maroon  Stool Appearance: Bloody, Loose  Stool Amount: Large  Stool Source/Status: Rectum    Emesis: 0 occurrences. Characteristics:        VITAL SIGNS  Patient Vitals for the past 12 hrs:   Temp Pulse Resp BP SpO2   02/22/20 1905     95 %   02/22/20 1511 98 °F (36.7 °C) 70 16 132/65 95 %   02/22/20 1100 98.8 °F (37.1 °C) 74 18 145/56 96 %   02/22/20 0800     99 %       Pain Assessment  Pain Intensity 1: 0 (02/22/20 0736)        Patient Stated Pain Goal: 0    Ambulating  No    Shift report given to oncoming nurse at the bedside.     Julianna Stark RN

## 2020-02-23 NOTE — PROGRESS NOTES
Hospitalist Progress Note     Admit Date:  2020  1:58 AM   Name:  Antwan Moya   Age:  80 y.o.  :  1929   MRN:  [id number]   PCP:  Acosta Vasquez MD  Treatment Team: Attending Provider: Blair Lew MD; Primary Nurse: Yahaira Kenny RN; Consulting Provider: Rudolph Aguilera MD; Utilization Review: Kan Gasca    Subjective:     2020 patient seen and evaluated. New patient for me today. She was admitted overnight with a GI bleed. She has received 1 unit of packed red blood cells. She apparently has lots of antibodies so it takes a while for her blood products to be prepared per reports from nursing from the blood bank. The patient herself has not noticed any new bleeding. She states that she seen some old blood yesterday evening. In addition to this the pain and cramping that she had previously experienced in her abdominal hernia that precipitated the bleed has stopped. And her stomach is not as full as it was previously. She also notes an abrasion to the right side of her neck from her chain scratching her neck overnight when she forgot to take it off.      Objective:     Patient Vitals for the past 24 hrs:   Temp Pulse Resp BP SpO2   20 1139 99.1 °F (37.3 °C) 71 16 154/64 97 %   20 0754     94 %   20 0725 98.5 °F (36.9 °C) 67 16 141/63 96 %   20 0200 98.6 °F (37 °C) 72 16 140/54 96 %   20 0102 98.4 °F (36.9 °C) 70 16 138/65 98 %   20 0005 98.4 °F (36.9 °C) 70 16 145/66 95 %   20 2316 98.6 °F (37 °C) 71 16 142/62 96 %   20 2304 98.5 °F (36.9 °C) 70 16 142/60 98 %   20 1948 98.3 °F (36.8 °C) 73 16 146/67 99 %   20 1905     95 %   20 1511 98 °F (36.7 °C) 70 16 132/65 95 %     Oxygen Therapy  O2 Sat (%): 97 % (20 1139)  Pulse via Oximetry: 75 beats per minute (20 0754)  O2 Device: Room air (20 0754)    Intake/Output Summary (Last 24 hours) at 2020 1003  Last data filed at 2/23/2020 0200  Gross per 24 hour   Intake 1648 ml   Output 300 ml   Net 1348 ml         General:    Well nourished. Alert. CV:   RRR. No murmur, rub, or gallop. Lungs:   CTAB. No wheezing, rhonchi, or rales. Abdomen:   Soft, nontender, nondistended. Extremities: Warm and dry. No cyanosis or edema. Skin:     No rashes or jaundice. Data Review:  I have reviewed all labs, meds, telemetry events, and studies from the last 24 hours. Recent Results (from the past 24 hour(s))   URINALYSIS W/ RFLX MICROSCOPIC    Collection Time: 02/22/20  5:19 PM   Result Value Ref Range    Color YELLOW      Appearance CLOUDY      Specific gravity 1.035 (H) 1.001 - 1.023      pH (UA) 5.5 5.0 - 9.0      Protein NEGATIVE  NEG mg/dL    Glucose NEGATIVE  mg/dL    Ketone NEGATIVE  NEG mg/dL    Bilirubin NEGATIVE  NEG      Blood MODERATE (A) NEG      Urobilinogen 0.2 0.2 - 1.0 EU/dL    Nitrites NEGATIVE  NEG      Leukocyte Esterase LARGE (A) NEG      WBC >100 (H) 0 /hpf    RBC 3-5 0 /hpf    Epithelial cells 0-3 0 /hpf    Bacteria 4+ (H) 0 /hpf    Casts 3-5 0 /lpf   HEMOGLOBIN    Collection Time: 02/22/20  9:15 PM   Result Value Ref Range    HGB 6.8 (LL) 11.7 - 15.4 g/dL   GLUCOSE, POC    Collection Time: 02/22/20  9:27 PM   Result Value Ref Range    Glucose (POC) 149 (H) 65 - 100 mg/dL   CULTURE, URINE    Collection Time: 02/22/20 10:51 PM   Result Value Ref Range    Special Requests: NO SPECIAL REQUESTS      Culture result:        NO GROWTH AFTER SHORT PERIOD OF INCUBATION. FURTHER RESULTS TO FOLLOW AFTER OVERNIGHT INCUBATION.    HEMOGLOBIN    Collection Time: 02/23/20  5:33 AM   Result Value Ref Range    HGB 7.9 (L) 11.7 - 15.4 g/dL   HEMOGLOBIN    Collection Time: 02/23/20  1:19 PM   Result Value Ref Range    HGB 7.4 (L) 11.7 - 15.4 g/dL        All Micro Results     Procedure Component Value Units Date/Time    CULTURE, URINE [[id number]] Collected:  02/22/20 3855    Order Status:  Completed Specimen:  Urine from Clean catch Updated:  02/23/20 0906     Special Requests: NO SPECIAL REQUESTS        Culture result:       NO GROWTH AFTER SHORT PERIOD OF INCUBATION. FURTHER RESULTS TO FOLLOW AFTER OVERNIGHT INCUBATION. Current Meds:  Current Facility-Administered Medications   Medication Dose Route Frequency    pantoprazole (PROTONIX) 40 mg in 0.9% sodium chloride 10 mL injection  40 mg IntraVENous Q12H    fluticasone propionate (FLONASE) 50 mcg/actuation nasal spray 2 Spray  2 Spray Both Nostrils DAILY    gabapentin (NEURONTIN) capsule 100 mg  100 mg Oral BID    LORazepam (ATIVAN) tablet 0.5 mg  0.5 mg Oral DAILY PRN    sertraline (ZOLOFT) tablet 50 mg  50 mg Oral DAILY    albuterol (PROVENTIL VENTOLIN) nebulizer solution 2.5 mg  2.5 mg Nebulization Q4H PRN    sodium chloride (NS) flush 5-40 mL  5-40 mL IntraVENous Q8H    sodium chloride (NS) flush 5-40 mL  5-40 mL IntraVENous PRN    ondansetron (ZOFRAN) injection 4 mg  4 mg IntraVENous Q4H PRN    0.9% sodium chloride infusion  75 mL/hr IntraVENous CONTINUOUS    acetaminophen (TYLENOL) tablet 650 mg  650 mg Oral Q4H PRN    oxyCODONE-acetaminophen (PERCOCET) 5-325 mg per tablet 1 Tab  1 Tab Oral Q4H PRN    pravastatin (PRAVACHOL) tablet 40 mg  40 mg Oral QHS    cefTRIAXone (ROCEPHIN) 1 g in 0.9% sodium chloride (MBP/ADV) 50 mL  1 g IntraVENous Q24H    budesonide-formoteroL (SYMBICORT) 160-4.5 mcg/actuation HFA inhaler 2 Puff  2 Puff Inhalation BID RT    0.9% sodium chloride infusion 250 mL  250 mL IntraVENous PRN       Other Studies (last 24 hours):  No results found.     Assessment and Plan:     Hospital Problems as of 2/23/2020 Date Reviewed: 10/18/2019          Codes Class Noted - Resolved POA    Abdominal wall hernia ICD-10-CM: K43.9  ICD-9-CM: 553.20  2/23/2020 - Present Yes        CKD (chronic kidney disease) stage 3, GFR 30-59 ml/min ([facility]) ICD-10-CM: N18.3  ICD-9-CM: 585.3  4/18/2019 - Present Yes        COPD (chronic obstructive pulmonary disease) [facility]) (Chronic) ICD-10-CM: J44.9  ICD-9-CM: 819  8/22/2018 - Present Yes        MIHAELA (acute kidney injury) ([facility] [address] ICD-10-CM: N17.9  ICD-9-CM: 584.9  8/21/2018 - Present Yes        * (Principal) GI bleed ICD-10-CM: K92.2  ICD-9-CM: 578.9  7/28/2018 - Present Unknown        Anxiety ICD-10-CM: F41.9  ICD-9-CM: 300.00  2/1/2018 - Present Yes              PLAN:    · GI bleed  continue to monitor H&H and transfuse as appropriate; continue Protonix for now GI input appreciated; would not like to scope this 44-year-old female but will reconsider if indicated with further bleeding  · Acute blood loss anemia  the patient is status post 1 unit of packed red blood cells-as above she has antibodies and it takes quite some time to get blood for her; will ask blood bank to prepare an additional unit in case we need to transfuse her. If she does require more blood it would be worthwhile to keep 1 unit ahead. · Abdominal hernia - she is status post CT abdomen and pelvis which showed no evidence of incarceration; hernia is currently reducible   · COPD  no active issues at this time will continue home medications and monitor  · Hypertension - stable  · Chronic kidney disease - currently at baseline    DC planning/Dispo:  Hopefully home in next 24 to 48 hours if she remains hemodynamically stable with no further episodes of bleeding  DVT ppx: SCDs    Signed:  Mary Niño MD

## 2020-02-24 ENCOUNTER — PATIENT OUTREACH (OUTPATIENT)
Dept: CASE MANAGEMENT | Age: 85
End: 2020-02-24

## 2020-02-24 LAB
GLUCOSE BLD STRIP.AUTO-MCNC: 168 MG/DL (ref 65–100)
GLUCOSE BLD STRIP.AUTO-MCNC: 180 MG/DL (ref 65–100)
GLUCOSE BLD STRIP.AUTO-MCNC: 189 MG/DL (ref 65–100)
HGB BLD-MCNC: 7.1 G/DL (ref 11.7–15.4)
HGB BLD-MCNC: 7.5 G/DL (ref 11.7–15.4)
HGB BLD-MCNC: 8 G/DL (ref 11.7–15.4)

## 2020-02-24 PROCEDURE — 94760 N-INVAS EAR/PLS OXIMETRY 1: CPT

## 2020-02-24 PROCEDURE — 74011250637 HC RX REV CODE- 250/637: Performed by: INTERNAL MEDICINE

## 2020-02-24 PROCEDURE — 96376 TX/PRO/DX INJ SAME DRUG ADON: CPT

## 2020-02-24 PROCEDURE — 74011250636 HC RX REV CODE- 250/636: Performed by: INTERNAL MEDICINE

## 2020-02-24 PROCEDURE — 36415 COLL VENOUS BLD VENIPUNCTURE: CPT

## 2020-02-24 PROCEDURE — 99218 HC RM OBSERVATION: CPT

## 2020-02-24 PROCEDURE — 74011000250 HC RX REV CODE- 250: Performed by: INTERNAL MEDICINE

## 2020-02-24 PROCEDURE — 82962 GLUCOSE BLOOD TEST: CPT

## 2020-02-24 PROCEDURE — C9113 INJ PANTOPRAZOLE SODIUM, VIA: HCPCS | Performed by: INTERNAL MEDICINE

## 2020-02-24 PROCEDURE — 74011000258 HC RX REV CODE- 258: Performed by: INTERNAL MEDICINE

## 2020-02-24 PROCEDURE — 85018 HEMOGLOBIN: CPT

## 2020-02-24 PROCEDURE — 94640 AIRWAY INHALATION TREATMENT: CPT

## 2020-02-24 PROCEDURE — 96366 THER/PROPH/DIAG IV INF ADDON: CPT

## 2020-02-24 RX ORDER — HYDRALAZINE HYDROCHLORIDE 25 MG/1
25 TABLET, FILM COATED ORAL 3 TIMES DAILY
Status: DISCONTINUED | OUTPATIENT
Start: 2020-02-24 | End: 2020-02-26 | Stop reason: HOSPADM

## 2020-02-24 RX ORDER — AMLODIPINE BESYLATE 10 MG/1
10 TABLET ORAL DAILY
Status: DISCONTINUED | OUTPATIENT
Start: 2020-02-25 | End: 2020-02-26 | Stop reason: HOSPADM

## 2020-02-24 RX ADMIN — SODIUM CHLORIDE 75 ML/HR: 900 INJECTION, SOLUTION INTRAVENOUS at 08:38

## 2020-02-24 RX ADMIN — FLUTICASONE PROPIONATE 2 SPRAY: 50 SPRAY, METERED NASAL at 08:32

## 2020-02-24 RX ADMIN — Medication 10 ML: at 05:42

## 2020-02-24 RX ADMIN — BUDESONIDE AND FORMOTEROL FUMARATE DIHYDRATE 2 PUFF: 160; 4.5 AEROSOL RESPIRATORY (INHALATION) at 19:52

## 2020-02-24 RX ADMIN — SODIUM CHLORIDE 40 MG: 9 INJECTION, SOLUTION INTRAMUSCULAR; INTRAVENOUS; SUBCUTANEOUS at 20:45

## 2020-02-24 RX ADMIN — HYDRALAZINE HYDROCHLORIDE 25 MG: 25 TABLET, FILM COATED ORAL at 20:48

## 2020-02-24 RX ADMIN — GABAPENTIN 100 MG: 100 CAPSULE ORAL at 08:31

## 2020-02-24 RX ADMIN — GABAPENTIN 100 MG: 100 CAPSULE ORAL at 17:24

## 2020-02-24 RX ADMIN — HYDRALAZINE HYDROCHLORIDE 25 MG: 25 TABLET, FILM COATED ORAL at 15:27

## 2020-02-24 RX ADMIN — CEFTRIAXONE 1 G: 1 INJECTION, POWDER, FOR SOLUTION INTRAMUSCULAR; INTRAVENOUS at 12:11

## 2020-02-24 RX ADMIN — SODIUM CHLORIDE 40 MG: 9 INJECTION, SOLUTION INTRAMUSCULAR; INTRAVENOUS; SUBCUTANEOUS at 08:31

## 2020-02-24 RX ADMIN — SERTRALINE HYDROCHLORIDE 50 MG: 50 TABLET ORAL at 08:31

## 2020-02-24 RX ADMIN — PRAVASTATIN SODIUM 40 MG: 20 TABLET ORAL at 20:47

## 2020-02-24 RX ADMIN — SODIUM CHLORIDE 75 ML/HR: 900 INJECTION, SOLUTION INTRAVENOUS at 19:56

## 2020-02-24 RX ADMIN — BUDESONIDE AND FORMOTEROL FUMARATE DIHYDRATE 2 PUFF: 160; 4.5 AEROSOL RESPIRATORY (INHALATION) at 07:15

## 2020-02-24 NOTE — PROGRESS NOTES
END OF SHIFT NOTE:    INTAKE/OUTPUT  02/22 0701 - 02/23 0700  In: 0657 [I.V.:1338]  Out: 300 [Urine:300]  Voiding: YES  Catheter: NO  Drain:              Flatus: Patient does have flatus present. Stool:  3 occurrences. Characteristics:  Stool Assessment  Stool Color: Maroon  Stool Appearance: Bloody  Stool Amount: Medium  Stool Source/Status: Rectum    Emesis: 0 occurrences. Characteristics:        VITAL SIGNS  Patient Vitals for the past 12 hrs:   Temp Pulse Resp BP SpO2   02/23/20 1532 98.9 °F (37.2 °C) 69 16 129/54 96 %   02/23/20 1139 99.1 °F (37.3 °C) 71 16 154/64 97 %   02/23/20 0754     94 %   02/23/20 0725 98.5 °F (36.9 °C) 67 16 141/63 96 %       Pain Assessment  Pain Intensity 1: 0 (02/22/20 0736)        Patient Stated Pain Goal: 0    Ambulating  No    Shift report given to oncoming nurse at the bedside.     Sandra Jones RN
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
## 2020-02-24 NOTE — PROGRESS NOTES
TYLER CM outreach with Ho Blankenship, [facility]. Informed about pt's admission. Following. This note will not be viewable in [address]

## 2020-02-24 NOTE — PROGRESS NOTES
Hospitalist Progress Note     Admit Date:  2020  1:58 AM   Name:  Thu Ann   Age:  80 y.o.  :  1929   MRN:  [id number]   PCP:  Heath Frost MD  Treatment Team: Attending Provider: Anju Soto MD; Primary Nurse: Rome Vazquez RN; Utilization Review: Katarina Roblero; Care Manager: Rasheed Cary RN; Utilization Review: Joseline Matt; Consulting Provider: Anirudh Muhammad MD    Subjective:     2020 patient seen and evaluated. New patient for me today. She was admitted overnight with a GI bleed. She has received 1 unit of packed red blood cells. She apparently has lots of antibodies so it takes a while for her blood products to be prepared per reports from nursing from the blood bank. The patient herself has not noticed any new bleeding. She states that she seen some old blood yesterday evening. In addition to this the pain and cramping that she had previously experienced in her abdominal hernia that precipitated the bleed has stopped. And her stomach is not as full as it was previously. She also notes an abrasion to the right side of her neck from her chain scratching her neck overnight when she forgot to take it off.       2020- pt seen- small amount of bright red blood after she got up for a bath.      Objective:     Patient Vitals for the past 24 hrs:   Temp Pulse Resp BP SpO2   20 1159 97.9 °F (36.6 °C) 62 17 166/64 97 %   20 0737 98.4 °F (36.9 °C) 66 18 169/65 96 %   20 0716     94 %   20 0322 98.1 °F (36.7 °C) 65 18 132/62 98 %   20 2301 98.5 °F (36.9 °C) 67 18 148/69 97 %   20 2047     96 %   20 1954 98.5 °F (36.9 °C) 66 16 150/65 96 %   20 1532 98.9 °F (37.2 °C) 69 16 129/54 96 %     Oxygen Therapy  O2 Sat (%): 97 % (20 1159)  Pulse via Oximetry: 84 beats per minute (20)  O2 Device: Room air (20)  No intake or output data in the 24 hours ending 20 1406      General:    Well nourished. Alert. CV:   RRR. No murmur, rub, or gallop. Lungs:   CTAB. No wheezing, rhonchi, or rales. Abdomen:   Soft, nontender, nondistended. Extremities: Warm and dry. No cyanosis or edema. Skin:     No rashes or jaundice. Data Review:  I have reviewed all labs, meds, telemetry events, and studies from the last 24 hours.     Recent Results (from the past 24 hour(s))   HEMOGLOBIN    Collection Time: 02/23/20  9:23 PM   Result Value Ref Range    HGB 7.6 (L) 11.7 - 15.4 g/dL   HEMOGLOBIN    Collection Time: 02/24/20  5:08 AM   Result Value Ref Range    HGB 7.1 (L) 11.7 - 15.4 g/dL   GLUCOSE, POC    Collection Time: 02/24/20  5:45 AM   Result Value Ref Range    Glucose (POC) 168 (H) 65 - 100 mg/dL        All Micro Results     Procedure Component Value Units Date/Time    CULTURE, URINE [[id number]]  (Abnormal) Collected:  02/22/20 2251    Order Status:  Completed Specimen:  Urine from Clean catch Updated:  02/24/20 0746     Special Requests: NO SPECIAL REQUESTS        Culture result:       >100,000 COLONIES/mL GRAM NEGATIVE RODS SUBCULTURE IN PROGRESS                  10,000 to 50,000 COLONIES/mL MIXED SKIN NGOZI ISOLATED                Current Meds:  Current Facility-Administered Medications   Medication Dose Route Frequency    [START ON 2/25/2020] amLODIPine (NORVASC) tablet 10 mg  10 mg Oral DAILY    hydrALAZINE (APRESOLINE) tablet 25 mg  25 mg Oral TID    0.9% sodium chloride infusion 250 mL  250 mL IntraVENous PRN    pantoprazole (PROTONIX) 40 mg in 0.9% sodium chloride 10 mL injection  40 mg IntraVENous Q12H    fluticasone propionate (FLONASE) 50 mcg/actuation nasal spray 2 Spray  2 Spray Both Nostrils DAILY    gabapentin (NEURONTIN) capsule 100 mg  100 mg Oral BID    LORazepam (ATIVAN) tablet 0.5 mg  0.5 mg Oral DAILY PRN    sertraline (ZOLOFT) tablet 50 mg  50 mg Oral DAILY    albuterol (PROVENTIL VENTOLIN) nebulizer solution 2.5 mg  2.5 mg Nebulization Q4H PRN    sodium chloride (NS) flush 5-40 mL  5-40 mL IntraVENous Q8H    sodium chloride (NS) flush 5-40 mL  5-40 mL IntraVENous PRN    ondansetron (ZOFRAN) injection 4 mg  4 mg IntraVENous Q4H PRN    0.9% sodium chloride infusion  75 mL/hr IntraVENous CONTINUOUS    acetaminophen (TYLENOL) tablet 650 mg  650 mg Oral Q4H PRN    oxyCODONE-acetaminophen (PERCOCET) 5-325 mg per tablet 1 Tab  1 Tab Oral Q4H PRN    pravastatin (PRAVACHOL) tablet 40 mg  40 mg Oral QHS    cefTRIAXone (ROCEPHIN) 1 g in 0.9% sodium chloride (MBP/ADV) 50 mL  1 g IntraVENous Q24H    budesonide-formoteroL (SYMBICORT) 160-4.5 mcg/actuation HFA inhaler 2 Puff  2 Puff Inhalation BID RT    0.9% sodium chloride infusion 250 mL  250 mL IntraVENous PRN       Other Studies (last 24 hours):  No results found.     Assessment and Plan:     Hospital Problems as of 2/24/2020 Date Reviewed: 10/18/2019          Codes Class Noted - Resolved POA    Abdominal wall hernia ICD-10-CM: K43.9  ICD-9-CM: 553.20  2/23/2020 - Present Yes        CKD (chronic kidney disease) stage 3, GFR 30-59 ml/min ([facility]) ICD-10-CM: N18.3  ICD-9-CM: 585.3  4/18/2019 - Present Yes        COPD (chronic obstructive pulmonary disease) ([facility]) (Chronic) ICD-10-CM: J44.9  ICD-9-CM: 350  8/22/2018 - Present Yes        MIHAELA (acute kidney injury) ([facility] ICD-10-CM: N17.9  ICD-9-CM: 584.9  8/21/2018 - Present Yes        * (Principal) GI bleed ICD-10-CM: K92.2  ICD-9-CM: 578.9  7/28/2018 - Present Unknown        Anxiety ICD-10-CM: F41.9  ICD-9-CM: 300.00  2/1/2018 - Present Yes              PLAN:    · GI bleed  continue to monitor H&H and transfuse as appropriate; continue Protonix for now GI input appreciated; would not like to scope this 71-year-old female but will reconsider if indicated with further bleeding  · She is clear liquid diet; I will add nothing red; defer to gi advancing to anything further   · Acute blood loss anemia  the patient is status post 1 unit of packed red blood cells-as above she has antibodies and it takes quite some time to get blood for her; I have asked the blood bank to prepare an additional unit in case we need to transfuse her. If she does require more blood it would be worthwhile to keep 1 unit ahead. · Abdominal hernia - she is status post CT abdomen and pelvis which showed no evidence of incarceration; hernia is currently reducible and no longer painful  · COPD  no active issues at this time will continue home medications and monitor  · Hypertension - start back some meds at a lower dose; order prn meds  · Chronic kidney disease - currently at baseline    DC planning/Dispo:  Hopefully home in next 24 to 48 hours if she remains hemodynamically stable with no further episodes of bleeding  DVT ppx: SCDs    Signed:  Elva Doyle MD

## 2020-02-24 NOTE — PROGRESS NOTES
END OF SHIFT NOTE:    INTAKE/OUTPUT  No intake/output data recorded. Voiding: YES  Catheter: NO  Drain:              Flatus: Patient does have flatus present. Stool:  0 occurrences. Characteristics:  Stool Assessment  Stool Color: Maroon  Stool Appearance: Bloody  Stool Amount: Medium  Stool Source/Status: Rectum    Emesis: 0 occurrences. Characteristics:        VITAL SIGNS  Patient Vitals for the past 12 hrs:   Temp Pulse Resp BP SpO2   02/24/20 0716     94 %   02/24/20 0322 98.1 °F (36.7 °C) 65 18 132/62 98 %   02/23/20 2301 98.5 °F (36.9 °C) 67 18 148/69 97 %   02/23/20 2047     96 %   02/23/20 1954 98.5 °F (36.9 °C) 66 16 150/65 96 %       Pain Assessment  Pain Intensity 1: 0 (02/22/20 0736)        Patient Stated Pain Goal: 0    Ambulating  No    Shift report given to oncoming nurse at the bedside.     Mary Bro RN

## 2020-02-24 NOTE — PROGRESS NOTES
END OF SHIFT NOTE:    INTAKE/OUTPUT  No intake/output data recorded. Voiding: YES  Catheter: NO  Drain:              Flatus: Patient does have flatus present. Stool:  0 occurrences. Characteristics:  Stool Assessment  Stool Color: Maroon  Stool Appearance: Bloody  Stool Amount: Medium  Stool Source/Status: Rectum    Emesis: 0 occurrences. Characteristics:        VITAL SIGNS  Patient Vitals for the past 12 hrs:   Temp Pulse Resp BP SpO2   02/24/20 1627 98.4 °F (36.9 °C) 66 18 187/62 98 %   02/24/20 1159 97.9 °F (36.6 °C) 62 17 166/64 97 %   02/24/20 0737 98.4 °F (36.9 °C) 66 18 169/65 96 %   02/24/20 0716     94 %       Pain Assessment  Pain Intensity 1: 0 (02/24/20 1400)        Patient Stated Pain Goal: 0    Ambulating  Yes   (got up to the chair with assistance)  Shift report given to oncoming nurse at the bedside.     Amira Stovall

## 2020-02-25 ENCOUNTER — PATIENT OUTREACH (OUTPATIENT)
Dept: CASE MANAGEMENT | Age: 85
End: 2020-02-25

## 2020-02-25 PROBLEM — K92.2 GIB (GASTROINTESTINAL BLEEDING): Status: ACTIVE | Noted: 2020-02-25

## 2020-02-25 PROBLEM — N39.0 UTI (URINARY TRACT INFECTION): Status: ACTIVE | Noted: 2020-02-25

## 2020-02-25 LAB
BACTERIA SPEC CULT: ABNORMAL
BACTERIA SPEC CULT: ABNORMAL
GLUCOSE BLD STRIP.AUTO-MCNC: 174 MG/DL (ref 65–100)
GLUCOSE BLD STRIP.AUTO-MCNC: 192 MG/DL (ref 65–100)
GLUCOSE BLD STRIP.AUTO-MCNC: 197 MG/DL (ref 65–100)
GLUCOSE BLD STRIP.AUTO-MCNC: 288 MG/DL (ref 65–100)
GLUCOSE BLD STRIP.AUTO-MCNC: 345 MG/DL (ref 65–100)
HGB BLD-MCNC: 7.6 G/DL (ref 11.7–15.4)
SERVICE CMNT-IMP: ABNORMAL

## 2020-02-25 PROCEDURE — 82962 GLUCOSE BLOOD TEST: CPT

## 2020-02-25 PROCEDURE — 94760 N-INVAS EAR/PLS OXIMETRY 1: CPT

## 2020-02-25 PROCEDURE — 96376 TX/PRO/DX INJ SAME DRUG ADON: CPT

## 2020-02-25 PROCEDURE — 99218 HC RM OBSERVATION: CPT

## 2020-02-25 PROCEDURE — 85018 HEMOGLOBIN: CPT

## 2020-02-25 PROCEDURE — 74011250637 HC RX REV CODE- 250/637: Performed by: INTERNAL MEDICINE

## 2020-02-25 PROCEDURE — 36415 COLL VENOUS BLD VENIPUNCTURE: CPT

## 2020-02-25 PROCEDURE — 74011250636 HC RX REV CODE- 250/636: Performed by: INTERNAL MEDICINE

## 2020-02-25 PROCEDURE — 94640 AIRWAY INHALATION TREATMENT: CPT

## 2020-02-25 PROCEDURE — 74011636637 HC RX REV CODE- 636/637: Performed by: INTERNAL MEDICINE

## 2020-02-25 PROCEDURE — C9113 INJ PANTOPRAZOLE SODIUM, VIA: HCPCS | Performed by: INTERNAL MEDICINE

## 2020-02-25 PROCEDURE — 74011000258 HC RX REV CODE- 258: Performed by: INTERNAL MEDICINE

## 2020-02-25 PROCEDURE — 65270000029 HC RM PRIVATE

## 2020-02-25 PROCEDURE — 74011000250 HC RX REV CODE- 250: Performed by: INTERNAL MEDICINE

## 2020-02-25 RX ORDER — INSULIN LISPRO 100 [IU]/ML
0-10 INJECTION, SOLUTION INTRAVENOUS; SUBCUTANEOUS
Status: DISCONTINUED | OUTPATIENT
Start: 2020-02-25 | End: 2020-02-26 | Stop reason: HOSPADM

## 2020-02-25 RX ADMIN — PRAVASTATIN SODIUM 40 MG: 20 TABLET ORAL at 21:24

## 2020-02-25 RX ADMIN — BUDESONIDE AND FORMOTEROL FUMARATE DIHYDRATE 2 PUFF: 160; 4.5 AEROSOL RESPIRATORY (INHALATION) at 08:26

## 2020-02-25 RX ADMIN — SODIUM CHLORIDE 40 MG: 9 INJECTION, SOLUTION INTRAMUSCULAR; INTRAVENOUS; SUBCUTANEOUS at 08:56

## 2020-02-25 RX ADMIN — HYDRALAZINE HYDROCHLORIDE 25 MG: 25 TABLET, FILM COATED ORAL at 21:24

## 2020-02-25 RX ADMIN — GABAPENTIN 100 MG: 100 CAPSULE ORAL at 17:29

## 2020-02-25 RX ADMIN — FLUTICASONE PROPIONATE 2 SPRAY: 50 SPRAY, METERED NASAL at 08:56

## 2020-02-25 RX ADMIN — INSULIN LISPRO 8 UNITS: 100 INJECTION, SOLUTION INTRAVENOUS; SUBCUTANEOUS at 22:40

## 2020-02-25 RX ADMIN — Medication 10 ML: at 22:00

## 2020-02-25 RX ADMIN — SODIUM CHLORIDE 40 MG: 9 INJECTION, SOLUTION INTRAMUSCULAR; INTRAVENOUS; SUBCUTANEOUS at 21:24

## 2020-02-25 RX ADMIN — INSULIN LISPRO 2 UNITS: 100 INJECTION, SOLUTION INTRAVENOUS; SUBCUTANEOUS at 17:29

## 2020-02-25 RX ADMIN — SERTRALINE HYDROCHLORIDE 50 MG: 50 TABLET ORAL at 08:56

## 2020-02-25 RX ADMIN — LORAZEPAM 0.5 MG: 0.5 TABLET ORAL at 21:24

## 2020-02-25 RX ADMIN — INSULIN LISPRO 6 UNITS: 100 INJECTION, SOLUTION INTRAVENOUS; SUBCUTANEOUS at 12:59

## 2020-02-25 RX ADMIN — OXYCODONE HYDROCHLORIDE AND ACETAMINOPHEN 1 TABLET: 5; 325 TABLET ORAL at 09:05

## 2020-02-25 RX ADMIN — AMLODIPINE BESYLATE 10 MG: 10 TABLET ORAL at 08:56

## 2020-02-25 RX ADMIN — CEFTRIAXONE 1 G: 1 INJECTION, POWDER, FOR SOLUTION INTRAMUSCULAR; INTRAVENOUS at 12:59

## 2020-02-25 RX ADMIN — HYDRALAZINE HYDROCHLORIDE 25 MG: 25 TABLET, FILM COATED ORAL at 08:56

## 2020-02-25 RX ADMIN — HYDRALAZINE HYDROCHLORIDE 25 MG: 25 TABLET, FILM COATED ORAL at 17:29

## 2020-02-25 RX ADMIN — BUDESONIDE AND FORMOTEROL FUMARATE DIHYDRATE 2 PUFF: 160; 4.5 AEROSOL RESPIRATORY (INHALATION) at 19:36

## 2020-02-25 RX ADMIN — GABAPENTIN 100 MG: 100 CAPSULE ORAL at 08:56
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
## 2020-02-25 NOTE — PROGRESS NOTES
.  END OF SHIFT NOTE:    INTAKE/OUTPUT  02/24 0701 - 02/25 0700  In: 2328 [I.V.:3344]  Out: 200 [Urine:200]  Voiding: YES  Catheter: NO  Drain:              Flatus: Patient does not have flatus present. Emesis: 0 occurrences. Characteristics:        VITAL SIGNS  Patient Vitals for the past 12 hrs:   Temp Pulse Resp BP SpO2   02/25/20 0334 98.3 °F (36.8 °C) 60 18 163/68 95 %   02/24/20 2350 98.2 °F (36.8 °C) 68 18 189/69 99 %   02/24/20 2028 98.2 °F (36.8 °C) 61 19 161/70    02/24/20 1954     97 %       Pain Assessment  Pain Intensity 1: 0 (02/25/20 0209)        Patient Stated Pain Goal: 0    Ambulating  Yes    Shift report given to oncoming nurse at the bedside.     Holly Becker RN

## 2020-02-25 NOTE — PROGRESS NOTES
GASTROENTEROLOGY DAILY PROGRESS NOTE    Patient: Samanta Kamara MRN: [id number]  SSN: [id number]    YOB: 1929  Age: 80 y.o. Sex: female       Admit Date:  2/22/2020    Today's Date:  2/24/2020    Subjective:     No active bleeding. Denies abdominal pain. Objective:   Vitals:  Visit Vitals  /70 (BP 1 Location: Left arm, BP Patient Position: At rest)   Pulse 61   Temp 98.2 °F (36.8 °C)   Resp 19   Ht 5' 4\" (1.626 m)   Wt 79.4 kg (175 lb)   SpO2 97%   BMI 30.04 kg/m²     Intake/Output:  Current Shift:  No intake/output data recorded. Previous three completed shifts:  No intake/output data recorded. Exam:  General appearance: alert, cooperative, no distress  Lungs: clear to auscultation bilaterally anteriorly  Heart: regular rate and rhythm  Abdomen: soft, non-tender.  Bowel sounds normal. No masses,  no organomegaly  Extremities: extremities normal, atraumatic, no cyanosis or edema  Neuro:  alert and oriented    Data Review (Labs):    Recent Labs     02/24/20  1358 02/24/20  0508 02/23/20  2123 02/23/20  1319 02/23/20  0533 02/22/20  2115 02/22/20  1412 02/22/20  1056 02/22/20  0230   WBC  --   --   --   --   --   --   --   --  9.3   HGB 7.5* 7.1* 7.6* 7.4* 7.9* 6.8* 7.4* 7.7* 9.3*   HCT  --   --   --   --   --   --   --  25.5* 30.2*   PLT  --   --   --   --   --   --   --   --  193   MCV  --   --   --   --   --   --   --   --  99.0*   NA  --   --   --   --   --   --   --   --  144   K  --   --   --   --   --   --   --   --  3.9   CL  --   --   --   --   --   --   --   --  109*   CO2  --   --   --   --   --   --   --   --  25   BUN  --   --   --   --   --   --   --   --  28*   CREA  --   --   --   --   --   --   --   --  1.28*   CA  --   --   --   --   --   --   --   --  10.3   GLU  --   --   --   --   --   --   --   --  248*   AP  --   --   --   --   --   --   --   --  73   SGOT  --   --   --   --   --   --   --   --  11*   ALT  --   --   --   --   --   --   --   --  22   TBILI  -- Ok to increase to ropinerole 2mg nightly     --   --   --   --   --   --   --  0.3           Assessment:     Principal Problem:    GI bleed (7/28/2018)    Active Problems:    Anxiety (2/1/2018)      MIHAELA (acute kidney injury) ([facility] (8/21/2018)      COP[facility] (8/22/2018)      CKD (chronic kidney disease) stage 3, GFR 30-59 ml/min [facility] (4/18/2019)      Abdominal wall hernia (2/23/2020)        Plan:     No plans for gi procedures at this point. Hgb's in the 7 to 8 range. Continue present care.   If stable in am advance diet etc.    Signed By: Yossi Valles MD     February 24, 2020

## 2020-02-25 NOTE — PROGRESS NOTES
Patient admitted at [facility].  Please let patient/POA  know we are aware of her hospitalization and will follow her progress

## 2020-02-25 NOTE — PROGRESS NOTES
END OF SHIFT NOTE:    INTAKE/OUTPUT  02/24 0701 - 02/25 0700  In: 8965 [I.V.:3344]  Out: 200 [Urine:200]  Voiding: YES  Catheter: NO  Drain:              Flatus: Patient does have flatus present. Stool:  1 occurrences.  (this nurse did not assess very small with no blood per CNA)  Characteristics:  Stool Assessment  Stool Color: Maroon  Stool Appearance: Bloody  Stool Amount: Medium  Stool Source/Status: Rectum     Emesis: 0 occurrences. Characteristics:        VITAL SIGNS  Patient Vitals for the past 12 hrs:   Temp Pulse Resp BP SpO2   02/25/20 1513 98.3 °F (36.8 °C) 70 18 133/48 95 %   02/25/20 1106 98.3 °F (36.8 °C) 92 18 133/58 93 %   02/25/20 0826     94 %   02/25/20 0708 98.2 °F (36.8 °C) (!) 59 18 165/65 97 %       Pain Assessment  Pain Intensity 1: 0 (02/25/20 1400)  Pain Location 1: Back, Leg  Pain Intervention(s) 1: Rest  Patient Stated Pain Goal: 0    Ambulating  Yes    Shift report given to oncoming nurse at the bedside.     Sheryle Gibbons
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
## 2020-02-25 NOTE — PROGRESS NOTES
Called pt and let her know that Dr. Fiorella Guaman is aware that she is in the hospital and that he will follow her progress.

## 2020-02-26 ENCOUNTER — HOME HEALTH ADMISSION (OUTPATIENT)
Dept: HOME HEALTH SERVICES | Facility: HOME HEALTH | Age: 85
End: 2020-02-26

## 2020-02-26 VITALS
TEMPERATURE: 98.3 F | HEART RATE: 74 BPM | SYSTOLIC BLOOD PRESSURE: 155 MMHG | OXYGEN SATURATION: 98 % | WEIGHT: 173 LBS | DIASTOLIC BLOOD PRESSURE: 66 MMHG | BODY MASS INDEX: 29.53 KG/M2 | HEIGHT: 64 IN | RESPIRATION RATE: 18 BRPM

## 2020-02-26 LAB
ABO + RH BLD: NORMAL
ANION GAP SERPL CALC-SCNC: 7 MMOL/L (ref 7–16)
ANTIGENS PRESENT BLD: NORMAL
ANTIGENS PRESENT RBC DONR: NORMAL
BASOPHILS # BLD: 0 K/UL (ref 0–0.2)
BASOPHILS NFR BLD: 0 % (ref 0–2)
BLD PROD TYP BPU: NORMAL
BLOOD BANK CMNT PATIENT-IMP: NORMAL
BLOOD GROUP ANTIBODIES SERPL: NORMAL
BLOOD GROUP ANTIBODIES SERPL: NORMAL
BPU ID: NORMAL
BUN SERPL-MCNC: 17 MG/DL (ref 8–23)
CALCIUM SERPL-MCNC: 8.8 MG/DL (ref 8.3–10.4)
CHLORIDE SERPL-SCNC: 114 MMOL/L (ref 98–107)
CO2 SERPL-SCNC: 26 MMOL/L (ref 21–32)
CREAT SERPL-MCNC: 0.92 MG/DL (ref 0.6–1)
CROSSMATCH RESULT,%XM: NORMAL
DIFFERENTIAL METHOD BLD: ABNORMAL
EOSINOPHIL # BLD: 0.1 K/UL (ref 0–0.8)
EOSINOPHIL NFR BLD: 2 % (ref 0.5–7.8)
ERYTHROCYTE [DISTWIDTH] IN BLOOD BY AUTOMATED COUNT: 14.2 % (ref 11.9–14.6)
GLUCOSE BLD STRIP.AUTO-MCNC: 122 MG/DL (ref 65–100)
GLUCOSE SERPL-MCNC: 93 MG/DL (ref 65–100)
HCT VFR BLD AUTO: 23.3 % (ref 35.8–46.3)
HGB BLD-MCNC: 7.4 G/DL (ref 11.7–15.4)
IMM GRANULOCYTES # BLD AUTO: 0 K/UL (ref 0–0.5)
IMM GRANULOCYTES NFR BLD AUTO: 0 % (ref 0–5)
LYMPHOCYTES # BLD: 1.6 K/UL (ref 0.5–4.6)
LYMPHOCYTES NFR BLD: 25 % (ref 13–44)
MAGNESIUM SERPL-MCNC: 1.9 MG/DL (ref 1.8–2.4)
MCH RBC QN AUTO: 30.5 PG (ref 26.1–32.9)
MCHC RBC AUTO-ENTMCNC: 31.8 G/DL (ref 31.4–35)
MCV RBC AUTO: 95.9 FL (ref 79.6–97.8)
MONOCYTES # BLD: 0.8 K/UL (ref 0.1–1.3)
MONOCYTES NFR BLD: 13 % (ref 4–12)
NEUTS SEG # BLD: 3.6 K/UL (ref 1.7–8.2)
NEUTS SEG NFR BLD: 59 % (ref 43–78)
NRBC # BLD: 0 K/UL (ref 0–0.2)
PLATELET # BLD AUTO: 140 K/UL (ref 150–450)
PMV BLD AUTO: 10.5 FL (ref 9.4–12.3)
POTASSIUM SERPL-SCNC: 3.2 MMOL/L (ref 3.5–5.1)
RBC # BLD AUTO: 2.43 M/UL (ref 4.05–5.2)
SODIUM SERPL-SCNC: 147 MMOL/L (ref 136–145)
SPECIMEN EXP DATE BLD: NORMAL
STATUS OF UNIT,%ST: NORMAL
UNIT DIVISION, %UDIV: 0
WBC # BLD AUTO: 6.2 K/UL (ref 4.3–11.1)

## 2020-02-26 PROCEDURE — 74011250636 HC RX REV CODE- 250/636: Performed by: INTERNAL MEDICINE

## 2020-02-26 PROCEDURE — 74011250637 HC RX REV CODE- 250/637: Performed by: INTERNAL MEDICINE

## 2020-02-26 PROCEDURE — C9113 INJ PANTOPRAZOLE SODIUM, VIA: HCPCS | Performed by: INTERNAL MEDICINE

## 2020-02-26 PROCEDURE — 94760 N-INVAS EAR/PLS OXIMETRY 1: CPT

## 2020-02-26 PROCEDURE — 83735 ASSAY OF MAGNESIUM: CPT

## 2020-02-26 PROCEDURE — 94640 AIRWAY INHALATION TREATMENT: CPT

## 2020-02-26 PROCEDURE — 36415 COLL VENOUS BLD VENIPUNCTURE: CPT

## 2020-02-26 PROCEDURE — 85025 COMPLETE CBC W/AUTO DIFF WBC: CPT

## 2020-02-26 PROCEDURE — 82962 GLUCOSE BLOOD TEST: CPT

## 2020-02-26 PROCEDURE — 80048 BASIC METABOLIC PNL TOTAL CA: CPT

## 2020-02-26 RX ORDER — CEFPODOXIME PROXETIL 200 MG/1
200 TABLET, FILM COATED ORAL 2 TIMES DAILY
Qty: 4 TAB | Refills: 0 | Status: SHIPPED | OUTPATIENT
Start: 2020-02-27 | End: 2020-02-29

## 2020-02-26 RX ORDER — POTASSIUM CHLORIDE 20 MEQ/1
40 TABLET, EXTENDED RELEASE ORAL 2 TIMES DAILY
Status: DISCONTINUED | OUTPATIENT
Start: 2020-02-26 | End: 2020-02-26 | Stop reason: HOSPADM

## 2020-02-26 RX ORDER — SAME BUTANEDISULFONATE/BETAINE 400-600 MG
250 POWDER IN PACKET (EA) ORAL 2 TIMES DAILY
Qty: 14 CAP | Refills: 0 | Status: SHIPPED | OUTPATIENT
Start: 2020-02-26 | End: 2020-03-04

## 2020-02-26 RX ADMIN — Medication 10 ML: at 06:00

## 2020-02-26 RX ADMIN — BUDESONIDE AND FORMOTEROL FUMARATE DIHYDRATE 2 PUFF: 160; 4.5 AEROSOL RESPIRATORY (INHALATION) at 07:45

## 2020-02-26 RX ADMIN — AMLODIPINE BESYLATE 10 MG: 10 TABLET ORAL at 08:17

## 2020-02-26 RX ADMIN — SODIUM CHLORIDE 40 MG: 9 INJECTION, SOLUTION INTRAMUSCULAR; INTRAVENOUS; SUBCUTANEOUS at 08:19

## 2020-02-26 RX ADMIN — SERTRALINE HYDROCHLORIDE 50 MG: 50 TABLET ORAL at 08:17

## 2020-02-26 RX ADMIN — FLUTICASONE PROPIONATE 2 SPRAY: 50 SPRAY, METERED NASAL at 08:19

## 2020-02-26 RX ADMIN — POTASSIUM CHLORIDE 40 MEQ: 20 TABLET, EXTENDED RELEASE ORAL at 10:24

## 2020-02-26 RX ADMIN — GABAPENTIN 100 MG: 100 CAPSULE ORAL at 08:17

## 2020-02-26 RX ADMIN — HYDRALAZINE HYDROCHLORIDE 25 MG: 25 TABLET, FILM COATED ORAL at 08:18

## 2020-02-26 NOTE — DISCHARGE INSTRUCTIONS
Disposition: home with home hs   Activity: as tolerated  Diet: DIET GI SOFT cardiac; Diabetic advance as tolerated; nothing red     Gastrointestinal Bleeding: Care Instructions  Your Care Instructions    The digestive or gastrointestinal tract goes from the mouth to the anus. It is often called the GI tract. Bleeding can happen anywhere in the GI tract. It may be caused by an ulcer, an infection, or cancer. It may also be caused by medicines such as aspirin or ibuprofen. Light bleeding may not cause any symptoms at first. But if you continue to bleed for a while, you may feel very weak or tired. Sudden, heavy bleeding means you need to see a doctor right away. This kind of bleeding can be very dangerous. But it can usually be cured or controlled. The doctor may do some tests to find the cause of your bleeding. Follow-up care is a key part of your treatment and safety. Be sure to make and go to all appointments, and call your doctor if you are having problems. It's also a good idea to know your test results and keep a list of the medicines you take. How can you care for yourself at home? · Be safe with medicines. Take your medicines exactly as prescribed. Call your doctor if you think you are having a problem with your medicine. You will get more details on the specific medicines your doctor prescribes. · Do not take aspirin or other anti-inflammatory medicines, such as naproxen (Aleve) or ibuprofen (Advil, Motrin), without talking to your doctor first. Ask your doctor if it is okay to use acetaminophen (Tylenol). · Do not drink alcohol. · The bleeding may make you lose iron. So it's important to eat foods that have a lot of iron. These include red meat, shellfish, poultry, and eggs. They also include beans, raisins, whole-grain breads, and leafy green vegetables. If you want help planning meals, you can make an appointment with a dietitian. When should you call for help?   Call 911 anytime you think you may need emergency care. For example, call if:    · You have sudden, severe belly pain.     · You vomit blood or what looks like coffee grounds.     · You passed out (lost consciousness).     · Your stools are maroon or very bloody.    Call your doctor now or seek immediate medical care if:    · You are dizzy or lightheaded, or you feel like you may faint.     · Your stools are black and look like tar, or they have streaks of blood.     · You have belly pain.     · You vomit or have nausea.     · You have trouble swallowing, or it hurts when you swallow.    Watch closely for changes in your health, and be sure to contact your doctor if:    · You do not get better as expected. Where can you learn more? Go to http://nelia-mandy.info/. Enter C504 in the search box to learn more about \"Gastrointestinal Bleeding: Care Instructions. \"  Current as of: June 26, 2019  Content Version: 12.2  © 0428-1169 [employer]. Care instructions adapted under license by [employer] (which disclaims liability or warranty for this information). If you have questions about a medical condition or this instruction, always ask your healthcare professional. [place] [address] any warranty or liability for your use of this information. DISCHARGE SUMMARY from Nurse    PATIENT INSTRUCTIONS:    After general anesthesia or intravenous sedation, for 24 hours or while taking prescription Narcotics:  · Limit your activities  · Do not drive and operate hazardous machinery  · Do not make important personal or business decisions  · Do  not drink alcoholic beverages  · If you have not urinated within 8 hours after discharge, please contact your surgeon on call.     Report the following to your surgeon:  · Excessive pain, swelling, redness or odor of or around the surgical area  · Temperature over 100.5  · Nausea and vomiting lasting longer than 4 hours or if unable to take medications  · Any signs of decreased circulation or nerve impairment to extremity: change in color, persistent  numbness, tingling, coldness or increase pain  · Any questions    What to do at Home:  Recommended activity: Activity as tolerated. If you experience any of the following symptoms:  Black, bloody or tarry stools,  Uncontrolled pain or nausea/ vomiting,  please follow up with your doctor. *  Please give a list of your current medications to your Primary Care Provider. *  Please update this list whenever your medications are discontinued, doses are      changed, or new medications (including over-the-counter products) are added. *  Please carry medication information at all times in case of emergency situations. These are general instructions for a healthy lifestyle:    No smoking/ No tobacco products/ Avoid exposure to second hand smoke  Surgeon General's Warning:  Quitting smoking now greatly reduces serious risk to your health. Obesity, smoking, and sedentary lifestyle greatly increases your risk for illness    A healthy diet, regular physical exercise & weight monitoring are important for maintaining a healthy lifestyle    You may be retaining fluid if you have a history of heart failure or if you experience any of the following symptoms:  Weight gain of 3 pounds or more overnight or 5 pounds in a week, increased swelling in our hands or feet or shortness of breath while lying flat in bed. Please call your doctor as soon as you notice any of these symptoms; do not wait until your next office visit. The discharge information has been reviewed with the patient. The patient verbalized understanding. Discharge medications reviewed with the patient and appropriate educational materials and side effects teaching were provided.   ___________________________________________________________________________________________________________________________________

## 2020-02-26 NOTE — DISCHARGE SUMMARY
Hospitalist Discharge Summary     Admit Date:  2020  1:58 AM   Name:  Anna Duarte   Age:  80 y.o.  :  1929   MRN:  [id number]   PCP:  Gadiel Watts MD  Treatment Team: Attending Provider: Adryan Knight MD; Primary Nurse: Ryan Hernández, RN; Utilization Review: Celina Carrillo; Care Manager: Caty Chaves, RN; Utilization Review: Violetta Dennis; Staff Nurse: Talon Galan RN    Problem List for this Hospitalization:  Hospital Problems as of 2020 Date Reviewed: 10/18/2019          Codes Class Noted - Resolved POA    UTI (urinary tract infection) ICD-10-CM: N39.0  ICD-9-CM: 599.0  2020 - Present Unknown        GIB (gastrointestinal bleeding) ICD-10-CM: K92.2  ICD-9-CM: 578.9  2020 - Present Unknown        Abdominal wall hernia ICD-10-CM: K43.9  ICD-9-CM: 553.20  2020 - Present Yes        CKD (chronic kidney disease) stage 3, GFR 30-59 ml/min ([facility]) ICD-10-CM: N18.3  ICD-9-CM: 585.3  2019 - Present Yes        COPD (chronic obstructive pulmonary disease) ([facility] (Chronic) ICD-10-CM: J44.9  ICD-9-CM: 416  2018 - Present Yes        MIHAELA (acute kidney injury) ([facility] ICD-10-CM: N17.9  ICD-9-CM: 584.9  2018 - Present Yes        * (Principal) GI bleed ICD-10-CM: K92.2  ICD-9-CM: 578.9  2018 - Present Unknown        Anxiety ICD-10-CM: F41.9  ICD-9-CM: 300.00  2018 - Present Yes                Admission HPI from 2020:    \" The patient is a 80-year-old female with a past medical history of HTN, GERD, diabetes mellitus type 2, COPD (not on home O2), asthma, CKD, and previous GI bleed who presents to the ED with a chief complaint of bright red blood per rectum. Patient reports at 10:30 PM she experienced a dark bowel movement with some visible blood. She reports after that she had a another bowel movement with formed more blood and blood clots mixed in. Reportedly passed bloody BM while in ED.   In addition patient complains of abdominal pain located around her hernia. This pain is reportedly chronic and started prior to Akil. Pain described as tender in nature, comes and goes, with no radiation. No aggravating or alleviating factors reported. Patient denies any chest pain, shortness of breath, dysuria, myalgias or arthralgias. \"    Hospital Course: The patient was admitted and started on abx. She was seen vby gi and transfused 1 unit of prbs. Bleeding slowed and hb remained stable. She has noted small amount of bleeding with wiping. UC grew klebsiella.s he is stable for dc to home today. Follow up instructions below. Plan was discussed with the patient and IDT. All questions answered. Patient was stable at time of discharge and was instructed to call or return if there are any concerns or recurrence of symptoms. Diagnostic Imaging/Tests:   Nm Acute Gi Bleed Scan    Result Date: 2/22/2020  NM ACUTE GI BLEED SCAN 2/22/2020 INDICATION:  Dark maroon stool. Bright red clots. Started last night. History of GI bleed and partial colectomy. COMPARISON: CT abdomen and pelvis with contrast 2/22/2020. TRACER: 25.8 mCi of Tc-99m UltraTag labeled red blood cells. TECHNIQUE: Imaging of the abdomen was performed from the anterior projection following intravenous administration of Tc-99m UltraTag labeled red blood cells through 55 minutes. FINDINGS: Abnormal activity is seen early in the examination over the right abdomen. This subsequently progresses in a colonic pattern subsequently seen in the rectum. IMPRESSION: 1. Abnormal radiotracer activity suggesting an active colon bleed. This appears to originate in the right colon and therefore is favored to arise in the ascending colon. Ct Abd Pelv W Cont    Result Date: 2/22/2020  CT abdomen and pelvis with contrast COMPARISON: July 19, 2017. INDICATION: Left lower quadrant abdominal pain, rectal bleeding diverticulitis. . TECHNIQUE: CT imaging was performed of the abdomen and pelvis following the uncomplicated administration of intravenous contrast (Isovue 370, 100 mL). Oral contrast was administered. Radiation dose reduction techniques were used for this study:  Our CT scanners use one or all of the following: Automated exposure control, adjustment of the mA and/or kVp according to patient's size, iterative reconstruction. FINDINGS: Mild dependent subsegmental atelectasis at the lung bases. Heart is enlarged. Abdomen findings: Gallbladder is surgically absent. There is micronodular contour of the liver. No focal liver lesion. Pancreas is atrophic. The spleen is unremarkable. Few small cystic lesions are noted in the left kidney. No renal calculus or hydronephrosis. Abdominal aorta is normal in course and caliber with prominent atherosclerotic calcifications. Small bilateral adrenal nodules, similar dating back to 2019 study and likely adenomas. There is small hiatal hernia. Stomach is otherwise normal in contour. Small bowel loops are normal in caliber. No small bowel obstruction. There is no evidence of lymphadenopathy. Pelvic findings: Urinary bladder is normal in contour. There is sigmoid diverticulosis without diverticulitis. Additional scattered colon diverticula There is moderate fluid volume in the colon. Appendix is absent. There is no free air or free fluid. Bones are osteopenic. There are degenerative changes of the spine. IMPRESSION: 1. Diffuse colon diverticulosis. No evidence of diverticulitis, colitis or bowel obstruction. 2. No hydronephrosis. Echocardiogram results:  No results found for this visit on 02/22/20.       All Micro Results     Procedure Component Value Units Date/Time    CULTURE, URINE [[id number]]  (Abnormal)  (Susceptibility) Collected:  02/22/20 8778    Order Status:  Completed Specimen:  Urine from Clean catch Updated:  02/25/20 0847     Special Requests: NO SPECIAL REQUESTS        Culture result:       >100,000 COLONIES/mL KLEBSIELLA PNEUMONIAE 10,000 to 50,000 COLONIES/mL MIXED SKIN NGOZI ISOLATED                Labs: Results:       BMP, Mg, Phos Recent Labs     02/26/20  0544   *   K 3.2*   *   CO2 26   AGAP 7   BUN 17   CREA 0.92   CA 8.8   GLU 93      CBC Recent Labs     02/26/20  0544 02/25/20  0456 02/24/20  2223   WBC 6.2  --   --    RBC 2.43*  --   --    HGB 7.4* 7.6* 8.0*   HCT 23.3*  --   --    *  --   --    GRANS 59  --   --    LYMPH 25  --   --    EOS 2  --   --    MONOS 13*  --   --    BASOS 0  --   --    IG 0  --   --    ANEU 3.6  --   --    ABL 1.6  --   --    DELL 0.1  --   --    ABM 0.8  --   --    ABB 0.0  --   --    AIG 0.0  --   --       LFT No results for input(s): SGOT, ALT, TBIL, AP, TP, ALB, GLOB, AGRAT, GPT in the last 72 hours.    Cardiac Testing Lab Results   Component Value Date/Time    BNP 38 09/10/2016 10:15 AM    BNP 54 04/19/2016 03:30 PM    BNP 22 03/01/2012 02:53 PM    CK 64 08/21/2018 09:25 PM    CK 38 07/16/2010 04:05 PM    CK - MB 0.7 07/16/2010 04:05 PM    CK-MB Index 1.8 07/16/2010 04:05 PM    Troponin-I <0.05 03/01/2012 02:53 PM    Troponin-I, Qt. <0.02 (L) 08/06/2019 06:25 PM    Troponin-I, Qt. <0.02 (L) 08/21/2018 03:09 PM    Troponin-I, Qt. <0.04 (L) 07/17/2010 06:52 AM      Coagulation Tests Lab Results   Component Value Date/Time    Prothrombin time 13.1 07/27/2018 08:53 PM    Prothrombin time 10.4 03/01/2012 02:08 PM    Prothrombin time 9.6 09/06/2009 12:55 PM    INR 1.0 07/27/2018 08:53 PM    INR 1.0 03/01/2012 02:08 PM    INR 0.9 09/06/2009 12:55 PM    aPTT 27.1 07/27/2018 08:53 PM    aPTT 27.3 07/12/2014 02:30 PM    aPTT 23.3 (L) 03/01/2012 02:08 PM      A1c Lab Results   Component Value Date/Time    Hemoglobin A1c 6.0 (H) 10/07/2019 10:16 AM    Hemoglobin A1c 6.5 (H) 07/08/2019 09:55 AM    Hemoglobin A1c 6.7 (H) 04/08/2019 09:58 AM      Lipid Panel Lab Results   Component Value Date/Time    Cholesterol, total 144 10/07/2019 10:16 AM    HDL Cholesterol 57 10/07/2019 10:16 AM    LDL, calculated 72 10/07/2019 10:16 AM    VLDL, calculated 15 10/07/2019 10:16 AM    Triglyceride 74 10/07/2019 10:16 AM    CHOL/HDL Ratio 2.8 02/22/2017 09:54 AM      Thyroid Panel Lab Results   Component Value Date/Time    TSH 1.960 07/08/2019 09:55 AM    TSH 0.888 08/22/2018 06:01 AM        Most Recent UA Lab Results   Component Value Date/Time    Color YELLOW 02/22/2020 05:19 PM    Appearance CLOUDY 02/22/2020 05:19 PM    Specific gravity 1.035 (H) 02/22/2020 05:19 PM    pH (UA) 5.5 02/22/2020 05:19 PM    Protein NEGATIVE  02/22/2020 05:19 PM    Glucose NEGATIVE  02/22/2020 05:19 PM    Ketone NEGATIVE  02/22/2020 05:19 PM    Bilirubin NEGATIVE  02/22/2020 05:19 PM    Blood MODERATE (A) 02/22/2020 05:19 PM    Urobilinogen 0.2 02/22/2020 05:19 PM    Nitrites NEGATIVE  02/22/2020 05:19 PM    Leukocyte Esterase LARGE (A) 02/22/2020 05:19 PM        No Known Allergies  Immunization History   Administered Date(s) Administered    (RETIRED) Pneumococcal Vaccine (Unspecified Type) 01/01/2006    Influenza High Dose Vaccine PF 09/14/2016, 10/25/2017, 12/13/2018    Influenza Vaccine 11/05/2013, 09/16/2014, 10/19/2015    Influenza Vaccine (Tri) Adjuvanted 10/14/2019    Influenza Vaccine PF 09/22/2015    Influenza Vaccine Whole 01/01/2011    Pneumococcal Conjugate (PCV-13) 07/14/2015    Pneumococcal Vaccine (Unspecified Type) 01/01/2006    TB Skin Test (PPD) Intradermal 07/31/2018, 08/21/2018    Tdap 11/04/2014       All Labs from Last 24 Hrs:  Recent Results (from the past 24 hour(s))   GLUCOSE, POC    Collection Time: 02/25/20 12:09 PM   Result Value Ref Range    Glucose (POC) 288 (H) 65 - 100 mg/dL   GLUCOSE, POC    Collection Time: 02/25/20  4:21 PM   Result Value Ref Range    Glucose (POC) 197 (H) 65 - 100 mg/dL   GLUCOSE, POC    Collection Time: 02/25/20  9:43 PM   Result Value Ref Range    Glucose (POC) 345 (H) 65 - 044 mg/dL   METABOLIC PANEL, BASIC    Collection Time: 02/26/20  5:44 AM   Result Value Ref Range Sodium 147 (H) 136 - 145 mmol/L    Potassium 3.2 (L) 3.5 - 5.1 mmol/L    Chloride 114 (H) 98 - 107 mmol/L    CO2 26 21 - 32 mmol/L    Anion gap 7 7 - 16 mmol/L    Glucose 93 65 - 100 mg/dL    BUN 17 8 - 23 MG/DL    Creatinine 0.92 0.6 - 1.0 MG/DL    GFR est AA >60 >60 ml/min/1.73m2    GFR est non-AA >60 >60 ml/min/1.73m2    Calcium 8.8 8.3 - 10.4 MG/DL   CBC WITH AUTOMATED DIFF    Collection Time: 02/26/20  5:44 AM   Result Value Ref Range    WBC 6.2 4.3 - 11.1 K/uL    RBC 2.43 (L) 4.05 - 5.2 M/uL    HGB 7.4 (L) 11.7 - 15.4 g/dL    HCT 23.3 (L) 35.8 - 46.3 %    MCV 95.9 79.6 - 97.8 FL    MCH 30.5 26.1 - 32.9 PG    MCHC 31.8 31.4 - 35.0 g/dL    RDW 14.2 11.9 - 14.6 %    PLATELET 493 (L) 476 - 450 K/uL    MPV 10.5 9.4 - 12.3 FL    ABSOLUTE NRBC 0.00 0.0 - 0.2 K/uL    DF AUTOMATED      NEUTROPHILS 59 43 - 78 %    LYMPHOCYTES 25 13 - 44 %    MONOCYTES 13 (H) 4.0 - 12.0 %    EOSINOPHILS 2 0.5 - 7.8 %    BASOPHILS 0 0.0 - 2.0 %    IMMATURE GRANULOCYTES 0 0.0 - 5.0 %    ABS. NEUTROPHILS 3.6 1.7 - 8.2 K/UL    ABS. LYMPHOCYTES 1.6 0.5 - 4.6 K/UL    ABS. MONOCYTES 0.8 0.1 - 1.3 K/UL    ABS. EOSINOPHILS 0.1 0.0 - 0.8 K/UL    ABS. BASOPHILS 0.0 0.0 - 0.2 K/UL    ABS. IMM.  GRANS. 0.0 0.0 - 0.5 K/UL   GLUCOSE, POC    Collection Time: 02/26/20  8:22 AM   Result Value Ref Range    Glucose (POC) 122 (H) 65 - 100 mg/dL       Discharge Exam:  Patient Vitals for the past 24 hrs:   Temp Pulse Resp BP SpO2   02/26/20 0812 98.3 °F (36.8 °C) 74 18 155/66 98 %   02/26/20 0747     98 %   02/26/20 0317 98.2 °F (36.8 °C) 66 18 133/64 97 %   02/25/20 2330 98.4 °F (36.9 °C) 75 18 146/63 98 %   02/25/20 2123  76  136/85    02/25/20 1940     93 %   02/25/20 1901 99 °F (37.2 °C) 84 18 142/82 92 %   02/25/20 1513 98.3 °F (36.8 °C) 70 18 133/48 95 %   02/25/20 1106 98.3 °F (36.8 °C) 92 18 133/58 93 %     Oxygen Therapy  O2 Sat (%): 98 % (02/26/20 0812)  Pulse via Oximetry: 67 beats per minute (02/26/20 0747)  O2 Device: Room air (02/26/20 1993)    Intake/Output Summary (Last 24 hours) at 2/26/2020 0851  Last data filed at 2/25/2020 1805  Gross per 24 hour   Intake 725 ml   Output 450 ml   Net 275 ml       General:    Well nourished. Alert. No distress. Eyes:   Normal sclera. Extraocular movements intact. ENT:  Normocephalic, atraumatic. Moist mucous membranes  CV:   Regular rate and rhythm. No murmur, rub, or gallop. Lungs:  Clear to auscultation bilaterally. No wheezing, rhonchi, or rales. Abdomen: Soft, nontender, nondistended. Bowel sounds normal.   Extremities: Warm and dry. No cyanosis or edema. Neurologic: CN II-XII grossly intact. Sensation intact. Skin:     No rashes or jaundice. Psych:  Normal mood and affect. Discharge Info:   Current Discharge Medication List      START taking these medications    Details   cefpodoxime (VANTIN) 200 mg tablet Take 1 Tab by mouth two (2) times a day for 2 days. Qty: 4 Tab, Refills: 0      Saccharomyces boulardii (FLORASTOR) 250 mg capsule Take 1 Cap by mouth two (2) times a day for 7 days. Qty: 14 Cap, Refills: 0         CONTINUE these medications which have NOT CHANGED    Details   acetaminophen (TYLENOL ARTHRITIS PAIN) 650 mg TbER Take 650 mg by mouth every eight (8) hours. Tylenol arthritis as needed      hydrALAZINE (APRESOLINE) 50 mg tablet TAKE 2 TABLETS BY MOUTH THREE TIMES DAILY  Qty: 540 Tab, Refills: 5    Comments: **Patient requests 90 days supply**      pantoprazole (PROTONIX) 20 mg tablet TAKE 1 TABLET BY MOUTH DAILY  Qty: 90 Tab, Refills: 3    Comments: **Patient requests 90 days supply**      potassium chloride (K-DUR, KLOR-CON) 20 mEq tablet TAKE 1 TABLET BY MOUTH EVERY MORNING  Qty: 90 Tab, Refills: 3      sertraline (ZOLOFT) 100 mg tablet Take 0.5 Tabs by mouth daily.  Indications: major depressive disorder, am  Qty: 90 Tab, Refills: 3      benazepriL (LOTENSIN) 40 mg tablet TAKE 1 TABLET BY MOUTH DAILY  Qty: 90 Tab, Refills: 3      gabapentin (NEURONTIN) 100 mg capsule TAKE 1 CAPSULE BY MOUTH TWICE DAILY  Qty: 180 Cap, Refills: 3      glipiZIDE SR (GLUCOTROL XL) 2.5 mg CR tablet TAKE 1 TABLET BY MOUTH EVERY DAY AFTER BREAKFAST  Qty: 90 Tab, Refills: 3      furosemide (LASIX) 20 mg tablet Take 1 Tab by mouth daily. Qty: 90 Tab, Refills: 3      fluticasone propion-salmeteroL (ADVAIR DISKUS) 250-50 mcg/dose diskus inhaler Take 1 Puff by inhalation every twelve (12) hours. Qty: 3 Inhaler, Refills: 3      amLODIPine (NORVASC) 10 mg tablet Take 1 Tab by mouth daily. Qty: 90 Tab, Refills: 3      pravastatin (PRAVACHOL) 40 mg tablet TAKE 1 TABLET BY MOUTH EVERY NIGHT AT BEDTIME  Qty: 90 Tab, Refills: 3      LORazepam (ATIVAN) 0.5 mg tablet Take 1 Tab by mouth daily as needed for Anxiety. Qty: 30 Tab, Refills: 2    Associated Diagnoses: Anxiety      glucose blood VI test strips (TRUE METRIX GLUCOSE TEST STRIP) strip TEST ONCE DAILY dx code E11.29  Qty: 100 Strip, Refills: 12      fluticasone propionate (FLONASE) 50 mcg/actuation nasal spray INSTILL 1 SPRAY IN EACH NOSTRIL NIGHTLY AS DIRECTED  Qty: 1 Bottle, Refills: 3      cyanocobalamin, vitamin B-12, 1,000 mcg/mL kit 1,000 mcg by Injection route every month. Qty: 1 Kit, Refills: 12      metFORMIN (GLUCOPHAGE) 500 mg tablet Take 1 Tab by mouth two (2) times daily (with meals). Qty: 180 Tab, Refills: 3      Blood-Glucose Meter monitoring kit Pt uses true metrix meter and tests once daily dx code E11.29  Qty: 1 Kit, Refills: 0      polyethylene glycol (MIRALAX) 17 gram/dose powder Take 17 g by mouth daily. 1 tablespoon with 8 oz of water daily  Qty: 119 g, Refills: 0      FOLIC ACID/MULTIVIT-MIN/LUTEIN (CENTRUM SILVER PO) Take  by mouth. VIT A/VIT C/VIT E/ZINC/COPPER (PRESERVISION AREDS PO) Take 1 Tab by mouth two (2) times a day.       Lancets Misc Pt tests three times daily Diagnosis Code: 250.00  Qty: 1 Package, Refills: 11      VENTOLIN HFA 90 mcg/actuation inhaler INHALE 2 PUFFS BY MOUTH EVERY 6 HOURS AS NEEDED FOR WHEEZING  Qty: 1 Inhaler, Refills: 12      OTHER One shower chair  Qty: 1 Each, Refills: 0               Disposition: home with home hs   Activity: as tolerated  Diet: DIET GI SOFT cardiac; Diabetic advance as tolerated; nothing red    Follow-up Appointments   Procedures    FOLLOW UP VISIT Appointment in: One Week     Standing Status:   Standing     Number of Occurrences:   1     Order Specific Question:   Appointment in     Answer: One Week         Follow-up Information     Follow up With Specialties Details Why Contact Info    Karina Roman MD Family Practice In 1 week  [address]  [phone]      [facility]   as scheduled by them  [address] [facility] [address]  [phone]          Time spent in patient discharge planning and coordination 35 minutes.     Signed:  Javid Anderson MD

## 2020-02-26 NOTE — PROGRESS NOTES
END OF SHIFT NOTE:    INTAKE/OUTPUT  02/25 0701 - 02/26 0700  In: 725 [P.O.:420; I.V.:305]  Out: 450 [Urine:450]  Voiding: YES  Catheter: NO  Drain:              Flatus: Patient does not have flatus present. Stool:  0 occurrences. Characteristics:  Stool Assessment  Stool Color: Maroon  Stool Appearance: Bloody  Stool Amount: Medium  Stool Source/Status: Rectum    Emesis: 0 occurrences. Characteristics:        VITAL SIGNS  Patient Vitals for the past 12 hrs:   Temp Pulse Resp BP SpO2   02/26/20 0317 98.2 °F (36.8 °C) 66 18 133/64 97 %   02/25/20 2330 98.4 °F (36.9 °C) 75 18 146/63 98 %   02/25/20 2123  76  136/85    02/25/20 1940     93 %       Pain Assessment  Pain Intensity 1: 0 (02/26/20 0330)  Pain Location 1: Back, Leg  Pain Intervention(s) 1: Rest  Patient Stated Pain Goal: 0    Ambulating  Yes    Shift report given to oncoming nurse at the bedside.     Benson De La Paz, RN

## 2020-02-26 NOTE — PROGRESS NOTES
Dispo update:  Spoke to Ms. Lex Mcmahon in room 210 again. She now agrees to [facility] home health OT, PT, and RN. Order and referral placed into Epic/CC. No other discharge needs identified. Care Management Interventions  PCP Verified by CM:  Yes  Transition of Care Consult (CM Consult): [address]: Yes  Current Support Network: Own Home, Family Lives Nearby  Confirm Follow Up Transport: Family  The Plan for Transition of Care is Related to the Following Treatment Goals : home health OT, PT, and RN   The Patient and/or Patient Representative was Provided with a Choice of Provider and Agrees with the Discharge Plan?: Yes  Name of the Patient Representative Who was Provided with a Choice of Provider and Agrees with the Discharge Plan: patient   Freedom of Choice List was Provided with Basic Dialogue that Supports the Patient's Individualized Plan of Care/Goals, Treatment Preferences and Shares the Quality Data Associated with the Providers?: Yes

## 2020-02-27 ENCOUNTER — PATIENT OUTREACH (OUTPATIENT)
Dept: CASE MANAGEMENT | Age: 85
End: 2020-02-27

## 2020-02-27 NOTE — PROGRESS NOTES
Pt dc'd home yesterday with order for [facility], PT/OT. Pt contacted pt today. Feeling weak. Having trouble getting to the phone on time.  intake was unable to reach pt this a.m.   TYLER CUEVAS contacted  intake. Requested they try to call pt now as she is by the phone. Outreach with LIZBETH Wray c'worker to inform of dc and home health involvement. Outreach next week. Dr. Andressa Dennis updated. Goals      Pt will be safe and well  maintained in home setting      TYLER CUEVAS will monitor pt in home; collaborate with APS/HH, as indicated - re gene 3/30/20               This note will not be viewable in [employer].

## 2020-02-28 ENCOUNTER — HOME CARE VISIT (OUTPATIENT)
Dept: HOME HEALTH SERVICES | Facility: HOME HEALTH | Age: 85
End: 2020-02-28

## 2020-02-28 ENCOUNTER — PATIENT OUTREACH (OUTPATIENT)
Dept: CASE MANAGEMENT | Age: 85
End: 2020-02-28

## 2020-03-06 ENCOUNTER — PATIENT OUTREACH (OUTPATIENT)
Dept: CASE MANAGEMENT | Age: 85
End: 2020-03-06

## 2020-03-06 NOTE — PROGRESS NOTES
TYLER CUEVAS attempted outreach with pt. Voice mail is full. Unable to lv a message. TYLER CUEVAS contacted Jose Armando Noel, pt's APS . Left message for call back. This note will not be viewable in [address]

## 2020-03-11 ENCOUNTER — PATIENT OUTREACH (OUTPATIENT)
Dept: CASE MANAGEMENT | Age: 85
End: 2020-03-11

## 2020-03-11 NOTE — PROGRESS NOTES
TYLER CUEVAS outreach with pt. Managing satisfactorily at home. Said she declined HH because it makes her \"nervous\" the night before they come and she \"can't sleep. \"   Pt has decided she wants to remain in her home. Does not want to go to a facility. Ko hasn't moved out yet. TYLER CUEVAS called Martinez Mazariegos with APS. She will home visit pt tomorrow, check on status and follow up with TYLER CUEVAS afterwards. Goals      Pt will be safe and well  maintained in home setting      TYLER CUEVAS will monitor pt in home; collaborate with APS, as indicated - re eval 3/30/20           PLAN  Await call from Mayito Benavides worker      This note will not be viewable in [address]

## 2020-03-26 ENCOUNTER — PATIENT OUTREACH (OUTPATIENT)
Dept: CASE MANAGEMENT | Age: 85
End: 2020-03-26

## 2020-03-26 NOTE — PROGRESS NOTES
[facility] outreach with pt and follow up call with Bang Bettencourt, APS . Pt says she's managing at home but is having more pain in legs. Declines offer of HH. Has told this writer and the APS  that she wants to remain in her home. Does not want to go to a facility. Pt is decisional.  Ko hasn't moved out yet. Ms. Yenny To will be closing her case in 1-2 weeks. She has offered numerous times to assist pt with placement but pt refuses. There is little else that [facility] can offer given pt's refusal of services. PLAN  SW CM will extend involvement for two weeks and then follow up with pt. Will close case if the situation remains unchanged. Dr. Connie Berrios updated. Goals      Pt will be safe and well  maintained in home setting      SW CM will monitor pt in home; collaborate with APS, as indicated - re araselial 4/15/20                       This note will not be viewable in [employer].

## 2020-04-22 ENCOUNTER — PATIENT OUTREACH (OUTPATIENT)
Dept: CASE MANAGEMENT | Age: 85
End: 2020-04-22

## 2020-04-22 NOTE — PROGRESS NOTES
TYLER CUEVAS outreach with pt . APS has closed case. Pt says she's managing satisfactorily at home. Ko still resides in the home;  looking for alternate housing. Pt again declines offer of [facility] [address] aides. \"I don't want strangers in my home. \"  Still does not want to go to an assisted living facility. Owns her home and is aware that she could sell and move in to an prison or SNF in the future if more care is required.       Case will be closed at this time. Nothing additional to offer. Dr. Ar Osuna updated. Pt has TYLER CUEVAS 's contact # should future needs arise. This note will not be viewable in [address]

## 2020-05-18 ENCOUNTER — HOSPITAL ENCOUNTER (OUTPATIENT)
Dept: LAB | Age: 85
Discharge: HOME | End: 2020-05-18
Payer: COMMERCIAL

## 2020-05-18 DIAGNOSIS — D64.9 ANEMIA, UNSPECIFIED TYPE: ICD-10-CM

## 2020-05-18 PROBLEM — D50.0 IRON DEFICIENCY ANEMIA DUE TO CHRONIC BLOOD LOSS: Status: ACTIVE | Noted: 2020-05-18

## 2020-05-18 LAB
ALBUMIN SERPL-MCNC: 3.5 G/DL (ref 3.2–4.6)
ALBUMIN/GLOB SERPL: 0.9 {RATIO} (ref 1.2–3.5)
ALP SERPL-CCNC: 63 U/L (ref 50–136)
ALT SERPL-CCNC: 22 U/L (ref 12–65)
ANION GAP SERPL CALC-SCNC: 6 MMOL/L (ref 7–16)
AST SERPL-CCNC: 13 U/L (ref 15–37)
BASOPHILS # BLD: 0 K/UL (ref 0–0.2)
BASOPHILS NFR BLD: 0 % (ref 0–2)
BILIRUB SERPL-MCNC: 0.3 MG/DL (ref 0.2–1.1)
BUN SERPL-MCNC: 27 MG/DL (ref 8–23)
CALCIUM SERPL-MCNC: 10.1 MG/DL (ref 8.3–10.4)
CHLORIDE SERPL-SCNC: 111 MMOL/L (ref 98–107)
CO2 SERPL-SCNC: 24 MMOL/L (ref 21–32)
CREAT SERPL-MCNC: 1.2 MG/DL (ref 0.6–1)
DIFFERENTIAL METHOD BLD: ABNORMAL
EOSINOPHIL # BLD: 0.1 K/UL (ref 0–0.8)
EOSINOPHIL NFR BLD: 1 % (ref 0.5–7.8)
ERYTHROCYTE [DISTWIDTH] IN BLOOD BY AUTOMATED COUNT: 13.9 % (ref 11.9–14.6)
FERRITIN SERPL-MCNC: 17 NG/ML (ref 8–388)
FOLATE SERPL-MCNC: 60.2 NG/ML (ref 3.1–17.5)
GLOBULIN SER CALC-MCNC: 3.8 G/DL (ref 2.3–3.5)
GLUCOSE SERPL-MCNC: 156 MG/DL (ref 65–100)
HCT VFR BLD AUTO: 29.5 % (ref 35.8–46.3)
HGB BLD-MCNC: 8.9 G/DL (ref 11.7–15.4)
HGB RETIC QN AUTO: 33 PG (ref 29–35)
IMM GRANULOCYTES # BLD AUTO: 0 K/UL (ref 0–0.5)
IMM GRANULOCYTES NFR BLD AUTO: 0 % (ref 0–5)
IMM RETICS NFR: 17.2 % (ref 3–15.9)
IRON SATN MFR SERPL: 17 %
IRON SERPL-MCNC: 57 UG/DL (ref 35–150)
LYMPHOCYTES # BLD: 1.7 K/UL (ref 0.5–4.6)
LYMPHOCYTES NFR BLD: 26 % (ref 13–44)
MCH RBC QN AUTO: 29 PG (ref 26.1–32.9)
MCHC RBC AUTO-ENTMCNC: 30.2 G/DL (ref 31.4–35)
MCV RBC AUTO: 96.1 FL (ref 79.6–97.8)
MONOCYTES # BLD: 0.5 K/UL (ref 0.1–1.3)
MONOCYTES NFR BLD: 8 % (ref 4–12)
NEUTS SEG # BLD: 4.1 K/UL (ref 1.7–8.2)
NEUTS SEG NFR BLD: 64 % (ref 43–78)
NRBC # BLD: 0 K/UL (ref 0–0.2)
PLATELET # BLD AUTO: 191 K/UL (ref 150–450)
PMV BLD AUTO: 10.7 FL (ref 9.4–12.3)
POTASSIUM SERPL-SCNC: 3.7 MMOL/L (ref 3.5–5.1)
PROT SERPL-MCNC: 7.3 G/DL (ref 6.3–8.2)
RBC # BLD AUTO: 3.07 M/UL (ref 4.05–5.25)
RETICS # AUTO: 0.08 M/UL (ref 0.03–0.1)
RETICS/RBC NFR AUTO: 2.5 % (ref 0.3–2)
SODIUM SERPL-SCNC: 141 MMOL/L (ref 136–145)
TIBC SERPL-MCNC: 333 UG/DL (ref 250–450)
VIT B12 SERPL-MCNC: 3558 PG/ML (ref 193–986)
WBC # BLD AUTO: 6.4 K/UL (ref 4.3–11.1)

## 2020-05-18 PROCEDURE — 82728 ASSAY OF FERRITIN: CPT

## 2020-05-18 PROCEDURE — 36415 COLL VENOUS BLD VENIPUNCTURE: CPT

## 2020-05-18 PROCEDURE — 82180 ASSAY OF ASCORBIC ACID: CPT

## 2020-05-18 PROCEDURE — 82607 VITAMIN B-12: CPT

## 2020-05-18 PROCEDURE — 83540 ASSAY OF IRON: CPT

## 2020-05-18 PROCEDURE — 82746 ASSAY OF FOLIC ACID SERUM: CPT

## 2020-05-18 PROCEDURE — 85046 RETICYTE/HGB CONCENTRATE: CPT

## 2020-05-18 PROCEDURE — 80053 COMPREHEN METABOLIC PANEL: CPT

## 2020-05-18 PROCEDURE — 82306 VITAMIN D 25 HYDROXY: CPT

## 2020-05-18 PROCEDURE — 85025 COMPLETE CBC W/AUTO DIFF WBC: CPT

## 2020-05-19 LAB — 25(OH)D3+25(OH)D2 SERPL-MCNC: 39.3 NG/ML (ref 30–100)

## 2020-05-20 LAB — VIT C SERPL-MCNC: 1.4 MG/DL (ref 0.4–2)

## 2020-05-22 RX ORDER — DIPHENHYDRAMINE HYDROCHLORIDE 50 MG/ML
50 INJECTION, SOLUTION INTRAMUSCULAR; INTRAVENOUS AS NEEDED
Status: CANCELLED
Start: 2020-05-26

## 2020-05-22 RX ORDER — ACETAMINOPHEN 325 MG/1
650 TABLET ORAL AS NEEDED
Status: CANCELLED
Start: 2020-05-26

## 2020-05-22 RX ORDER — SODIUM CHLORIDE 9 MG/ML
10 INJECTION INTRAMUSCULAR; INTRAVENOUS; SUBCUTANEOUS AS NEEDED
Status: CANCELLED | OUTPATIENT
Start: 2020-05-26

## 2020-05-22 RX ORDER — EPINEPHRINE 1 MG/ML
0.3 INJECTION, SOLUTION, CONCENTRATE INTRAVENOUS AS NEEDED
Status: CANCELLED | OUTPATIENT
Start: 2020-05-26

## 2020-05-22 RX ORDER — DIPHENHYDRAMINE HYDROCHLORIDE 50 MG/ML
25 INJECTION, SOLUTION INTRAMUSCULAR; INTRAVENOUS AS NEEDED
Status: CANCELLED
Start: 2020-05-26

## 2020-05-22 RX ORDER — ALBUTEROL SULFATE 0.83 MG/ML
2.5 SOLUTION RESPIRATORY (INHALATION) AS NEEDED
Status: CANCELLED
Start: 2020-05-26

## 2020-05-22 RX ORDER — ACETAMINOPHEN 325 MG/1
650 TABLET ORAL ONCE
Status: CANCELLED
Start: 2020-05-26

## 2020-05-22 RX ORDER — HYDROCORTISONE SODIUM SUCCINATE 100 MG/2ML
100 INJECTION, POWDER, FOR SOLUTION INTRAMUSCULAR; INTRAVENOUS AS NEEDED
Status: CANCELLED | OUTPATIENT
Start: 2020-05-26

## 2020-05-22 RX ORDER — SODIUM CHLORIDE 0.9 % (FLUSH) 0.9 %
10 SYRINGE (ML) INJECTION AS NEEDED
Status: CANCELLED
Start: 2020-05-26

## 2020-05-22 RX ORDER — HEPARIN 100 UNIT/ML
300-500 SYRINGE INTRAVENOUS AS NEEDED
Status: CANCELLED
Start: 2020-05-26

## 2020-05-22 RX ORDER — DIPHENHYDRAMINE HYDROCHLORIDE 50 MG/ML
50 INJECTION, SOLUTION INTRAMUSCULAR; INTRAVENOUS ONCE
Status: CANCELLED
Start: 2020-05-26

## 2020-05-22 RX ORDER — ONDANSETRON 2 MG/ML
8 INJECTION INTRAMUSCULAR; INTRAVENOUS AS NEEDED
Status: CANCELLED | OUTPATIENT
Start: 2020-05-26

## 2020-05-25 ENCOUNTER — HOSPITAL ENCOUNTER (OUTPATIENT)
Dept: INFUSION THERAPY | Age: 85
Discharge: HOME | End: 2020-05-25
Payer: COMMERCIAL

## 2020-05-26 ENCOUNTER — HOSPITAL ENCOUNTER (OUTPATIENT)
Dept: INFUSION THERAPY | Age: 85
Discharge: HOME | End: 2020-05-26
Payer: COMMERCIAL

## 2020-05-26 VITALS
RESPIRATION RATE: 18 BRPM | HEART RATE: 73 BPM | TEMPERATURE: 98.2 F | OXYGEN SATURATION: 96 % | DIASTOLIC BLOOD PRESSURE: 57 MMHG | SYSTOLIC BLOOD PRESSURE: 152 MMHG

## 2020-05-26 VITALS
SYSTOLIC BLOOD PRESSURE: 187 MMHG | DIASTOLIC BLOOD PRESSURE: 75 MMHG | RESPIRATION RATE: 18 BRPM | TEMPERATURE: 98.7 F | OXYGEN SATURATION: 98 % | HEART RATE: 86 BPM

## 2020-05-26 DIAGNOSIS — D50.0 IRON DEFICIENCY ANEMIA DUE TO CHRONIC BLOOD LOSS: Primary | ICD-10-CM

## 2020-05-26 PROCEDURE — 96366 THER/PROPH/DIAG IV INF ADDON: CPT

## 2020-05-26 PROCEDURE — 74011250636 HC RX REV CODE- 250/636: Performed by: NURSE PRACTITIONER

## 2020-05-26 PROCEDURE — 96375 TX/PRO/DX INJ NEW DRUG ADDON: CPT

## 2020-05-26 PROCEDURE — 74011000258 HC RX REV CODE- 258: Performed by: NURSE PRACTITIONER

## 2020-05-26 PROCEDURE — 96361 HYDRATE IV INFUSION ADD-ON: CPT

## 2020-05-26 PROCEDURE — 74011250637 HC RX REV CODE- 250/637: Performed by: NURSE PRACTITIONER

## 2020-05-26 PROCEDURE — 96365 THER/PROPH/DIAG IV INF INIT: CPT

## 2020-05-26 RX ORDER — ACETAMINOPHEN 325 MG/1
650 TABLET ORAL AS NEEDED
Status: ACTIVE | OUTPATIENT
Start: 2020-05-26 | End: 2020-05-26

## 2020-05-26 RX ORDER — ACETAMINOPHEN 325 MG/1
650 TABLET ORAL ONCE
Status: COMPLETED | OUTPATIENT
Start: 2020-05-26 | End: 2020-05-26

## 2020-05-26 RX ORDER — DIPHENHYDRAMINE HYDROCHLORIDE 50 MG/ML
50 INJECTION, SOLUTION INTRAMUSCULAR; INTRAVENOUS ONCE
Status: COMPLETED | OUTPATIENT
Start: 2020-05-26 | End: 2020-05-26

## 2020-05-26 RX ORDER — ONDANSETRON 2 MG/ML
8 INJECTION INTRAMUSCULAR; INTRAVENOUS AS NEEDED
Status: ACTIVE | OUTPATIENT
Start: 2020-05-26 | End: 2020-05-26

## 2020-05-26 RX ADMIN — SODIUM CHLORIDE 500 ML: 900 INJECTION, SOLUTION INTRAVENOUS at 17:27

## 2020-05-26 RX ADMIN — DIPHENHYDRAMINE HYDROCHLORIDE 50 MG: 50 INJECTION, SOLUTION INTRAMUSCULAR; INTRAVENOUS at 12:59

## 2020-05-26 RX ADMIN — SODIUM CHLORIDE 1000 MG: 900 INJECTION, SOLUTION INTRAVENOUS at 13:27

## 2020-05-26 RX ADMIN — ACETAMINOPHEN 650 MG: 325 TABLET, FILM COATED ORAL at 12:56

## 2020-05-26 RX ADMIN — SODIUM CHLORIDE 25 MG: 900 INJECTION, SOLUTION INTRAVENOUS at 11:30

## 2020-05-26 NOTE — PROGRESS NOTES
[facility] infusion. Patient discharged via w/c accompanied by staff Skye Ledbetter RN). Instructed to notify physician of any problems, questions or concerns after discharge. Next appointment is 09/18/2020 at [phone] with JOSH PELLETIER with labs immediately prior.

## 2020-05-26 NOTE — PROGRESS NOTES
Report taken from [address] [place] [address] for continuation of care. Patient resting in recliner. Voices no needs at present time.

## 2020-06-01 ENCOUNTER — APPOINTMENT (OUTPATIENT)
Dept: INFUSION THERAPY | Age: 85
End: 2020-06-01

## 2020-09-18 ENCOUNTER — HOSPITAL ENCOUNTER (OUTPATIENT)
Dept: LAB | Age: 85
Discharge: HOME | End: 2020-09-18
Payer: COMMERCIAL

## 2020-09-18 DIAGNOSIS — D64.9 ANEMIA, UNSPECIFIED TYPE: ICD-10-CM

## 2020-09-18 LAB
ALBUMIN SERPL-MCNC: 3.6 G/DL (ref 3.2–4.6)
ALBUMIN/GLOB SERPL: 1.1 {RATIO} (ref 1.2–3.5)
ALP SERPL-CCNC: 55 U/L (ref 50–136)
ALT SERPL-CCNC: 21 U/L (ref 12–65)
ANION GAP SERPL CALC-SCNC: 5 MMOL/L (ref 7–16)
AST SERPL-CCNC: 14 U/L (ref 15–37)
BASOPHILS # BLD: 0 K/UL (ref 0–0.2)
BASOPHILS NFR BLD: 0 % (ref 0–2)
BILIRUB SERPL-MCNC: 0.4 MG/DL (ref 0.2–1.1)
BUN SERPL-MCNC: 30 MG/DL (ref 8–23)
CALCIUM SERPL-MCNC: 10.3 MG/DL (ref 8.3–10.4)
CHLORIDE SERPL-SCNC: 108 MMOL/L (ref 98–107)
CO2 SERPL-SCNC: 27 MMOL/L (ref 21–32)
CREAT SERPL-MCNC: 1.3 MG/DL (ref 0.6–1)
DIFFERENTIAL METHOD BLD: ABNORMAL
EOSINOPHIL # BLD: 0 K/UL (ref 0–0.8)
EOSINOPHIL NFR BLD: 0 % (ref 0.5–7.8)
ERYTHROCYTE [DISTWIDTH] IN BLOOD BY AUTOMATED COUNT: 13.2 % (ref 11.9–14.6)
FERRITIN SERPL-MCNC: 245 NG/ML (ref 8–388)
GLOBULIN SER CALC-MCNC: 3.3 G/DL (ref 2.3–3.5)
GLUCOSE SERPL-MCNC: 162 MG/DL (ref 65–100)
HCT VFR BLD AUTO: 30.7 % (ref 35.8–46.3)
HGB BLD-MCNC: 9.4 G/DL (ref 11.7–15.4)
HGB RETIC QN AUTO: 34 PG (ref 29–35)
IMM GRANULOCYTES # BLD AUTO: 0 K/UL (ref 0–0.5)
IMM GRANULOCYTES NFR BLD AUTO: 0 % (ref 0–5)
IMM RETICS NFR: 15.2 % (ref 3–15.9)
IRON SATN MFR SERPL: 33 %
IRON SERPL-MCNC: 78 UG/DL (ref 35–150)
LDH SERPL L TO P-CCNC: 166 U/L (ref 110–210)
LYMPHOCYTES # BLD: 2.2 K/UL (ref 0.5–4.6)
LYMPHOCYTES NFR BLD: 35 % (ref 13–44)
MCH RBC QN AUTO: 30.9 PG (ref 26.1–32.9)
MCHC RBC AUTO-ENTMCNC: 30.6 G/DL (ref 31.4–35)
MCV RBC AUTO: 101 FL (ref 79.6–97.8)
MONOCYTES # BLD: 0.6 K/UL (ref 0.1–1.3)
MONOCYTES NFR BLD: 10 % (ref 4–12)
NEUTS SEG # BLD: 3.4 K/UL (ref 1.7–8.2)
NEUTS SEG NFR BLD: 54 % (ref 43–78)
NRBC # BLD: 0 K/UL (ref 0–0.2)
PLATELET # BLD AUTO: 198 K/UL (ref 150–450)
PMV BLD AUTO: 10.2 FL (ref 9.4–12.3)
POTASSIUM SERPL-SCNC: 3.8 MMOL/L (ref 3.5–5.1)
PROT SERPL-MCNC: 6.9 G/DL (ref 6.3–8.2)
RBC # BLD AUTO: 3.04 M/UL (ref 4.05–5.25)
RETICS # AUTO: 0.09 M/UL (ref 0.03–0.1)
RETICS/RBC NFR AUTO: 3.1 % (ref 0.3–2)
SODIUM SERPL-SCNC: 140 MMOL/L (ref 136–145)
TIBC SERPL-MCNC: 237 UG/DL (ref 250–450)
WBC # BLD AUTO: 6.3 K/UL (ref 4.3–11.1)

## 2020-09-18 PROCEDURE — 83615 LACTATE (LD) (LDH) ENZYME: CPT

## 2020-09-18 PROCEDURE — 82728 ASSAY OF FERRITIN: CPT

## 2020-09-18 PROCEDURE — 85025 COMPLETE CBC W/AUTO DIFF WBC: CPT

## 2020-09-18 PROCEDURE — 36415 COLL VENOUS BLD VENIPUNCTURE: CPT

## 2020-09-18 PROCEDURE — 85046 RETICYTE/HGB CONCENTRATE: CPT

## 2020-09-18 PROCEDURE — 83540 ASSAY OF IRON: CPT

## 2020-09-18 PROCEDURE — 80053 COMPREHEN METABOLIC PANEL: CPT

## 2020-09-24 ENCOUNTER — HOSPITAL ENCOUNTER (OUTPATIENT)
Dept: INFUSION THERAPY | Age: 85
Discharge: HOME | End: 2020-09-24
Payer: COMMERCIAL

## 2020-09-24 VITALS
TEMPERATURE: 98.1 F | HEART RATE: 73 BPM | DIASTOLIC BLOOD PRESSURE: 52 MMHG | OXYGEN SATURATION: 98 % | SYSTOLIC BLOOD PRESSURE: 145 MMHG | RESPIRATION RATE: 18 BRPM

## 2020-09-24 DIAGNOSIS — D50.0 IRON DEFICIENCY ANEMIA DUE TO CHRONIC BLOOD LOSS: Primary | ICD-10-CM

## 2020-09-24 LAB
HCT VFR BLD AUTO: 30.8 % (ref 35.8–46.3)
HGB BLD-MCNC: 9.5 G/DL (ref 11.7–15.4)

## 2020-09-24 PROCEDURE — 36415 COLL VENOUS BLD VENIPUNCTURE: CPT

## 2020-09-24 PROCEDURE — 74011250636 HC RX REV CODE- 250/636: Performed by: INTERNAL MEDICINE

## 2020-09-24 PROCEDURE — 96372 THER/PROPH/DIAG INJ SC/IM: CPT

## 2020-09-24 PROCEDURE — 85018 HEMOGLOBIN: CPT

## 2020-09-24 RX ORDER — CYANOCOBALAMIN 1000 UG/ML
1000 INJECTION, SOLUTION INTRAMUSCULAR; SUBCUTANEOUS ONCE
Status: COMPLETED | OUTPATIENT
Start: 2020-09-24 | End: 2020-09-24

## 2020-09-24 RX ADMIN — CYANOCOBALAMIN 1000 MCG: 1000 INJECTION, SOLUTION INTRAMUSCULAR at 17:30

## 2020-09-24 RX ADMIN — EPOETIN ALFA-EPBX 40000 UNITS: 40000 INJECTION, SOLUTION INTRAVENOUS; SUBCUTANEOUS at 17:32

## 2020-09-24 NOTE — PROGRESS NOTES
Arrived to the [facility]. Injections completed. Patient tolerated well. Any issues or concerns during appointment: none. Patient aware of next infusion appointment on 10/1/20 at 1030. Discharged in [employer].

## 2020-10-01 ENCOUNTER — HOSPITAL ENCOUNTER (OUTPATIENT)
Dept: LAB | Age: 85
Discharge: HOME | End: 2020-10-01

## 2020-10-01 ENCOUNTER — HOSPITAL ENCOUNTER (OUTPATIENT)
Dept: INFUSION THERAPY | Age: 85
Discharge: HOME | End: 2020-10-01
Payer: COMMERCIAL

## 2020-10-01 VITALS
HEART RATE: 71 BPM | SYSTOLIC BLOOD PRESSURE: 134 MMHG | DIASTOLIC BLOOD PRESSURE: 55 MMHG | TEMPERATURE: 98 F | RESPIRATION RATE: 18 BRPM | OXYGEN SATURATION: 97 %

## 2020-10-01 DIAGNOSIS — D50.0 IRON DEFICIENCY ANEMIA DUE TO CHRONIC BLOOD LOSS: Primary | ICD-10-CM

## 2020-10-01 LAB — HGB BLD-MCNC: 10 G/DL (ref 11.7–15.4)

## 2020-10-01 PROCEDURE — 85018 HEMOGLOBIN: CPT

## 2020-10-01 PROCEDURE — 36415 COLL VENOUS BLD VENIPUNCTURE: CPT

## 2020-10-01 PROCEDURE — 96372 THER/PROPH/DIAG INJ SC/IM: CPT

## 2020-10-01 PROCEDURE — 74011250636 HC RX REV CODE- 250/636: Performed by: INTERNAL MEDICINE

## 2020-10-01 RX ADMIN — EPOETIN ALFA-EPBX 40000 UNITS: 40000 INJECTION, SOLUTION INTRAVENOUS; SUBCUTANEOUS at 11:40

## 2020-10-01 NOTE — PROGRESS NOTES
Arrived to the [facility]. Hemoglobin=10. Retacrit completed    Provided education on Retacrit -patient has previously received this medication  Patient instructed to report any side affects to ordering provider-  Patient tolerated well  Any issues or concerns during appointment:No  Patient aware of next infusion appointment on Thursday,October 8th @ 1030  Discharged via wheelchair

## 2020-10-08 ENCOUNTER — APPOINTMENT (OUTPATIENT)
Dept: INFUSION THERAPY | Age: 85
End: 2020-10-08

## 2020-10-08 ENCOUNTER — HOSPITAL ENCOUNTER (OUTPATIENT)
Dept: LAB | Age: 85
Discharge: HOME | End: 2020-10-08
Payer: COMMERCIAL

## 2020-10-08 ENCOUNTER — HOSPITAL ENCOUNTER (OUTPATIENT)
Dept: INFUSION THERAPY | Age: 85
Discharge: HOME | End: 2020-10-08
Payer: COMMERCIAL

## 2020-10-08 VITALS
HEART RATE: 66 BPM | SYSTOLIC BLOOD PRESSURE: 139 MMHG | OXYGEN SATURATION: 99 % | DIASTOLIC BLOOD PRESSURE: 60 MMHG | TEMPERATURE: 98.2 F | RESPIRATION RATE: 16 BRPM

## 2020-10-08 DIAGNOSIS — D50.0 IRON DEFICIENCY ANEMIA DUE TO CHRONIC BLOOD LOSS: Primary | ICD-10-CM

## 2020-10-08 LAB — HGB BLD-MCNC: 10.8 G/DL (ref 11.7–15.4)

## 2020-10-08 PROCEDURE — 36415 COLL VENOUS BLD VENIPUNCTURE: CPT

## 2020-10-08 PROCEDURE — 85018 HEMOGLOBIN: CPT

## 2020-10-08 PROCEDURE — 96372 THER/PROPH/DIAG INJ SC/IM: CPT

## 2020-10-08 PROCEDURE — 74011250636 HC RX REV CODE- 250/636: Performed by: INTERNAL MEDICINE

## 2020-10-08 RX ADMIN — EPOETIN ALFA-EPBX 40000 UNITS: 40000 INJECTION, SOLUTION INTRAVENOUS; SUBCUTANEOUS at 16:29

## 2020-10-08 NOTE — PROGRESS NOTES
Pt arrived via w/c to [facility]. Retacrit SQ. Pt aware of net appt on 1015/2020 1430. Discharged via w/c.

## 2020-10-15 ENCOUNTER — HOSPITAL ENCOUNTER (OUTPATIENT)
Dept: LAB | Age: 85
Discharge: HOME | End: 2020-10-15

## 2020-10-15 ENCOUNTER — APPOINTMENT (OUTPATIENT)
Dept: INFUSION THERAPY | Age: 85
End: 2020-10-15

## 2020-10-15 ENCOUNTER — HOSPITAL ENCOUNTER (OUTPATIENT)
Dept: INFUSION THERAPY | Age: 85
Discharge: HOME | End: 2020-10-15
Payer: COMMERCIAL

## 2020-10-15 VITALS
RESPIRATION RATE: 16 BRPM | TEMPERATURE: 97.1 F | SYSTOLIC BLOOD PRESSURE: 161 MMHG | OXYGEN SATURATION: 98 % | HEART RATE: 68 BPM | DIASTOLIC BLOOD PRESSURE: 76 MMHG

## 2020-10-15 LAB — HGB BLD-MCNC: 11.5 G/DL (ref 11.7–15.4)

## 2020-10-15 PROCEDURE — 85018 HEMOGLOBIN: CPT

## 2020-10-15 PROCEDURE — 36415 COLL VENOUS BLD VENIPUNCTURE: CPT

## 2020-10-23 ENCOUNTER — HOSPITAL ENCOUNTER (OUTPATIENT)
Dept: INFUSION THERAPY | Age: 85
Discharge: HOME | End: 2020-10-23
Payer: COMMERCIAL

## 2020-10-23 ENCOUNTER — HOSPITAL ENCOUNTER (OUTPATIENT)
Dept: LAB | Age: 85
Discharge: HOME | End: 2020-10-23
Payer: COMMERCIAL

## 2020-10-23 DIAGNOSIS — D50.0 IRON DEFICIENCY ANEMIA DUE TO CHRONIC BLOOD LOSS: Primary | ICD-10-CM

## 2020-10-23 DIAGNOSIS — D64.9 ANEMIA, UNSPECIFIED TYPE: ICD-10-CM

## 2020-10-23 LAB
ALBUMIN SERPL-MCNC: 3.8 G/DL (ref 3.2–4.6)
ALBUMIN/GLOB SERPL: 1.2 {RATIO} (ref 1.2–3.5)
ALP SERPL-CCNC: 58 U/L (ref 50–136)
ALT SERPL-CCNC: 18 U/L (ref 12–65)
ANION GAP SERPL CALC-SCNC: 5 MMOL/L (ref 7–16)
AST SERPL-CCNC: 11 U/L (ref 15–37)
BASOPHILS # BLD: 0 K/UL (ref 0–0.2)
BASOPHILS NFR BLD: 0 % (ref 0–2)
BILIRUB SERPL-MCNC: 0.5 MG/DL (ref 0.2–1.1)
BUN SERPL-MCNC: 38 MG/DL (ref 8–23)
CALCIUM SERPL-MCNC: 10.4 MG/DL (ref 8.3–10.4)
CHLORIDE SERPL-SCNC: 108 MMOL/L (ref 98–107)
CO2 SERPL-SCNC: 27 MMOL/L (ref 21–32)
CREAT SERPL-MCNC: 1.3 MG/DL (ref 0.6–1)
DIFFERENTIAL METHOD BLD: ABNORMAL
EOSINOPHIL # BLD: 0 K/UL (ref 0–0.8)
EOSINOPHIL NFR BLD: 0 % (ref 0.5–7.8)
ERYTHROCYTE [DISTWIDTH] IN BLOOD BY AUTOMATED COUNT: 14.2 % (ref 11.9–14.6)
FERRITIN SERPL-MCNC: 92 NG/ML (ref 8–388)
GLOBULIN SER CALC-MCNC: 3.1 G/DL (ref 2.3–3.5)
GLUCOSE SERPL-MCNC: 99 MG/DL (ref 65–100)
HCT VFR BLD AUTO: 38.9 % (ref 35.8–46.3)
HGB BLD-MCNC: 11.8 G/DL (ref 11.7–15.4)
HGB RETIC QN AUTO: 33 PG (ref 29–35)
IMM GRANULOCYTES # BLD AUTO: 0 K/UL (ref 0–0.5)
IMM GRANULOCYTES NFR BLD AUTO: 0 % (ref 0–5)
IMM RETICS NFR: 6.9 % (ref 3–15.9)
IRON SATN MFR SERPL: 32 %
IRON SERPL-MCNC: 100 UG/DL (ref 35–150)
LYMPHOCYTES # BLD: 2.2 K/UL (ref 0.5–4.6)
LYMPHOCYTES NFR BLD: 37 % (ref 13–44)
MCH RBC QN AUTO: 31 PG (ref 26.1–32.9)
MCHC RBC AUTO-ENTMCNC: 30.3 G/DL (ref 31.4–35)
MCV RBC AUTO: 102.1 FL (ref 79.6–97.8)
MONOCYTES # BLD: 0.4 K/UL (ref 0.1–1.3)
MONOCYTES NFR BLD: 7 % (ref 4–12)
NEUTS SEG # BLD: 3.2 K/UL (ref 1.7–8.2)
NEUTS SEG NFR BLD: 55 % (ref 43–78)
NRBC # BLD: 0 K/UL (ref 0–0.2)
PLATELET # BLD AUTO: 171 K/UL (ref 150–450)
PMV BLD AUTO: 10.4 FL (ref 9.4–12.3)
POTASSIUM SERPL-SCNC: 4 MMOL/L (ref 3.5–5.1)
PROT SERPL-MCNC: 6.9 G/DL (ref 6.3–8.2)
RBC # BLD AUTO: 3.81 M/UL (ref 4.05–5.25)
RETICS # AUTO: 0.08 M/UL (ref 0.03–0.1)
RETICS/RBC NFR AUTO: 2.2 % (ref 0.3–2)
SODIUM SERPL-SCNC: 140 MMOL/L (ref 136–145)
TIBC SERPL-MCNC: 310 UG/DL (ref 250–450)
WBC # BLD AUTO: 5.8 K/UL (ref 4.3–11.1)

## 2020-10-23 PROCEDURE — 36415 COLL VENOUS BLD VENIPUNCTURE: CPT

## 2020-10-23 PROCEDURE — 80053 COMPREHEN METABOLIC PANEL: CPT

## 2020-10-23 PROCEDURE — 85025 COMPLETE CBC W/AUTO DIFF WBC: CPT

## 2020-10-23 PROCEDURE — 96372 THER/PROPH/DIAG INJ SC/IM: CPT

## 2020-10-23 PROCEDURE — 74011250636 HC RX REV CODE- 250/636: Performed by: INTERNAL MEDICINE

## 2020-10-23 PROCEDURE — 83540 ASSAY OF IRON: CPT

## 2020-10-23 PROCEDURE — 85046 RETICYTE/HGB CONCENTRATE: CPT

## 2020-10-23 PROCEDURE — 82728 ASSAY OF FERRITIN: CPT

## 2020-10-23 RX ORDER — CYANOCOBALAMIN 1000 UG/ML
1000 INJECTION, SOLUTION INTRAMUSCULAR; SUBCUTANEOUS ONCE
Status: COMPLETED | OUTPATIENT
Start: 2020-10-23 | End: 2020-10-23

## 2020-10-23 RX ADMIN — CYANOCOBALAMIN 1000 MCG: 1000 INJECTION, SOLUTION INTRAMUSCULAR at 15:12

## 2020-10-23 NOTE — PROGRESS NOTES
Arrived to the [facility]. Assessment and B12 injection completed. Patient tolerated well. Any issues or concerns during appointment: No.  Patient aware of next infusion appointment on 11/20/20 at 430pm.  Discharged via w/c.

## 2020-11-20 ENCOUNTER — HOSPITAL ENCOUNTER (OUTPATIENT)
Dept: INFUSION THERAPY | Age: 85
Discharge: HOME | End: 2020-11-20
Payer: COMMERCIAL

## 2020-11-20 ENCOUNTER — HOSPITAL ENCOUNTER (OUTPATIENT)
Dept: LAB | Age: 85
Discharge: HOME | End: 2020-11-20
Payer: COMMERCIAL

## 2020-11-20 DIAGNOSIS — N18.31 ANEMIA DUE TO STAGE 3A CHRONIC KIDNEY DISEASE (HCC): ICD-10-CM

## 2020-11-20 DIAGNOSIS — D50.0 IRON DEFICIENCY ANEMIA DUE TO CHRONIC BLOOD LOSS: Primary | ICD-10-CM

## 2020-11-20 DIAGNOSIS — D63.1 ANEMIA DUE TO STAGE 3A CHRONIC KIDNEY DISEASE (HCC): ICD-10-CM

## 2020-11-20 LAB
ALBUMIN SERPL-MCNC: 3.2 G/DL (ref 3.2–4.6)
ALBUMIN/GLOB SERPL: 1.1 {RATIO} (ref 1.2–3.5)
ALP SERPL-CCNC: 55 U/L (ref 50–136)
ALT SERPL-CCNC: 24 U/L (ref 12–65)
ANION GAP SERPL CALC-SCNC: 6 MMOL/L (ref 7–16)
AST SERPL-CCNC: 14 U/L (ref 15–37)
BASOPHILS # BLD: 0 K/UL (ref 0–0.2)
BASOPHILS NFR BLD: 0 % (ref 0–2)
BILIRUB SERPL-MCNC: 0.3 MG/DL (ref 0.2–1.1)
BUN SERPL-MCNC: 26 MG/DL (ref 8–23)
CALCIUM SERPL-MCNC: 10 MG/DL (ref 8.3–10.4)
CHLORIDE SERPL-SCNC: 109 MMOL/L (ref 98–107)
CO2 SERPL-SCNC: 25 MMOL/L (ref 21–32)
CREAT SERPL-MCNC: 1.1 MG/DL (ref 0.6–1)
DIFFERENTIAL METHOD BLD: ABNORMAL
EOSINOPHIL # BLD: 0 K/UL (ref 0–0.8)
EOSINOPHIL NFR BLD: 0 % (ref 0.5–7.8)
ERYTHROCYTE [DISTWIDTH] IN BLOOD BY AUTOMATED COUNT: 13.5 % (ref 11.9–14.6)
FERRITIN SERPL-MCNC: 186 NG/ML (ref 8–388)
FERRITIN SERPL-MCNC: 188 NG/ML (ref 8–388)
GLOBULIN SER CALC-MCNC: 3 G/DL (ref 2.3–3.5)
GLUCOSE SERPL-MCNC: 238 MG/DL (ref 65–100)
HCT VFR BLD AUTO: 30.5 % (ref 35.8–46.3)
HGB BLD-MCNC: 9.6 G/DL (ref 11.7–15.4)
IMM GRANULOCYTES # BLD AUTO: 0 K/UL (ref 0–0.5)
IMM GRANULOCYTES NFR BLD AUTO: 0 % (ref 0–5)
IMM RETICS NFR: 9.4 % (ref 3–15.9)
IRON SATN MFR SERPL: 43 %
IRON SATN MFR SERPL: 44 %
IRON SERPL-MCNC: 97 UG/DL (ref 35–150)
IRON SERPL-MCNC: 99 UG/DL (ref 35–150)
LYMPHOCYTES # BLD: 1.7 K/UL (ref 0.5–4.6)
LYMPHOCYTES NFR BLD: 26 % (ref 13–44)
MCH RBC QN AUTO: 31.1 PG (ref 26.1–32.9)
MCHC RBC AUTO-ENTMCNC: 31.5 G/DL (ref 31.4–35)
MCV RBC AUTO: 98.7 FL (ref 79.6–97.8)
MONOCYTES # BLD: 0.5 K/UL (ref 0.1–1.3)
MONOCYTES NFR BLD: 8 % (ref 4–12)
NEUTS SEG # BLD: 4.4 K/UL (ref 1.7–8.2)
NEUTS SEG NFR BLD: 66 % (ref 43–78)
NRBC # BLD: 0 K/UL (ref 0–0.2)
PLATELET # BLD AUTO: 158 K/UL (ref 150–450)
PMV BLD AUTO: 10.2 FL (ref 9.4–12.3)
POTASSIUM SERPL-SCNC: 4.3 MMOL/L (ref 3.5–5.1)
PROT SERPL-MCNC: 6.2 G/DL (ref 6.3–8.2)
RBC # BLD AUTO: 3.09 M/UL (ref 4.05–5.25)
RETICS # AUTO: 0.05 M/UL (ref 0.03–0.1)
RETICS/RBC NFR AUTO: 1.6 % (ref 0.3–2)
SODIUM SERPL-SCNC: 140 MMOL/L (ref 136–145)
TIBC SERPL-MCNC: 222 UG/DL (ref 250–450)
TIBC SERPL-MCNC: 231 UG/DL (ref 250–450)
WBC # BLD AUTO: 6.6 K/UL (ref 4.3–11.1)

## 2020-11-20 PROCEDURE — 96372 THER/PROPH/DIAG INJ SC/IM: CPT

## 2020-11-20 PROCEDURE — 83540 ASSAY OF IRON: CPT

## 2020-11-20 PROCEDURE — 82728 ASSAY OF FERRITIN: CPT

## 2020-11-20 PROCEDURE — 85025 COMPLETE CBC W/AUTO DIFF WBC: CPT

## 2020-11-20 PROCEDURE — 74011250636 HC RX REV CODE- 250/636: Performed by: INTERNAL MEDICINE

## 2020-11-20 PROCEDURE — 85046 RETICYTE/HGB CONCENTRATE: CPT

## 2020-11-20 PROCEDURE — 80053 COMPREHEN METABOLIC PANEL: CPT

## 2020-11-20 PROCEDURE — 36415 COLL VENOUS BLD VENIPUNCTURE: CPT

## 2020-11-20 RX ORDER — CYANOCOBALAMIN 1000 UG/ML
1000 INJECTION, SOLUTION INTRAMUSCULAR; SUBCUTANEOUS ONCE
Status: COMPLETED | OUTPATIENT
Start: 2020-11-20 | End: 2020-11-20

## 2020-11-20 RX ADMIN — EPOETIN ALFA-EPBX 40000 UNITS: 40000 INJECTION, SOLUTION INTRAVENOUS; SUBCUTANEOUS at 16:43

## 2020-11-20 RX ADMIN — CYANOCOBALAMIN 1000 MCG: 1000 INJECTION, SOLUTION INTRAMUSCULAR; SUBCUTANEOUS at 16:40

## 2020-11-20 NOTE — PROGRESS NOTES
Arrived to the [facility]. Assessment completed, labs reviewed. Vitamin B 12 and Retacrit injection completed. Patient tolerated without problems. Any issues or concerns during appointment: None  Instructed to call Dr Inna Bullock with any side effects or concerns  Patient aware of next infusion appointment on 12/4/20 (date) at 2 27 PM (time).   Discharged via W/C

## 2020-12-04 ENCOUNTER — HOSPITAL ENCOUNTER (OUTPATIENT)
Dept: LAB | Age: 85
Discharge: HOME | End: 2020-12-04

## 2020-12-04 ENCOUNTER — HOSPITAL ENCOUNTER (OUTPATIENT)
Dept: INFUSION THERAPY | Age: 85
Discharge: HOME | End: 2020-12-04
Payer: COMMERCIAL

## 2020-12-04 VITALS
TEMPERATURE: 97.1 F | RESPIRATION RATE: 16 BRPM | DIASTOLIC BLOOD PRESSURE: 65 MMHG | OXYGEN SATURATION: 98 % | HEART RATE: 72 BPM | SYSTOLIC BLOOD PRESSURE: 147 MMHG

## 2020-12-04 LAB
HCT VFR BLD AUTO: 35.8 % (ref 35.8–46.3)
HGB BLD-MCNC: 11 G/DL (ref 11.7–15.4)

## 2020-12-04 PROCEDURE — 85018 HEMOGLOBIN: CPT

## 2020-12-04 PROCEDURE — 99211 OFF/OP EST MAY X REQ PHY/QHP: CPT

## 2020-12-04 PROCEDURE — 36415 COLL VENOUS BLD VENIPUNCTURE: CPT

## 2020-12-04 NOTE — PROGRESS NOTES
Arrived to the [facility] via [facility] [address]. Lab reviewed, HGB=11 pt did not get retacrit today. Any issues or concerns during appointment: no.  Patient aware of next infusion appointment on  12/18 at 1430. Discharged to home ambulatory.

## 2020-12-18 ENCOUNTER — HOSPITAL ENCOUNTER (OUTPATIENT)
Dept: INFUSION THERAPY | Age: 85
Discharge: HOME | End: 2020-12-18
Payer: COMMERCIAL

## 2020-12-18 ENCOUNTER — HOSPITAL ENCOUNTER (OUTPATIENT)
Dept: LAB | Age: 85
Discharge: HOME | End: 2020-12-18
Payer: COMMERCIAL

## 2020-12-18 DIAGNOSIS — E53.8 B12 DEFICIENCY: ICD-10-CM

## 2020-12-18 DIAGNOSIS — D50.0 IRON DEFICIENCY ANEMIA DUE TO CHRONIC BLOOD LOSS: ICD-10-CM

## 2020-12-18 DIAGNOSIS — D50.0 IRON DEFICIENCY ANEMIA DUE TO CHRONIC BLOOD LOSS: Primary | ICD-10-CM

## 2020-12-18 LAB
ALBUMIN SERPL-MCNC: 3.6 G/DL (ref 3.2–4.6)
ALBUMIN/GLOB SERPL: 1 {RATIO} (ref 1.2–3.5)
ALP SERPL-CCNC: 70 U/L (ref 50–136)
ALT SERPL-CCNC: 23 U/L (ref 12–65)
ANION GAP SERPL CALC-SCNC: 6 MMOL/L (ref 7–16)
AST SERPL-CCNC: 13 U/L (ref 15–37)
BASOPHILS # BLD: 0 K/UL (ref 0–0.2)
BASOPHILS NFR BLD: 0 % (ref 0–2)
BILIRUB SERPL-MCNC: 0.4 MG/DL (ref 0.2–1.1)
BUN SERPL-MCNC: 37 MG/DL (ref 8–23)
CALCIUM SERPL-MCNC: 10.7 MG/DL (ref 8.3–10.4)
CHLORIDE SERPL-SCNC: 110 MMOL/L (ref 98–107)
CO2 SERPL-SCNC: 26 MMOL/L (ref 21–32)
CREAT SERPL-MCNC: 1.2 MG/DL (ref 0.6–1)
DIFFERENTIAL METHOD BLD: ABNORMAL
EOSINOPHIL # BLD: 0 K/UL (ref 0–0.8)
EOSINOPHIL NFR BLD: 0 % (ref 0.5–7.8)
ERYTHROCYTE [DISTWIDTH] IN BLOOD BY AUTOMATED COUNT: 14.4 % (ref 11.9–14.6)
FERRITIN SERPL-MCNC: 219 NG/ML (ref 8–388)
GLOBULIN SER CALC-MCNC: 3.5 G/DL (ref 2.3–3.5)
GLUCOSE SERPL-MCNC: 146 MG/DL (ref 65–100)
HCT VFR BLD AUTO: 35.2 % (ref 35.8–46.3)
HGB BLD-MCNC: 10.7 G/DL (ref 11.7–15.4)
HGB RETIC QN AUTO: 35 PG (ref 29–35)
IMM GRANULOCYTES # BLD AUTO: 0 K/UL (ref 0–0.5)
IMM GRANULOCYTES NFR BLD AUTO: 0 % (ref 0–5)
IMM RETICS NFR: 14.1 % (ref 3–15.9)
IRON SATN MFR SERPL: 29 %
IRON SERPL-MCNC: 97 UG/DL (ref 35–150)
LYMPHOCYTES # BLD: 2.1 K/UL (ref 0.5–4.6)
LYMPHOCYTES NFR BLD: 31 % (ref 13–44)
MCH RBC QN AUTO: 30.7 PG (ref 26.1–32.9)
MCHC RBC AUTO-ENTMCNC: 30.4 G/DL (ref 31.4–35)
MCV RBC AUTO: 101.1 FL (ref 79.6–97.8)
MONOCYTES # BLD: 0.5 K/UL (ref 0.1–1.3)
MONOCYTES NFR BLD: 8 % (ref 4–12)
NEUTS SEG # BLD: 4.2 K/UL (ref 1.7–8.2)
NEUTS SEG NFR BLD: 61 % (ref 43–78)
NRBC # BLD: 0 K/UL (ref 0–0.2)
PLATELET # BLD AUTO: 180 K/UL (ref 150–450)
PMV BLD AUTO: 11 FL (ref 9.4–12.3)
POTASSIUM SERPL-SCNC: 3.9 MMOL/L (ref 3.5–5.1)
PROT SERPL-MCNC: 7.1 G/DL (ref 6.3–8.2)
RBC # BLD AUTO: 3.48 M/UL (ref 4.05–5.25)
RETICS # AUTO: 0.08 M/UL (ref 0.03–0.1)
RETICS/RBC NFR AUTO: 2.3 % (ref 0.3–2)
SODIUM SERPL-SCNC: 142 MMOL/L (ref 136–145)
TIBC SERPL-MCNC: 334 UG/DL (ref 250–450)
WBC # BLD AUTO: 7 K/UL (ref 4.3–11.1)

## 2020-12-18 PROCEDURE — 82728 ASSAY OF FERRITIN: CPT

## 2020-12-18 PROCEDURE — 96372 THER/PROPH/DIAG INJ SC/IM: CPT

## 2020-12-18 PROCEDURE — 83540 ASSAY OF IRON: CPT

## 2020-12-18 PROCEDURE — 74011250636 HC RX REV CODE- 250/636: Performed by: NURSE PRACTITIONER

## 2020-12-18 PROCEDURE — 85046 RETICYTE/HGB CONCENTRATE: CPT

## 2020-12-18 PROCEDURE — 80053 COMPREHEN METABOLIC PANEL: CPT

## 2020-12-18 PROCEDURE — 85025 COMPLETE CBC W/AUTO DIFF WBC: CPT

## 2020-12-18 PROCEDURE — 36415 COLL VENOUS BLD VENIPUNCTURE: CPT

## 2020-12-18 RX ORDER — CYANOCOBALAMIN 1000 UG/ML
1000 INJECTION, SOLUTION INTRAMUSCULAR; SUBCUTANEOUS ONCE
Status: COMPLETED | OUTPATIENT
Start: 2020-12-18 | End: 2020-12-18

## 2020-12-18 RX ADMIN — CYANOCOBALAMIN 1000 MCG: 1000 INJECTION, SOLUTION INTRAMUSCULAR; SUBCUTANEOUS at 14:53

## 2020-12-18 NOTE — PROGRESS NOTES
Arrived to the [facility] via [facility]. Vit-B12 completed. Patient tolerated well. Any issues or concerns during appointment: no.  Patient aware of next infusion appointment on  1/1 at 1330. Discharged to home via [facility].

## 2020-12-30 PROBLEM — K92.2 GIB (GASTROINTESTINAL BLEEDING): Status: RESOLVED | Noted: 2020-02-25 | Resolved: 2020-12-30

## 2020-12-30 PROBLEM — N39.0 UTI (URINARY TRACT INFECTION): Status: RESOLVED | Noted: 2020-02-25 | Resolved: 2020-12-30

## 2020-12-30 PROBLEM — L84 CORN: Status: RESOLVED | Noted: 2017-08-30 | Resolved: 2020-12-30

## 2020-12-30 PROBLEM — N17.9 AKI (ACUTE KIDNEY INJURY) (HCC): Status: RESOLVED | Noted: 2018-08-21 | Resolved: 2020-12-30

## 2020-12-30 PROBLEM — E10.9 COMPREHENSIVE DIABETIC FOOT EXAMINATION, TYPE 1 DM, ENCOUNTER FOR (HCC): Status: RESOLVED | Noted: 2017-07-12 | Resolved: 2020-12-30

## 2020-12-30 PROBLEM — B35.1 ONYCHOMYCOSIS: Status: RESOLVED | Noted: 2017-06-15 | Resolved: 2020-12-30

## 2021-01-01 ENCOUNTER — APPOINTMENT (OUTPATIENT)
Dept: ULTRASOUND IMAGING | Age: 86
DRG: 690 | End: 2021-01-01
Attending: EMERGENCY MEDICINE
Payer: COMMERCIAL

## 2021-01-01 ENCOUNTER — HOSPITAL ENCOUNTER (OUTPATIENT)
Dept: LAB | Age: 86
Discharge: HOME | End: 2021-09-15
Payer: COMMERCIAL

## 2021-01-01 ENCOUNTER — HOSPITAL ENCOUNTER (OUTPATIENT)
Dept: INFUSION THERAPY | Age: 86
Discharge: HOME | End: 2021-11-17
Payer: COMMERCIAL

## 2021-01-01 ENCOUNTER — HOSPITAL ENCOUNTER (OUTPATIENT)
Dept: INFUSION THERAPY | Age: 86
End: 2021-01-01

## 2021-01-01 ENCOUNTER — HOSPITAL ENCOUNTER (OUTPATIENT)
Dept: LAB | Age: 86
Discharge: HOME | End: 2021-06-22
Payer: COMMERCIAL

## 2021-01-01 ENCOUNTER — APPOINTMENT (OUTPATIENT)
Dept: CT IMAGING | Age: 86
DRG: 690 | End: 2021-01-01
Attending: FAMILY MEDICINE
Payer: COMMERCIAL

## 2021-01-01 ENCOUNTER — HOSPITAL ENCOUNTER (OUTPATIENT)
Dept: LAB | Age: 86
Discharge: HOME | End: 2021-08-25

## 2021-01-01 ENCOUNTER — HOSPITAL ENCOUNTER (OUTPATIENT)
Dept: INFUSION THERAPY | Age: 86
Discharge: HOME | End: 2021-10-27
Payer: COMMERCIAL

## 2021-01-01 ENCOUNTER — HOSPITAL ENCOUNTER (OUTPATIENT)
Dept: INFUSION THERAPY | Age: 86
Discharge: HOME | End: 2021-08-25
Payer: COMMERCIAL

## 2021-01-01 ENCOUNTER — APPOINTMENT (OUTPATIENT)
Dept: CT IMAGING | Age: 86
DRG: 690 | End: 2021-01-01
Attending: EMERGENCY MEDICINE
Payer: COMMERCIAL

## 2021-01-01 ENCOUNTER — HOSPITAL ENCOUNTER (OUTPATIENT)
Dept: INFUSION THERAPY | Age: 86
Discharge: HOME | End: 2021-06-22
Payer: COMMERCIAL

## 2021-01-01 ENCOUNTER — HOSPITAL ENCOUNTER (OUTPATIENT)
Dept: INFUSION THERAPY | Age: 86
Discharge: HOME | End: 2021-10-06
Payer: COMMERCIAL

## 2021-01-01 ENCOUNTER — APPOINTMENT (OUTPATIENT)
Dept: GENERAL RADIOLOGY | Age: 86
DRG: 690 | End: 2021-01-01
Attending: EMERGENCY MEDICINE
Payer: COMMERCIAL

## 2021-01-01 ENCOUNTER — HOSPITAL ENCOUNTER (INPATIENT)
Age: 86
LOS: 10 days | Discharge: NURSING, REHAB OR LONG-TERM CARE | DRG: 690 | End: 2021-12-24
Attending: EMERGENCY MEDICINE | Admitting: FAMILY MEDICINE
Payer: COMMERCIAL

## 2021-01-01 ENCOUNTER — HOSPITAL ENCOUNTER (OUTPATIENT)
Dept: LAB | Age: 86
Discharge: HOME | End: 2021-10-06

## 2021-01-01 ENCOUNTER — HOSPITAL ENCOUNTER (OUTPATIENT)
Dept: INFUSION THERAPY | Age: 86
Discharge: HOME | End: 2021-01-01
Payer: COMMERCIAL

## 2021-01-01 ENCOUNTER — HOSPITAL ENCOUNTER (OUTPATIENT)
Dept: GENERAL RADIOLOGY | Age: 86
Discharge: HOME | End: 2021-07-07
Payer: COMMERCIAL

## 2021-01-01 ENCOUNTER — APPOINTMENT (OUTPATIENT)
Dept: GENERAL RADIOLOGY | Age: 86
DRG: 690 | End: 2021-01-01
Attending: FAMILY MEDICINE
Payer: COMMERCIAL

## 2021-01-01 ENCOUNTER — HOSPITAL ENCOUNTER (OUTPATIENT)
Dept: INFUSION THERAPY | Age: 86
Discharge: HOME | End: 2021-08-04
Payer: COMMERCIAL

## 2021-01-01 ENCOUNTER — HOSPITAL ENCOUNTER (OUTPATIENT)
Dept: LAB | Age: 86
Discharge: HOME | End: 2021-11-17

## 2021-01-01 ENCOUNTER — HOSPITAL ENCOUNTER (OUTPATIENT)
Dept: LAB | Age: 86
Discharge: HOME | End: 2021-07-28
Payer: COMMERCIAL

## 2021-01-01 ENCOUNTER — HOSPITAL ENCOUNTER (OUTPATIENT)
Dept: INFUSION THERAPY | Age: 86
Discharge: HOME | End: 2021-09-15
Payer: COMMERCIAL

## 2021-01-01 ENCOUNTER — HOSPITAL ENCOUNTER (OUTPATIENT)
Dept: LAB | Age: 86
Discharge: HOME | End: 2021-08-04

## 2021-01-01 ENCOUNTER — HOSPITAL ENCOUNTER (OUTPATIENT)
Dept: LAB | Age: 86
Discharge: HOME | End: 2021-10-27
Payer: COMMERCIAL

## 2021-01-01 VITALS — RESPIRATION RATE: 18 BRPM | DIASTOLIC BLOOD PRESSURE: 65 MMHG | HEART RATE: 75 BPM | SYSTOLIC BLOOD PRESSURE: 159 MMHG

## 2021-01-01 VITALS
RESPIRATION RATE: 18 BRPM | OXYGEN SATURATION: 95 % | SYSTOLIC BLOOD PRESSURE: 150 MMHG | HEART RATE: 72 BPM | DIASTOLIC BLOOD PRESSURE: 48 MMHG | WEIGHT: 159.5 LBS | TEMPERATURE: 97.8 F | BODY MASS INDEX: 29.35 KG/M2 | HEIGHT: 62 IN

## 2021-01-01 VITALS
RESPIRATION RATE: 18 BRPM | HEART RATE: 69 BPM | SYSTOLIC BLOOD PRESSURE: 157 MMHG | DIASTOLIC BLOOD PRESSURE: 57 MMHG | OXYGEN SATURATION: 98 % | TEMPERATURE: 98.7 F

## 2021-01-01 VITALS
RESPIRATION RATE: 18 BRPM | TEMPERATURE: 98 F | SYSTOLIC BLOOD PRESSURE: 160 MMHG | HEART RATE: 72 BPM | DIASTOLIC BLOOD PRESSURE: 64 MMHG

## 2021-01-01 VITALS
OXYGEN SATURATION: 100 % | SYSTOLIC BLOOD PRESSURE: 152 MMHG | HEART RATE: 74 BPM | DIASTOLIC BLOOD PRESSURE: 66 MMHG | TEMPERATURE: 96.9 F | RESPIRATION RATE: 16 BRPM

## 2021-01-01 VITALS
OXYGEN SATURATION: 100 % | TEMPERATURE: 97.3 F | RESPIRATION RATE: 16 BRPM | DIASTOLIC BLOOD PRESSURE: 75 MMHG | SYSTOLIC BLOOD PRESSURE: 160 MMHG | HEART RATE: 67 BPM

## 2021-01-01 VITALS — WEIGHT: 154 LBS | BODY MASS INDEX: 27.67 KG/M2

## 2021-01-01 DIAGNOSIS — S09.90XA CLOSED HEAD INJURY, INITIAL ENCOUNTER: ICD-10-CM

## 2021-01-01 DIAGNOSIS — N18.31 ANEMIA DUE TO STAGE 3A CHRONIC KIDNEY DISEASE (HCC): ICD-10-CM
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
## 2021-01-01 DIAGNOSIS — N39.0 URINARY TRACT INFECTION WITHOUT HEMATURIA, SITE UNSPECIFIED: ICD-10-CM

## 2021-01-01 DIAGNOSIS — E53.8 B12 DEFICIENCY: Primary | ICD-10-CM

## 2021-01-01 DIAGNOSIS — G89.29 CHRONIC BILATERAL LOW BACK PAIN WITHOUT SCIATICA: ICD-10-CM

## 2021-01-01 DIAGNOSIS — D50.0 IRON DEFICIENCY ANEMIA DUE TO CHRONIC BLOOD LOSS: Primary | ICD-10-CM

## 2021-01-01 DIAGNOSIS — M54.50 CHRONIC BILATERAL LOW BACK PAIN WITHOUT SCIATICA: ICD-10-CM

## 2021-01-01 DIAGNOSIS — D63.1 ANEMIA DUE TO STAGE 3A CHRONIC KIDNEY DISEASE (HCC): ICD-10-CM

## 2021-01-01 DIAGNOSIS — E53.8 B12 DEFICIENCY: ICD-10-CM

## 2021-01-01 DIAGNOSIS — D50.0 IRON DEFICIENCY ANEMIA DUE TO CHRONIC BLOOD LOSS: ICD-10-CM

## 2021-01-01 DIAGNOSIS — S70.00XA CONTUSION OF HIP, INITIAL ENCOUNTER: ICD-10-CM

## 2021-01-01 LAB
ALBUMIN SERPL-MCNC: 3.2 G/DL (ref 3.2–4.6)
ALBUMIN SERPL-MCNC: 3.4 G/DL (ref 3.2–4.6)
ALBUMIN SERPL-MCNC: 3.5 G/DL (ref 3.2–4.6)
ALBUMIN SERPL-MCNC: 3.5 G/DL (ref 3.2–4.6)
ALBUMIN SERPL-MCNC: 4 G/DL (ref 3.2–4.6)
ALBUMIN/GLOB SERPL: 0.8 {RATIO} (ref 1.2–3.5)
ALBUMIN/GLOB SERPL: 0.9 {RATIO} (ref 1.2–3.5)
ALBUMIN/GLOB SERPL: 1.1 {RATIO} (ref 1.2–3.5)
ALBUMIN/GLOB SERPL: 1.1 {RATIO} (ref 1.2–3.5)
ALBUMIN/GLOB SERPL: 1.2 {RATIO} (ref 1.2–3.5)
ALP SERPL-CCNC: 116 U/L (ref 50–136)
ALP SERPL-CCNC: 62 U/L (ref 50–136)
ALP SERPL-CCNC: 64 U/L (ref 50–136)
ALP SERPL-CCNC: 76 U/L (ref 50–136)
ALP SERPL-CCNC: 84 U/L (ref 50–136)
ALT SERPL-CCNC: 17 U/L (ref 12–65)
ALT SERPL-CCNC: 18 U/L (ref 12–65)
ALT SERPL-CCNC: 58 U/L (ref 12–65)
ANION GAP SERPL CALC-SCNC: 10 MMOL/L (ref 7–16)
ANION GAP SERPL CALC-SCNC: 5 MMOL/L (ref 7–16)
ANION GAP SERPL CALC-SCNC: 6 MMOL/L (ref 7–16)
ANION GAP SERPL CALC-SCNC: 6 MMOL/L (ref 7–16)
ANION GAP SERPL CALC-SCNC: 7 MMOL/L (ref 7–16)
ANION GAP SERPL CALC-SCNC: 7 MMOL/L (ref 7–16)
ANION GAP SERPL CALC-SCNC: 8 MMOL/L (ref 7–16)
AST SERPL-CCNC: 10 U/L (ref 15–37)
AST SERPL-CCNC: 27 U/L (ref 15–37)
AST SERPL-CCNC: 6 U/L (ref 15–37)
ATRIAL RATE: 68 BPM
BACTERIA SPEC CULT: ABNORMAL
BACTERIA URNS QL MICRO: ABNORMAL /HPF
BASOPHILS # BLD: 0 K/UL (ref 0–0.2)
BASOPHILS # BLD: 0.1 K/UL (ref 0–0.2)
BASOPHILS # BLD: 0.1 K/UL (ref 0–0.2)
BASOPHILS NFR BLD: 0 % (ref 0–2)
BASOPHILS NFR BLD: 1 % (ref 0–2)
BILIRUB SERPL-MCNC: 0.4 MG/DL (ref 0.2–1.1)
BILIRUB SERPL-MCNC: 0.5 MG/DL (ref 0.2–1.1)
BILIRUB UR QL: NEGATIVE
BUN SERPL-MCNC: 24 MG/DL (ref 8–23)
BUN SERPL-MCNC: 29 MG/DL (ref 8–23)
BUN SERPL-MCNC: 30 MG/DL (ref 8–23)
BUN SERPL-MCNC: 32 MG/DL (ref 8–23)
BUN SERPL-MCNC: 34 MG/DL (ref 8–23)
BUN SERPL-MCNC: 42 MG/DL (ref 8–23)
BUN SERPL-MCNC: 43 MG/DL (ref 8–23)
BUN SERPL-MCNC: 43 MG/DL (ref 8–23)
BUN SERPL-MCNC: 97 MG/DL (ref 8–23)
CALCIUM SERPL-MCNC: 10 MG/DL (ref 8.3–10.4)
CALCIUM SERPL-MCNC: 10.3 MG/DL (ref 8.3–10.4)
CALCIUM SERPL-MCNC: 10.4 MG/DL (ref 8.3–10.4)
CALCIUM SERPL-MCNC: 10.5 MG/DL (ref 8.3–10.4)
CALCIUM SERPL-MCNC: 10.9 MG/DL (ref 8.3–10.4)
CALCIUM SERPL-MCNC: 9.3 MG/DL (ref 8.3–10.4)
CALCIUM SERPL-MCNC: 9.4 MG/DL (ref 8.3–10.4)
CALCIUM SERPL-MCNC: 9.8 MG/DL (ref 8.3–10.4)
CALCIUM SERPL-MCNC: 9.9 MG/DL (ref 8.3–10.4)
CALCULATED P AXIS, ECG09: 11 DEGREES
CALCULATED R AXIS, ECG10: -61 DEGREES
CALCULATED T AXIS, ECG11: -20 DEGREES
CASTS URNS QL MICRO: ABNORMAL /LPF
CHLORIDE SERPL-SCNC: 108 MMOL/L (ref 98–107)
CHLORIDE SERPL-SCNC: 110 MMOL/L (ref 98–107)
CHLORIDE SERPL-SCNC: 110 MMOL/L (ref 98–107)
CHLORIDE SERPL-SCNC: 111 MMOL/L (ref 98–107)
CHLORIDE SERPL-SCNC: 112 MMOL/L (ref 98–107)
CK SERPL-CCNC: 95 U/L (ref 21–215)
CO2 SERPL-SCNC: 23 MMOL/L (ref 21–32)
CO2 SERPL-SCNC: 23 MMOL/L (ref 21–32)
CO2 SERPL-SCNC: 25 MMOL/L (ref 21–32)
CO2 SERPL-SCNC: 25 MMOL/L (ref 21–32)
CO2 SERPL-SCNC: 26 MMOL/L (ref 21–32)
CO2 SERPL-SCNC: 27 MMOL/L (ref 21–32)
CO2 SERPL-SCNC: 28 MMOL/L (ref 21–32)
CO2 SERPL-SCNC: 29 MMOL/L (ref 21–32)
CO2 SERPL-SCNC: 30 MMOL/L (ref 21–32)
COVID-19 RAPID TEST, COVR: NOT DETECTED
CREAT SERPL-MCNC: 0.97 MG/DL (ref 0.6–1)
CREAT SERPL-MCNC: 1.13 MG/DL (ref 0.6–1)
CREAT SERPL-MCNC: 1.14 MG/DL (ref 0.6–1)
CREAT SERPL-MCNC: 1.2 MG/DL (ref 0.6–1)
CREAT SERPL-MCNC: 1.21 MG/DL (ref 0.6–1)
CREAT SERPL-MCNC: 1.4 MG/DL (ref 0.6–1)
CREAT SERPL-MCNC: 1.47 MG/DL (ref 0.6–1)
CREAT SERPL-MCNC: 1.5 MG/DL (ref 0.6–1)
CREAT SERPL-MCNC: 1.8 MG/DL (ref 0.6–1)
DIAGNOSIS, 93000: NORMAL
DIFFERENTIAL METHOD BLD: ABNORMAL
EOSINOPHIL # BLD: 0 K/UL (ref 0–0.8)
EOSINOPHIL # BLD: 0.1 K/UL (ref 0–0.8)
EOSINOPHIL # BLD: 0.1 K/UL (ref 0–0.8)
EOSINOPHIL NFR BLD: 0 % (ref 0.5–7.8)
EOSINOPHIL NFR BLD: 0 % (ref 0.5–7.8)
EOSINOPHIL NFR BLD: 1 % (ref 0.5–7.8)
EPI CELLS #/AREA URNS HPF: ABNORMAL /HPF
ERYTHROCYTE [DISTWIDTH] IN BLOOD BY AUTOMATED COUNT: 12.7 % (ref 11.9–14.6)
ERYTHROCYTE [DISTWIDTH] IN BLOOD BY AUTOMATED COUNT: 12.9 % (ref 11.9–14.6)
ERYTHROCYTE [DISTWIDTH] IN BLOOD BY AUTOMATED COUNT: 14.3 % (ref 11.9–14.6)
ERYTHROCYTE [DISTWIDTH] IN BLOOD BY AUTOMATED COUNT: 15.1 % (ref 11.9–14.6)
ERYTHROCYTE [DISTWIDTH] IN BLOOD BY AUTOMATED COUNT: 15.3 % (ref 11.9–14.6)
ERYTHROCYTE [DISTWIDTH] IN BLOOD BY AUTOMATED COUNT: 15.3 % (ref 11.9–14.6)
ERYTHROCYTE [DISTWIDTH] IN BLOOD BY AUTOMATED COUNT: 15.4 % (ref 11.9–14.6)
ERYTHROCYTE [DISTWIDTH] IN BLOOD BY AUTOMATED COUNT: 15.5 % (ref 11.9–14.6)
FERRITIN SERPL-MCNC: 102 NG/ML (ref 8–388)
FERRITIN SERPL-MCNC: 147 NG/ML (ref 8–388)
FERRITIN SERPL-MCNC: 200 NG/ML (ref 8–388)
FERRITIN SERPL-MCNC: 87 NG/ML (ref 8–388)
GLOBULIN SER CALC-MCNC: 3 G/DL (ref 2.3–3.5)
GLOBULIN SER CALC-MCNC: 3.3 G/DL (ref 2.3–3.5)
GLOBULIN SER CALC-MCNC: 3.6 G/DL (ref 2.3–3.5)
GLOBULIN SER CALC-MCNC: 3.8 G/DL (ref 2.3–3.5)
GLOBULIN SER CALC-MCNC: 3.8 G/DL (ref 2.3–3.5)
GLUCOSE BLD STRIP.AUTO-MCNC: 106 MG/DL (ref 65–100)
GLUCOSE BLD STRIP.AUTO-MCNC: 109 MG/DL (ref 65–100)
GLUCOSE BLD STRIP.AUTO-MCNC: 111 MG/DL (ref 65–100)
GLUCOSE BLD STRIP.AUTO-MCNC: 117 MG/DL (ref 65–100)
GLUCOSE BLD STRIP.AUTO-MCNC: 170 MG/DL (ref 65–100)
GLUCOSE BLD STRIP.AUTO-MCNC: 177 MG/DL (ref 65–100)
GLUCOSE BLD STRIP.AUTO-MCNC: 186 MG/DL (ref 65–100)
GLUCOSE BLD STRIP.AUTO-MCNC: 186 MG/DL (ref 65–100)
GLUCOSE BLD STRIP.AUTO-MCNC: 188 MG/DL (ref 65–100)
GLUCOSE BLD STRIP.AUTO-MCNC: 190 MG/DL (ref 65–100)
GLUCOSE BLD STRIP.AUTO-MCNC: 193 MG/DL (ref 65–100)
GLUCOSE BLD STRIP.AUTO-MCNC: 193 MG/DL (ref 65–100)
GLUCOSE BLD STRIP.AUTO-MCNC: 195 MG/DL (ref 65–100)
GLUCOSE BLD STRIP.AUTO-MCNC: 209 MG/DL (ref 65–100)
GLUCOSE BLD STRIP.AUTO-MCNC: 219 MG/DL (ref 65–100)
GLUCOSE BLD STRIP.AUTO-MCNC: 226 MG/DL (ref 65–100)
GLUCOSE BLD STRIP.AUTO-MCNC: 233 MG/DL (ref 65–100)
GLUCOSE BLD STRIP.AUTO-MCNC: 234 MG/DL (ref 65–100)
GLUCOSE BLD STRIP.AUTO-MCNC: 236 MG/DL (ref 65–100)
GLUCOSE BLD STRIP.AUTO-MCNC: 237 MG/DL (ref 65–100)
GLUCOSE BLD STRIP.AUTO-MCNC: 238 MG/DL (ref 65–100)
GLUCOSE BLD STRIP.AUTO-MCNC: 242 MG/DL (ref 65–100)
GLUCOSE BLD STRIP.AUTO-MCNC: 252 MG/DL (ref 65–100)
GLUCOSE BLD STRIP.AUTO-MCNC: 253 MG/DL (ref 65–100)
GLUCOSE BLD STRIP.AUTO-MCNC: 263 MG/DL (ref 65–100)
GLUCOSE BLD STRIP.AUTO-MCNC: 263 MG/DL (ref 65–100)
GLUCOSE BLD STRIP.AUTO-MCNC: 266 MG/DL (ref 65–100)
GLUCOSE BLD STRIP.AUTO-MCNC: 268 MG/DL (ref 65–100)
GLUCOSE BLD STRIP.AUTO-MCNC: 270 MG/DL (ref 65–100)
GLUCOSE BLD STRIP.AUTO-MCNC: 276 MG/DL (ref 65–100)
GLUCOSE BLD STRIP.AUTO-MCNC: 286 MG/DL (ref 65–100)
GLUCOSE BLD STRIP.AUTO-MCNC: 329 MG/DL (ref 65–100)
GLUCOSE BLD STRIP.AUTO-MCNC: 330 MG/DL (ref 65–100)
GLUCOSE BLD STRIP.AUTO-MCNC: 332 MG/DL (ref 65–100)
GLUCOSE BLD STRIP.AUTO-MCNC: 335 MG/DL (ref 65–100)
GLUCOSE BLD STRIP.AUTO-MCNC: 375 MG/DL (ref 65–100)
GLUCOSE BLD STRIP.AUTO-MCNC: 60 MG/DL (ref 65–100)
GLUCOSE BLD STRIP.AUTO-MCNC: 69 MG/DL (ref 65–100)
GLUCOSE BLD STRIP.AUTO-MCNC: 72 MG/DL (ref 65–100)
GLUCOSE BLD STRIP.AUTO-MCNC: 76 MG/DL (ref 65–100)
GLUCOSE BLD STRIP.AUTO-MCNC: 89 MG/DL (ref 65–100)
GLUCOSE SERPL-MCNC: 111 MG/DL (ref 65–100)
GLUCOSE SERPL-MCNC: 138 MG/DL (ref 65–100)
GLUCOSE SERPL-MCNC: 173 MG/DL (ref 65–100)
GLUCOSE SERPL-MCNC: 196 MG/DL (ref 65–100)
GLUCOSE SERPL-MCNC: 199 MG/DL (ref 65–100)
GLUCOSE SERPL-MCNC: 200 MG/DL (ref 65–100)
GLUCOSE SERPL-MCNC: 225 MG/DL (ref 65–100)
GLUCOSE SERPL-MCNC: 278 MG/DL (ref 65–100)
GLUCOSE SERPL-MCNC: 99 MG/DL (ref 65–100)
GLUCOSE UR QL STRIP.AUTO: NEGATIVE MG/DL
HCT VFR BLD AUTO: 29.8 % (ref 35.8–46.3)
HCT VFR BLD AUTO: 30 % (ref 35.8–46.3)
HCT VFR BLD AUTO: 31.6 % (ref 35.8–46.3)
HCT VFR BLD AUTO: 32.3 % (ref 35.8–46.3)
HCT VFR BLD AUTO: 33.3 % (ref 35.8–46.3)
HCT VFR BLD AUTO: 33.3 % (ref 35.8–46.3)
HCT VFR BLD AUTO: 34.6 % (ref 35.8–46.3)
HCT VFR BLD AUTO: 37.2 % (ref 35.8–46.3)
HCT VFR BLD AUTO: 37.6 % (ref 35.8–46.3)
HCT VFR BLD AUTO: 39.3 % (ref 35.8–46.3)
HCT VFR BLD AUTO: 39.6 % (ref 35.8–46.3)
HGB BLD-MCNC: 10 G/DL (ref 11.7–15.4)
HGB BLD-MCNC: 10.1 G/DL (ref 11.7–15.4)
HGB BLD-MCNC: 10.3 G/DL (ref 11.7–15.4)
HGB BLD-MCNC: 10.4 G/DL (ref 11.7–15.4)
HGB BLD-MCNC: 10.7 G/DL (ref 11.7–15.4)
HGB BLD-MCNC: 11.3 G/DL (ref 11.7–15.4)
HGB BLD-MCNC: 11.6 G/DL (ref 11.7–15.4)
HGB BLD-MCNC: 11.9 G/DL (ref 11.7–15.4)
HGB BLD-MCNC: 12.3 G/DL (ref 11.7–15.4)
HGB BLD-MCNC: 9.1 G/DL (ref 11.7–15.4)
HGB BLD-MCNC: 9.3 G/DL (ref 11.7–15.4)
HGB RETIC QN AUTO: 33 PG (ref 29–35)
HGB RETIC QN AUTO: 34 PG (ref 29–35)
HGB RETIC QN AUTO: 34 PG (ref 29–35)
HGB RETIC QN AUTO: 36 PG (ref 29–35)
IMM GRANULOCYTES # BLD AUTO: 0 K/UL (ref 0–0.5)
IMM GRANULOCYTES NFR BLD AUTO: 0 % (ref 0–5)
IMM RETICS NFR: 12.2 % (ref 3–15.9)
IMM RETICS NFR: 16.9 % (ref 3–15.9)
IMM RETICS NFR: 7.6 % (ref 3–15.9)
IMM RETICS NFR: 9 % (ref 3–15.9)
IRON SATN MFR SERPL: 21 %
IRON SATN MFR SERPL: 23 %
IRON SATN MFR SERPL: 24 %
IRON SATN MFR SERPL: 27 %
IRON SERPL-MCNC: 62 UG/DL (ref 35–150)
IRON SERPL-MCNC: 68 UG/DL (ref 35–150)
IRON SERPL-MCNC: 81 UG/DL (ref 35–150)
IRON SERPL-MCNC: 83 UG/DL (ref 35–150)
KETONES UR-MCNC: NEGATIVE MG/DL
LEUKOCYTE ESTERASE UR QL STRIP: ABNORMAL
LYMPHOCYTES # BLD: 1.7 K/UL (ref 0.5–4.6)
LYMPHOCYTES # BLD: 2.1 K/UL (ref 0.5–4.6)
LYMPHOCYTES # BLD: 2.2 K/UL (ref 0.5–4.6)
LYMPHOCYTES # BLD: 2.2 K/UL (ref 0.5–4.6)
LYMPHOCYTES # BLD: 2.3 K/UL (ref 0.5–4.6)
LYMPHOCYTES NFR BLD: 21 % (ref 13–44)
LYMPHOCYTES NFR BLD: 31 % (ref 13–44)
LYMPHOCYTES NFR BLD: 33 % (ref 13–44)
LYMPHOCYTES NFR BLD: 33 % (ref 13–44)
LYMPHOCYTES NFR BLD: 35 % (ref 13–44)
MAGNESIUM SERPL-MCNC: 2 MG/DL (ref 1.8–2.4)
MAGNESIUM SERPL-MCNC: 2.2 MG/DL (ref 1.8–2.4)
MAGNESIUM SERPL-MCNC: 2.2 MG/DL (ref 1.8–2.4)
MAGNESIUM SERPL-MCNC: 2.3 MG/DL (ref 1.8–2.4)
MAGNESIUM SERPL-MCNC: 2.5 MG/DL (ref 1.8–2.4)
MCH RBC QN AUTO: 28.3 PG (ref 26.1–32.9)
MCH RBC QN AUTO: 29 PG (ref 26.1–32.9)
MCH RBC QN AUTO: 29.2 PG (ref 26.1–32.9)
MCH RBC QN AUTO: 29.3 PG (ref 26.1–32.9)
MCH RBC QN AUTO: 29.5 PG (ref 26.1–32.9)
MCH RBC QN AUTO: 30.2 PG (ref 26.1–32.9)
MCH RBC QN AUTO: 31.6 PG (ref 26.1–32.9)
MCH RBC QN AUTO: 31.6 PG (ref 26.1–32.9)
MCHC RBC AUTO-ENTMCNC: 30.3 G/DL (ref 31.4–35)
MCHC RBC AUTO-ENTMCNC: 30.3 G/DL (ref 31.4–35)
MCHC RBC AUTO-ENTMCNC: 30.4 G/DL (ref 31.4–35)
MCHC RBC AUTO-ENTMCNC: 30.9 G/DL (ref 31.4–35)
MCHC RBC AUTO-ENTMCNC: 31.1 G/DL (ref 31.4–35)
MCHC RBC AUTO-ENTMCNC: 31.2 G/DL (ref 31.4–35)
MCHC RBC AUTO-ENTMCNC: 31.3 G/DL (ref 31.4–35)
MCHC RBC AUTO-ENTMCNC: 31.6 G/DL (ref 31.4–35)
MCV RBC AUTO: 100 FL (ref 79.6–97.8)
MCV RBC AUTO: 101.4 FL (ref 79.6–97.8)
MCV RBC AUTO: 93.6 FL (ref 79.6–97.8)
MCV RBC AUTO: 94 FL (ref 79.6–97.8)
MCV RBC AUTO: 94.1 FL (ref 79.6–97.8)
MCV RBC AUTO: 94.4 FL (ref 79.6–97.8)
MCV RBC AUTO: 96.4 FL (ref 79.6–97.8)
MCV RBC AUTO: 99.7 FL (ref 79.6–97.8)
MM INDURATION POC: 0 MM (ref 0–5)
MM INDURATION POC: 0 MM (ref 0–5)
MONOCYTES # BLD: 0.5 K/UL (ref 0.1–1.3)
MONOCYTES NFR BLD: 6 % (ref 4–12)
MONOCYTES NFR BLD: 7 % (ref 4–12)
MONOCYTES NFR BLD: 8 % (ref 4–12)
MONOCYTES NFR BLD: 8 % (ref 4–12)
MONOCYTES NFR BLD: 9 % (ref 4–12)
MUCOUS THREADS URNS QL MICRO: 0 /LPF
NEUTS SEG # BLD: 3.5 K/UL (ref 1.7–8.2)
NEUTS SEG # BLD: 3.6 K/UL (ref 1.7–8.2)
NEUTS SEG # BLD: 3.8 K/UL (ref 1.7–8.2)
NEUTS SEG # BLD: 4.3 K/UL (ref 1.7–8.2)
NEUTS SEG # BLD: 5.9 K/UL (ref 1.7–8.2)
NEUTS SEG NFR BLD: 55 % (ref 43–78)
NEUTS SEG NFR BLD: 57 % (ref 43–78)
NEUTS SEG NFR BLD: 58 % (ref 43–78)
NEUTS SEG NFR BLD: 61 % (ref 43–78)
NEUTS SEG NFR BLD: 72 % (ref 43–78)
NITRITE UR QL: NEGATIVE
NRBC # BLD: 0 K/UL (ref 0–0.2)
P-R INTERVAL, ECG05: 187 MS
PH UR: 6.5 [PH] (ref 5–9)
PLATELET # BLD AUTO: 142 K/UL (ref 150–450)
PLATELET # BLD AUTO: 157 K/UL (ref 150–450)
PLATELET # BLD AUTO: 168 K/UL (ref 150–450)
PLATELET # BLD AUTO: 168 K/UL (ref 150–450)
PLATELET # BLD AUTO: 170 K/UL (ref 150–450)
PLATELET # BLD AUTO: 171 K/UL (ref 150–450)
PLATELET # BLD AUTO: 173 K/UL (ref 150–450)
PLATELET # BLD AUTO: 189 K/UL (ref 150–450)
PLATELET COMMENTS,PCOM: ADEQUATE
PMV BLD AUTO: 10 FL (ref 9.4–12.3)
PMV BLD AUTO: 10.3 FL (ref 9.4–12.3)
PMV BLD AUTO: 10.3 FL (ref 9.4–12.3)
PMV BLD AUTO: 10.6 FL (ref 9.4–12.3)
PMV BLD AUTO: 10.6 FL (ref 9.4–12.3)
PMV BLD AUTO: 10.7 FL (ref 9.4–12.3)
PMV BLD AUTO: 11 FL (ref 9.4–12.3)
PMV BLD AUTO: 9.5 FL (ref 9.4–12.3)
POTASSIUM SERPL-SCNC: 3.5 MMOL/L (ref 3.5–5.1)
POTASSIUM SERPL-SCNC: 3.7 MMOL/L (ref 3.5–5.1)
POTASSIUM SERPL-SCNC: 3.7 MMOL/L (ref 3.5–5.1)
POTASSIUM SERPL-SCNC: 4 MMOL/L (ref 3.5–5.1)
POTASSIUM SERPL-SCNC: 4.1 MMOL/L (ref 3.5–5.1)
POTASSIUM SERPL-SCNC: 4.2 MMOL/L (ref 3.5–5.1)
POTASSIUM SERPL-SCNC: 4.9 MMOL/L (ref 3.5–5.1)
PPD POC: NEGATIVE NEGATIVE
PPD POC: NEGATIVE NEGATIVE
PROT SERPL-MCNC: 6.5 G/DL (ref 6.3–8.2)
PROT SERPL-MCNC: 6.8 G/DL (ref 6.3–8.2)
PROT SERPL-MCNC: 7 G/DL (ref 6.3–8.2)
PROT SERPL-MCNC: 7.2 G/DL (ref 6.3–8.2)
PROT SERPL-MCNC: 7.6 G/DL (ref 6.3–8.2)
PROT UR QL: >300 MG/DL
Q-T INTERVAL, ECG07: 376 MS
QRS DURATION, ECG06: 133 MS
QTC CALCULATION (BEZET), ECG08: 403 MS
RBC # BLD AUTO: 2.94 M/UL (ref 4.05–5.2)
RBC # BLD AUTO: 3.16 M/UL (ref 4.05–5.2)
RBC # BLD AUTO: 3.34 M/UL (ref 4.05–5.2)
RBC # BLD AUTO: 3.42 M/UL (ref 4.05–5.2)
RBC # BLD AUTO: 3.86 M/UL (ref 4.05–5.2)
RBC # BLD AUTO: 4 M/UL (ref 4.05–5.2)
RBC # BLD AUTO: 4.2 M/UL (ref 4.05–5.2)
RBC # BLD AUTO: 4.21 M/UL (ref 4.05–5.2)
RBC # UR STRIP: ABNORMAL /UL
RBC #/AREA URNS HPF: ABNORMAL /HPF
RBC MORPH BLD: ABNORMAL
RETICS # AUTO: 0.05 M/UL (ref 0.03–0.1)
RETICS # AUTO: 0.06 M/UL (ref 0.03–0.1)
RETICS # AUTO: 0.08 M/UL (ref 0.03–0.1)
RETICS # AUTO: 0.08 M/UL (ref 0.03–0.1)
RETICS/RBC NFR AUTO: 1.6 % (ref 0.3–2)
RETICS/RBC NFR AUTO: 2 % (ref 0.3–2)
RETICS/RBC NFR AUTO: 2 % (ref 0.3–2)
RETICS/RBC NFR AUTO: 2.6 % (ref 0.3–2)
SERVICE CMNT-IMP: ABNORMAL
SERVICE CMNT-IMP: NORMAL
SODIUM SERPL-SCNC: 138 MMOL/L (ref 136–145)
SODIUM SERPL-SCNC: 139 MMOL/L (ref 136–145)
SODIUM SERPL-SCNC: 141 MMOL/L (ref 136–145)
SODIUM SERPL-SCNC: 143 MMOL/L (ref 136–145)
SODIUM SERPL-SCNC: 144 MMOL/L (ref 136–145)
SODIUM SERPL-SCNC: 144 MMOL/L (ref 136–145)
SOURCE, COVRS: NORMAL
SP GR UR: >1.03 (ref 1–1.02)
TIBC SERPL-MCNC: 275 UG/DL (ref 250–450)
TIBC SERPL-MCNC: 289 UG/DL (ref 250–450)
TIBC SERPL-MCNC: 312 UG/DL (ref 250–450)
TIBC SERPL-MCNC: 383 UG/DL (ref 250–450)
TROPONIN-HIGH SENSITIVITY: 26.9 PG/ML (ref 0–14)
UROBILINOGEN UR QL: 0.2 EU/DL (ref 0.2–1)
VENTRICULAR RATE, ECG03: 69 BPM
VIT B12 SERPL-MCNC: 1445 PG/ML (ref 193–986)
WBC # BLD AUTO: 6.4 K/UL (ref 4.3–11.1)
WBC # BLD AUTO: 6.4 K/UL (ref 4.3–11.1)
WBC # BLD AUTO: 6.5 K/UL (ref 4.3–11.1)
WBC # BLD AUTO: 7 K/UL (ref 4.3–11.1)
WBC # BLD AUTO: 7.2 K/UL (ref 4.3–11.1)
WBC # BLD AUTO: 7.8 K/UL (ref 4.3–11.1)
WBC # BLD AUTO: 8.1 K/UL (ref 4.3–11.1)
WBC # BLD AUTO: 9.7 K/UL (ref 4.3–11.1)
WBC MORPH BLD: ABNORMAL
WBC URNS QL MICRO: ABNORMAL /HPF

## 2021-01-01 PROCEDURE — 36415 COLL VENOUS BLD VENIPUNCTURE: CPT

## 2021-01-01 PROCEDURE — 97110 THERAPEUTIC EXERCISES: CPT

## 2021-01-01 PROCEDURE — 72072 X-RAY EXAM THORAC SPINE 3VWS: CPT

## 2021-01-01 PROCEDURE — 74011250637 HC RX REV CODE- 250/637: Performed by: PHYSICIAN ASSISTANT

## 2021-01-01 PROCEDURE — 85046 RETICYTE/HGB CONCENTRATE: CPT

## 2021-01-01 PROCEDURE — 83550 IRON BINDING TEST: CPT

## 2021-01-01 PROCEDURE — 94640 AIRWAY INHALATION TREATMENT: CPT

## 2021-01-01 PROCEDURE — 96372 THER/PROPH/DIAG INJ SC/IM: CPT

## 2021-01-01 PROCEDURE — 85018 HEMOGLOBIN: CPT

## 2021-01-01 PROCEDURE — 83735 ASSAY OF MAGNESIUM: CPT

## 2021-01-01 PROCEDURE — 85025 COMPLETE CBC W/AUTO DIFF WBC: CPT

## 2021-01-01 PROCEDURE — 99285 EMERGENCY DEPT VISIT HI MDM: CPT

## 2021-01-01 PROCEDURE — 74011250637 HC RX REV CODE- 250/637: Performed by: FAMILY MEDICINE

## 2021-01-01 PROCEDURE — 82962 GLUCOSE BLOOD TEST: CPT

## 2021-01-01 PROCEDURE — 74011636637 HC RX REV CODE- 636/637: Performed by: PHYSICIAN ASSISTANT

## 2021-01-01 PROCEDURE — 97535 SELF CARE MNGMENT TRAINING: CPT

## 2021-01-01 PROCEDURE — 70450 CT HEAD/BRAIN W/O DYE: CPT

## 2021-01-01 PROCEDURE — 73700 CT LOWER EXTREMITY W/O DYE: CPT

## 2021-01-01 PROCEDURE — 73590 X-RAY EXAM OF LOWER LEG: CPT

## 2021-01-01 PROCEDURE — 77030038269 HC DRN EXT URIN PURWCK BARD -A

## 2021-01-01 PROCEDURE — 2709999900 HC NON-CHARGEABLE SUPPLY

## 2021-01-01 PROCEDURE — 74011250636 HC RX REV CODE- 250/636: Performed by: INTERNAL MEDICINE

## 2021-01-01 PROCEDURE — 74011250637 HC RX REV CODE- 250/637: Performed by: HOSPITALIST

## 2021-01-01 PROCEDURE — 85027 COMPLETE CBC AUTOMATED: CPT

## 2021-01-01 PROCEDURE — 96374 THER/PROPH/DIAG INJ IV PUSH: CPT

## 2021-01-01 PROCEDURE — 74011000250 HC RX REV CODE- 250: Performed by: FAMILY MEDICINE

## 2021-01-01 PROCEDURE — 94760 N-INVAS EAR/PLS OXIMETRY 1: CPT

## 2021-01-01 PROCEDURE — 82550 ASSAY OF CK (CPK): CPT

## 2021-01-01 PROCEDURE — 83540 ASSAY OF IRON: CPT

## 2021-01-01 PROCEDURE — 87635 SARS-COV-2 COVID-19 AMP PRB: CPT

## 2021-01-01 PROCEDURE — 99211 OFF/OP EST MAY X REQ PHY/QHP: CPT

## 2021-01-01 PROCEDURE — 81003 URINALYSIS AUTO W/O SCOPE: CPT

## 2021-01-01 PROCEDURE — 97530 THERAPEUTIC ACTIVITIES: CPT

## 2021-01-01 PROCEDURE — 74011636637 HC RX REV CODE- 636/637: Performed by: FAMILY MEDICINE

## 2021-01-01 PROCEDURE — 84484 ASSAY OF TROPONIN QUANT: CPT

## 2021-01-01 PROCEDURE — 97166 OT EVAL MOD COMPLEX 45 MIN: CPT

## 2021-01-01 PROCEDURE — 65270000029 HC RM PRIVATE

## 2021-01-01 PROCEDURE — 87088 URINE BACTERIA CULTURE: CPT

## 2021-01-01 PROCEDURE — 74011250636 HC RX REV CODE- 250/636: Performed by: FAMILY MEDICINE

## 2021-01-01 PROCEDURE — 82607 VITAMIN B-12: CPT

## 2021-01-01 PROCEDURE — 93005 ELECTROCARDIOGRAM TRACING: CPT | Performed by: EMERGENCY MEDICINE

## 2021-01-01 PROCEDURE — 80053 COMPREHEN METABOLIC PANEL: CPT

## 2021-01-01 PROCEDURE — 74011250636 HC RX REV CODE- 250/636: Performed by: PHYSICIAN ASSISTANT

## 2021-01-01 PROCEDURE — 97112 NEUROMUSCULAR REEDUCATION: CPT

## 2021-01-01 PROCEDURE — 81015 MICROSCOPIC EXAM OF URINE: CPT

## 2021-01-01 PROCEDURE — 80048 BASIC METABOLIC PNL TOTAL CA: CPT

## 2021-01-01 PROCEDURE — 74011250636 HC RX REV CODE- 250/636: Performed by: NURSE PRACTITIONER

## 2021-01-01 PROCEDURE — 93971 EXTREMITY STUDY: CPT

## 2021-01-01 PROCEDURE — 72125 CT NECK SPINE W/O DYE: CPT

## 2021-01-01 PROCEDURE — 86580 TB INTRADERMAL TEST: CPT | Performed by: FAMILY MEDICINE

## 2021-01-01 PROCEDURE — 82728 ASSAY OF FERRITIN: CPT

## 2021-01-01 PROCEDURE — 73523 X-RAY EXAM HIPS BI 5/> VIEWS: CPT

## 2021-01-01 PROCEDURE — 87086 URINE CULTURE/COLONY COUNT: CPT

## 2021-01-01 PROCEDURE — 97162 PT EVAL MOD COMPLEX 30 MIN: CPT

## 2021-01-01 PROCEDURE — 87186 SC STD MICRODIL/AGAR DIL: CPT

## 2021-01-01 PROCEDURE — 74011000258 HC RX REV CODE- 258: Performed by: EMERGENCY MEDICINE

## 2021-01-01 PROCEDURE — 72110 X-RAY EXAM L-2 SPINE 4/>VWS: CPT

## 2021-01-01 PROCEDURE — 74011000258 HC RX REV CODE- 258: Performed by: FAMILY MEDICINE

## 2021-01-01 PROCEDURE — 74011250636 HC RX REV CODE- 250/636: Performed by: EMERGENCY MEDICINE

## 2021-01-01 PROCEDURE — 94664 DEMO&/EVAL PT USE INHALER: CPT

## 2021-01-01 PROCEDURE — 77030040361 HC SLV COMPR DVT MDII -B

## 2021-01-01 RX ORDER — ACETAMINOPHEN 325 MG/1
650 TABLET ORAL
Status: DISCONTINUED | OUTPATIENT
Start: 2021-01-01 | End: 2021-01-01 | Stop reason: HOSPADM

## 2021-01-01 RX ORDER — INSULIN GLARGINE 100 [IU]/ML
10 INJECTION, SOLUTION SUBCUTANEOUS DAILY
Status: DISCONTINUED | OUTPATIENT
Start: 2021-01-01 | End: 2021-01-01

## 2021-01-01 RX ORDER — SODIUM CHLORIDE 0.9 % (FLUSH) 0.9 %
5-40 SYRINGE (ML) INJECTION EVERY 8 HOURS
Status: DISCONTINUED | OUTPATIENT
Start: 2021-01-01 | End: 2021-01-01 | Stop reason: HOSPADM

## 2021-01-01 RX ORDER — DEXTROSE 40 %
15 GEL (GRAM) ORAL AS NEEDED
Status: DISCONTINUED | OUTPATIENT
Start: 2021-01-01 | End: 2021-01-01 | Stop reason: HOSPADM

## 2021-01-01 RX ORDER — BUDESONIDE AND FORMOTEROL FUMARATE DIHYDRATE 160; 4.5 UG/1; UG/1
2 AEROSOL RESPIRATORY (INHALATION)
Status: DISCONTINUED | OUTPATIENT
Start: 2021-01-01 | End: 2021-01-01 | Stop reason: HOSPADM

## 2021-01-01 RX ORDER — AMLODIPINE BESYLATE 10 MG/1
10 TABLET ORAL DAILY
Status: DISCONTINUED | OUTPATIENT
Start: 2021-01-01 | End: 2021-01-01

## 2021-01-01 RX ORDER — CYANOCOBALAMIN 1000 UG/ML
1000 INJECTION, SOLUTION INTRAMUSCULAR; SUBCUTANEOUS ONCE
Status: COMPLETED | OUTPATIENT
Start: 2021-01-01 | End: 2021-01-01

## 2021-01-01 RX ORDER — INSULIN GLARGINE 100 [IU]/ML
10 INJECTION, SOLUTION SUBCUTANEOUS
Status: DISCONTINUED | OUTPATIENT
Start: 2021-01-01 | End: 2021-01-01

## 2021-01-01 RX ORDER — AMLODIPINE BESYLATE 10 MG/1
10 TABLET ORAL DAILY
Status: DISCONTINUED | OUTPATIENT
Start: 2021-01-01 | End: 2021-01-01 | Stop reason: HOSPADM

## 2021-01-01 RX ORDER — ACETAMINOPHEN 650 MG/1
650 SUPPOSITORY RECTAL
Status: DISCONTINUED | OUTPATIENT
Start: 2021-01-01 | End: 2021-01-01 | Stop reason: HOSPADM

## 2021-01-01 RX ORDER — PANTOPRAZOLE SODIUM 40 MG/1
40 TABLET, DELAYED RELEASE ORAL
Status: DISCONTINUED | OUTPATIENT
Start: 2021-01-01 | End: 2021-01-01 | Stop reason: HOSPADM

## 2021-01-01 RX ORDER — SODIUM CHLORIDE 9 MG/ML
75 INJECTION, SOLUTION INTRAVENOUS CONTINUOUS
Status: DISCONTINUED | OUTPATIENT
Start: 2021-01-01 | End: 2021-01-01

## 2021-01-01 RX ORDER — HYDRALAZINE HYDROCHLORIDE 50 MG/1
100 TABLET, FILM COATED ORAL 3 TIMES DAILY
Status: DISCONTINUED | OUTPATIENT
Start: 2021-01-01 | End: 2021-01-01 | Stop reason: HOSPADM

## 2021-01-01 RX ORDER — TRAMADOL HYDROCHLORIDE 50 MG/1
50 TABLET ORAL
Status: DISCONTINUED | OUTPATIENT
Start: 2021-01-01 | End: 2021-01-01 | Stop reason: HOSPADM

## 2021-01-01 RX ORDER — SERTRALINE HYDROCHLORIDE 100 MG/1
100 TABLET, FILM COATED ORAL DAILY
Status: DISCONTINUED | OUTPATIENT
Start: 2021-01-01 | End: 2021-01-01 | Stop reason: HOSPADM

## 2021-01-01 RX ORDER — LABETALOL HYDROCHLORIDE 5 MG/ML
10 INJECTION, SOLUTION INTRAVENOUS
Status: DISCONTINUED | OUTPATIENT
Start: 2021-01-01 | End: 2021-01-01 | Stop reason: HOSPADM

## 2021-01-01 RX ORDER — CLONIDINE HYDROCHLORIDE 0.2 MG/1
0.2 TABLET ORAL 3 TIMES DAILY
Qty: 90 TABLET | Refills: 1 | Status: SHIPPED | OUTPATIENT
Start: 2021-01-01 | End: 2022-01-01

## 2021-01-01 RX ORDER — CLONIDINE HYDROCHLORIDE 0.2 MG/1
0.2 TABLET ORAL 3 TIMES DAILY
Status: DISCONTINUED | OUTPATIENT
Start: 2021-01-01 | End: 2021-01-01 | Stop reason: HOSPADM

## 2021-01-01 RX ORDER — ONDANSETRON 2 MG/ML
4 INJECTION INTRAMUSCULAR; INTRAVENOUS
Status: DISCONTINUED | OUTPATIENT
Start: 2021-01-01 | End: 2021-01-01 | Stop reason: HOSPADM

## 2021-01-01 RX ORDER — CIPROFLOXACIN 500 MG/1
500 TABLET ORAL EVERY 24 HOURS
Status: DISCONTINUED | OUTPATIENT
Start: 2021-01-01 | End: 2021-01-01

## 2021-01-01 RX ORDER — INSULIN GLARGINE 100 [IU]/ML
15 INJECTION, SOLUTION SUBCUTANEOUS DAILY
Status: DISCONTINUED | OUTPATIENT
Start: 2021-01-01 | End: 2021-01-01 | Stop reason: HOSPADM

## 2021-01-01 RX ORDER — LISINOPRIL 20 MG/1
40 TABLET ORAL DAILY
Status: DISCONTINUED | OUTPATIENT
Start: 2021-01-01 | End: 2021-01-01 | Stop reason: HOSPADM

## 2021-01-01 RX ORDER — SPIRONOLACTONE 25 MG/1
25 TABLET ORAL DAILY
Status: DISCONTINUED | OUTPATIENT
Start: 2021-01-01 | End: 2021-01-01 | Stop reason: HOSPADM

## 2021-01-01 RX ORDER — GABAPENTIN 100 MG/1
100 CAPSULE ORAL 3 TIMES DAILY
Status: DISCONTINUED | OUTPATIENT
Start: 2021-01-01 | End: 2021-01-01 | Stop reason: HOSPADM

## 2021-01-01 RX ORDER — FUROSEMIDE 20 MG/1
20 TABLET ORAL DAILY
Status: DISCONTINUED | OUTPATIENT
Start: 2021-01-01 | End: 2021-01-01 | Stop reason: HOSPADM

## 2021-01-01 RX ORDER — INSULIN LISPRO 100 [IU]/ML
INJECTION, SOLUTION INTRAVENOUS; SUBCUTANEOUS
Status: DISCONTINUED | OUTPATIENT
Start: 2021-01-01 | End: 2021-01-01 | Stop reason: HOSPADM

## 2021-01-01 RX ORDER — POLYETHYLENE GLYCOL 3350 17 G/17G
17 POWDER, FOR SOLUTION ORAL DAILY PRN
Status: DISCONTINUED | OUTPATIENT
Start: 2021-01-01 | End: 2021-01-01 | Stop reason: HOSPADM

## 2021-01-01 RX ORDER — CEFPODOXIME PROXETIL 100 MG/1
200 TABLET, FILM COATED ORAL EVERY 12 HOURS
Status: DISCONTINUED | OUTPATIENT
Start: 2021-01-01 | End: 2021-01-01

## 2021-01-01 RX ORDER — SODIUM CHLORIDE 0.9 % (FLUSH) 0.9 %
5-40 SYRINGE (ML) INJECTION AS NEEDED
Status: DISCONTINUED | OUTPATIENT
Start: 2021-01-01 | End: 2021-01-01 | Stop reason: HOSPADM

## 2021-01-01 RX ORDER — ALBUTEROL SULFATE 0.83 MG/ML
2.5 SOLUTION RESPIRATORY (INHALATION)
Status: DISCONTINUED | OUTPATIENT
Start: 2021-01-01 | End: 2021-01-01 | Stop reason: HOSPADM

## 2021-01-01 RX ORDER — HYDRALAZINE HYDROCHLORIDE 25 MG/1
100 TABLET, FILM COATED ORAL 3 TIMES DAILY
Status: DISCONTINUED | OUTPATIENT
Start: 2021-01-01 | End: 2021-01-01

## 2021-01-01 RX ORDER — ONDANSETRON 4 MG/1
4 TABLET, ORALLY DISINTEGRATING ORAL
Status: DISCONTINUED | OUTPATIENT
Start: 2021-01-01 | End: 2021-01-01 | Stop reason: HOSPADM

## 2021-01-01 RX ORDER — DEXTROSE 50 % IN WATER (D50W) INTRAVENOUS SYRINGE
25-50 AS NEEDED
Status: DISCONTINUED | OUTPATIENT
Start: 2021-01-01 | End: 2021-01-01 | Stop reason: HOSPADM

## 2021-01-01 RX ORDER — PRAVASTATIN SODIUM 20 MG/1
40 TABLET ORAL
Status: DISCONTINUED | OUTPATIENT
Start: 2021-01-01 | End: 2021-01-01 | Stop reason: HOSPADM

## 2021-01-01 RX ADMIN — HYDRALAZINE HYDROCHLORIDE 100 MG: 50 TABLET, FILM COATED ORAL at 17:04

## 2021-01-01 RX ADMIN — SPIRONOLACTONE 25 MG: 25 TABLET ORAL at 08:32

## 2021-01-01 RX ADMIN — INSULIN GLARGINE 10 UNITS: 100 INJECTION, SOLUTION SUBCUTANEOUS at 21:55

## 2021-01-01 RX ADMIN — CLONIDINE HYDROCHLORIDE 0.2 MG: 0.2 TABLET ORAL at 17:18

## 2021-01-01 RX ADMIN — HYDRALAZINE HYDROCHLORIDE 100 MG: 50 TABLET, FILM COATED ORAL at 21:17

## 2021-01-01 RX ADMIN — FUROSEMIDE 20 MG: 40 TABLET ORAL at 08:23

## 2021-01-01 RX ADMIN — SPIRONOLACTONE 25 MG: 25 TABLET ORAL at 08:44

## 2021-01-01 RX ADMIN — GABAPENTIN 100 MG: 100 CAPSULE ORAL at 21:55

## 2021-01-01 RX ADMIN — Medication 10 ML: at 00:36

## 2021-01-01 RX ADMIN — CLONIDINE HYDROCHLORIDE 0.2 MG: 0.2 TABLET ORAL at 08:23

## 2021-01-01 RX ADMIN — Medication 10 ML: at 22:33

## 2021-01-01 RX ADMIN — FUROSEMIDE 20 MG: 40 TABLET ORAL at 08:20

## 2021-01-01 RX ADMIN — PRAVASTATIN SODIUM 40 MG: 20 TABLET ORAL at 21:55

## 2021-01-01 RX ADMIN — SPIRONOLACTONE 25 MG: 25 TABLET ORAL at 08:19

## 2021-01-01 RX ADMIN — CEFTRIAXONE 1 G: 1 INJECTION, POWDER, FOR SOLUTION INTRAMUSCULAR; INTRAVENOUS at 22:28

## 2021-01-01 RX ADMIN — Medication 5 ML: at 21:24

## 2021-01-01 RX ADMIN — BUDESONIDE AND FORMOTEROL FUMARATE DIHYDRATE 2 PUFF: 160; 4.5 AEROSOL RESPIRATORY (INHALATION) at 20:49

## 2021-01-01 RX ADMIN — INSULIN GLARGINE 15 UNITS: 100 INJECTION, SOLUTION SUBCUTANEOUS at 08:32

## 2021-01-01 RX ADMIN — Medication 4 UNITS: at 12:09

## 2021-01-01 RX ADMIN — GABAPENTIN 100 MG: 100 CAPSULE ORAL at 16:50

## 2021-01-01 RX ADMIN — BUDESONIDE AND FORMOTEROL FUMARATE DIHYDRATE 2 PUFF: 160; 4.5 AEROSOL RESPIRATORY (INHALATION) at 08:02

## 2021-01-01 RX ADMIN — GABAPENTIN 100 MG: 100 CAPSULE ORAL at 22:27

## 2021-01-01 RX ADMIN — PRAVASTATIN SODIUM 40 MG: 20 TABLET ORAL at 00:35

## 2021-01-01 RX ADMIN — PANTOPRAZOLE SODIUM 40 MG: 40 TABLET, DELAYED RELEASE ORAL at 06:30

## 2021-01-01 RX ADMIN — HYDRALAZINE HYDROCHLORIDE 100 MG: 50 TABLET, FILM COATED ORAL at 17:19

## 2021-01-01 RX ADMIN — INSULIN GLARGINE 10 UNITS: 100 INJECTION, SOLUTION SUBCUTANEOUS at 22:31

## 2021-01-01 RX ADMIN — PRAVASTATIN SODIUM 40 MG: 20 TABLET ORAL at 22:27

## 2021-01-01 RX ADMIN — Medication 6 UNITS: at 21:54

## 2021-01-01 RX ADMIN — GABAPENTIN 100 MG: 100 CAPSULE ORAL at 21:50

## 2021-01-01 RX ADMIN — CEFPODOXIME PROXETIL 200 MG: 100 TABLET, FILM COATED ORAL at 09:17

## 2021-01-01 RX ADMIN — HYDRALAZINE HYDROCHLORIDE 100 MG: 50 TABLET, FILM COATED ORAL at 09:17

## 2021-01-01 RX ADMIN — SERTRALINE 100 MG: 100 TABLET, FILM COATED ORAL at 08:26

## 2021-01-01 RX ADMIN — GABAPENTIN 100 MG: 100 CAPSULE ORAL at 09:17

## 2021-01-01 RX ADMIN — GABAPENTIN 100 MG: 100 CAPSULE ORAL at 08:24

## 2021-01-01 RX ADMIN — AMLODIPINE BESYLATE 10 MG: 10 TABLET ORAL at 09:17

## 2021-01-01 RX ADMIN — SERTRALINE 100 MG: 100 TABLET, FILM COATED ORAL at 08:24

## 2021-01-01 RX ADMIN — HYDRALAZINE HYDROCHLORIDE 100 MG: 50 TABLET, FILM COATED ORAL at 16:50

## 2021-01-01 RX ADMIN — CLONIDINE HYDROCHLORIDE 0.2 MG: 0.2 TABLET ORAL at 08:19

## 2021-01-01 RX ADMIN — PANTOPRAZOLE SODIUM 40 MG: 40 TABLET, DELAYED RELEASE ORAL at 05:44

## 2021-01-01 RX ADMIN — INSULIN GLARGINE 10 UNITS: 100 INJECTION, SOLUTION SUBCUTANEOUS at 08:45

## 2021-01-01 RX ADMIN — Medication 10 ML: at 05:33

## 2021-01-01 RX ADMIN — TUBERCULIN PURIFIED PROTEIN DERIVATIVE 5 UNITS: 5 INJECTION, SOLUTION INTRADERMAL at 00:36

## 2021-01-01 RX ADMIN — Medication 2 UNITS: at 12:24

## 2021-01-01 RX ADMIN — CLONIDINE HYDROCHLORIDE 0.2 MG: 0.2 TABLET ORAL at 09:16

## 2021-01-01 RX ADMIN — AMLODIPINE BESYLATE 10 MG: 10 TABLET ORAL at 08:47

## 2021-01-01 RX ADMIN — CYANOCOBALAMIN 1000 MCG: 1000 INJECTION, SOLUTION INTRAMUSCULAR; SUBCUTANEOUS at 13:21

## 2021-01-01 RX ADMIN — Medication 10 ML: at 06:27

## 2021-01-01 RX ADMIN — Medication 5 ML: at 21:19

## 2021-01-01 RX ADMIN — Medication 10 UNITS: at 17:17

## 2021-01-01 RX ADMIN — HYDRALAZINE HYDROCHLORIDE 100 MG: 50 TABLET, FILM COATED ORAL at 11:50

## 2021-01-01 RX ADMIN — Medication 10 ML: at 13:21

## 2021-01-01 RX ADMIN — HYDRALAZINE HYDROCHLORIDE 100 MG: 50 TABLET, FILM COATED ORAL at 16:54

## 2021-01-01 RX ADMIN — GABAPENTIN 100 MG: 100 CAPSULE ORAL at 17:04

## 2021-01-01 RX ADMIN — HYDRALAZINE HYDROCHLORIDE 100 MG: 50 TABLET, FILM COATED ORAL at 08:47

## 2021-01-01 RX ADMIN — Medication 10 ML: at 14:00

## 2021-01-01 RX ADMIN — CYANOCOBALAMIN 1000 MCG: 1000 INJECTION, SOLUTION INTRAMUSCULAR; SUBCUTANEOUS at 15:34

## 2021-01-01 RX ADMIN — CLONIDINE HYDROCHLORIDE 0.2 MG: 0.2 TABLET ORAL at 10:18

## 2021-01-01 RX ADMIN — EPOETIN ALFA-EPBX 40000 UNITS: 40000 INJECTION, SOLUTION INTRAVENOUS; SUBCUTANEOUS at 15:03

## 2021-01-01 RX ADMIN — CLONIDINE HYDROCHLORIDE 0.2 MG: 0.2 TABLET ORAL at 09:37

## 2021-01-01 RX ADMIN — GABAPENTIN 100 MG: 100 CAPSULE ORAL at 17:17

## 2021-01-01 RX ADMIN — FUROSEMIDE 20 MG: 40 TABLET ORAL at 08:33

## 2021-01-01 RX ADMIN — Medication 6 UNITS: at 20:08

## 2021-01-01 RX ADMIN — LISINOPRIL 40 MG: 20 TABLET ORAL at 09:37

## 2021-01-01 RX ADMIN — CLONIDINE HYDROCHLORIDE 0.2 MG: 0.2 TABLET ORAL at 17:19

## 2021-01-01 RX ADMIN — FUROSEMIDE 20 MG: 40 TABLET ORAL at 08:26

## 2021-01-01 RX ADMIN — Medication 10 ML: at 06:16

## 2021-01-01 RX ADMIN — AMLODIPINE BESYLATE 10 MG: 10 TABLET ORAL at 08:24

## 2021-01-01 RX ADMIN — GABAPENTIN 100 MG: 100 CAPSULE ORAL at 09:37

## 2021-01-01 RX ADMIN — INSULIN GLARGINE 10 UNITS: 100 INJECTION, SOLUTION SUBCUTANEOUS at 08:24

## 2021-01-01 RX ADMIN — HYDRALAZINE HYDROCHLORIDE 100 MG: 50 TABLET, FILM COATED ORAL at 17:21

## 2021-01-01 RX ADMIN — CLONIDINE HYDROCHLORIDE 0.2 MG: 0.2 TABLET ORAL at 14:23

## 2021-01-01 RX ADMIN — PRAVASTATIN SODIUM 40 MG: 20 TABLET ORAL at 21:00

## 2021-01-01 RX ADMIN — LISINOPRIL 40 MG: 20 TABLET ORAL at 09:59

## 2021-01-01 RX ADMIN — Medication 4 UNITS: at 14:23

## 2021-01-01 RX ADMIN — PANTOPRAZOLE SODIUM 40 MG: 40 TABLET, DELAYED RELEASE ORAL at 05:15

## 2021-01-01 RX ADMIN — Medication 4 UNITS: at 21:57

## 2021-01-01 RX ADMIN — LISINOPRIL 40 MG: 20 TABLET ORAL at 08:26

## 2021-01-01 RX ADMIN — INSULIN GLARGINE 15 UNITS: 100 INJECTION, SOLUTION SUBCUTANEOUS at 10:17

## 2021-01-01 RX ADMIN — FUROSEMIDE 20 MG: 40 TABLET ORAL at 08:47

## 2021-01-01 RX ADMIN — CLONIDINE HYDROCHLORIDE 0.2 MG: 0.2 TABLET ORAL at 21:55

## 2021-01-01 RX ADMIN — SPIRONOLACTONE 25 MG: 25 TABLET ORAL at 10:18

## 2021-01-01 RX ADMIN — LISINOPRIL 40 MG: 20 TABLET ORAL at 10:19

## 2021-01-01 RX ADMIN — HYDRALAZINE HYDROCHLORIDE 100 MG: 50 TABLET, FILM COATED ORAL at 08:33

## 2021-01-01 RX ADMIN — CEFTRIAXONE 1 G: 1 INJECTION, POWDER, FOR SOLUTION INTRAMUSCULAR; INTRAVENOUS at 22:01

## 2021-01-01 RX ADMIN — HYDRALAZINE HYDROCHLORIDE 100 MG: 50 TABLET, FILM COATED ORAL at 21:53

## 2021-01-01 RX ADMIN — CEFPODOXIME PROXETIL 200 MG: 100 TABLET, FILM COATED ORAL at 08:46

## 2021-01-01 RX ADMIN — Medication 10 ML: at 13:40

## 2021-01-01 RX ADMIN — SERTRALINE 100 MG: 100 TABLET, FILM COATED ORAL at 10:19

## 2021-01-01 RX ADMIN — GABAPENTIN 100 MG: 100 CAPSULE ORAL at 16:49

## 2021-01-01 RX ADMIN — SPIRONOLACTONE 25 MG: 25 TABLET ORAL at 08:25

## 2021-01-01 RX ADMIN — PRAVASTATIN SODIUM 40 MG: 20 TABLET ORAL at 21:09

## 2021-01-01 RX ADMIN — SODIUM CHLORIDE 75 ML/HR: 900 INJECTION, SOLUTION INTRAVENOUS at 14:23

## 2021-01-01 RX ADMIN — Medication 4 UNITS: at 17:11

## 2021-01-01 RX ADMIN — Medication 10 ML: at 22:02

## 2021-01-01 RX ADMIN — Medication 4 UNITS: at 17:44

## 2021-01-01 RX ADMIN — HYDRALAZINE HYDROCHLORIDE 100 MG: 50 TABLET, FILM COATED ORAL at 09:37

## 2021-01-01 RX ADMIN — CLONIDINE HYDROCHLORIDE 0.2 MG: 0.2 TABLET ORAL at 08:44

## 2021-01-01 RX ADMIN — INSULIN GLARGINE 10 UNITS: 100 INJECTION, SOLUTION SUBCUTANEOUS at 21:58

## 2021-01-01 RX ADMIN — FUROSEMIDE 20 MG: 40 TABLET ORAL at 08:44

## 2021-01-01 RX ADMIN — GABAPENTIN 100 MG: 100 CAPSULE ORAL at 09:59

## 2021-01-01 RX ADMIN — PRAVASTATIN SODIUM 40 MG: 20 TABLET ORAL at 21:20

## 2021-01-01 RX ADMIN — Medication 8 UNITS: at 12:26

## 2021-01-01 RX ADMIN — LISINOPRIL 40 MG: 20 TABLET ORAL at 08:47

## 2021-01-01 RX ADMIN — AMLODIPINE BESYLATE 10 MG: 10 TABLET ORAL at 08:46

## 2021-01-01 RX ADMIN — EPOETIN ALFA-EPBX 60000 UNITS: 40000 INJECTION, SOLUTION INTRAVENOUS; SUBCUTANEOUS at 11:20

## 2021-01-01 RX ADMIN — PANTOPRAZOLE SODIUM 40 MG: 40 TABLET, DELAYED RELEASE ORAL at 05:23

## 2021-01-01 RX ADMIN — SPIRONOLACTONE 25 MG: 25 TABLET ORAL at 08:47

## 2021-01-01 RX ADMIN — GABAPENTIN 100 MG: 100 CAPSULE ORAL at 17:19

## 2021-01-01 RX ADMIN — CLONIDINE HYDROCHLORIDE 0.2 MG: 0.2 TABLET ORAL at 17:11

## 2021-01-01 RX ADMIN — HYDRALAZINE HYDROCHLORIDE 100 MG: 50 TABLET, FILM COATED ORAL at 08:26

## 2021-01-01 RX ADMIN — SPIRONOLACTONE 25 MG: 25 TABLET ORAL at 08:26

## 2021-01-01 RX ADMIN — EPOETIN ALFA-EPBX 60000 UNITS: 40000 INJECTION, SOLUTION INTRAVENOUS; SUBCUTANEOUS at 12:04

## 2021-01-01 RX ADMIN — BUDESONIDE AND FORMOTEROL FUMARATE DIHYDRATE 2 PUFF: 160; 4.5 AEROSOL RESPIRATORY (INHALATION) at 07:51

## 2021-01-01 RX ADMIN — PANTOPRAZOLE SODIUM 40 MG: 40 TABLET, DELAYED RELEASE ORAL at 05:57

## 2021-01-01 RX ADMIN — ACETAMINOPHEN 650 MG: 325 TABLET ORAL at 06:28

## 2021-01-01 RX ADMIN — FUROSEMIDE 20 MG: 40 TABLET ORAL at 09:17

## 2021-01-01 RX ADMIN — LISINOPRIL 40 MG: 20 TABLET ORAL at 09:17

## 2021-01-01 RX ADMIN — INSULIN GLARGINE 10 UNITS: 100 INJECTION, SOLUTION SUBCUTANEOUS at 21:22

## 2021-01-01 RX ADMIN — Medication 10 ML: at 21:56

## 2021-01-01 RX ADMIN — SERTRALINE 100 MG: 100 TABLET, FILM COATED ORAL at 09:17

## 2021-01-01 RX ADMIN — HYDRALAZINE HYDROCHLORIDE 100 MG: 50 TABLET, FILM COATED ORAL at 21:19

## 2021-01-01 RX ADMIN — GABAPENTIN 100 MG: 100 CAPSULE ORAL at 21:00

## 2021-01-01 RX ADMIN — Medication 6 UNITS: at 17:19

## 2021-01-01 RX ADMIN — BUDESONIDE AND FORMOTEROL FUMARATE DIHYDRATE 2 PUFF: 160; 4.5 AEROSOL RESPIRATORY (INHALATION) at 07:33

## 2021-01-01 RX ADMIN — SPIRONOLACTONE 25 MG: 25 TABLET ORAL at 09:37

## 2021-01-01 RX ADMIN — PANTOPRAZOLE SODIUM 40 MG: 40 TABLET, DELAYED RELEASE ORAL at 06:00

## 2021-01-01 RX ADMIN — CEFPODOXIME PROXETIL 200 MG: 100 TABLET, FILM COATED ORAL at 21:53

## 2021-01-01 RX ADMIN — Medication 4 UNITS: at 09:58

## 2021-01-01 RX ADMIN — BUDESONIDE AND FORMOTEROL FUMARATE DIHYDRATE 2 PUFF: 160; 4.5 AEROSOL RESPIRATORY (INHALATION) at 21:15

## 2021-01-01 RX ADMIN — EPOETIN ALFA-EPBX 60000 UNITS: 40000 INJECTION, SOLUTION INTRAVENOUS; SUBCUTANEOUS at 12:03

## 2021-01-01 RX ADMIN — LISINOPRIL 40 MG: 20 TABLET ORAL at 08:19

## 2021-01-01 RX ADMIN — SERTRALINE 100 MG: 100 TABLET, FILM COATED ORAL at 11:50

## 2021-01-01 RX ADMIN — SERTRALINE 100 MG: 100 TABLET, FILM COATED ORAL at 08:20

## 2021-01-01 RX ADMIN — FUROSEMIDE 20 MG: 40 TABLET ORAL at 09:59

## 2021-01-01 RX ADMIN — Medication 2 UNITS: at 17:47

## 2021-01-01 RX ADMIN — FUROSEMIDE 20 MG: 40 TABLET ORAL at 10:18

## 2021-01-01 RX ADMIN — INSULIN GLARGINE 10 UNITS: 100 INJECTION, SOLUTION SUBCUTANEOUS at 21:16

## 2021-01-01 RX ADMIN — PANTOPRAZOLE SODIUM 40 MG: 40 TABLET, DELAYED RELEASE ORAL at 05:30

## 2021-01-01 RX ADMIN — GABAPENTIN 100 MG: 100 CAPSULE ORAL at 08:44

## 2021-01-01 RX ADMIN — Medication 2 UNITS: at 21:00

## 2021-01-01 RX ADMIN — Medication 8 UNITS: at 11:20

## 2021-01-01 RX ADMIN — GABAPENTIN 100 MG: 100 CAPSULE ORAL at 17:18

## 2021-01-01 RX ADMIN — Medication 6 UNITS: at 17:20

## 2021-01-01 RX ADMIN — Medication 4 UNITS: at 12:08

## 2021-01-01 RX ADMIN — GABAPENTIN 100 MG: 100 CAPSULE ORAL at 21:17

## 2021-01-01 RX ADMIN — CLONIDINE HYDROCHLORIDE 0.2 MG: 0.2 TABLET ORAL at 21:53

## 2021-01-01 RX ADMIN — BUDESONIDE AND FORMOTEROL FUMARATE DIHYDRATE 2 PUFF: 160; 4.5 AEROSOL RESPIRATORY (INHALATION) at 20:39

## 2021-01-01 RX ADMIN — EPOETIN ALFA-EPBX 60000 UNITS: 40000 INJECTION, SOLUTION INTRAVENOUS; SUBCUTANEOUS at 15:33

## 2021-01-01 RX ADMIN — Medication 8 UNITS: at 16:48

## 2021-01-01 RX ADMIN — CLONIDINE HYDROCHLORIDE 0.2 MG: 0.2 TABLET ORAL at 21:20

## 2021-01-01 RX ADMIN — SERTRALINE 100 MG: 100 TABLET, FILM COATED ORAL at 09:37

## 2021-01-01 RX ADMIN — AMLODIPINE BESYLATE 10 MG: 10 TABLET ORAL at 09:37

## 2021-01-01 RX ADMIN — AMLODIPINE BESYLATE 10 MG: 10 TABLET ORAL at 08:26

## 2021-01-01 RX ADMIN — CEFPODOXIME PROXETIL 200 MG: 100 TABLET, FILM COATED ORAL at 21:19

## 2021-01-01 RX ADMIN — PANTOPRAZOLE SODIUM 40 MG: 40 TABLET, DELAYED RELEASE ORAL at 05:03

## 2021-01-01 RX ADMIN — BUDESONIDE AND FORMOTEROL FUMARATE DIHYDRATE 2 PUFF: 160; 4.5 AEROSOL RESPIRATORY (INHALATION) at 20:41

## 2021-01-01 RX ADMIN — BUDESONIDE AND FORMOTEROL FUMARATE DIHYDRATE 2 PUFF: 160; 4.5 AEROSOL RESPIRATORY (INHALATION) at 08:39

## 2021-01-01 RX ADMIN — INSULIN GLARGINE 5 UNITS: 100 INJECTION, SOLUTION SUBCUTANEOUS at 09:42

## 2021-01-01 RX ADMIN — GABAPENTIN 100 MG: 100 CAPSULE ORAL at 21:08

## 2021-01-01 RX ADMIN — Medication 10 ML: at 21:09

## 2021-01-01 RX ADMIN — CEFPODOXIME PROXETIL 200 MG: 100 TABLET, FILM COATED ORAL at 21:17

## 2021-01-01 RX ADMIN — AMLODIPINE BESYLATE 10 MG: 10 TABLET ORAL at 10:18

## 2021-01-01 RX ADMIN — Medication 2 UNITS: at 08:31

## 2021-01-01 RX ADMIN — SPIRONOLACTONE 25 MG: 25 TABLET ORAL at 09:17

## 2021-01-01 RX ADMIN — HYDRALAZINE HYDROCHLORIDE 100 MG: 50 TABLET, FILM COATED ORAL at 08:20

## 2021-01-01 RX ADMIN — Medication 5 ML: at 05:46

## 2021-01-01 RX ADMIN — HYDRALAZINE HYDROCHLORIDE 100 MG: 50 TABLET, FILM COATED ORAL at 08:23

## 2021-01-01 RX ADMIN — GABAPENTIN 100 MG: 100 CAPSULE ORAL at 08:47

## 2021-01-01 RX ADMIN — GABAPENTIN 100 MG: 100 CAPSULE ORAL at 16:54

## 2021-01-01 RX ADMIN — PANTOPRAZOLE SODIUM 40 MG: 40 TABLET, DELAYED RELEASE ORAL at 10:00

## 2021-01-01 RX ADMIN — CYANOCOBALAMIN 1000 MCG: 1000 INJECTION, SOLUTION INTRAMUSCULAR; SUBCUTANEOUS at 11:24

## 2021-01-01 RX ADMIN — INSULIN GLARGINE 10 UNITS: 100 INJECTION, SOLUTION SUBCUTANEOUS at 21:25

## 2021-01-01 RX ADMIN — GABAPENTIN 100 MG: 100 CAPSULE ORAL at 10:19

## 2021-01-01 RX ADMIN — Medication 2 UNITS: at 21:57

## 2021-01-01 RX ADMIN — CLONIDINE HYDROCHLORIDE 0.2 MG: 0.2 TABLET ORAL at 17:04

## 2021-01-01 RX ADMIN — Medication 10 ML: at 21:58

## 2021-01-01 RX ADMIN — LISINOPRIL 40 MG: 20 TABLET ORAL at 08:24

## 2021-01-01 RX ADMIN — HYDRALAZINE HYDROCHLORIDE 100 MG: 50 TABLET, FILM COATED ORAL at 17:17

## 2021-01-01 RX ADMIN — Medication 4 UNITS: at 11:59

## 2021-01-01 RX ADMIN — GABAPENTIN 100 MG: 100 CAPSULE ORAL at 21:53

## 2021-01-01 RX ADMIN — Medication 2 UNITS: at 12:21

## 2021-01-01 RX ADMIN — HYDRALAZINE HYDROCHLORIDE 100 MG: 50 TABLET, FILM COATED ORAL at 03:41

## 2021-01-01 RX ADMIN — CLONIDINE HYDROCHLORIDE 0.2 MG: 0.2 TABLET ORAL at 17:17

## 2021-01-01 RX ADMIN — Medication 4 UNITS: at 21:50

## 2021-01-01 RX ADMIN — Medication 10 ML: at 05:11

## 2021-01-01 RX ADMIN — LABETALOL HYDROCHLORIDE 10 MG: 5 INJECTION INTRAVENOUS at 03:13

## 2021-01-01 RX ADMIN — INSULIN GLARGINE 10 UNITS: 100 INJECTION, SOLUTION SUBCUTANEOUS at 12:21

## 2021-01-01 RX ADMIN — BUDESONIDE AND FORMOTEROL FUMARATE DIHYDRATE 2 PUFF: 160; 4.5 AEROSOL RESPIRATORY (INHALATION) at 20:00

## 2021-01-01 RX ADMIN — SERTRALINE 100 MG: 100 TABLET, FILM COATED ORAL at 08:45

## 2021-01-01 RX ADMIN — BUDESONIDE AND FORMOTEROL FUMARATE DIHYDRATE 2 PUFF: 160; 4.5 AEROSOL RESPIRATORY (INHALATION) at 21:32

## 2021-01-01 RX ADMIN — SERTRALINE 100 MG: 100 TABLET, FILM COATED ORAL at 08:47

## 2021-01-01 RX ADMIN — GABAPENTIN 100 MG: 100 CAPSULE ORAL at 08:33

## 2021-01-01 RX ADMIN — Medication 10 ML: at 21:05

## 2021-01-01 RX ADMIN — FUROSEMIDE 20 MG: 40 TABLET ORAL at 09:37

## 2021-01-01 RX ADMIN — CLONIDINE HYDROCHLORIDE 0.2 MG: 0.2 TABLET ORAL at 21:17

## 2021-01-01 RX ADMIN — CIPROFLOXACIN 500 MG: 500 TABLET, FILM COATED ORAL at 08:26

## 2021-01-01 RX ADMIN — CEFTRIAXONE 1 G: 1 INJECTION, POWDER, FOR SOLUTION INTRAMUSCULAR; INTRAVENOUS at 23:16

## 2021-01-01 RX ADMIN — CLONIDINE HYDROCHLORIDE 0.2 MG: 0.2 TABLET ORAL at 16:49

## 2021-01-01 RX ADMIN — PRAVASTATIN SODIUM 40 MG: 20 TABLET ORAL at 21:17

## 2021-01-01 RX ADMIN — GABAPENTIN 100 MG: 100 CAPSULE ORAL at 21:20

## 2021-01-01 RX ADMIN — AMLODIPINE BESYLATE 10 MG: 10 TABLET ORAL at 08:20

## 2021-01-01 RX ADMIN — Medication 10 ML: at 16:54

## 2021-01-01 RX ADMIN — Medication 5 ML: at 05:14

## 2021-01-01 RX ADMIN — Medication 10 ML: at 05:04

## 2021-01-01 RX ADMIN — CEFPODOXIME PROXETIL 200 MG: 100 TABLET, FILM COATED ORAL at 21:50

## 2021-01-01 RX ADMIN — BUDESONIDE AND FORMOTEROL FUMARATE DIHYDRATE 2 PUFF: 160; 4.5 AEROSOL RESPIRATORY (INHALATION) at 20:14

## 2021-01-01 RX ADMIN — BUDESONIDE AND FORMOTEROL FUMARATE DIHYDRATE 2 PUFF: 160; 4.5 AEROSOL RESPIRATORY (INHALATION) at 07:28

## 2021-01-01 RX ADMIN — Medication 10 ML: at 17:23

## 2021-01-01 RX ADMIN — Medication 8 UNITS: at 17:04

## 2021-01-01 RX ADMIN — CEFPODOXIME PROXETIL 200 MG: 100 TABLET, FILM COATED ORAL at 09:37

## 2021-01-01 RX ADMIN — INSULIN GLARGINE 10 UNITS: 100 INJECTION, SOLUTION SUBCUTANEOUS at 21:54

## 2021-01-01 RX ADMIN — BUDESONIDE AND FORMOTEROL FUMARATE DIHYDRATE 2 PUFF: 160; 4.5 AEROSOL RESPIRATORY (INHALATION) at 07:43

## 2021-01-01 RX ADMIN — CLONIDINE HYDROCHLORIDE 0.2 MG: 0.2 TABLET ORAL at 08:26

## 2021-01-01 RX ADMIN — HYDRALAZINE HYDROCHLORIDE 100 MG: 50 TABLET, FILM COATED ORAL at 22:27

## 2021-01-01 RX ADMIN — PRAVASTATIN SODIUM 40 MG: 20 TABLET ORAL at 21:53

## 2021-01-01 RX ADMIN — AMLODIPINE BESYLATE 10 MG: 10 TABLET ORAL at 03:41

## 2021-01-01 RX ADMIN — Medication 2 UNITS: at 08:19

## 2021-01-01 RX ADMIN — BUDESONIDE AND FORMOTEROL FUMARATE DIHYDRATE 2 PUFF: 160; 4.5 AEROSOL RESPIRATORY (INHALATION) at 19:59

## 2021-01-01 RX ADMIN — GABAPENTIN 100 MG: 100 CAPSULE ORAL at 17:11

## 2021-01-01 RX ADMIN — EPOETIN ALFA-EPBX 60000 UNITS: 40000 INJECTION, SOLUTION INTRAVENOUS; SUBCUTANEOUS at 11:21

## 2021-01-01 RX ADMIN — GABAPENTIN 100 MG: 100 CAPSULE ORAL at 08:20

## 2021-01-01 RX ADMIN — Medication 2 UNITS: at 21:16

## 2021-01-01 RX ADMIN — LISINOPRIL 40 MG: 20 TABLET ORAL at 08:44

## 2021-01-01 RX ADMIN — HYDRALAZINE HYDROCHLORIDE 100 MG: 50 TABLET, FILM COATED ORAL at 17:11

## 2021-01-01 RX ADMIN — CLONIDINE HYDROCHLORIDE 0.2 MG: 0.2 TABLET ORAL at 16:54

## 2021-01-01 RX ADMIN — LISINOPRIL 40 MG: 20 TABLET ORAL at 08:33

## 2021-01-01 RX ADMIN — HYDRALAZINE HYDROCHLORIDE 100 MG: 50 TABLET, FILM COATED ORAL at 10:19

## 2021-01-01 RX ADMIN — CEFPODOXIME PROXETIL 200 MG: 100 TABLET, FILM COATED ORAL at 08:19

## 2021-01-01 RX ADMIN — BUDESONIDE AND FORMOTEROL FUMARATE DIHYDRATE 2 PUFF: 160; 4.5 AEROSOL RESPIRATORY (INHALATION) at 20:12

## 2021-01-01 RX ADMIN — GABAPENTIN 100 MG: 100 CAPSULE ORAL at 08:26

## 2021-01-01 RX ADMIN — CLONIDINE HYDROCHLORIDE 0.2 MG: 0.2 TABLET ORAL at 22:00

## 2021-01-01 RX ADMIN — CEFPODOXIME PROXETIL 200 MG: 100 TABLET, FILM COATED ORAL at 12:21

## 2021-01-01 RX ADMIN — INSULIN GLARGINE 15 UNITS: 100 INJECTION, SOLUTION SUBCUTANEOUS at 08:48

## 2021-01-01 RX ADMIN — Medication 10 ML: at 21:22

## 2021-01-01 RX ADMIN — HYDRALAZINE HYDROCHLORIDE 100 MG: 50 TABLET, FILM COATED ORAL at 21:55

## 2021-01-01 RX ADMIN — HYDRALAZINE HYDROCHLORIDE 100 MG: 50 TABLET, FILM COATED ORAL at 17:18

## 2021-01-01 RX ADMIN — PRAVASTATIN SODIUM 40 MG: 20 TABLET ORAL at 21:50

## 2021-01-01 RX ADMIN — Medication 4 UNITS: at 17:19

## 2021-01-01 RX ADMIN — CLONIDINE HYDROCHLORIDE 0.2 MG: 0.2 TABLET ORAL at 08:32

## 2021-01-01 RX ADMIN — AMLODIPINE BESYLATE 10 MG: 10 TABLET ORAL at 08:33

## 2021-01-01 RX ADMIN — Medication 2 UNITS: at 08:25

## 2021-01-01 RX ADMIN — PANTOPRAZOLE SODIUM 40 MG: 40 TABLET, DELAYED RELEASE ORAL at 06:16

## 2021-01-01 RX ADMIN — Medication 10 ML: at 04:38

## 2021-01-01 RX ADMIN — CYANOCOBALAMIN 1000 MCG: 1000 INJECTION, SOLUTION INTRAMUSCULAR; SUBCUTANEOUS at 15:05

## 2021-01-01 RX ADMIN — Medication 10 ML: at 14:37

## 2021-01-01 RX ADMIN — INSULIN GLARGINE 10 UNITS: 100 INJECTION, SOLUTION SUBCUTANEOUS at 09:41

## 2021-01-01 RX ADMIN — CLONIDINE HYDROCHLORIDE 0.2 MG: 0.2 TABLET ORAL at 16:50

## 2021-01-01 RX ADMIN — Medication 10 ML: at 05:23

## 2021-01-01 RX ADMIN — BUDESONIDE AND FORMOTEROL FUMARATE DIHYDRATE 2 PUFF: 160; 4.5 AEROSOL RESPIRATORY (INHALATION) at 09:07

## 2021-01-01 RX ADMIN — BUDESONIDE AND FORMOTEROL FUMARATE DIHYDRATE 2 PUFF: 160; 4.5 AEROSOL RESPIRATORY (INHALATION) at 09:28

## 2021-01-01 RX ADMIN — GABAPENTIN 100 MG: 100 CAPSULE ORAL at 21:23

## 2021-01-01 RX ADMIN — Medication 6 UNITS: at 22:00

## 2021-01-01 RX ADMIN — CLONIDINE HYDROCHLORIDE 0.2 MG: 0.2 TABLET ORAL at 08:47

## 2021-01-01 RX ADMIN — SERTRALINE 100 MG: 100 TABLET, FILM COATED ORAL at 08:33

## 2021-01-01 RX ADMIN — PRAVASTATIN SODIUM 40 MG: 20 TABLET ORAL at 21:23

## 2021-01-01 RX ADMIN — HYDRALAZINE HYDROCHLORIDE 100 MG: 50 TABLET, FILM COATED ORAL at 16:49

## 2021-01-01 NOTE — PROGRESS NOTES
Arrived to the [facility]. Assessment complete, labs reviewed. Hgb noted to be 10.3. Per protocol, Retacrit held. Patient tolerated without problems. Any issues or concerns during appointment: None. Patient aware of next infusion appointment on 1/15/2021 (date) at  (time). Discharged via wheelchair with caregiver.

## 2021-01-14 ENCOUNTER — APPOINTMENT (OUTPATIENT)
Dept: GENERAL RADIOLOGY | Age: 86
End: 2021-01-14
Attending: EMERGENCY MEDICINE
Payer: COMMERCIAL

## 2021-01-14 ENCOUNTER — HOSPITAL ENCOUNTER (EMERGENCY)
Age: 86
Discharge: HOME | End: 2021-01-14
Attending: EMERGENCY MEDICINE
Payer: COMMERCIAL

## 2021-01-14 VITALS
HEART RATE: 73 BPM | BODY MASS INDEX: 28.67 KG/M2 | HEIGHT: 63 IN | OXYGEN SATURATION: 98 % | RESPIRATION RATE: 18 BRPM | DIASTOLIC BLOOD PRESSURE: 81 MMHG | WEIGHT: 161.8 LBS | SYSTOLIC BLOOD PRESSURE: 190 MMHG | TEMPERATURE: 98.1 F

## 2021-01-14 DIAGNOSIS — S22.31XA CLOSED FRACTURE OF ONE RIB OF RIGHT SIDE, INITIAL ENCOUNTER: Primary | ICD-10-CM

## 2021-01-14 LAB
ALBUMIN SERPL-MCNC: 3.5 G/DL (ref 3.2–4.6)
ALBUMIN/GLOB SERPL: 1.1 {RATIO} (ref 1.2–3.5)
ALP SERPL-CCNC: 61 U/L (ref 50–136)
ALT SERPL-CCNC: 22 U/L (ref 12–65)
ANION GAP SERPL CALC-SCNC: 6 MMOL/L (ref 7–16)
AST SERPL-CCNC: 12 U/L (ref 15–37)
BASOPHILS # BLD: 0 K/UL (ref 0–0.2)
BASOPHILS NFR BLD: 1 % (ref 0–2)
BILIRUB SERPL-MCNC: 0.6 MG/DL (ref 0.2–1.1)
BUN SERPL-MCNC: 38 MG/DL (ref 8–23)
CALCIUM SERPL-MCNC: 10.6 MG/DL (ref 8.3–10.4)
CHLORIDE SERPL-SCNC: 109 MMOL/L (ref 98–107)
CO2 SERPL-SCNC: 26 MMOL/L (ref 21–32)
CREAT SERPL-MCNC: 1.26 MG/DL (ref 0.6–1)
DIFFERENTIAL METHOD BLD: ABNORMAL
EOSINOPHIL # BLD: 0 K/UL (ref 0–0.8)
EOSINOPHIL NFR BLD: 0 % (ref 0.5–7.8)
ERYTHROCYTE [DISTWIDTH] IN BLOOD BY AUTOMATED COUNT: 13.8 % (ref 11.9–14.6)
GLOBULIN SER CALC-MCNC: 3.1 G/DL (ref 2.3–3.5)
GLUCOSE SERPL-MCNC: 124 MG/DL (ref 65–100)
HCT VFR BLD AUTO: 32.2 % (ref 35.8–46.3)
HGB BLD-MCNC: 10 G/DL (ref 11.7–15.4)
IMM GRANULOCYTES # BLD AUTO: 0.1 K/UL (ref 0–0.5)
IMM GRANULOCYTES NFR BLD AUTO: 1 % (ref 0–5)
LYMPHOCYTES # BLD: 2.1 K/UL (ref 0.5–4.6)
LYMPHOCYTES NFR BLD: 28 % (ref 13–44)
MCH RBC QN AUTO: 30.8 PG (ref 26.1–32.9)
MCHC RBC AUTO-ENTMCNC: 31.1 G/DL (ref 31.4–35)
MCV RBC AUTO: 99.1 FL (ref 79.6–97.8)
MONOCYTES # BLD: 0.5 K/UL (ref 0.1–1.3)
MONOCYTES NFR BLD: 6 % (ref 4–12)
NEUTS SEG # BLD: 4.9 K/UL (ref 1.7–8.2)
NEUTS SEG NFR BLD: 64 % (ref 43–78)
NRBC # BLD: 0 K/UL (ref 0–0.2)
PLATELET # BLD AUTO: 173 K/UL (ref 150–450)
PMV BLD AUTO: 10.1 FL (ref 9.4–12.3)
POTASSIUM SERPL-SCNC: 4.3 MMOL/L (ref 3.5–5.1)
PROT SERPL-MCNC: 6.6 G/DL (ref 6.3–8.2)
RBC # BLD AUTO: 3.25 M/UL (ref 4.05–5.2)
SODIUM SERPL-SCNC: 141 MMOL/L (ref 136–145)
WBC # BLD AUTO: 7.7 K/UL (ref 4.3–11.1)

## 2021-01-14 PROCEDURE — 80053 COMPREHEN METABOLIC PANEL: CPT

## 2021-01-14 PROCEDURE — 85025 COMPLETE CBC W/AUTO DIFF WBC: CPT

## 2021-01-14 PROCEDURE — 74011250637 HC RX REV CODE- 250/637: Performed by: EMERGENCY MEDICINE

## 2021-01-14 PROCEDURE — 99285 EMERGENCY DEPT VISIT HI MDM: CPT

## 2021-01-14 PROCEDURE — 74011250636 HC RX REV CODE- 250/636: Performed by: EMERGENCY MEDICINE

## 2021-01-14 PROCEDURE — 71101 X-RAY EXAM UNILAT RIBS/CHEST: CPT

## 2021-01-14 RX ORDER — HYDRALAZINE HYDROCHLORIDE 25 MG/1
100 TABLET, FILM COATED ORAL
Status: COMPLETED | OUTPATIENT
Start: 2021-01-14 | End: 2021-01-14

## 2021-01-14 RX ORDER — HYDROCODONE BITARTRATE AND ACETAMINOPHEN 5; 325 MG/1; MG/1
1 TABLET ORAL
Qty: 10 TAB | Refills: 0 | Status: SHIPPED | OUTPATIENT
Start: 2021-01-14 | End: 2021-01-17

## 2021-01-14 RX ADMIN — HYDRALAZINE HYDROCHLORIDE 100 MG: 25 TABLET, FILM COATED ORAL at 17:30

## 2021-01-14 RX ADMIN — SODIUM CHLORIDE 500 ML: 900 INJECTION, SOLUTION INTRAVENOUS at 16:33

## 2021-01-14 NOTE — ED NOTES
I have reviewed discharge instructions with the patient and caregiver. The patient and caregiver verbalized understanding. Patient left ED via Discharge Method: wheelchair to Home with family  Opportunity for questions and clarification provided. Patient given 1 scripts. Education on incentive spirometer given to family. Understands how to use,   MD aware of D/C BP. To continue your aftercare when you leave the hospital, you may receive an automated call from our care team to check in on how you are doing. This is a free service and part of our promise to provide the best care and service to meet your aftercare needs.  If you have questions, or wish to unsubscribe from this service please call [phone]. Thank you for Choosing our [employer] [facility] Emergency Department.

## 2021-01-14 NOTE — ED TRIAGE NOTES
Patient arrives via EMS from home for complaint of fall and right rib pain. Patient states she was dizzy and fell. Patient has been getting blood transfusions twice monthly since July. Patient reports that when iron levels get low, she gets dizzy. No LOC with fall today. RBBB on EKG for EMS. VSS. .

## 2021-01-14 NOTE — DISCHARGE INSTRUCTIONS
Use your incentive spirometer as you have been shown in the past.  Your Tylenol and Motrin for pain control but add the Norco for breakthrough pain. You mentioned Voltaren gel that your insurance would not cover. This is likely because of Voltaren is now over-the-counter and can be purchased at most any store that sells these kinds of medications.     Return to the emergency department for shortness of breath and fevers

## 2021-01-14 NOTE — ED PROVIDER NOTES
72-year-old female presenting for right rib pain. Tells me she was standing up when she lost her balance fell sideways and struck the right side of her chest on the countertop. Had instantaneous pain. Did not hit her head or pass out. She has been falling quite a bit and tells me she is fallen almost 12 times over the past year or so. Has no other complaints at this time. The history is provided by the patient. Rib Injury  This is a recurrent problem. The current episode started 1 to 2 hours ago. The problem has not changed since onset. Associated symptoms include chest pain. Pertinent negatives include no fever, no headaches, no coryza, no rhinorrhea, no sore throat, no swollen glands, no ear pain, no neck pain, no cough, no sputum production, no hemoptysis, no wheezing, no PND, no orthopnea, no syncope, no vomiting, no abdominal pain, no rash, no leg pain, no leg swelling and no claudication. It is unknown what precipitated the problem. She has tried nothing for the symptoms. The treatment provided no relief. She has had prior hospitalizations. She has had prior ED visits. She has had no prior ICU admissions. Associated medical issues do not include asthma, COPD, pneumonia, chronic lung disease, PE, CAD, heart failure, past MI, DVT or recent surgery. Fall  The accident occurred less than 1 hour ago. The fall occurred while walking. She fell from a height of ground level. Impact surface: counter top. There was no blood loss. Point of impact: right rib. Pain location: right rib. The pain is at a severity of 4/10. The pain is moderate. She was ambulatory at the scene. There was no entrapment after the fall. There was no drug use involved in the accident. There was no alcohol use involved in the accident.  Pertinent negatives include no visual change, no fever, no numbness, no abdominal pain, no bowel incontinence, no nausea, no vomiting, no hematuria, no headaches, no extremity weakness, no hearing loss, no loss of consciousness, no tingling and no laceration. The risk factors include being elderly.  The symptoms are aggravated by activity. The treatment provided mild relief.        Past Medical History:   Diagnosis Date   • Anxiety 2/1/2018   • Arrhythmia     palpitations 2/10-went to ER-[facility]   • Arthritis     L leg- fell 2008   • Asthma     adult onset x 20 yrs   • B12 deficiency 8/24/2012   • Body mass index 37.0-37.9, adult 2/5/2014   • CAD (coronary artery disease)     cardiac work up-[id number]   • Controlled type 2 diabetes mellitus with microalbuminuria, without long-term current use of insulin ([facility]) 11/22/2016   • COPD    • Corns and callosities 12/19/2016   • CRI (chronic renal insufficiency) 8/24/2012   • Depression 6/1/2012   • Dermatophytosis of nail 12/19/2016   • Diabetes ([facility])     type II- home tests fasting-112   • DJD (degenerative joint disease) of hip    • DM (diabetes mellitus) type II controlled with renal manifestation ([facility]) 7/16/2010   • Encounter for long-term (current) use of other medications 1/8/2013   • Essential hypertension 7/16/2010   • Gastrointestinal disorder    • GERD (gastroesophageal reflux disease)    • Heart failure ([facility])    • Hiatal hernia 8/24/2012   • HLD (hyperlipidemia) 1/8/2013   • Neuropathy 8/24/2012    Lower limbs    • Obesity (BMI 30.0-39.9) BMI-43.8   • Other and unspecified hyperlipidemia 7/16/2010   • Other chest pain 7/16/2010   • Other ill-defined conditions(799.89)     high cholesterol   • Other ill-defined conditions(799.89)     incont. urine   • PAD (peripheral artery disease) ([facility]) 8/24/2012   • Retinal hemorrhage        Past Surgical History:   Procedure Laterality Date   • HX APPENDECTOMY     • HX CHOLECYSTECTOMY     • HX CHOLECYSTECTOMY  2000   • HX ENDOSCOPY  02/27/2018    Dr Hill   • HX GI      small intestine obstruction   • HX GYN      hyst   • HX HEART CATHETERIZATION     • HX HYSTERECTOMY      prolapse   • HX INTRACRANIAL ANEURYSM REPAIR     •  HX INTRACRANIAL ANEURYSM REPAIR      HX ORTHOPAEDIC      right wrist 2010    HX OTHER SURGICAL      aneurysm clip-cerebral-2000    HX OTHER SURGICAL      cerebral aneurysm   Dr. Brenda Mckeon UNLISTED           Family History:   Problem Relation Age of Onset   [employer] Cancer Mother     Diabetes Father     Diabetes Paternal Grandfather        Social History     Socioeconomic History    Marital status: SINGLE     Spouse name: Not on file    Number of children: Not on file    Years of education: Not on file    Highest education level: Not on file   Occupational History    Not on file   Social Needs    Financial resource strain: Not on file    Food insecurity     Worry: Not on file     Inability: Not on file    Transportation needs     Medical: Not on file     Non-medical: Not on file   Tobacco Use    Smoking status: Never Smoker    Smokeless tobacco: Never Used   Substance and Sexual Activity    Alcohol use: No    Drug use: No    Sexual activity: Never   Lifestyle    Physical activity     Days per week: Not on file     Minutes per session: Not on file    Stress: Not on file   Relationships    Social connections     Talks on phone: Not on file     Gets together: Not on file     Attends Restorationism service: Not on file     Active member of club or organization: Not on file     Attends meetings of clubs or organizations: Not on file     Relationship status: Not on file    Intimate partner violence     Fear of current or ex partner: Not on file     Emotionally abused: Not on file     Physically abused: Not on file     Forced sexual activity: Not on file   Other Topics Concern    Not on file   Social History Narrative     with four children         ALLERGIES: Patient has no known allergies. Review of Systems   Constitutional: Negative for fever. HENT: Negative for ear pain, rhinorrhea and sore throat.     Respiratory: Negative for cough, hemoptysis, sputum production and wheezing. Cardiovascular: Positive for chest pain. Negative for orthopnea, claudication, leg swelling, syncope and PND. Gastrointestinal: Negative for abdominal pain, bowel incontinence, nausea and vomiting. Genitourinary: Negative for hematuria. Musculoskeletal: Negative for extremity weakness and neck pain. Skin: Negative for rash. Neurological: Negative for tingling, loss of consciousness, numbness and headaches. All other systems reviewed and are negative. Vitals:    01/14/21 1448 01/14/21 1631   BP: (!) 167/65 (!) 178/75   Pulse: 80    Resp: 18    Temp: 98.1 °F (36.7 °C)    SpO2: 98% 97%   Weight: 73.4 kg (161 lb 12.8 oz)    Height: 5' 2.5\" (1.588 m)             Physical Exam  Vitals signs and nursing note reviewed. Constitutional:       Appearance: She is well-developed. HENT:      Head: Normocephalic and atraumatic. Eyes:      Conjunctiva/sclera: Conjunctivae normal.      Pupils: Pupils are equal, round, and reactive to light. Neck:      Musculoskeletal: Neck supple. Cardiovascular:      Rate and Rhythm: Normal rate and regular rhythm. Pulmonary:      Effort: Pulmonary effort is normal.      Breath sounds: Normal breath sounds. Chest:       Abdominal:      General: Bowel sounds are normal.      Palpations: Abdomen is soft. Musculoskeletal: Normal range of motion. Skin:     General: Skin is warm and dry. Findings: No laceration. Neurological:      Mental Status: She is alert and oriented to person, place, and time. MDM  Number of Diagnoses or Management Options  Diagnosis management comments: 77-year-old female presenting for evaluation after what sounds like a mechanical fall. She struck the right side of her chest against the countertop. Basic blood work and chest x-ray were performed which shows a right ninth rib fracture. Patient is awake and alert in no real acute distress and able to take deep breaths with minimal effort.   Patient is also hypertensive at this time but a review of the medical record reveals that she takes hydralazine and has a dose that is due now. I am giving that to her as this is likely rebound hypertension from missing her prior medication dose. Blood work is otherwise unremarkable. Discharging home with pain control and PCP follow-up       Amount and/or Complexity of Data Reviewed  Clinical lab tests: ordered and reviewed (Results for orders placed or performed during the hospital encounter of 01/14/21  -CBC WITH AUTOMATED DIFF       Result                      Value             Ref Range           WBC                         7.7               4.3 - 11.1 K*       RBC                         3.25 (L)          4.05 - 5.2 M*       HGB                         10.0 (L)          11.7 - 15.4 *       HCT                         32.2 (L)          35.8 - 46.3 %       MCV                         99.1 (H)          79.6 - 97.8 *       MCH                         30.8              26.1 - 32.9 *       MCHC                        31.1 (L)          31.4 - 35.0 *       RDW                         13.8              11.9 - 14.6 %       PLATELET                    173               150 - 450 K/*       MPV                         10.1              9.4 - 12.3 FL       ABSOLUTE NRBC               0.00              0.0 - 0.2 K/*       DF                          AUTOMATED                             NEUTROPHILS                 64                43 - 78 %           LYMPHOCYTES                 28                13 - 44 %           MONOCYTES                   6                 4.0 - 12.0 %        EOSINOPHILS                 0 (L)             0.5 - 7.8 %         BASOPHILS                   1                 0.0 - 2.0 %         IMMATURE GRANULOCYTES       1                 0.0 - 5.0 %         ABS. NEUTROPHILS            4.9               1.7 - 8.2 K/*       ABS. LYMPHOCYTES            2.1               0.5 - 4.6 K/*       ABS.  MONOCYTES              0.5 0.1 - 1.3 K/*       ABS. EOSINOPHILS            0.0               0.0 - 0.8 K/*       ABS. BASOPHILS              0.0               0.0 - 0.2 K/*       ABS. IMM. GRANS.            0.1               0.0 - 0.5 K/*  -METABOLIC PANEL, COMPREHENSIVE       Result                      Value             Ref Range           Sodium                      141               136 - 145 mm*       Potassium                   4.3               3.5 - 5.1 mm*       Chloride                    109 (H)           98 - 107 mmo*       CO2                         26                21 - 32 mmol*       Anion gap                   6 (L)             7 - 16 mmol/L       Glucose                     124 (H)           65 - 100 mg/*       BUN                         38 (H)            8 - 23 MG/DL        Creatinine                  1.26 (H)          0.6 - 1.0 MG*       GFR est AA                  51 (L)            >60 ml/min/1*       GFR est non-AA              42 (L)            >60 ml/min/1*       Calcium                     10.6 (H)          8.3 - 10.4 M*       Bilirubin, total            0.6               0.2 - 1.1 MG*       ALT (SGPT)                  22                12 - 65 U/L         AST (SGOT)                  12 (L)            15 - 37 U/L         Alk. phosphatase            61                50 - 136 U/L        Protein, total              6.6               6.3 - 8.2 g/*       Albumin                     3.5               3.2 - 4.6 g/*       Globulin                    3.1               2.3 - 3.5 g/*       A-G Ratio                   1.1 (L)           1.2 - 3.5      )  Tests in the radiology section of CPT®: ordered and reviewed (Xr Ribs Rt W Pa Cxr Min 3 V    Result Date: 1/14/2021  CHEST AND RIGHT RIBS, 3 VIEWS. HISTORY: Pain following fall. TECHNIQUE: AP view and multiple oblique views. FINDINGS: Ribs: Minimally displaced right 9th lateral rib rib fractures. Multiple old right rib fractures 3, 4, 5, 6. Chest: No pneumothorax. IMPRESSION: No pneumothorax. Several old healed right rib fractures. One acute minimally displaced right rib fracture, number 9.    )  Tests in the medicine section of CPT®: ordered and reviewed  Decide to obtain previous medical records or to obtain history from someone other than the patient: yes  Independent visualization of images, tracings, or specimens: yes    Risk of Complications, Morbidity, and/or Mortality  Presenting problems: high  Diagnostic procedures: high  Management options: moderate  General comments: I personally reviewed the patient's vital signs, laboratory tests, and/or radiological findings. I discussed these findings with the patient and their significance. I answered all questions and gave the patient clear return precautions.   The patient was discharged from the emergency department in stable condition        Patient Progress  Patient progress: improved         Procedures

## 2021-01-15 ENCOUNTER — HOSPITAL ENCOUNTER (OUTPATIENT)
Dept: INFUSION THERAPY | Age: 86
End: 2021-01-15

## 2021-03-03 ENCOUNTER — HOSPITAL ENCOUNTER (EMERGENCY)
Age: 86
Discharge: HOME | End: 2021-03-03
Attending: EMERGENCY MEDICINE
Payer: COMMERCIAL

## 2021-03-03 ENCOUNTER — APPOINTMENT (OUTPATIENT)
Dept: GENERAL RADIOLOGY | Age: 86
End: 2021-03-03
Attending: EMERGENCY MEDICINE
Payer: COMMERCIAL

## 2021-03-03 ENCOUNTER — APPOINTMENT (OUTPATIENT)
Dept: CT IMAGING | Age: 86
End: 2021-03-03
Attending: EMERGENCY MEDICINE
Payer: COMMERCIAL

## 2021-03-03 VITALS
TEMPERATURE: 98.4 F | OXYGEN SATURATION: 94 % | WEIGHT: 162 LBS | RESPIRATION RATE: 16 BRPM | HEART RATE: 77 BPM | DIASTOLIC BLOOD PRESSURE: 77 MMHG | HEIGHT: 62 IN | SYSTOLIC BLOOD PRESSURE: 187 MMHG | BODY MASS INDEX: 29.81 KG/M2

## 2021-03-03 DIAGNOSIS — S09.90XA MINOR HEAD INJURY, INITIAL ENCOUNTER: ICD-10-CM

## 2021-03-03 DIAGNOSIS — S20.212A CONTUSION OF LEFT CHEST WALL, INITIAL ENCOUNTER: Primary | ICD-10-CM

## 2021-03-03 PROCEDURE — 99284 EMERGENCY DEPT VISIT MOD MDM: CPT

## 2021-03-03 PROCEDURE — 70450 CT HEAD/BRAIN W/O DYE: CPT

## 2021-03-03 PROCEDURE — 71101 X-RAY EXAM UNILAT RIBS/CHEST: CPT

## 2021-03-03 NOTE — ED PROVIDER NOTES
55-year-old -American female presents after slipping while trying to get out of bed. She struck her left side and her head with the fall. No loss of consciousness. No neck pain or back pain. She is complaining of pain to her left ribs and mild headache. She has been able to ambulate since the fall. The history is provided by the patient. Fall  Associated symptoms include headaches. Pertinent negatives include no fever, no abdominal pain and no vomiting. Past Medical History:   Diagnosis Date    Anxiety 2/1/2018    Arrhythmia     palpitations 2/10-went to [facility]    Arthritis     L leg- fell 2008    Asthma     adult onset x 20 yrs    B12 deficiency 8/24/2012    Body mass index 37.0-37.9, adult 2/5/2014    CAD (coronary artery disease)     cardiac work up-9/0909-neg-Holter    Controlled type 2 diabetes mellitus with microalbuminuria, without long-term current use of insulin ([address] 11/22/2016    COPD     Corns and callosities 12/19/2016    CRI (chronic renal insufficiency) 8/24/2012    Depression 6/1/2012    Dermatophytosis of nail 12/19/2016    Diabetes ([address]     type II- home tests fasting-112    DJD (degenerative joint disease) of hip     DM (diabetes mellitus) type II controlled with renal manifestation ([address] 7/16/2010    Encounter for long-term (current) use of other medications 1/8/2013    Essential hypertension 7/16/2010    Gastrointestinal disorder     GERD (gastroesophageal reflux disease)     Heart failure ([address]     Hiatal hernia 8/24/2012    HLD (hyperlipidemia) 1/8/2013    Neuropathy 8/24/2012    Lower limbs     Obesity (BMI 30.0-39. 9) BMI-43.8    Other and unspecified hyperlipidemia 7/16/2010    Other chest pain 7/16/2010    Other ill-defined conditions(799.89)     high cholesterol    Other ill-defined conditions(799.89)     incont.  urine    PAD (peripheral artery disease) ([address] 8/24/2012    Retinal hemorrhage        Past Surgical History:   Procedure Laterality Date    HX APPENDECTOMY      HX CHOLECYSTECTOMY      HX CHOLECYSTECTOMY  2000    HX ENDOSCOPY  02/27/2018    Dr Diane Padilla HX GI      small intestine obstruction    HX GYN      hyst    HX HEART CATHETERIZATION      HX HYSTERECTOMY      prolapse    HX INTRACRANIAL ANEURYSM REPAIR      HX INTRACRANIAL ANEURYSM REPAIR      HX ORTHOPAEDIC      right wrist 2010    HX OTHER SURGICAL      aneurysm clip-cerebral-2000    HX OTHER SURGICAL      cerebral aneurysm   Dr. Mary Pizarro UNLISTED           Family History:   Problem Relation Age of Onset   [facility] Cancer Mother     Diabetes Father     Diabetes Paternal Grandfather        Social History     Socioeconomic History    Marital status: SINGLE     Spouse name: Not on file    Number of children: Not on file    Years of education: Not on file    Highest education level: Not on file   Occupational History    Not on file   Social Needs    Financial resource strain: Not on file    Food insecurity     Worry: Not on file     Inability: Not on file    Transportation needs     Medical: Not on file     Non-medical: Not on file   Tobacco Use    Smoking status: Never Smoker    Smokeless tobacco: Never Used   Substance and Sexual Activity    Alcohol use: No    Drug use: No    Sexual activity: Never   Lifestyle    Physical activity     Days per week: Not on file     Minutes per session: Not on file    Stress: Not on file   Relationships    Social connections     Talks on phone: Not on file     Gets together: Not on file     Attends Gnosticist service: Not on file     Active member of club or organization: Not on file     Attends meetings of clubs or organizations: Not on file     Relationship status: Not on file    Intimate partner violence     Fear of current or ex partner: Not on file     Emotionally abused: Not on file     Physically abused: Not on file     Forced sexual activity: Not on file   Other Topics Concern    Not on file   Social History Narrative     with four children         ALLERGIES: Patient has no known allergies. Review of Systems   Constitutional: Negative for fever. Respiratory: Negative for shortness of breath. Gastrointestinal: Negative for abdominal pain and vomiting. Musculoskeletal: Negative for back pain and neck pain. Neurological: Positive for headaches. Vitals:    03/03/21 1237 03/03/21 1241   BP: (!) 182/77 (!) 182/77   Pulse: 77    Resp: 16    Temp: 98.4 °F (36.9 °C)    SpO2: 98% 97%   Weight: 73.5 kg (162 lb)    Height: 5' 2\" (1.575 m)             Physical Exam  Vitals signs and nursing note reviewed. Constitutional:       General: She is not in acute distress. Appearance: Normal appearance. She is not toxic-appearing. HENT:      Head: Normocephalic and atraumatic. Nose: Nose normal.      Mouth/Throat:      Mouth: Mucous membranes are moist.   Eyes:      Pupils: Pupils are equal, round, and reactive to light. Neck:      Musculoskeletal: Normal range of motion and neck supple. No muscular tenderness. Cardiovascular:      Rate and Rhythm: Normal rate and regular rhythm. Pulmonary:      Effort: Pulmonary effort is normal.      Breath sounds: Normal breath sounds. Comments: Left lateral chest tender to palpation without bruising swelling or crepitus  Abdominal:      General: There is no distension. Palpations: Abdomen is soft. Tenderness: There is no abdominal tenderness. Musculoskeletal:         General: No tenderness. Skin:     General: Skin is warm and dry. Neurological:      Mental Status: She is alert and oriented to person, place, and time.    Psychiatric:         Mood and Affect: Mood normal.         Behavior: Behavior normal.          MDM  Number of Diagnoses or Management Options  Contusion of left chest wall, initial encounter  Minor head injury, initial encounter  Diagnosis management comments: Chest x-ray shows no pneumothorax or rib fracture. Head CT no acute abnormality. Patient's fall was due to slipping while trying to get out of bed. She does not require further emergent work-up. Patient appears safe for discharge home.        Amount and/or Complexity of Data Reviewed  Tests in the radiology section of CPT®: ordered and reviewed    Risk of Complications, Morbidity, and/or Mortality  Presenting problems: low  Diagnostic procedures: low  Management options: low           Procedures

## 2021-03-03 NOTE — ED TRIAGE NOTES
PT arrives via EMS from home after slip and fall out of bed this morning - hit left side of back on nightstand and hit head. No LOC, complaining of back pain - hx of anemia and gets transfusions every month. Pt able to walk for EMS out of the house and down the steps. Denies blood thinners.  Hx of htn - didn't take meds this morning due to fall

## 2021-03-03 NOTE — ED NOTES
I have reviewed discharge instructions with the patient. The patient verbalized understanding. Patient left ED via Discharge Method: stretcher to Home with [facility]    Opportunity for questions and clarification provided. Patient given 0 scripts. To continue your aftercare when you leave the hospital, you may receive an automated call from our care team to check in on how you are doing. This is a free service and part of our promise to provide the best care and service to meet your aftercare needs.  If you have questions, or wish to unsubscribe from this service please call [phone]. Thank you for Choosing our [facility] Emergency Department.

## 2021-04-27 PROBLEM — R55 SYNCOPE: Status: RESOLVED | Noted: 2018-07-28 | Resolved: 2021-04-27

## 2021-06-22 NOTE — PROGRESS NOTES
Pt arrived via wheelchair today at [phone], to receive Vitamin B-12 and retacrit. Pt tolerated without difficulty. Patient discharged via wheelchair accompanied by staff. Instructed to notify physician of any problems, questions or concerns. Allowed opportunity for patient/family to ask questions. Verbalized understanding. Next appointment is July 28 at [phone] with JOSH. Pt requesting an order to an Ortho for his right leg pain. Pt states he played basketball yesterday because his  knee was feeling better. Pt states the pain is so bad he cant sleep, he hurts to drive Pt states it like an big ball of fluid.

## 2021-08-04 NOTE — PROGRESS NOTES
Arrived to the [facility]. Retacrit and B12 injection completed and tolerated well. Any issues or concerns during appointment: none  Patient given education on injections  Informed of next appointment on 8/25/21 at 1040am for labs and injection appointment scheduled for 11am.  Instructed patient to notify provider for any issues or worrisome symptoms. They verbalized understanding. Discharged via w/c with son.

## 2021-08-25 NOTE — PROGRESS NOTES
Arrived to the [facility]. Retacrit completed. Patient tolerated well. Any issues or concerns during appointment: none. Discharged via w/c.

## 2021-08-25 NOTE — PROGRESS NOTES
Arrived to the [facility]. Retacrit injection completed. Provided education on same    Patient instructed to report any side affects to ordering provider. Patient tolerated well. Any issues or concerns during appointment: none. Patient aware of next infusion appointment on 09/15/2021 (date) at 1400 (time). Discharged ambulatory.

## 2021-08-31 PROBLEM — M54.50 CHRONIC BILATERAL LOW BACK PAIN WITHOUT SCIATICA: Status: ACTIVE | Noted: 2021-01-01

## 2021-08-31 PROBLEM — K92.2 GI BLEED: Status: RESOLVED | Noted: 2018-07-28 | Resolved: 2021-01-01

## 2021-08-31 PROBLEM — G89.29 CHRONIC BILATERAL LOW BACK PAIN WITHOUT SCIATICA: Status: ACTIVE | Noted: 2021-01-01

## 2021-09-15 NOTE — PROGRESS NOTES
Arrived to the [facility]. Retacrit and B12 injection completed and tolerated well. Any issues or concerns during appointment: none  Patient given education on injections  Informed of next appointment on 10/6/21 at 1030am for labs and injection appointment scheduled for 11am.  Instructed patient to notify provider for any issues or worrisome symptoms. They verbalized understanding.    Discharged via w/c with son.

## 2021-10-06 NOTE — PROGRESS NOTES
Arrived to the [facility]. Retacrit completed. Patient tolerated without problems. Any issues or concerns during appointment: None. Patient aware of next infusion appointment on 10/27/2021 (date) at 1400 (time). Discharged via wheelchair with caregiver.

## 2021-10-27 NOTE — PROGRESS NOTES
Arrived to the [facility]. B12 injection completed. Provided education on same. Patient instructed to report any side affects to ordering provider. Patient tolerated well. Any issues or concerns during appointment:none. Patient aware of next infusion appointment on 11/17  Discharged via wheelchair.

## 2021-11-17 NOTE — PROGRESS NOTES
Arrived to the [facility]. Retacrit completed. Provided education on SE to report. Patient instructed to report any side affects to ordering provider. Patient tolerated well. Any issues or concerns during appointment: none. Patient aware of next infusion appointment on 12/08/21 (date) at 80 (time). Discharged via wheelchair.

## 2021-12-14 PROBLEM — R53.81 PHYSICAL DEBILITY: Status: ACTIVE | Noted: 2021-01-01

## 2021-12-14 NOTE — ED TRIAGE NOTES
Pt arrives masked via EMS, per EMS, pt's medical alarm bracelet was set off by pt, EMS found pt sitting up against cabinet in kitchen, per pt, she did hit her head, negative thinners, no LOC. A& O x4. /76, O2 97%, HR 70, , Temp 98.5.

## 2021-12-14 NOTE — PROGRESS NOTES
----- Message from Mario Rollins NP sent at 12/14/2021  9:17 AM EST -----  Can you call pt with stable results? BMP looks good - renal function looks great! Potassium is improved as well- still borderline low. Please have pt work on increased potassium in his diet - also cont potassium 40mEq tid as rx. Hospitalist Progress Note     Admit Date:  2020  1:58 AM   Name:  Radha Brantley   Age:  80 y.o.  :  1929   MRN:  [id number]   PCP:  Elisa Casarez MD  Treatment Team: Attending Provider: Shaniqua Parnell MD; Primary Nurse: Huang Rueda RN; Utilization Review: Jacklynn Litten; Care Manager: Rm Rushing RN; Utilization Review: [place]; Consulting Provider: Carly Ferreira MD    Subjective:     2020 patient seen and evaluated. New patient for me today. She was admitted overnight with a GI bleed. She has received 1 unit of packed red blood cells. She apparently has lots of antibodies so it takes a while for her blood products to be prepared per reports from nursing from the blood bank. The patient herself has not noticed any new bleeding. She states that she seen some old blood yesterday evening. In addition to this the pain and cramping that she had previously experienced in her abdominal hernia that precipitated the bleed has stopped. And her stomach is not as full as it was previously. She also notes an abrasion to the right side of her neck from her chain scratching her neck overnight when she forgot to take it off.       2020- pt seen- small amount of bright red blood after she got up for a bath. 2020patient seen and evaluated; out of bed to chair. Urine culture results are positive for greater than 100,000 CFU's. ID and sensitivity are pending. She is not noted any significant bleeding just some blood when she wipes.   Hemoglobin remains stable    Objective:     Patient Vitals for the past 24 hrs:   Temp Pulse Resp BP SpO2   20 1513 98.3 °F (36.8 °C) 70 18 133/48 95 %   20 1106 98.3 °F (36.8 °C) 92 18 133/58 93 %   20 0826     94 %   20 0708 98.2 °F (36.8 °C) (!) 59 18 165/65 97 %   20 0334 98.3 °F (36.8 °C) 60 18 163/68 95 %   20 2350 98.2 °F (36.8 °C) 68 18 189/69 99 % 02/24/20 2028 98.2 °F (36.8 °C) 61 19 161/70    02/24/20 1954     97 %     Oxygen Therapy  O2 Sat (%): 95 % (02/25/20 1513)  Pulse via Oximetry: 86 beats per minute (02/24/20 1954)  O2 Device: Room air (02/25/20 1400)    Intake/Output Summary (Last 24 hours) at 2/25/2020 1659  Last data filed at 2/25/2020 1557  Gross per 24 hour   Intake 3909 ml   Output 525 ml   Net 3384 ml         General:    Well nourished. Alert. CV:   RRR. No murmur, rub, or gallop. Lungs:   CTAB. No wheezing, rhonchi, or rales. Abdomen:   Soft, nontender, nondistended. Extremities: Warm and dry. No cyanosis or edema. Skin:     No rashes or jaundice. Data Review:  I have reviewed all labs, meds, telemetry events, and studies from the last 24 hours.     Recent Results (from the past 24 hour(s))   GLUCOSE, POC    Collection Time: 02/24/20  5:43 PM   Result Value Ref Range    Glucose (POC) 180 (H) 65 - 100 mg/dL   GLUCOSE, POC    Collection Time: 02/24/20  9:55 PM   Result Value Ref Range    Glucose (POC) 189 (H) 65 - 100 mg/dL   HEMOGLOBIN    Collection Time: 02/24/20 10:23 PM   Result Value Ref Range    HGB 8.0 (L) 11.7 - 15.4 g/dL   GLUCOSE, POC    Collection Time: 02/25/20  4:50 AM   Result Value Ref Range    Glucose (POC) 192 (H) 65 - 100 mg/dL   HEMOGLOBIN    Collection Time: 02/25/20  4:56 AM   Result Value Ref Range    HGB 7.6 (L) 11.7 - 15.4 g/dL   GLUCOSE, POC    Collection Time: 02/25/20  7:47 AM   Result Value Ref Range    Glucose (POC) 174 (H) 65 - 100 mg/dL   GLUCOSE, POC    Collection Time: 02/25/20 12:09 PM   Result Value Ref Range    Glucose (POC) 288 (H) 65 - 100 mg/dL   GLUCOSE, POC    Collection Time: 02/25/20  4:21 PM   Result Value Ref Range    Glucose (POC) 197 (H) 65 - 100 mg/dL        All Micro Results     Procedure Component Value Units Date/Time    CULTURE, URINE [[id number]]  (Abnormal)  (Susceptibility) Collected:  02/22/20 7462    Order Status:  Completed Specimen:  Urine from Clean catch Updated:  02/25/20 0856     Special Requests: NO SPECIAL REQUESTS        Culture result:       >100,000 COLONIES/mL KLEBSIELLA PNEUMONIAE                  10,000 to 50,000 COLONIES/mL MIXED SKIN NGOZI ISOLATED                Current Meds:  Current Facility-Administered Medications   Medication Dose Route Frequency    insulin lispro (HUMALOG) injection 0-10 Units  0-10 Units SubCUTAneous AC&HS    [START ON 2/26/2020] ceFAZolin (ANCEF) 1,000 mg in 0.9% sodium chloride (MBP/ADV) 50 mL ADV  1 g IntraVENous Q12H    amLODIPine (NORVASC) tablet 10 mg  10 mg Oral DAILY    hydrALAZINE (APRESOLINE) tablet 25 mg  25 mg Oral TID    0.9% sodium chloride infusion 250 mL  250 mL IntraVENous PRN    pantoprazole (PROTONIX) 40 mg in 0.9% sodium chloride 10 mL injection  40 mg IntraVENous Q12H    fluticasone propionate (FLONASE) 50 mcg/actuation nasal spray 2 Spray  2 Spray Both Nostrils DAILY    gabapentin (NEURONTIN) capsule 100 mg  100 mg Oral BID    LORazepam (ATIVAN) tablet 0.5 mg  0.5 mg Oral DAILY PRN    sertraline (ZOLOFT) tablet 50 mg  50 mg Oral DAILY    albuterol (PROVENTIL VENTOLIN) nebulizer solution 2.5 mg  2.5 mg Nebulization Q4H PRN    sodium chloride (NS) flush 5-40 mL  5-40 mL IntraVENous Q8H    sodium chloride (NS) flush 5-40 mL  5-40 mL IntraVENous PRN    ondansetron (ZOFRAN) injection 4 mg  4 mg IntraVENous Q4H PRN    acetaminophen (TYLENOL) tablet 650 mg  650 mg Oral Q4H PRN    oxyCODONE-acetaminophen (PERCOCET) 5-325 mg per tablet 1 Tab  1 Tab Oral Q4H PRN    pravastatin (PRAVACHOL) tablet 40 mg  40 mg Oral QHS    budesonide-formoteroL (SYMBICORT) 160-4.5 mcg/actuation HFA inhaler 2 Puff  2 Puff Inhalation BID RT    0.9% sodium chloride infusion 250 mL  250 mL IntraVENous PRN       Other Studies (last 24 hours):  No results found.     Assessment and Plan:     Hospital Problems as of 2/25/2020 Date Reviewed: 10/18/2019          Codes Class Noted - Resolved POA    UTI (urinary tract infection) ICD-10-CM: N39.0  ICD-9-CM: 599.0  2/25/2020 - Present Unknown        GIB (gastrointestinal bleeding) ICD-10-CM: K92.2  ICD-9-CM: 578.9  2/25/2020 - Present Unknown        Abdominal wall hernia ICD-10-CM: K43.9  ICD-9-CM: 553.20  2/23/2020 - Present Yes        CKD (chronic kidney disease) stage 3, GFR 30-59 ml/min (HCC) ICD-10-CM: N18.3  ICD-9-CM: 585.3  4/18/2019 - Present Yes        COPD (chronic obstructive pulmonary disease) ([facility]) (Chronic) ICD-10-CM: J44.9  ICD-9-CM: 496  8/22/2018 - Present Yes        MIHAELA (acute kidney injury) ([facility] ICD-10-CM: N17.9  ICD-9-CM: 584.9  8/21/2018 - Present Yes        * (Principal) GI bleed ICD-10-CM: K92.2  ICD-9-CM: 578.9  7/28/2018 - Present Unknown        Anxiety ICD-10-CM: F41.9  ICD-9-CM: 300.00  2/1/2018 - Present Yes              PLAN:    · GI bleed  continue to monitor H&H and transfuse as appropriate; continue Protonix for now GI input appreciated; would not like to scope this 75-year-old female but will reconsider if indicated with further bleeding; switch H&H to daily given stable hemoglobin  · Currently on a GI soft diet  · Acute blood loss anemia  the patient is status post 1 unit of packed red blood cells-as above she has antibodies and it takes quite some time to get blood for her; I have asked the blood bank to prepare an additional unit in case we need to transfuse her. If she does require more blood it would be worthwhile to keep 1 unit ahead.    · Abdominal hernia - she is status post CT abdomen and pelvis which showed no evidence of incarceration; hernia is currently reducible and no longer painful  · COPD  no active issues at this time will continue home medications and monitor  · Hypertension - start back some meds at a lower dose; order prn meds  · Chronic kidney disease - currently at baseline  · UTIcontinue antibiotics; follow-up final ID and sensitivity    DC planning/Dispo: Home in the next 24 to 48 hours if urine culture is back and we have ID and sensitivity and hemoglobin remains stable  DVT ppx: SCDs    Signed:  Cookie Paredes MD

## 2021-12-15 PROBLEM — R29.6 FREQUENT FALLS: Status: ACTIVE | Noted: 2021-01-01

## 2021-12-15 NOTE — ED NOTES
Attempted to ambulate pt, pt unable to stand with assistance x2 RNs. Pt soiled of urine, cleaned, pericare provided, new gown placed on pt. Pt placed back in bed and positioned for comfort.

## 2021-12-15 NOTE — ACP (ADVANCE CARE PLANNING)
[facility] Hospitalist Service  At the heart of better care     Advance Care Planning   Admit Date:  2021  5:29 PM   Name:  Adali Aguilar   Age:  80 y.o. Sex:  female  :  1929   MRN:  [id number]   Room:  Yesenia Lai is able to make her own decisions: Yes    If pt unable to make decisions, POA/surrogate decision maker:  Undecided      Patient / surrogate decision-maker directed:  Code Status: DO NOT RESUSCITATE, DO NOT INTUBATE -okay for other aggressive medical and surgical intervention  She stated if she approaches the time when her heart stops beating and she stops breathing, just \"let her go in peace. \"    Patient or surrogate consented to discussion of the current conditions, workup, management plans, prognosis, and understand the risk for further deterioration. Time spent: 17 minutes in direct discussion (face to face and/or over phone). Signed:   Nelida Heath DO

## 2021-12-15 NOTE — PROGRESS NOTES
12/15/21 1239   Dual Skin Pressure Injury Assessment   Dual Skin Pressure Injury Assessment WDL   Second Care Provider (Based on [address] [place] [address]) Leslie Carlson RN      Patient alert and oriented x4. Pt c/o of no pain. Be dlow and locked. Bed alarm on. Call light within in reach.

## 2021-12-15 NOTE — PROGRESS NOTES
TRANSFER - IN REPORT:    Verbal report received from [address] on [address]  being received from ER(unit) for routine progression of care      Report consisted of patients Situation, Background, Assessment and   Recommendations(SBAR). Information from the following report(s) SBAR, Kardex, ED Summary, [facility] and Recent Results was reviewed with the receiving nurse. Opportunity for questions and clarification was provided. Assessment completed upon patients arrival to unit and care assumed.

## 2021-12-15 NOTE — PROGRESS NOTES
Hospitalist Progress Note   Admit Date:  2021  5:29 PM   Name:  Tyron Mckeon   Age:  80 y.o. Sex:  female  :  1929   MRN:  [id number]   Room:  [facility]    Presenting Complaint: Fall    Reason(s) for Admission: Physical debility [R53.81]     Hospital Course & Interval History:   Tyron Mckeon is a 80 y.o. female with medical history of HTN, CKD stage III, COPD who presented with difficulty with ambulation after fall PTA. Patient reported she was going to the refrigerator with her walker, reached out to get some water and lost her balance. She hit the right side of her head on the table during fall but no loss of consciousness. She reported landing on her R hip. She denies palpitations, lightheadedness, dizziness, chest pain prior to falling. She denies nausea vomiting or changes in her vision. She activated her medical alert and EMS found her on the floor. She reported bilateral leg pain and has not been able to ambulate since her fall. At baseline, patient lives by herself and able to ambulate with a walker. In ED, CT head shows no acute findings. X-ray tib-fib bilaterally shows no fracture or dislocation. X-ray hips with pelvis shows no fracture or dislocation. CT C-spine shows no acute bony abnormalities; multilevel spondylosis marked at C4-5. Right LE venous US shows no DVT. UA was positive for UTI in ED and patient received Rocephin. She was too weak to ambulate in the ER with not much balance so hospitalist team was contacted for admission and possible SNF placement. Subjective (12/15/21): \"Oooh my leg (RLE) hurts. \"  Pleasant 89y.o. AA female with significant halitosis, c/o RLE pain     Review of Systems:  10 systems reviewed and negative except as noted in HPI.       Assessment & Plan:     Principal Problem:    Fall / right hip pain  - awaiting for radiology addendum to comment on right hip findings  - PT/OT consulted  - STR vs NH placement given recurrent falls and pt lives at home alone    Active Problems:    UTI  - f/u urine culture results  - on empiric rocephin  - gentle IVF      Malnutrition / dehydration  - gentle IVF  - nutrition consulted      Uncontrolled HTN  - cont norvasc / lisinopril / lasix / hydralazine / aldactone as dosed  - add clonidine 0.2mg tid  - closely monitor      COPD / asthma overlap syndrome  - cont symbicort bid; not in acute exacerbation      DM uncontrolled  - hgbA1c 8.3 on 11/30/2021  - on metformin outpatient  - start lantus 10u qhs titrate as needed      CKD (chronic kidney disease) stage 3, GFR 30-59 ml/min ([facility])  - renal fxn stable        Severe debility  - needs STR vs long-term placement; at high risk for falls        Dispo/Discharge Planning:    - anticipate at least 2 midnight hospitalization    Diet:  ADULT DIET Regular; 3 carb choices (45 gm/meal)  DVT PPx: SCD  Code status: DNR    Hospital Problems as of 12/15/2021 Date Reviewed: 11/30/2021          Codes Class Noted - Resolved POA    Frequent falls ICD-10-CM: R29.6  ICD-9-CM: V15.88  12/15/2021 - Present Unknown        * (Principal) Physical debility ICD-10-CM: R53.81  ICD-9-CM: 799.3  12/14/2021 - Present Yes        CKD (chronic kidney disease) stage 3, GFR 30-59 ml/min ([facility]) ICD-10-CM: N18.30  ICD-9-CM: 585.3  4/18/2019 - Present Yes        COP[facility] (Chronic) ICD-10-CM: J44.9  ICD-9-CM: 823  8/22/2018 - Present Yes        Anxiety ICD-10-CM: F41.9  ICD-9-CM: 300.00  2/1/2018 - Present Yes        Controlled type 2 diabetes mellitus with microalbuminuria, without long-term current use of insulin (HCC) ICD-10-CM: E11.29, R80.9  ICD-9-CM: 250.40, 791.0  11/22/2016 - Present Yes        HLD (hyperlipidemia) ICD-10-CM: E78.5  ICD-9-CM: 272.4  1/8/2013 - Present Yes        Depression ICD-10-CM: F32. A  ICD-9-CM: 489  6/1/2012 - Present Yes        Asthma ICD-10-CM: J45.909  ICD-9-CM: 493.90  6/1/2012 - Present Yes        Essential hypertension (Chronic) ICD-10-CM: I10  ICD-9-CM: 401.9  7/16/2010 - Present Yes        GERD (gastroesophageal reflux disease) (Chronic) ICD-10-CM: K21.9  ICD-9-CM: 530.81  7/16/2010 - Present Yes              Objective:     Patient Vitals for the past 24 hrs:   Temp Pulse Resp BP SpO2   12/15/21 1117    (!) 180/99    12/15/21 1116  73 20 (!) 180/99    12/15/21 1100  67  (!) 143/87 93 %   12/15/21 0959  72  (!) 175/116    12/15/21 0400  62 15 (!) 179/63 100 %   12/15/21 0330  61 10 (!) 180/61 97 %   12/15/21 0313    (!) 191/74    12/15/21 0100  65 14 (!) 193/80 99 %   12/14/21 2245  70 15 (!) 200/82    12/14/21 2240     96 %   12/14/21 2004   17  100 %   12/14/21 2003  72  (!) 168/85    12/14/21 1831  68 12 (!) 208/75 97 %   12/14/21 1737 98.6 °F (37 °C)       12/14/21 1734  73 16 (!) 214/90    12/14/21 1733  76 18 (!) 205/107 98 %     Oxygen Therapy  O2 Sat (%): 93 % (12/15/21 1100)  Pulse via Oximetry: 62 beats per minute (12/15/21 0400)  O2 Device: None (Room air) (12/15/21 1100)    Estimated body mass index is 27.62 kg/m² as calculated from the following:    Height as of this encounter: 5' 2\" (1.575 m). Weight as of this encounter: 68.5 kg (151 lb). No intake or output data in the 24 hours ending 12/15/21 1244      Physical Exam:     Blood pressure (!) 180/99, pulse 73, temperature 98.6 °F (37 °C), resp. rate 20, height 5' 2\" (1.575 m), weight 68.5 kg (151 lb), SpO2 93 %. General:    Undernourished, in NAD  Head:  Normocephalic, atraumatic  Eyes:  Sclerae appear normal.  Pupils equally round. Anicteric  ENT:  Nares appear normal, no drainage. Dry oral mucosa, halitosis   Neck:  No restricted ROM. Trachea midline   CV:   RRR. No m/r/g. No jugular venous distension. Lungs:   CTAB. No wheezing, rhonchi, or rales. Respirations even, unlabored  Abdomen: Bowel sounds present. Soft, nontender, nondistended.   Extremities: Tenderness elicited on palpation right hip; dependent b/l LE edema, negative calf tenderness  Skin:     No rashes and normal coloration. Warm and dry. Neuro:  CN II-XII grossly intact. Sensation intact. A&Ox3  Psych:  Normal mood and affect. I have reviewed ordered lab tests and independently visualized imaging below:    Recent Labs:  Recent Results (from the past 48 hour(s))   EKG, 12 LEAD, INITIAL    Collection Time: 12/14/21  5:49 PM   Result Value Ref Range    Ventricular Rate 69 BPM    Atrial Rate 68 BPM    P-R Interval 187 ms    QRS Duration 133 ms    Q-T Interval 376 ms    QTC Calculation (Bezet) 403 ms    Calculated P Axis 11 degrees    Calculated R Axis -61 degrees    Calculated T Axis -20 degrees    Diagnosis       Sinus rhythm  Right bundle branch block  Borderline LVH criteria  Anterolateral infarct, age indeterminate    Confirmed by Andrea Gross MD (), ARMAAN ([id number]) on 12/14/2021 10:50:00 PM     CBC WITH AUTOMATED DIFF    Collection Time: 12/14/21  7:31 PM   Result Value Ref Range    WBC 8.1 4.3 - 11.1 K/uL    RBC 4.20 4.05 - 5.2 M/uL    HGB 11.9 11.7 - 15.4 g/dL    HCT 39.3 35.8 - 46.3 %    MCV 93.6 79.6 - 97.8 FL    MCH 28.3 26.1 - 32.9 PG    MCHC 30.3 (L) 31.4 - 35.0 g/dL    RDW 15.1 (H) 11.9 - 14.6 %    PLATELET 865 083 - 354 K/uL    MPV 10.0 9.4 - 12.3 FL    ABSOLUTE NRBC 0.00 0.0 - 0.2 K/uL    DF AUTOMATED      NEUTROPHILS 72 43 - 78 %    LYMPHOCYTES 21 13 - 44 %    MONOCYTES 6 4.0 - 12.0 %    EOSINOPHILS 0 (L) 0.5 - 7.8 %    BASOPHILS 0 0.0 - 2.0 %    IMMATURE GRANULOCYTES 0 0.0 - 5.0 %    ABS. NEUTROPHILS 5.9 1.7 - 8.2 K/UL    ABS. LYMPHOCYTES 1.7 0.5 - 4.6 K/UL    ABS. MONOCYTES 0.5 0.1 - 1.3 K/UL    ABS. EOSINOPHILS 0.0 0.0 - 0.8 K/UL    ABS. BASOPHILS 0.0 0.0 - 0.2 K/UL    ABS. IMM.  GRANS. 0.0 0.0 - 0.5 K/UL   METABOLIC PANEL, COMPREHENSIVE    Collection Time: 12/14/21  7:31 PM   Result Value Ref Range    Sodium 143 136 - 145 mmol/L    Potassium 4.0 3.5 - 5.1 mmol/L    Chloride 108 (H) 98 - 107 mmol/L    CO2 30 21 - 32 mmol/L    Anion gap 5 (L) 7 - 16 mmol/L    Glucose 278 (H) 65 - 100 mg/dL    BUN 29 (H) 8 - 23 MG/DL    Creatinine 1.21 (H) 0.6 - 1.0 MG/DL    GFR est AA 54 (L) >60 ml/min/1.73m2    GFR est non-AA 44 (L) >60 ml/min/1.73m2    Calcium 10.9 (H) 8.3 - 10.4 MG/DL    Bilirubin, total 0.5 0.2 - 1.1 MG/DL    ALT (SGPT) 58 12 - 65 U/L    AST (SGOT) 27 15 - 37 U/L    Alk. phosphatase 116 50 - 136 U/L    Protein, total 7.2 6.3 - 8.2 g/dL    Albumin 3.4 3.2 - 4.6 g/dL    Globulin 3.8 (H) 2.3 - 3.5 g/dL    A-G Ratio 0.9 (L) 1.2 - 3.5     CK    Collection Time: 12/14/21  7:31 PM   Result Value Ref Range    CK 95 21 - 215 U/L   TROPONIN-HIGH SENSITIVITY    Collection Time: 12/14/21  7:31 PM   Result Value Ref Range    Troponin-High Sensitivity 26.9 (H) 0 - 14 pg/mL   POC URINE MACROSCOPIC    Collection Time: 12/14/21 10:06 PM   Result Value Ref Range    Spec. gravity (POC) >1.030 (H) 1.001 - 1.023    pH, urine  (POC) 6.5 5.0 - 9.0      Protein (POC) >300 (A) NEG mg/dL    Glucose, urine (POC) Negative NEG mg/dL    Ketones (POC) Negative NEG mg/dL    Bilirubin (POC) Negative NEG      Blood (POC) MODERATE (A) NEG      Urobilinogen (POC) 0.2 0.2 - 1.0 EU/dL    Nitrite (POC) Negative NEG      Leukocyte esterase (POC) TRACE (A) NEG     URINE MICROSCOPIC    Collection Time: 12/14/21 10:08 PM   Result Value Ref Range    WBC 10-20 0 /hpf    RBC 5-10 0 /hpf    Epithelial cells 0-3 0 /hpf    Bacteria 4+ (H) 0 /hpf    Casts 0-3 0 /lpf    Mucus 0 0 /lpf   CULTURE, URINE    Collection Time: 12/14/21 10:08 PM    Specimen: Clean catch; Urine   Result Value Ref Range    Special Requests: NO SPECIAL REQUESTS      Culture result:        NO GROWTH AFTER SHORT PERIOD OF INCUBATION. FURTHER RESULTS TO FOLLOW AFTER OVERNIGHT INCUBATION.    METABOLIC PANEL, BASIC    Collection Time: 12/15/21  4:58 AM   Result Value Ref Range    Sodium 143 136 - 145 mmol/L    Potassium 3.7 3.5 - 5.1 mmol/L    Chloride 108 (H) 98 - 107 mmol/L    CO2 29 21 - 32 mmol/L    Anion gap 6 (L) 7 - 16 mmol/L    Glucose 225 (H) 65 - 100 mg/dL    BUN 24 (H) 8 - 23 MG/DL    Creatinine 0.97 0.6 - 1.0 MG/DL    GFR est AA >60 >60 ml/min/1.73m2    GFR est non-AA 57 (L) >60 ml/min/1.73m2    Calcium 10.3 8.3 - 10.4 MG/DL   MAGNESIUM    Collection Time: 12/15/21  4:58 AM   Result Value Ref Range    Magnesium 2.2 1.8 - 2.4 mg/dL   CBC W/O DIFF    Collection Time: 12/15/21  4:58 AM   Result Value Ref Range    WBC 9.7 4.3 - 11.1 K/uL    RBC 4.21 4.05 - 5.2 M/uL    HGB 12.3 11.7 - 15.4 g/dL    HCT 39.6 35.8 - 46.3 %    MCV 94.1 79.6 - 97.8 FL    MCH 29.2 26.1 - 32.9 PG    MCHC 31.1 (L) 31.4 - 35.0 g/dL    RDW 15.3 (H) 11.9 - 14.6 %    PLATELET 544 291 - 055 K/uL    MPV 9.5 9.4 - 12.3 FL    ABSOLUTE NRBC 0.00 0.0 - 0.2 K/uL   GLUCOSE, POC    Collection Time: 12/15/21  9:46 AM   Result Value Ref Range    Glucose (POC) 237 (H) 65 - 100 mg/dL    Performed by Ronaldo Meraz        All Micro Results     Procedure Component Value Units Date/Time    CULTURE, URINE [[id number]] Collected: 12/14/21 2208    Order Status: Completed Specimen: Urine from Clean catch Updated: 12/15/21 0875     Special Requests: NO SPECIAL REQUESTS        Culture result:       NO GROWTH AFTER SHORT PERIOD OF INCUBATION. FURTHER RESULTS TO FOLLOW AFTER OVERNIGHT INCUBATION. Other Studies:  XR SPINE LUMB MIN 4 V    Result Date: 12/15/2021  LUMBAR SPINE, 5 views. INDICATION: Low back pain following fall. COMPARISON: None. TECHNIQUE: AP, lateral, bilateral oblique and cone-down lumbosacral junction views. FINDINGS: Spinal alignment: mild scoliosis toward the left. Disk spaces: Narrowed Vertebral bodies: All appear compressed. . Pedicles:  Grossly intact SI joints:  Appear symmetric. Facet joints:  Hypertrophic facet arthropathy. Other:  Small osteophytes. Aortic calcifications. Moderate degenerative changes throughout the lumbar spine.  This includes compression all lumbar vertebra which could be acute or chronic     XR TIB/FIB LT    Result Date: 12/14/2021  Exam: XR TIB/FIB LT on 12/14/2021 8:00 PM Clinical History: The Female patient is 80years old  presenting for pain following fall . Comparison:  none Findings: 3 views of the left tib-fib were obtained. No fracture or dislocation is identified. There is diffuse osteopenia. Joint spaces are well-maintained. 1. No acute osseous or joint abnormalities. XR TIB/FIB RT    Result Date: 12/14/2021  Exam: XR TIB/FIB RT on 12/14/2021 7:58 PM Clinical History: The Female patient is 80years old  presenting for pain following fall. Comparison:  none Findings: 3 views of the right tib-fib were obtained. No fracture or dislocation is identified. There is diffuse osteopenia. Joint spaces are well-maintained. 1. No acute osseous or joint abnormalities. CT HEAD WO CONT    Result Date: 12/14/2021  NONCONTRAST HEAD CT CLINICAL HISTORY:  HEAD injury from fall today. TECHNIQUE:  Axial images were obtained with spiral technique. Radiation dose reduction was achieved using one or all of the following techniques: automated exposure control, weight-based dosing, iterative reconstruction. COMPARISON:  March 3, 2021. REPORT:   Standard noncontrast head CT demonstrates no definite intracranial mass effect, hemorrhage, or evidence of acute geographic infarction. Stable inferior right frontal and anterior right temporal encephalomalacia. Extensive white matter hypodensities elsewhere are most consistent with small vessel ischemic disease. The ventricles are normal in size and configuration, accounting for the patient's age. Orbits  and paranasal sinuses are clear where imaged. Right frontotemporal craniotomy again noted. Bone windows demonstrate no definite fracture or destruction.      RIGHT FRONTOTEMPORAL CRANIOTOMY WITH STABLE SUBJACENT ENCEPHALOMALACIA AS WELL AS EXTENSIVE SMALL VESSEL ISCHEMIC DISEASE, BUT NO ACUTE INTRACRANIAL ABNORMALITY OR CALVARIAL FRACTURE IDENTIFIED AT NONCONTRAST CT.     CT SPINE CERV WO CONT    Result Date: 12/14/2021  CT CERVICAL SPINE WITH ADDITIONAL REFORMATS CLINICAL HISTORY:  NECK pain after a fall at home. TECHNIQUE:  Axial images were obtained with spiral technique, and coronal and sagittal reformats were produced. Radiation dose reduction was achieved using one or all of the following techniques: automated exposure control, weight-based dosing, iterative reconstruction. COMPARISON:  None. FINDINGS:  No definite acute fracture or malalignment is evident. Prevertebral soft tissues are unremarkable. There is multilevel degenerative disc disease which is most marked at C4-5, where focal central disc protrusion results in moderate central spinal stenosis. Multilevel uncovertebral and facet spurring results in comparable foraminal narrowing as well. 1.  No definite acute bony abnormality identified. 2.  Multilevel spondylosis is most marked at C4-5 central disc protrusion resulting in moderate central spinal stenosis and likely impinging upon the cord. CT HIP RT WO CONT    Result Date: 12/15/2021  CT left hip WITHOUT CONTRAST. INDICATION: Left hip pain following fall. TECHNIQUE: 2.0mm axial scans through the joint with sagittal and coronal reconstructions. Radiation dose reduction techniques were used for this study. Our CT scanners use one or more of the following:  Automated exposure control, adjustment of the mA and or kV according to patient size, iterative reconstruction. FINDINGS: No hip fracture. Osteoarthritis of both hip joints. Sacrum is unremarkable. Degenerative changes lumbar spine. Pubic rami intact. Moderate stool in the rectum. Diverticulosis. Aortoiliac atherosclerosis. Negative for hip fracture. MRI is more sensitive for radiographically occult fractures if clinical suspicion persists. XR HIPS BI W PELV MIN 5 VWS    Result Date: 12/14/2021  Exam: XR HIPS BI W PELV MIN 5 VWS on 12/14/2021 7:59 PM Clinical History:  The Female patient is 80 years old  presenting for pain/injury in hip. Comparison:  none Findings: 2 views of each each hip and single view of the pelvis were obtained. Right: No fracture or dislocation is identified. There is diffuse osteopenia. Moderate acetabular joint space loss is seen. Left: No fracture or dislocation is identified. There is diffuse osteopenia. Moderate acetabular joint space loss is seen. 1. No acute osseous or joint abnormalities. DUPLEX LOWER EXT VENOUS RIGHT    Result Date: 12/14/2021  RIGHT LOWER EXTREMITY DEEP VENOUS SONOGRAPHY: CLINICAL HISTORY: Leg pain and swelling. FINDINGS: Multiple images from real time ultrasound evaluation of the deep venous system of the right leg demonstrate normal venous flow in the posterior tibial, popliteal, and superficial and common femoral veins. Normal compressibility was demonstrated. Flow was also documented in the proximal saphenous and deep femoral veins. No intraluminal echogenic material was seen to suggest the presence of nonobstructive thrombus. NO ULTRASOUND EVIDENCE OF DEEP VENOUS THROMBOSIS IN THE RIGHT LEG.        Current Meds:  Current Facility-Administered Medications   Medication Dose Route Frequency    labetaloL (NORMODYNE;TRANDATE) injection 10 mg  10 mg IntraVENous Q6H PRN    hydrALAZINE (APRESOLINE) tablet 100 mg  100 mg Oral TID    amLODIPine (NORVASC) tablet 10 mg  10 mg Oral DAILY    0.9% sodium chloride infusion  75 mL/hr IntraVENous CONTINUOUS    lisinopriL (PRINIVIL, ZESTRIL) tablet 40 mg  40 mg Oral DAILY    budesonide-formoteroL (SYMBICORT) 160-4.5 mcg/actuation HFA inhaler 2 Puff  2 Puff Inhalation BID RT    furosemide (LASIX) tablet 20 mg  20 mg Oral DAILY    gabapentin (NEURONTIN) capsule 100 mg  100 mg Oral TID    pantoprazole (PROTONIX) tablet 40 mg  40 mg Oral ACB    pravastatin (PRAVACHOL) tablet 40 mg  40 mg Oral QHS    sertraline (ZOLOFT) tablet 100 mg  100 mg Oral DAILY    spironolactone (ALDACTONE) tablet 25 mg  25 mg Oral DAILY    albuterol (PROVENTIL VENTOLIN) nebulizer solution 2.5 mg  2.5 mg Nebulization Q6H PRN    sodium chloride (NS) flush 5-40 mL  5-40 mL IntraVENous Q8H    sodium chloride (NS) flush 5-40 mL  5-40 mL IntraVENous PRN    acetaminophen (TYLENOL) tablet 650 mg  650 mg Oral Q6H PRN    Or    acetaminophen (TYLENOL) suppository 650 mg  650 mg Rectal Q6H PRN    polyethylene glycol (MIRALAX) packet 17 g  17 g Oral DAILY PRN    ondansetron (ZOFRAN ODT) tablet 4 mg  4 mg Oral Q8H PRN    Or    ondansetron (ZOFRAN) injection 4 mg  4 mg IntraVENous Q6H PRN    tuberculin injection 5 Units  5 Units IntraDERMal ONCE    cefTRIAXone (ROCEPHIN) 1 g in 0.9% sodium chloride (MBP/ADV) 50 mL MBP  1 g IntraVENous Q24H    traMADoL (ULTRAM) tablet 50 mg  50 mg Oral BID PRN    insulin lispro (HUMALOG) injection   SubCUTAneous AC&HS    dextrose 40% (GLUTOSE) oral gel 1 Tube  15 g Oral PRN    glucagon (GLUCAGEN) injection 1 mg  1 mg IntraMUSCular PRN    dextrose (D50W) injection syrg 12.5-25 g  25-50 mL IntraVENous PRN       Signed:  MICHAEL Champion    Part of this note may have been written by using a voice dictation software. The note has been proof read but may still contain some grammatical/other typographical errors.

## 2021-12-15 NOTE — ED NOTES
TRANSFER - OUT REPORT:    Verbal report given to vandana Sharp Blaever  being transferred to [facility] for routine progression of care      Report consisted of patients Situation, Background, Assessment and   Recommendations(SBAR). Information from the following report(s) ED Summary was reviewed with the receiving nurse. Lines:   Peripheral IV 12/14/21 Right Hand (Active)        Opportunity for questions and clarification was provided.       Patient transported with:  Transport

## 2021-12-15 NOTE — PROGRESS NOTES
ACUTE PHYSICAL THERAPY GOALS:  (Developed with and agreed upon by patient and/or caregiver.)    (1.) Morene Frames  will move from supine to sit and sit to supine  with CONTACT GUARD ASSIST within 7 treatment day(s). (2.) Morene Frames will transfer from bed to chair and chair to bed with MINIMAL ASSIST using the least restrictive device within 7 treatment day(s). (3.) Morene Frames will ambulate with MINIMAL ASSIST for 5 feet with the least restrictive device within 7 treatment day(s). (4.) Morene Frames will perform seated static and dynamic balance activities x 10 minutes with CONTACT GUARD ASSIST to improve safety within 7 treatment day(s). (5.) Morene Frames will perform bilateral lower extremity exercises x 20 min for HEP with INDEPENDENCE to improve strength, endurance, and functional mobility within 7 treatment day(s).      PHYSICAL THERAPY ASSESSMENT: Initial Assessment, Daily Note and AM PT Treatment Day # 1      Aide Daugherty is a 80 y.o. female   PRIMARY DIAGNOSIS: Physical debility  Physical debility [R53.81]       Reason for Referral:    ICD-10: Treatment Diagnosis: Generalized Muscle Weakness (M62.81)  Difficulty in walking, Not elsewhere classified (R26.2)  INPATIENT: Payor: [employer] / Plan: [place] [employer] / Product Type: [employer] Medicare /     ASSESSMENT:     REHAB RECOMMENDATIONS:   Recommendation to date pending progress:  Setting:   Short-term Rehab  Equipment:    To Be Determined     PRIOR LEVEL OF FUNCTION:  (Prior to Hospitalization) INITIAL/CURRENT LEVEL OF FUNCTION:  (Most Recently Demonstrated)   Bed Mobility:   Independent  Sit to Stand:   Independent  Transfers:   Modified Independent  Gait/Mobility:   Modified Independent Bed Mobility:   x 2 mod-max  Sit to Stand:   Not tested  Transfers:   Not tested  Gait/Mobility:   Not tested     ASSESSMENT:  Ms. Luisa Daugherty  is a 80year old F who presents s/p fall, complaints of B LE pain, no acute fractures. At baseline, pt lives alone, ambulates with a rollator independently. This is her second recent fall. Today she presents oriented x 4 but confused. This date pt performs mobility including bed mobility with mod-maxA of 2. She required modA of 2 to sit at EOB. Pt with fair- sitting balance, requiring CGA-modA depending on fatigue level. Discussed attempting STS, pt became visibly anxious and complained of intense pain in R low back/hip area. Pt returned to supine with maxA of 2, positioning for comfort, RN informed. Pt presents as functioning below her baseline, with deficits in mobility including transfers, gait, balance, and activity tolerance. Pt will benefit from skilled therapy services to address stated deficits to promote return to highest level of function, independence, and safety. Will continue to follow. SUBJECTIVE:   Ms. Conchis Fontanez states, \"you have gloves on. \"    SOCIAL HISTORY/LIVING ENVIRONMENT: PLOF: lives alone, ind for ADLs, has meals on wheels, ambulates with rollator, son and neigbor check in, 0 ABDIAS     OBJECTIVE:     PAIN: VITAL SIGNS: LINES/DRAINS:   Pre Treatment: Pain Screen  Pain Scale 1: Numeric (0 - 10)  Pain Intensity 1: 10  Pain Location 1: Back; Hip  Pain Orientation 1: Right  Pain Intervention(s) 1: Repositioned  Post Treatment: 10   Purewick  O2 Device: None (Room air)     GROSS EVALUATION:  B LE Within Functional Limits Abnormal/ Functional Abnormal/ Non-Functional (see comments) Not Tested Comments:   AROM [x] [] [] []    PROM [] [] [] [x]    Strength [] [] [x] [] Generalized weakness   Balance [] [] [x] [] Fair- sitting, unable to stand   Posture [] [] [x] [] Forward flexed   Sensation [] [] [] [x]    Coordination [] [] [] [x]    Tone [] [] [] [x]    Edema [] [] [] [x]    Activity Tolerance [] [] [x] [] Below baseline    [] [] [] []      COGNITION/  PERCEPTION: Intact Impaired   (see comments) Comments:   Orientation [x] []    Vision [x] [] Hearing [] [x] [place]   Command Following [x] []    Safety Awareness [] [x] confused    [] []      MOBILITY: I Mod I S SBA CGA Min Mod Max Total  NT x2 Comments:   Bed Mobility    Rolling [] [] [] [] [] [] [] [x] [] [] [x]    Supine to Sit [] [] [] [] [] [] [x] [] [] [] [x]    Scooting [] [] [] [] [] [] [] [x] [] [] [x]    Sit to Supine [] [] [] [] [] [] [] [x] [] [] [x]    Transfers    Sit to Stand [] [] [] [] [] [] [] [] [] [x] []    Bed to Chair [] [] [] [] [] [] [] [] [] [x] []    Stand to Sit [] [] [] [] [] [] [] [] [] [x] []    I=Independent, Mod I=Modified Independent, S=Supervision, SBA=Standby Assistance, CGA=Contact Guard Assistance,   Min=Minimal Assistance, Mod=Moderate Assistance, Max=Maximal Assistance, Total=Total Assistance, NT=Not Tested  GAIT: I Mod I S SBA CGA Min Mod Max Total  NT x2 Comments:   Level of Assistance [] [] [] [] [] [] [] [] [] [x] []    Distance n/a    DME N/A    Gait Quality n/a    Weightbearing Status N/A     I=Independent, Mod I=Modified Independent, S=Supervision, SBA=Standby Assistance, CGA=Contact Guard Assistance,   Min=Minimal Assistance, Mod=Moderate Assistance, Max=Maximal Assistance, Total=Total Assistance, NT=Not Tested    [facility] [id number] [facility] Mob[facility] Inpatient Short Form       How much difficulty does the patient currently have. .. Unable A Lot A Little None   1. Turning over in bed (including adjusting bedclothes, sheets and blankets)? [] 1   [x] 2   [] 3   [] 4   2. Sitting down on and standing up from a chair with arms ( e.g., wheelchair, bedside commode, etc.)   [] 1   [x] 2   [] 3   [] 4   3. Moving from lying on back to sitting on the side of the bed? [] 1   [x] 2   [] 3   [] 4   How much help from another person does the patient currently need. .. Total A Lot A Little None   4. Moving to and from a bed to a chair (including a wheelchair)? [] 1   [x] 2   [] 3   [] 4   5. Need to walk in hospital room?    [x] 1   [] 2   [] 3   [] 4 6.  Climbing 3-5 steps with a railing? [x] 1   [] 2   [] 3   [] 4   © 2007, Trustees of [address] [place] [address] [address] [id number], under license to [employer]. All rights reserved     Score:  Initial: 10 Most Recent: X (Date: -- )    Interpretation of Tool:  Represents activities that are increasingly more difficult (i.e. Bed mobility, Transfers, Gait). PLAN:   FREQUENCY/DURATION: PT Plan of Care: 3 times/week for duration of hospital stay or until stated goals are met, whichever comes first.    PROBLEM LIST:   (Skilled intervention is medically necessary to address:)  1. Decreased ADL/Functional Activities  2. Decreased Activity Tolerance  3. Decreased Balance  4. Decreased Cognition  5. Decreased Gait Ability  6. Decreased Strength  7. Decreased Transfer Abilities  8. Increased Pain   INTERVENTIONS PLANNED:   (Benefits and precautions of physical therapy have been discussed with the patient.)  1. Therapeutic Activity  2. Therapeutic Exercise/HEP  3. Neuromuscular Re-education  4. Gait Training  5. Education     TREATMENT:     EVALUATION: Moderate Complexity : (Untimed Charge)    TREATMENT:   ($$ Therapeutic Activity: 23-37 mins    )  Co-Treatment PT/OT necessary due to patient's decreased overall endurance/tolerance levels, as well as need for high level skilled assistance to complete functional transfers/mobility and functional tasks  Therapeutic Activity (30 Minutes): Therapeutic activity included Rolling, Supine to Sit, Sit to Supine, Scooting and Sitting balance  to improve functional Mobility, Strength and Activity tolerance.     TREATMENT GRID:  N/A    AFTER TREATMENT POSITION/PRECAUTIONS:  Bed, Needs within reach and RN notified    INTERDISCIPLINARY COLLABORATION:  RN/PCT and OT/MARIE    TOTAL TREATMENT DURATION:  PT Patient Time In/Time Out  Time In: 1100  Time Out: [facility],

## 2021-12-15 NOTE — PROGRESS NOTES
Patient pleasant. No complaints this shift. MRI screening completed. Patient is eating dinner. Patient is voiding. Bed alarm on. Bed low and locked. Call light within reach. Hourly rounds completed this shift.

## 2021-12-15 NOTE — PROGRESS NOTES
CM received call from pts daughter about getting [facility] aid states pt had aid once before but she quit coming and pt never got another one but family would like to have services resumed, CM instructed daughter to contact SW with [facility] and have them come back out and complete evaluation and have new aid assigned if possible, daughter voiced understanding.

## 2021-12-15 NOTE — PROGRESS NOTES
ACUTE OT GOALS:  (Developed with and agreed upon by patient and/or caregiver.)  1. Patient will complete upper body bathing and dressing with setup and adaptive equipment as needed. 2. Patient will complete toileting with minimal assistance. 3. Patient will tolerate 20 minutes of OT treatment with as needed rest breaks to increase activity tolerance for ADLs. 4. Patient will attempt standing in preparation for functional transfers. Timeframe: 7 visits       OCCUPATIONAL THERAPY ASSESSMENT: Initial Assessment and Daily Note OT Treatment Day # 1    Aide Mathew is a 80 y.o. female   PRIMARY DIAGNOSIS: Physical debility  Physical debility [R53.81]       Reason for Referral:  Recent falls, generalized weakness   ICD-10: Treatment Diagnosis: Pain in Right Hip (M25.551)  History of falling (Z91.81)  Low Back Pain (M54.5)  INPATIENT: Payor: [employer] / Plan: [facility] [employer] MMP / Product Type: Managed Care Medicare /   ASSESSMENT:     REHAB RECOMMENDATIONS:   Recommendation to date pending progress:  Setting:   Short-term Rehab  Equipment:    To Be Determined     PRIOR LEVEL OF FUNCTION:  (Prior to Hospitalization)  INITIAL/CURRENT LEVEL OF FUNCTION:  (Based on today's evaluation)   Bathing:   Modified Independent  Dressing:   Modified Independent  Feeding/Grooming:   Independent  Toileting:   Independent  Functional Mobility:   Modified Independent Bathing:   Maximal Assistance  Dressing:   Maximal Assistance  Feeding/Grooming:   Minimal Assistance  Toileting:   Maximal Assistance  Functional Mobility:   Maximal Assistance x 2     ASSESSMENT:  Ms. Janee Mathew is a 81 y/o F with relevant PMH of DM, cerebral aneurysm, & COPD admitted with UTI & generalized weakness after falling at home. Baseline modified independent, living alone. Today, reports no pain at rest but c/o significant 10/10 pain in R hip/ low back area with movement.  Unable to attempt standing today d/t pain (RN notified). Participated in toileting and dressing ADLs from bed. She is functioning far below baseline & would benefit from continued OT. SUBJECTIVE:   Ms. Luisa Daugherty states, \"Do you have gloves on? .\"    SOCIAL HISTORY/LIVING ENVIRONMENT: Lives alone. Modified independent/ independent with ADLs. Gets assistance with IADLs (receives meals on wheels, family drives her, etc). Has a rollator and 2 canes. Has had several falls.         OBJECTIVE:     PAIN: VITAL SIGNS: LINES/DRAINS:   Pre Treatment: Pain Screen  Pain Scale 1: Numeric (0 - 10)  Pain Intensity 1: 10 (with movement)  Pain Location 1: Coccyx  Pain Intervention(s) 1: Nurse notified;Repositioned  Post Treatment: 0/10 at rest  Vital Signs  Pulse (Heart Rate): 67  BP: (!) 180/99  MAP (Calculated): 126  O2 Sat (%): 93 %  O2 Device: None (Room air) Continuous Pulse Oximetry, IV and Purewick  O2 Device: None (Room air)     GROSS EVALUATION:  BUE Within Functional Limits Abnormal/ Functional Abnormal/ Non-Functional (see comments) Not Tested Comments:   AROM [] [x] [] []    PROM [] [x] [] []    Strength [] [x] [] []    Balance [] [] [x] []    Posture [] [x] [] []    Sensation [] [] [] [x]    Coordination [] [] [] [x]    Tone [] [] [] [x]    Edema [] [] [] [x]    Activity Tolerance [] [] [x] [] Limited by pain     [] [] [] []      COGNITION/  PERCEPTION: Intact Impaired   (see comments) Comments:   Orientation [x] []    Vision [] [x] R eye cataract per patient; glasses    Hearing [] [x] [place]   Judgment/ Insight [] []    Attention [] []    Memory [] []    Command Following [x] []    Emotional Regulation [] [x] Appears anxious about falling     [] []      ACTIVITIES OF DAILY LIVING: I Mod I S SBA CGA Min Mod Max Total NT Comments   BASIC ADLs:              Bathing/ Showering [] [] [] [] [] [] [] [] [] []    Toileting [] [] [] [] [] [x] [] [x] [] [] Bladder hygiene- min  Bowel hygiene & brief management- max    Dressing [] [] [] [] [] [x] [] [] [] [] Upper body (gown doffing/ donning)    Feeding [] [] [x] [] [] [] [] [] [] []    Grooming [] [] [] [] [] [] [] [] [] []    Personal Device Care [] [] [] [] [] [] [] [] [] []    Functional Mobility [] [] [] [] [] [] [] [x] [] []    I=Independent, Mod I=Modified Independent, S=Supervision, SBA=Standby Assistance, CGA=Contact Guard Assistance,   Min=Minimal Assistance, Mod=Moderate Assistance, Max=Maximal Assistance, Total=Total Assistance, NT=Not Tested    MOBILITY: I Mod I S SBA CGA Min Mod Max Total  NT x2 Comments:   Supine to sit [] [] [] [] [] [] [x] [] [] [] [x]    Sit to supine [] [] [] [] [] [] [] [] [x] [] [x] Returned to supine quickly with onset of pain in back    Sit to stand [] [] [] [] [] [] [] [] [] [x] [] Unable to attempt secondary to pain    Bed to chair [] [] [] [] [] [] [] [] [] [x] []    I=Independent, Mod I=Modified Independent, S=Supervision, SBA=Standby Assistance, CGA=Contact Guard Assistance,   Min=Minimal Assistance, Mod=Moderate Assistance, Max=Maximal Assistance, Total=Total Assistance, NT=Not Tested    [facility] [place] [id number]   Daily Activity Inpatient Short Form        How much help from another person does the patient currently need. .. Total A Lot A Little None   1. Putting on and taking off regular lower body clothing? [] 1   [x] 2   [] 3   [] 4   2. Bathing (including washing, rinsing, drying)? [] 1   [x] 2   [] 3   [] 4   3. Toileting, which includes using toilet, bedpan or urinal?   [] 1   [x] 2   [] 3   [] 4   4. Putting on and taking off regular upper body clothing? [] 1   [x] 2   [] 3   [] 4   5. Taking care of personal grooming such as brushing teeth? [] 1   [x] 2   [] 3   [] 4   6. Eating meals? [] 1   [] 2   [x] 3   [] 4   © 2007, Trustees of [facility] [employer], under license to [employer].  All rights reserved     Score:  Initial: 13 Most Recent: X (Date: -- )   Interpretation of Tool:  Represents activities that are increasingly more difficult (i.e. Bed mobility, Transfers, Gait). PLAN:   FREQUENCY/DURATION: OT Plan of Care: 3 times/week for duration of hospital stay or until stated goals are met, whichever comes first.    PROBLEM LIST:   (Skilled intervention is medically necessary to address:)  1. Decreased ADL/Functional Activities  2. Decreased Activity Tolerance  3. Decreased AROM/PROM  4. Decreased Balance  5. Decreased Gait Ability  6. Decreased Strength  7. Decreased Transfer Abilities  8. Increased Pain   INTERVENTIONS PLANNED:   (Benefits and precautions of occupational therapy have been discussed with the patient.)  1. Self Care Training  2. Therapeutic Activity  3. Therapeutic Exercise/HEP  4. Neuromuscular Re-education  5. Education     TREATMENT:     EVALUATION: Moderate Complexity : (Untimed Charge)    TREATMENT:   ($$ Self Care/Home Management: 23-37 mins    )  Co-Treatment PT/OT necessary due to patient's decreased overall endurance/tolerance levels, as well as need for high level skilled assistance to complete functional transfers/mobility and functional tasks  Self Care (23 Minutes): Self care including Toileting, Upper Body Dressing and Self Feeding to increase independence and decrease level of assistance required.     TREATMENT GRID:  N/A    AFTER TREATMENT POSITION/PRECAUTIONS:  Bed, Needs within reach and RN notified    INTERDISCIPLINARY COLLABORATION:  RN/PCT, PT/PTA and OT/MARIE    TOTAL TREATMENT DURATION:  OT Patient Time In/Time Out  Time In: 1100  Time Out: [address] [place]

## 2021-12-15 NOTE — H&P
Hospitalist History and Physical   Admit Date:  2021  5:29 PM   Name:  Arno Klinefelter   Age:  80 y.o. Sex:  female  :  1929   MRN:  [id number]   Room:  [place] [address]    Presenting Complaint: Fall    Reason(s) for Admission: Physical debility [R53.81]     History of Present Illness:   Arno Klinefelter is a 80 y.o. female with medical history of HTN, CKD stage III, COPD who presented with difficulty with ambulation after fall PTA. Patient reported she was going to the refrigerator with her walker, reached out to get some water and lost her balance. She hit the right side of her head on the table during fall but no loss of consciousness. She reported landing on her R hip. She denies palpitations, lightheadedness, dizziness, chest pain prior to falling. She denies nausea vomiting or changes in her vision. She activated her medical alert and EMS found her on the floor. She reported bilateral leg pain and has not been able to ambulate since her fall. At baseline, patient lives by herself and able to ambulate with a walker. In ED, CT head shows no acute findings. X-ray tib-fib bilaterally shows no fracture or dislocation. X-ray hips with pelvis shows no fracture or dislocation. CT C-spine shows no acute bony abnormalities; multilevel spondylosis marked at C4-5. Right LE venous US shows no DVT. UA was positive for UTI in ED and patient received Rocephin. She was too weak to ambulate in the ER with not much balance so hospitalist team was contacted for admission and possible SNF placement. Review of Systems:  10 systems reviewed and negative except as noted in HPI. Assessment & Plan:     Physical debility  S/p Fall with b/l LE weakness  CT head shows no acute findings. X-ray tib-fib bilaterally shows no fracture or dislocation. X-ray hips with pelvis shows no fracture or dislocation. CT C-spine shows no acute bony abnormalities; multilevel spondylosis marked at C4-5.   Right LE venous US shows no DVT. Fall precautions  Obtain orthostatic vital signs  Analgesics prn  PT/OT  PPD ordered, may need SNF  Check XR L spine and CT of R hip     Acute UTI  Previous Ucx grew Klebsiella pansusceptible  Abx:Rocephin (12/14-. ..) empirically pending UCx  Obtain Ucx    CKD stage III  Cr on admission 1.21 (BL 1.21.5)  Cont home ACE-I  CTM    HLD  Continue home pravastatin    DM 2  Holding home Metformin. HA1C 8.3 from 2 weeks ago  Start sliding scale insulin  Monitor for hypoglycemia    Neuropathy  Continue home gabapentin    Anxiety/depression  Continue home Zoloft    Hypertension  Continue home Norvasc, benazepril, Lasix, hydralazine, spironolactone  Labetalol prn    COPD/asthma  Continue home inhalers    Anemia of chronic disease  Follows oncology outpatient and received Retacrit every other week previously and B12 monthly  Hgb 11.9 on admission (baseline)  CTM    History of aneurysmal subarachnoid hemorrhage >20 years ago  Noted. CT head on admission shows right frontotemporal craniotomy with stable encephalomalacia as well as extensive small vessel disease but no acute intracranial abnormality or fracture.       Dispo/Discharge Planning:     >2 midnights    Diet: No diet orders on file  VTE ppx: SCD's  Code status: Prior    Hospital Problems as of 12/14/2021 Date Reviewed: 11/30/2021          Codes Class Noted - Resolved POA    Physical debility ICD-10-CM: R53.81  ICD-9-CM: 799.3  12/14/2021 - Present Unknown              Past History:  Past Medical History:   Diagnosis Date    Anxiety 2/1/2018    Arrhythmia     palpitations 2/10-went to ER[facility]    Arthritis     L leg- fell 2008    Asthma     adult onset x 20 yrs    B12 deficiency 8/24/2012    Body mass index 37.0-37.9, adult 2/5/2014    CAD (coronary artery disease)     cardiac work [id number]    Controlled type 2 diabetes mellitus with microalbuminuria, without long-term current use of insulin ([facility] 11/22/2016    COPD     Corns and callosities 12/19/2016    CRI (chronic renal insufficiency) 8/24/2012    Depression 6/1/2012    Dermatophytosis of nail 12/19/2016    Diabetes ([facility] [address] [address]     type II- home tests fasting-112    DJD (degenerative joint disease) of hip     DM (diabetes mellitus) type II controlled with renal manifestation ([facility] [address] 7/16/2010    Encounter for long-term (current) use of other medications 1/8/2013    Essential hypertension 7/16/2010    Gastrointestinal disorder     GERD (gastroesophageal reflux disease)     GI bleed 7/28/2018    Heart failure ([facility] [address]     Hiatal hernia 8/24/2012    HLD (hyperlipidemia) 1/8/2013    Neuropathy 8/24/2012    Lower limbs     Obesity (BMI 30.0-39. 9) BMI-43.8    Other and unspecified hyperlipidemia 7/16/2010    Other chest pain 7/16/2010    Other ill-defined conditions(799.89)     high cholesterol    Other ill-defined conditions(799.89)     incont. urine    PAD (peripheral artery disease) ([facility] 8/24/2012    Retinal hemorrhage      Past Surgical History:   Procedure Laterality Date    HX APPENDECTOMY      HX CHOLECYSTECTOMY      HX CHOLECYSTECTOMY  2000    HX ENDOSCOPY  02/27/2018    Dr Nida Barrera    HX GI      small intestine obstruction    HX GYN      hyst    HX HEART CATHETERIZATION      HX HYSTERECTOMY      prolapse    HX INTRACRANIAL ANEURYSM REPAIR      HX INTRACRANIAL ANEURYSM REPAIR      HX ORTHOPAEDIC      right wrist 2010    HX OTHER SURGICAL      aneurysm clip-cerebral-2000    HX OTHER SURGICAL      cerebral aneurysm   Dr. Delta Galeazzi UNLISTED        No Known Allergies   Social History     Tobacco Use    Smoking status: Never Smoker    Smokeless tobacco: Never Used   Substance Use Topics    Alcohol use: No      Family History   Problem Relation Age of Onset    Cancer Mother     Diabetes Father     Diabetes Paternal Grandfather       Family history reviewed and negative except as otherwise noted.     Immunization History   Administered Date(s) Administered    (RETIRED) Pneumococcal Vaccine (Unspecified Type) 01/01/2006    Influenza High Dose Vaccine PF 09/14/2016, 10/25/2017, 12/13/2018    Influenza Vaccine 11/05/2013, 09/16/2014, 10/19/2015    Influenza Vaccine (Tri) Adjuvanted (>65 Yrs FLUAD TRI 30525) 10/14/2019    Influenza Vaccine PF 09/22/2015    Influenza Vaccine Whole 01/01/2011    Influenza, Quadrivalent, Adjuvanted (>65 Yrs FLUAD QUAD 50343) 12/30/2020, 11/30/2021    Pneumococcal Conjugate (PCV-13) 07/14/2015    Pneumococcal Vaccine (Unspecified Type) 01/01/2006    TB Skin Test (PPD) Intradermal 07/31/2018, 08/21/2018    Tdap 11/04/2014     Prior to Admit Medications:  Current Outpatient Medications   Medication Instructions    acetaminophen (TYLENOL ARTHRITIS PAIN) 650 mg, Oral, EVERY 8 HOURS, Tylenol arthritis as needed     amLODIPine (NORVASC) 10 mg, Oral, DAILY    benazepriL (LOTENSIN) 40 mg tablet TAKE 1 TABLET BY MOUTH DAILY    Blood-Glucose Meter monitoring kit Pt uses true metrix meter and tests once daily dx code E11.29    cyanocobalamin (vitamin B-12) 1,000 mcg, Injection, EVERY MONTH    diclofenac (VOLTAREN) 2 g, Topical, 3 TIMES DAILY AS NEEDED    fluticasone propion-salmeteroL (Advair Diskus) 250-50 mcg/dose diskus inhaler 1 Puff, Inhalation, EVERY 12 HOURS    fluticasone propionate (FLONASE) 50 mcg/actuation nasal spray 2 Sprays, Both Nostrils, DAILY    FOLIC ACID/MULTIVIT-MIN/LUTEIN (CENTRUM SILVER PO) Oral    furosemide (LASIX) 20 mg, Oral, DAILY    gabapentin (NEURONTIN) 100 mg, Oral, 3 TIMES DAILY    glucose blood VI test strips (True Metrix Glucose Test Strip) strip TEST ONCE DAILY dx code E11.29    hydrALAZINE (APRESOLINE) 100 mg, Oral, 3 TIMES DAILY    Lancets Misc Pt tests three times daily Diagnosis Code: 250.00    lidocaine 4 % patch Apply to affected area daily prn.     metFORMIN (GLUCOPHAGE) 500 mg, Oral, 2 TIMES DAILY WITH MEALS    OTHER One shower chair    pantoprazole (PROTONIX) 20 mg tablet TAKE 1 TABLET BY MOUTH DAILY    polyethylene glycol (MIRALAX) 17 g, Oral, DAILY, 1 tablespoon with 8 oz of water daily    potassium chloride (K-DUR, KLOR-CON) 20 mEq tablet TAKE 1 TABLET BY MOUTH EVERY MORNING    pravastatin (PRAVACHOL) 40 mg tablet TAKE 1 TABLET BY MOUTH EVERY NIGHT AT BEDTIME    sertraline (ZOLOFT) 100 mg, Oral, DAILY    spironolactone (ALDACTONE) 25 mg, Oral, DAILY    VENTOLIN HFA 90 mcg/actuation inhaler INHALE 2 PUFFS BY MOUTH EVERY 6 HOURS AS NEEDED FOR WHEEZING    VIT A/VIT C/VIT E/ZINC/COPPER (PRESERVISION AREDS PO) 1 Tablet, Oral, 2 TIMES DAILY    Wheel Chair kalyn Wheelchair       Objective:     Patient Vitals for the past 24 hrs:   Temp Pulse Resp BP SpO2   12/14/21 2245  70 15 (!) 200/82    12/14/21 2240     96 %   12/14/21 2004   17  100 %   12/14/21 2003  72  (!) 168/85    12/14/21 1831  68 12 (!) 208/75 97 %   12/14/21 1737 98.6 °F (37 °C)       12/14/21 1734  73 16 (!) 214/90    12/14/21 1733  76 18 (!) 205/107 98 %     Oxygen Therapy  O2 Sat (%): 96 % (12/14/21 2240)  Pulse via Oximetry: 68 beats per minute (12/14/21 1831)    Estimated body mass index is 27.62 kg/m² as calculated from the following:    Height as of this encounter: 5' 2\" (1.575 m). Weight as of this encounter: 68.5 kg (151 lb). No intake or output data in the 24 hours ending 12/14/21 2318      Physical Exam:    Blood pressure (!) 200/82, pulse 70, temperature 98.6 °F (37 °C), resp. rate 15, height 5' 2\" (1.575 m), weight 68.5 kg (151 lb), SpO2 96 %. General:    Well nourished. No overt distress  Head:  Normocephalic, atraumatic  Eyes:  Sclerae appear normal.  Pupils equally round. ENT:  Nares appear normal, no drainage. Moist oral mucosa  Neck:  No restricted ROM. Trachea midline   CV:   RRR. No m/r/g. No jugular venous distension. Lungs:   CTAB. No wheezing, rhonchi, or rales. Respirations even, unlabored  Abdomen: Bowel sounds present.   Soft, nontender, nondistended. Extremities: No cyanosis or clubbing. TTP on low back and R hip. Strength 5/5 b/l LE though weak R hip flexion d/t pain. Sensation intact. Skin:     No rashes and normal coloration. Warm and dry. Neuro:  CN II-XII grossly intact. Sensation intact. A&Ox3  Psych:  Normal mood and affect. I have reviewed ordered lab tests and independently visualized imaging below:    Last 24hr Labs:  Recent Results (from the past 24 hour(s))   EKG, 12 LEAD, INITIAL    Collection Time: 12/14/21  5:49 PM   Result Value Ref Range    Ventricular Rate 69 BPM    Atrial Rate 68 BPM    P-R Interval 187 ms    QRS Duration 133 ms    Q-T Interval 376 ms    QTC Calculation (Bezet) 403 ms    Calculated P Axis 11 degrees    Calculated R Axis -61 degrees    Calculated T Axis -20 degrees    Diagnosis       Sinus rhythm  Right bundle branch block  Borderline LVH criteria  Anterolateral infarct, age indeterminate    Confirmed by Neha Davis MD (), ARMAAN ([id number]) on 12/14/2021 10:50:00 PM     CBC WITH AUTOMATED DIFF    Collection Time: 12/14/21  7:31 PM   Result Value Ref Range    WBC 8.1 4.3 - 11.1 K/uL    RBC 4.20 4.05 - 5.2 M/uL    HGB 11.9 11.7 - 15.4 g/dL    HCT 39.3 35.8 - 46.3 %    MCV 93.6 79.6 - 97.8 FL    MCH 28.3 26.1 - 32.9 PG    MCHC 30.3 (L) 31.4 - 35.0 g/dL    RDW 15.1 (H) 11.9 - 14.6 %    PLATELET 963 706 - 251 K/uL    MPV 10.0 9.4 - 12.3 FL    ABSOLUTE NRBC 0.00 0.0 - 0.2 K/uL    DF AUTOMATED      NEUTROPHILS 72 43 - 78 %    LYMPHOCYTES 21 13 - 44 %    MONOCYTES 6 4.0 - 12.0 %    EOSINOPHILS 0 (L) 0.5 - 7.8 %    BASOPHILS 0 0.0 - 2.0 %    IMMATURE GRANULOCYTES 0 0.0 - 5.0 %    ABS. NEUTROPHILS 5.9 1.7 - 8.2 K/UL    ABS. LYMPHOCYTES 1.7 0.5 - 4.6 K/UL    ABS. MONOCYTES 0.5 0.1 - 1.3 K/UL    ABS. EOSINOPHILS 0.0 0.0 - 0.8 K/UL    ABS. BASOPHILS 0.0 0.0 - 0.2 K/UL    ABS. IMM.  GRANS. 0.0 0.0 - 0.5 K/UL   METABOLIC PANEL, COMPREHENSIVE    Collection Time: 12/14/21  7:31 PM   Result Value Ref Range    Sodium 143 136 - 145 mmol/L    Potassium 4.0 3.5 - 5.1 mmol/L    Chloride 108 (H) 98 - 107 mmol/L    CO2 30 21 - 32 mmol/L    Anion gap 5 (L) 7 - 16 mmol/L    Glucose 278 (H) 65 - 100 mg/dL    BUN 29 (H) 8 - 23 MG/DL    Creatinine 1.21 (H) 0.6 - 1.0 MG/DL    GFR est AA 54 (L) >60 ml/min/1.73m2    GFR est non-AA 44 (L) >60 ml/min/1.73m2    Calcium 10.9 (H) 8.3 - 10.4 MG/DL    Bilirubin, total 0.5 0.2 - 1.1 MG/DL    ALT (SGPT) 58 12 - 65 U/L    AST (SGOT) 27 15 - 37 U/L    Alk. phosphatase 116 50 - 136 U/L    Protein, total 7.2 6.3 - 8.2 g/dL    Albumin 3.4 3.2 - 4.6 g/dL    Globulin 3.8 (H) 2.3 - 3.5 g/dL    A-G Ratio 0.9 (L) 1.2 - 3.5     CK    Collection Time: 12/14/21  7:31 PM   Result Value Ref Range    CK 95 21 - 215 U/L   TROPONIN-HIGH SENSITIVITY    Collection Time: 12/14/21  7:31 PM   Result Value Ref Range    Troponin-High Sensitivity 26.9 (H) 0 - 14 pg/mL   URINE MICROSCOPIC    Collection Time: 12/14/21 10:08 PM   Result Value Ref Range    WBC 10-20 0 /hpf    RBC 5-10 0 /hpf    Epithelial cells 0-3 0 /hpf    Bacteria 4+ (H) 0 /hpf    Casts 0-3 0 /lpf    Mucus 0 0 /lpf       All Micro Results     Procedure Component Value Units Date/Time    CULTURE, URINE [[id number]]     Order Status: Sent Specimen: Urine from Clean catch           Other Studies:  XR TIB/FIB LT    Result Date: 12/14/2021  Exam: XR TIB/FIB LT on 12/14/2021 8:00 PM Clinical History: The Female patient is 80years old  presenting for pain following fall . Comparison:  none Findings: 3 views of the left tib-fib were obtained. No fracture or dislocation is identified. There is diffuse osteopenia. Joint spaces are well-maintained. 1. No acute osseous or joint abnormalities. XR TIB/FIB RT    Result Date: 12/14/2021  Exam: XR TIB/FIB RT on 12/14/2021 7:58 PM Clinical History: The Female patient is 80years old  presenting for pain following fall. Comparison:  none Findings: 3 views of the right tib-fib were obtained.  No fracture or dislocation is identified. There is diffuse osteopenia. Joint spaces are well-maintained. 1. No acute osseous or joint abnormalities. CT HEAD WO CONT    Result Date: 12/14/2021  NONCONTRAST HEAD CT CLINICAL HISTORY:  HEAD injury from fall today. TECHNIQUE:  Axial images were obtained with spiral technique. Radiation dose reduction was achieved using one or all of the following techniques: automated exposure control, weight-based dosing, iterative reconstruction. COMPARISON:  March 3, 2021. REPORT:   Standard noncontrast head CT demonstrates no definite intracranial mass effect, hemorrhage, or evidence of acute geographic infarction. Stable inferior right frontal and anterior right temporal encephalomalacia. Extensive white matter hypodensities elsewhere are most consistent with small vessel ischemic disease. The ventricles are normal in size and configuration, accounting for the patient's age. Orbits  and paranasal sinuses are clear where imaged. Right frontotemporal craniotomy again noted. Bone windows demonstrate no definite fracture or destruction. RIGHT FRONTOTEMPORAL CRANIOTOMY WITH STABLE SUBJACENT ENCEPHALOMALACIA AS WELL AS EXTENSIVE SMALL VESSEL ISCHEMIC DISEASE, BUT NO ACUTE INTRACRANIAL ABNORMALITY OR CALVARIAL FRACTURE IDENTIFIED AT NONCONTRAST CT.     CT SPINE CERV WO CONT    Result Date: 12/14/2021  CT CERVICAL SPINE WITH ADDITIONAL REFORMATS CLINICAL HISTORY:  NECK pain after a fall at home. TECHNIQUE:  Axial images were obtained with spiral technique, and coronal and sagittal reformats were produced. Radiation dose reduction was achieved using one or all of the following techniques: automated exposure control, weight-based dosing, iterative reconstruction. COMPARISON:  None. FINDINGS:  No definite acute fracture or malalignment is evident. Prevertebral soft tissues are unremarkable.   There is multilevel degenerative disc disease which is most marked at C4-5, where focal central disc protrusion results in moderate central spinal stenosis. Multilevel uncovertebral and facet spurring results in comparable foraminal narrowing as well. 1.  No definite acute bony abnormality identified. 2.  Multilevel spondylosis is most marked at C4-5 central disc protrusion resulting in moderate central spinal stenosis and likely impinging upon the cord. XR HIPS BI W PELV MIN 5 VWS    Result Date: 12/14/2021  Exam: XR HIPS BI W PELV MIN 5 VWS on 12/14/2021 7:59 PM Clinical History: The Female patient is 80years old  presenting for pain/injury in hip. Comparison:  none Findings: 2 views of each each hip and single view of the pelvis were obtained. Right: No fracture or dislocation is identified. There is diffuse osteopenia. Moderate acetabular joint space loss is seen. Left: No fracture or dislocation is identified. There is diffuse osteopenia. Moderate acetabular joint space loss is seen. 1. No acute osseous or joint abnormalities. DUPLEX LOWER EXT VENOUS RIGHT    Result Date: 12/14/2021  RIGHT LOWER EXTREMITY DEEP VENOUS SONOGRAPHY: CLINICAL HISTORY: Leg pain and swelling. FINDINGS: Multiple images from real time ultrasound evaluation of the deep venous system of the right leg demonstrate normal venous flow in the posterior tibial, popliteal, and superficial and common femoral veins. Normal compressibility was demonstrated. Flow was also documented in the proximal saphenous and deep femoral veins. No intraluminal echogenic material was seen to suggest the presence of nonobstructive thrombus. NO ULTRASOUND EVIDENCE OF DEEP VENOUS THROMBOSIS IN THE RIGHT LEG. Medications Administered     cefTRIAXone (ROCEPHIN) 1 g in 0.9% sodium chloride (MBP/ADV) 50 mL MBP     Admin Date  12/14/2021 Action  New Bag Dose  1 g Rate  100 mL/hr Route  IntraVENous Administered By  Anisa Castano RN                Signed:   Zully Trinidad DO    Part of this note may have been written by using a voice dictation software. The note has been proof read but may still contain some grammatical/other typographical errors.

## 2021-12-15 NOTE — CONSULTS
Comprehensive Nutrition Assessment    Type and Reason for Visit: Initial,Consult  Poor intake/appetite 5 or more days (hospitalist)    Nutrition Recommendations/Plan:   Meals and Snacks:  Continue current diet. Nutrition Supplement Therapy:   Medical food supplement therapy:  Initiate Ensure High Protein three times per day (this provides 160 kcal and 16 grams protein per bottle)       Malnutrition Assessment:  Malnutrition Status: Moderate malnutrition  Context: Chronic illness  Findings of clinical characteristics of malnutrition:   Energy Intake:  Unable to assess (pt does not provide quantifiable nutrition hx)  Weight Loss:  Mild weight loss (specify amount and time period) (8% loss over 1 yr based on stated body wt, potentially more)     Body Fat Loss:  1 - Mild body fat loss, Triceps,Buccal region   Muscle Mass Loss:  1 - Mild muscle mass loss, Hand (interosseous),Temples (temporalis),Clavicles (pectoralis &deltoids)  Fluid Accumulation:  Unable to assess     Strength:  Not performed     Nutrition Assessment:   Nutrition History: Pt reports she gets meals on wheels and medicaid sends a box of food per month. She indicates she eats 3 meals per day and snacks of PB crackers between. She rpeorts drinking an oral supplement once daily, unknown nutrition composition. Nutrition Background: PMH remarkable for HTN, CKD, COPD. Admitted s/p fall, UTI, malnutrition. Daily Update:  Pt is alert and oriented times 4. Limited specific recall related to nutrition intake. She did eat noon meal today without difficulties. Discussed with Dre Alanis, RN.       Nutrition Related Findings:   NFPE as above      Current Nutrition Therapies:  ADULT DIET Regular; 3 carb choices (45 gm/meal)    Current Intake:   Average Meal Intake:  (ate ~75% noon meal per RN) Average Supplement Intake: None ordered      Anthropometric Measures:  Height: 5' 2\" (157.5 cm)  Current Body Wt: 68.5 kg (151 lb 0.2 oz) (12/14), Weight source: Stated  BMI: 27.6, Overweight (BMI 25.0-29. 9)  Admission Body Weight: 151 lb 0.2 oz (12/14 stated)  Ideal Body Weight (lbs) (Calculated): 110 lbs (50 kg), 137.3 %  Usual Body Wt: 74.4 kg (164 lb) (per Onc office records 12/18/20, 147# 10/27/21), Percent weight change: -7.9          Edema: No data recorded   Estimated Daily Nutrient Needs:  Energy (kcal/day): 7790-4232 (Kcal/kg (20-25), Weight Used: Admission)  Protein (g/day): 69-82 (1-1.2 g/kg) Weight Used: (Admission)  Fluid (ml/day):   (1 ml/kcal)    Nutrition Diagnosis:   · Predicted inadequate energy intake related to  (hx of progressive wt loss) as evidenced by  (pt states UBW >200# several yrs ago, min of 8% wt loss )    · Moderate malnutrition,In context of chronic illness related to  (presumed compromised intake) as evidenced by  (malnutrition criteria as above)    Nutrition Interventions:   Food and/or Nutrient Delivery: Continue current diet,Start oral nutrition supplement     Coordination of Nutrition Care: Continue to monitor while inpatient    Goals: Active Goal: Meet >75% estimated needs    Nutrition Monitoring and Evaluation:      Food/Nutrient Intake Outcomes: Food and nutrient intake,Supplement intake  Physical Signs/Symptoms Outcomes: Biochemical data,GI status,Meal time behavior,Weight    Discharge Planning:     Too soon to determine    Caitlyn Bermudez RD, [facility]   Contact: [phone]

## 2021-12-15 NOTE — ED PROVIDER NOTES
14-year-old female was brought in by EMS. She usually gets around with a walker lives by herself. She states she was going to get the refrigerator when she fell. Is not sure why she fell. But she denies any lightheadedness or syncope. She struck the right side of her head on the table. No loss of consciousness or vomiting. She activated her medic alert and EMS found her still on the floor. Has not walked since this is occurred. She complains of bilateral leg pain and some slight soreness in her head. No particular neck back chest or abdominal pain. No focal numbness or weakness. She is not had any fever chills or vomiting. Review of records reveals history of hypertension diabetes. Also history of anemia. And does have a history of aneurysmal subarachnoid hemorrhage. The history is provided by the patient and the EMS personnel. Fall  The accident occurred 3 to 5 hours ago. She fell from a height of ground level. She landed on hard floor. There was no blood loss. The point of impact was the head. The pain is moderate. She was not ambulatory at the scene. Associated symptoms include headaches. Pertinent negatives include no visual change, no fever, no numbness, no abdominal pain, no nausea, no vomiting, no extremity weakness, no loss of consciousness and no laceration. The symptoms are aggravated by activity. She has tried nothing for the symptoms.         Past Medical History:   Diagnosis Date    Anxiety 2/1/2018    Arrhythmia     palpitations 2/10-went to [facility]    Arthritis     L leg- fell 2008    Asthma     adult onset x 20 yrs    B12 deficiency 8/24/2012    Body mass index 37.0-37.9, adult 2/5/2014    CAD (coronary artery disease)     cardiac work [id number]    Controlled type 2 diabetes mellitus with microalbuminuria, without long-term current use of insulin ([facility] [address] 11/22/2016    COPD     Corns and callosities 12/19/2016    CRI (chronic renal insufficiency) 8/24/2012    Depression 6/1/2012  Dermatophytosis of nail 12/19/2016    Diabetes ([facility] [address]     type II- home tests fasting-112    DJD (degenerative joint disease) of hip     DM (diabetes mellitus) type II controlled with renal manifestation ([address] 7/16/2010    Encounter for long-term (current) use of other medications 1/8/2013    Essential hypertension 7/16/2010    Gastrointestinal disorder     GERD (gastroesophageal reflux disease)     GI bleed 7/28/2018    Heart failure ([facility] [address]     Hiatal hernia 8/24/2012    HLD (hyperlipidemia) 1/8/2013    Neuropathy 8/24/2012    Lower limbs     Obesity (BMI 30.0-39. 9) BMI-43.8    Other and unspecified hyperlipidemia 7/16/2010    Other chest pain 7/16/2010    Other ill-defined conditions(799.89)     high cholesterol    Other ill-defined conditions(799.89)     incont.  urine    PAD (peripheral artery disease) ([facility] [address] 8/24/2012    Retinal hemorrhage        Past Surgical History:   Procedure Laterality Date    HX APPENDECTOMY      HX CHOLECYSTECTOMY      HX CHOLECYSTECTOMY  2000    HX ENDOSCOPY  02/27/2018    Dr Brianne Clayton    HX GI      small intestine obstruction    HX GYN      hyst    HX HEART CATHETERIZATION      HX HYSTERECTOMY      prolapse    HX INTRACRANIAL ANEURYSM REPAIR      HX INTRACRANIAL ANEURYSM REPAIR      HX ORTHOPAEDIC      right wrist 2010    HX OTHER SURGICAL      aneurysm clip-cerebral-2000    HX OTHER SURGICAL      cerebral aneurysm   Dr. Comfort Gonzalez UNLISTED           Family History:   Problem Relation Age of Onset   [facility] Cancer Mother     Diabetes Father     Diabetes Paternal Grandfather        Social History     Socioeconomic History    Marital status: SINGLE     Spouse name: Not on file    Number of children: Not on file    Years of education: Not on file    Highest education level: Not on file   Occupational History    Not on file   Tobacco Use    Smoking status: Never Smoker    Smokeless tobacco: Never Used   Vaping Use    Vaping Use: Never used   Substance and Sexual Activity    Alcohol use: No    Drug use: No    Sexual activity: Never   Other Topics Concern    Not on file   Social History Narrative     with four children     Social Determinants of Health     Financial Resource Strain:     Difficulty of Paying Living Expenses: Not on file   Food Insecurity:     Worried About Running Out of Food in the Last Year: Not on file    Alistair of Food in the Last Year: Not on file   Transportation Needs:     Lack of Transportation (Medical): Not on file    Lack of Transportation (Non-Medical): Not on file   Physical Activity:     Days of Exercise per Week: Not on file    Minutes of Exercise per Session: Not on file   Stress:     Feeling of Stress : Not on file   Social Connections:     Frequency of Communication with Friends and Family: Not on file    Frequency of Social Gatherings with Friends and Family: Not on file    Attends Mosque Services: Not on file    Active Member of [address] [place] [address] or Organizations: Not on file    Attends Club or Organization Meetings: Not on file    Marital Status: Not on file   Intimate Partner Violence:     Fear of Current or Ex-Partner: Not on file    Emotionally Abused: Not on file    Physically Abused: Not on file    Sexually Abused: Not on file   Housing Stability:     Unable to Pay for Housing in the Last Year: Not on file    Number of [place] in the Last Year: Not on file    Unstable Housing in the Last Year: Not on file         ALLERGIES: Patient has no known allergies. Review of Systems   Constitutional: Negative for chills and fever. Respiratory: Negative for cough and shortness of breath. Cardiovascular: Negative for chest pain and palpitations. Gastrointestinal: Negative for abdominal pain, diarrhea, nausea and vomiting. Genitourinary: Negative for dysuria and flank pain. Musculoskeletal: Positive for arthralgias.  Negative for back pain, extremity weakness and neck pain.   Skin: Negative for color change and rash. Neurological: Positive for headaches. Negative for loss of consciousness, syncope and numbness. All other systems reviewed and are negative. Vitals:    12/14/21 1734 12/14/21 1734 12/14/21 1737 12/14/21 1831   BP: (!) 214/90   (!) 208/75   Pulse: 73   68   Resp: 16   12   Temp:   98.6 °F (37 °C)    SpO2:    97%   Weight:  68.5 kg (151 lb)     Height:  5' 2\" (1.575 m)              Physical Exam  Vitals and nursing note reviewed. Constitutional:       General: She is not in acute distress. Appearance: She is well-developed. HENT:      Head: Normocephalic and atraumatic. Right Ear: External ear normal.      Left Ear: External ear normal.      Mouth/Throat:      Pharynx: No oropharyngeal exudate. Eyes:      Conjunctiva/sclera: Conjunctivae normal.      Pupils: Pupils are equal, round, and reactive to light. Cardiovascular:      Rate and Rhythm: Normal rate and regular rhythm. Heart sounds: No murmur heard. Pulmonary:      Effort: No respiratory distress. Breath sounds: Normal breath sounds. Abdominal:      General: Bowel sounds are normal.      Palpations: Abdomen is soft. There is no mass. Tenderness: There is no abdominal tenderness. There is no guarding or rebound. Hernia: No hernia is present. Musculoskeletal:      Cervical back: Normal range of motion and neck supple. Right hip: Tenderness and bony tenderness present. Decreased range of motion. Left hip: Tenderness and bony tenderness present. Decreased range of motion. Right lower leg: Swelling and tenderness present. Left lower leg: Bony tenderness present. Comments: Upper extremities move well without any issues. Tender about both hips bilaterally. Patient's and has worse pain with range of motion. No deformity. Nonspecific tenderness about the knee down through the tibial regions bilaterally. No obvious deformity.  Right tib-fib region is swollen with tenderness right calf. Skin:     General: Skin is warm and dry. Findings: No laceration. Neurological:      Mental Status: She is alert and oriented to person, place, and time. Comments: Nl speech  No facial asymmetry. No drift. Psychiatric:         Speech: Speech normal.          MDM  Number of Diagnoses or Management Options  Diagnosis management comments: Fall with lower extremity injuries and struck head. CT of the head. Imaging of painful areas and lower extremity. Patient with anemia. Check CBC. Check EKG for dysrhythmia       Amount and/or Complexity of Data Reviewed  Clinical lab tests: ordered and reviewed  Tests in the radiology section of CPT®: ordered and reviewed  Tests in the medicine section of CPT®: ordered and reviewed  Review and summarize past medical records: yes  Independent visualization of images, tracings, or specimens: yes (My interpretation EKG shows normal sinus rhythm 70. Right bundle branch block. There is decreased R wave progression. Somewhat new compared to previous.   Normal QT interval.  No ventricular ectopy.)    Risk of Complications, Morbidity, and/or Mortality  Presenting problems: moderate  Diagnostic procedures: low  Management options: low    Patient Progress  Patient progress: stable         Procedures      Results Include:    Recent Results (from the past 24 hour(s))   EKG, 12 LEAD, INITIAL    Collection Time: 12/14/21  5:49 PM   Result Value Ref Range    Ventricular Rate 69 BPM    Atrial Rate 68 BPM    P-R Interval 187 ms    QRS Duration 133 ms    Q-T Interval 376 ms    QTC Calculation (Bezet) 403 ms    Calculated P Axis 11 degrees    Calculated R Axis -61 degrees    Calculated T Axis -20 degrees    Diagnosis       Sinus rhythm  Right bundle branch block  LVH with IVCD and secondary repol abnrm  Anterolateral infarct, age indeterminate     CBC WITH AUTOMATED DIFF    Collection Time: 12/14/21  7:31 PM   Result Value Ref Range    WBC 8.1 4.3 - 11.1 K/uL    RBC 4.20 4.05 - 5.2 M/uL    HGB 11.9 11.7 - 15.4 g/dL    HCT 39.3 35.8 - 46.3 %    MCV 93.6 79.6 - 97.8 FL    MCH 28.3 26.1 - 32.9 PG    MCHC 30.3 (L) 31.4 - 35.0 g/dL    RDW 15.1 (H) 11.9 - 14.6 %    PLATELET 412 567 - 554 K/uL    MPV 10.0 9.4 - 12.3 FL    ABSOLUTE NRBC 0.00 0.0 - 0.2 K/uL    DF AUTOMATED      NEUTROPHILS 72 43 - 78 %    LYMPHOCYTES 21 13 - 44 %    MONOCYTES 6 4.0 - 12.0 %    EOSINOPHILS 0 (L) 0.5 - 7.8 %    BASOPHILS 0 0.0 - 2.0 %    IMMATURE GRANULOCYTES 0 0.0 - 5.0 %    ABS. NEUTROPHILS 5.9 1.7 - 8.2 K/UL    ABS. LYMPHOCYTES 1.7 0.5 - 4.6 K/UL    ABS. MONOCYTES 0.5 0.1 - 1.3 K/UL    ABS. EOSINOPHILS 0.0 0.0 - 0.8 K/UL    ABS. BASOPHILS 0.0 0.0 - 0.2 K/UL    ABS. IMM. GRANS. 0.0 0.0 - 0.5 K/UL   METABOLIC PANEL, COMPREHENSIVE    Collection Time: 12/14/21  7:31 PM   Result Value Ref Range    Sodium 143 136 - 145 mmol/L    Potassium 4.0 3.5 - 5.1 mmol/L    Chloride 108 (H) 98 - 107 mmol/L    CO2 30 21 - 32 mmol/L    Anion gap 5 (L) 7 - 16 mmol/L    Glucose 278 (H) 65 - 100 mg/dL    BUN 29 (H) 8 - 23 MG/DL    Creatinine 1.21 (H) 0.6 - 1.0 MG/DL    GFR est AA 54 (L) >60 ml/min/1.73m2    GFR est non-AA 44 (L) >60 ml/min/1.73m2    Calcium 10.9 (H) 8.3 - 10.4 MG/DL    Bilirubin, total 0.5 0.2 - 1.1 MG/DL    ALT (SGPT) 58 12 - 65 U/L    AST (SGOT) 27 15 - 37 U/L    Alk. phosphatase 116 50 - 136 U/L    Protein, total 7.2 6.3 - 8.2 g/dL    Albumin 3.4 3.2 - 4.6 g/dL    Globulin 3.8 (H) 2.3 - 3.5 g/dL    A-G Ratio 0.9 (L) 1.2 - 3.5       XR TIB/FIB LT    Result Date: 12/14/2021  Exam: XR TIB/FIB LT on 12/14/2021 8:00 PM Clinical History: The Female patient is 80years old  presenting for pain following fall . Comparison:  none Findings: 3 views of the left tib-fib were obtained. No fracture or dislocation is identified. There is diffuse osteopenia. Joint spaces are well-maintained. 1. No acute osseous or joint abnormalities.      XR TIB/FIB RT    Result Date: 12/14/2021  Exam: XR TIB/FIB RT on 12/14/2021 7:58 PM Clinical History: The Female patient is 80years old  presenting for pain following fall. Comparison:  none Findings: 3 views of the right tib-fib were obtained. No fracture or dislocation is identified. There is diffuse osteopenia. Joint spaces are well-maintained. 1. No acute osseous or joint abnormalities. CT HEAD WO CONT    Result Date: 12/14/2021  NONCONTRAST HEAD CT CLINICAL HISTORY:  HEAD injury from fall today. TECHNIQUE:  Axial images were obtained with spiral technique. Radiation dose reduction was achieved using one or all of the following techniques: automated exposure control, weight-based dosing, iterative reconstruction. COMPARISON:  March 3, 2021. REPORT:   Standard noncontrast head CT demonstrates no definite intracranial mass effect, hemorrhage, or evidence of acute geographic infarction. Stable inferior right frontal and anterior right temporal encephalomalacia. Extensive white matter hypodensities elsewhere are most consistent with small vessel ischemic disease. The ventricles are normal in size and configuration, accounting for the patient's age. Orbits  and paranasal sinuses are clear where imaged. Right frontotemporal craniotomy again noted. Bone windows demonstrate no definite fracture or destruction. RIGHT FRONTOTEMPORAL CRANIOTOMY WITH STABLE SUBJACENT ENCEPHALOMALACIA AS WELL AS EXTENSIVE SMALL VESSEL ISCHEMIC DISEASE, BUT NO ACUTE INTRACRANIAL ABNORMALITY OR CALVARIAL FRACTURE IDENTIFIED AT NONCONTRAST CT.     CT SPINE CERV WO CONT    Result Date: 12/14/2021  CT CERVICAL SPINE WITH ADDITIONAL REFORMATS CLINICAL HISTORY:  NECK pain after a fall at home. TECHNIQUE:  Axial images were obtained with spiral technique, and coronal and sagittal reformats were produced. Radiation dose reduction was achieved using one or all of the following techniques: automated exposure control, weight-based dosing, iterative reconstruction. COMPARISON:  None. FINDINGS:  No definite acute fracture or malalignment is evident. Prevertebral soft tissues are unremarkable. There is multilevel degenerative disc disease which is most marked at C4-5, where focal central disc protrusion results in moderate central spinal stenosis. Multilevel uncovertebral and facet spurring results in comparable foraminal narrowing as well. 1.  No definite acute bony abnormality identified. 2.  Multilevel spondylosis is most marked at C4-5 central disc protrusion resulting in moderate central spinal stenosis and likely impinging upon the cord. XR HIPS BI W PELV MIN 5 VWS    Result Date: 12/14/2021  Exam: XR HIPS BI W PELV MIN 5 VWS on 12/14/2021 7:59 PM Clinical History: The Female patient is 80years old  presenting for pain/injury in hip. Comparison:  none Findings: 2 views of each each hip and single view of the pelvis were obtained. Right: No fracture or dislocation is identified. There is diffuse osteopenia. Moderate acetabular joint space loss is seen. Left: No fracture or dislocation is identified. There is diffuse osteopenia. Moderate acetabular joint space loss is seen. 1. No acute osseous or joint abnormalities. DUPLEX LOWER EXT VENOUS RIGHT    Result Date: 12/14/2021  RIGHT LOWER EXTREMITY DEEP VENOUS SONOGRAPHY: CLINICAL HISTORY: Leg pain and swelling. FINDINGS: Multiple images from real time ultrasound evaluation of the deep venous system of the right leg demonstrate normal venous flow in the posterior tibial, popliteal, and superficial and common femoral veins. Normal compressibility was demonstrated. Flow was also documented in the proximal saphenous and deep femoral veins. No intraluminal echogenic material was seen to suggest the presence of nonobstructive thrombus. NO ULTRASOUND EVIDENCE OF DEEP VENOUS THROMBOSIS IN THE RIGHT LEG.       9:12 PM  Lab work and x-rays unremarkable. Will attempt to ambulate the patient.        9:59 PM  Patient weak bilaterally too weak to walk. Appears not to have much balance. With inability to ambulate will discuss with hospitalist regarding admission for PT evaluation and possible rehab admission. Needs urinalysis. Patient denies chest pain. Will check troponin given R wave progression that is decreased since last EKG. 11:00 PM  Has UTI. Too weak to walk. Discussed with hospitalist regarding admission.

## 2021-12-16 PROBLEM — E44.0 MODERATE PROTEIN-CALORIE MALNUTRITION (HCC): Status: ACTIVE | Noted: 2021-01-01

## 2021-12-16 NOTE — PROGRESS NOTES
Patient has no new complaints. Pt eating and voiding well. Had one BM today. Sat in recliner most of the day. Returned to bed. Bed low and locked. Call light within reach.

## 2021-12-16 NOTE — PROGRESS NOTES
CM attempted to discuss discharge planning with patient this day. Patient presents with confusion. CM contacted patient's daughter Ilene Lopez [phone] to obtain assessment information. Demographic information verified by daughter. Patient lives alone in a one story home with 4 steps at the front entrance. At baseline, daughter reports patient is independent with completing ADL's. Family provides transportation when needed. Patient's [facility] Aide currently placed on hold- per patient request. Family anticipates resuming services. Aide is scheduled 5x week- 2 hours per day. Per patient's  Toribio Chang [phone], if additional hours are needed, patient will have to receive approval from area office. Daughter confirmed DME use. Patient uses a walker daily, elevated toilet seat, and cane. Patient receives MOW. Daughter stated patient receives additional meals from another service, but is unable to recall name of service. Patient has an emergency alert system in place at home. Diabetes: YES    PCP: Sabrina Hart NP    Preferred Pharmacy: [facility] in [place]. Discharge planning: PT/OT consults placed. Patient has been recommended for STR. Patient has a history of [facility] services. CM discussed REHAB placement with patient's daughter. Daughter to speak with the patient's other three children, to assist with discharge planning. CM continues to follow. Care Management Interventions  PCP Verified by CM: Yes  Mode of Transport at Discharge: Other (see comment) (TBD: based upon need. )  Transition of Care Consult (CM Consult): Discharge Planning  Physical Therapy Consult: Yes  Occupational Therapy Consult: Yes  Speech Therapy Consult: No  Support Systems: Child(edward),Caregiver/Home Care Staff  Confirm Follow Up Transport: Family  The Plan for Transition of Care is Related to the Following Treatment Goals : Return to prior level of functioning.   Name of the Patient Representative Who was Provided with a Choice of Provider and Agrees with the Discharge Plan: Patient's Daughter Twin Nguyen [phone].   [place] Resource Information Provided?: No  Discharge Location  Discharge Placement: Unable to determine at this time

## 2021-12-16 NOTE — PROGRESS NOTES
Hospitalist Progress Note   Admit Date:  2021  5:29 PM   Name:  Naveen Sender   Age:  80 y.o. Sex:  female  :  1929   MRN:  [id number]   Room:  [facility]– [place]    Presenting Complaint: Fall    Reason(s) for Admission: Physical debility [R53.81]     Hospital Course & Interval History:   Naveen Sender is a 80 y.o. female with medical history of HTN, CKD stage III, COPD who presented with difficulty with ambulation after fall PTA. Patient reported she was going to the refrigerator with her walker, reached out to get some water and lost her balance. She hit the right side of her head on the table during fall but no loss of consciousness. She reported landing on her R hip. She denies palpitations, lightheadedness, dizziness, chest pain prior to falling. She denies nausea vomiting or changes in her vision. She activated her medical alert and EMS found her on the floor. She reported bilateral leg pain and has not been able to ambulate since her fall. At baseline, patient lives by herself and able to ambulate with a walker. In ED, CT head shows no acute findings. X-ray tib-fib bilaterally shows no fracture or dislocation. X-ray hips with pelvis shows no fracture or dislocation. CT C-spine shows no acute bony abnormalities; multilevel spondylosis marked at C4-5. Right LE venous US shows no DVT. UA was positive for UTI in ED and patient received Rocephin. She was too weak to ambulate in the ER with not much balance so hospitalist team was contacted for admission and possible SNF placement. Subjective (21): \"My leg feels better today. \"  Pleasant 89y.o. AA female sitting up in bed better spirited, eating and drinking without complaints; RLE pain improved    Review of Systems:  10 systems reviewed and negative except as noted in HPI.       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  - PT/OT eval appreciated  - STR on dc    Active Problems:    UTI  - GNR on 12/14/2021 urine culture  - cont empiric rocephin pending susceptibilities       Malnutrition / dehydration  - improved; dc IVF  - encourage oral intake  - appreciate nutrition eval      Uncontrolled HTN  - better ranges this AM  - cont norvasc / lisinopril / lasix / hydralazine / aldactone / clonidine      COPD / asthma overlap syndrome  - cont symbicort bid; not in acute exacerbation      DM uncontrolled  - hgbA1c 8.3 on 11/30/2021  - monitor with new start lantus 10u last PM, adjust dose as needed  - sliding scale coverage as needed      CKD (chronic kidney disease) stage 3, GFR 30-59 ml/min ([facility])  - renal fxn stable        Severe debility  - needs STR vs long-term placement; at high risk for falls        Dispo/Discharge Planning:    - anticipate at least 2 midnight hospitalization    Diet:  ADULT DIET Regular; 3 carb choices (45 gm/meal)  ADULT ORAL NUTRITION SUPPLEMENT Breakfast, Lunch, Dinner;  Low Calorie/High Protein  DVT PPx: SCD  Code status: DNR    Hospital Problems as of 12/16/2021 Date Reviewed: 11/30/2021          Codes Class Noted - Resolved POA    Moderate protein-calorie malnutrition ([facility] [address] ICD-10-CM: E44.0  ICD-9-CM: 263.0  12/16/2021 - Present Yes        Frequent falls ICD-10-CM: R29.6  ICD-9-CM: V15.88  12/15/2021 - Present Unknown        * (Principal) Physical debility ICD-10-CM: R53.81  ICD-9-CM: 799.3  12/14/2021 - Present Yes        CKD (chronic kidney disease) stage 3, GFR 30-59 ml/min ([facility]) ICD-10-CM: N18.30  ICD-9-CM: 585.3  4/18/2019 - Present Yes        COPD (chronic obstructive pulmonary disease) ([facility]) (Chronic) ICD-10-CM: J44.9  ICD-9-CM: 496  8/22/2018 - Present Yes        Anxiety ICD-10-CM: F41.9  ICD-9-CM: 300.00  2/1/2018 - Present Yes        Controlled type 2 diabetes mellitus with microalbuminuria, without long-term current use of insulin ([facility]) ICD-10-CM: E11.29, R80.9  ICD-9-CM: 250.40, 791.0  11/22/2016 - Present Yes HLD (hyperlipidemia) ICD-10-CM: E78.5  ICD-9-CM: 272.4  1/8/2013 - Present Yes        Depression ICD-10-CM: F32. A  ICD-9-CM: 068  6/1/2012 - Present Yes        Asthma ICD-10-CM: J45.909  ICD-9-CM: 493.90  6/1/2012 - Present Yes        Essential hypertension (Chronic) ICD-10-CM: I10  ICD-9-CM: 401.9  7/16/2010 - Present Yes        GERD (gastroesophageal reflux disease) (Chronic) ICD-10-CM: K21.9  ICD-9-CM: 530.81  7/16/2010 - Present Yes              Objective:     Patient Vitals for the past 24 hrs:   Temp Pulse Resp BP SpO2   12/16/21 0817 97.3 °F (36.3 °C) 62 16 (!) 150/80 97 %   12/16/21 0733     94 %   12/16/21 0317 97.6 °F (36.4 °C) 60 14 (!) 150/66 97 %   12/15/21 2331 97.5 °F (36.4 °C) 60 14 137/70 98 %   12/15/21 2041     93 %   12/15/21 1942 97.5 °F (36.4 °C) (!) 58 12 (!) 148/62 98 %   12/15/21 1528 97.7 °F (36.5 °C) 65  (!) 192/74    12/15/21 1526 97.7 °F (36.5 °C) 66 19 (!) 196/79 97 %   12/15/21 1302 97.8 °F (36.6 °C) 66 18 (!) 180/89 96 %   12/15/21 1117    (!) 180/99    12/15/21 1116  73 20 (!) 180/99    12/15/21 1100  67  (!) 143/87 93 %     Oxygen Therapy  O2 Sat (%): 97 % (12/16/21 0817)  Pulse via Oximetry: 78 beats per minute (12/16/21 0733)  O2 Device: None (Room air) (12/16/21 0733)    Estimated body mass index is 27.62 kg/m² as calculated from the following:    Height as of this encounter: 5' 2\" (1.575 m). Weight as of this encounter: 68.5 kg (151 lb). Intake/Output Summary (Last 24 hours) at 12/16/2021 1049  Last data filed at 12/16/2021 0817  Gross per 24 hour   Intake 240 ml   Output 600 ml   Net -360 ml         Physical Exam:     Blood pressure (!) 150/80, pulse 62, temperature 97.3 °F (36.3 °C), resp. rate 16, height 5' 2\" (1.575 m), weight 68.5 kg (151 lb), SpO2 97 %. General:    Undernourished, in NAD  Head:  Normocephalic, atraumatic  Eyes:  Sclerae appear normal.  Pupils equally round. Anicteric  ENT:  Nares appear normal, no drainage.  Moist oral mucosa, improved halitosis   Neck:  No restricted ROM. Trachea midline   CV:   RRR. No m/r/g. No jugular venous distension. Lungs:   CTAB. No wheezing, rhonchi, or rales. Respirations even, unlabored  Abdomen: Bowel sounds present. Soft, nontender, nondistended. Extremities: Tenderness elicited on palpation right hip, + mvmt x4; dependent b/l LE edema, negative calf tenderness  Skin:     No rashes and normal coloration. Warm and dry. Neuro:  CN II-XII grossly intact. Sensation intact. A&Ox3  Psych:  Normal mood and affect. I have reviewed ordered lab tests and independently visualized imaging below:    Recent Labs:  Recent Results (from the past 48 hour(s))   EKG, 12 LEAD, INITIAL    Collection Time: 12/14/21  5:49 PM   Result Value Ref Range    Ventricular Rate 69 BPM    Atrial Rate 68 BPM    P-R Interval 187 ms    QRS Duration 133 ms    Q-T Interval 376 ms    QTC Calculation (Bezet) 403 ms    Calculated P Axis 11 degrees    Calculated R Axis -61 degrees    Calculated T Axis -20 degrees    Diagnosis       Sinus rhythm  Right bundle branch block  Borderline LVH criteria  Anterolateral infarct, age indeterminate    Confirmed by Dennis Harding MD (), ARMAAN ([id number]) on 12/14/2021 10:50:00 PM     CBC WITH AUTOMATED DIFF    Collection Time: 12/14/21  7:31 PM   Result Value Ref Range    WBC 8.1 4.3 - 11.1 K/uL    RBC 4.20 4.05 - 5.2 M/uL    HGB 11.9 11.7 - 15.4 g/dL    HCT 39.3 35.8 - 46.3 %    MCV 93.6 79.6 - 97.8 FL    MCH 28.3 26.1 - 32.9 PG    MCHC 30.3 (L) 31.4 - 35.0 g/dL    RDW 15.1 (H) 11.9 - 14.6 %    PLATELET 082 348 - 717 K/uL    MPV 10.0 9.4 - 12.3 FL    ABSOLUTE NRBC 0.00 0.0 - 0.2 K/uL    DF AUTOMATED      NEUTROPHILS 72 43 - 78 %    LYMPHOCYTES 21 13 - 44 %    MONOCYTES 6 4.0 - 12.0 %    EOSINOPHILS 0 (L) 0.5 - 7.8 %    BASOPHILS 0 0.0 - 2.0 %    IMMATURE GRANULOCYTES 0 0.0 - 5.0 %    ABS. NEUTROPHILS 5.9 1.7 - 8.2 K/UL    ABS. LYMPHOCYTES 1.7 0.5 - 4.6 K/UL    ABS.  MONOCYTES 0.5 0.1 - 1.3 K/UL    ABS. EOSINOPHILS 0.0 0.0 - 0.8 K/UL    ABS. BASOPHILS 0.0 0.0 - 0.2 K/UL    ABS. IMM. GRANS. 0.0 0.0 - 0.5 K/UL   METABOLIC PANEL, COMPREHENSIVE    Collection Time: 12/14/21  7:31 PM   Result Value Ref Range    Sodium 143 136 - 145 mmol/L    Potassium 4.0 3.5 - 5.1 mmol/L    Chloride 108 (H) 98 - 107 mmol/L    CO2 30 21 - 32 mmol/L    Anion gap 5 (L) 7 - 16 mmol/L    Glucose 278 (H) 65 - 100 mg/dL    BUN 29 (H) 8 - 23 MG/DL    Creatinine 1.21 (H) 0.6 - 1.0 MG/DL    GFR est AA 54 (L) >60 ml/min/1.73m2    GFR est non-AA 44 (L) >60 ml/min/1.73m2    Calcium 10.9 (H) 8.3 - 10.4 MG/DL    Bilirubin, total 0.5 0.2 - 1.1 MG/DL    ALT (SGPT) 58 12 - 65 U/L    AST (SGOT) 27 15 - 37 U/L    Alk.  phosphatase 116 50 - 136 U/L    Protein, total 7.2 6.3 - 8.2 g/dL    Albumin 3.4 3.2 - 4.6 g/dL    Globulin 3.8 (H) 2.3 - 3.5 g/dL    A-G Ratio 0.9 (L) 1.2 - 3.5     CK    Collection Time: 12/14/21  7:31 PM   Result Value Ref Range    CK 95 21 - 215 U/L   TROPONIN-HIGH SENSITIVITY    Collection Time: 12/14/21  7:31 PM   Result Value Ref Range    Troponin-High Sensitivity 26.9 (H) 0 - 14 pg/mL   POC URINE MACROSCOPIC    Collection Time: 12/14/21 10:06 PM   Result Value Ref Range    Spec. gravity (POC) >1.030 (H) 1.001 - 1.023    pH, urine  (POC) 6.5 5.0 - 9.0      Protein (POC) >300 (A) NEG mg/dL    Glucose, urine (POC) Negative NEG mg/dL    Ketones (POC) Negative NEG mg/dL    Bilirubin (POC) Negative NEG      Blood (POC) MODERATE (A) NEG      Urobilinogen (POC) 0.2 0.2 - 1.0 EU/dL    Nitrite (POC) Negative NEG      Leukocyte esterase (POC) TRACE (A) NEG     URINE MICROSCOPIC    Collection Time: 12/14/21 10:08 PM   Result Value Ref Range    WBC 10-20 0 /hpf    RBC 5-10 0 /hpf    Epithelial cells 0-3 0 /hpf    Bacteria 4+ (H) 0 /hpf    Casts 0-3 0 /lpf    Mucus 0 0 /lpf   CULTURE, URINE    Collection Time: 12/14/21 10:08 PM    Specimen: Clean catch; Urine   Result Value Ref Range    Special Requests: NO SPECIAL REQUESTS Culture result: (A)       >100,000 COLONIES/mL GRAM NEGATIVE RODS IDENTIFICATION AND SUSCEPTIBILITY TO FOLLOW   METABOLIC PANEL, BASIC    Collection Time: 12/15/21  4:58 AM   Result Value Ref Range    Sodium 143 136 - 145 mmol/L    Potassium 3.7 3.5 - 5.1 mmol/L    Chloride 108 (H) 98 - 107 mmol/L    CO2 29 21 - 32 mmol/L    Anion gap 6 (L) 7 - 16 mmol/L    Glucose 225 (H) 65 - 100 mg/dL    BUN 24 (H) 8 - 23 MG/DL    Creatinine 0.97 0.6 - 1.0 MG/DL    GFR est AA >60 >60 ml/min/1.73m2    GFR est non-AA 57 (L) >60 ml/min/1.73m2    Calcium 10.3 8.3 - 10.4 MG/DL   MAGNESIUM    Collection Time: 12/15/21  4:58 AM   Result Value Ref Range    Magnesium 2.2 1.8 - 2.4 mg/dL   CBC W/O DIFF    Collection Time: 12/15/21  4:58 AM   Result Value Ref Range    WBC 9.7 4.3 - 11.1 K/uL    RBC 4.21 4.05 - 5.2 M/uL    HGB 12.3 11.7 - 15.4 g/dL    HCT 39.6 35.8 - 46.3 %    MCV 94.1 79.6 - 97.8 FL    MCH 29.2 26.1 - 32.9 PG    MCHC 31.1 (L) 31.4 - 35.0 g/dL    RDW 15.3 (H) 11.9 - 14.6 %    PLATELET 942 210 - 189 K/uL    MPV 9.5 9.4 - 12.3 FL    ABSOLUTE NRBC 0.00 0.0 - 0.2 K/uL   GLUCOSE, POC    Collection Time: 12/15/21  9:46 AM   Result Value Ref Range    Glucose (POC) 237 (H) 65 - 100 mg/dL    Performed by Jamarcus Mcqueen    GLUCOSE, POC    Collection Time: 12/15/21  1:05 PM   Result Value Ref Range    Glucose (POC) 238 (H) 65 - 100 mg/dL    Performed by Cee)    GLUCOSE, POC    Collection Time: 12/15/21  3:30 PM   Result Value Ref Range    Glucose (POC) 263 (H) 65 - 100 mg/dL    Performed by Primitivo Greenfield    GLUCOSE, POC    Collection Time: 12/15/21  9:24 PM   Result Value Ref Range    Glucose (POC) 233 (H) 65 - 100 mg/dL    Performed by Sharan    PLEASE READ & DOCUMENT PPD TEST IN 24 HRS    Collection Time: 12/16/21 12:07 AM   Result Value Ref Range    PPD Negative Negative    mm Induration 0 0 - 5 mm   METABOLIC PANEL, BASIC    Collection Time: 12/16/21  6:35 AM   Result Value Ref Range    Sodium 143 136 - 145 mmol/L    Potassium 3.7 3.5 - 5.1 mmol/L    Chloride 110 (H) 98 - 107 mmol/L    CO2 28 21 - 32 mmol/L    Anion gap 5 (L) 7 - 16 mmol/L    Glucose 199 (H) 65 - 100 mg/dL    BUN 30 (H) 8 - 23 MG/DL    Creatinine 1.13 (H) 0.6 - 1.0 MG/DL    GFR est AA 58 (L) >60 ml/min/1.73m2    GFR est non-AA 48 (L) >60 ml/min/1.73m2    Calcium 9.3 8.3 - 10.4 MG/DL   MAGNESIUM    Collection Time: 12/16/21  6:35 AM   Result Value Ref Range    Magnesium 2.0 1.8 - 2.4 mg/dL   CBC W/O DIFF    Collection Time: 12/16/21  6:35 AM   Result Value Ref Range    WBC 7.0 4.3 - 11.1 K/uL    RBC 3.42 (L) 4.05 - 5.2 M/uL    HGB 10.1 (L) 11.7 - 15.4 g/dL    HCT 32.3 (L) 35.8 - 46.3 %    MCV 94.4 79.6 - 97.8 FL    MCH 29.5 26.1 - 32.9 PG    MCHC 31.3 (L) 31.4 - 35.0 g/dL    RDW 15.5 (H) 11.9 - 14.6 %    PLATELET 665 (L) 919 - 450 K/uL    MPV 10.3 9.4 - 12.3 FL    ABSOLUTE NRBC 0.00 0.0 - 0.2 K/uL   GLUCOSE, POC    Collection Time: 12/16/21  7:16 AM   Result Value Ref Range    Glucose (POC) 195 (H) 65 - 100 mg/dL    Performed by [facility]        All Micro Results     Procedure Component Value Units Date/Time    CULTURE, URINE [[id number]]  (Abnormal) Collected: 12/14/21 2208    Order Status: Completed Specimen: Urine from Clean catch Updated: 12/16/21 0776     Special Requests: NO SPECIAL REQUESTS        Culture result:       >100,000 COLONIES/mL GRAM NEGATIVE RODS IDENTIFICATION AND SUSCEPTIBILITY TO FOLLOW                Other Studies:  No results found.     Current Meds:  Current Facility-Administered Medications   Medication Dose Route Frequency    labetaloL (NORMODYNE;TRANDATE) injection 10 mg  10 mg IntraVENous Q6H PRN    hydrALAZINE (APRESOLINE) tablet 100 mg  100 mg Oral TID    amLODIPine (NORVASC) tablet 10 mg  10 mg Oral DAILY    cloNIDine HCL (CATAPRES) tablet 0.2 mg  0.2 mg Oral TID    insulin glargine (LANTUS) injection 10 Units  10 Units SubCUTAneous QHS    lisinopriL (PRINIVIL, ZESTRIL) tablet 40 mg  40 mg Oral DAILY  budesonide-formoteroL (SYMBICORT) 160-4.5 mcg/actuation HFA inhaler 2 Puff  2 Puff Inhalation BID RT    furosemide (LASIX) tablet 20 mg  20 mg Oral DAILY    gabapentin (NEURONTIN) capsule 100 mg  100 mg Oral TID    pantoprazole (PROTONIX) tablet 40 mg  40 mg Oral ACB    pravastatin (PRAVACHOL) tablet 40 mg  40 mg Oral QHS    sertraline (ZOLOFT) tablet 100 mg  100 mg Oral DAILY    spironolactone (ALDACTONE) tablet 25 mg  25 mg Oral DAILY    albuterol (PROVENTIL VENTOLIN) nebulizer solution 2.5 mg  2.5 mg Nebulization Q6H PRN    sodium chloride (NS) flush 5-40 mL  5-40 mL IntraVENous Q8H    sodium chloride (NS) flush 5-40 mL  5-40 mL IntraVENous PRN    acetaminophen (TYLENOL) tablet 650 mg  650 mg Oral Q6H PRN    Or    acetaminophen (TYLENOL) suppository 650 mg  650 mg Rectal Q6H PRN    polyethylene glycol (MIRALAX) packet 17 g  17 g Oral DAILY PRN    ondansetron (ZOFRAN ODT) tablet 4 mg  4 mg Oral Q8H PRN    Or    ondansetron (ZOFRAN) injection 4 mg  4 mg IntraVENous Q6H PRN    cefTRIAXone (ROCEPHIN) 1 g in 0.9% sodium chloride (MBP/ADV) 50 mL MBP  1 g IntraVENous Q24H    traMADoL (ULTRAM) tablet 50 mg  50 mg Oral BID PRN    insulin lispro (HUMALOG) injection   SubCUTAneous AC&HS    dextrose 40% (GLUTOSE) oral gel 1 Tube  15 g Oral PRN    glucagon (GLUCAGEN) injection 1 mg  1 mg IntraMUSCular PRN    dextrose (D50W) injection syrg 12.5-25 g  25-50 mL IntraVENous PRN       Signed:  MICHAEL Head    Part of this note may have been written by using a voice dictation software. The note has been proof read but may still contain some grammatical/other typographical errors.

## 2021-12-17 NOTE — PROGRESS NOTES
ACUTE OT GOALS:  (Developed with and agreed upon by patient and/or caregiver.)    1. Patient will complete upper body bathing and dressing with setup and adaptive equipment as needed. 2. Patient will complete toileting with minimal assistance. 3. Patient will tolerate 20 minutes of OT treatment with as needed rest breaks to increase activity tolerance for ADLs. 4. Patient will attempt standing in preparation for functional transfers  OCCUPATIONAL THERAPY: Daily Note OT Treatment Day # 2    Arno Klinefelter is a 80 y.o. female   PRIMARY DIAGNOSIS: Physical debility  Physical debility [R53.81]       Payor: [employer] / [place] re[employer] / Product Type: Managed Care Medicare /   ASSESSMENT:     REHAB RECOMMENDATIONS: CURRENT LEVEL OF FUNCTION:  (Most Recently Demonstrated)   Recommendation to date pending progress:  Setting:   Short-term Rehab  Equipment:    Rolling Walker   To Be Determined Bathing:   Not tested  Dressing:   Not tested  Feeding/Grooming:   Not tested  Toileting:   Not tested  Functional Mobility:   Maximal Assistance x 2     ASSESSMENT:  Ms. Cynthia Wood presents in supine upon arrival. Pt completed supine to sit transfer with mod/max a x 2. Pt sat with fair balance overall and then participated in STS transfer with max a x 2 with heavy L side lean. Pt completed steps to the chair with max a x 2 and a rolling walker. Once pt was positioned in the chair, we practiced a couple more STS transfers with mod/max a x 2, but pt with much better posture and balance. Pt left up in the chair with belongings in reach, posey activated, and visitors in room. Good effort. Continue POC. SUBJECTIVE:   Ms. Cynthia Wood states, \"I fell hard on my right side. \"    SOCIAL HISTORY/LIVING ENVIRONMENT:  Home Environment: Private residence  # Steps to Enter: 2  One/Two Story Residence: One story  Living Alone: Yes  Support Systems: Child(edward),Caregiver/Home Care Staff    OBJECTIVE: PAIN: VITAL SIGNS: LINES/DRAINS:   Pre Treatment: Pain Screen  Pain Scale 1: Visual  Pain Location 1: Hip;Knee  Pain Orientation 1: Left;Right  Pain Description 1: Aching  Pain Intervention(s) 1: Repositioned  Post Treatment: improved   Purewick  O2 Device: None (Room air)     ACTIVITIES OF DAILY LIVING: I Mod I S SBA CGA Min Mod Max Total NT Comments   BASIC ADLs:              Bathing/ Showering [] [] [] [] [] [] [] [] [] [x]    Toileting [] [] [] [] [] [] [] [] [] [x]    Dressing [] [] [] [] [] [] [] [] [] [x]    Feeding [] [] [] [] [] [] [] [] [] [x]    Grooming [] [] [] [] [] [] [] [] [] [x]    Personal Device Care [] [] [] [] [] [] [] [] [] [x]    Functional Mobility [] [] [] [] [] [] [x] [x] [] [] Assist x 2   I=Independent, Mod I=Modified Independent, S=Supervision, SBA=Standby Assistance, CGA=Contact Guard Assistance,   Min=Minimal Assistance, Mod=Moderate Assistance, Max=Maximal Assistance, Total=Total Assistance, NT=Not Tested    MOBILITY: I Mod I S SBA CGA Min Mod Max Total  NT x2 Comments:   Supine to sit [] [] [] [] [] [] [x] [x] [] [] [x]    Sit to supine [] [] [] [] [] [] [] [] [] [] []    Sit to stand [] [] [] [] [] [] [x] [x] [] [] [x]    Bed to chair [] [] [] [] [] [] [] [x] [] [] [x]    I=Independent, Mod I=Modified Independent, S=Supervision, SBA=Standby Assistance, CGA=Contact Guard Assistance,   Min=Minimal Assistance, Mod=Moderate Assistance, Max=Maximal Assistance, Total=Total Assistance, NT=Not Tested    BALANCE: Good Fair+ Fair Fair- Poor NT Comments   Sitting Static [] [] [x] [] [] []    Sitting Dynamic [] [] [] [x] [] []              Standing Static [] [] [] [x] [x] []    Standing Dynamic [] [] [] [] [x] []      PLAN:   FREQUENCY/DURATION: OT Plan of Care: 3 times/week for duration of hospital stay or until stated goals are met, whichever comes first.    TREATMENT:   TREATMENT:   ( $$ Therapeutic Exercises: 8-22 mins  $$ Neuromuscular Re-Education: 8-22 mins   )  Co-Treatment PT/OT necessary due to patient's decreased overall endurance/tolerance levels, as well as need for high level skilled assistance to complete functional transfers/mobility and functional tasks  Therapeutic Exercise (15 Minutes): Therapeutic exercises noted below to improve functional activity tolerance, AROM, strength and mobility. Neuromuscular Re-education (16 Minutes): Neuromuscular Re-education included Balance Training, Postural training, Sitting balance training, Standing balance training and transfers and functional mobility to improve Balance, Coordination, Functional Mobility and Postural Control.     TREATMENT GRID:  N/A    AFTER TREATMENT POSITION/PRECAUTIONS:  Alarm Activated, Chair, Needs within reach and RN notified    INTERDISCIPLINARY COLLABORATION:  RN/PCT, PT/PTA and OT/MARIE    TOTAL TREATMENT DURATION:  OT Patient Time In/Time Out  Time In: [address] [facility],Third Floor  Time Out: [facility]

## 2021-12-17 NOTE — PROGRESS NOTES
ACUTE PHYSICAL THERAPY GOALS:  (Developed with and agreed upon by patient and/or caregiver.)  (1.) Kassie Bishop  will move from supine to sit and sit to supine  with CONTACT GUARD ASSIST within 7 treatment day(s). (2.) Kassie Bishop will transfer from bed to chair and chair to bed with MINIMAL ASSIST using the least restrictive device within 7 treatment day(s). (3.) Kassie Bishop will ambulate with MINIMAL ASSIST for 5 feet with the least restrictive device within 7 treatment day(s). (4.) Saint Inigoesjayne Bishop will perform seated static and dynamic balance activities x 10 minutes with CONTACT GUARD ASSIST to improve safety within 7 treatment day(s). (5.) [place] Inigoesjayne Bishop will perform bilateral lower extremity exercises x 20 min for HEP with INDEPENDENCE to improve strength, endurance, and functional mobility within 7 treatment day(s). PHYSICAL THERAPY: Daily Note and PM Treatment Day # 2    Aide Bauer is a 80 y.o. female   PRIMARY DIAGNOSIS: Physical debility  Physical debility [R53.81]         ASSESSMENT:     REHAB RECOMMENDATIONS: CURRENT LEVEL OF FUNCTION:  (Most Recently Demonstrated)   Recommendation to date pending progress:  Setting:   Short-term Rehab  Equipment:    To Be Determined Bed Mobility:   Moderate Assistance x 2 max assist x 2  Sit to Stand:   Moderate Assistance x 2 to max assist x 2  Transfers:   Moderate Assistance x 2 to max assist x 2  Gait/Mobility:   Moderate Assistance x 2 to max assist x 2     ASSESSMENT:  Ms. Enrique Dukes is supine, a little hard of hearing, family present. She performs mobility overall with mod/max assist x 2. She requires more assist initially with sitting balance due to R hip pain and then only requires CGA. She also requires max assist with standing balance initially and then decreases to min assist x 2 using rolling walker. She takes a few steps to the chair, stands x 3, and performs below LE exercises.   She has trouble with sitting due to sore knees and plops down. She requires additional time and cues for technique. Slow progress towards goals. Will continue with POC. SUBJECTIVE:   Ms. Tyra Mendoza is very pleasant and joking around during session.     SOCIAL HISTORY/ LIVING ENVIRONMENT:   Home Environment: Private residence  # Steps to Enter: 2  One/Two Story Residence: One story  Living Alone: Yes  Support Systems: Child(edward),Caregiver/Home Care Staff  OBJECTIVE:     PAIN: VITAL SIGNS: LINES/DRAINS:   Pre Treatment:  4 R Hip  Post Treatment: 4 R Hip   Purewick  O2 Device: None (Room air)     MOBILITY: I Mod I S SBA CGA Min Mod Max Total  NT x2 Comments:   Bed Mobility    Rolling [] [] [] [] [] [] [] [] [] [] []    Supine to Sit [] [] [] [] [] [] [x] [x] [] [] [x]    Scooting [] [] [] [] [] [] [x] [x] [] [] []    Sit to Supine [] [] [] [] [] [] [] [] [] [] []    Transfers    Sit to Stand [] [] [] [] [] [] [x] [x] [] [] [x]    Bed to Chair [] [] [] [] [] [] [x] [x] [] [] [x]    Stand to Sit [] [] [] [] [] [] [x] [x] [] [] [x]    I=Independent, Mod I=Modified Independent, S=Supervision, SBA=Standby Assistance, CGA=Contact Guard Assistance,   Min=Minimal Assistance, Mod=Moderate Assistance, Max=Maximal Assistance, Total=Total Assistance, NT=Not Tested    BALANCE: Good Fair+ Fair Fair- Poor NT Comments   Sitting Static [] [] [x] [] [] []    Sitting Dynamic [] [] [x] [] [] []              Standing Static [] [] [] [x] [] [] After initial standing and she gets her balance   Standing Dynamic [] [] [] [] [x] []      GAIT: I Mod I S SBA CGA Min Mod Max Total  NT x2 Comments:   Level of Assistance [] [] [] [] [] [] [x] [x] [] [] [x]    Distance 3'    DME Rolling Walker    Gait Quality Slow, short steps, leans posteriorly and to the L initially    Weightbearing  Status N/A     I=Independent, Mod I=Modified Independent, S=Supervision, SBA=Standby Assistance, CGA=Contact Guard Assistance,   Min=Minimal Assistance, Mod=Moderate Assistance, Max=Maximal Assistance, Total=Total Assistance, NT=Not Tested    PLAN:   FREQUENCY/DURATION: PT Plan of Care: 3 times/week for duration of hospital stay or until stated goals are met, whichever comes first.  TREATMENT:     TREATMENT:   ($$ Therapeutic Activity: 8-22 mins  $$ Therapeutic Exercises: 8-22 mins    )  Co-Treatment PT/OT necessary due to patient's decreased overall endurance/tolerance levels, as well as need for high level skilled assistance to complete functional transfers/mobility and functional tasks  Therapeutic Activity (15 Minutes): Therapeutic activity included Supine to Sit, Scooting, Transfer Training, Ambulation on level ground, Sitting balance  and Standing balance to improve functional Mobility, Strength and Activity tolerance. Therapeutic Exercise (15 Minutes): Therapeutic exercises noted below to improve functional activity tolerance, AROM and strength.      TREATMENT GRID:   Date:  12/17/21 Date:   Date:     Activity/Exercise Parameters Parameters Parameters   Ankle pumps X 10 B     LAQ X 15 B     Hip flexion X 15 B                                   AFTER TREATMENT POSITION/PRECAUTIONS:  Chair, Needs within reach, RN notified and Visitors at bedside    INTERDISCIPLINARY COLLABORATION:  RN/PCT, PT/PTA and OT/MARIE    TOTAL TREATMENT DURATION:  PT Patient Time In/Time Out  Time In: 1525  Time Out: 2776 [address] PTA

## 2021-12-17 NOTE — PROGRESS NOTES
Problem: Diabetes Self-Management  Goal: *Disease process and treatment process  Description: Define diabetes and identify own type of diabetes; list 3 options for treating diabetes. Outcome: Progressing Towards Goal  Goal: *Incorporating nutritional management into lifestyle  Description: Describe effect of type, amount and timing of food on blood glucose; list 3 methods for planning meals. Outcome: Progressing Towards Goal  Goal: *Incorporating physical activity into lifestyle  Description: State effect of exercise on blood glucose levels. Outcome: Progressing Towards Goal  Goal: *Developing strategies to promote health/change behavior  Description: Define the ABC's of diabetes; identify appropriate screenings, schedule and personal plan for screenings. Outcome: Progressing Towards Goal  Goal: *Using medications safely  Description: State effect of diabetes medications on diabetes; name diabetes medication taking, action and side effects. Outcome: Progressing Towards Goal  Goal: *Monitoring blood glucose, interpreting and using results  Description: Identify recommended blood glucose targets  and personal targets. Outcome: Progressing Towards Goal  Goal: *Prevention, detection, treatment of acute complications  Description: List symptoms of hyper- and hypoglycemia; describe how to treat low blood sugar and actions for lowering  high blood glucose level. Outcome: Progressing Towards Goal  Goal: *Prevention, detection and treatment of chronic complications  Description: Define the natural course of diabetes and describe the relationship of blood glucose levels to long term complications of diabetes.   Outcome: Progressing Towards Goal  Goal: *Developing strategies to address psychosocial issues  Description: Describe feelings about living with diabetes; identify support needed and support network  Outcome: Progressing Towards Goal  Goal: *Insulin pump training  Outcome: Progressing Towards Goal  Goal: *Sick day guidelines  Outcome: Progressing Towards Goal  Goal: *Patient Specific Goal (EDIT GOAL, INSERT TEXT)  Outcome: Progressing Towards Goal     Problem: Patient Education: Go to Patient Education Activity  Goal: Patient/Family Education  Outcome: Progressing Towards Goal     Problem: Falls - Risk of  Goal: *Absence of Falls  Description: Document Malinajese Elder Fall Risk and appropriate interventions in the flowsheet. Outcome: Progressing Towards Goal  Note: Fall Risk Interventions:  Mobility Interventions: Communicate number of staff needed for ambulation/transfer,Patient to call before getting OOB    Mentation Interventions: Bed/chair exit alarm,Door open when patient unattended    Medication Interventions: Evaluate medications/consider consulting pharmacy,Patient to call before getting OOB    Elimination Interventions: Call light in reach,Toileting schedule/hourly rounds,Patient to call for help with toileting needs    History of Falls Interventions: Bed/chair exit alarm,Investigate reason for fall,Evaluate medications/consider consulting pharmacy         Problem: Pressure Injury - Risk of  Goal: *Prevention of pressure injury  Description: Document Carlos Scale and appropriate interventions in the flowsheet.   Outcome: Progressing Towards Goal  Note: Pressure Injury Interventions:  Sensory Interventions: Keep linens dry and wrinkle-free,Maintain/enhance activity level,Minimize linen layers    Moisture Interventions: Absorbent underpads,Apply protective barrier, creams and emollients    Activity Interventions: Increase time out of bed    Mobility Interventions: Pressure redistribution bed/mattress (bed type)    Nutrition Interventions: Document food/fluid/supplement intake,Offer support with meals,snacks and hydration    Friction and Shear Interventions: Lift sheet,Minimize layers

## 2021-12-17 NOTE — PROGRESS NOTES
CM received call from daughter Rolf Steven [phone] regarding discharge planning. Family is in agreement with STR placement. Family to visit patient this afternoon at 1500. CM to meet with patient and family to discuss REHAB placement in further detail. CM continues to follow plan of care. Update 4:48pm- CM met with patient and family to discuss discharge planning. Patient and family are in agreement STR placement. Family asked if patient's insurance plan is network, with [facility], [address]. CM verified this information and informed daughter Rolf Steven, patient's payor source is not in network with requested facilities . Daughter requested  CM proceed with referral to [facility]. Referral sent. CM will request weekend staff follow up, to assist with disposition. CM continues to follow.

## 2021-12-17 NOTE — PROGRESS NOTES
Problem: Diabetes Self-Management  Goal: *Disease process and treatment process  Description: Define diabetes and identify own type of diabetes; list 3 options for treating diabetes. Outcome: Progressing Towards Goal  Goal: *Incorporating nutritional management into lifestyle  Description: Describe effect of type, amount and timing of food on blood glucose; list 3 methods for planning meals. Outcome: Progressing Towards Goal  Goal: *Incorporating physical activity into lifestyle  Description: State effect of exercise on blood glucose levels. Outcome: Progressing Towards Goal  Goal: *Developing strategies to promote health/change behavior  Description: Define the ABC's of diabetes; identify appropriate screenings, schedule and personal plan for screenings. Outcome: Progressing Towards Goal  Goal: *Using medications safely  Description: State effect of diabetes medications on diabetes; name diabetes medication taking, action and side effects. Outcome: Progressing Towards Goal  Goal: *Monitoring blood glucose, interpreting and using results  Description: Identify recommended blood glucose targets  and personal targets. Outcome: Progressing Towards Goal  Goal: *Prevention, detection, treatment of acute complications  Description: List symptoms of hyper- and hypoglycemia; describe how to treat low blood sugar and actions for lowering  high blood glucose level. Outcome: Progressing Towards Goal  Goal: *Prevention, detection and treatment of chronic complications  Description: Define the natural course of diabetes and describe the relationship of blood glucose levels to long term complications of diabetes.   Outcome: Progressing Towards Goal  Goal: *Developing strategies to address psychosocial issues  Description: Describe feelings about living with diabetes; identify support needed and support network  Outcome: Progressing Towards Goal  Goal: *Insulin pump training  Outcome: Progressing Towards Goal  Goal: *Sick day guidelines  Outcome: Progressing Towards Goal  Goal: *Patient Specific Goal (EDIT GOAL, INSERT TEXT)  Outcome: Progressing Towards Goal     Problem: Patient Education: Go to Patient Education Activity  Goal: Patient/Family Education  Outcome: Progressing Towards Goal     Problem: Patient Education: Go to Patient Education Activity  Goal: Patient/Family Education  Outcome: Progressing Towards Goal     Problem: Patient Education: Go to Patient Education Activity  Goal: Patient/Family Education  Outcome: Progressing Towards Goal     Problem: Falls - Risk of  Goal: *Absence of Falls  Description: Document Santana Reason Fall Risk and appropriate interventions in the flowsheet. Outcome: Progressing Towards Goal  Note: Fall Risk Interventions:  Mobility Interventions: Communicate number of staff needed for ambulation/transfer,Bed/chair exit alarm,Patient to call before getting OOB    Mentation Interventions: Bed/chair exit alarm,Adequate sleep, hydration, pain control,Door open when patient unattended,More frequent rounding,Reorient patient    Medication Interventions: Assess postural VS orthostatic hypotension,Evaluate medications/consider consulting pharmacy,Bed/chair exit alarm,Patient to call before getting OOB,Teach patient to arise slowly    Elimination Interventions: Bed/chair exit alarm,Call light in reach,Patient to call for help with toileting needs,Toilet paper/wipes in reach,Toileting schedule/hourly rounds    History of Falls Interventions: Bed/chair exit alarm,Door open when patient unattended         Problem: Patient Education: Go to Patient Education Activity  Goal: Patient/Family Education  Outcome: Progressing Towards Goal     Problem: Pressure Injury - Risk of  Goal: *Prevention of pressure injury  Description: Document Carlos Scale and appropriate interventions in the flowsheet.   Outcome: Progressing Towards Goal  Note: Pressure Injury Interventions:  Sensory Interventions: Assess changes in LOC,Check visual cues for pain,Keep linens dry and wrinkle-free,Maintain/enhance activity level,Monitor skin under medical devices,Pressure redistribution bed/mattress (bed type)    Moisture Interventions: Absorbent underpads,Apply protective barrier, creams and emollients,Check for incontinence Q2 hours and as needed,Maintain skin hydration (lotion/cream),Minimize layers    Activity Interventions: Pressure redistribution bed/mattress(bed type),Increase time out of bed,Chair cushion    Mobility Interventions: Pressure redistribution bed/mattress (bed type),Chair cushion    Nutrition Interventions: Document food/fluid/supplement intake,Offer support with meals,snacks and hydration    Friction and Shear Interventions: Apply protective barrier, creams and emollients,HOB 30 degrees or less,Lift sheet,Foam dressings/transparent film/skin sealants                Problem: Patient Education: Go to Patient Education Activity  Goal: Patient/Family Education  Outcome: Progressing Towards Goal

## 2021-12-17 NOTE — PROGRESS NOTES
Hospitalist Progress Note   Admit Date:  2021  5:29 PM   Name:  Fabio Nyhan   Age:  80 y.o. Sex:  female  :  1929   MRN:  [id number]   Room:  [facility]    Presenting Complaint: Fall    Reason(s) for Admission: Physical debility [R53.81]     Hospital Course & Interval History:   Fabio Nyhan is a 80 y.o. female with medical history of HTN, CKD stage III, COPD who presented with difficulty with ambulation after fall PTA. Patient reported she was going to the refrigerator with her walker, reached out to get some water and lost her balance. She hit the right side of her head on the table during fall but no loss of consciousness. She reported landing on her R hip. She denies palpitations, lightheadedness, dizziness, chest pain prior to falling. She denies nausea vomiting or changes in her vision. She activated her medical alert and EMS found her on the floor. She reported bilateral leg pain and has not been able to ambulate since her fall. At baseline, patient lives by herself and able to ambulate with a walker. In ED, CT head shows no acute findings. X-ray tib-fib bilaterally shows no fracture or dislocation. X-ray hips with pelvis shows no fracture or dislocation. CT C-spine shows no acute bony abnormalities; multilevel spondylosis marked at C4-5. Right LE venous US shows no DVT. UA was positive for UTI in ED and patient received Rocephin. She was too weak to ambulate in the ER with not much balance so hospitalist team was contacted for admission and possible SNF placement. Subjective (21): \"I feel good; I want to go home. \" Pleasant 89y.o. AA female sitting up in bed eating breakfast, without complaints    Review of Systems:  10 systems reviewed and negative except as noted in HPI.       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  - PT/OT recommends STR; although pt doesn't want to go, her 3 daughters reported to case mgmt they would like their 79yo mother to go; family meeting with case mgmt later today    Active Problems:    UTI (klebsiella)  - susceptible to cephalosporins, transition to vantin bid x 5 more days      Malnutrition / dehydration  - improved; off IVF  - Improved oral intake      Uncontrolled HTN  - better ranges   - cont norvasc / lisinopril / lasix / hydralazine / aldactone / clonidine      COPD / asthma overlap syndrome  - cont symbicort bid; not in acute exacerbation      DM uncontrolled  - hgbA1c 8.3 on 11/30/2021  - cont lantus as dosed  - sliding scale coverage as needed      CKD (chronic kidney disease) stage 3, GFR 30-59 ml/min ([facility])  - renal fxn stable        Severe debility  - needs STR vs long-term placement; at high risk for falls        Dispo/Discharge Planning:    - family meeting later today  - pt would benefit from STR    Diet:  ADULT DIET Regular; 3 carb choices (45 gm/meal)  ADULT ORAL NUTRITION SUPPLEMENT Breakfast, Lunch, Dinner;  Low Calorie/High Protein  DVT PPx: SCD  Code status: DNR    Hospital Problems as of 12/17/2021 Date Reviewed: 11/30/2021          Codes Class Noted - Resolved POA    Moderate protein-calorie malnutrition ([facility] [address] ICD-10-CM: E44.0  ICD-9-CM: 263.0  12/16/2021 - Present Yes        Frequent falls ICD-10-CM: R29.6  ICD-9-CM: V15.88  12/15/2021 - Present Unknown        * (Principal) Physical debility ICD-10-CM: R53.81  ICD-9-CM: 799.3  12/14/2021 - Present Yes        CKD (chronic kidney disease) stage 3, GFR 30-59 ml/min ([facility]) ICD-10-CM: N18.30  ICD-9-CM: 585.3  4/18/2019 - Present Yes        COPD (chronic obstructive pulmonary disease) ([facility]) (Chronic) ICD-10-CM: J44.9  ICD-9-CM: 496  8/22/2018 - Present Yes        Anxiety ICD-10-CM: F41.9  ICD-9-CM: 300.00  2/1/2018 - Present Yes        Controlled type 2 diabetes mellitus with microalbuminuria, without long-term current use of insulin (HCC) ICD-10-CM: E11.29, R80.9  ICD-9-CM: 250.40, 791.0  11/22/2016 - Present Yes        HLD (hyperlipidemia) ICD-10-CM: E78.5  ICD-9-CM: 272.4  1/8/2013 - Present Yes        Depression ICD-10-CM: F32. A  ICD-9-CM: 912  6/1/2012 - Present Yes        Asthma ICD-10-CM: J45.909  ICD-9-CM: 493.90  6/1/2012 - Present Yes        Essential hypertension (Chronic) ICD-10-CM: I10  ICD-9-CM: 401.9  7/16/2010 - Present Yes        GERD (gastroesophageal reflux disease) (Chronic) ICD-10-CM: K21.9  ICD-9-CM: 530.81  7/16/2010 - Present Yes              Objective:     Patient Vitals for the past 24 hrs:   Temp Pulse Resp BP SpO2   12/17/21 1046 97.4 °F (36.3 °C) 71 16 132/61 100 %   12/17/21 0752     96 %   12/17/21 0657 97.4 °F (36.3 °C) (!) 59 16 (!) 159/65 98 %   12/17/21 0330 98 °F (36.7 °C) (!) 57 17 (!) 141/50 100 %   12/16/21 2237 98.2 °F (36.8 °C) 60 16 (!) 180/60 99 %   12/16/21 2001     98 %   12/16/21 1910 97.5 °F (36.4 °C) 61 16 (!) 142/76 98 %   12/16/21 1535 97.6 °F (36.4 °C) (!) 58 16 (!) 126/51 99 %   12/16/21 1157 97.4 °F (36.3 °C) (!) 57 16 (!) 148/62 100 %     Oxygen Therapy  O2 Sat (%): 100 % (12/17/21 1046)  Pulse via Oximetry: 61 beats per minute (12/17/21 0752)  O2 Device: None (Room air) (12/17/21 0752)    Estimated body mass index is 27.95 kg/m² as calculated from the following:    Height as of this encounter: 5' 2\" (1.575 m). Weight as of this encounter: 69.3 kg (152 lb 12.8 oz). Intake/Output Summary (Last 24 hours) at 12/17/2021 1145  Last data filed at 12/17/2021 0330  Gross per 24 hour   Intake 120 ml   Output 1300 ml   Net -1180 ml         Physical Exam:     Blood pressure 132/61, pulse 71, temperature 97.4 °F (36.3 °C), resp. rate 16, height 5' 2\" (1.575 m), weight 69.3 kg (152 lb 12.8 oz), SpO2 100 %. General:    Thin, well developed, alert, good spirited, in NAD  Head:  Normocephalic, atraumatic  Eyes:  Sclerae appear normal.  Pupils equally round. Anicteric  ENT:  Nares appear normal, no drainage.  Moist oral mucosa, improved halitosis   Neck:  No restricted ROM. Trachea midline   CV:   RRR. No m/r/g. No jugular venous distension. Lungs:   CTAB. No wheezing, rhonchi, or rales. Respirations even, unlabored  Abdomen: Bowel sounds present. Soft, nontender, nondistended. Extremities: + mvmt x4; dependent b/l LE edema  Skin:     No rashes and normal coloration. Warm and dry. Neuro:  CN II-XII grossly intact. Sensation intact. A&Ox3  Psych:  Normal mood and affect.       I have reviewed ordered lab tests and independently visualized imaging below:    Recent Labs:  Recent Results (from the past 48 hour(s))   GLUCOSE, POC    Collection Time: 12/15/21  1:05 PM   Result Value Ref Range    Glucose (POC) 238 (H) 65 - 100 mg/dL    Performed by Cee)    GLUCOSE, POC    Collection Time: 12/15/21  3:30 PM   Result Value Ref Range    Glucose (POC) 263 (H) 65 - 100 mg/dL    Performed by Cee)    GLUCOSE, POC    Collection Time: 12/15/21  9:24 PM   Result Value Ref Range    Glucose (POC) 233 (H) 65 - 100 mg/dL    [facility]    PLEASE READ & DOCUMENT PPD TEST IN 24 HRS    Collection Time: 12/16/21 12:07 AM   Result Value Ref Range    PPD Negative Negative    mm Induration 0 0 - 5 mm   METABOLIC PANEL, BASIC    Collection Time: 12/16/21  6:35 AM   Result Value Ref Range    Sodium 143 136 - 145 mmol/L    Potassium 3.7 3.5 - 5.1 mmol/L    Chloride 110 (H) 98 - 107 mmol/L    CO2 28 21 - 32 mmol/L    Anion gap 5 (L) 7 - 16 mmol/L    Glucose 199 (H) 65 - 100 mg/dL    BUN 30 (H) 8 - 23 MG/DL    Creatinine 1.13 (H) 0.6 - 1.0 MG/DL    GFR est AA 58 (L) >60 ml/min/1.73m2    GFR est non-AA 48 (L) >60 ml/min/1.73m2    Calcium 9.3 8.3 - 10.4 MG/DL   MAGNESIUM    Collection Time: 12/16/21  6:35 AM   Result Value Ref Range    Magnesium 2.0 1.8 - 2.4 mg/dL   CBC W/O DIFF    Collection Time: 12/16/21  6:35 AM   Result Value Ref Range    WBC 7.0 4.3 - 11.1 K/uL    RBC 3.42 (L) 4.05 - 5.2 M/uL    HGB 10.1 (L) 11.7 - 15.4 g/dL    HCT 32.3 (L) 35.8 - 46.3 %    MCV 94.4 79.6 - 97.8 FL    MCH 29.5 26.1 - 32.9 PG    MCHC 31.3 (L) 31.4 - 35.0 g/dL    RDW 15.5 (H) 11.9 - 14.6 %    PLATELET 800 (L) 130 - 450 K/uL    MPV 10.3 9.4 - 12.3 FL    ABSOLUTE NRBC 0.00 0.0 - 0.2 K/uL   GLUCOSE, POC    Collection Time: 12/16/21  7:16 AM   Result Value Ref Range    Glucose (POC) 195 (H) 65 - 100 mg/dL    Performed by Jose Eduardo Caro    GLUCOSE, POC    Collection Time: 12/16/21 11:17 AM   Result Value Ref Range    Glucose (POC) 332 (H) 65 - 100 mg/dL    Performed by Winsome    GLUCOSE, POC    Collection Time: 12/16/21  4:29 PM   Result Value Ref Range    Glucose (POC) 375 (H) 65 - 100 mg/dL    Performed by Iris    GLUCOSE, POC    Collection Time: 12/16/21  8:05 PM   Result Value Ref Range    Glucose (POC) 263 (H) 65 - 100 mg/dL    Performed by Simi Carl    GLUCOSE, POC    Collection Time: 12/17/21  7:00 AM   Result Value Ref Range    Glucose (POC) 89 65 - 100 mg/dL    Performed by Aram    METABOLIC PANEL, BASIC    Collection Time: 12/17/21  8:17 AM   Result Value Ref Range    Sodium 144 136 - 145 mmol/L    Potassium 3.5 3.5 - 5.1 mmol/L    Chloride 110 (H) 98 - 107 mmol/L    CO2 27 21 - 32 mmol/L    Anion gap 7 7 - 16 mmol/L    Glucose 99 65 - 100 mg/dL    BUN 32 (H) 8 - 23 MG/DL    Creatinine 1.14 (H) 0.6 - 1.0 MG/DL    GFR est AA 57 (L) >60 ml/min/1.73m2    GFR est non-AA 47 (L) >60 ml/min/1.73m2    Calcium 9.8 8.3 - 10.4 MG/DL   MAGNESIUM    Collection Time: 12/17/21  8:17 AM   Result Value Ref Range    Magnesium 2.2 1.8 - 2.4 mg/dL   CBC W/O DIFF    Collection Time: 12/17/21  8:17 AM   Result Value Ref Range    WBC 7.8 4.3 - 11.1 K/uL    RBC 4.00 (L) 4.05 - 5.2 M/uL    HGB 11.6 (L) 11.7 - 15.4 g/dL    HCT 37.6 35.8 - 46.3 %    MCV 94.0 79.6 - 97.8 FL    MCH 29.0 26.1 - 32.9 PG    MCHC 30.9 (L) 31.4 - 35.0 g/dL    RDW 15.4 (H) 11.9 - 14.6 %    PLATELET 852 447 - 894 K/uL    MPV 10.6 9.4 - 12.3 FL    ABSOLUTE NRBC 0.00 0.0 - 0.2 K/uL   GLUCOSE, POC    Collection Time: 12/17/21 10:49 AM   Result Value Ref Range    Glucose (POC) 335 (H) 65 - 100 mg/dL    Performed by Aram        All Micro Results     Procedure Component Value Units Date/Time    CULTURE, URINE [[id number]]  (Abnormal)  (Susceptibility) Collected: 12/14/21 2208    Order Status: Completed Specimen: Urine from Clean catch Updated: 12/17/21 0829     Special Requests: NO SPECIAL REQUESTS        Culture result:       >100,000 COLONIES/mL KLEBSIELLA PNEUMONIAE                Other Studies:  No results found.     Current Meds:  Current Facility-Administered Medications   Medication Dose Route Frequency    ciprofloxacin HCl (CIPRO) tablet 500 mg  500 mg Oral Q24H    labetaloL (NORMODYNE;TRANDATE) injection 10 mg  10 mg IntraVENous Q6H PRN    hydrALAZINE (APRESOLINE) tablet 100 mg  100 mg Oral TID    amLODIPine (NORVASC) tablet 10 mg  10 mg Oral DAILY    cloNIDine HCL (CATAPRES) tablet 0.2 mg  0.2 mg Oral TID    insulin glargine (LANTUS) injection 10 Units  10 Units SubCUTAneous QHS    lisinopriL (PRINIVIL, ZESTRIL) tablet 40 mg  40 mg Oral DAILY    budesonide-formoteroL (SYMBICORT) 160-4.5 mcg/actuation HFA inhaler 2 Puff  2 Puff Inhalation BID RT    furosemide (LASIX) tablet 20 mg  20 mg Oral DAILY    gabapentin (NEURONTIN) capsule 100 mg  100 mg Oral TID    pantoprazole (PROTONIX) tablet 40 mg  40 mg Oral ACB    pravastatin (PRAVACHOL) tablet 40 mg  40 mg Oral QHS    sertraline (ZOLOFT) tablet 100 mg  100 mg Oral DAILY    spironolactone (ALDACTONE) tablet 25 mg  25 mg Oral DAILY    albuterol (PROVENTIL VENTOLIN) nebulizer solution 2.5 mg  2.5 mg Nebulization Q6H PRN    sodium chloride (NS) flush 5-40 mL  5-40 mL IntraVENous Q8H    sodium chloride (NS) flush 5-40 mL  5-40 mL IntraVENous PRN    acetaminophen (TYLENOL) tablet 650 mg  650 mg Oral Q6H PRN    Or    acetaminophen (TYLENOL) suppository 650 mg  650 mg Rectal Q6H PRN    polyethylene glycol (MIRALAX) packet 17 g  17 g Oral DAILY PRN    ondansetron (ZOFRAN ODT) tablet 4 mg  4 mg Oral Q8H PRN    Or    ondansetron (ZOFRAN) injection 4 mg  4 mg IntraVENous Q6H PRN    traMADoL (ULTRAM) tablet 50 mg  50 mg Oral BID PRN    insulin lispro (HUMALOG) injection   SubCUTAneous AC&HS    dextrose 40% (GLUTOSE) oral gel 1 Tube  15 g Oral PRN    glucagon (GLUCAGEN) injection 1 mg  1 mg IntraMUSCular PRN    dextrose (D50W) injection syrg 12.5-25 g  25-50 mL IntraVENous PRN       Signed:  MICHAEL Bruner    Part of this note may have been written by using a voice dictation software. The note has been proof read but may still contain some grammatical/other typographical errors.

## 2021-12-18 NOTE — PROGRESS NOTES
Chart reviewed. Waiting decision from [facility] for [address] [place]. Contacted liaison with no response at this time. CM to continue to follow and monitor for any further needs. Update 0927: Pt accepted to [facility] and pre-cert being started today per liaison.

## 2021-12-18 NOTE — PROGRESS NOTES
Hospitalist Progress Note   Admit Date:  2021  5:29 PM   Name:  Kassie Bishop   Age:  80 y.o. Sex:  female  :  1929   MRN:  [id number]   Room:  [facility]/    Presenting Complaint: Fall    Reason(s) for Admission: Physical debility [R53.81]     Hospital Course & Interval History:   Kassie Bishop is a 80 y.o. female with medical history of HTN, CKD stage III, COPD who presented with difficulty with ambulation after fall PTA. Patient reported she was going to the refrigerator with her walker, reached out to get some water and lost her balance. She hit the right side of her head on the table during fall but no loss of consciousness. She reported landing on her R hip. She denies palpitations, lightheadedness, dizziness, chest pain prior to falling. She denies nausea vomiting or changes in her vision. She activated her medical alert and EMS found her on the floor. She reported bilateral leg pain and has not been able to ambulate since her fall. At baseline, patient lives by herself and able to ambulate with a walker. In ED, CT head shows no acute findings. X-ray tib-fib bilaterally shows no fracture or dislocation. X-ray hips with pelvis shows no fracture or dislocation. CT C-spine shows no acute bony abnormalities; multilevel spondylosis marked at C4-5. Right LE venous US shows no DVT. UA was positive for UTI in ED and patient received Rocephin. She was too weak to ambulate in the ER with not much balance so hospitalist team was contacted for admission and possible SNF placement. Subjective (21): \"Yea I so I'd go to rehab. \" Pleasant 89y.o. AA female sitting up in bed eating breakfast, without complaints    Review of Systems:  10 systems reviewed and negative except as noted in HPI.       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  - appreciate PT/OT eval; STR     Active Problems: UTI (klebsiella)  - susceptible to cephalosporins, transition to vantin bid EOT 12/21/2021      Malnutrition / dehydration  - Improved oral intake; dehydration resolved      Uncontrolled HTN  - on 6-drug regimen; acceptable control      COPD / asthma overlap syndrome  - cont symbicort bid; not in acute exacerbation      DM uncontrolled  - hgbA1c 8.3 on 11/30/2021  - better ranges with lantus bid      CKD (chronic kidney disease) stage 3, GFR 30-59 ml/min ([facility])  - renal fxn stable        Severe debility  - Awaiting pre-cert for [employer]        Dispo/Discharge Planning:    - rehab     Diet:  ADULT DIET Regular; 3 carb choices (45 gm/meal)  ADULT ORAL NUTRITION SUPPLEMENT Breakfast, Lunch, Dinner; Low Calorie/High Protein  DVT PPx: SCD  Code status: DNR    Hospital Problems as of 12/18/2021 Date Reviewed: 11/30/2021          Codes Class Noted - Resolved POA    Moderate protein-calorie malnutrition ([facility] [address] ICD-10-CM: E44.0  ICD-9-CM: 263.0  12/16/2021 - Present Yes        Frequent falls ICD-10-CM: R29.6  ICD-9-CM: V15.88  12/15/2021 - Present Unknown        * (Principal) Physical debility ICD-10-CM: R53.81  ICD-9-CM: 799.3  12/14/2021 - Present Yes        CKD (chronic kidney disease) stage 3, GFR 30-59 ml/min ([facility]) ICD-10-CM: N18.30  ICD-9-CM: 585.3  4/18/2019 - Present Yes        COPD (chronic obstructive pulmonary disease) (HCC) (Chronic) ICD-10-CM: J44.9  ICD-9-CM: 496  8/22/2018 - Present Yes        Anxiety ICD-10-CM: F41.9  ICD-9-CM: 300.00  2/1/2018 - Present Yes        Controlled type 2 diabetes mellitus with microalbuminuria, without long-term current use of insulin (HCC) ICD-10-CM: E11.29, R80.9  ICD-9-CM: 250.40, 791.0  11/22/2016 - Present Yes        HLD (hyperlipidemia) ICD-10-CM: E78.5  ICD-9-CM: 272.4  1/8/2013 - Present Yes        Depression ICD-10-CM: F32. A  ICD-9-CM: 227  6/1/2012 - Present Yes        Asthma ICD-10-CM: J45.909  ICD-9-CM: 493.90  6/1/2012 - Present Yes        Essential hypertension (Chronic) ICD-10-CM: I10  ICD-9-CM: 401.9  7/16/2010 - Present Yes        GERD (gastroesophageal reflux disease) (Chronic) ICD-10-CM: K21.9  ICD-9-CM: 530.81  7/16/2010 - Present Yes              Objective:     Patient Vitals for the past 24 hrs:   Temp Pulse Resp BP SpO2   12/18/21 0928     95 %   12/18/21 0808 97.4 °F (36.3 °C) 61 16 (!) 162/54 98 %   12/18/21 0346 97.3 °F (36.3 °C) (!) 57 16 (!) 125/58 100 %   12/17/21 2301 97.4 °F (36.3 °C) (!) 59 16 (!) 114/53 97 %   12/17/21 2014     98 %   12/17/21 1952 97.7 °F (36.5 °C) 69 18 (!) 176/58 97 %   12/17/21 1458 97.6 °F (36.4 °C) 66 16 (!) 128/56 97 %     Oxygen Therapy  O2 Sat (%): 95 % (12/18/21 0928)  Pulse via Oximetry: 58 beats per minute (12/18/21 0928)  O2 Device: None (Room air) (12/18/21 0928)    Estimated body mass index is 27.95 kg/m² as calculated from the following:    Height as of this encounter: 5' 2\" (1.575 m). Weight as of this encounter: 69.3 kg (152 lb 12.8 oz). Intake/Output Summary (Last 24 hours) at 12/18/2021 1147  Last data filed at 12/18/2021 0346  Gross per 24 hour   Intake 360 ml   Output 1050 ml   Net -690 ml         Physical Exam:     Blood pressure (!) 162/54, pulse 61, temperature 97.4 °F (36.3 °C), resp. rate 16, height 5' 2\" (1.575 m), weight 69.3 kg (152 lb 12.8 oz), SpO2 95 %. General:    Thin, well developed, alert, good spirited, in NAD  Head:  Normocephalic, atraumatic  Eyes:  Sclerae appear normal.  Pupils equally round. Anicteric  ENT:  Nares appear normal, no drainage. Moist oral mucosa, improved halitosis   Neck:  No restricted ROM. Trachea midline   CV:   RRR. No m/r/g. No jugular venous distension. Lungs:   CTAB. No wheezing, rhonchi, or rales. Respirations even, unlabored  Abdomen: Bowel sounds present. Soft, nontender, nondistended. Extremities: + mvmt x4; mild dependent b/l LE edema  Skin:     No rashes and normal coloration. Warm and dry. Neuro:  CN II-XII grossly intact.    Sensation intact. A&Ox3  Psych:  Normal mood and affect.       I have reviewed ordered lab tests and independently visualized imaging below:    Recent Labs:  Recent Results (from the past 48 hour(s))   GLUCOSE, POC    Collection Time: 12/16/21  4:29 PM   Result Value Ref Range    Glucose (POC) 375 (H) 65 - 100 mg/dL    Performed by [email or web address], POC    Collection Time: 12/16/21  8:05 PM   Result Value Ref Range    Glucose (POC) 263 (H) 65 - 100 mg/dL    Performed by Anushka Otero    GLUCOSE, POC    Collection Time: 12/17/21  7:00 AM   Result Value Ref Range    Glucose (POC) 89 65 - 100 mg/dL    Performed by [facility]    METABOLIC PANEL, BASIC    Collection Time: 12/17/21  8:17 AM   Result Value Ref Range    Sodium 144 136 - 145 mmol/L    Potassium 3.5 3.5 - 5.1 mmol/L    Chloride 110 (H) 98 - 107 mmol/L    CO2 27 21 - 32 mmol/L    Anion gap 7 7 - 16 mmol/L    Glucose 99 65 - 100 mg/dL    BUN 32 (H) 8 - 23 MG/DL    Creatinine 1.14 (H) 0.6 - 1.0 MG/DL    GFR est AA 57 (L) >60 ml/min/1.73m2    GFR est non-AA 47 (L) >60 ml/min/1.73m2    Calcium 9.8 8.3 - 10.4 MG/DL   MAGNESIUM    Collection Time: 12/17/21  8:17 AM   Result Value Ref Range    Magnesium 2.2 1.8 - 2.4 mg/dL   CBC W/O DIFF    Collection Time: 12/17/21  8:17 AM   Result Value Ref Range    WBC 7.8 4.3 - 11.1 K/uL    RBC 4.00 (L) 4.05 - 5.2 M/uL    HGB 11.6 (L) 11.7 - 15.4 g/dL    HCT 37.6 35.8 - 46.3 %    MCV 94.0 79.6 - 97.8 FL    MCH 29.0 26.1 - 32.9 PG    MCHC 30.9 (L) 31.4 - 35.0 g/dL    RDW 15.4 (H) 11.9 - 14.6 %    PLATELET 658 935 - 932 K/uL    MPV 10.6 9.4 - 12.3 FL    ABSOLUTE NRBC 0.00 0.0 - 0.2 K/uL   GLUCOSE, POC    Collection Time: 12/17/21 10:49 AM   Result Value Ref Range    Glucose (POC) 335 (H) 65 - 100 mg/dL    Performed by DianxinT    GLUCOSE, POC    Collection Time: 12/17/21  3:00 PM   Result Value Ref Range    Glucose (POC) 330 (H) 65 - 100 mg/dL    Performed by DianxinT    GLUCOSE, POC    Collection Time: 12/17/21  8:26 PM   Result Value Ref Range    Glucose (POC) 193 (H) 65 - 100 mg/dL    Performed by Bindu Aj    MAGNESIUM    Collection Time: 12/18/21  6:06 AM   Result Value Ref Range    Magnesium 2.3 1.8 - 2.4 mg/dL   GLUCOSE, POC    Collection Time: 12/18/21  7:24 AM   Result Value Ref Range    Glucose (POC) 76 65 - 100 mg/dL    Performed by Ricky Vidal, POC    Collection Time: 12/18/21 11:32 AM   Result Value Ref Range    Glucose (POC) 186 (H) 65 - 100 mg/dL    Performed by Ghislaine Flores        All Micro Results     Procedure Component Value Units Date/Time    CULTURE, URINE [[id number]]  (Abnormal)  (Susceptibility) Collected: 12/14/21 2208    Order Status: Completed Specimen: Urine from Clean catch Updated: 12/17/21 5258     Special Requests: NO SPECIAL REQUESTS        Culture result:       >100,000 COLONIES/mL KLEBSIELLA PNEUMONIAE                Other Studies:  No results found.     Current Meds:  Current Facility-Administered Medications   Medication Dose Route Frequency    cefpodoxime (VANTIN) tablet 200 mg  200 mg Oral Q12H    insulin glargine (LANTUS) injection 10 Units  10 Units SubCUTAneous DAILY    labetaloL (NORMODYNE;TRANDATE) injection 10 mg  10 mg IntraVENous Q6H PRN    hydrALAZINE (APRESOLINE) tablet 100 mg  100 mg Oral TID    amLODIPine (NORVASC) tablet 10 mg  10 mg Oral DAILY    cloNIDine HCL (CATAPRES) tablet 0.2 mg  0.2 mg Oral TID    insulin glargine (LANTUS) injection 10 Units  10 Units SubCUTAneous QHS    lisinopriL (PRINIVIL, ZESTRIL) tablet 40 mg  40 mg Oral DAILY    budesonide-formoteroL (SYMBICORT) 160-4.5 mcg/actuation HFA inhaler 2 Puff  2 Puff Inhalation BID RT    furosemide (LASIX) tablet 20 mg  20 mg Oral DAILY    gabapentin (NEURONTIN) capsule 100 mg  100 mg Oral TID    pantoprazole (PROTONIX) tablet 40 mg  40 mg Oral ACB    pravastatin (PRAVACHOL) tablet 40 mg  40 mg Oral QHS    sertraline (ZOLOFT) tablet 100 mg  100 mg Oral DAILY    spironolactone (ALDACTONE) tablet 25 mg  25 mg Oral DAILY    albuterol (PROVENTIL VENTOLIN) nebulizer solution 2.5 mg  2.5 mg Nebulization Q6H PRN    sodium chloride (NS) flush 5-40 mL  5-40 mL IntraVENous Q8H    sodium chloride (NS) flush 5-40 mL  5-40 mL IntraVENous PRN    acetaminophen (TYLENOL) tablet 650 mg  650 mg Oral Q6H PRN    Or    acetaminophen (TYLENOL) suppository 650 mg  650 mg Rectal Q6H PRN    polyethylene glycol (MIRALAX) packet 17 g  17 g Oral DAILY PRN    ondansetron (ZOFRAN ODT) tablet 4 mg  4 mg Oral Q8H PRN    Or    ondansetron (ZOFRAN) injection 4 mg  4 mg IntraVENous Q6H PRN    traMADoL (ULTRAM) tablet 50 mg  50 mg Oral BID PRN    insulin lispro (HUMALOG) injection   SubCUTAneous AC&HS    dextrose 40% (GLUTOSE) oral gel 1 Tube  15 g Oral PRN    glucagon (GLUCAGEN) injection 1 mg  1 mg IntraMUSCular PRN    dextrose (D50W) injection syrg 12.5-25 g  25-50 mL IntraVENous PRN       Signed:  MICHAEL Glez    Part of this note may have been written by using a voice dictation software. The note has been proof read but may still contain some grammatical/other typographical errors.

## 2021-12-18 NOTE — PROGRESS NOTES
Care of pt assumed at 1900. Hourly rounds performed throughout shift. Pt bed in low/locked position, call bell and personal items within reach. Pt denies pain. Pt reports no needs or concerns at this time. Will continue to monitor and report to oncoming dayshift RN.

## 2021-12-18 NOTE — PROGRESS NOTES
Problem: Diabetes Self-Management  Goal: *Disease process and treatment process  Description: Define diabetes and identify own type of diabetes; list 3 options for treating diabetes. Outcome: Progressing Towards Goal  Goal: *Incorporating nutritional management into lifestyle  Description: Describe effect of type, amount and timing of food on blood glucose; list 3 methods for planning meals. Outcome: Progressing Towards Goal  Goal: *Incorporating physical activity into lifestyle  Description: State effect of exercise on blood glucose levels. Outcome: Progressing Towards Goal  Goal: *Developing strategies to promote health/change behavior  Description: Define the ABC's of diabetes; identify appropriate screenings, schedule and personal plan for screenings. Outcome: Progressing Towards Goal  Goal: *Using medications safely  Description: State effect of diabetes medications on diabetes; name diabetes medication taking, action and side effects. Outcome: Progressing Towards Goal  Goal: *Monitoring blood glucose, interpreting and using results  Description: Identify recommended blood glucose targets  and personal targets. Outcome: Progressing Towards Goal  Goal: *Prevention, detection, treatment of acute complications  Description: List symptoms of hyper- and hypoglycemia; describe how to treat low blood sugar and actions for lowering  high blood glucose level. Outcome: Progressing Towards Goal  Goal: *Prevention, detection and treatment of chronic complications  Description: Define the natural course of diabetes and describe the relationship of blood glucose levels to long term complications of diabetes.   Outcome: Progressing Towards Goal  Goal: *Developing strategies to address psychosocial issues  Description: Describe feelings about living with diabetes; identify support needed and support network  Outcome: Progressing Towards Goal  Goal: *Insulin pump training  Outcome: Progressing Towards Goal  Goal: *Sick day guidelines  Outcome: Progressing Towards Goal  Goal: *Patient Specific Goal (EDIT GOAL, INSERT TEXT)  Outcome: Progressing Towards Goal     Problem: Patient Education: Go to Patient Education Activity  Goal: Patient/Family Education  Outcome: Progressing Towards Goal     Problem: Patient Education: Go to Patient Education Activity  Goal: Patient/Family Education  Outcome: Progressing Towards Goal     Problem: Patient Education: Go to Patient Education Activity  Goal: Patient/Family Education  Outcome: Progressing Towards Goal     Problem: Falls - Risk of  Goal: *Absence of Falls  Description: Document Willie Nicole Fall Risk and appropriate interventions in the flowsheet. Outcome: Progressing Towards Goal  Note: Fall Risk Interventions:  Mobility Interventions: Communicate number of staff needed for ambulation/transfer,Patient to call before getting OOB,PT Consult for mobility concerns    Mentation Interventions: Bed/chair exit alarm,Door open when patient unattended,More frequent rounding,Reorient patient    Medication Interventions: Patient to call before getting OOB,Teach patient to arise slowly    Elimination Interventions: Call light in reach,Bed/chair exit alarm,Toileting schedule/hourly rounds    History of Falls Interventions: Bed/chair exit alarm         Problem: Patient Education: Go to Patient Education Activity  Goal: Patient/Family Education  Outcome: Progressing Towards Goal     Problem: Pressure Injury - Risk of  Goal: *Prevention of pressure injury  Description: Document Carlos Scale and appropriate interventions in the flowsheet.   Outcome: Progressing Towards Goal  Note: Pressure Injury Interventions:  Sensory Interventions: Assess changes in LOC,Keep linens dry and wrinkle-free,Maintain/enhance activity level,Minimize linen layers    Moisture Interventions: Absorbent underpads,Apply protective barrier, creams and emollients    Activity Interventions: Pressure redistribution bed/mattress(bed type),Increase time out of bed    Mobility Interventions: PT/OT evaluation    Nutrition Interventions: Document food/fluid/supplement intake,Offer support with meals,snacks and hydration    Friction and Shear Interventions: Apply protective barrier, creams and emollients,HOB 30 degrees or less,Lift sheet,Foam dressings/transparent film/skin sealants                Problem: Patient Education: Go to Patient Education Activity  Goal: Patient/Family Education  Outcome: Progressing Towards Goal

## 2021-12-19 NOTE — PROGRESS NOTES
Problem: Diabetes Self-Management  Goal: *Disease process and treatment process  Description: Define diabetes and identify own type of diabetes; list 3 options for treating diabetes. Outcome: Progressing Towards Goal  Goal: *Incorporating nutritional management into lifestyle  Description: Describe effect of type, amount and timing of food on blood glucose; list 3 methods for planning meals. Outcome: Progressing Towards Goal  Goal: *Incorporating physical activity into lifestyle  Description: State effect of exercise on blood glucose levels. Outcome: Progressing Towards Goal  Goal: *Developing strategies to promote health/change behavior  Description: Define the ABC's of diabetes; identify appropriate screenings, schedule and personal plan for screenings. Outcome: Progressing Towards Goal  Goal: *Using medications safely  Description: State effect of diabetes medications on diabetes; name diabetes medication taking, action and side effects. Outcome: Progressing Towards Goal  Goal: *Monitoring blood glucose, interpreting and using results  Description: Identify recommended blood glucose targets  and personal targets. Outcome: Progressing Towards Goal  Goal: *Prevention, detection, treatment of acute complications  Description: List symptoms of hyper- and hypoglycemia; describe how to treat low blood sugar and actions for lowering  high blood glucose level. Outcome: Progressing Towards Goal  Goal: *Prevention, detection and treatment of chronic complications  Description: Define the natural course of diabetes and describe the relationship of blood glucose levels to long term complications of diabetes.   Outcome: Progressing Towards Goal  Goal: *Developing strategies to address psychosocial issues  Description: Describe feelings about living with diabetes; identify support needed and support network  Outcome: Progressing Towards Goal  Goal: *Insulin pump training  Outcome: Progressing Towards Goal  Goal: *Sick day guidelines  Outcome: Progressing Towards Goal  Goal: *Patient Specific Goal (EDIT GOAL, INSERT TEXT)  Outcome: Progressing Towards Goal     Problem: Patient Education: Go to Patient Education Activity  Goal: Patient/Family Education  Outcome: Progressing Towards Goal     Problem: Patient Education: Go to Patient Education Activity  Goal: Patient/Family Education  Outcome: Progressing Towards Goal     Problem: Patient Education: Go to Patient Education Activity  Goal: Patient/Family Education  Outcome: Progressing Towards Goal     Problem: Falls - Risk of  Goal: *Absence of Falls  Description: Document Thora Bare Fall Risk and appropriate interventions in the flowsheet. Outcome: Progressing Towards Goal  Note: Fall Risk Interventions:  Mobility Interventions: Communicate number of staff needed for ambulation/transfer    Mentation Interventions: Bed/chair exit alarm    Medication Interventions: Patient to call before getting OOB,Teach patient to arise slowly    Elimination Interventions: Toileting schedule/hourly rounds,Call light in reach    History of Falls Interventions: Bed/chair exit alarm         Problem: Patient Education: Go to Patient Education Activity  Goal: Patient/Family Education  Outcome: Progressing Towards Goal     Problem: Pressure Injury - Risk of  Goal: *Prevention of pressure injury  Description: Document Carlos Scale and appropriate interventions in the flowsheet.   Outcome: Progressing Towards Goal  Note: Pressure Injury Interventions:  Sensory Interventions: Assess changes in LOC,Keep linens dry and wrinkle-free,Maintain/enhance activity level,Minimize linen layers    Moisture Interventions: Internal/External urinary devices,Check for incontinence Q2 hours and as needed    Activity Interventions: PT/OT evaluation,Pressure redistribution bed/mattress(bed type)    Mobility Interventions: PT/OT evaluation    Nutrition Interventions: Document food/fluid/supplement intake    Friction and Shear Interventions: Apply protective barrier, creams and emollients,Minimize layers                Problem: Patient Education: Go to Patient Education Activity  Goal: Patient/Family Education  Outcome: Progressing Towards Goal

## 2021-12-19 NOTE — PROGRESS NOTES
Care of pt assumed at 1900. Hourly rounds performed throughout shift. Pt bed in low/locked position, call bell and personal items within reach. Pt reports no needs or concerns at this time. Pt reports feeling \"much better\" and that she slept well. Will continue to monitor and report to oncoming dayshift RN.

## 2021-12-19 NOTE — PROGRESS NOTES
Problem: Diabetes Self-Management  Goal: *Disease process and treatment process  Description: Define diabetes and identify own type of diabetes; list 3 options for treating diabetes. Outcome: Progressing Towards Goal  Goal: *Incorporating nutritional management into lifestyle  Description: Describe effect of type, amount and timing of food on blood glucose; list 3 methods for planning meals. Outcome: Progressing Towards Goal  Goal: *Incorporating physical activity into lifestyle  Description: State effect of exercise on blood glucose levels. Outcome: Progressing Towards Goal  Goal: *Developing strategies to promote health/change behavior  Description: Define the ABC's of diabetes; identify appropriate screenings, schedule and personal plan for screenings. Outcome: Progressing Towards Goal  Goal: *Using medications safely  Description: State effect of diabetes medications on diabetes; name diabetes medication taking, action and side effects. Outcome: Progressing Towards Goal  Goal: *Monitoring blood glucose, interpreting and using results  Description: Identify recommended blood glucose targets  and personal targets. Outcome: Progressing Towards Goal  Goal: *Prevention, detection, treatment of acute complications  Description: List symptoms of hyper- and hypoglycemia; describe how to treat low blood sugar and actions for lowering  high blood glucose level. Outcome: Progressing Towards Goal  Goal: *Prevention, detection and treatment of chronic complications  Description: Define the natural course of diabetes and describe the relationship of blood glucose levels to long term complications of diabetes.   Outcome: Progressing Towards Goal  Goal: *Developing strategies to address psychosocial issues  Description: Describe feelings about living with diabetes; identify support needed and support network  Outcome: Progressing Towards Goal  Goal: *Insulin pump training  Outcome: Progressing Towards Goal  Goal: *Sick day guidelines  Outcome: Progressing Towards Goal  Goal: *Patient Specific Goal (EDIT GOAL, INSERT TEXT)  Outcome: Progressing Towards Goal     Problem: Patient Education: Go to Patient Education Activity  Goal: Patient/Family Education  Outcome: Progressing Towards Goal     Problem: Falls - Risk of  Goal: *Absence of Falls  Description: Document Mansoor Mccord Fall Risk and appropriate interventions in the flowsheet. Outcome: Progressing Towards Goal  Note: Fall Risk Interventions:  Mobility Interventions: Communicate number of staff needed for ambulation/transfer    Mentation Interventions: Bed/chair exit alarm    Medication Interventions: Patient to call before getting OOB,Teach patient to arise slowly    Elimination Interventions:  Toileting schedule/hourly rounds,Call light in reach    History of Falls Interventions: Bed/chair exit alarm

## 2021-12-19 NOTE — PROGRESS NOTES
Great visit with this very pleasant lady    She was calm    Reading her [facility] bulletin    Prayer offered    [address]

## 2021-12-19 NOTE — PROGRESS NOTES
Hospitalist Progress Note   Admit Date:  2021  5:29 PM   Name:  Tyron Mckeon   Age:  80 y.o. Sex:  female  :  1929   MRN:  [id number]   Room:  [facility]/    Presenting Complaint: Fall    Reason(s) for Admission: Physical debility [R53.81]     Hospital Course & Interval History:   Tyron Mckeon is a 80 y.o. female with medical history of HTN, CKD stage III, COPD who presented with difficulty with ambulation after fall PTA. Patient reported she was going to the refrigerator with her walker, reached out to get some water and lost her balance. She hit the right side of her head on the table during fall but no loss of consciousness. She reported landing on her R hip. She denies palpitations, lightheadedness, dizziness, chest pain prior to falling. She denies nausea vomiting or changes in her vision. She activated her medical alert and EMS found her on the floor. She reported bilateral leg pain and has not been able to ambulate since her fall. At baseline, patient lives by herself and able to ambulate with a walker. In ED, CT head shows no acute findings. X-ray tib-fib bilaterally shows no fracture or dislocation. X-ray hips with pelvis shows no fracture or dislocation. CT C-spine shows no acute bony abnormalities; multilevel spondylosis marked at C4-5. Right LE venous US shows no DVT. UA was positive for UTI in ED and patient received Rocephin. She was too weak to ambulate in the ER with not much balance so hospitalist team was contacted for admission and possible SNF placement. Subjective (21):  Denies abdominal pain but does endorse some continued pain with movement .       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  - appreciate PT/OT eval; STR   21  -Patient being pre-certed for [facility]     Active Problems:    UTI (klebsiella)  - susceptible to cephalosporins, transition to vantin bid EOT 12/21/2021      Malnutrition / dehydration  - Improved oral intake; dehydration resolved      Uncontrolled HTN  - on 6-drug regimen; acceptable control      COPD / asthma overlap syndrome  - cont symbicort bid; not in acute exacerbation      DM uncontrolled  - hgbA1c 8.3 on 11/30/2021  - better ranges with lantus bid      CKD (chronic kidney disease) stage 3, GFR 30-59 ml/min ([facility])  - renal fxn stable        Severe debility  - Awaiting pre-cert for [facility]        Dispo/Discharge Planning:    - rehab     Diet:  ADULT DIET Regular; 3 carb choices (45 gm/meal)  ADULT ORAL NUTRITION SUPPLEMENT Breakfast, Lunch, Dinner; Low Calorie/High Protein  DVT PPx: SCD  Code status: DNR    Hospital Problems as of 12/19/2021 Date Reviewed: 11/30/2021          Codes Class Noted - Resolved POA    Moderate protein-calorie malnutrition ([facility] [address] ICD-10-CM: E44.0  ICD-9-CM: 263.0  12/16/2021 - Present Yes        Frequent falls ICD-10-CM: R29.6  ICD-9-CM: V15.88  12/15/2021 - Present Unknown        * (Principal) Physical debility ICD-10-CM: R53.81  ICD-9-CM: 799.3  12/14/2021 - Present Yes        CKD (chronic kidney disease) stage 3, GFR 30-59 ml/min ([facility]) ICD-10-CM: N18.30  ICD-9-CM: 585.3  4/18/2019 - Present Yes        COPD (chronic obstructive pulmonary disease) (HCC) (Chronic) ICD-10-CM: J44.9  ICD-9-CM: 496  8/22/2018 - Present Yes        Anxiety ICD-10-CM: F41.9  ICD-9-CM: 300.00  2/1/2018 - Present Yes        Controlled type 2 diabetes mellitus with microalbuminuria, without long-term current use of insulin (HCC) ICD-10-CM: E11.29, R80.9  ICD-9-CM: 250.40, 791.0  11/22/2016 - Present Yes        HLD (hyperlipidemia) ICD-10-CM: E78.5  ICD-9-CM: 272.4  1/8/2013 - Present Yes        Depression ICD-10-CM: F32. A  ICD-9-CM: 882  6/1/2012 - Present Yes        Asthma ICD-10-CM: J45.909  ICD-9-CM: 493.90  6/1/2012 - Present Yes        Essential hypertension (Chronic) ICD-10-CM: I10  ICD-9-CM: 401.9  7/16/2010 - Present Yes        GERD (gastroesophageal reflux disease) (Chronic) ICD-10-CM: K21.9  ICD-9-CM: 530.81  7/16/2010 - Present Yes              Objective:     Patient Vitals for the past 24 hrs:   Temp Pulse Resp BP SpO2   12/19/21 1156 97.4 °F (36.3 °C) (!) 56 16 (!) 105/45 97 %   12/19/21 0841     98 %   12/19/21 0742 97.3 °F (36.3 °C) (!) 57 16 (!) 125/58 100 %   12/19/21 0328 97.4 °F (36.3 °C) (!) 58 17 110/80 96 %   12/18/21 2323 97.4 °F (36.3 °C) 64 16 (!) 126/55 96 %   12/18/21 2039     97 %   12/18/21 1951 97.6 °F (36.4 °C) 61 17 (!) 116/59 97 %   12/18/21 1552 97.3 °F (36.3 °C) 64 16 (!) 149/67 98 %     Oxygen Therapy  O2 Sat (%): 97 % (12/19/21 1156)  Pulse via Oximetry: 58 beats per minute (12/19/21 0841)  O2 Device: None (Room air) (12/19/21 0841)    Estimated body mass index is 27.95 kg/m² as calculated from the following:    Height as of this encounter: 5' 2\" (1.575 m). Weight as of this encounter: 69.3 kg (152 lb 12.8 oz). Intake/Output Summary (Last 24 hours) at 12/19/2021 1254  Last data filed at 12/19/2021 9207  Gross per 24 hour   Intake 120 ml   Output 1575 ml   Net -1455 ml         Physical Exam:     Blood pressure (!) 105/45, pulse (!) 56, temperature 97.4 °F (36.3 °C), resp. rate 16, height 5' 2\" (1.575 m), weight 69.3 kg (152 lb 12.8 oz), SpO2 97 %. General:    No distress noted   Head:  Normocephalic, atraumatic  Eyes:  Sclerae appear normal.  Pupils equally round. Anicteric  ENT:  Nares appear normal, no drainage. Moist oral mucosa, improved halitosis   Neck:  No restricted ROM. Trachea midline   CV:   RRR. No m/r/g. No jugular venous distension. Lungs:   CTAB. No wheezing, rhonchi, or rales. Respirations even, unlabored  Abdomen: Bowel sounds present. Soft, nontender, nondistended. Extremities: + mvmt x4; mild dependent b/l LE edema  Skin:     No rashes and normal coloration. Warm and dry. Neuro:  CN II-XII grossly intact. Sensation intact.   A&Ox3  Psych:  Normal mood and affect.       I have reviewed ordered lab tests and independently visualized imaging below:    Recent Labs:  Recent Results (from the past 48 hour(s))   GLUCOSE, POC    Collection Time: 12/17/21  3:00 PM   Result Value Ref Range    Glucose (POC) 330 (H) 65 - 100 mg/dL    Performed by Aram    GLUCOSE, POC    Collection Time: 12/17/21  8:26 PM   Result Value Ref Range    Glucose (POC) 193 (H) 65 - 100 mg/dL    Performed by Sebastian Schulz    MAGNESIUM    Collection Time: 12/18/21  6:06 AM   Result Value Ref Range    Magnesium 2.3 1.8 - 2.4 mg/dL   GLUCOSE, POC    Collection Time: 12/18/21  7:24 AM   Result Value Ref Range    Glucose (POC) 76 65 - 100 mg/dL    Performed by Loni Posada, POC    Collection Time: 12/18/21 11:32 AM   Result Value Ref Range    Glucose (POC) 186 (H) 65 - 100 mg/dL    Performed by 1 Rebel Garcia, POC    Collection Time: 12/18/21  4:01 PM   Result Value Ref Range    Glucose (POC) 252 (H) 65 - 100 mg/dL    Performed by Kortney    GLUCOSE, POC    Collection Time: 12/18/21  8:49 PM   Result Value Ref Range    Glucose (POC) 268 (H) 65 - 100 mg/dL    Performed by Sebastian Schulz    MAGNESIUM    Collection Time: 12/19/21  6:30 AM   Result Value Ref Range    Magnesium 2.5 (H) 1.8 - 2.4 mg/dL   GLUCOSE, POC    Collection Time: 12/19/21  7:13 AM   Result Value Ref Range    Glucose (POC) 190 (H) 65 - 100 mg/dL    Performed by Loni Posada, POC    Collection Time: 12/19/21 10:48 AM   Result Value Ref Range    Glucose (POC) 186 (H) 65 - 100 mg/dL    Performed by Alex Lung        All Micro Results     Procedure Component Value Units Date/Time    CULTURE, URINE [[id number]]  (Abnormal)  (Susceptibility) Collected: 12/14/21 2206    Order Status: Completed Specimen: Urine from Clean catch Updated: 12/17/21 5389     Special Requests: NO SPECIAL REQUESTS        Culture result:       >100,000 COLONIES/mL KLEBSIELLA PNEUMONIAE Other Studies:  No results found.     Current Meds:  Current Facility-Administered Medications   Medication Dose Route Frequency    cefpodoxime (VANTIN) tablet 200 mg  200 mg Oral Q12H    insulin glargine (LANTUS) injection 10 Units  10 Units SubCUTAneous DAILY    labetaloL (NORMODYNE;TRANDATE) injection 10 mg  10 mg IntraVENous Q6H PRN    hydrALAZINE (APRESOLINE) tablet 100 mg  100 mg Oral TID    amLODIPine (NORVASC) tablet 10 mg  10 mg Oral DAILY    cloNIDine HCL (CATAPRES) tablet 0.2 mg  0.2 mg Oral TID    insulin glargine (LANTUS) injection 10 Units  10 Units SubCUTAneous QHS    lisinopriL (PRINIVIL, ZESTRIL) tablet 40 mg  40 mg Oral DAILY    budesonide-formoteroL (SYMBICORT) 160-4.5 mcg/actuation HFA inhaler 2 Puff  2 Puff Inhalation BID RT    furosemide (LASIX) tablet 20 mg  20 mg Oral DAILY    gabapentin (NEURONTIN) capsule 100 mg  100 mg Oral TID    pantoprazole (PROTONIX) tablet 40 mg  40 mg Oral ACB    pravastatin (PRAVACHOL) tablet 40 mg  40 mg Oral QHS    sertraline (ZOLOFT) tablet 100 mg  100 mg Oral DAILY    spironolactone (ALDACTONE) tablet 25 mg  25 mg Oral DAILY    albuterol (PROVENTIL VENTOLIN) nebulizer solution 2.5 mg  2.5 mg Nebulization Q6H PRN    sodium chloride (NS) flush 5-40 mL  5-40 mL IntraVENous Q8H    sodium chloride (NS) flush 5-40 mL  5-40 mL IntraVENous PRN    acetaminophen (TYLENOL) tablet 650 mg  650 mg Oral Q6H PRN    Or    acetaminophen (TYLENOL) suppository 650 mg  650 mg Rectal Q6H PRN    polyethylene glycol (MIRALAX) packet 17 g  17 g Oral DAILY PRN    ondansetron (ZOFRAN ODT) tablet 4 mg  4 mg Oral Q8H PRN    Or    ondansetron (ZOFRAN) injection 4 mg  4 mg IntraVENous Q6H PRN    traMADoL (ULTRAM) tablet 50 mg  50 mg Oral BID PRN    insulin lispro (HUMALOG) injection   SubCUTAneous AC&HS    dextrose 40% (GLUTOSE) oral gel 1 Tube  15 g Oral PRN    glucagon (GLUCAGEN) injection 1 mg  1 mg IntraMUSCular PRN    dextrose (D50W) injection syrg 12.5-25 g  25-50 mL IntraVENous PRN       Signed:  Joanna Jay NP    Part of this note may have been written by using a voice dictation software. The note has been proof read but may still contain some grammatical/other typographical errors.

## 2021-12-20 NOTE — PROGRESS NOTES
Hospitalist Progress Note   Admit Date:  2021  5:29 PM   Name:  Apolinar De La Torre   Age:  80 y.o. Sex:  female  :  1929   MRN:  [id number]   Room:  [facility]    Presenting Complaint: Fall    Reason(s) for Admission: Physical debility [R53.81]     Hospital Course & Interval History:   Apolinar De La Torre is a 80 y.o. female with medical history of HTN, CKD stage III, COPD who presented with difficulty with ambulation after fall PTA. Patient reported she was going to the refrigerator with her walker, reached out to get some water and lost her balance. She hit the right side of her head on the table during fall but no loss of consciousness. She reported landing on her R hip. She denies palpitations, lightheadedness, dizziness, chest pain prior to falling. She denies nausea vomiting or changes in her vision. She activated her medical alert and EMS found her on the floor. She reported bilateral leg pain and has not been able to ambulate since her fall. At baseline, patient lives by herself and able to ambulate with a walker. In ED, CT head shows no acute findings. X-ray tib-fib bilaterally shows no fracture or dislocation. X-ray hips with pelvis shows no fracture or dislocation. CT C-spine shows no acute bony abnormalities; multilevel spondylosis marked at C4-5. Right LE venous US shows no DVT. UA was positive for UTI in ED and patient received Rocephin. She was too weak to ambulate in the ER with not much balance so hospitalist team was contacted for admission and possible SNF placement. Subjective (21):  Denies abdominal pain but does endorse some continued pain with movement .       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  - appreciate PT/OT eval; STR   21  -Patient being pre-certed for [facility]     Active Problems:    UTI (klebsiella)  - susceptible to cephalosporins, transition to vantin bid EOT 12/21/2021      Malnutrition / dehydration  - Improved oral intake; dehydration resolved      Uncontrolled HTN  - on 6-drug regimen; acceptable control      COPD / asthma overlap syndrome  - cont symbicort bid; not in acute exacerbation      DM uncontrolled  - hgbA1c 8.3 on 11/30/2021  - better ranges with lantus bid      CKD (chronic kidney disease) stage 3, GFR 30-59 ml/min ([facility])  - renal fxn stable        Severe debility  - Awaiting pre-cert for [facility]        Dispo/Discharge Planning:    - rehab     Diet:  ADULT DIET Regular; 3 carb choices (45 gm/meal)  ADULT ORAL NUTRITION SUPPLEMENT Breakfast, Lunch, Dinner; Low Calorie/High Protein  DVT PPx: SCD  Code status: DNR    Hospital Problems as of 12/20/2021 Date Reviewed: 11/30/2021          Codes Class Noted - Resolved POA    Moderate protein-calorie malnutrition ([facility] [address] ICD-10-CM: E44.0  ICD-9-CM: 263.0  12/16/2021 - Present Yes        Frequent falls ICD-10-CM: R29.6  ICD-9-CM: V15.88  12/15/2021 - Present Unknown        * (Principal) Physical debility ICD-10-CM: R53.81  ICD-9-CM: 799.3  12/14/2021 - Present Yes        CKD (chronic kidney disease) stage 3, GFR 30-59 ml/min ([facility]) ICD-10-CM: N18.30  ICD-9-CM: 585.3  4/18/2019 - Present Yes        COPD (chronic obstructive pulmonary disease) ([facility]) (Chronic) ICD-10-CM: J44.9  ICD-9-CM: 496  8/22/2018 - Present Yes        Anxiety ICD-10-CM: F41.9  ICD-9-CM: 300.00  2/1/2018 - Present Yes        Controlled type 2 diabetes mellitus with microalbuminuria, without long-term current use of insulin (HCC) ICD-10-CM: E11.29, R80.9  ICD-9-CM: 250.40, 791.0  11/22/2016 - Present Yes        HLD (hyperlipidemia) ICD-10-CM: E78.5  ICD-9-CM: 272.4  1/8/2013 - Present Yes        Depression ICD-10-CM: F32. A  ICD-9-CM: 025  6/1/2012 - Present Yes        Asthma ICD-10-CM: J45.909  ICD-9-CM: 493.90  6/1/2012 - Present Yes        Essential hypertension (Chronic) ICD-10-CM: I10  ICD-9-CM: 401.9  7/16/2010 - Present Yes        GERD (gastroesophageal reflux disease) (Chronic) ICD-10-CM: K21.9  ICD-9-CM: 530.81  7/16/2010 - Present Yes              Objective:     Patient Vitals for the past 24 hrs:   Temp Pulse Resp BP SpO2   12/20/21 1112 97.5 °F (36.4 °C) 67 16 (!) 139/49 96 %   12/20/21 0802 97.4 °F (36.3 °C) 66 16 (!) 135/53 98 %   12/20/21 0743     97 %   12/20/21 0447 97.4 °F (36.3 °C) (!) 58 18 (!) 129/46 94 %   12/19/21 2347 97.5 °F (36.4 °C) 67 16 (!) 142/55 98 %   12/19/21 2153  62  (!) 128/59    12/19/21 2012     100 %   12/19/21 1916 97.3 °F (36.3 °C) 60 18 (!) 101/41 96 %   12/19/21 1530 97.6 °F (36.4 °C) 62 16 (!) 117/48 96 %     Oxygen Therapy  O2 Sat (%): 96 % (12/20/21 1112)  Pulse via Oximetry: 65 beats per minute (12/20/21 0743)  O2 Device: None (Room air) (12/20/21 0743)    Estimated body mass index is 29.45 kg/m² as calculated from the following:    Height as of this encounter: 5' 2\" (1.575 m). Weight as of this encounter: 73 kg (161 lb). Intake/Output Summary (Last 24 hours) at 12/20/2021 1308  Last data filed at 12/20/2021 0900  Gross per 24 hour   Intake 420 ml   Output 100 ml   Net 320 ml         Physical Exam:     Blood pressure (!) 139/49, pulse 67, temperature 97.5 °F (36.4 °C), resp. rate 16, height 5' 2\" (1.575 m), weight 73 kg (161 lb), SpO2 96 %. General:    No distress noted   Head:  Normocephalic, atraumatic  Eyes:  Sclerae appear normal.  Pupils equally round. Anicteric  ENT:  Nares appear normal, no drainage. Moist oral mucosa, improved halitosis   Neck:  No restricted ROM. Trachea midline   CV:   RRR. No m/r/g. No jugular venous distension. Lungs:   CTAB. No wheezing, rhonchi, or rales. Respirations even, unlabored  Abdomen: Bowel sounds present. Soft, nontender, nondistended. Extremities: + mvmt x4; mild dependent b/l LE edema  Skin:     No rashes and normal coloration. Warm and dry. Neuro:  CN II-XII grossly intact. Sensation intact.   A&Ox3  Psych:  Normal mood and affect.       I have reviewed ordered lab tests and independently visualized imaging below:    Recent Labs:  Recent Results (from the past 48 hour(s))   GLUCOSE, POC    Collection Time: 12/18/21  4:01 PM   Result Value Ref Range    Glucose (POC) 252 (H) 65 - 100 mg/dL    Performed by Kortney    GLUCOSE, POC    Collection Time: 12/18/21  8:49 PM   Result Value Ref Range    Glucose (POC) 268 (H) 65 - 100 mg/dL    Performed by Beverly Melo    MAGNESIUM    Collection Time: 12/19/21  6:30 AM   Result Value Ref Range    Magnesium 2.5 (H) 1.8 - 2.4 mg/dL   GLUCOSE, POC    Collection Time: 12/19/21  7:13 AM   Result Value Ref Range    Glucose (POC) 190 (H) 65 - 100 mg/dL    Performed by [place] Polio, POC    Collection Time: 12/19/21 10:48 AM   Result Value Ref Range    Glucose (POC) 186 (H) 65 - 100 mg/dL    Performed by Jolanta Polio, POC    Collection Time: 12/19/21  3:53 PM   Result Value Ref Range    Glucose (POC) 270 (H) 65 - 100 mg/dL    Performed by Mojgan Moseley    GLUCOSE, POC    Collection Time: 12/19/21  9:03 PM   Result Value Ref Range    Glucose (POC) 286 (H) 65 - 100 mg/dL    Performed by Cornell    GLUCOSE, POC    Collection Time: 12/20/21  7:24 AM   Result Value Ref Range    Glucose (POC) 60 (L) 65 - 100 mg/dL    Performed by [place] Polio, POC    Collection Time: 12/20/21  8:28 AM   Result Value Ref Range    Glucose (POC) 106 (H) 65 - 100 mg/dL    Performed by Jolanta Polio, POC    Collection Time: 12/20/21 10:43 AM   Result Value Ref Range    Glucose (POC) 234 (H) 65 - 100 mg/dL    Performed by Mojgan Moseley        All Micro Results     Procedure Component Value Units Date/Time    CULTURE, URINE [[id number]]  (Abnormal)  (Susceptibility) Collected: 12/14/21 2206    Order Status: Completed Specimen: Urine from Clean catch Updated: 12/17/21 3068     Special Requests: NO SPECIAL REQUESTS        Culture result: >100,000 COLONIES/mL KLEBSIELLA PNEUMONIAE                Other Studies:  No results found.     Current Meds:  Current Facility-Administered Medications   Medication Dose Route Frequency    zinc oxide-cod liver oil (DESITIN) 40 % paste   Topical PRN    cefpodoxime (VANTIN) tablet 200 mg  200 mg Oral Q12H    insulin glargine (LANTUS) injection 10 Units  10 Units SubCUTAneous DAILY    labetaloL (NORMODYNE;TRANDATE) injection 10 mg  10 mg IntraVENous Q6H PRN    hydrALAZINE (APRESOLINE) tablet 100 mg  100 mg Oral TID    amLODIPine (NORVASC) tablet 10 mg  10 mg Oral DAILY    cloNIDine HCL (CATAPRES) tablet 0.2 mg  0.2 mg Oral TID    insulin glargine (LANTUS) injection 10 Units  10 Units SubCUTAneous QHS    lisinopriL (PRINIVIL, ZESTRIL) tablet 40 mg  40 mg Oral DAILY    budesonide-formoteroL (SYMBICORT) 160-4.5 mcg/actuation HFA inhaler 2 Puff  2 Puff Inhalation BID RT    furosemide (LASIX) tablet 20 mg  20 mg Oral DAILY    gabapentin (NEURONTIN) capsule 100 mg  100 mg Oral TID    pantoprazole (PROTONIX) tablet 40 mg  40 mg Oral ACB    pravastatin (PRAVACHOL) tablet 40 mg  40 mg Oral QHS    sertraline (ZOLOFT) tablet 100 mg  100 mg Oral DAILY    spironolactone (ALDACTONE) tablet 25 mg  25 mg Oral DAILY    albuterol (PROVENTIL VENTOLIN) nebulizer solution 2.5 mg  2.5 mg Nebulization Q6H PRN    sodium chloride (NS) flush 5-40 mL  5-40 mL IntraVENous Q8H    sodium chloride (NS) flush 5-40 mL  5-40 mL IntraVENous PRN    acetaminophen (TYLENOL) tablet 650 mg  650 mg Oral Q6H PRN    Or    acetaminophen (TYLENOL) suppository 650 mg  650 mg Rectal Q6H PRN    polyethylene glycol (MIRALAX) packet 17 g  17 g Oral DAILY PRN    ondansetron (ZOFRAN ODT) tablet 4 mg  4 mg Oral Q8H PRN    Or    ondansetron (ZOFRAN) injection 4 mg  4 mg IntraVENous Q6H PRN    traMADoL (ULTRAM) tablet 50 mg  50 mg Oral BID PRN    insulin lispro (HUMALOG) injection   SubCUTAneous AC&HS    dextrose 40% (GLUTOSE) oral gel 1 Tube  15 g Oral PRN    glucagon (GLUCAGEN) injection 1 mg  1 mg IntraMUSCular PRN    dextrose (D50W) injection syrg 12.5-25 g  25-50 mL IntraVENous PRN       Signed:  Miguel Angel Brown NP    Part of this note may have been written by using a voice dictation software. The note has been proof read but may still contain some grammatical/other typographical errors.

## 2021-12-20 NOTE — PROGRESS NOTES
Comprehensive Nutrition Assessment    Type and Reason for Visit: Reassess  Poor intake/appetite 5 or more days (hospitalist)    Nutrition Recommendations/Plan:   Meals and Snacks:  Continue current diet. Nutrition Supplement Therapy:   Medical food supplement therapy:  Continue Ensure High Protein three times per day (this provides 160 kcal and 16 grams protein per bottle)       Malnutrition Assessment:  Malnutrition Status: Moderate malnutrition  Context: Chronic illness  Findings of clinical characteristics of malnutrition:   Energy Intake:  Unable to assess (pt does not provide quantifiable nutrition hx)  Weight Loss:  Mild weight loss (specify amount and time period) (8% loss over 1 yr based on stated body wt, potentially more)     Body Fat Loss:  1 - Mild body fat loss, Triceps,Buccal region   Muscle Mass Loss:  1 - Mild muscle mass loss, Hand (interosseous),Temples (temporalis),Clavicles (pectoralis &deltoids)  Fluid Accumulation:  Unable to assess     Strength:  Not performed     Nutrition Assessment:   Nutrition History: Pt reports she gets meals on wheels and medicaid sends a box of food per month. She indicates she eats 3 meals per day and snacks of PB crackers between. She rpeorts drinking an oral supplement once daily, unknown nutrition composition. Nutrition Background: PMH remarkable for HTN, CKD, COPD. Admitted s/p fall, UTI, malnutrition. Daily Update:  Pt is alert and oriented times 4 at RD visit. She reports po intake is much improved over home intake. She relates fatigue and difficulties with meal prep as barriers to intake at home. Nursing note she requires assistance with opening packages at times. Pt reports consuming all of the foods and states it is delicious. Intake inpatient is adequate to meet needs.     Lab Results   Component Value Date/Time    Glucose 99 12/17/2021 08:17 AM    Glucose (POC) 234 (H) 12/20/2021 10:43 AM    Glucose (POC) 106 (H) 12/20/2021 08:28 AM Glucose (POC) 60 (L) 12/20/2021 07:24 AM    Glucose (POC) 286 (H) 12/19/2021 09:03 PM    Glucose (POC) 270 (H) 12/19/2021 03:53 PM    Glucose (POC) 186 (H) 12/19/2021 10:48 AM     Nutrition Related Findings:   NFPE as above      Current Nutrition Therapies:  ADULT DIET Regular; 3 carb choices (45 gm/meal)  ADULT ORAL NUTRITION SUPPLEMENT Breakfast, Lunch, Dinner; Low Calorie/High Protein    Current Intake:   Average Meal Intake: % Average Supplement Intake: 51-75%      Anthropometric Measures:  Height: 5' 2\" (157.5 cm)  Current Body Wt: 69.3 kg (152 lb 12.5 oz) (12/17; 12/20 wt w/o source), Weight source: Bed scale  BMI: 27.9, Overweight (BMI 25.0-29. 9)  Admission Body Weight: 151 lb 0.2 oz (12/14 stated)  Ideal Body Weight (lbs) (Calculated): 110 lbs (50 kg), 137.3 %  Usual Body Wt: 74.4 kg (164 lb) (per Onc office records 12/18/20, 147# 10/27/21), Percent weight change: -7.9          Edema: No data recorded   Estimated Daily Nutrient Needs:  Energy (kcal/day): 1965-2056 (Kcal/kg (20-25), Weight Used: Admission)  Protein (g/day): 69-82 (1-1.2 g/kg) Weight Used: (Admission)  Fluid (ml/day):   (1 ml/kcal)    Nutrition Diagnosis:   · Inadequate oral intake related to  (physical debility) as evidenced by  (difficulties w meal self prep, home po <50% if current intak)    · Moderate malnutrition,In context of chronic illness related to  (presumed compromised intake) as evidenced by  (malnutrition criteria as above)    Nutrition Interventions:   Food and/or Nutrient Delivery: Continue current diet,Continue oral nutrition supplement     Coordination of Nutrition Care: Continue to monitor while inpatient,Interdisciplinary rounds    Goals:   Previous Goal Met: Goal(s) achieved  Active Goal: Maintain po intake >75% estimated needs    Nutrition Monitoring and Evaluation:      Food/Nutrient Intake Outcomes: Food and nutrient intake,Supplement intake  Physical Signs/Symptoms Outcomes: Biochemical data,GI status,Meal time behavior,Weight    Discharge Planning:    Continue oral nutrition supplement    Caitlyn Alonzo, RD, LDN   Contact: [phone]

## 2021-12-20 NOTE — DISCHARGE INSTR - DIET
Discharge Nutrition Plan: Continue Oral Nutrition Supplement (ONS) at discharge. Recommend  Ensure High Protein  or a comparable/similar product Twice daily for 30 days unless otherwise directed by your Primary Care Physician.

## 2021-12-20 NOTE — PROGRESS NOTES
Problem: Diabetes Self-Management  Goal: *Disease process and treatment process  Description: Define diabetes and identify own type of diabetes; list 3 options for treating diabetes. Outcome: Progressing Towards Goal  Goal: *Incorporating nutritional management into lifestyle  Description: Describe effect of type, amount and timing of food on blood glucose; list 3 methods for planning meals. Outcome: Progressing Towards Goal  Goal: *Incorporating physical activity into lifestyle  Description: State effect of exercise on blood glucose levels. Outcome: Progressing Towards Goal  Goal: *Developing strategies to promote health/change behavior  Description: Define the ABC's of diabetes; identify appropriate screenings, schedule and personal plan for screenings. Outcome: Progressing Towards Goal  Goal: *Using medications safely  Description: State effect of diabetes medications on diabetes; name diabetes medication taking, action and side effects. Outcome: Progressing Towards Goal  Goal: *Monitoring blood glucose, interpreting and using results  Description: Identify recommended blood glucose targets  and personal targets. Outcome: Progressing Towards Goal  Goal: *Prevention, detection, treatment of acute complications  Description: List symptoms of hyper- and hypoglycemia; describe how to treat low blood sugar and actions for lowering  high blood glucose level. Outcome: Progressing Towards Goal  Goal: *Prevention, detection and treatment of chronic complications  Description: Define the natural course of diabetes and describe the relationship of blood glucose levels to long term complications of diabetes.   Outcome: Progressing Towards Goal  Goal: *Developing strategies to address psychosocial issues  Description: Describe feelings about living with diabetes; identify support needed and support network  Outcome: Progressing Towards Goal  Goal: *Insulin pump training  Outcome: Progressing Towards Goal  Goal: *Sick day guidelines  Outcome: Progressing Towards Goal  Goal: *Patient Specific Goal (EDIT GOAL, INSERT TEXT)  Outcome: Progressing Towards Goal     Problem: Patient Education: Go to Patient Education Activity  Goal: Patient/Family Education  Outcome: Progressing Towards Goal     Problem: Patient Education: Go to Patient Education Activity  Goal: Patient/Family Education  Outcome: Progressing Towards Goal     Problem: Patient Education: Go to Patient Education Activity  Goal: Patient/Family Education  Outcome: Progressing Towards Goal     Problem: Falls - Risk of  Goal: *Absence of Falls  Description: Document Reno Sanches Fall Risk and appropriate interventions in the flowsheet. Outcome: Progressing Towards Goal  Note: Fall Risk Interventions:  Mobility Interventions: Bed/chair exit alarm,Communicate number of staff needed for ambulation/transfer,Patient to call before getting OOB    Mentation Interventions: Bed/chair exit alarm,Reorient patient,Room close to nurse's station    Medication Interventions: Bed/chair exit alarm,Patient to call before getting OOB,Teach patient to arise slowly    Elimination Interventions: Call light in reach,Patient to call for help with toileting needs,Bed/chair exit alarm    History of Falls Interventions: Bed/chair exit alarm,Room close to nurse's station         Problem: Patient Education: Go to Patient Education Activity  Goal: Patient/Family Education  Outcome: Progressing Towards Goal     Problem: Pressure Injury - Risk of  Goal: *Prevention of pressure injury  Description: Document Carlos Scale and appropriate interventions in the flowsheet.   Outcome: Progressing Towards Goal  Note: Pressure Injury Interventions:  Sensory Interventions: Assess changes in LOC,Discuss PT/OT consult with provider    Moisture Interventions: Absorbent underpads,Minimize layers,Internal/External urinary devices,Limit adult briefs    Activity Interventions: PT/OT evaluation,Pressure redistribution bed/mattress(bed type),Increase time out of bed    Mobility Interventions: HOB 30 degrees or less,Pressure redistribution bed/mattress (bed type),PT/OT evaluation    Nutrition Interventions: Document food/fluid/supplement intake    Friction and Shear Interventions: Apply protective barrier, creams and emollients,Minimize layers,Foam dressings/transparent film/skin sealants                Problem: Patient Education: Go to Patient Education Activity  Goal: Patient/Family Education  Outcome: Progressing Towards Goal

## 2021-12-21 NOTE — PROGRESS NOTES
ACUTE OT GOALS:  (Developed with and agreed upon by patient and/or caregiver.)    1. Patient will complete upper body bathing and dressing with setup and adaptive equipment as needed. 2. Patient will complete toileting with minimal assistance. 3. Patient will tolerate 20 minutes of OT treatment with as needed rest breaks to increase activity tolerance for ADLs. 4. Patient will attempt standing in preparation for functional transfers  OCCUPATIONAL THERAPY: Daily Note OT Treatment Day # 3    Osman Gillette is a 80 y.o. female   PRIMARY DIAGNOSIS: Physical debility  Physical debility [R53.81]       Payor: [employer] / Plan: [place] [employer] / Product Type: Managed Care Medicare /   ASSESSMENT:     REHAB RECOMMENDATIONS: CURRENT LEVEL OF FUNCTION:  (Most Recently Demonstrated)   Recommendation to date pending progress:  Setting:   Short-term Rehab  Equipment:    Rolling Walker   To Be Determined Bathing:   Not tested  Dressing:   Minimal Assistance using AE to don/doff socks  Feeding/Grooming:   Not tested  Toileting:   Maximal Assistance  Functional Mobility:   Minimal Assistance     ASSESSMENT:  Ms. Anna José is doing well today. Pt presents supine upon arrival. Pt did well with bed mobility only requiring supervision to complete. Pt introduced and practiced using AE to assist with LB dressing. Pt did well with AE requiring min A to don/doff socks. Pt also instructed in UE exercises while seated at EOB. Tolerated exercises fair. Pt noted to have soiled brief. Assisted patient with hygiene after she completed her BM on BSC. Pt required max assist for hygiene at this time. Pt tolerated session well for her age. Good progress made today. Will continue to benefit from skilled OT during stay.      SUBJECTIVE:   Ms. Anna José states, \"I have 3 kids that live near\"    SOCIAL HISTORY/LIVING ENVIRONMENT:  Home Environment: Private residence  # Steps to Enter: 2  One/Two Story Residence: One story  Living Alone: Yes  Support Systems: Child(edward),Caregiver/Home Care Staff    OBJECTIVE:     PAIN: VITAL SIGNS: LINES/DRAINS:   Pre Treatment: Pain Screen  Pain Scale 1: Numeric (0 - 10)  Pain Intensity 1: 0  Post Treatment:0   Purewick  O2 Device: None (Room air)     ACTIVITIES OF DAILY LIVING: I Mod I S SBA CGA Min Mod Max Total NT Comments   BASIC ADLs:              Bathing/ Showering [] [] [] [] [] [] [] [] [] [x]    Toileting [] [] [] [] [] [] [] [] [] [x]    Dressing [] [] [] [] [] [x] [] [] [] []    Feeding [] [] [] [] [] [] [] [] [] [x]    Grooming [] [] [] [] [] [] [] [] [] [x]    Personal Device Care [] [] [] [] [] [] [] [] [] [x]    Functional Mobility [] [] [] [] [] [x] [] [] [] []    I=Independent, Mod I=Modified Independent, S=Supervision, SBA=Standby Assistance, CGA=Contact Guard Assistance,   Min=Minimal Assistance, Mod=Moderate Assistance, Max=Maximal Assistance, Total=Total Assistance, NT=Not Tested    MOBILITY: I Mod I S SBA CGA Min Mod Max Total  NT x2 Comments:   Supine to sit [] [] [x] [] [] [] [] [] [] [] []    Sit to supine [] [] [] [] [] [] [] [] [] [x] []    Sit to stand [] [] [] [] [] [x] [] [x] [] [] []    Bed to chair [] [] [] [] [] [x] [] [] [] [] []    I=Independent, Mod I=Modified Independent, S=Supervision, SBA=Standby Assistance, CGA=Contact Guard Assistance,   Min=Minimal Assistance, Mod=Moderate Assistance, Max=Maximal Assistance, Total=Total Assistance, NT=Not Tested    BALANCE: Good Fair+ Fair Fair- Poor NT Comments   Sitting Static [] [] [x] [] [] []    Sitting Dynamic [] [] [] [x] [] []              Standing Static [] [] [] [x] [] []    Standing Dynamic [] [] [] [] [x] []      PLAN:   FREQUENCY/DURATION: OT Plan of Care: 3 times/week for duration of hospital stay or until stated goals are met, whichever comes first.    TREATMENT:   TREATMENT:   ($$ Self Care/Home Management: 23-37 mins$$ Therapeutic Exercises: 8-22 mins  $$ Neuromuscular Re-Education: 8-22 mins )  Co-Treatment PT/OT necessary due to patient's decreased overall endurance/tolerance levels, as well as need for high level skilled assistance to complete functional transfers/mobility and functional tasks  Therapeutic Exercise (15 Minutes): Therapeutic exercises noted below to improve functional activity tolerance, strength and mobility. Self Care (30 Minutes): Self care including Toileting, Lower Body Dressing, Grooming and Energy Conservation Training to increase independence and decrease level of assistance required. Neuromuscular Re-education (20 Minutes): Neuromuscular Re-education included Balance Training, Coordination training, Postural training, Sitting balance training and Standing balance training to improve Balance, Coordination, Functional Mobility and Postural Control.     TREATMENT GRID:  N/A    AFTER TREATMENT POSITION/PRECAUTIONS:  Alarm Activated, Chair, Needs within reach, RN notified and Visitors at bedside    INTERDISCIPLINARY COLLABORATION:  RN/PCT, PT/PTA and OT/MARIE    TOTAL TREATMENT DURATION:  OT Patient Time In/Time Out  Time In: 1345  Time Out: 0091 LEANDRA Florez

## 2021-12-21 NOTE — PROGRESS NOTES
Hospitalist Progress Note   Admit Date:  2021  5:29 PM   Name:  Lenny Boyle   Age:  80 y.o. Sex:  female  :  1929   MRN:  [id number]   Room:  [facility]/    Presenting Complaint: Fall    Reason(s) for Admission: Physical debility [R53.81]     Hospital Course & Interval History:   Lenny Boyle is a 80 y.o. female with medical history of HTN, CKD stage III, COPD who presented with difficulty with ambulation after fall PTA. Patient reported she was going to the refrigerator with her walker, reached out to get some water and lost her balance. She hit the right side of her head on the table during fall but no loss of consciousness. She reported landing on her R hip. She denies palpitations, lightheadedness, dizziness, chest pain prior to falling. She denies nausea vomiting or changes in her vision. She activated her medical alert and EMS found her on the floor. She reported bilateral leg pain and has not been able to ambulate since her fall. At baseline, patient lives by herself and able to ambulate with a walker. In ED, CT head shows no acute findings. X-ray tib-fib bilaterally shows no fracture or dislocation. X-ray hips with pelvis shows no fracture or dislocation. CT C-spine shows no acute bony abnormalities; multilevel spondylosis marked at C4-5. Right LE venous US shows no DVT. UA was positive for UTI in ED and patient received Rocephin. She was too weak to ambulate in the ER with not much balance so hospitalist team was contacted for admission and possible SNF placement. Subjective (21): \"I know I have to go to rehab to get stronger before I can go home. \"  Pleasant 89y.o. AA female sitting up in bed eating breakfast, without complaints    Review of Systems:  10 systems reviewed and negative except as noted in HPI.       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  - PT/OT recommended STR  - awaiting insurance approval for rehab    Active Problems:    UTI (klebsiella)  - susceptible to cephalosporins, vantin bid EOT 12/21/2021      Malnutrition / dehydration  - Improved oral intake; dehydration resolved      Uncontrolled HTN  - on 6-drug regimen; acceptable control      COPD / asthma overlap syndrome  - cont symbicort bid; not in acute exacerbation      DM uncontrolled  - hgbA1c 8.3 on 11/30/2021  - increase daytime lantus dose to 15units for tighter glycemic control  - cont lantus 10units qhs      CKD (chronic kidney disease) stage 3, GFR 30-59 ml/min ([facility])  - renal fxn ~stable        Severe debility  - awaiting insurance approval for rehab        Dispo/Discharge Planning:    - rehab     Diet:  ADULT DIET Regular; 3 carb choices (45 gm/meal)  ADULT ORAL NUTRITION SUPPLEMENT Breakfast, Lunch, Dinner; Low Calorie/High Protein  DVT PPx: SCD  Code status: DNR    Hospital Problems as of 12/21/2021 Date Reviewed: 11/30/2021          Codes Class Noted - Resolved POA    Moderate protein-calorie malnutrition ([facility] [address] ICD-10-CM: E44.0  ICD-9-CM: 263.0  12/16/2021 - Present Yes        Frequent falls ICD-10-CM: R29.6  ICD-9-CM: V15.88  12/15/2021 - Present Unknown        * (Principal) Physical debility ICD-10-CM: R53.81  ICD-9-CM: 799.3  12/14/2021 - Present Yes        CKD (chronic kidney disease) stage 3, GFR 30-59 ml/min ([facility]) ICD-10-CM: N18.30  ICD-9-CM: 585.3  4/18/2019 - Present Yes        COPD (chronic obstructive pulmonary disease) (HCC) (Chronic) ICD-10-CM: J44.9  ICD-9-CM: 496  8/22/2018 - Present Yes        Anxiety ICD-10-CM: F41.9  ICD-9-CM: 300.00  2/1/2018 - Present Yes        Controlled type 2 diabetes mellitus with microalbuminuria, without long-term current use of insulin (HCC) ICD-10-CM: E11.29, R80.9  ICD-9-CM: 250.40, 791.0  11/22/2016 - Present Yes        HLD (hyperlipidemia) ICD-10-CM: E78.5  ICD-9-CM: 272.4  1/8/2013 - Present Yes        Depression ICD-10-CM: F32. A  ICD-9-CM: 311  6/1/2012 - Present Yes        Asthma ICD-10-CM: J45.909  ICD-9-CM: 493.90  6/1/2012 - Present Yes        Essential hypertension (Chronic) ICD-10-CM: I10  ICD-9-CM: 401.9  7/16/2010 - Present Yes        GERD (gastroesophageal reflux disease) (Chronic) ICD-10-CM: K21.9  ICD-9-CM: 530.81  7/16/2010 - Present Yes              Objective:     Patient Vitals for the past 24 hrs:   Temp Pulse Resp BP SpO2   12/21/21 1058 97.9 °F (36.6 °C) 69  (!) 125/41 95 %   12/21/21 0757 98.1 °F (36.7 °C) 70  (!) 164/45 95 %   12/21/21 0728     96 %   12/21/21 0427 97.5 °F (36.4 °C) 61 18 (!) 112/41 97 %   12/21/21 0029 98.3 °F (36.8 °C) 60 18 (!) 104/48 96 %   12/20/21 2133     97 %   12/20/21 1940 98.1 °F (36.7 °C) 61 18 (!) 112/45 97 %   12/20/21 1508 98 °F (36.7 °C) 67 16 (!) 129/53 97 %     Oxygen Therapy  O2 Sat (%): 95 % (12/21/21 1058)  Pulse via Oximetry: 72 beats per minute (12/21/21 0728)  O2 Device: None (Room air) (12/21/21 0728)    Estimated body mass index is 29.45 kg/m² as calculated from the following:    Height as of this encounter: 5' 2\" (1.575 m). Weight as of this encounter: 73 kg (161 lb). Intake/Output Summary (Last 24 hours) at 12/21/2021 1442  Last data filed at 12/21/2021 1102  Gross per 24 hour   Intake 480 ml   Output 1800 ml   Net -1320 ml         Physical Exam:     Blood pressure (!) 125/41, pulse 69, temperature 97.9 °F (36.6 °C), resp. rate 18, height 5' 2\" (1.575 m), weight 73 kg (161 lb), SpO2 95 %. General:    Thin, well developed, alert, good spirited, in NAD  Head:  Normocephalic, atraumatic  Eyes:  Sclerae appear normal.  Pupils equally round. Anicteric  ENT:  Nares appear normal, no drainage. Moist oral mucosa, improved halitosis   Neck:  No restricted ROM. Trachea midline   CV:   RRR. No m/r/g. No jugular venous distension. Lungs:   CTAB. No wheezing, rhonchi, or rales. Respirations even, unlabored  Abdomen: Bowel sounds present.   Soft, nontender, nondistended. Extremities: + mvmt x4; mild dependent b/l LE edema  Skin:     No rashes and normal coloration. Warm and dry. Neuro:  CN II-XII grossly intact. Sensation intact. A&Ox3  Psych:  Normal mood and affect.       I have reviewed ordered lab tests and independently visualized imaging below:    Recent Labs:  Recent Results (from the past 48 hour(s))   GLUCOSE, POC    Collection Time: 12/19/21  3:53 PM   Result Value Ref Range    Glucose (POC) 270 (H) 65 - 100 mg/dL    Performed by Mattie Estrella, POC    Collection Time: 12/19/21  9:03 PM   Result Value Ref Range    Glucose (POC) 286 (H) 65 - 100 mg/dL    Performed by [facility]    GLUCOSE, POC    Collection Time: 12/20/21  7:24 AM   Result Value Ref Range    Glucose (POC) 60 (L) 65 - 100 mg/dL    Performed by Mattie Estrella, POC    Collection Time: 12/20/21  8:28 AM   Result Value Ref Range    Glucose (POC) 106 (H) 65 - 100 mg/dL    Performed by Mattie Estrella, POC    Collection Time: 12/20/21 10:43 AM   Result Value Ref Range    Glucose (POC) 234 (H) 65 - 100 mg/dL    Performed by Mattie Estrella, POC    Collection Time: 12/20/21  3:45 PM   Result Value Ref Range    Glucose (POC) 329 (H) 65 - 100 mg/dL    Performed by Mattie Estrella, POC    Collection Time: 12/20/21  9:08 PM   Result Value Ref Range    Glucose (POC) 188 (H) 65 - 100 mg/dL    Performed by [employer]    METABOLIC PANEL, BASIC    Collection Time: 12/21/21  5:48 AM   Result Value Ref Range    Sodium 144 136 - 145 mmol/L    Potassium 4.9 3.5 - 5.1 mmol/L    Chloride 111 (H) 98 - 107 mmol/L    CO2 23 21 - 32 mmol/L    Anion gap 10 7 - 16 mmol/L    Glucose 111 (H) 65 - 100 mg/dL    BUN 97 (H) 8 - 23 MG/DL    Creatinine 1.47 (H) 0.6 - 1.0 MG/DL    GFR est AA 43 (L) >60 ml/min/1.73m2    GFR est non-AA 35 (L) >60 ml/min/1.73m2    Calcium 9.4 8.3 - 10.4 MG/DL   GLUCOSE, POC    Collection Time: 12/21/21  7:10 AM   Result Value Ref Range    Glucose (POC) 109 (H) 65 - 100 mg/dL    Performed by Radha Mejia    GLUCOSE, POC    Collection Time: 12/21/21 10:55 AM   Result Value Ref Range    Glucose (POC) 236 (H) 65 - 100 mg/dL    Performed by Jos Cool        All Micro Results     Procedure Component Value Units Date/Time    CULTURE, URINE [[id number]]  (Abnormal)  (Susceptibility) Collected: 12/14/21 1070    Order Status: Completed Specimen: Urine from Clean catch Updated: 12/17/21 1155     Special Requests: NO SPECIAL REQUESTS        Culture result:       >100,000 COLONIES/mL KLEBSIELLA PNEUMONIAE                Other Studies:  No results found.     Current Meds:  Current Facility-Administered Medications   Medication Dose Route Frequency    zinc oxide-cod liver oil (DESITIN) 40 % paste   Topical PRN    insulin glargine (LANTUS) injection 10 Units  10 Units SubCUTAneous DAILY    labetaloL (NORMODYNE;TRANDATE) injection 10 mg  10 mg IntraVENous Q6H PRN    hydrALAZINE (APRESOLINE) tablet 100 mg  100 mg Oral TID    amLODIPine (NORVASC) tablet 10 mg  10 mg Oral DAILY    cloNIDine HCL (CATAPRES) tablet 0.2 mg  0.2 mg Oral TID    insulin glargine (LANTUS) injection 10 Units  10 Units SubCUTAneous QHS    lisinopriL (PRINIVIL, ZESTRIL) tablet 40 mg  40 mg Oral DAILY    budesonide-formoteroL (SYMBICORT) 160-4.5 mcg/actuation HFA inhaler 2 Puff  2 Puff Inhalation BID RT    furosemide (LASIX) tablet 20 mg  20 mg Oral DAILY    gabapentin (NEURONTIN) capsule 100 mg  100 mg Oral TID    pantoprazole (PROTONIX) tablet 40 mg  40 mg Oral ACB    pravastatin (PRAVACHOL) tablet 40 mg  40 mg Oral QHS    sertraline (ZOLOFT) tablet 100 mg  100 mg Oral DAILY    spironolactone (ALDACTONE) tablet 25 mg  25 mg Oral DAILY    albuterol (PROVENTIL VENTOLIN) nebulizer solution 2.5 mg  2.5 mg Nebulization Q6H PRN    sodium chloride (NS) flush 5-40 mL  5-40 mL IntraVENous Q8H    sodium chloride (NS) flush 5-40 mL  5-40 mL IntraVENous PRN    acetaminophen (TYLENOL) tablet 650 mg  650 mg Oral Q6H PRN    Or    acetaminophen (TYLENOL) suppository 650 mg  650 mg Rectal Q6H PRN    polyethylene glycol (MIRALAX) packet 17 g  17 g Oral DAILY PRN    ondansetron (ZOFRAN ODT) tablet 4 mg  4 mg Oral Q8H PRN    Or    ondansetron (ZOFRAN) injection 4 mg  4 mg IntraVENous Q6H PRN    traMADoL (ULTRAM) tablet 50 mg  50 mg Oral BID PRN    insulin lispro (HUMALOG) injection   SubCUTAneous AC&HS    dextrose 40% (GLUTOSE) oral gel 1 Tube  15 g Oral PRN    glucagon (GLUCAGEN) injection 1 mg  1 mg IntraMUSCular PRN    dextrose (D50W) injection syrg 12.5-25 g  25-50 mL IntraVENous PRN       Signed:  MICHAEL Kline    Part of this note may have been written by using a voice dictation software. The note has been proof read but may still contain some grammatical/other typographical errors.

## 2021-12-21 NOTE — PROGRESS NOTES
ACUTE PHYSICAL THERAPY GOALS:  (Developed with and agreed upon by patient and/or caregiver.)  (1.) Aide Dent move from supine to sit and sit to supine  with CONTACT GUARD ASSIST within 7 treatment day(s).    (2.) Aide Hatfield will transfer from bed to chair and chair to bed with MINIMAL ASSIST using the least restrictive device within 7 treatment day(s).    (3.) Aide Hatfield will ambulate with MINIMAL ASSIST for 5 feet with the least restrictive device within 7 treatment day(s). (4.) Aide Hatfield will perform seated static and dynamic balance activities x 10 minutes with CONTACT GUARD ASSIST to improve safety within 7 treatment day(s). (5.) Aide Hatfield will perform bilateral lower extremity exercises x 20 min for HEP with INDEPENDENCE to improve strength, endurance, and functional mobility within 7 treatment day(s). PHYSICAL THERAPY: Daily Note and PM Treatment Day # 3    Aide Jolly is a 80 y.o. female   PRIMARY DIAGNOSIS: Physical debility  Physical debility [R53.81]         ASSESSMENT:     REHAB RECOMMENDATIONS: CURRENT LEVEL OF FUNCTION:  (Most Recently Demonstrated)   Recommendation to date pending progress:  Setting:   Short-term Rehab  Equipment:    To Be Determined Bed Mobility:   Not tested sitting EOB with OT at start of care  Sit to Stand:   Minimal Assistance  Transfers:   Minimal Assistance  Gait/Mobility:   Minimal Assistance     ASSESSMENT:  Ms. Gary Jolly presents sitting on the EOB working with OT. Her family member is at the bedside. She stated she is feeling better and wanted to try and walk. She stood with min assist and used the RW for UE support. She ambulated 13' with the RW with min assist.  She had a BM during gait and was seated so PT/OT could get supplies ready for changing the patient. She stood again and PT/OT took additional time to change her brief and clean her up.  She was not finished completely with her BM so OT obtained a [facility] and the patient transferred to the [facility] with min assist.  Additional time was given again to allow her to complete her BM. She stood again and was cleaned and a new brief was placed again and she transferred from the [facility] to the bedside chair with min assist.  She was positioned for comfort in the recliner with her call light and personal items in reach. PT/OT co-treated to allow for higher level activities to be attempted. Ms. Randee Rivero is kind and pleasant to work with and appears to be making steady gains towards her goals.       SUBJECTIVE:   Ms. Randee Rivero states, \"I am so glad I am feeling better and am able to walk\"    SOCIAL HISTORY/ LIVING ENVIRONMENT:   Home Environment: Private residence  # Steps to Enter: 2  One/Two Story Residence: One story  Living Alone: Yes  Support Systems: Child(edward),Caregiver/Home Care Staff  OBJECTIVE:     PAIN: VITAL SIGNS: LINES/DRAINS:   Pre Treatment: Pain Screen  Pain Scale 1: Numeric (0 - 10)  Post Treatment: 0/10     Visit Vitals  BP (!) 125/41   Pulse 69   Temp 97.9 °F (36.6 °C)   Resp 18   Ht 5' 2\" (1.575 m)   Wt 73 kg (161 lb)   SpO2 95%   BMI 29.45 kg/m²    Purewick  O2 Device: None (Room air)     MOBILITY: I Mod I S SBA CGA Min Mod Max Total  NT x2 Comments:   Bed Mobility    Rolling [] [] [] [] [] [] [] [] [] [x] []    Supine to Sit [] [] [] [] [] [] [] [] [] [x] []    Scooting [] [] [] [] [] [] [] [] [] [x] []    Sit to Supine [] [] [] [] [] [] [] [] [] [x] []    Transfers    Sit to Stand [] [] [] [] [] [x] [] [] [] [] []    Bed to Chair [] [] [] [] [] [x] [] [] [] [] []    Stand to Sit [] [] [] [] [] [x] [] [] [] [] []    I=Independent, Mod I=Modified Independent, S=Supervision, SBA=Standby Assistance, CGA=Contact Guard Assistance,   Min=Minimal Assistance, Mod=Moderate Assistance, Max=Maximal Assistance, Total=Total Assistance, NT=Not Tested    BALANCE: Good Fair+ Fair Fair- Poor NT Comments   Sitting Static [x] [] [] [] [] []    Sitting Dynamic [x] [] [] [] [] []              Standing Static [] [x] [] [] [] [] With RW   Standing Dynamic [] [] [x] [] [] [] With RW     GAIT: I Mod I S SBA CGA Min Mod Max Total  NT x2 Comments:   Level of Assistance [] [] [] [] [] [x] [] [] [] [] []    Distance 15'    DME Rolling Walker    Gait Quality Slow, cautious, verbal cues for upright standing posture, short steps and stooped upper body    Weightbearing  Status N/A     I=Independent, Mod I=Modified Independent, S=Supervision, SBA=Standby Assistance, CGA=Contact Guard Assistance,   Min=Minimal Assistance, Mod=Moderate Assistance, Max=Maximal Assistance, Total=Total Assistance, NT=Not Tested    PLAN:   FREQUENCY/DURATION: PT Plan of Care: 3 times/week for duration of hospital stay or until stated goals are met, whichever comes first.  TREATMENT:     TREATMENT:   ($$ Therapeutic Activity: 23-37 mins    )  Co-Treatment PT/OT necessary due to patient's decreased overall endurance/tolerance levels, as well as need for high level skilled assistance to complete functional transfers/mobility and functional tasks  Therapeutic Activity (31 Minutes): Therapeutic activity included Transfer Training, Ambulation on level ground, Sitting balance , Standing balance and sit to stand to improve functional Mobility, Strength and Activity tolerance.     TREATMENT GRID:   Date:  12/21/21 Date:   Date:     Activity/Exercise Parameters Parameters Parameters   Ankle pumps 10 B     Seated knee extension 10 B     Seated hip flexion 10 B                               AFTER TREATMENT POSITION/PRECAUTIONS:  Chair, Needs within reach, RN notified and family at the bedside    INTERDISCIPLINARY COLLABORATION:  RN/PCT and OT/MARIE    TOTAL TREATMENT DURATION:  PT Patient Time In/Time Out  Time In: 1415  Time Out: [address], PT

## 2021-12-21 NOTE — PROGRESS NOTES
Patient with no events this shift. AAOx4. Patient aware of pending referral for rehab. Patient agrees to use call light before trying to get out of bed. Call light within reach. Bed in lowest position.

## 2021-12-22 NOTE — PROGRESS NOTES
Problem: Diabetes Self-Management  Goal: *Disease process and treatment process  Description: Define diabetes and identify own type of diabetes; list 3 options for treating diabetes. Outcome: Progressing Towards Goal  Goal: *Incorporating nutritional management into lifestyle  Description: Describe effect of type, amount and timing of food on blood glucose; list 3 methods for planning meals. Outcome: Progressing Towards Goal  Goal: *Incorporating physical activity into lifestyle  Description: State effect of exercise on blood glucose levels. Outcome: Progressing Towards Goal  Goal: *Developing strategies to promote health/change behavior  Description: Define the ABC's of diabetes; identify appropriate screenings, schedule and personal plan for screenings. Outcome: Progressing Towards Goal  Goal: *Using medications safely  Description: State effect of diabetes medications on diabetes; name diabetes medication taking, action and side effects. Outcome: Progressing Towards Goal  Goal: *Monitoring blood glucose, interpreting and using results  Description: Identify recommended blood glucose targets  and personal targets. Outcome: Progressing Towards Goal  Goal: *Prevention, detection, treatment of acute complications  Description: List symptoms of hyper- and hypoglycemia; describe how to treat low blood sugar and actions for lowering  high blood glucose level. Outcome: Progressing Towards Goal  Goal: *Prevention, detection and treatment of chronic complications  Description: Define the natural course of diabetes and describe the relationship of blood glucose levels to long term complications of diabetes.   Outcome: Progressing Towards Goal  Goal: *Developing strategies to address psychosocial issues  Description: Describe feelings about living with diabetes; identify support needed and support network  Outcome: Progressing Towards Goal  Goal: *Insulin pump training  Outcome: Progressing Towards Goal  Goal: *Sick day guidelines  Outcome: Progressing Towards Goal  Goal: *Patient Specific Goal (EDIT GOAL, INSERT TEXT)  Outcome: Progressing Towards Goal     Problem: Patient Education: Go to Patient Education Activity  Goal: Patient/Family Education  Outcome: Progressing Towards Goal     Problem: Patient Education: Go to Patient Education Activity  Goal: Patient/Family Education  Outcome: Progressing Towards Goal     Problem: Patient Education: Go to Patient Education Activity  Goal: Patient/Family Education  Outcome: Progressing Towards Goal     Problem: Falls - Risk of  Goal: *Absence of Falls  Description: Document Starleen Freeze Fall Risk and appropriate interventions in the flowsheet. Outcome: Progressing Towards Goal  Note: Fall Risk Interventions:  Mobility Interventions: Bed/chair exit alarm,Patient to call before getting OOB    Mentation Interventions: Bed/chair exit alarm,Door open when patient unattended    Medication Interventions: Bed/chair exit alarm,Patient to call before getting OOB    Elimination Interventions: Call light in reach,Patient to call for help with toileting needs    History of Falls Interventions: Bed/chair exit alarm,Door open when patient unattended         Problem: Patient Education: Go to Patient Education Activity  Goal: Patient/Family Education  Outcome: Progressing Towards Goal     Problem: Pressure Injury - Risk of  Goal: *Prevention of pressure injury  Description: Document Carlos Scale and appropriate interventions in the flowsheet.   Outcome: Progressing Towards Goal  Note: Pressure Injury Interventions:  Sensory Interventions: Assess changes in LOC    Moisture Interventions: Absorbent underpads,Check for incontinence Q2 hours and as needed    Activity Interventions: Increase time out of bed    Mobility Interventions: Assess need for specialty bed,Pressure redistribution bed/mattress (bed type)    Nutrition Interventions: Document food/fluid/supplement intake    Friction and Shear Interventions: Apply protective barrier, creams and emollients                Problem: Patient Education: Go to Patient Education Activity  Goal: Patient/Family Education  Outcome: Progressing Towards Goal

## 2021-12-22 NOTE — PROGRESS NOTES
Patient were discussed in [id number] team meeting this AM.  Patient will need a peer to peer per insurance approval or denial.  NP will complete the peer to peer. CM will continue to monitor.

## 2021-12-22 NOTE — PROGRESS NOTES
Hospitalist Progress Note   Admit Date:  2021  5:29 PM   Name:  Peyton Crane   Age:  80 y.o. Sex:  female  :  1929   MRN:  [id number]   Room:  [facility]    Presenting Complaint: Fall    Reason(s) for Admission: Physical debility [R53.81]     Hospital Course & Interval History:   Peyton Crane is a 80 y.o. female with medical history of HTN, CKD stage III, COPD who presented with difficulty with ambulation after fall PTA. Patient reported she was going to the refrigerator with her walker, reached out to get some water and lost her balance. She hit the right side of her head on the table during fall but no loss of consciousness. She reported landing on her R hip. She denies palpitations, lightheadedness, dizziness, chest pain prior to falling. She denies nausea vomiting or changes in her vision. She activated her medical alert and EMS found her on the floor. She reported bilateral leg pain and has not been able to ambulate since her fall. At baseline, patient lives by herself and able to ambulate with a walker. In ED, CT head shows no acute findings. X-ray tib-fib bilaterally shows no fracture or dislocation. X-ray hips with pelvis shows no fracture or dislocation. CT C-spine shows no acute bony abnormalities; multilevel spondylosis marked at C4-5. Right LE venous US shows no DVT. UA was positive for UTI in ED and patient received Rocephin. She was too weak to ambulate in the ER with not much balance so hospitalist team was contacted for admission and possible SNF placement. Subjective (21): \"I feel pretty good. \" Pleasant 89y.o. AA female sitting up in chair eating breakfast, without complaints    Review of Systems:  10 systems reviewed and negative except as noted in HPI.       Assessment & Plan:     Principal Problem:    Fall / right hip pain  - negative for acute fx on CT hip; no MRI given aneurysm clipping  - PT/OT recommended STR given recurrent falls / debility  - awaiting insurance approval for rehab    Active Problems:    UTI (klebsiella)  - susceptible to cephalosporins, s/p vantin course      Malnutrition / dehydration  - Improved oral intake; dehydration resolved      Uncontrolled HTN  - on 6-drug regimen; acceptable control      COPD / asthma overlap syndrome  - cont symbicort bid; not in acute exacerbation      DM uncontrolled  - hgbA1c 8.3 on 11/30/2021  - dc nighttime dose lantus with hypoglycemic AM readings; cont daily lantus dose 15units      CKD (chronic kidney disease) stage 3, GFR 30-59 ml/min ([facility])  - renal fxn ~stable        Severe debility  - awaiting insurance approval for rehab        Dispo/Discharge Planning:    - rehab     Diet:  ADULT DIET Regular; 3 carb choices (45 gm/meal)  ADULT ORAL NUTRITION SUPPLEMENT Breakfast, Lunch, Dinner; Low Calorie/High Protein  DVT PPx: SCD  Code status: DNR    Hospital Problems as of 12/22/2021 Date Reviewed: 11/30/2021          Codes Class Noted - Resolved POA    Moderate protein-calorie malnutrition ([facility] [address] ICD-10-CM: E44.0  ICD-9-CM: 263.0  12/16/2021 - Present Yes        Frequent falls ICD-10-CM: R29.6  ICD-9-CM: V15.88  12/15/2021 - Present Unknown        * (Principal) Physical debility ICD-10-CM: R53.81  ICD-9-CM: 799.3  12/14/2021 - Present Yes        CKD (chronic kidney disease) stage 3, GFR 30-59 ml/min ([facility]) ICD-10-CM: N18.30  ICD-9-CM: 585.3  4/18/2019 - Present Yes        COPD (chronic obstructive pulmonary disease) (HCC) (Chronic) ICD-10-CM: J44.9  ICD-9-CM: 496  8/22/2018 - Present Yes        Anxiety ICD-10-CM: F41.9  ICD-9-CM: 300.00  2/1/2018 - Present Yes        Controlled type 2 diabetes mellitus with microalbuminuria, without long-term current use of insulin (HCC) ICD-10-CM: E11.29, R80.9  ICD-9-CM: 250.40, 791.0  11/22/2016 - Present Yes        HLD (hyperlipidemia) ICD-10-CM: E78.5  ICD-9-CM: 272.4  1/8/2013 - Present Yes        Depression ICD-10-CM: F32. A  ICD-9-CM: 403  6/1/2012 - Present Yes Asthma ICD-10-CM: J45.909  ICD-9-CM: 493.90  6/1/2012 - Present Yes        Essential hypertension (Chronic) ICD-10-CM: I10  ICD-9-CM: 401.9  7/16/2010 - Present Yes        GERD (gastroesophageal reflux disease) (Chronic) ICD-10-CM: K21.9  ICD-9-CM: 530.81  7/16/2010 - Present Yes              Objective:     Patient Vitals for the past 24 hrs:   Temp Pulse Resp BP SpO2   12/22/21 1156 97.6 °F (36.4 °C) 69 18 (!) 123/55 98 %   12/22/21 0802 97.5 °F (36.4 °C) 66 18 (!) 139/52 99 %   12/22/21 0429 98 °F (36.7 °C) 69 16 (!) 147/56 94 %   12/21/21 2359 98.4 °F (36.9 °C) 66 16 (!) 132/58 96 %   12/21/21 2110     96 %   12/21/21 2014 98 °F (36.7 °C) 66 18 (!) 124/42 97 %   12/21/21 1632 97.6 °F (36.4 °C) (!) 0  (!) 150/51 94 %     Oxygen Therapy  O2 Sat (%): 98 % (12/22/21 1156)  Pulse via Oximetry: 68 beats per minute (12/22/21 0802)  O2 Device: None (Room air) (12/22/21 0803)    Estimated body mass index is 29.45 kg/m² as calculated from the following:    Height as of this encounter: 5' 2\" (1.575 m). Weight as of this encounter: 73 kg (161 lb). Intake/Output Summary (Last 24 hours) at 12/22/2021 1449  Last data filed at 12/22/2021 1318  Gross per 24 hour   Intake 1960 ml   Output 2050 ml   Net -90 ml         Physical Exam:     Blood pressure (!) 123/55, pulse 69, temperature 97.6 °F (36.4 °C), resp. rate 18, height 5' 2\" (1.575 m), weight 73 kg (161 lb), SpO2 98 %. General:    Thin, well developed, alert, good spirited, in NAD  Head:  Normocephalic, atraumatic  Eyes:  Sclerae appear normal.  Pupils equally round. Anicteric  ENT:  Nares appear normal, no drainage. Moist oral mucosa, improved halitosis   Neck:  No restricted ROM. Trachea midline   CV:   RRR. No m/r/g. No jugular venous distension. Lungs:   CTAB. No wheezing, rhonchi, or rales. Respirations even, unlabored  Abdomen: Bowel sounds present. Soft, nontender, nondistended.   Extremities: + mvmt x4; mild dependent b/l LE edema  Skin:     No rashes and normal coloration. Warm and dry. Neuro:  CN II-XII grossly intact. Sensation intact. A&Ox3  Psych:  Normal mood and affect.       I have reviewed ordered lab tests and independently visualized imaging below:    Recent Labs:  Recent Results (from the past 48 hour(s))   GLUCOSE, POC    Collection Time: 12/20/21  3:45 PM   Result Value Ref Range    Glucose (POC) 329 (H) 65 - 100 mg/dL    Performed by Erma Guido    GLUCOSE, POC    Collection Time: 12/20/21  9:08 PM   Result Value Ref Range    Glucose (POC) 188 (H) 65 - 100 mg/dL    Performed by [employer]    METABOLIC PANEL, BASIC    Collection Time: 12/21/21  5:48 AM   Result Value Ref Range    Sodium 144 136 - 145 mmol/L    Potassium 4.9 3.5 - 5.1 mmol/L    Chloride 111 (H) 98 - 107 mmol/L    CO2 23 21 - 32 mmol/L    Anion gap 10 7 - 16 mmol/L    Glucose 111 (H) 65 - 100 mg/dL    BUN 97 (H) 8 - 23 MG/DL    Creatinine 1.47 (H) 0.6 - 1.0 MG/DL    GFR est AA 43 (L) >60 ml/min/1.73m2    GFR est non-AA 35 (L) >60 ml/min/1.73m2    Calcium 9.4 8.3 - 10.4 MG/DL   GLUCOSE, POC    Collection Time: 12/21/21  7:10 AM   Result Value Ref Range    Glucose (POC) 109 (H) 65 - 100 mg/dL    Performed by Malachi Quezada    GLUCOSE, POC    Collection Time: 12/21/21 10:55 AM   Result Value Ref Range    Glucose (POC) 236 (H) 65 - 100 mg/dL    Performed by Savanna Cat    GLUCOSE, POC    Collection Time: 12/21/21  4:05 PM   Result Value Ref Range    Glucose (POC) 253 (H) 65 - 100 mg/dL    Performed by RonitraPCT    GLUCOSE, POC    Collection Time: 12/21/21  9:15 PM   Result Value Ref Range    Glucose (POC) 226 (H) 65 - 100 mg/dL    Performed by RonitraPCT    GLUCOSE, POC    Collection Time: 12/22/21  7:04 AM   Result Value Ref Range    Glucose (POC) 69 65 - 100 mg/dL    Performed by JohnsonAnnaPCT    GLUCOSE, POC    Collection Time: 12/22/21  7:18 AM   Result Value Ref Range    Glucose (POC) 72 65 - 100 mg/dL    Performed by Meño Evans GLUCOSE, POC    Collection Time: 12/22/21  7:40 AM   Result Value Ref Range    Glucose (POC) 117 (H) 65 - 100 mg/dL    Performed by ETF SecuritiesPCT    GLUCOSE, POC    Collection Time: 12/22/21 11:43 AM   Result Value Ref Range    Glucose (POC) 242 (H) 65 - 100 mg/dL    Performed by [employer]TF SecuritiesPCT        All Micro Results     Procedure Component Value Units Date/Time    CULTURE, URINE [[id number]]  (Abnormal)  (Susceptibility) Collected: 12/14/21 2206    Order Status: Completed Specimen: Urine from Clean catch Updated: 12/17/21 6437     Special Requests: NO SPECIAL REQUESTS        Culture result:       >100,000 COLONIES/mL KLEBSIELLA PNEUMONIAE                Other Studies:  No results found.     Current Meds:  Current Facility-Administered Medications   Medication Dose Route Frequency    insulin glargine (LANTUS) injection 15 Units  15 Units SubCUTAneous DAILY    zinc oxide-cod liver oil (DESITIN) 40 % paste   Topical PRN    labetaloL (NORMODYNE;TRANDATE) injection 10 mg  10 mg IntraVENous Q6H PRN    hydrALAZINE (APRESOLINE) tablet 100 mg  100 mg Oral TID    amLODIPine (NORVASC) tablet 10 mg  10 mg Oral DAILY    cloNIDine HCL (CATAPRES) tablet 0.2 mg  0.2 mg Oral TID    lisinopriL (PRINIVIL, ZESTRIL) tablet 40 mg  40 mg Oral DAILY    budesonide-formoteroL (SYMBICORT) 160-4.5 mcg/actuation HFA inhaler 2 Puff  2 Puff Inhalation BID RT    furosemide (LASIX) tablet 20 mg  20 mg Oral DAILY    gabapentin (NEURONTIN) capsule 100 mg  100 mg Oral TID    pantoprazole (PROTONIX) tablet 40 mg  40 mg Oral ACB    pravastatin (PRAVACHOL) tablet 40 mg  40 mg Oral QHS    sertraline (ZOLOFT) tablet 100 mg  100 mg Oral DAILY    spironolactone (ALDACTONE) tablet 25 mg  25 mg Oral DAILY    albuterol (PROVENTIL VENTOLIN) nebulizer solution 2.5 mg  2.5 mg Nebulization Q6H PRN    sodium chloride (NS) flush 5-40 mL  5-40 mL IntraVENous Q8H    sodium chloride (NS) flush 5-40 mL  5-40 mL IntraVENous PRN    acetaminophen (TYLENOL) tablet 650 mg  650 mg Oral Q6H PRN    Or    acetaminophen (TYLENOL) suppository 650 mg  650 mg Rectal Q6H PRN    polyethylene glycol (MIRALAX) packet 17 g  17 g Oral DAILY PRN    ondansetron (ZOFRAN ODT) tablet 4 mg  4 mg Oral Q8H PRN    Or    ondansetron (ZOFRAN) injection 4 mg  4 mg IntraVENous Q6H PRN    traMADoL (ULTRAM) tablet 50 mg  50 mg Oral BID PRN    insulin lispro (HUMALOG) injection   SubCUTAneous AC&HS    dextrose 40% (GLUTOSE) oral gel 1 Tube  15 g Oral PRN    glucagon (GLUCAGEN) injection 1 mg  1 mg IntraMUSCular PRN    dextrose (D50W) injection syrg 12.5-25 g  25-50 mL IntraVENous PRN       Signed:  MICHAEL Bee    Part of this note may have been written by using a voice dictation software. The note has been proof read but may still contain some grammatical/other typographical errors.

## 2021-12-23 NOTE — PROGRESS NOTES
Problem: Diabetes Self-Management  Goal: *Disease process and treatment process  Description: Define diabetes and identify own type of diabetes; list 3 options for treating diabetes. Outcome: Progressing Towards Goal  Goal: *Incorporating nutritional management into lifestyle  Description: Describe effect of type, amount and timing of food on blood glucose; list 3 methods for planning meals. Outcome: Progressing Towards Goal  Goal: *Incorporating physical activity into lifestyle  Description: State effect of exercise on blood glucose levels. Outcome: Progressing Towards Goal  Goal: *Developing strategies to promote health/change behavior  Description: Define the ABC's of diabetes; identify appropriate screenings, schedule and personal plan for screenings. Outcome: Progressing Towards Goal  Goal: *Using medications safely  Description: State effect of diabetes medications on diabetes; name diabetes medication taking, action and side effects. Outcome: Progressing Towards Goal  Goal: *Monitoring blood glucose, interpreting and using results  Description: Identify recommended blood glucose targets  and personal targets. Outcome: Progressing Towards Goal  Goal: *Prevention, detection, treatment of acute complications  Description: List symptoms of hyper- and hypoglycemia; describe how to treat low blood sugar and actions for lowering  high blood glucose level. Outcome: Progressing Towards Goal  Goal: *Prevention, detection and treatment of chronic complications  Description: Define the natural course of diabetes and describe the relationship of blood glucose levels to long term complications of diabetes.   Outcome: Progressing Towards Goal  Goal: *Developing strategies to address psychosocial issues  Description: Describe feelings about living with diabetes; identify support needed and support network  Outcome: Progressing Towards Goal  Goal: *Insulin pump training  Outcome: Progressing Towards Goal  Goal: *Sick day guidelines  Outcome: Progressing Towards Goal  Goal: *Patient Specific Goal (EDIT GOAL, INSERT TEXT)  Outcome: Progressing Towards Goal     Problem: Patient Education: Go to Patient Education Activity  Goal: Patient/Family Education  Outcome: Progressing Towards Goal     Problem: Patient Education: Go to Patient Education Activity  Goal: Patient/Family Education  Outcome: Progressing Towards Goal     Problem: Patient Education: Go to Patient Education Activity  Goal: Patient/Family Education  Outcome: Progressing Towards Goal     Problem: Falls - Risk of  Goal: *Absence of Falls  Description: Document Mansoor Mccord Fall Risk and appropriate interventions in the flowsheet. Outcome: Progressing Towards Goal  Note: Fall Risk Interventions:  Mobility Interventions: Patient to call before getting OOB,Bed/chair exit alarm,Strengthening exercises (ROM-active/passive)    Mentation Interventions: Bed/chair exit alarm,Door open when patient unattended    Medication Interventions: Patient to call before getting OOB,Evaluate medications/consider consulting pharmacy,Bed/chair exit alarm,Teach patient to arise slowly    Elimination Interventions: Call light in reach,Patient to call for help with toileting needs    History of Falls Interventions: Bed/chair exit alarm,Door open when patient unattended         Problem: Patient Education: Go to Patient Education Activity  Goal: Patient/Family Education  Outcome: Progressing Towards Goal     Problem: Pressure Injury - Risk of  Goal: *Prevention of pressure injury  Description: Document Carlos Scale and appropriate interventions in the flowsheet.   Outcome: Progressing Towards Goal  Note: Pressure Injury Interventions:  Sensory Interventions: Assess changes in LOC    Moisture Interventions: Absorbent underpads,Internal/External urinary devices,Limit adult briefs,Maintain skin hydration (lotion/cream),Minimize layers,Moisture barrier    Activity Interventions: Increase time out of bed,Pressure redistribution bed/mattress(bed type),PT/OT evaluation    Mobility Interventions: PT/OT evaluation    Nutrition Interventions: Document food/fluid/supplement intake,Offer support with meals,snacks and hydration    Friction and Shear Interventions: Apply protective barrier, creams and emollients                Problem: Patient Education: Go to Patient Education Activity  Goal: Patient/Family Education  Outcome: Progressing Towards Goal

## 2021-12-23 NOTE — PROGRESS NOTES
ACUTE OT GOALS:  (Developed with and agreed upon by patient and/or caregiver.)    1. Patient will complete upper body bathing and dressing with setup and adaptive equipment as needed. 2. Patient will complete toileting with minimal assistance. 3. Patient will tolerate 20 minutes of OT treatment with as needed rest breaks to increase activity tolerance for ADLs. 4. Patient will attempt standing in preparation for functional transfers  OCCUPATIONAL THERAPY: Daily Note OT Treatment Day # 4    Marta Norris is a 80 y.o. female   PRIMARY DIAGNOSIS: Physical debility  Physical debility [R53.81]       Payor: [employer] / [place] [employer] / Product Type: Managed Care Medicare /   ASSESSMENT:     REHAB RECOMMENDATIONS: CURRENT LEVEL OF FUNCTION:  (Most Recently Demonstrated)   Recommendation to date pending progress:  Setting:   Short-term Rehab  Equipment:    Rolling Walker   To Be Determined Bathing:   Minimal Assistance  Dressing:   Minimal Assistance using AE to don/doff socks  Feeding/Grooming:   Supervision  Toileting:   Moderate Assistance  Functional Mobility:   Minimal Assistance     ASSESSMENT:  Ms. Annita Montes is doing well today. Pt presents supine upon arrival. Pt did well with bed mobility only requiring supervision to complete. Pt completed functional transfer with min A using rolling walker. Pt completed toileting with mod A for bowel hygiene. Pt completed sponge bath and dressing with min A. Continue POC.       SUBJECTIVE:   Ms. Annita Montes states, \"I have 3 kids that live near\"    SOCIAL HISTORY/LIVING ENVIRONMENT:  Home Environment: Private residence  # Steps to Enter: 2  One/Two Story Residence: One story  Living Alone: Yes  Support Systems: Child(edward),Caregiver/Home Care Staff    OBJECTIVE:     PAIN: VITAL SIGNS: LINES/DRAINS:   Pre Treatment: Pain Screen  Pain Scale 1: Numeric (0 - 10)  Pain Intensity 1: 0  Post Treatment:0   [place]  O2 Device: None (Room air) ACTIVITIES OF DAILY LIVING: I Mod I S SBA CGA Min Mod Max Total NT Comments   BASIC ADLs:              Bathing/ Showering [] [] [] [] [] [x] [] [] [] [] UB- set up  LB-min A   Toileting [] [] [] [] [] [] [x] [] [] [] Bowel hygiene    Dressing [] [] [] [] [] [x] [] [] [] [] UB-set up  LB- min A with sock aid    Feeding [] [] [] [] [] [] [] [] [] [x]    Grooming [] [] [x] [] [] [] [] [] [] [] Washing face    Personal Device Care [] [] [] [] [] [] [] [] [] [x]    Functional Mobility [] [] [] [] [] [x] [] [] [] []    I=Independent, Mod I=Modified Independent, S=Supervision, SBA=Standby Assistance, CGA=Contact Guard Assistance,   Min=Minimal Assistance, Mod=Moderate Assistance, Max=Maximal Assistance, Total=Total Assistance, NT=Not Tested    MOBILITY: I Mod I S SBA CGA Min Mod Max Total  NT x2 Comments:   Supine to sit [] [] [x] [] [] [] [] [] [] [] []    Sit to supine [] [] [] [] [] [] [] [] [] [x] []    Sit to stand [] [] [] [] [] [x] [] [] [] [] []    Bed to chair [] [] [] [] [] [x] [] [] [] [] []    I=Independent, Mod I=Modified Independent, S=Supervision, SBA=Standby Assistance, CGA=Contact Guard Assistance,   Min=Minimal Assistance, Mod=Moderate Assistance, Max=Maximal Assistance, Total=Total Assistance, NT=Not Tested    BALANCE: Good Fair+ Fair Fair- Poor NT Comments   Sitting Static [] [] [x] [] [] []    Sitting Dynamic [] [] [] [x] [] []              Standing Static [] [] [] [x] [] []    Standing Dynamic [] [] [] [] [x] []      PLAN:   FREQUENCY/DURATION: OT Plan of Care: 3 times/week for duration of hospital stay or until stated goals are met, whichever comes first.    TREATMENT:   TREATMENT:   ($$ Self Care/Home Management: 23-37 mins    )  Self Care (25 Minutes): Self care including Upper Body Bathing, Lower Body Bathing, Toileting, Upper Body Dressing, Lower Body Dressing and Grooming to increase independence and decrease level of assistance required.     TREATMENT GRID:  N/A    AFTER TREATMENT POSITION/PRECAUTIONS:  Alarm Activated, Chair, Needs within reach and RN notified    INTERDISCIPLINARY COLLABORATION:  RN/PCT, PT/PTA and OT/MARIE    TOTAL TREATMENT DURATION:  OT Patient Time In/Time Out  Time In: 1105  Time Out: [address]

## 2021-12-23 NOTE — PROGRESS NOTES
Rounds performed through shift, pt denies needs at this time. Bed in low position, locked and call light/personal items within reach. Will report to day shift nurse.

## 2021-12-23 NOTE — PROGRESS NOTES
Hospitalist Progress Note   Admit Date:  2021  5:29 PM   Name:  Nestor Dennis   Age:  80 y.o. Sex:  female  :  1929   MRN:  [id number]   Room:  Critical access hospital/    Presenting Complaint: Fall    Reason(s) for Admission: Physical debility [R53.81]     Hospital Course & Interval History:   Nestor Dennis is a 80 y.o. female with medical history of HTN, CKD stage III, COPD who presented with difficulty with ambulation after fall PTA. Patient reported she was going to the refrigerator with her walker, reached out to get some water and lost her balance. She hit the right side of her head on the table during fall but no loss of consciousness. She reported landing on her R hip. She denies palpitations, lightheadedness, dizziness, chest pain prior to falling. She denies nausea vomiting or changes in her vision. She activated her medical alert and EMS found her on the floor. She reported bilateral leg pain and has not been able to ambulate since her fall. At baseline, patient lives by herself and able to ambulate with a walker. In ED, CT head shows no acute findings. X-ray tib-fib bilaterally shows no fracture or dislocation. X-ray hips with pelvis shows no fracture or dislocation. CT C-spine shows no acute bony abnormalities; multilevel spondylosis marked at C4-5. Right LE venous US shows no DVT. UA was positive for UTI in ED and patient received Rocephin. She was too weak to ambulate in the ER with not much balance so hospitalist team was contacted for admission and possible SNF placement. Subjective (21): \"How are you doing Dr. Morin. \" Pleasant 89y.o. AA female sitting up in bed eating breakfast, without complaints; denies hip pain    Review of Systems:  10 systems reviewed and negative except as noted in HPI.       Assessment & Plan:     Principal Problem:    Fall / right hip pain  - negative for acute fx on CT hip; no MRI given aneurysm clipping  - PT/OT recommended STR given recurrent falls / debility; appreciate PT/OT ongoing assistance  - awaiting insurance approval for rehab    Active Problems:    UTI (klebsiella)  - susceptible to cephalosporins, s/p vantin course      Malnutrition / dehydration  - Improved oral intake; dehydration resolved      Uncontrolled HTN  - on 6-drug regimen; acceptable control      COPD / asthma overlap syndrome  - cont symbicort bid; not in acute exacerbation      DM uncontrolled  - hgbA1c 8.3 on 11/30/2021  - cont lantus 15units daytime; may need to titrate up further for tighter control      CKD (chronic kidney disease) stage 3, GFR 30-59 ml/min ([facility])  - renal fxn ~stable (1.47 this AM)        Severe debility  - awaiting insurance approval for rehab        Dispo/Discharge Planning:    - rehab     Diet:  ADULT DIET Regular; 3 carb choices (45 gm/meal)  ADULT ORAL NUTRITION SUPPLEMENT Breakfast, Lunch, Dinner;  Low Calorie/High Protein  DVT PPx: SCD  Code status: DNR    Hospital Problems as of 12/23/2021 Date Reviewed: 11/30/2021          Codes Class Noted - Resolved POA    Moderate protein-calorie malnutrition ([address] ICD-10-CM: E44.0  ICD-9-CM: 263.0  12/16/2021 - Present Yes        Frequent falls ICD-10-CM: R29.6  ICD-9-CM: V15.88  12/15/2021 - Present Unknown        * (Principal) Physical debility ICD-10-CM: R53.81  ICD-9-CM: 799.3  12/14/2021 - Present Yes        CKD (chronic kidney disease) stage 3, GFR 30-59 ml/min ([facility]) ICD-10-CM: N18.30  ICD-9-CM: 585.3  4/18/2019 - Present Yes        COPD (chronic obstructive pulmonary disease) ([facility]) (Chronic) ICD-10-CM: J44.9  ICD-9-CM: 496  8/22/2018 - Present Yes        Anxiety ICD-10-CM: F41.9  ICD-9-CM: 300.00  2/1/2018 - Present Yes        Controlled type 2 diabetes mellitus with microalbuminuria, without long-term current use of insulin ([facility]) ICD-10-CM: E11.29, R80.9  ICD-9-CM: 250.40, 791.0  11/22/2016 - Present Yes        HLD (hyperlipidemia) ICD-10-CM: E78.5  ICD-9-CM: 272.4  1/8/2013 - Present Yes Depression ICD-10-CM: F31. A  ICD-9-CM: 592  6/1/2012 - Present Yes        Asthma ICD-10-CM: J45.909  ICD-9-CM: 493.90  6/1/2012 - Present Yes        Essential hypertension (Chronic) ICD-10-CM: I10  ICD-9-CM: 401.9  7/16/2010 - Present Yes        GERD (gastroesophageal reflux disease) (Chronic) ICD-10-CM: K21.9  ICD-9-CM: 530.81  7/16/2010 - Present Yes              Objective:     Patient Vitals for the past 24 hrs:   Temp Pulse Resp BP SpO2   12/23/21 1053 97.6 °F (36.4 °C) 65 18 (!) 120/40 98 %   12/23/21 0907     97 %   12/23/21 0738 98.1 °F (36.7 °C) 71 18 (!) 151/51 94 %   12/23/21 0411 98 °F (36.7 °C) 76 16 (!) 164/64 97 %   12/23/21 0021 97.5 °F (36.4 °C) 67 16 126/66 96 %   12/22/21 2117     97 %   12/22/21 1938 98 °F (36.7 °C) 64 16 (!) 109/47 96 %   12/22/21 1600 97.4 °F (36.3 °C) 67 18 122/60 96 %     Oxygen Therapy  O2 Sat (%): 98 % (12/23/21 1053)  Pulse via Oximetry: 66 beats per minute (12/23/21 0907)  O2 Device: None (Room air) (12/23/21 1053)    Estimated body mass index is 29.45 kg/m² as calculated from the following:    Height as of this encounter: 5' 2\" (1.575 m). Weight as of this encounter: 73 kg (161 lb). Intake/Output Summary (Last 24 hours) at 12/23/2021 1353  Last data filed at 12/23/2021 0955  Gross per 24 hour   Intake 180 ml   Output 1000 ml   Net -820 ml         Physical Exam:     Blood pressure (!) 120/40, pulse 65, temperature 97.6 °F (36.4 °C), resp. rate 18, height 5' 2\" (1.575 m), weight 73 kg (161 lb), SpO2 98 %. General:    Thin, well developed, alert, good spirited, in NAD  Head:  Normocephalic, atraumatic  Eyes:  Sclerae appear normal.  Pupils equally round. Anicteric  ENT:  Nares appear normal, no drainage. Moist oral mucosa, improved halitosis   Neck:  No restricted ROM. Trachea midline   CV:   RRR. No m/r/g. No jugular venous distension. Lungs:   CTAB. No wheezing, rhonchi, or rales. Respirations even, unlabored  Abdomen: Bowel sounds present.   Soft, nontender, nondistended. Extremities: + mvmt x4; mild dependent b/l LE edema  Skin:     No rashes and normal coloration. Warm and dry. Neuro:  CN II-XII grossly intact. Sensation intact. A&Ox3  Psych:  Normal mood and affect.       I have reviewed ordered lab tests and independently visualized imaging below:    Recent Labs:  Recent Results (from the past 48 hour(s))   GLUCOSE, POC    Collection Time: 12/21/21  4:05 PM   Result Value Ref Range    Glucose (POC) 253 (H) 65 - 100 mg/dL    Performed by [email or web address]    GLUCOSE, POC    Collection Time: 12/21/21  9:15 PM   Result Value Ref Range    Glucose (POC) 226 (H) 65 - 100 mg/dL    Performed by [email or web address]    GLUCOSE, POC    Collection Time: 12/22/21  7:04 AM   Result Value Ref Range    Glucose (POC) 69 65 - 100 mg/dL    Performed by [employer]    GLUCOSE, POC    Collection Time: 12/22/21  7:18 AM   Result Value Ref Range    Glucose (POC) 72 65 - 100 mg/dL    Performed by [employer]    GLUCOSE, POC    Collection Time: 12/22/21  7:40 AM   Result Value Ref Range    Glucose (POC) 117 (H) 65 - 100 mg/dL    Performed by [employer]    GLUCOSE, POC    Collection Time: 12/22/21 11:43 AM   Result Value Ref Range    Glucose (POC) 242 (H) 65 - 100 mg/dL    Performed by [employer]    GLUCOSE, POC    Collection Time: 12/22/21  4:24 PM   Result Value Ref Range    Glucose (POC) 209 (H) 65 - 100 mg/dL    Performed by [employer]    GLUCOSE, POC    Collection Time: 12/22/21  8:46 PM   Result Value Ref Range    Glucose (POC) 177 (H) 65 - 100 mg/dL    Performed by [facility], POC    Collection Time: 12/23/21  7:38 AM   Result Value Ref Range    Glucose (POC) 170 (H) 65 - 100 mg/dL    Performed by [employer]    GLUCOSE, POC    Collection Time: 12/23/21 10:54 AM   Result Value Ref Range    Glucose (POC) 193 (H) 65 - 100 mg/dL    Performed by [employer]        All Micro Results     Procedure Component Value Units Date/Time CULTURE, URINE [[id number]]  (Abnormal)  (Susceptibility) Collected: 12/14/21 5492    Order Status: Completed Specimen: Urine from Clean catch Updated: 12/17/21 9776     Special Requests: NO SPECIAL REQUESTS        Culture result:       >100,000 COLONIES/mL KLEBSIELLA PNEUMONIAE                Other Studies:  No results found.     Current Meds:  Current Facility-Administered Medications   Medication Dose Route Frequency    insulin glargine (LANTUS) injection 15 Units  15 Units SubCUTAneous DAILY    zinc oxide-cod liver oil (DESITIN) 40 % paste   Topical PRN    labetaloL (NORMODYNE;TRANDATE) injection 10 mg  10 mg IntraVENous Q6H PRN    hydrALAZINE (APRESOLINE) tablet 100 mg  100 mg Oral TID    amLODIPine (NORVASC) tablet 10 mg  10 mg Oral DAILY    cloNIDine HCL (CATAPRES) tablet 0.2 mg  0.2 mg Oral TID    lisinopriL (PRINIVIL, ZESTRIL) tablet 40 mg  40 mg Oral DAILY    budesonide-formoteroL (SYMBICORT) 160-4.5 mcg/actuation HFA inhaler 2 Puff  2 Puff Inhalation BID RT    furosemide (LASIX) tablet 20 mg  20 mg Oral DAILY    gabapentin (NEURONTIN) capsule 100 mg  100 mg Oral TID    pantoprazole (PROTONIX) tablet 40 mg  40 mg Oral ACB    pravastatin (PRAVACHOL) tablet 40 mg  40 mg Oral QHS    sertraline (ZOLOFT) tablet 100 mg  100 mg Oral DAILY    spironolactone (ALDACTONE) tablet 25 mg  25 mg Oral DAILY    albuterol (PROVENTIL VENTOLIN) nebulizer solution 2.5 mg  2.5 mg Nebulization Q6H PRN    sodium chloride (NS) flush 5-40 mL  5-40 mL IntraVENous Q8H    sodium chloride (NS) flush 5-40 mL  5-40 mL IntraVENous PRN    acetaminophen (TYLENOL) tablet 650 mg  650 mg Oral Q6H PRN    Or    acetaminophen (TYLENOL) suppository 650 mg  650 mg Rectal Q6H PRN    polyethylene glycol (MIRALAX) packet 17 g  17 g Oral DAILY PRN    ondansetron (ZOFRAN ODT) tablet 4 mg  4 mg Oral Q8H PRN    Or    ondansetron (ZOFRAN) injection 4 mg  4 mg IntraVENous Q6H PRN    traMADoL (ULTRAM) tablet 50 mg  50 mg Oral BID PRN  insulin lispro (HUMALOG) injection   SubCUTAneous AC&HS    dextrose 40% (GLUTOSE) oral gel 1 Tube  15 g Oral PRN    glucagon (GLUCAGEN) injection 1 mg  1 mg IntraMUSCular PRN    dextrose (D50W) injection syrg 12.5-25 g  25-50 mL IntraVENous PRN       Signed:  MICHAEL Olmos    Part of this note may have been written by using a voice dictation software. The note has been proof read but may still contain some grammatical/other typographical errors.

## 2021-12-24 NOTE — PROGRESS NOTES
Called [facility] to get report several times. No answer, will continue to try to call. piv removed. David Aviles

## 2021-12-24 NOTE — PROGRESS NOTES
Care Management Interventions  PCP Verified by CM: Yes  Mode of Transport at Discharge: Other (see comment) (TBD: based upon need. )  Transition of Care Consult (CM Consult): SNF  Physical Therapy Consult: Yes  Occupational Therapy Consult: Yes  Speech Therapy Consult: No  Support Systems: Child(edward)  Confirm Follow Up Transport: Other (see comment)  The Plan for Transition of Care is Related to the Following Treatment Goals : snf  Name of the Patient Representative Who was Provided with a Choice of Provider and Agrees with the Discharge Plan: daughter and patient  Freedom of Choice List was Provided with Basic Dialogue that Supports the Patient's Individualized Plan of Care/Goals, Treatment Preferences and Shares the Quality Data Associated with the Providers?: Yes  The [employer] Information Provided?: No  Discharge Location  Discharge Placement: Skilled nursing facility  Patient is discharging via [address] Ambulance to [facility]. CM spoke with patient's daughter and made her aware of transport time. CM requested RN place the resulted covid test in packet. CM remains available.

## 2021-12-24 NOTE — PROGRESS NOTES
Problem: Diabetes Self-Management  Goal: *Using medications safely  Description: State effect of diabetes medications on diabetes; name diabetes medication taking, action and side effects. Outcome: Progressing Towards Goal  Goal: *Monitoring blood glucose, interpreting and using results  Description: Identify recommended blood glucose targets  and personal targets. Outcome: Progressing Towards Goal     Problem: Falls - Risk of  Goal: *Absence of Falls  Description: Document Thomasney Margot Fall Risk and appropriate interventions in the flowsheet. Outcome: Progressing Towards Goal  Note: Fall Risk Interventions:  Mobility Interventions: Patient to call before getting OOB    Mentation Interventions: More frequent rounding    Medication Interventions: Patient to call before getting OOB    Elimination Interventions: Patient to call for help with toileting needs,Toileting schedule/hourly rounds    History of Falls Interventions: Door open when patient unattended         Problem: Pressure Injury - Risk of  Goal: *Prevention of pressure injury  Description: Document Carlos Scale and appropriate interventions in the flowsheet.   Outcome: Progressing Towards Goal  Note: Pressure Injury Interventions:  Sensory Interventions: Assess changes in LOC    Moisture Interventions: Internal/External urinary devices    Activity Interventions: Pressure redistribution bed/mattress(bed type),PT/OT evaluation    Mobility Interventions: Pressure redistribution bed/mattress (bed type)    Nutrition Interventions: Document food/fluid/supplement intake    Friction and Shear Interventions: Apply protective barrier, creams and emollients

## 2021-12-24 NOTE — DISCHARGE SUMMARY
Hospitalist Discharge Summary   Admit Date:  2021  5:29 PM   DC Note date: 2021  Name:  Erin Daily   Age:  80 y.o. Sex:  female  :  1929   MRN:  [id number]   Room:  [facility]  PCP:  Ian Banks NP    Presenting Complaint: Fall    Initial Admission Diagnosis: Physical debility [R53.81]     Problem List for this Hospitalization:  Hospital Problems as of 2021 Date Reviewed: 2021          Codes Class Noted - Resolved POA    Moderate protein-calorie malnutrition ([facility] ICD-10-CM: E44.0  ICD-9-CM: 263.0  2021 - Present Yes        Frequent falls ICD-10-CM: R29.6  ICD-9-CM: V15.88  12/15/2021 - Present Unknown        * (Principal) Physical debility ICD-10-CM: R53.81  ICD-9-CM: 799.3  2021 - Present Yes        CKD (chronic kidney disease) stage 3, GFR 30-59 ml/min (HCC) ICD-10-CM: N18.30  ICD-9-CM: 585.3  2019 - Present Yes        COPD (chronic obstructive pulmonary disease) ([facility] (Chronic) ICD-10-CM: J44.9  ICD-9-CM: 248  2018 - Present Yes        Anxiety ICD-10-CM: F41.9  ICD-9-CM: 300.00  2018 - Present Yes        Controlled type 2 diabetes mellitus with microalbuminuria, without long-term current use of insulin ([facility] ICD-10-CM: E11.29, R80.9  ICD-9-CM: 250.40, 791.0  2016 - Present Yes        HLD (hyperlipidemia) ICD-10-CM: E78.5  ICD-9-CM: 272.4  2013 - Present Yes        Depression ICD-10-CM: F32. A  ICD-9-CM: 594  2012 - Present Yes        Asthma ICD-10-CM: E90.461  ICD-9-CM: 493.90  2012 - Present Yes        Essential hypertension (Chronic) ICD-10-CM: I10  ICD-9-CM: 401.9  2010 - Present Yes        GERD (gastroesophageal reflux disease) (Chronic) ICD-10-CM: K21.9  ICD-9-CM: 530.81  2010 - Present Yes            Did Patient have Sepsis (YES OR NO): No    Hospital Course:  Abraham Maciel a 80 y. o. female with medical history of HTN, CKD stage III, COPD who presented with difficulty with ambulation after fall PTA.  Patient reported she was going to the refrigerator with her walker, reached out to get some water and lost her balance. She hit the right side of her head on the table during fall but no loss of consciousness. She reported landing on her R hip. She denies palpitations, lightheadedness, dizziness, chest pain prior to falling.  She denies nausea vomiting or changes in her vision.  She activated her medical alert and EMS found her on the floor.  She reported bilateral leg pain and has not been able to ambulate since her fall. At baseline, patient lives by herself and able to ambulate with a walker.     In ER, CT head shows no acute findings.  X-ray tib-fib bilaterally shows no fracture or dislocation.  X-ray hips with pelvis shows no fracture or dislocation.  CT C-spine shows no acute bony abnormalities; multilevel spondylosis marked at C4-5.  Right LE venous US shows no DVT. UA was positive for UTI in ER and patient received Rocephin and completed a cephalosporin course while inpatient.  She was too weak to ambulate in the ER with not much balance so hospitalist team was contacted for admission and possible SNF placement. Ms. Cynthia Wood has improved during her inpatient stay. PT/OT evaluations indicated that she would need short term rehab at discharge. She is stable and appropriate for discharge to rehab on Dec/24/2021 and we encourage her to follow up with her PCP within 2 weeks.     A&P  Principal Problem:    Fall / right hip pain  - negative for acute fx on CT hip; no MRI given aneurysm clipping  - PT/OT recommended STR given recurrent falls / debility; appreciate PT/OT ongoing assistance  - approved for discharge to rehab on Dec/24     Active Problems:    UTI (klebsiella)  - susceptible to cephalosporins, s/p vantin course       Malnutrition / dehydration  - Improved oral intake; dehydration resolved       Uncontrolled HTN  - on 6-drug regimen; acceptable control       COPD / asthma overlap syndrome  - cont symbicort bid; not in acute exacerbation       DM uncontrolled  - hgbA1c 8.3 on 11/30/2021  - cont lantus 15units daytime inpatient       CKD (chronic kidney disease) stage 3, GFR 30-59 ml/min ([facility])  - renal fxn stable         Severe debility  - discharge to rehab on Dec/24  - patient is stable    Disposition: [address]: ADULT DIET Regular; 3 carb choices (45 gm/meal)  ADULT ORAL NUTRITION SUPPLEMENT Breakfast, Lunch, Dinner; Low Calorie/High Protein  Code Status: DNR    Follow Up Orders: Follow-up Appointments   Procedures    FOLLOW UP VISIT Appointment in: Other (Specify)     Standing Status:   Standing     Number of Occurrences:   1     Order Specific Question:   Appointment in     Answer: Other (Specify)       Follow-up Information     Follow up With Specialties Details Why Contact Info    [address] [facility], [facility]   [address] [address]  [facility], [address] [facility] [address], NP Family Medicine, Nurse Practitioner In 2 weeks post discharge check up and medication review [address] [facility] [address] [place] [address]  [phone]            Time spent in patient discharge and coordination 32 minutes. Plan was discussed with Ms. Hatfield and Case mgmt. All questions answered. Patient was stable at time of discharge. Instructions given to call a physician or return if any concerns. Discharge Info:   Current Discharge Medication List      START taking these medications    Details   cloNIDine HCL (CATAPRES) 0.2 mg tablet Take 1 Tablet by mouth three (3) times daily for 60 days.   Qty: 90 Tablet, Refills: 1  Start date: 12/24/2021, End date: 2/22/2022         CONTINUE these medications which have NOT CHANGED    Details   pravastatin (PRAVACHOL) 40 mg tablet TAKE 1 TABLET BY MOUTH EVERY NIGHT AT BEDTIME  Qty: 90 Tablet, Refills: 1    Associated Diagnoses: Controlled type 2 diabetes mellitus with microalbuminuria, without long-term current use of insulin ([address] Mixed hyperlipidemia      pantoprazole (PROTONIX) 20 mg tablet TAKE 1 TABLET BY MOUTH DAILY  Qty: 90 Tablet, Refills: 1    Comments: **Patient requests 90 days supply**  Associated Diagnoses: Gastroesophageal reflux disease, unspecified whether esophagitis present      metFORMIN (GLUCOPHAGE) 500 mg tablet Take 1 Tablet by mouth two (2) times daily (with meals). Qty: 180 Tablet, Refills: 1    Associated Diagnoses: Controlled type 2 diabetes mellitus with microalbuminuria, without long-term current use of insulin ([facility])      gabapentin (NEURONTIN) 100 mg capsule Take 1 Capsule by mouth three (3) times daily. Qty: 270 Capsule, Refills: 1    Associated Diagnoses: Controlled type 2 diabetes mellitus with microalbuminuria, without long-term current use of insulin ([address] Neuropathy      glucose blood VI test strips (True Metrix Glucose Test Strip) strip TEST ONCE DAILY dx code E11.29  Qty: 100 Strip, Refills: 12    Associated Diagnoses: Type 2 diabetes mellitus with diabetic neuropathy, without long-term current use of insulin ([facility])      fluticasone propion-salmeteroL (Advair Diskus) 250-50 mcg/dose diskus inhaler Take 1 Puff by inhalation every twelve (12) hours. Qty: 3 Inhaler, Refills: 1    Associated Diagnoses: Mild persistent asthma without complication      potassium chloride (K-DUR, KLOR-CON) 20 mEq tablet TAKE 1 TABLET BY MOUTH EVERY MORNING  Qty: 90 Tab, Refills: 1    Associated Diagnoses: Essential hypertension      benazepriL (LOTENSIN) 40 mg tablet TAKE 1 TABLET BY MOUTH DAILY  Qty: 90 Tab, Refills: 1    Associated Diagnoses: Controlled type 2 diabetes mellitus with microalbuminuria, without long-term current use of insulin ([address] Essential hypertension      fluticasone propionate (FLONASE) 50 mcg/actuation nasal spray 2 Sprays by Both Nostrils route daily. Qty: 1 Bottle, Refills: 5    Associated Diagnoses:  Allergic rhinitis, unspecified seasonality, unspecified trigger      hydrALAZINE (APRESOLINE) 100 mg tablet Take 1 Tab by mouth three (3) times daily. Qty: 270 Tab, Refills: 1    Associated Diagnoses: Essential hypertension      Wheel Chair kalyn Wheelchair  Qty: 1 Each, Refills: 0    Associated Diagnoses: Neuropathy; Chronic bilateral low back pain without sciatica; Debility      spironolactone (ALDACTONE) 25 mg tablet Take 1 Tab by mouth daily. Qty: 90 Tab, Refills: 0    Associated Diagnoses: Essential hypertension      acetaminophen (TYLENOL ARTHRITIS PAIN) 650 mg TbER Take 650 mg by mouth every eight (8) hours. Tylenol arthritis as needed      VENTOLIN HFA 90 mcg/actuation inhaler INHALE 2 PUFFS BY MOUTH EVERY 6 HOURS AS NEEDED FOR WHEEZING  Qty: 1 Inhaler, Refills: 12      Blood-Glucose Meter monitoring kit Pt uses true metrix meter and tests once daily dx code E11.29  Qty: 1 Kit, Refills: 0      polyethylene glycol (MIRALAX) 17 gram/dose powder Take 17 g by mouth daily. 1 tablespoon with 8 oz of water daily  Qty: 119 g, Refills: 0      FOLIC ACID/MULTIVIT-MIN/LUTEIN (CENTRUM SILVER PO) Take  by mouth. OTHER One shower chair  Qty: 1 Each, Refills: 0      VIT A/VIT C/VIT E/ZINC/COPPER (PRESERVISION AREDS PO) Take 1 Tab by mouth two (2) times a day. Lancets Misc Pt tests three times daily Diagnosis Code: 250.00  Qty: 1 Package, Refills: 11      sertraline (ZOLOFT) 100 mg tablet Take 1 Tablet by mouth daily. Indications: major depressive disorder, am  Qty: 90 Tablet, Refills: 1    Associated Diagnoses: Anxiety and depression      amLODIPine (NORVASC) 10 mg tablet Take 1 Tablet by mouth daily. Qty: 90 Tablet, Refills: 1    Associated Diagnoses: Essential hypertension      lidocaine 4 % patch Apply to affected area daily prn. Qty: 30 Each, Refills: 2    Associated Diagnoses: Chronic bilateral low back pain without sciatica      furosemide (LASIX) 20 mg tablet Take 1 Tab by mouth daily.   Qty: 90 Tab, Refills: 1    Associated Diagnoses: Essential hypertension         STOP taking these medications       diclofenac (VOLTAREN) 1 % gel Comments:   Reason for Stopping:         cyanocobalamin, vitamin B-12, 1,000 mcg/mL kit Comments:   Reason for Stopping:               Procedures done this admission:  * No surgery found *    Consults this admission:  None    Echocardiogram/EKG results:  No results found for this or any previous visit. EKG Results     Procedure 720 Value Units Date/Time    EKG, 12 LEAD, INITIAL [[id number]] Collected: 12/14/21 1749    Order Status: Completed Updated: 12/14/21 2250     Ventricular Rate 69 BPM      Atrial Rate 68 BPM      P-R Interval 187 ms      QRS Duration 133 ms      Q-T Interval 376 ms      QTC Calculation (Bezet) 403 ms      Calculated P Axis 11 degrees      Calculated R Axis -61 degrees      Calculated T Axis -20 degrees      Diagnosis --     Sinus rhythm  Right bundle branch block  Borderline LVH criteria  Anterolateral infarct, age indeterminate    Confirmed by Jennifer Grissom MD (), ARMAAN ([id number]) on 12/14/2021 10:50:00 PM            Diagnostic Imaging/Tests:   XR SPINE LUMB MIN 4 V    Result Date: 12/15/2021  LUMBAR SPINE, 5 views. INDICATION: Low back pain following fall. COMPARISON: None. TECHNIQUE: AP, lateral, bilateral oblique and cone-down lumbosacral junction views. FINDINGS: Spinal alignment: mild scoliosis toward the left. Disk spaces: Narrowed Vertebral bodies: All appear compressed. . Pedicles:  Grossly intact SI joints:  Appear symmetric. Facet joints:  Hypertrophic facet arthropathy. Other:  Small osteophytes. Aortic calcifications. Moderate degenerative changes throughout the lumbar spine. This includes compression all lumbar vertebra which could be acute or chronic     XR TIB/FIB LT    Result Date: 12/14/2021  Exam: XR TIB/FIB LT on 12/14/2021 8:00 PM Clinical History: The Female patient is 80years old  presenting for pain following fall .  Comparison:  none Findings: 3 views of the left tib-fib were obtained. No fracture or dislocation is identified. There is diffuse osteopenia. Joint spaces are well-maintained. 1. No acute osseous or joint abnormalities. XR TIB/FIB RT    Result Date: 12/14/2021  Exam: XR TIB/FIB RT on 12/14/2021 7:58 PM Clinical History: The Female patient is 80years old  presenting for pain following fall. Comparison:  none Findings: 3 views of the right tib-fib were obtained. No fracture or dislocation is identified. There is diffuse osteopenia. Joint spaces are well-maintained. 1. No acute osseous or joint abnormalities. CT HEAD WO CONT    Result Date: 12/14/2021  NONCONTRAST HEAD CT CLINICAL HISTORY:  HEAD injury from fall today. TECHNIQUE:  Axial images were obtained with spiral technique. Radiation dose reduction was achieved using one or all of the following techniques: automated exposure control, weight-based dosing, iterative reconstruction. COMPARISON:  March 3, 2021. REPORT:   Standard noncontrast head CT demonstrates no definite intracranial mass effect, hemorrhage, or evidence of acute geographic infarction. Stable inferior right frontal and anterior right temporal encephalomalacia. Extensive white matter hypodensities elsewhere are most consistent with small vessel ischemic disease. The ventricles are normal in size and configuration, accounting for the patient's age. Orbits  and paranasal sinuses are clear where imaged. Right frontotemporal craniotomy again noted. Bone windows demonstrate no definite fracture or destruction. RIGHT FRONTOTEMPORAL CRANIOTOMY WITH STABLE SUBJACENT ENCEPHALOMALACIA AS WELL AS EXTENSIVE SMALL VESSEL ISCHEMIC DISEASE, BUT NO ACUTE INTRACRANIAL ABNORMALITY OR CALVARIAL FRACTURE IDENTIFIED AT NONCONTRAST CT.     CT SPINE CERV WO CONT    Result Date: 12/14/2021  CT CERVICAL SPINE WITH ADDITIONAL REFORMATS CLINICAL HISTORY:  NECK pain after a fall at home.  TECHNIQUE:  Axial images were obtained with spiral technique, and coronal and sagittal reformats were produced. Radiation dose reduction was achieved using one or all of the following techniques: automated exposure control, weight-based dosing, iterative reconstruction. COMPARISON:  None. FINDINGS:  No definite acute fracture or malalignment is evident. Prevertebral soft tissues are unremarkable. There is multilevel degenerative disc disease which is most marked at C4-5, where focal central disc protrusion results in moderate central spinal stenosis. Multilevel uncovertebral and facet spurring results in comparable foraminal narrowing as well. 1.  No definite acute bony abnormality identified. 2.  Multilevel spondylosis is most marked at C4-5 central disc protrusion resulting in moderate central spinal stenosis and likely impinging upon the cord. CT HIP RT WO CONT    Addendum Date: 12/15/2021    Addendum: Addendum: Correction: RIGHT hip pain following fall is the correct history of laterality. No fracture    Result Date: 12/15/2021  CT left hip WITHOUT CONTRAST. INDICATION: Left hip pain following fall. TECHNIQUE: 2.0mm axial scans through the joint with sagittal and coronal reconstructions. Radiation dose reduction techniques were used for this study. Our CT scanners use one or more of the following:  Automated exposure control, adjustment of the mA and or kV according to patient size, iterative reconstruction. FINDINGS: No hip fracture. Osteoarthritis of both hip joints. Sacrum is unremarkable. Degenerative changes lumbar spine. Pubic rami intact. Moderate stool in the rectum. Diverticulosis. Aortoiliac atherosclerosis. Negative for hip fracture. MRI is more sensitive for radiographically occult fractures if clinical suspicion persists. XR HIPS BI W PELV MIN 5 VWS    Result Date: 12/14/2021  Exam: XR HIPS BI W PELV MIN 5 VWS on 12/14/2021 7:59 PM Clinical History: The Female patient is 80years old  presenting for pain/injury in hip. Comparison:  none Findings: 2 views of each each hip and single view of the pelvis were obtained. Right: No fracture or dislocation is identified. There is diffuse osteopenia. Moderate acetabular joint space loss is seen. Left: No fracture or dislocation is identified. There is diffuse osteopenia. Moderate acetabular joint space loss is seen. 1. No acute osseous or joint abnormalities. DUPLEX LOWER EXT VENOUS RIGHT    Result Date: 12/14/2021  RIGHT LOWER EXTREMITY DEEP VENOUS SONOGRAPHY: CLINICAL HISTORY: Leg pain and swelling. FINDINGS: Multiple images from real time ultrasound evaluation of the deep venous system of the right leg demonstrate normal venous flow in the posterior tibial, popliteal, and superficial and common femoral veins. Normal compressibility was demonstrated. Flow was also documented in the proximal saphenous and deep femoral veins. No intraluminal echogenic material was seen to suggest the presence of nonobstructive thrombus. NO ULTRASOUND EVIDENCE OF DEEP VENOUS THROMBOSIS IN THE RIGHT LEG. All Micro Results     Procedure Component Value Units Date/Time    CULTURE, URINE [[id number]]  (Abnormal)  (Susceptibility) Collected: 12/14/21 2203    Order Status: Completed Specimen: Urine from Clean catch Updated: 12/17/21 1151     Special Requests: NO SPECIAL REQUESTS        Culture result:       >100,000 COLONIES/mL KLEBSIELLA PNEUMONIAE                Labs: Results:       BMP, Mg, Phos No results for input(s): NA, K, CL, CO2, AGAP, BUN, CREA, CA, GLU, MG, PHOS in the last 72 hours. CBC No results for input(s): WBC, RBC, HGB, HCT, PLT, GRANS, LYMPH, EOS, MONOS, BASOS, IG, ANEU, ABL, DLEL, ABM, ABB, AIG, HGBEXT, HCTEXT, PLTEXT in the last 72 hours. LFT No results for input(s): ALT, TBIL, AP, TP, ALB, GLOB, AGRAT in the last 72 hours.     No lab exists for component: SGOT, GPT   Cardiac Testing Lab Results   Component Value Date/Time    BNP 38 09/10/2016 10:15 AM    BNP 54 04/19/2016 03:30 PM    BNP 22 03/01/2012 02:53 PM    CK 95 12/14/2021 07:31 PM    CK 64 08/21/2018 09:25 PM    CK 38 07/16/2010 04:05 PM    CK - MB 0.7 07/16/2010 04:05 PM    CK-MB Index 1.8 07/16/2010 04:05 PM    Troponin-I <0.05 03/01/2012 02:53 PM    Troponin-I, Qt. <0.02 (L) 08/06/2019 06:25 PM    Troponin-I, Qt. <0.02 (L) 08/21/2018 03:09 PM    Troponin-I, Qt. <0.04 (L) 07/17/2010 06:52 AM      Coagulation Tests Lab Results   Component Value Date/Time    Prothrombin time 13.1 07/27/2018 08:53 PM    Prothrombin time 10.4 03/01/2012 02:08 PM    Prothrombin time 9.6 09/06/2009 12:55 PM    INR 1.0 07/27/2018 08:53 PM    INR 1.0 03/01/2012 02:08 PM    INR 0.9 09/06/2009 12:55 PM    aPTT 27.1 07/27/2018 08:53 PM    aPTT 27.3 07/12/2014 02:30 PM    aPTT 23.3 (L) 03/01/2012 02:08 PM      A1c Lab Results   Component Value Date/Time    Hemoglobin A1c 8.3 (H) 11/30/2021 03:41 PM    Hemoglobin A1c 6.1 (H) 08/31/2021 03:26 PM    Hemoglobin A1c 5.9 (H) 04/27/2021 03:59 PM      Lipid Panel Lab Results   Component Value Date/Time    Cholesterol, total 153 11/30/2021 03:41 PM    HDL Cholesterol 53 11/30/2021 03:41 PM    LDL, calculated 70 11/30/2021 03:41 PM    LDL, calculated 72 10/07/2019 10:16 AM    VLDL, calculated 30 11/30/2021 03:41 PM    VLDL, calculated 15 10/07/2019 10:16 AM    Triglyceride 179 (H) 11/30/2021 03:41 PM    CHOL/HDL Ratio 2.8 02/22/2017 09:54 AM      Thyroid Panel Lab Results   Component Value Date/Time    TSH 1.080 11/30/2021 03:41 PM    TSH 1.960 07/08/2019 09:55 AM    TSH 0.888 08/22/2018 06:01 AM        Most Recent UA Lab Results   Component Value Date/Time    Color Yellow 03/09/2020 02:17 PM    Appearance Clear 03/09/2020 02:17 PM    Specific gravity 1.035 (H) 02/22/2020 05:19 PM    pH (UA) 6.0 03/09/2020 02:17 PM    Protein 30 mg/dL (A) 03/09/2020 02:17 PM    Glucose Negative 03/09/2020 02:17 PM    Ketone Trace (A) 03/09/2020 02:17 PM    Bilirubin Negative 03/09/2020 02:17 PM    Blood Negative 03/09/2020 02:17 PM    Urobilinogen 0.2 E.U./dL 03/09/2020 02:17 PM    Nitrites Negative 03/09/2020 02:17 PM    Leukocyte Esterase Large (A) 03/09/2020 02:17 PM    WBC 10-20 12/14/2021 10:08 PM    RBC 5-10 12/14/2021 10:08 PM    Epithelial cells 0-3 12/14/2021 10:08 PM    Bacteria 4+ (H) 12/14/2021 10:08 PM    Casts 0-3 12/14/2021 10:08 PM    Crystals, urine 0 02/21/2010 01:15 PM    Mucus 0 12/14/2021 10:08 PM          All Labs from Last 24 Hrs:  Recent Results (from the past 24 hour(s))   GLUCOSE, POC    Collection Time: 12/23/21 10:54 AM   Result Value Ref Range    Glucose (POC) 193 (H) 65 - 100 mg/dL    Performed by ParvizT    GLUCOSE, POC    Collection Time: 12/23/21  3:15 PM   Result Value Ref Range    Glucose (POC) 266 (H) 65 - 100 mg/dL    Performed by Abilio    GLUCOSE, POC    Collection Time: 12/23/21  8:42 PM   Result Value Ref Range    Glucose (POC) 276 (H) 65 - 100 mg/dL    Performed by Faisal Carrillo    GLUCOSE, POC    Collection Time: 12/24/21  7:03 AM   Result Value Ref Range    Glucose (POC) 111 (H) 65 - 100 mg/dL    Performed by [address], [address] [facility] List in Hospital:   Current Facility-Administered Medications   Medication Dose Route Frequency    insulin glargine (LANTUS) injection 15 Units  15 Units SubCUTAneous DAILY    zinc oxide-cod liver oil (DESITIN) 40 % paste   Topical PRN    labetaloL (NORMODYNE;TRANDATE) injection 10 mg  10 mg IntraVENous Q6H PRN    hydrALAZINE (APRESOLINE) tablet 100 mg  100 mg Oral TID    amLODIPine (NORVASC) tablet 10 mg  10 mg Oral DAILY    cloNIDine HCL (CATAPRES) tablet 0.2 mg  0.2 mg Oral TID    lisinopriL (PRINIVIL, ZESTRIL) tablet 40 mg  40 mg Oral DAILY    budesonide-formoteroL (SYMBICORT) 160-4.5 mcg/actuation HFA inhaler 2 Puff  2 Puff Inhalation BID RT    furosemide (LASIX) tablet 20 mg  20 mg Oral DAILY    gabapentin (NEURONTIN) capsule 100 mg  100 mg Oral TID    pantoprazole (PROTONIX) tablet 40 mg  40 mg Oral ACB    pravastatin (PRAVACHOL) tablet 40 mg  40 mg Oral QHS    sertraline (ZOLOFT) tablet 100 mg  100 mg Oral DAILY    spironolactone (ALDACTONE) tablet 25 mg  25 mg Oral DAILY    albuterol (PROVENTIL VENTOLIN) nebulizer solution 2.5 mg  2.5 mg Nebulization Q6H PRN    sodium chloride (NS) flush 5-40 mL  5-40 mL IntraVENous Q8H    sodium chloride (NS) flush 5-40 mL  5-40 mL IntraVENous PRN    acetaminophen (TYLENOL) tablet 650 mg  650 mg Oral Q6H PRN    Or    acetaminophen (TYLENOL) suppository 650 mg  650 mg Rectal Q6H PRN    polyethylene glycol (MIRALAX) packet 17 g  17 g Oral DAILY PRN    ondansetron (ZOFRAN ODT) tablet 4 mg  4 mg Oral Q8H PRN    Or    ondansetron (ZOFRAN) injection 4 mg  4 mg IntraVENous Q6H PRN    traMADoL (ULTRAM) tablet 50 mg  50 mg Oral BID PRN    insulin lispro (HUMALOG) injection   SubCUTAneous AC&HS    dextrose 40% (GLUTOSE) oral gel 1 Tube  15 g Oral PRN    glucagon (GLUCAGEN) injection 1 mg  1 mg IntraMUSCular PRN    dextrose (D50W) injection syrg 12.5-25 g  25-50 mL IntraVENous PRN       No Known Allergies  Immunization History   Administered Date(s) Administered    (RETIRED) Pneumococcal Vaccine (Unspecified Type) 01/01/2006    Influenza High Dose Vaccine PF 09/14/2016, 10/25/2017, 12/13/2018    Influenza Vaccine 11/05/2013, 09/16/2014, 10/19/2015    Influenza Vaccine (Tri) Adjuvanted (>65 Yrs FLUAD TRI [id number]) 10/14/2019    Influenza Vaccine PF 09/22/2015    Influenza Vaccine Whole 01/01/2011    Influenza, Quadrivalent, Adjuvanted (>65 Yrs FLUAD QUAD [id number]) 12/30/2020, 11/30/2021    Pneumococcal Conjugate (PCV-13) 07/14/2015    Pneumococcal Vaccine (Unspecified Type) 01/01/2006    TB Skin Test (PPD) Intradermal 07/31/2018, 08/21/2018, 12/15/2021    Tdap 11/04/2014       Recent Vital Data:  Patient Vitals for the past 24 hrs:   Temp Pulse Resp BP SpO2   12/24/21 0816 97.6 °F (36.4 °C) 72  (!) 170/58 95 %   12/24/21 0423 97.6 °F (36.4 °C) 67 18 (!) 130/45 97 %   12/24/21 0004 97.7 °F (36.5 °C) 67 18 (!) 136/44 97 %   12/23/21 2049     94 %   12/23/21 1936 97.4 °F (36.3 °C) 60 18 (!) 110/45 97 %   12/23/21 1513 97.6 °F (36.4 °C) 67 18 (!) 132/45 96 %   12/23/21 1053 97.6 °F (36.4 °C) 65 18 (!) 120/40 98 %     Oxygen Therapy  O2 Sat (%): 95 % (12/24/21 0816)  Pulse via Oximetry: 69 beats per minute (12/23/21 2049)  O2 Device: None (Room air) (12/23/21 2049)    Estimated body mass index is 29.17 kg/m² as calculated from the following:    Height as of this encounter: 5' 2\" (1.575 m). Weight as of this encounter: 72.3 kg (159 lb 8 oz). Intake/Output Summary (Last 24 hours) at 12/24/2021 1047  Last data filed at 12/24/2021 0853  Gross per 24 hour   Intake    Output 1900 ml   Net -1900 ml         Physical Exam:  General:    No overt distress  Head:  Normocephalic, atraumatic  Eyes:  Sclerae appear normal.  Pupils equally round. HENT:  Nares appear normal, no drainage. Moist mucous membranes  Neck:  No restricted ROM. Trachea midline  CV:   RRR. No m/r/g. Lungs: No wheezing. Even, unlabored at rest.  Abdomen:   Soft, nontender, nondistended. Extremities: Warm and dry. No cyanosis or clubbing. Skin:     No rashes. Normal coloration  Neuro:  CN II-XII grossly intact. Psych:  Normal mood and affect. Pleasant. Signed:  Lindy Calzada NP    Part of this note may have been written by using a voice dictation software. The note has been proof read but may still contain some grammatical/other typographical errors.

## 2022-01-01 ENCOUNTER — HOSPITAL ENCOUNTER (INPATIENT)
Age: 87
LOS: 8 days | Discharge: HOSPICE | DRG: 871 | End: 2022-06-10
Attending: EMERGENCY MEDICINE
Payer: MEDICARE

## 2022-01-01 ENCOUNTER — HOSPITAL ENCOUNTER (EMERGENCY)
Dept: GENERAL RADIOLOGY | Age: 87
Discharge: HOME | End: 2022-05-25
Payer: MEDICARE

## 2022-01-01 ENCOUNTER — APPOINTMENT (OUTPATIENT)
Dept: GENERAL RADIOLOGY | Age: 87
End: 2022-01-01
Attending: EMERGENCY MEDICINE
Payer: COMMERCIAL

## 2022-01-01 ENCOUNTER — HOSPITAL ENCOUNTER (EMERGENCY)
Age: 87
Discharge: HOME | End: 2022-05-23
Attending: EMERGENCY MEDICINE | Admitting: EMERGENCY MEDICINE
Payer: MEDICARE

## 2022-01-01 ENCOUNTER — APPOINTMENT (OUTPATIENT)
Dept: CT IMAGING | Age: 87
DRG: 871 | End: 2022-01-01
Payer: MEDICARE

## 2022-01-01 ENCOUNTER — APPOINTMENT (OUTPATIENT)
Dept: GENERAL RADIOLOGY | Age: 87
DRG: 871 | End: 2022-01-01
Payer: MEDICARE

## 2022-01-01 ENCOUNTER — APPOINTMENT (OUTPATIENT)
Dept: NON INVASIVE DIAGNOSTICS | Age: 87
DRG: 871 | End: 2022-01-01
Payer: MEDICARE

## 2022-01-01 ENCOUNTER — HOME CARE VISIT (OUTPATIENT)
Dept: SCHEDULING | Facility: HOME HEALTH | Age: 87
End: 2022-01-01

## 2022-01-01 ENCOUNTER — HOME HEALTH ADMISSION (OUTPATIENT)
Dept: HOME HEALTH SERVICES | Facility: HOME HEALTH | Age: 87
End: 2022-01-01
Payer: COMMERCIAL

## 2022-01-01 ENCOUNTER — APPOINTMENT (OUTPATIENT)
Dept: GENERAL RADIOLOGY | Age: 87
End: 2022-01-01
Attending: STUDENT IN AN ORGANIZED HEALTH CARE EDUCATION/TRAINING PROGRAM
Payer: COMMERCIAL

## 2022-01-01 ENCOUNTER — HOSPICE ADMISSION (OUTPATIENT)
Dept: HOSPICE | Facility: HOSPICE | Age: 87
End: 2022-01-01
Payer: MEDICARE

## 2022-01-01 ENCOUNTER — ANESTHESIA (OUTPATIENT)
Dept: ENDOSCOPY | Age: 87
DRG: 177 | End: 2022-01-01
Payer: COMMERCIAL

## 2022-01-01 ENCOUNTER — APPOINTMENT (OUTPATIENT)
Dept: NON INVASIVE DIAGNOSTICS | Age: 87
DRG: 175 | End: 2022-01-01
Attending: INTERNAL MEDICINE
Payer: COMMERCIAL

## 2022-01-01 ENCOUNTER — APPOINTMENT (OUTPATIENT)
Dept: GENERAL RADIOLOGY | Age: 87
DRG: 175 | End: 2022-01-01
Attending: EMERGENCY MEDICINE
Payer: COMMERCIAL

## 2022-01-01 ENCOUNTER — ANESTHESIA EVENT (OUTPATIENT)
Dept: ENDOSCOPY | Age: 87
DRG: 177 | End: 2022-01-01
Payer: COMMERCIAL

## 2022-01-01 ENCOUNTER — HOSPITAL ENCOUNTER (INPATIENT)
Age: 87
LOS: 11 days | Discharge: NURSING, REHAB OR LONG-TERM CARE | DRG: 177 | End: 2022-02-03
Attending: EMERGENCY MEDICINE | Admitting: HOSPITALIST
Payer: COMMERCIAL

## 2022-01-01 ENCOUNTER — HOSPITAL ENCOUNTER (OUTPATIENT)
Dept: LAB | Age: 87
Discharge: HOME | End: 2022-02-06

## 2022-01-01 ENCOUNTER — HOSPITAL ENCOUNTER (EMERGENCY)
Age: 87
Discharge: HOME | End: 2022-04-07
Attending: STUDENT IN AN ORGANIZED HEALTH CARE EDUCATION/TRAINING PROGRAM
Payer: COMMERCIAL

## 2022-01-01 ENCOUNTER — APPOINTMENT (OUTPATIENT)
Dept: NUCLEAR MEDICINE | Age: 87
DRG: 871 | End: 2022-01-01
Payer: MEDICARE

## 2022-01-01 ENCOUNTER — APPOINTMENT (OUTPATIENT)
Dept: NUCLEAR MEDICINE | Age: 87
DRG: 177 | End: 2022-01-01
Attending: INTERNAL MEDICINE
Payer: COMMERCIAL

## 2022-01-01 ENCOUNTER — APPOINTMENT (OUTPATIENT)
Dept: ULTRASOUND IMAGING | Age: 87
DRG: 175 | End: 2022-01-01
Attending: INTERNAL MEDICINE
Payer: COMMERCIAL

## 2022-01-01 ENCOUNTER — APPOINTMENT (OUTPATIENT)
Dept: CT IMAGING | Age: 87
DRG: 175 | End: 2022-01-01
Attending: EMERGENCY MEDICINE
Payer: COMMERCIAL

## 2022-01-01 ENCOUNTER — HOSPITAL ENCOUNTER (OUTPATIENT)
Dept: INFUSION THERAPY | Age: 87
Discharge: HOME | End: 2022-02-17
Payer: COMMERCIAL

## 2022-01-01 ENCOUNTER — APPOINTMENT (OUTPATIENT)
Dept: GENERAL RADIOLOGY | Age: 87
DRG: 177 | End: 2022-01-01
Attending: EMERGENCY MEDICINE
Payer: COMMERCIAL

## 2022-01-01 ENCOUNTER — HOSPITAL ENCOUNTER (INPATIENT)
Age: 87
LOS: 6 days | Discharge: HOME HEALTH | DRG: 175 | End: 2022-03-10
Attending: EMERGENCY MEDICINE | Admitting: INTERNAL MEDICINE
Payer: COMMERCIAL

## 2022-01-01 ENCOUNTER — HOSPITAL ENCOUNTER (OUTPATIENT)
Dept: LAB | Age: 87
Discharge: HOME | End: 2022-02-17
Payer: COMMERCIAL

## 2022-01-01 ENCOUNTER — HOSPITAL ENCOUNTER (EMERGENCY)
Age: 87
Discharge: HOME | End: 2022-04-06
Attending: EMERGENCY MEDICINE
Payer: COMMERCIAL

## 2022-01-01 ENCOUNTER — HOSPITAL ENCOUNTER (OUTPATIENT)
Age: 87
Setting detail: OBSERVATION
Discharge: NURSING, REHAB OR LONG-TERM CARE | End: 2022-03-23
Attending: EMERGENCY MEDICINE | Admitting: FAMILY MEDICINE
Payer: COMMERCIAL

## 2022-01-01 ENCOUNTER — APPOINTMENT (OUTPATIENT)
Dept: GENERAL RADIOLOGY | Age: 87
End: 2022-01-01
Payer: MEDICARE

## 2022-01-01 VITALS
SYSTOLIC BLOOD PRESSURE: 145 MMHG | WEIGHT: 163 LBS | HEIGHT: 62 IN | TEMPERATURE: 97.3 F | DIASTOLIC BLOOD PRESSURE: 66 MMHG | OXYGEN SATURATION: 100 % | RESPIRATION RATE: 16 BRPM | HEART RATE: 77 BPM | BODY MASS INDEX: 30 KG/M2

## 2022-01-01 VITALS
BODY MASS INDEX: 26.49 KG/M2 | TEMPERATURE: 98.4 F | RESPIRATION RATE: 18 BRPM | HEIGHT: 65 IN | WEIGHT: 159 LBS | DIASTOLIC BLOOD PRESSURE: 59 MMHG | OXYGEN SATURATION: 93 % | HEART RATE: 79 BPM | SYSTOLIC BLOOD PRESSURE: 141 MMHG

## 2022-01-01 VITALS
HEART RATE: 70 BPM | BODY MASS INDEX: 26.78 KG/M2 | HEIGHT: 62 IN | OXYGEN SATURATION: 97 % | RESPIRATION RATE: 16 BRPM | TEMPERATURE: 98.3 F | DIASTOLIC BLOOD PRESSURE: 82 MMHG | WEIGHT: 145.5 LBS | SYSTOLIC BLOOD PRESSURE: 170 MMHG

## 2022-01-01 VITALS
DIASTOLIC BLOOD PRESSURE: 69 MMHG | TEMPERATURE: 98.3 F | HEART RATE: 78 BPM | SYSTOLIC BLOOD PRESSURE: 160 MMHG | RESPIRATION RATE: 18 BRPM | OXYGEN SATURATION: 92 %

## 2022-01-01 VITALS
DIASTOLIC BLOOD PRESSURE: 83 MMHG | SYSTOLIC BLOOD PRESSURE: 150 MMHG | OXYGEN SATURATION: 97 % | HEART RATE: 89 BPM | RESPIRATION RATE: 18 BRPM | TEMPERATURE: 98 F

## 2022-01-01 VITALS
TEMPERATURE: 99.4 F | HEIGHT: 62 IN | SYSTOLIC BLOOD PRESSURE: 146 MMHG | OXYGEN SATURATION: 93 % | RESPIRATION RATE: 19 BRPM | BODY MASS INDEX: 26.78 KG/M2 | DIASTOLIC BLOOD PRESSURE: 57 MMHG | WEIGHT: 145.5 LBS | HEART RATE: 67 BPM

## 2022-01-01 VITALS
OXYGEN SATURATION: 94 % | RESPIRATION RATE: 17 BRPM | SYSTOLIC BLOOD PRESSURE: 144 MMHG | BODY MASS INDEX: 27.27 KG/M2 | TEMPERATURE: 98.6 F | HEART RATE: 89 BPM | DIASTOLIC BLOOD PRESSURE: 84 MMHG | WEIGHT: 148.2 LBS | HEIGHT: 62 IN

## 2022-01-01 VITALS
SYSTOLIC BLOOD PRESSURE: 159 MMHG | DIASTOLIC BLOOD PRESSURE: 74 MMHG | OXYGEN SATURATION: 88 % | HEART RATE: 81 BPM | TEMPERATURE: 98.4 F | RESPIRATION RATE: 14 BRPM

## 2022-01-01 DIAGNOSIS — N17.9 AKI (ACUTE KIDNEY INJURY) (HCC): ICD-10-CM

## 2022-01-01 DIAGNOSIS — R06.02 SOB (SHORTNESS OF BREATH): ICD-10-CM

## 2022-01-01 DIAGNOSIS — F32.A ANXIETY AND DEPRESSION: Chronic | ICD-10-CM

## 2022-01-01 DIAGNOSIS — D50.0 IRON DEFICIENCY ANEMIA DUE TO CHRONIC BLOOD LOSS: ICD-10-CM

## 2022-01-01 DIAGNOSIS — R80.9 CONTROLLED TYPE 2 DIABETES MELLITUS WITH MICROALBUMINURIA, WITHOUT LONG-TERM CURRENT USE OF INSULIN (HCC): ICD-10-CM

## 2022-01-01 DIAGNOSIS — E11.40 TYPE 2 DIABETES MELLITUS WITH DIABETIC NEUROPATHY, WITHOUT LONG-TERM CURRENT USE OF INSULIN (HCC): ICD-10-CM

## 2022-01-01 DIAGNOSIS — I10 ESSENTIAL HYPERTENSION: Chronic | ICD-10-CM

## 2022-01-01 DIAGNOSIS — E87.5 HYPERKALEMIA: ICD-10-CM

## 2022-01-01 DIAGNOSIS — B95.5 STREPTOCOCCAL BACTEREMIA: ICD-10-CM
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
## 2022-01-01 DIAGNOSIS — J90 PLEURAL EFFUSION: ICD-10-CM

## 2022-01-01 DIAGNOSIS — E13.10 DIABETIC KETOACIDOSIS WITHOUT COMA ASSOCIATED WITH OTHER SPECIFIED DIABETES MELLITUS (HCC): Primary | ICD-10-CM

## 2022-01-01 DIAGNOSIS — M25.559 HIP PAIN: ICD-10-CM

## 2022-01-01 DIAGNOSIS — D62 ACUTE BLOOD LOSS ANEMIA: ICD-10-CM

## 2022-01-01 DIAGNOSIS — M25.562 ACUTE PAIN OF LEFT KNEE: ICD-10-CM

## 2022-01-01 DIAGNOSIS — M25.552 ACUTE HIP PAIN, LEFT: ICD-10-CM
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
## 2022-01-01 DIAGNOSIS — R09.02 HYPOXIA: Primary | ICD-10-CM

## 2022-01-01 DIAGNOSIS — R91.8 BILATERAL PULMONARY INFILTRATES ON CXR: Primary | ICD-10-CM

## 2022-01-01 DIAGNOSIS — N30.00 ACUTE CYSTITIS WITHOUT HEMATURIA: ICD-10-CM

## 2022-01-01 DIAGNOSIS — J18.9 HCAP (HEALTHCARE-ASSOCIATED PNEUMONIA): ICD-10-CM
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
## 2022-01-01 DIAGNOSIS — U09.9 POST-COVID-19 CONDITION: ICD-10-CM

## 2022-01-01 DIAGNOSIS — F41.9 ANXIETY AND DEPRESSION: Chronic | ICD-10-CM

## 2022-01-01 DIAGNOSIS — J81.0 ACUTE PULMONARY EDEMA (HCC): ICD-10-CM

## 2022-01-01 DIAGNOSIS — K21.9 GASTROESOPHAGEAL REFLUX DISEASE, UNSPECIFIED WHETHER ESOPHAGITIS PRESENT: ICD-10-CM

## 2022-01-01 DIAGNOSIS — R73.9 HYPERGLYCEMIA: ICD-10-CM

## 2022-01-01 DIAGNOSIS — K92.2 LOWER GI HEMORRHAGE: Primary | ICD-10-CM

## 2022-01-01 DIAGNOSIS — I26.93 SINGLE SUBSEGMENTAL PULMONARY EMBOLISM WITHOUT ACUTE COR PULMONALE (HCC): ICD-10-CM

## 2022-01-01 DIAGNOSIS — M54.50 ACUTE MIDLINE LOW BACK PAIN WITHOUT SCIATICA: ICD-10-CM

## 2022-01-01 DIAGNOSIS — R09.02 HYPOXIA: ICD-10-CM

## 2022-01-01 DIAGNOSIS — R78.81 STREPTOCOCCAL BACTEREMIA: ICD-10-CM

## 2022-01-01 DIAGNOSIS — E11.29 CONTROLLED TYPE 2 DIABETES MELLITUS WITH MICROALBUMINURIA, WITHOUT LONG-TERM CURRENT USE OF INSULIN (HCC): ICD-10-CM

## 2022-01-01 DIAGNOSIS — R53.1 GENERALIZED WEAKNESS: ICD-10-CM

## 2022-01-01 DIAGNOSIS — R19.7 DIARRHEA, UNSPECIFIED TYPE: Primary | ICD-10-CM

## 2022-01-01 DIAGNOSIS — R11.2 NAUSEA AND VOMITING, UNSPECIFIED VOMITING TYPE: Primary | ICD-10-CM

## 2022-01-01 DIAGNOSIS — N39.0 URINARY TRACT INFECTION WITHOUT HEMATURIA, SITE UNSPECIFIED: ICD-10-CM

## 2022-01-01 DIAGNOSIS — J18.9 HAP (HOSPITAL-ACQUIRED PNEUMONIA): ICD-10-CM

## 2022-01-01 DIAGNOSIS — R19.7 DIARRHEA, UNSPECIFIED TYPE: ICD-10-CM

## 2022-01-01 LAB
ABO + RH BLD: NORMAL
ABO + RH BLD: NORMAL
ACCESSION NUMBER, LLC1M: ABNORMAL
ALBUMIN SERPL-MCNC: 1.6 G/DL (ref 3.2–4.6)
ALBUMIN SERPL-MCNC: 2 G/DL (ref 3.2–4.6)
ALBUMIN SERPL-MCNC: 2.1 G/DL (ref 3.2–4.6)
ALBUMIN SERPL-MCNC: 2.1 G/DL (ref 3.2–4.6)
ALBUMIN SERPL-MCNC: 2.2 G/DL (ref 3.2–4.6)
ALBUMIN SERPL-MCNC: 2.2 G/DL (ref 3.2–4.6)
ALBUMIN SERPL-MCNC: 2.5 G/DL (ref 3.2–4.6)
ALBUMIN/GLOB SERPL: 0.4 {RATIO} (ref 1.2–3.5)
ALBUMIN/GLOB SERPL: 0.4 {RATIO} (ref 1.2–3.5)
ALBUMIN/GLOB SERPL: 0.5 {RATIO} (ref 1.2–3.5)
ALBUMIN/GLOB SERPL: 0.6 {RATIO} (ref 1.2–3.5)
ALBUMIN/GLOB SERPL: 0.6 {RATIO} (ref 1.2–3.5)
ALP SERPL-CCNC: 111 U/L (ref 50–136)
ALP SERPL-CCNC: 64 U/L (ref 50–136)
ALP SERPL-CCNC: 68 U/L (ref 50–136)
ALP SERPL-CCNC: 76 U/L (ref 50–136)
ALP SERPL-CCNC: 78 U/L (ref 50–136)
ALP SERPL-CCNC: 88 U/L (ref 50–136)
ALP SERPL-CCNC: 91 U/L (ref 50–136)
ALT SERPL-CCNC: 14 U/L (ref 12–65)
ALT SERPL-CCNC: 23 U/L (ref 12–65)
ALT SERPL-CCNC: 32 U/L (ref 12–65)
ALT SERPL-CCNC: 35 U/L (ref 12–65)
ALT SERPL-CCNC: 38 U/L (ref 12–65)
ALT SERPL-CCNC: 38 U/L (ref 12–65)
ALT SERPL-CCNC: 58 U/L (ref 12–65)
ANION GAP SERPL CALC-SCNC: 1 MMOL/L (ref 7–16)
ANION GAP SERPL CALC-SCNC: 10 MMOL/L (ref 7–16)
ANION GAP SERPL CALC-SCNC: 10 MMOL/L (ref 7–16)
ANION GAP SERPL CALC-SCNC: 11 MMOL/L (ref 7–16)
ANION GAP SERPL CALC-SCNC: 19 MMOL/L (ref 7–16)
ANION GAP SERPL CALC-SCNC: 2 MMOL/L (ref 7–16)
ANION GAP SERPL CALC-SCNC: 2 MMOL/L (ref 7–16)
ANION GAP SERPL CALC-SCNC: 3 MMOL/L (ref 7–16)
ANION GAP SERPL CALC-SCNC: 4 MMOL/L (ref 7–16)
ANION GAP SERPL CALC-SCNC: 5 MMOL/L (ref 7–16)
ANION GAP SERPL CALC-SCNC: 6 MMOL/L (ref 7–16)
ANION GAP SERPL CALC-SCNC: 7 MMOL/L (ref 7–16)
ANION GAP SERPL CALC-SCNC: 8 MMOL/L (ref 7–16)
ANION GAP SERPL CALC-SCNC: 8 MMOL/L (ref 7–16)
ANION GAP SERPL CALC-SCNC: 9 MMOL/L (ref 7–16)
ANION GAP SERPL CALC-SCNC: 9 MMOL/L (ref 7–16)
ANTIGENS PRESENT RBC DONR: NORMAL
APPEARANCE UR: ABNORMAL
APPEARANCE UR: CLEAR
APTT PPP: 34.6 SEC (ref 24.1–35.1)
ARTERIAL PATENCY WRIST A: NEGATIVE
AST SERPL-CCNC: 16 U/L (ref 15–37)
AST SERPL-CCNC: 16 U/L (ref 15–37)
AST SERPL-CCNC: 21 U/L (ref 15–37)
AST SERPL-CCNC: 23 U/L (ref 15–37)
AST SERPL-CCNC: 26 U/L (ref 15–37)
AST SERPL-CCNC: 41 U/L (ref 15–37)
AST SERPL-CCNC: 60 U/L (ref 15–37)
ATRIAL RATE: 85 BPM
ATRIAL RATE: 94 BPM
BACTERIA SPEC CULT: ABNORMAL
BACTERIA SPEC CULT: NORMAL
BACTERIA URNS QL MICRO: 0 /HPF
BACTERIA URNS QL MICRO: 0 /HPF
BACTERIA URNS QL MICRO: ABNORMAL /HPF
BASE DEFICIT BLD-SCNC: 4.9 MMOL/L
BASOPHILS # BLD: 0 K/UL (ref 0–0.2)
BASOPHILS # BLD: 0.1 K/UL (ref 0–0.2)
BASOPHILS NFR BLD: 0 % (ref 0–2)
BASOPHILS NFR BLD: 1 % (ref 0–2)
BASOPHILS NFR BLD: 1 % (ref 0–2)
BDY SITE: ABNORMAL
BILIRUB SERPL-MCNC: 0.3 MG/DL (ref 0.2–1.1)
BILIRUB SERPL-MCNC: 0.4 MG/DL (ref 0.2–1.1)
BILIRUB SERPL-MCNC: 0.5 MG/DL (ref 0.2–1.1)
BILIRUB SERPL-MCNC: 0.5 MG/DL (ref 0.2–1.1)
BILIRUB UR QL: NEGATIVE
BLD PROD TYP BPU: NORMAL
BLOOD BANK CMNT PATIENT-IMP: NORMAL
BLOOD BANK CMNT PATIENT-IMP: NORMAL
BLOOD GROUP ANTIBODIES SERPL: NORMAL
BNP SERPL-MCNC: 3697 PG/ML
BNP SERPL-MCNC: 3899 PG/ML
BPU ID: NORMAL
BUN SERPL-MCNC: 20 MG/DL (ref 8–23)
BUN SERPL-MCNC: 21 MG/DL (ref 8–23)
BUN SERPL-MCNC: 22 MG/DL (ref 8–23)
BUN SERPL-MCNC: 23 MG/DL (ref 8–23)
BUN SERPL-MCNC: 24 MG/DL (ref 8–23)
BUN SERPL-MCNC: 26 MG/DL (ref 8–23)
BUN SERPL-MCNC: 27 MG/DL (ref 8–23)
BUN SERPL-MCNC: 30 MG/DL (ref 8–23)
BUN SERPL-MCNC: 31 MG/DL (ref 8–23)
BUN SERPL-MCNC: 33 MG/DL (ref 8–23)
BUN SERPL-MCNC: 34 MG/DL (ref 8–23)
BUN SERPL-MCNC: 34 MG/DL (ref 8–23)
BUN SERPL-MCNC: 37 MG/DL (ref 8–23)
BUN SERPL-MCNC: 42 MG/DL (ref 8–23)
BUN SERPL-MCNC: 43 MG/DL (ref 8–23)
BUN SERPL-MCNC: 44 MG/DL (ref 8–23)
BUN SERPL-MCNC: 51 MG/DL (ref 8–23)
BUN SERPL-MCNC: 53 MG/DL (ref 8–23)
BUN SERPL-MCNC: 56 MG/DL (ref 8–23)
BUN SERPL-MCNC: 60 MG/DL (ref 8–23)
BUN SERPL-MCNC: 63 MG/DL (ref 8–23)
BUN SERPL-MCNC: 72 MG/DL (ref 8–23)
BUN SERPL-MCNC: 75 MG/DL (ref 8–23)
BUN SERPL-MCNC: 78 MG/DL (ref 8–23)
BUN SERPL-MCNC: 83 MG/DL (ref 8–23)
BUN SERPL-MCNC: 83 MG/DL (ref 8–23)
BUN SERPL-MCNC: 85 MG/DL (ref 8–23)
CALCIUM SERPL-MCNC: 10 MG/DL (ref 8.3–10.4)
CALCIUM SERPL-MCNC: 10.2 MG/DL (ref 8.3–10.4)
CALCIUM SERPL-MCNC: 8.7 MG/DL (ref 8.3–10.4)
CALCIUM SERPL-MCNC: 8.7 MG/DL (ref 8.3–10.4)
CALCIUM SERPL-MCNC: 8.8 MG/DL (ref 8.3–10.4)
CALCIUM SERPL-MCNC: 8.8 MG/DL (ref 8.3–10.4)
CALCIUM SERPL-MCNC: 9 MG/DL (ref 8.3–10.4)
CALCIUM SERPL-MCNC: 9 MG/DL (ref 8.3–10.4)
CALCIUM SERPL-MCNC: 9.1 MG/DL (ref 8.3–10.4)
CALCIUM SERPL-MCNC: 9.2 MG/DL (ref 8.3–10.4)
CALCIUM SERPL-MCNC: 9.3 MG/DL (ref 8.3–10.4)
CALCIUM SERPL-MCNC: 9.4 MG/DL (ref 8.3–10.4)
CALCIUM SERPL-MCNC: 9.5 MG/DL (ref 8.3–10.4)
CALCIUM SERPL-MCNC: 9.6 MG/DL (ref 8.3–10.4)
CALCIUM SERPL-MCNC: 9.6 MG/DL (ref 8.3–10.4)
CALCIUM SERPL-MCNC: 9.8 MG/DL (ref 8.3–10.4)
CALCIUM SERPL-MCNC: 9.8 MG/DL (ref 8.3–10.4)
CALCULATED P AXIS, ECG09: 131 DEGREES
CALCULATED P AXIS, ECG09: 46 DEGREES
CALCULATED R AXIS, ECG10: -100 DEGREES
CALCULATED R AXIS, ECG10: -50 DEGREES
CALCULATED R AXIS, ECG10: -50 DEGREES
CALCULATED T AXIS, ECG11: -16 DEGREES
CALCULATED T AXIS, ECG11: -23 DEGREES
CALCULATED T AXIS, ECG11: 100 DEGREES
CASTS URNS QL MICRO: 0 /LPF
CASTS URNS QL MICRO: 0 /LPF
CASTS URNS QL MICRO: ABNORMAL /LPF
CHLORIDE SERPL-SCNC: 106 MMOL/L (ref 98–107)
CHLORIDE SERPL-SCNC: 108 MMOL/L (ref 98–107)
CHLORIDE SERPL-SCNC: 108 MMOL/L (ref 98–107)
CHLORIDE SERPL-SCNC: 110 MMOL/L (ref 98–107)
CHLORIDE SERPL-SCNC: 110 MMOL/L (ref 98–107)
CHLORIDE SERPL-SCNC: 111 MMOL/L (ref 98–107)
CHLORIDE SERPL-SCNC: 111 MMOL/L (ref 98–107)
CHLORIDE SERPL-SCNC: 112 MMOL/L (ref 98–107)
CHLORIDE SERPL-SCNC: 113 MMOL/L (ref 98–107)
CHLORIDE SERPL-SCNC: 114 MMOL/L (ref 98–107)
CHLORIDE SERPL-SCNC: 116 MMOL/L (ref 98–107)
CHLORIDE SERPL-SCNC: 117 MMOL/L (ref 98–107)
CHLORIDE SERPL-SCNC: 119 MMOL/L (ref 98–107)
CO2 SERPL-SCNC: 13 MMOL/L (ref 21–32)
CO2 SERPL-SCNC: 18 MMOL/L (ref 21–32)
CO2 SERPL-SCNC: 19 MMOL/L (ref 21–32)
CO2 SERPL-SCNC: 20 MMOL/L (ref 21–32)
CO2 SERPL-SCNC: 20 MMOL/L (ref 21–32)
CO2 SERPL-SCNC: 21 MMOL/L (ref 21–32)
CO2 SERPL-SCNC: 22 MMOL/L (ref 21–32)
CO2 SERPL-SCNC: 22 MMOL/L (ref 21–32)
CO2 SERPL-SCNC: 23 MMOL/L (ref 21–32)
CO2 SERPL-SCNC: 23 MMOL/L (ref 21–32)
CO2 SERPL-SCNC: 24 MMOL/L (ref 21–32)
CO2 SERPL-SCNC: 25 MMOL/L (ref 21–32)
CO2 SERPL-SCNC: 25 MMOL/L (ref 21–32)
CO2 SERPL-SCNC: 26 MMOL/L (ref 21–32)
CO2 SERPL-SCNC: 27 MMOL/L (ref 21–32)
CO2 SERPL-SCNC: 28 MMOL/L (ref 21–32)
CO2 SERPL-SCNC: 28 MMOL/L (ref 21–32)
CO2 SERPL-SCNC: 29 MMOL/L (ref 21–32)
CO2 SERPL-SCNC: 30 MMOL/L (ref 21–32)
COLOR UR: YELLOW
COVID-19 RAPID TEST, COVR: DETECTED
COVID-19 RAPID TEST, COVR: NOT DETECTED
CREAT SERPL-MCNC: 0.9 MG/DL (ref 0.6–1)
CREAT SERPL-MCNC: 0.94 MG/DL (ref 0.6–1)
CREAT SERPL-MCNC: 0.96 MG/DL (ref 0.6–1)
CREAT SERPL-MCNC: 1 MG/DL (ref 0.6–1)
CREAT SERPL-MCNC: 1 MG/DL (ref 0.6–1)
CREAT SERPL-MCNC: 1.06 MG/DL (ref 0.6–1)
CREAT SERPL-MCNC: 1.07 MG/DL (ref 0.6–1)
CREAT SERPL-MCNC: 1.09 MG/DL (ref 0.6–1)
CREAT SERPL-MCNC: 1.1 MG/DL (ref 0.6–1)
CREAT SERPL-MCNC: 1.1 MG/DL (ref 0.6–1)
CREAT SERPL-MCNC: 1.14 MG/DL (ref 0.6–1)
CREAT SERPL-MCNC: 1.2 MG/DL (ref 0.6–1)
CREAT SERPL-MCNC: 1.3 MG/DL (ref 0.6–1)
CREAT SERPL-MCNC: 1.56 MG/DL (ref 0.6–1)
CREAT SERPL-MCNC: 1.6 MG/DL (ref 0.6–1)
CREAT SERPL-MCNC: 1.7 MG/DL (ref 0.6–1)
CREAT SERPL-MCNC: 1.8 MG/DL (ref 0.6–1)
CREAT SERPL-MCNC: 1.8 MG/DL (ref 0.6–1)
CREAT SERPL-MCNC: 1.9 MG/DL (ref 0.6–1)
CROSSMATCH RESULT,%XM: NORMAL
CRP SERPL-MCNC: 10.5 MG/DL (ref 0–0.9)
CRP SERPL-MCNC: 6 MG/DL (ref 0–0.9)
CRYSTALS URNS QL MICRO: 0 /LPF
D DIMER PPP FEU-MCNC: 9.83 UG/ML(FEU)
DIAGNOSIS, 93000: NORMAL
DIFFERENTIAL METHOD BLD: ABNORMAL
ECHO AO ASC DIAM: 3 CM
ECHO AO ASC DIAM: 3.3 CM
ECHO AO ASCENDING AORTA INDEX: 1.78 CM/M2
ECHO AO ASCENDING AORTA INDEX: 1.95 CM/M2
ECHO AO ROOT DIAM: 3.3 CM
ECHO AO ROOT DIAM: 3.4 CM
ECHO AO ROOT INDEX: 1.95 CM/M2
ECHO AO ROOT INDEX: 2.01 CM/M2
ECHO AV AREA PEAK VELOCITY: 2 CM2
ECHO AV AREA PEAK VELOCITY: 2.3 CM2
ECHO AV AREA VTI: 1.9 CM2
ECHO AV AREA VTI: 2.4 CM2
ECHO AV AREA/BSA PEAK VELOCITY: 1.2 CM2/M2
ECHO AV AREA/BSA PEAK VELOCITY: 1.4 CM2/M2
ECHO AV AREA/BSA VTI: 1.1 CM2/M2
ECHO AV AREA/BSA VTI: 1.4 CM2/M2
ECHO AV MEAN GRADIENT: 6 MMHG
ECHO AV MEAN GRADIENT: 6 MMHG
ECHO AV MEAN GRADIENT: 7 MMHG
ECHO AV MEAN VELOCITY: 1.2 M/S
ECHO AV MEAN VELOCITY: 1.2 M/S
ECHO AV PEAK GRADIENT: 12 MMHG
ECHO AV PEAK GRADIENT: 9 MMHG
ECHO AV PEAK VELOCITY: 1.5 M/S
ECHO AV PEAK VELOCITY: 1.8 M/S
ECHO AV VELOCITY RATIO: 0.73
ECHO AV VELOCITY RATIO: 0.78
ECHO AV VTI: 30.5 CM
ECHO AV VTI: 33.5 CM
ECHO BSA: 1.67 M2
ECHO EST RA PRESSURE: 8 MMHG
ECHO EST RA PRESSURE: 8 MMHG
ECHO IVC PROX: 2.4 CM
ECHO IVC PROX: 2.5 CM
ECHO LA AREA 2C: 22.5 CM2
ECHO LA AREA 2C: 26.8 CM2
ECHO LA AREA 4C: 22.9 CM2
ECHO LA AREA 4C: 33.1 CM2
ECHO LA DIAMETER INDEX: 2.78 CM/M2
ECHO LA DIAMETER INDEX: 2.9 CM/M2
ECHO LA DIAMETER: 4.7 CM
ECHO LA DIAMETER: 4.9 CM
ECHO LA MAJOR AXIS: 5.4 CM
ECHO LA MAJOR AXIS: 7.6 CM
ECHO LA MINOR AXIS: 5.6 CM
ECHO LA MINOR AXIS: 6.4 CM
ECHO LA TO AORTIC ROOT RATIO: 1.38
ECHO LA TO AORTIC ROOT RATIO: 1.48
ECHO LA VOL BP: 111 ML (ref 22–52)
ECHO LA VOL BP: 74 ML (ref 22–52)
ECHO LA VOL/BSA BIPLANE: 44 ML/M2 (ref 16–34)
ECHO LA VOL/BSA BIPLANE: 66 ML/M2 (ref 16–34)
ECHO LV E' LATERAL VELOCITY: 12 CM/S
ECHO LV E' LATERAL VELOCITY: 5 CM/S
ECHO LV E' SEPTAL VELOCITY: 5 CM/S
ECHO LV E' SEPTAL VELOCITY: 9 CM/S
ECHO LV EDV A2C: 100 ML
ECHO LV EDV A2C: 97 ML
ECHO LV EDV A4C: 117 ML
ECHO LV EDV A4C: 92 ML
ECHO LV EDV INDEX A4C: 54 ML/M2
ECHO LV EDV INDEX A4C: 69 ML/M2
ECHO LV EDV NDEX A2C: 57 ML/M2
ECHO LV EDV NDEX A2C: 59 ML/M2
ECHO LV EJECTION FRACTION A2C: 44 %
ECHO LV EJECTION FRACTION A2C: 62 %
ECHO LV EJECTION FRACTION A4C: 47 %
ECHO LV EJECTION FRACTION A4C: 66 %
ECHO LV EJECTION FRACTION BIPLANE: 46 % (ref 55–100)
ECHO LV EJECTION FRACTION BIPLANE: 64 % (ref 55–100)
ECHO LV ESV A2C: 38 ML
ECHO LV ESV A2C: 55 ML
ECHO LV ESV A4C: 31 ML
ECHO LV ESV A4C: 62 ML
ECHO LV ESV INDEX A2C: 22 ML/M2
ECHO LV ESV INDEX A2C: 33 ML/M2
ECHO LV ESV INDEX A4C: 18 ML/M2
ECHO LV ESV INDEX A4C: 37 ML/M2
ECHO LV FRACTIONAL SHORTENING: 21 % (ref 28–44)
ECHO LV FRACTIONAL SHORTENING: 46 % (ref 28–44)
ECHO LV INTERNAL DIMENSION DIASTOLE INDEX: 2.84 CM/M2
ECHO LV INTERNAL DIMENSION DIASTOLE INDEX: 2.96 CM/M2
ECHO LV INTERNAL DIMENSION DIASTOLIC: 4.8 CM (ref 3.9–5.3)
ECHO LV INTERNAL DIMENSION DIASTOLIC: 5 CM (ref 3.9–5.3)
ECHO LV INTERNAL DIMENSION SYSTOLIC INDEX: 1.6 CM/M2
ECHO LV INTERNAL DIMENSION SYSTOLIC INDEX: 2.25 CM/M2
ECHO LV INTERNAL DIMENSION SYSTOLIC: 2.7 CM
ECHO LV INTERNAL DIMENSION SYSTOLIC: 3.8 CM
ECHO LV IVSD: 0.9 CM (ref 0.6–0.9)
ECHO LV IVSD: 1.1 CM (ref 0.6–0.9)
ECHO LV MASS 2D: 147.8 G (ref 67–162)
ECHO LV MASS 2D: 194.4 G (ref 67–162)
ECHO LV MASS INDEX 2D: 115 G/M2 (ref 43–95)
ECHO LV MASS INDEX 2D: 87.4 G/M2 (ref 43–95)
ECHO LV POSTERIOR WALL DIASTOLIC: 0.9 CM (ref 0.6–0.9)
ECHO LV POSTERIOR WALL DIASTOLIC: 1 CM (ref 0.6–0.9)
ECHO LV RELATIVE WALL THICKNESS RATIO: 0.38
ECHO LV RELATIVE WALL THICKNESS RATIO: 0.4
ECHO LVOT AREA: 2.8 CM2
ECHO LVOT AREA: 2.8 CM2
ECHO LVOT AV VTI INDEX: 0.67
ECHO LVOT AV VTI INDEX: 0.85
ECHO LVOT DIAM: 1.9 CM
ECHO LVOT DIAM: 1.9 CM
ECHO LVOT MEAN GRADIENT: 2 MMHG
ECHO LVOT MEAN GRADIENT: 4 MMHG
ECHO LVOT PEAK GRADIENT: 5 MMHG
ECHO LVOT PEAK GRADIENT: 8 MMHG
ECHO LVOT PEAK VELOCITY: 1.1 M/S
ECHO LVOT PEAK VELOCITY: 1.4 M/S
ECHO LVOT STROKE VOLUME INDEX: 34.4 ML/M2
ECHO LVOT STROKE VOLUME INDEX: 48 ML/M2
ECHO LVOT SV: 58.1 ML
ECHO LVOT SV: 81 ML
ECHO LVOT VTI: 20.5 CM
ECHO LVOT VTI: 28.6 CM
ECHO MV A VELOCITY: 1.15 M/S
ECHO MV A VELOCITY: 1.37 M/S
ECHO MV AREA VTI: 1.2 CM2
ECHO MV E DECELERATION TIME (DT): 177 MS
ECHO MV E DECELERATION TIME (DT): 261 MS
ECHO MV E VELOCITY: 0.85 M/S
ECHO MV E VELOCITY: 1.51 M/S
ECHO MV E/A RATIO: 0.62
ECHO MV E/A RATIO: 1.31
ECHO MV E/E' LATERAL: 12.58
ECHO MV E/E' LATERAL: 17
ECHO MV E/E' RATIO (AVERAGED): 14.68
ECHO MV E/E' RATIO (AVERAGED): 17
ECHO MV E/E' SEPTAL: 16.78
ECHO MV E/E' SEPTAL: 17
ECHO MV EROA PISA: 20 CM2
ECHO MV LVOT VTI INDEX: 2.28
ECHO MV MAX VELOCITY: 1.8 M/S
ECHO MV MEAN GRADIENT: 5 MMHG
ECHO MV MEAN VELOCITY: 1 M/S
ECHO MV PEAK GRADIENT: 13 MMHG
ECHO MV REGURGITANT ALIASING (NYQUIST) VELOCITY: 22 CM/S
ECHO MV REGURGITANT PEAK GRADIENT: 125 MMHG
ECHO MV REGURGITANT PEAK VELOCITY: 5.6 M/S
ECHO MV REGURGITANT RADIUS PISA: 0.9 CM
ECHO MV REGURGITANT VOLUME PISA: 3697.01 ML
ECHO MV REGURGITANT VTIA: 185 CM
ECHO MV VTI: 46.8 CM
ECHO PV ACCELERATION TIME (AT): 124 MS
ECHO PV ACCELERATION TIME (AT): 92 MS
ECHO PV MAX VELOCITY: 1 M/S
ECHO PV PEAK GRADIENT: 4 MMHG
ECHO RIGHT VENTRICULAR SYSTOLIC PRESSURE (RVSP): 91 MMHG
ECHO RV BASAL DIMENSION: 3.4 CM
ECHO RV BASAL DIMENSION: 4.1 CM
ECHO RV INTERNAL DIMENSION: 3.7 CM
ECHO RV INTERNAL DIMENSION: 3.8 CM
ECHO RV TAPSE: 2 CM (ref 1.7–?)
ECHO RV TAPSE: 2.2 CM (ref 1.5–2)
ECHO TV REGURGITANT MAX VELOCITY: 4.56 M/S
ECHO TV REGURGITANT PEAK GRADIENT: 83 MMHG
EKG ATRIAL RATE: 153 BPM
EKG DIAGNOSIS: NORMAL
EKG P AXIS: 55 DEGREES
EKG P-R INTERVAL: 162 MS
EKG Q-T INTERVAL: 365 MS
EKG QRS DURATION: 137 MS
EKG QTC CALCULATION (BAZETT): 459 MS
EKG R AXIS: -30 DEGREES
EKG T AXIS: 43 DEGREES
EKG VENTRICULAR RATE: 95 BPM
EOSINOPHIL # BLD: 0 K/UL (ref 0–0.8)
EOSINOPHIL # BLD: 0.1 K/UL (ref 0–0.8)
EOSINOPHIL # BLD: 0.2 K/UL (ref 0–0.8)
EOSINOPHIL # BLD: 0.3 K/UL (ref 0–0.8)
EOSINOPHIL NFR BLD: 0 % (ref 0.5–7.8)
EOSINOPHIL NFR BLD: 1 % (ref 0.5–7.8)
EOSINOPHIL NFR BLD: 2 % (ref 0.5–7.8)
EOSINOPHIL NFR BLD: 3 % (ref 0.5–7.8)
EOSINOPHIL NFR BLD: 3 % (ref 0.5–7.8)
EPI CELLS #/AREA URNS HPF: 0 /HPF
EPI CELLS #/AREA URNS HPF: ABNORMAL /HPF
ERYTHROCYTE [DISTWIDTH] IN BLOOD BY AUTOMATED COUNT: 13.4 % (ref 11.9–14.6)
ERYTHROCYTE [DISTWIDTH] IN BLOOD BY AUTOMATED COUNT: 14.3 % (ref 11.9–14.6)
ERYTHROCYTE [DISTWIDTH] IN BLOOD BY AUTOMATED COUNT: 14.5 % (ref 11.9–14.6)
ERYTHROCYTE [DISTWIDTH] IN BLOOD BY AUTOMATED COUNT: 14.6 % (ref 11.9–14.6)
ERYTHROCYTE [DISTWIDTH] IN BLOOD BY AUTOMATED COUNT: 14.7 % (ref 11.9–14.6)
ERYTHROCYTE [DISTWIDTH] IN BLOOD BY AUTOMATED COUNT: 15 % (ref 11.9–14.6)
ERYTHROCYTE [DISTWIDTH] IN BLOOD BY AUTOMATED COUNT: 15.1 % (ref 11.9–14.6)
ERYTHROCYTE [DISTWIDTH] IN BLOOD BY AUTOMATED COUNT: 17.2 % (ref 11.9–14.6)
ERYTHROCYTE [DISTWIDTH] IN BLOOD BY AUTOMATED COUNT: 17.4 % (ref 11.9–14.6)
ERYTHROCYTE [DISTWIDTH] IN BLOOD BY AUTOMATED COUNT: 17.6 % (ref 11.9–14.6)
ERYTHROCYTE [DISTWIDTH] IN BLOOD BY AUTOMATED COUNT: 17.7 % (ref 11.9–14.6)
ERYTHROCYTE [DISTWIDTH] IN BLOOD BY AUTOMATED COUNT: 17.8 % (ref 11.9–14.6)
ERYTHROCYTE [DISTWIDTH] IN BLOOD BY AUTOMATED COUNT: 18 % (ref 11.9–14.6)
ERYTHROCYTE [DISTWIDTH] IN BLOOD BY AUTOMATED COUNT: 18.1 % (ref 11.9–14.6)
ERYTHROCYTE [DISTWIDTH] IN BLOOD BY AUTOMATED COUNT: 18.2 % (ref 11.9–14.6)
ERYTHROCYTE [DISTWIDTH] IN BLOOD BY AUTOMATED COUNT: 18.3 % (ref 11.9–14.6)
ERYTHROCYTE [DISTWIDTH] IN BLOOD BY AUTOMATED COUNT: 18.4 % (ref 11.9–14.6)
ERYTHROCYTE [DISTWIDTH] IN BLOOD BY AUTOMATED COUNT: 18.5 % (ref 11.9–14.6)
ERYTHROCYTE [DISTWIDTH] IN BLOOD BY AUTOMATED COUNT: 18.6 % (ref 11.9–14.6)
ERYTHROCYTE [DISTWIDTH] IN BLOOD BY AUTOMATED COUNT: 18.6 % (ref 11.9–14.6)
ERYTHROCYTE [DISTWIDTH] IN BLOOD BY AUTOMATED COUNT: 19 % (ref 11.9–14.6)
ERYTHROCYTE [DISTWIDTH] IN BLOOD BY AUTOMATED COUNT: 19.3 % (ref 11.9–14.6)
ERYTHROCYTE [DISTWIDTH] IN BLOOD BY AUTOMATED COUNT: 19.7 % (ref 11.9–14.6)
EST. AVERAGE GLUCOSE BLD GHB EST-MCNC: 209 MG/DL
FERRITIN SERPL-MCNC: 1635 NG/ML (ref 8–388)
FERRITIN SERPL-MCNC: 714 NG/ML (ref 8–388)
FERRITIN SERPL-MCNC: 964 NG/ML (ref 8–388)
FOLATE SERPL-MCNC: 12.7 NG/ML (ref 3.1–17.5)
GLOBULIN SER CALC-MCNC: 3.5 G/DL (ref 2.3–3.5)
GLOBULIN SER CALC-MCNC: 3.8 G/DL (ref 2.3–3.5)
GLOBULIN SER CALC-MCNC: 3.9 G/DL (ref 2.3–3.5)
GLOBULIN SER CALC-MCNC: 4.1 G/DL (ref 2.3–3.5)
GLOBULIN SER CALC-MCNC: 4.5 G/DL (ref 2.3–3.5)
GLOBULIN SER CALC-MCNC: 4.6 G/DL (ref 2.3–3.5)
GLOBULIN SER CALC-MCNC: 5.4 G/DL (ref 2.3–3.5)
GLUCOSE BLD STRIP.AUTO-MCNC: 104 MG/DL (ref 65–100)
GLUCOSE BLD STRIP.AUTO-MCNC: 104 MG/DL (ref 65–100)
GLUCOSE BLD STRIP.AUTO-MCNC: 105 MG/DL (ref 65–100)
GLUCOSE BLD STRIP.AUTO-MCNC: 110 MG/DL (ref 65–100)
GLUCOSE BLD STRIP.AUTO-MCNC: 110 MG/DL (ref 65–100)
GLUCOSE BLD STRIP.AUTO-MCNC: 113 MG/DL (ref 65–100)
GLUCOSE BLD STRIP.AUTO-MCNC: 115 MG/DL (ref 65–100)
GLUCOSE BLD STRIP.AUTO-MCNC: 119 MG/DL (ref 65–100)
GLUCOSE BLD STRIP.AUTO-MCNC: 123 MG/DL (ref 65–100)
GLUCOSE BLD STRIP.AUTO-MCNC: 124 MG/DL (ref 65–100)
GLUCOSE BLD STRIP.AUTO-MCNC: 126 MG/DL (ref 65–100)
GLUCOSE BLD STRIP.AUTO-MCNC: 127 MG/DL (ref 65–100)
GLUCOSE BLD STRIP.AUTO-MCNC: 128 MG/DL (ref 65–100)
GLUCOSE BLD STRIP.AUTO-MCNC: 131 MG/DL (ref 65–100)
GLUCOSE BLD STRIP.AUTO-MCNC: 131 MG/DL (ref 65–100)
GLUCOSE BLD STRIP.AUTO-MCNC: 133 MG/DL (ref 65–100)
GLUCOSE BLD STRIP.AUTO-MCNC: 134 MG/DL (ref 65–100)
GLUCOSE BLD STRIP.AUTO-MCNC: 134 MG/DL (ref 65–100)
GLUCOSE BLD STRIP.AUTO-MCNC: 135 MG/DL (ref 65–100)
GLUCOSE BLD STRIP.AUTO-MCNC: 136 MG/DL (ref 65–100)
GLUCOSE BLD STRIP.AUTO-MCNC: 137 MG/DL (ref 65–100)
GLUCOSE BLD STRIP.AUTO-MCNC: 141 MG/DL (ref 65–100)
GLUCOSE BLD STRIP.AUTO-MCNC: 142 MG/DL (ref 65–100)
GLUCOSE BLD STRIP.AUTO-MCNC: 142 MG/DL (ref 65–100)
GLUCOSE BLD STRIP.AUTO-MCNC: 145 MG/DL (ref 65–100)
GLUCOSE BLD STRIP.AUTO-MCNC: 146 MG/DL (ref 65–100)
GLUCOSE BLD STRIP.AUTO-MCNC: 147 MG/DL (ref 65–100)
GLUCOSE BLD STRIP.AUTO-MCNC: 151 MG/DL (ref 65–100)
GLUCOSE BLD STRIP.AUTO-MCNC: 152 MG/DL (ref 65–100)
GLUCOSE BLD STRIP.AUTO-MCNC: 154 MG/DL (ref 65–100)
GLUCOSE BLD STRIP.AUTO-MCNC: 155 MG/DL (ref 65–100)
GLUCOSE BLD STRIP.AUTO-MCNC: 157 MG/DL (ref 65–100)
GLUCOSE BLD STRIP.AUTO-MCNC: 161 MG/DL (ref 65–100)
GLUCOSE BLD STRIP.AUTO-MCNC: 161 MG/DL (ref 65–100)
GLUCOSE BLD STRIP.AUTO-MCNC: 163 MG/DL (ref 65–100)
GLUCOSE BLD STRIP.AUTO-MCNC: 164 MG/DL (ref 65–100)
GLUCOSE BLD STRIP.AUTO-MCNC: 164 MG/DL (ref 65–100)
GLUCOSE BLD STRIP.AUTO-MCNC: 165 MG/DL (ref 65–100)
GLUCOSE BLD STRIP.AUTO-MCNC: 166 MG/DL (ref 65–100)
GLUCOSE BLD STRIP.AUTO-MCNC: 167 MG/DL (ref 65–100)
GLUCOSE BLD STRIP.AUTO-MCNC: 169 MG/DL (ref 65–100)
GLUCOSE BLD STRIP.AUTO-MCNC: 171 MG/DL (ref 65–100)
GLUCOSE BLD STRIP.AUTO-MCNC: 171 MG/DL (ref 65–100)
GLUCOSE BLD STRIP.AUTO-MCNC: 173 MG/DL (ref 65–100)
GLUCOSE BLD STRIP.AUTO-MCNC: 173 MG/DL (ref 65–100)
GLUCOSE BLD STRIP.AUTO-MCNC: 174 MG/DL (ref 65–100)
GLUCOSE BLD STRIP.AUTO-MCNC: 175 MG/DL (ref 65–100)
GLUCOSE BLD STRIP.AUTO-MCNC: 177 MG/DL (ref 65–100)
GLUCOSE BLD STRIP.AUTO-MCNC: 178 MG/DL (ref 65–100)
GLUCOSE BLD STRIP.AUTO-MCNC: 178 MG/DL (ref 65–100)
GLUCOSE BLD STRIP.AUTO-MCNC: 180 MG/DL (ref 65–100)
GLUCOSE BLD STRIP.AUTO-MCNC: 181 MG/DL (ref 65–100)
GLUCOSE BLD STRIP.AUTO-MCNC: 182 MG/DL (ref 65–100)
GLUCOSE BLD STRIP.AUTO-MCNC: 182 MG/DL (ref 65–100)
GLUCOSE BLD STRIP.AUTO-MCNC: 183 MG/DL (ref 65–100)
GLUCOSE BLD STRIP.AUTO-MCNC: 185 MG/DL (ref 65–100)
GLUCOSE BLD STRIP.AUTO-MCNC: 189 MG/DL (ref 65–100)
GLUCOSE BLD STRIP.AUTO-MCNC: 191 MG/DL (ref 65–100)
GLUCOSE BLD STRIP.AUTO-MCNC: 192 MG/DL (ref 65–100)
GLUCOSE BLD STRIP.AUTO-MCNC: 193 MG/DL (ref 65–100)
GLUCOSE BLD STRIP.AUTO-MCNC: 193 MG/DL (ref 65–100)
GLUCOSE BLD STRIP.AUTO-MCNC: 196 MG/DL (ref 65–100)
GLUCOSE BLD STRIP.AUTO-MCNC: 197 MG/DL (ref 65–100)
GLUCOSE BLD STRIP.AUTO-MCNC: 199 MG/DL (ref 65–100)
GLUCOSE BLD STRIP.AUTO-MCNC: 202 MG/DL (ref 65–100)
GLUCOSE BLD STRIP.AUTO-MCNC: 202 MG/DL (ref 65–100)
GLUCOSE BLD STRIP.AUTO-MCNC: 206 MG/DL (ref 65–100)
GLUCOSE BLD STRIP.AUTO-MCNC: 209 MG/DL (ref 65–100)
GLUCOSE BLD STRIP.AUTO-MCNC: 211 MG/DL (ref 65–100)
GLUCOSE BLD STRIP.AUTO-MCNC: 213 MG/DL (ref 65–100)
GLUCOSE BLD STRIP.AUTO-MCNC: 214 MG/DL (ref 65–100)
GLUCOSE BLD STRIP.AUTO-MCNC: 215 MG/DL (ref 65–100)
GLUCOSE BLD STRIP.AUTO-MCNC: 217 MG/DL (ref 65–100)
GLUCOSE BLD STRIP.AUTO-MCNC: 218 MG/DL (ref 65–100)
GLUCOSE BLD STRIP.AUTO-MCNC: 219 MG/DL (ref 65–100)
GLUCOSE BLD STRIP.AUTO-MCNC: 221 MG/DL (ref 65–100)
GLUCOSE BLD STRIP.AUTO-MCNC: 224 MG/DL (ref 65–100)
GLUCOSE BLD STRIP.AUTO-MCNC: 230 MG/DL (ref 65–100)
GLUCOSE BLD STRIP.AUTO-MCNC: 231 MG/DL (ref 65–100)
GLUCOSE BLD STRIP.AUTO-MCNC: 233 MG/DL (ref 65–100)
GLUCOSE BLD STRIP.AUTO-MCNC: 233 MG/DL (ref 65–100)
GLUCOSE BLD STRIP.AUTO-MCNC: 236 MG/DL (ref 65–100)
GLUCOSE BLD STRIP.AUTO-MCNC: 236 MG/DL (ref 65–100)
GLUCOSE BLD STRIP.AUTO-MCNC: 240 MG/DL (ref 65–100)
GLUCOSE BLD STRIP.AUTO-MCNC: 242 MG/DL (ref 65–100)
GLUCOSE BLD STRIP.AUTO-MCNC: 243 MG/DL (ref 65–100)
GLUCOSE BLD STRIP.AUTO-MCNC: 243 MG/DL (ref 65–100)
GLUCOSE BLD STRIP.AUTO-MCNC: 244 MG/DL (ref 65–100)
GLUCOSE BLD STRIP.AUTO-MCNC: 245 MG/DL (ref 65–100)
GLUCOSE BLD STRIP.AUTO-MCNC: 248 MG/DL (ref 65–100)
GLUCOSE BLD STRIP.AUTO-MCNC: 249 MG/DL (ref 65–100)
GLUCOSE BLD STRIP.AUTO-MCNC: 251 MG/DL (ref 65–100)
GLUCOSE BLD STRIP.AUTO-MCNC: 252 MG/DL (ref 65–100)
GLUCOSE BLD STRIP.AUTO-MCNC: 253 MG/DL (ref 65–100)
GLUCOSE BLD STRIP.AUTO-MCNC: 255 MG/DL (ref 65–100)
GLUCOSE BLD STRIP.AUTO-MCNC: 256 MG/DL (ref 65–100)
GLUCOSE BLD STRIP.AUTO-MCNC: 257 MG/DL (ref 65–100)
GLUCOSE BLD STRIP.AUTO-MCNC: 257 MG/DL (ref 65–100)
GLUCOSE BLD STRIP.AUTO-MCNC: 258 MG/DL (ref 65–100)
GLUCOSE BLD STRIP.AUTO-MCNC: 259 MG/DL (ref 65–100)
GLUCOSE BLD STRIP.AUTO-MCNC: 270 MG/DL (ref 65–100)
GLUCOSE BLD STRIP.AUTO-MCNC: 270 MG/DL (ref 65–100)
GLUCOSE BLD STRIP.AUTO-MCNC: 272 MG/DL (ref 65–100)
GLUCOSE BLD STRIP.AUTO-MCNC: 274 MG/DL (ref 65–100)
GLUCOSE BLD STRIP.AUTO-MCNC: 276 MG/DL (ref 65–100)
GLUCOSE BLD STRIP.AUTO-MCNC: 277 MG/DL (ref 65–100)
GLUCOSE BLD STRIP.AUTO-MCNC: 278 MG/DL (ref 65–100)
GLUCOSE BLD STRIP.AUTO-MCNC: 282 MG/DL (ref 65–100)
GLUCOSE BLD STRIP.AUTO-MCNC: 284 MG/DL (ref 65–100)
GLUCOSE BLD STRIP.AUTO-MCNC: 285 MG/DL (ref 65–100)
GLUCOSE BLD STRIP.AUTO-MCNC: 286 MG/DL (ref 65–100)
GLUCOSE BLD STRIP.AUTO-MCNC: 288 MG/DL (ref 65–100)
GLUCOSE BLD STRIP.AUTO-MCNC: 291 MG/DL (ref 65–100)
GLUCOSE BLD STRIP.AUTO-MCNC: 291 MG/DL (ref 65–100)
GLUCOSE BLD STRIP.AUTO-MCNC: 293 MG/DL (ref 65–100)
GLUCOSE BLD STRIP.AUTO-MCNC: 302 MG/DL (ref 65–100)
GLUCOSE BLD STRIP.AUTO-MCNC: 302 MG/DL (ref 65–100)
GLUCOSE BLD STRIP.AUTO-MCNC: 306 MG/DL (ref 65–100)
GLUCOSE BLD STRIP.AUTO-MCNC: 308 MG/DL (ref 65–100)
GLUCOSE BLD STRIP.AUTO-MCNC: 317 MG/DL (ref 65–100)
GLUCOSE BLD STRIP.AUTO-MCNC: 321 MG/DL (ref 65–100)
GLUCOSE BLD STRIP.AUTO-MCNC: 328 MG/DL (ref 65–100)
GLUCOSE BLD STRIP.AUTO-MCNC: 338 MG/DL (ref 65–100)
GLUCOSE BLD STRIP.AUTO-MCNC: 342 MG/DL (ref 65–100)
GLUCOSE BLD STRIP.AUTO-MCNC: 342 MG/DL (ref 65–100)
GLUCOSE BLD STRIP.AUTO-MCNC: 348 MG/DL (ref 65–100)
GLUCOSE BLD STRIP.AUTO-MCNC: 357 MG/DL (ref 65–100)
GLUCOSE BLD STRIP.AUTO-MCNC: 366 MG/DL (ref 65–100)
GLUCOSE BLD STRIP.AUTO-MCNC: 367 MG/DL (ref 65–100)
GLUCOSE BLD STRIP.AUTO-MCNC: 381 MG/DL (ref 65–100)
GLUCOSE BLD STRIP.AUTO-MCNC: 382 MG/DL (ref 65–100)
GLUCOSE BLD STRIP.AUTO-MCNC: 384 MG/DL (ref 65–100)
GLUCOSE BLD STRIP.AUTO-MCNC: 404 MG/DL (ref 65–100)
GLUCOSE BLD STRIP.AUTO-MCNC: 404 MG/DL (ref 65–100)
GLUCOSE BLD STRIP.AUTO-MCNC: 408 MG/DL (ref 65–100)
GLUCOSE BLD STRIP.AUTO-MCNC: 41 MG/DL (ref 65–100)
GLUCOSE BLD STRIP.AUTO-MCNC: 443 MG/DL (ref 65–100)
GLUCOSE BLD STRIP.AUTO-MCNC: 468 MG/DL (ref 65–100)
GLUCOSE BLD STRIP.AUTO-MCNC: 474 MG/DL (ref 65–100)
GLUCOSE BLD STRIP.AUTO-MCNC: 54 MG/DL (ref 65–100)
GLUCOSE BLD STRIP.AUTO-MCNC: 58 MG/DL (ref 65–100)
GLUCOSE BLD STRIP.AUTO-MCNC: 59 MG/DL (ref 65–100)
GLUCOSE BLD STRIP.AUTO-MCNC: 60 MG/DL (ref 65–100)
GLUCOSE BLD STRIP.AUTO-MCNC: 61 MG/DL (ref 65–100)
GLUCOSE BLD STRIP.AUTO-MCNC: 68 MG/DL (ref 65–100)
GLUCOSE BLD STRIP.AUTO-MCNC: 70 MG/DL (ref 65–100)
GLUCOSE BLD STRIP.AUTO-MCNC: 70 MG/DL (ref 65–100)
GLUCOSE BLD STRIP.AUTO-MCNC: 76 MG/DL (ref 65–100)
GLUCOSE BLD STRIP.AUTO-MCNC: 88 MG/DL (ref 65–100)
GLUCOSE BLD STRIP.AUTO-MCNC: 89 MG/DL (ref 65–100)
GLUCOSE BLD STRIP.AUTO-MCNC: 89 MG/DL (ref 65–100)
GLUCOSE BLD STRIP.AUTO-MCNC: 93 MG/DL (ref 65–100)
GLUCOSE BLD STRIP.AUTO-MCNC: 95 MG/DL (ref 65–100)
GLUCOSE BLD STRIP.AUTO-MCNC: 95 MG/DL (ref 65–100)
GLUCOSE SERPL-MCNC: 111 MG/DL (ref 65–100)
GLUCOSE SERPL-MCNC: 112 MG/DL (ref 65–100)
GLUCOSE SERPL-MCNC: 116 MG/DL (ref 65–100)
GLUCOSE SERPL-MCNC: 119 MG/DL (ref 65–100)
GLUCOSE SERPL-MCNC: 124 MG/DL (ref 65–100)
GLUCOSE SERPL-MCNC: 132 MG/DL (ref 65–100)
GLUCOSE SERPL-MCNC: 134 MG/DL (ref 65–100)
GLUCOSE SERPL-MCNC: 142 MG/DL (ref 65–100)
GLUCOSE SERPL-MCNC: 147 MG/DL (ref 65–100)
GLUCOSE SERPL-MCNC: 149 MG/DL (ref 65–100)
GLUCOSE SERPL-MCNC: 165 MG/DL (ref 65–100)
GLUCOSE SERPL-MCNC: 165 MG/DL (ref 65–100)
GLUCOSE SERPL-MCNC: 178 MG/DL (ref 65–100)
GLUCOSE SERPL-MCNC: 182 MG/DL (ref 65–100)
GLUCOSE SERPL-MCNC: 188 MG/DL (ref 65–100)
GLUCOSE SERPL-MCNC: 198 MG/DL (ref 65–100)
GLUCOSE SERPL-MCNC: 225 MG/DL (ref 65–100)
GLUCOSE SERPL-MCNC: 225 MG/DL (ref 65–100)
GLUCOSE SERPL-MCNC: 235 MG/DL (ref 65–100)
GLUCOSE SERPL-MCNC: 243 MG/DL (ref 65–100)
GLUCOSE SERPL-MCNC: 278 MG/DL (ref 65–100)
GLUCOSE SERPL-MCNC: 328 MG/DL (ref 65–100)
GLUCOSE SERPL-MCNC: 412 MG/DL (ref 65–100)
GLUCOSE SERPL-MCNC: 483 MG/DL (ref 65–100)
GLUCOSE SERPL-MCNC: 49 MG/DL (ref 65–100)
GLUCOSE SERPL-MCNC: 78 MG/DL (ref 65–100)
GLUCOSE SERPL-MCNC: 85 MG/DL (ref 65–100)
GLUCOSE SERPL-MCNC: 92 MG/DL (ref 65–100)
GLUCOSE SERPL-MCNC: 93 MG/DL (ref 65–100)
GLUCOSE UR QL STRIP.AUTO: NEGATIVE MG/DL
GLUCOSE UR STRIP.AUTO-MCNC: 100 MG/DL
GLUCOSE UR STRIP.AUTO-MCNC: 250 MG/DL
GLUCOSE UR STRIP.AUTO-MCNC: NEGATIVE MG/DL
GP B STREP DNA BLD POS QL NAA+NON-PROBE: DETECTED
GRAM STN SPEC: ABNORMAL
HBA1C MFR BLD: 8.9 % (ref 4.2–6.3)
HCO3 BLD-SCNC: 18.4 MMOL/L (ref 22–26)
HCT VFR BLD AUTO: 23 % (ref 35.8–46.3)
HCT VFR BLD AUTO: 23.5 % (ref 35.8–46.3)
HCT VFR BLD AUTO: 23.7 % (ref 35.8–46.3)
HCT VFR BLD AUTO: 25.2 % (ref 35.8–46.3)
HCT VFR BLD AUTO: 25.5 % (ref 35.8–46.3)
HCT VFR BLD AUTO: 25.5 % (ref 35.8–46.3)
HCT VFR BLD AUTO: 25.6 % (ref 35.8–46.3)
HCT VFR BLD AUTO: 25.6 % (ref 35.8–46.3)
HCT VFR BLD AUTO: 26 % (ref 35.8–46.3)
HCT VFR BLD AUTO: 26.8 % (ref 35.8–46.3)
HCT VFR BLD AUTO: 26.8 % (ref 35.8–46.3)
HCT VFR BLD AUTO: 27 % (ref 35.8–46.3)
HCT VFR BLD AUTO: 27 % (ref 35.8–46.3)
HCT VFR BLD AUTO: 27.5 % (ref 35.8–46.3)
HCT VFR BLD AUTO: 27.6 % (ref 35.8–46.3)
HCT VFR BLD AUTO: 28.5 % (ref 35.8–46.3)
HCT VFR BLD AUTO: 28.5 % (ref 35.8–46.3)
HCT VFR BLD AUTO: 28.7 % (ref 35.8–46.3)
HCT VFR BLD AUTO: 29.3 % (ref 35.8–46.3)
HCT VFR BLD AUTO: 29.5 % (ref 35.8–46.3)
HCT VFR BLD AUTO: 29.8 % (ref 35.8–46.3)
HCT VFR BLD AUTO: 30 % (ref 35.8–46.3)
HCT VFR BLD AUTO: 30.3 % (ref 35.8–46.3)
HCT VFR BLD AUTO: 30.7 % (ref 35.8–46.3)
HCT VFR BLD AUTO: 30.8 % (ref 35.8–46.3)
HCT VFR BLD AUTO: 31.6 % (ref 35.8–46.3)
HCT VFR BLD AUTO: 32.2 % (ref 35.8–46.3)
HCT VFR BLD AUTO: 32.5 % (ref 35.8–46.3)
HCT VFR BLD AUTO: 33.3 % (ref 35.8–46.3)
HCT VFR BLD AUTO: 34 % (ref 35.8–46.3)
HCT VFR BLD AUTO: 35.2 % (ref 35.8–46.3)
HCT VFR BLD AUTO: 36.6 % (ref 35.8–46.3)
HCT VFR BLD AUTO: 36.7 % (ref 35.8–46.3)
HGB BLD-MCNC: 10.2 G/DL (ref 11.7–15.4)
HGB BLD-MCNC: 10.6 G/DL (ref 11.7–15.4)
HGB BLD-MCNC: 10.9 G/DL (ref 11.7–15.4)
HGB BLD-MCNC: 11 G/DL (ref 11.7–15.4)
HGB BLD-MCNC: 7 G/DL (ref 11.7–15.4)
HGB BLD-MCNC: 7.1 G/DL (ref 11.7–15.4)
HGB BLD-MCNC: 7.2 G/DL (ref 11.7–15.4)
HGB BLD-MCNC: 7.5 G/DL (ref 11.7–15.4)
HGB BLD-MCNC: 7.5 G/DL (ref 11.7–15.4)
HGB BLD-MCNC: 7.6 G/DL (ref 11.7–15.4)
HGB BLD-MCNC: 7.7 G/DL (ref 11.7–15.4)
HGB BLD-MCNC: 7.7 G/DL (ref 11.7–15.4)
HGB BLD-MCNC: 7.8 G/DL (ref 11.7–15.4)
HGB BLD-MCNC: 8 G/DL (ref 11.7–15.4)
HGB BLD-MCNC: 8 G/DL (ref 11.7–15.4)
HGB BLD-MCNC: 8.1 G/DL (ref 11.7–15.4)
HGB BLD-MCNC: 8.2 G/DL (ref 11.7–15.4)
HGB BLD-MCNC: 8.3 G/DL (ref 11.7–15.4)
HGB BLD-MCNC: 8.3 G/DL (ref 11.7–15.4)
HGB BLD-MCNC: 8.4 G/DL (ref 11.7–15.4)
HGB BLD-MCNC: 8.5 G/DL (ref 11.7–15.4)
HGB BLD-MCNC: 8.5 G/DL (ref 11.7–15.4)
HGB BLD-MCNC: 8.6 G/DL (ref 11.7–15.4)
HGB BLD-MCNC: 8.7 G/DL (ref 11.7–15.4)
HGB BLD-MCNC: 8.7 G/DL (ref 11.7–15.4)
HGB BLD-MCNC: 9 G/DL (ref 11.7–15.4)
HGB BLD-MCNC: 9 G/DL (ref 11.7–15.4)
HGB BLD-MCNC: 9.1 G/DL (ref 11.7–15.4)
HGB BLD-MCNC: 9.5 G/DL (ref 11.7–15.4)
HGB BLD-MCNC: 9.5 G/DL (ref 11.7–15.4)
HGB BLD-MCNC: 9.6 G/DL (ref 11.7–15.4)
HGB BLD-MCNC: 9.6 G/DL (ref 11.7–15.4)
HGB BLD-MCNC: 9.9 G/DL (ref 11.7–15.4)
HGB UR QL STRIP: ABNORMAL
HGB UR QL STRIP: NEGATIVE
HISTORY CHECKED?,CKHIST: NORMAL
IMM GRANULOCYTES # BLD AUTO: 0 K/UL (ref 0–0.5)
IMM GRANULOCYTES # BLD AUTO: 0.1 K/UL (ref 0–0.5)
IMM GRANULOCYTES # BLD AUTO: 0.2 K/UL (ref 0–0.5)
IMM GRANULOCYTES # BLD AUTO: 0.3 K/UL (ref 0–0.5)
IMM GRANULOCYTES NFR BLD AUTO: 0 % (ref 0–5)
IMM GRANULOCYTES NFR BLD AUTO: 0 % (ref 0–5)
IMM GRANULOCYTES NFR BLD AUTO: 1 % (ref 0–5)
IMM GRANULOCYTES NFR BLD AUTO: 2 % (ref 0–5)
IMM GRANULOCYTES NFR BLD AUTO: 3 % (ref 0–5)
INR PPP: 0.9
INTERPRETATION: ABNORMAL
IRON SATN MFR SERPL: 24 %
IRON SATN MFR SERPL: 44 %
IRON SERPL-MCNC: 35 UG/DL (ref 35–150)
IRON SERPL-MCNC: 47 UG/DL (ref 35–150)
KETONES UR QL STRIP.AUTO: 15 MG/DL
KETONES UR QL STRIP.AUTO: ABNORMAL MG/DL
KETONES UR QL STRIP.AUTO: NEGATIVE MG/DL
KETONES UR-MCNC: NEGATIVE MG/DL
LACTATE SERPL-SCNC: 1 MMOL/L (ref 0.4–2)
LACTATE SERPL-SCNC: 1.4 MMOL/L (ref 0.4–2)
LACTATE SERPL-SCNC: 2.1 MMOL/L (ref 0.4–2)
LEUKOCYTE ESTERASE UR QL STRIP.AUTO: ABNORMAL
LEUKOCYTE ESTERASE UR QL STRIP.AUTO: NEGATIVE
LEUKOCYTE ESTERASE UR QL STRIP: NEGATIVE
LIPASE SERPL-CCNC: 40 U/L (ref 73–393)
LIPASE SERPL-CCNC: 56 U/L (ref 73–393)
LYMPHOCYTES # BLD: 1 K/UL (ref 0.5–4.6)
LYMPHOCYTES # BLD: 1.3 K/UL (ref 0.5–4.6)
LYMPHOCYTES # BLD: 1.4 K/UL (ref 0.5–4.6)
LYMPHOCYTES # BLD: 1.5 K/UL (ref 0.5–4.6)
LYMPHOCYTES # BLD: 1.6 K/UL (ref 0.5–4.6)
LYMPHOCYTES # BLD: 1.9 K/UL (ref 0.5–4.6)
LYMPHOCYTES # BLD: 2 K/UL (ref 0.5–4.6)
LYMPHOCYTES # BLD: 2.1 K/UL (ref 0.5–4.6)
LYMPHOCYTES # BLD: 2.2 K/UL (ref 0.5–4.6)
LYMPHOCYTES # BLD: 3.8 K/UL (ref 0.5–4.6)
LYMPHOCYTES NFR BLD: 12 % (ref 13–44)
LYMPHOCYTES NFR BLD: 13 % (ref 13–44)
LYMPHOCYTES NFR BLD: 15 % (ref 13–44)
LYMPHOCYTES NFR BLD: 15 % (ref 13–44)
LYMPHOCYTES NFR BLD: 17 % (ref 13–44)
LYMPHOCYTES NFR BLD: 17 % (ref 13–44)
LYMPHOCYTES NFR BLD: 18 % (ref 13–44)
LYMPHOCYTES NFR BLD: 19 % (ref 13–44)
LYMPHOCYTES NFR BLD: 19 % (ref 13–44)
LYMPHOCYTES NFR BLD: 20 % (ref 13–44)
LYMPHOCYTES NFR BLD: 20 % (ref 13–44)
LYMPHOCYTES NFR BLD: 22 % (ref 13–44)
LYMPHOCYTES NFR BLD: 24 % (ref 13–44)
LYMPHOCYTES NFR BLD: 24 % (ref 13–44)
LYMPHOCYTES NFR BLD: 26 % (ref 13–44)
LYMPHOCYTES NFR BLD: 27 % (ref 13–44)
LYMPHOCYTES NFR BLD: 29 % (ref 13–44)
LYMPHOCYTES NFR BLD: 32 % (ref 13–44)
LYMPHOCYTES NFR BLD: 9 % (ref 13–44)
M PNEUMO IGG SER IA-ACNC: <100 U/ML (ref 0–99)
M PNEUMO IGM SER IA-ACNC: <770 U/ML (ref 0–769)
MAGNESIUM SERPL-MCNC: 1.6 MG/DL (ref 1.8–2.4)
MAGNESIUM SERPL-MCNC: 1.7 MG/DL (ref 1.8–2.4)
MAGNESIUM SERPL-MCNC: 1.8 MG/DL (ref 1.8–2.4)
MAGNESIUM SERPL-MCNC: 1.9 MG/DL (ref 1.8–2.4)
MAGNESIUM SERPL-MCNC: 2 MG/DL (ref 1.8–2.4)
MAGNESIUM SERPL-MCNC: 2.1 MG/DL (ref 1.8–2.4)
MAGNESIUM SERPL-MCNC: 2.2 MG/DL (ref 1.8–2.4)
MAGNESIUM SERPL-MCNC: 2.3 MG/DL (ref 1.8–2.4)
MAGNESIUM SERPL-MCNC: 2.4 MG/DL (ref 1.8–2.4)
MAGNESIUM SERPL-MCNC: 2.5 MG/DL (ref 1.8–2.4)
MCH RBC QN AUTO: 27.4 PG (ref 26.1–32.9)
MCH RBC QN AUTO: 28 PG (ref 26.1–32.9)
MCH RBC QN AUTO: 28 PG (ref 26.1–32.9)
MCH RBC QN AUTO: 28.2 PG (ref 26.1–32.9)
MCH RBC QN AUTO: 28.3 PG (ref 26.1–32.9)
MCH RBC QN AUTO: 28.4 PG (ref 26.1–32.9)
MCH RBC QN AUTO: 28.5 PG (ref 26.1–32.9)
MCH RBC QN AUTO: 28.7 PG (ref 26.1–32.9)
MCH RBC QN AUTO: 28.8 PG (ref 26.1–32.9)
MCH RBC QN AUTO: 28.8 PG (ref 26.1–32.9)
MCH RBC QN AUTO: 28.9 PG (ref 26.1–32.9)
MCH RBC QN AUTO: 29 PG (ref 26.1–32.9)
MCH RBC QN AUTO: 29.1 PG (ref 26.1–32.9)
MCH RBC QN AUTO: 29.3 PG (ref 26.1–32.9)
MCH RBC QN AUTO: 29.5 PG (ref 26.1–32.9)
MCH RBC QN AUTO: 29.6 PG (ref 26.1–32.9)
MCH RBC QN AUTO: 29.7 PG (ref 26.1–32.9)
MCHC RBC AUTO-ENTMCNC: 29.1 G/DL (ref 31.4–35)
MCHC RBC AUTO-ENTMCNC: 29.1 G/DL (ref 31.4–35)
MCHC RBC AUTO-ENTMCNC: 29.2 G/DL (ref 31.4–35)
MCHC RBC AUTO-ENTMCNC: 29.5 G/DL (ref 31.4–35)
MCHC RBC AUTO-ENTMCNC: 29.6 G/DL (ref 31.4–35)
MCHC RBC AUTO-ENTMCNC: 29.6 G/DL (ref 31.4–35)
MCHC RBC AUTO-ENTMCNC: 29.7 G/DL (ref 31.4–35)
MCHC RBC AUTO-ENTMCNC: 29.7 G/DL (ref 31.4–35)
MCHC RBC AUTO-ENTMCNC: 29.8 G/DL (ref 31.4–35)
MCHC RBC AUTO-ENTMCNC: 30 G/DL (ref 31.4–35)
MCHC RBC AUTO-ENTMCNC: 30.1 G/DL (ref 31.4–35)
MCHC RBC AUTO-ENTMCNC: 30.2 G/DL (ref 31.4–35)
MCHC RBC AUTO-ENTMCNC: 30.4 G/DL (ref 31.4–35)
MCHC RBC AUTO-ENTMCNC: 30.5 G/DL (ref 31.4–35)
MCHC RBC AUTO-ENTMCNC: 30.6 G/DL (ref 31.4–35)
MCHC RBC AUTO-ENTMCNC: 30.6 G/DL (ref 31.4–35)
MCHC RBC AUTO-ENTMCNC: 30.9 G/DL (ref 31.4–35)
MCHC RBC AUTO-ENTMCNC: 31.2 G/DL (ref 31.4–35)
MCHC RBC AUTO-ENTMCNC: 31.4 G/DL (ref 31.4–35)
MCHC RBC AUTO-ENTMCNC: 31.7 G/DL (ref 31.4–35)
MCV RBC AUTO: 101.2 FL (ref 79.6–97.8)
MCV RBC AUTO: 102.2 FL (ref 79.6–97.8)
MCV RBC AUTO: 89.6 FL (ref 79.6–97.8)
MCV RBC AUTO: 90.6 FL (ref 79.6–97.8)
MCV RBC AUTO: 92.1 FL (ref 79.6–97.8)
MCV RBC AUTO: 92.2 FL (ref 79.6–97.8)
MCV RBC AUTO: 92.4 FL (ref 79.6–97.8)
MCV RBC AUTO: 92.8 FL (ref 79.6–97.8)
MCV RBC AUTO: 92.8 FL (ref 79.6–97.8)
MCV RBC AUTO: 92.9 FL (ref 79.6–97.8)
MCV RBC AUTO: 93.5 FL (ref 79.6–97.8)
MCV RBC AUTO: 94.1 FL (ref 79.6–97.8)
MCV RBC AUTO: 94.4 FL (ref 79.6–97.8)
MCV RBC AUTO: 94.5 FL (ref 79.6–97.8)
MCV RBC AUTO: 94.7 FL (ref 79.6–97.8)
MCV RBC AUTO: 94.7 FL (ref 79.6–97.8)
MCV RBC AUTO: 94.9 FL (ref 79.6–97.8)
MCV RBC AUTO: 94.9 FL (ref 79.6–97.8)
MCV RBC AUTO: 95.2 FL (ref 79.6–97.8)
MCV RBC AUTO: 95.8 FL (ref 79.6–97.8)
MCV RBC AUTO: 96.6 FL (ref 79.6–97.8)
MCV RBC AUTO: 97.1 FL (ref 79.6–97.8)
MCV RBC AUTO: 97.1 FL (ref 79.6–97.8)
MCV RBC AUTO: 97.2 FL (ref 79.6–97.8)
MCV RBC AUTO: 97.5 FL (ref 79.6–97.8)
MCV RBC AUTO: 98.1 FL (ref 79.6–97.8)
MCV RBC AUTO: 98.9 FL (ref 79.6–97.8)
MCV RBC AUTO: 99.1 FL (ref 79.6–97.8)
MCV RBC AUTO: 99.2 FL (ref 79.6–97.8)
MCV RBC AUTO: 99.4 FL (ref 79.6–97.8)
MCV RBC AUTO: 99.6 FL (ref 79.6–97.8)
MCV RBC AUTO: 99.6 FL (ref 79.6–97.8)
MM INDURATION POC: 0 MM (ref 0–5)
MM INDURATION, POC: 0 MM (ref 0–5)
MM INDURATION, POC: 0 MM (ref 0–5)
MONOCYTES # BLD: 0.1 K/UL (ref 0.1–1.3)
MONOCYTES # BLD: 0.2 K/UL (ref 0.1–1.3)
MONOCYTES # BLD: 0.3 K/UL (ref 0.1–1.3)
MONOCYTES # BLD: 0.4 K/UL (ref 0.1–1.3)
MONOCYTES # BLD: 0.5 K/UL (ref 0.1–1.3)
MONOCYTES # BLD: 0.6 K/UL (ref 0.1–1.3)
MONOCYTES # BLD: 0.7 K/UL (ref 0.1–1.3)
MONOCYTES # BLD: 0.8 K/UL (ref 0.1–1.3)
MONOCYTES # BLD: 1.1 K/UL (ref 0.1–1.3)
MONOCYTES NFR BLD: 11 % (ref 4–12)
MONOCYTES NFR BLD: 2 % (ref 4–12)
MONOCYTES NFR BLD: 3 % (ref 4–12)
MONOCYTES NFR BLD: 4 % (ref 4–12)
MONOCYTES NFR BLD: 5 % (ref 4–12)
MONOCYTES NFR BLD: 5 % (ref 4–12)
MONOCYTES NFR BLD: 6 % (ref 4–12)
MONOCYTES NFR BLD: 7 % (ref 4–12)
MONOCYTES NFR BLD: 8 % (ref 4–12)
MONOCYTES NFR BLD: 9 % (ref 4–12)
MUCOUS THREADS URNS QL MICRO: 0 /LPF
NEUTS SEG # BLD: 10.2 K/UL (ref 1.7–8.2)
NEUTS SEG # BLD: 14.5 K/UL (ref 1.7–8.2)
NEUTS SEG # BLD: 14.7 K/UL (ref 1.7–8.2)
NEUTS SEG # BLD: 14.9 K/UL (ref 1.7–8.2)
NEUTS SEG # BLD: 3.5 K/UL (ref 1.7–8.2)
NEUTS SEG # BLD: 4.1 K/UL (ref 1.7–8.2)
NEUTS SEG # BLD: 4.2 K/UL (ref 1.7–8.2)
NEUTS SEG # BLD: 4.8 K/UL (ref 1.7–8.2)
NEUTS SEG # BLD: 4.8 K/UL (ref 1.7–8.2)
NEUTS SEG # BLD: 5 K/UL (ref 1.7–8.2)
NEUTS SEG # BLD: 5.5 K/UL (ref 1.7–8.2)
NEUTS SEG # BLD: 5.5 K/UL (ref 1.7–8.2)
NEUTS SEG # BLD: 6.2 K/UL (ref 1.7–8.2)
NEUTS SEG # BLD: 6.2 K/UL (ref 1.7–8.2)
NEUTS SEG # BLD: 6.3 K/UL (ref 1.7–8.2)
NEUTS SEG # BLD: 6.4 K/UL (ref 1.7–8.2)
NEUTS SEG # BLD: 6.5 K/UL (ref 1.7–8.2)
NEUTS SEG # BLD: 6.7 K/UL (ref 1.7–8.2)
NEUTS SEG # BLD: 6.9 K/UL (ref 1.7–8.2)
NEUTS SEG # BLD: 7.1 K/UL (ref 1.7–8.2)
NEUTS SEG # BLD: 8.5 K/UL (ref 1.7–8.2)
NEUTS SEG # BLD: 9.2 K/UL (ref 1.7–8.2)
NEUTS SEG NFR BLD: 56 % (ref 43–78)
NEUTS SEG NFR BLD: 57 % (ref 43–78)
NEUTS SEG NFR BLD: 61 % (ref 43–78)
NEUTS SEG NFR BLD: 62 % (ref 43–78)
NEUTS SEG NFR BLD: 66 % (ref 43–78)
NEUTS SEG NFR BLD: 67 % (ref 43–78)
NEUTS SEG NFR BLD: 68 % (ref 43–78)
NEUTS SEG NFR BLD: 69 % (ref 43–78)
NEUTS SEG NFR BLD: 69 % (ref 43–78)
NEUTS SEG NFR BLD: 70 % (ref 43–78)
NEUTS SEG NFR BLD: 72 % (ref 43–78)
NEUTS SEG NFR BLD: 73 % (ref 43–78)
NEUTS SEG NFR BLD: 74 % (ref 43–78)
NEUTS SEG NFR BLD: 77 % (ref 43–78)
NEUTS SEG NFR BLD: 79 % (ref 43–78)
NEUTS SEG NFR BLD: 79 % (ref 43–78)
NEUTS SEG NFR BLD: 82 % (ref 43–78)
NEUTS SEG NFR BLD: 86 % (ref 43–78)
NITRITE UR QL STRIP.AUTO: NEGATIVE
NITRITE UR QL: NEGATIVE
NRBC # BLD: 0 K/UL (ref 0–0.2)
NRBC # BLD: 0.02 K/UL (ref 0–0.2)
NRBC # BLD: 0.04 K/UL (ref 0–0.2)
NRBC # BLD: 0.05 K/UL (ref 0–0.2)
NRBC # BLD: 0.07 K/UL (ref 0–0.2)
NRBC # BLD: 0.1 K/UL (ref 0–0.2)
OTHER OBSERVATIONS,UCOM: ABNORMAL
OTHER OBSERVATIONS: ABNORMAL
OTHER OBSERVATIONS: ABNORMAL
P-R INTERVAL, ECG05: 162 MS
P-R INTERVAL, ECG05: 164 MS
PCO2 BLD: 26.6 MMHG (ref 35–45)
PH BLD: 7.45 [PH] (ref 7.35–7.45)
PH UR STRIP: 5.5 [PH] (ref 5–9)
PH UR STRIP: 5.5 [PH] (ref 5–9)
PH UR STRIP: 6 [PH] (ref 5–9)
PH UR: 5.5 [PH] (ref 5–9)
PHOSPHATE SERPL-MCNC: 2.7 MG/DL (ref 2.3–3.7)
PHOSPHATE SERPL-MCNC: 2.8 MG/DL (ref 2.3–3.7)
PHOSPHATE SERPL-MCNC: 2.9 MG/DL (ref 2.3–3.7)
PHOSPHATE SERPL-MCNC: 3 MG/DL (ref 2.3–3.7)
PHOSPHATE SERPL-MCNC: 3 MG/DL (ref 2.3–3.7)
PHOSPHATE SERPL-MCNC: 3.2 MG/DL (ref 2.3–3.7)
PLATELET # BLD AUTO: 146 K/UL (ref 150–450)
PLATELET # BLD AUTO: 152 K/UL (ref 150–450)
PLATELET # BLD AUTO: 155 K/UL (ref 150–450)
PLATELET # BLD AUTO: 170 K/UL (ref 150–450)
PLATELET # BLD AUTO: 172 K/UL (ref 150–450)
PLATELET # BLD AUTO: 176 K/UL (ref 150–450)
PLATELET # BLD AUTO: 180 K/UL (ref 150–450)
PLATELET # BLD AUTO: 182 K/UL (ref 150–450)
PLATELET # BLD AUTO: 185 K/UL (ref 150–450)
PLATELET # BLD AUTO: 185 K/UL (ref 150–450)
PLATELET # BLD AUTO: 186 K/UL (ref 150–450)
PLATELET # BLD AUTO: 186 K/UL (ref 150–450)
PLATELET # BLD AUTO: 191 K/UL (ref 150–450)
PLATELET # BLD AUTO: 199 K/UL (ref 150–450)
PLATELET # BLD AUTO: 204 K/UL (ref 150–450)
PLATELET # BLD AUTO: 208 K/UL (ref 150–450)
PLATELET # BLD AUTO: 210 K/UL (ref 150–450)
PLATELET # BLD AUTO: 211 K/UL (ref 150–450)
PLATELET # BLD AUTO: 212 K/UL (ref 150–450)
PLATELET # BLD AUTO: 224 K/UL (ref 150–450)
PLATELET # BLD AUTO: 225 K/UL (ref 150–450)
PLATELET # BLD AUTO: 230 K/UL (ref 150–450)
PLATELET # BLD AUTO: 235 K/UL (ref 150–450)
PLATELET # BLD AUTO: 239 K/UL (ref 150–450)
PLATELET # BLD AUTO: 246 K/UL (ref 150–450)
PLATELET # BLD AUTO: 246 K/UL (ref 150–450)
PLATELET # BLD AUTO: 247 K/UL (ref 150–450)
PLATELET # BLD AUTO: 252 K/UL (ref 150–450)
PLATELET # BLD AUTO: 254 K/UL (ref 150–450)
PLATELET # BLD AUTO: 286 K/UL (ref 150–450)
PLATELET # BLD AUTO: 333 K/UL (ref 150–450)
PLATELET # BLD AUTO: 356 K/UL (ref 150–450)
PLATELET COMMENTS,PCOM: ADEQUATE
PMV BLD AUTO: 10.6 FL (ref 9.4–12.3)
PMV BLD AUTO: 10.8 FL (ref 9.4–12.3)
PMV BLD AUTO: 10.9 FL (ref 9.4–12.3)
PMV BLD AUTO: 10.9 FL (ref 9.4–12.3)
PMV BLD AUTO: 11 FL (ref 9.4–12.3)
PMV BLD AUTO: 11.1 FL (ref 9.4–12.3)
PMV BLD AUTO: 11.2 FL (ref 9.4–12.3)
PMV BLD AUTO: 11.3 FL (ref 9.4–12.3)
PMV BLD AUTO: 11.4 FL (ref 9.4–12.3)
PMV BLD AUTO: 11.5 FL (ref 9.4–12.3)
PMV BLD AUTO: 11.6 FL (ref 9.4–12.3)
PMV BLD AUTO: 11.6 FL (ref 9.4–12.3)
PMV BLD AUTO: 11.8 FL (ref 9.4–12.3)
PMV BLD AUTO: 11.9 FL (ref 9.4–12.3)
PMV BLD AUTO: 12 FL (ref 9.4–12.3)
PMV BLD AUTO: 12.1 FL (ref 9.4–12.3)
PMV BLD AUTO: 12.1 FL (ref 9.4–12.3)
PMV BLD AUTO: 12.2 FL (ref 9.4–12.3)
PMV BLD AUTO: 12.4 FL (ref 9.4–12.3)
PMV BLD AUTO: 12.5 FL (ref 9.4–12.3)
PMV BLD AUTO: 9.4 FL (ref 9.4–12.3)
PMV BLD AUTO: 9.9 FL (ref 9.4–12.3)
PO2 BLD: 78 MMHG (ref 75–100)
POTASSIUM SERPL-SCNC: 3.1 MMOL/L (ref 3.5–5.1)
POTASSIUM SERPL-SCNC: 3.4 MMOL/L (ref 3.5–5.1)
POTASSIUM SERPL-SCNC: 3.5 MMOL/L (ref 3.5–5.1)
POTASSIUM SERPL-SCNC: 3.5 MMOL/L (ref 3.5–5.1)
POTASSIUM SERPL-SCNC: 3.6 MMOL/L (ref 3.5–5.1)
POTASSIUM SERPL-SCNC: 3.6 MMOL/L (ref 3.5–5.1)
POTASSIUM SERPL-SCNC: 3.8 MMOL/L (ref 3.5–5.1)
POTASSIUM SERPL-SCNC: 3.9 MMOL/L (ref 3.5–5.1)
POTASSIUM SERPL-SCNC: 4 MMOL/L (ref 3.5–5.1)
POTASSIUM SERPL-SCNC: 4.1 MMOL/L (ref 3.5–5.1)
POTASSIUM SERPL-SCNC: 4.2 MMOL/L (ref 3.5–5.1)
POTASSIUM SERPL-SCNC: 4.3 MMOL/L (ref 3.5–5.1)
POTASSIUM SERPL-SCNC: 4.4 MMOL/L (ref 3.5–5.1)
POTASSIUM SERPL-SCNC: 4.4 MMOL/L (ref 3.5–5.1)
POTASSIUM SERPL-SCNC: 4.5 MMOL/L (ref 3.5–5.1)
POTASSIUM SERPL-SCNC: 4.5 MMOL/L (ref 3.5–5.1)
POTASSIUM SERPL-SCNC: 4.6 MMOL/L (ref 3.5–5.1)
POTASSIUM SERPL-SCNC: 4.9 MMOL/L (ref 3.5–5.1)
POTASSIUM SERPL-SCNC: 5.2 MMOL/L (ref 3.5–5.1)
POTASSIUM SERPL-SCNC: 5.4 MMOL/L (ref 3.5–5.1)
PPD POC: NEGATIVE NEGATIVE
PPD, POC: NEGATIVE
PPD, POC: NEGATIVE
PROCALCITONIN SERPL-MCNC: 0.1 NG/ML (ref 0–0.49)
PROCALCITONIN SERPL-MCNC: 0.17 NG/ML (ref 0–0.49)
PROCALCITONIN SERPL-MCNC: 0.2 NG/ML (ref 0–0.49)
PROCALCITONIN SERPL-MCNC: <0.05 NG/ML (ref 0–0.49)
PROT SERPL-MCNC: 5.1 G/DL (ref 6.3–8.2)
PROT SERPL-MCNC: 5.9 G/DL (ref 6.3–8.2)
PROT SERPL-MCNC: 6.3 G/DL (ref 6.3–8.2)
PROT SERPL-MCNC: 6.4 G/DL (ref 6.3–8.2)
PROT SERPL-MCNC: 6.5 G/DL (ref 6.3–8.2)
PROT SERPL-MCNC: 6.8 G/DL (ref 6.3–8.2)
PROT SERPL-MCNC: 7.5 G/DL (ref 6.3–8.2)
PROT UR QL: >300 MG/DL
PROT UR STRIP-MCNC: 100 MG/DL
PROT UR STRIP-MCNC: 100 MG/DL
PROT UR STRIP-MCNC: 300 MG/DL
PROT UR STRIP-MCNC: >300 MG/DL
PROTHROMBIN TIME: 12.6 SEC (ref 12.6–14.5)
Q-T INTERVAL, ECG07: 366 MS
Q-T INTERVAL, ECG07: 380 MS
Q-T INTERVAL, ECG07: 390 MS
QRS DURATION, ECG06: 116 MS
QRS DURATION, ECG06: 124 MS
QRS DURATION, ECG06: 128 MS
QTC CALCULATION (BEZET), ECG08: 452 MS
QTC CALCULATION (BEZET), ECG08: 472 MS
QTC CALCULATION (BEZET), ECG08: 486 MS
RBC # BLD AUTO: 2.36 M/UL (ref 4.05–5.2)
RBC # BLD AUTO: 2.46 M/UL (ref 4.05–5.2)
RBC # BLD AUTO: 2.54 M/UL (ref 4.05–5.2)
RBC # BLD AUTO: 2.57 M/UL (ref 4.05–5.2)
RBC # BLD AUTO: 2.57 M/UL (ref 4.05–5.2)
RBC # BLD AUTO: 2.6 M/UL (ref 4.05–5.2)
RBC # BLD AUTO: 2.73 M/UL (ref 4.05–5.2)
RBC # BLD AUTO: 2.76 M/UL (ref 4.05–5.2)
RBC # BLD AUTO: 2.76 M/UL (ref 4.05–5.2)
RBC # BLD AUTO: 2.77 M/UL (ref 4.05–5.2)
RBC # BLD AUTO: 2.79 M/UL (ref 4.05–5.2)
RBC # BLD AUTO: 2.86 M/UL (ref 4.05–5.2)
RBC # BLD AUTO: 2.87 M/UL (ref 4.05–5.2)
RBC # BLD AUTO: 2.92 M/UL (ref 4.05–5.2)
RBC # BLD AUTO: 2.96 M/UL (ref 4.05–5.2)
RBC # BLD AUTO: 2.99 M/UL (ref 4.05–5.2)
RBC # BLD AUTO: 3.03 M/UL (ref 4.05–5.2)
RBC # BLD AUTO: 3.07 M/UL (ref 4.05–5.2)
RBC # BLD AUTO: 3.11 M/UL (ref 4.05–5.2)
RBC # BLD AUTO: 3.13 M/UL (ref 4.05–5.2)
RBC # BLD AUTO: 3.15 M/UL (ref 4.05–5.2)
RBC # BLD AUTO: 3.17 M/UL (ref 4.05–5.2)
RBC # BLD AUTO: 3.19 M/UL (ref 4.05–5.2)
RBC # BLD AUTO: 3.2 M/UL (ref 4.05–5.2)
RBC # BLD AUTO: 3.24 M/UL (ref 4.05–5.2)
RBC # BLD AUTO: 3.25 M/UL (ref 4.05–5.2)
RBC # BLD AUTO: 3.28 M/UL (ref 4.05–5.2)
RBC # BLD AUTO: 3.36 M/UL (ref 4.05–5.2)
RBC # BLD AUTO: 3.51 M/UL (ref 4.05–5.2)
RBC # BLD AUTO: 3.74 M/UL (ref 4.05–5.2)
RBC # BLD AUTO: 3.77 M/UL (ref 4.05–5.2)
RBC # BLD AUTO: 3.83 M/UL (ref 4.05–5.2)
RBC # UR STRIP: ABNORMAL /UL
RBC #/AREA URNS HPF: 0 /HPF
RBC #/AREA URNS HPF: ABNORMAL /HPF
RBC MORPH BLD: ABNORMAL
SAO2 % BLD: 96.2 % (ref 95–98)
SERVICE CMNT-IMP: ABNORMAL
SERVICE CMNT-IMP: NORMAL
SODIUM SERPL-SCNC: 139 MMOL/L (ref 136–145)
SODIUM SERPL-SCNC: 140 MMOL/L (ref 136–145)
SODIUM SERPL-SCNC: 141 MMOL/L (ref 136–145)
SODIUM SERPL-SCNC: 142 MMOL/L (ref 136–145)
SODIUM SERPL-SCNC: 143 MMOL/L (ref 136–145)
SODIUM SERPL-SCNC: 144 MMOL/L (ref 136–145)
SODIUM SERPL-SCNC: 145 MMOL/L (ref 136–145)
SODIUM SERPL-SCNC: 146 MMOL/L (ref 136–145)
SODIUM SERPL-SCNC: 148 MMOL/L (ref 136–145)
SOURCE, COVRS: ABNORMAL
SOURCE, COVRS: NORMAL
SP GR UR REFRACTOMETRY: 1.01 (ref 1–1.02)
SP GR UR REFRACTOMETRY: 1.01 (ref 1–1.02)
SP GR UR REFRACTOMETRY: 1.02 (ref 1–1.02)
SP GR UR: >1.03 (ref 1–1.02)
SPECIMEN EXP DATE BLD: NORMAL
SPECIMEN EXP DATE BLD: NORMAL
SPECIMEN TYPE: ABNORMAL
STATUS OF UNIT,%ST: NORMAL
STREPTOCOCCUS DNA BLD POS NAA+NON-PROBE: DETECTED
TIBC SERPL-MCNC: 107 UG/DL (ref 250–450)
TIBC SERPL-MCNC: 147 UG/DL (ref 250–450)
TROPONIN-HIGH SENSITIVITY: 21.2 PG/ML (ref 0–14)
TROPONIN-HIGH SENSITIVITY: 23.3 PG/ML (ref 0–14)
TROPONIN-HIGH SENSITIVITY: 36.5 PG/ML (ref 0–14)
TROPONIN-HIGH SENSITIVITY: 40.6 PG/ML (ref 0–14)
UFH PPP CHRO-ACNC: 0.27 IU/ML (ref 0.3–0.7)
UFH PPP CHRO-ACNC: 0.33 IU/ML (ref 0.3–0.7)
UFH PPP CHRO-ACNC: 0.55 IU/ML (ref 0.3–0.7)
UFH PPP CHRO-ACNC: 0.58 IU/ML (ref 0.3–0.7)
UFH PPP CHRO-ACNC: 0.6 IU/ML (ref 0.3–0.7)
UFH PPP CHRO-ACNC: 0.65 IU/ML (ref 0.3–0.7)
UFH PPP CHRO-ACNC: 0.65 IU/ML (ref 0.3–0.7)
UFH PPP CHRO-ACNC: 0.84 IU/ML (ref 0.3–0.7)
UFH PPP CHRO-ACNC: 1.04 IU/ML (ref 0.3–0.7)
UNIT DIVISION, %UDIV: 0
UROBILINOGEN UR QL STRIP.AUTO: 0.2 EU/DL (ref 0.2–1)
UROBILINOGEN UR QL STRIP.AUTO: 1 EU/DL (ref 0.2–1)
UROBILINOGEN UR QL STRIP.AUTO: 1 EU/DL (ref 0.2–1)
UROBILINOGEN UR QL: 0.2 EU/DL (ref 0.2–1)
VANCOMYCIN SERPL-MCNC: 25.4 UG/ML
VENTRICULAR RATE, ECG03: 100 BPM
VENTRICULAR RATE, ECG03: 85 BPM
VENTRICULAR RATE, ECG03: 93 BPM
VIT B12 SERPL-MCNC: 861 PG/ML (ref 193–986)
WBC # BLD AUTO: 11.2 K/UL (ref 4.3–11.1)
WBC # BLD AUTO: 11.3 K/UL (ref 4.3–11.1)
WBC # BLD AUTO: 11.3 K/UL (ref 4.3–11.1)
WBC # BLD AUTO: 12.2 K/UL (ref 4.3–11.1)
WBC # BLD AUTO: 12.5 K/UL (ref 4.3–11.1)
WBC # BLD AUTO: 12.8 K/UL (ref 4.3–11.1)
WBC # BLD AUTO: 14.4 K/UL (ref 4.3–11.1)
WBC # BLD AUTO: 14.8 K/UL (ref 4.3–11.1)
WBC # BLD AUTO: 15 K/UL (ref 4.3–11.1)
WBC # BLD AUTO: 15.6 K/UL (ref 4.3–11.1)
WBC # BLD AUTO: 17.1 K/UL (ref 4.3–11.1)
WBC # BLD AUTO: 17.4 K/UL (ref 4.3–11.1)
WBC # BLD AUTO: 17.9 K/UL (ref 4.3–11.1)
WBC # BLD AUTO: 19.9 K/UL (ref 4.3–11.1)
WBC # BLD AUTO: 5.6 K/UL (ref 4.3–11.1)
WBC # BLD AUTO: 6.3 K/UL (ref 4.3–11.1)
WBC # BLD AUTO: 6.8 K/UL (ref 4.3–11.1)
WBC # BLD AUTO: 6.8 K/UL (ref 4.3–11.1)
WBC # BLD AUTO: 7 K/UL (ref 4.3–11.1)
WBC # BLD AUTO: 7.1 K/UL (ref 4.3–11.1)
WBC # BLD AUTO: 7.2 K/UL (ref 4.3–11.1)
WBC # BLD AUTO: 7.8 K/UL (ref 4.3–11.1)
WBC # BLD AUTO: 7.8 K/UL (ref 4.3–11.1)
WBC # BLD AUTO: 8.9 K/UL (ref 4.3–11.1)
WBC # BLD AUTO: 8.9 K/UL (ref 4.3–11.1)
WBC # BLD AUTO: 9.3 K/UL (ref 4.3–11.1)
WBC # BLD AUTO: 9.3 K/UL (ref 4.3–11.1)
WBC # BLD AUTO: 9.4 K/UL (ref 4.3–11.1)
WBC # BLD AUTO: 9.8 K/UL (ref 4.3–11.1)
WBC # BLD AUTO: 9.9 K/UL (ref 4.3–11.1)
WBC MORPH BLD: ABNORMAL
WBC URNS QL MICRO: >100 /HPF
WBC URNS QL MICRO: ABNORMAL /HPF

## 2022-01-01 PROCEDURE — 84484 ASSAY OF TROPONIN QUANT: CPT

## 2022-01-01 PROCEDURE — 36415 COLL VENOUS BLD VENIPUNCTURE: CPT

## 2022-01-01 PROCEDURE — 74011250637 HC RX REV CODE- 250/637: Performed by: INTERNAL MEDICINE

## 2022-01-01 PROCEDURE — 72072 X-RAY EXAM THORAC SPINE 3VWS: CPT

## 2022-01-01 PROCEDURE — 94664 DEMO&/EVAL PT USE INHALER: CPT

## 2022-01-01 PROCEDURE — 77010033678 HC OXYGEN DAILY

## 2022-01-01 PROCEDURE — 76040000025: Performed by: INTERNAL MEDICINE

## 2022-01-01 PROCEDURE — 97530 THERAPEUTIC ACTIVITIES: CPT

## 2022-01-01 PROCEDURE — G0378 HOSPITAL OBSERVATION PER HR: HCPCS

## 2022-01-01 PROCEDURE — 94760 N-INVAS EAR/PLS OXIMETRY 1: CPT

## 2022-01-01 PROCEDURE — 74011250637 HC RX REV CODE- 250/637: Performed by: FAMILY MEDICINE

## 2022-01-01 PROCEDURE — 2709999900 HC NON-CHARGEABLE SUPPLY

## 2022-01-01 PROCEDURE — 74011636637 HC RX REV CODE- 636/637: Performed by: FAMILY MEDICINE

## 2022-01-01 PROCEDURE — 80048 BASIC METABOLIC PNL TOTAL CA: CPT

## 2022-01-01 PROCEDURE — 74011000250 HC RX REV CODE- 250: Performed by: EMERGENCY MEDICINE

## 2022-01-01 PROCEDURE — 6370000000 HC RX 637 (ALT 250 FOR IP): Performed by: INTERNAL MEDICINE

## 2022-01-01 PROCEDURE — 82962 GLUCOSE BLOOD TEST: CPT

## 2022-01-01 PROCEDURE — 6370000000 HC RX 637 (ALT 250 FOR IP): Performed by: PHYSICIAN ASSISTANT

## 2022-01-01 PROCEDURE — 74011250637 HC RX REV CODE- 250/637: Performed by: HOSPITALIST

## 2022-01-01 PROCEDURE — 74011250637 HC RX REV CODE- 250/637: Performed by: STUDENT IN AN ORGANIZED HEALTH CARE EDUCATION/TRAINING PROGRAM

## 2022-01-01 PROCEDURE — 2580000003 HC RX 258: Performed by: FAMILY MEDICINE

## 2022-01-01 PROCEDURE — 6370000000 HC RX 637 (ALT 250 FOR IP): Performed by: EMERGENCY MEDICINE

## 2022-01-01 PROCEDURE — 86870 RBC ANTIBODY IDENTIFICATION: CPT

## 2022-01-01 PROCEDURE — 74011000250 HC RX REV CODE- 250: Performed by: HOSPITALIST

## 2022-01-01 PROCEDURE — 74011636637 HC RX REV CODE- 636/637: Performed by: EMERGENCY MEDICINE

## 2022-01-01 PROCEDURE — 97010 HOT OR COLD PACKS THERAPY: CPT

## 2022-01-01 PROCEDURE — 51798 US URINE CAPACITY MEASURE: CPT

## 2022-01-01 PROCEDURE — 6370000000 HC RX 637 (ALT 250 FOR IP): Performed by: NURSE PRACTITIONER

## 2022-01-01 PROCEDURE — 83605 ASSAY OF LACTIC ACID: CPT

## 2022-01-01 PROCEDURE — A9503 TC99M MEDRONATE: HCPCS | Performed by: FAMILY MEDICINE

## 2022-01-01 PROCEDURE — 74011636637 HC RX REV CODE- 636/637: Performed by: INTERNAL MEDICINE

## 2022-01-01 PROCEDURE — 6370000000 HC RX 637 (ALT 250 FOR IP): Performed by: FAMILY MEDICINE

## 2022-01-01 PROCEDURE — 74011000258 HC RX REV CODE- 258: Performed by: INTERNAL MEDICINE

## 2022-01-01 PROCEDURE — 71045 X-RAY EXAM CHEST 1 VIEW: CPT

## 2022-01-01 PROCEDURE — 6360000002 HC RX W HCPCS: Performed by: HOSPITALIST

## 2022-01-01 PROCEDURE — 83735 ASSAY OF MAGNESIUM: CPT

## 2022-01-01 PROCEDURE — 80053 COMPREHEN METABOLIC PANEL: CPT

## 2022-01-01 PROCEDURE — 2700000000 HC OXYGEN THERAPY PER DAY

## 2022-01-01 PROCEDURE — 74011250636 HC RX REV CODE- 250/636: Performed by: FAMILY MEDICINE

## 2022-01-01 PROCEDURE — 74011250636 HC RX REV CODE- 250/636: Performed by: ANESTHESIOLOGY

## 2022-01-01 PROCEDURE — 93306 TTE W/DOPPLER COMPLETE: CPT

## 2022-01-01 PROCEDURE — 85025 COMPLETE CBC W/AUTO DIFF WBC: CPT

## 2022-01-01 PROCEDURE — 6370000000 HC RX 637 (ALT 250 FOR IP): Performed by: HOSPITALIST

## 2022-01-01 PROCEDURE — 0656 HSPC GENERAL INPATIENT

## 2022-01-01 PROCEDURE — 85027 COMPLETE CBC AUTOMATED: CPT

## 2022-01-01 PROCEDURE — 87493 C DIFF AMPLIFIED PROBE: CPT

## 2022-01-01 PROCEDURE — 74022 RADEX COMPL AQT ABD SERIES: CPT

## 2022-01-01 PROCEDURE — 81001 URINALYSIS AUTO W/SCOPE: CPT

## 2022-01-01 PROCEDURE — 86902 BLOOD TYPE ANTIGEN DONOR EA: CPT

## 2022-01-01 PROCEDURE — 73560 X-RAY EXAM OF KNEE 1 OR 2: CPT

## 2022-01-01 PROCEDURE — 97162 PT EVAL MOD COMPLEX 30 MIN: CPT

## 2022-01-01 PROCEDURE — 78300 BONE IMAGING LIMITED AREA: CPT

## 2022-01-01 PROCEDURE — 86920 COMPATIBILITY TEST SPIN: CPT

## 2022-01-01 PROCEDURE — 2000000000 HC ICU R&B

## 2022-01-01 PROCEDURE — 77030038269 HC DRN EXT URIN PURWCK BARD -A

## 2022-01-01 PROCEDURE — 85520 HEPARIN ASSAY: CPT

## 2022-01-01 PROCEDURE — 65270000029 HC RM PRIVATE

## 2022-01-01 PROCEDURE — 74011000258 HC RX REV CODE- 258: Performed by: FAMILY MEDICINE

## 2022-01-01 PROCEDURE — 2500000003 HC RX 250 WO HCPCS: Performed by: INTERNAL MEDICINE

## 2022-01-01 PROCEDURE — 74011000250 HC RX REV CODE- 250: Performed by: ANESTHESIOLOGY

## 2022-01-01 PROCEDURE — P9016 RBC LEUKOCYTES REDUCED: HCPCS

## 2022-01-01 PROCEDURE — 74011250636 HC RX REV CODE- 250/636: Performed by: HOSPITALIST

## 2022-01-01 PROCEDURE — 99284 EMERGENCY DEPT VISIT MOD MDM: CPT

## 2022-01-01 PROCEDURE — 87086 URINE CULTURE/COLONY COUNT: CPT

## 2022-01-01 PROCEDURE — 94640 AIRWAY INHALATION TREATMENT: CPT

## 2022-01-01 PROCEDURE — 6360000002 HC RX W HCPCS: Performed by: FAMILY MEDICINE

## 2022-01-01 PROCEDURE — 99285 EMERGENCY DEPT VISIT HI MDM: CPT

## 2022-01-01 PROCEDURE — C9113 INJ PANTOPRAZOLE SODIUM, VIA: HCPCS | Performed by: HOSPITALIST

## 2022-01-01 PROCEDURE — 97112 NEUROMUSCULAR REEDUCATION: CPT

## 2022-01-01 PROCEDURE — 87077 CULTURE AEROBIC IDENTIFY: CPT

## 2022-01-01 PROCEDURE — 84100 ASSAY OF PHOSPHORUS: CPT

## 2022-01-01 PROCEDURE — 85018 HEMOGLOBIN: CPT

## 2022-01-01 PROCEDURE — 92610 EVALUATE SWALLOWING FUNCTION: CPT

## 2022-01-01 PROCEDURE — 74011000250 HC RX REV CODE- 250: Performed by: FAMILY MEDICINE

## 2022-01-01 PROCEDURE — 77030040393 HC DRSG OPTIFOAM GENT MDII -B

## 2022-01-01 PROCEDURE — 93970 EXTREMITY STUDY: CPT

## 2022-01-01 PROCEDURE — 85730 THROMBOPLASTIN TIME PARTIAL: CPT

## 2022-01-01 PROCEDURE — 93005 ELECTROCARDIOGRAM TRACING: CPT | Performed by: EMERGENCY MEDICINE

## 2022-01-01 PROCEDURE — 30233N1 TRANSFUSION OF NONAUTOLOGOUS RED BLOOD CELLS INTO PERIPHERAL VEIN, PERCUTANEOUS APPROACH: ICD-10-PCS | Performed by: INTERNAL MEDICINE

## 2022-01-01 PROCEDURE — 80202 ASSAY OF VANCOMYCIN: CPT

## 2022-01-01 PROCEDURE — 96375 TX/PRO/DX INJ NEW DRUG ADDON: CPT

## 2022-01-01 PROCEDURE — 74011250636 HC RX REV CODE- 250/636: Performed by: INTERNAL MEDICINE

## 2022-01-01 PROCEDURE — 6360000002 HC RX W HCPCS: Performed by: INTERNAL MEDICINE

## 2022-01-01 PROCEDURE — 74011250636 HC RX REV CODE- 250/636: Performed by: EMERGENCY MEDICINE

## 2022-01-01 PROCEDURE — 86921 COMPATIBILITY TEST INCUBATE: CPT

## 2022-01-01 PROCEDURE — 77030018667 ADMN ST IV BLD FENW -A

## 2022-01-01 PROCEDURE — 70450 CT HEAD/BRAIN W/O DYE: CPT

## 2022-01-01 PROCEDURE — G0151 HHCP-SERV OF PT,EA 15 MIN: HCPCS

## 2022-01-01 PROCEDURE — 86580 TB INTRADERMAL TEST: CPT | Performed by: FAMILY MEDICINE

## 2022-01-01 PROCEDURE — 2580000003 HC RX 258: Performed by: EMERGENCY MEDICINE

## 2022-01-01 PROCEDURE — 85610 PROTHROMBIN TIME: CPT

## 2022-01-01 PROCEDURE — 84145 PROCALCITONIN (PCT): CPT

## 2022-01-01 PROCEDURE — 86580 TB INTRADERMAL TEST: CPT | Performed by: HOSPITALIST

## 2022-01-01 PROCEDURE — 74011000258 HC RX REV CODE- 258: Performed by: EMERGENCY MEDICINE

## 2022-01-01 PROCEDURE — 81015 MICROSCOPIC EXAM OF URINE: CPT

## 2022-01-01 PROCEDURE — 87635 SARS-COV-2 COVID-19 AMP PRB: CPT

## 2022-01-01 PROCEDURE — 6360000002 HC RX W HCPCS

## 2022-01-01 PROCEDURE — 97166 OT EVAL MOD COMPLEX 45 MIN: CPT

## 2022-01-01 PROCEDURE — 96360 HYDRATION IV INFUSION INIT: CPT

## 2022-01-01 PROCEDURE — 74011000250 HC RX REV CODE- 250: Performed by: INTERNAL MEDICINE

## 2022-01-01 PROCEDURE — 72125 CT NECK SPINE W/O DYE: CPT

## 2022-01-01 PROCEDURE — 83880 ASSAY OF NATRIURETIC PEPTIDE: CPT

## 2022-01-01 PROCEDURE — 2500000003 HC RX 250 WO HCPCS: Performed by: FAMILY MEDICINE

## 2022-01-01 PROCEDURE — 74011250636 HC RX REV CODE- 250/636: Performed by: STUDENT IN AN ORGANIZED HEALTH CARE EDUCATION/TRAINING PROGRAM

## 2022-01-01 PROCEDURE — 74011636637 HC RX REV CODE- 636/637: Performed by: STUDENT IN AN ORGANIZED HEALTH CARE EDUCATION/TRAINING PROGRAM

## 2022-01-01 PROCEDURE — 6500000001 HSPC ELECTION

## 2022-01-01 PROCEDURE — 1100000000 HC RM PRIVATE

## 2022-01-01 PROCEDURE — 85379 FIBRIN DEGRADATION QUANT: CPT

## 2022-01-01 PROCEDURE — 83690 ASSAY OF LIPASE: CPT

## 2022-01-01 PROCEDURE — 73562 X-RAY EXAM OF KNEE 3: CPT

## 2022-01-01 PROCEDURE — 72100 X-RAY EXAM L-S SPINE 2/3 VWS: CPT

## 2022-01-01 PROCEDURE — 73502 X-RAY EXAM HIP UNI 2-3 VIEWS: CPT

## 2022-01-01 PROCEDURE — 36430 TRANSFUSION BLD/BLD COMPNT: CPT

## 2022-01-01 PROCEDURE — 86140 C-REACTIVE PROTEIN: CPT

## 2022-01-01 PROCEDURE — 82728 ASSAY OF FERRITIN: CPT

## 2022-01-01 PROCEDURE — 87040 BLOOD CULTURE FOR BACTERIA: CPT

## 2022-01-01 PROCEDURE — 97535 SELF CARE MNGMENT TRAINING: CPT

## 2022-01-01 PROCEDURE — 96374 THER/PROPH/DIAG INJ IV PUSH: CPT

## 2022-01-01 PROCEDURE — 2580000003 HC RX 258: Performed by: PHYSICIAN ASSISTANT

## 2022-01-01 PROCEDURE — 97165 OT EVAL LOW COMPLEX 30 MIN: CPT

## 2022-01-01 PROCEDURE — 93306 TTE W/DOPPLER COMPLETE: CPT | Performed by: INTERNAL MEDICINE

## 2022-01-01 PROCEDURE — 71260 CT THORAX DX C+: CPT

## 2022-01-01 PROCEDURE — 87088 URINE BACTERIA CULTURE: CPT

## 2022-01-01 PROCEDURE — 2580000003 HC RX 258: Performed by: HOSPITALIST

## 2022-01-01 PROCEDURE — 83540 ASSAY OF IRON: CPT

## 2022-01-01 PROCEDURE — 74011250636 HC RX REV CODE- 250/636: Performed by: NURSE ANESTHETIST, CERTIFIED REGISTERED

## 2022-01-01 PROCEDURE — 73700 CT LOWER EXTREMITY W/O DYE: CPT

## 2022-01-01 PROCEDURE — 83036 HEMOGLOBIN GLYCOSYLATED A1C: CPT

## 2022-01-01 PROCEDURE — 81003 URINALYSIS AUTO W/O SCOPE: CPT

## 2022-01-01 PROCEDURE — 86922 COMPATIBILITY TEST ANTIGLOB: CPT

## 2022-01-01 PROCEDURE — 65660000000 HC RM CCU STEPDOWN

## 2022-01-01 PROCEDURE — 97161 PT EVAL LOW COMPLEX 20 MIN: CPT

## 2022-01-01 PROCEDURE — 3430000000 HC RX DIAGNOSTIC RADIOPHARMACEUTICAL: Performed by: FAMILY MEDICINE

## 2022-01-01 PROCEDURE — 78830 RP LOCLZJ TUM SPECT W/CT 1: CPT

## 2022-01-01 PROCEDURE — 82746 ASSAY OF FOLIC ACID SERUM: CPT

## 2022-01-01 PROCEDURE — 96365 THER/PROPH/DIAG IV INF INIT: CPT

## 2022-01-01 PROCEDURE — 74011000250 HC RX REV CODE- 250: Performed by: NURSE ANESTHETIST, CERTIFIED REGISTERED

## 2022-01-01 PROCEDURE — 87045 FECES CULTURE AEROBIC BACT: CPT

## 2022-01-01 PROCEDURE — 87186 SC STD MICRODIL/AGAR DIL: CPT

## 2022-01-01 PROCEDURE — 97110 THERAPEUTIC EXERCISES: CPT

## 2022-01-01 PROCEDURE — 74011250636 HC RX REV CODE- 250/636

## 2022-01-01 PROCEDURE — 74011000636 HC RX REV CODE- 636: Performed by: EMERGENCY MEDICINE

## 2022-01-01 PROCEDURE — 94761 N-INVAS EAR/PLS OXIMETRY MLT: CPT

## 2022-01-01 PROCEDURE — 87205 SMEAR GRAM STAIN: CPT

## 2022-01-01 PROCEDURE — 86738 MYCOPLASMA ANTIBODY: CPT

## 2022-01-01 PROCEDURE — 86900 BLOOD TYPING SEROLOGIC ABO: CPT

## 2022-01-01 PROCEDURE — 93005 ELECTROCARDIOGRAM TRACING: CPT | Performed by: PHYSICIAN ASSISTANT

## 2022-01-01 PROCEDURE — 97116 GAIT TRAINING THERAPY: CPT

## 2022-01-01 PROCEDURE — 74011250637 HC RX REV CODE- 250/637: Performed by: EMERGENCY MEDICINE

## 2022-01-01 PROCEDURE — 82607 VITAMIN B-12: CPT

## 2022-01-01 PROCEDURE — 6360000002 HC RX W HCPCS: Performed by: PHYSICIAN ASSISTANT

## 2022-01-01 PROCEDURE — 76060000031 HC ANESTHESIA FIRST 0.5 HR: Performed by: INTERNAL MEDICINE

## 2022-01-01 PROCEDURE — 99222 1ST HOSP IP/OBS MODERATE 55: CPT | Performed by: INTERNAL MEDICINE

## 2022-01-01 PROCEDURE — 74011636637 HC RX REV CODE- 636/637: Performed by: HOSPITALIST

## 2022-01-01 PROCEDURE — 93005 ELECTROCARDIOGRAM TRACING: CPT | Performed by: INTERNAL MEDICINE

## 2022-01-01 PROCEDURE — 2709999900 HC NON-CHARGEABLE SUPPLY: Performed by: INTERNAL MEDICINE

## 2022-01-01 PROCEDURE — 94762 N-INVAS EAR/PLS OXIMTRY CONT: CPT

## 2022-01-01 PROCEDURE — XW0DXM6 INTRODUCTION OF BARICITINIB INTO MOUTH AND PHARYNX, EXTERNAL APPROACH, NEW TECHNOLOGY GROUP 6: ICD-10-PCS | Performed by: INTERNAL MEDICINE

## 2022-01-01 PROCEDURE — 36600 WITHDRAWAL OF ARTERIAL BLOOD: CPT

## 2022-01-01 PROCEDURE — 86580 TB INTRADERMAL TEST: CPT | Performed by: INTERNAL MEDICINE

## 2022-01-01 PROCEDURE — 82803 BLOOD GASES ANY COMBINATION: CPT

## 2022-01-01 PROCEDURE — A9560 TC99M LABELED RBC: HCPCS

## 2022-01-01 PROCEDURE — 6360000002 HC RX W HCPCS: Performed by: REGISTERED NURSE

## 2022-01-01 PROCEDURE — 87150 DNA/RNA AMPLIFIED PROBE: CPT

## 2022-01-01 PROCEDURE — 0DJD8ZZ INSPECTION OF LOWER INTESTINAL TRACT, VIA NATURAL OR ARTIFICIAL OPENING ENDOSCOPIC: ICD-10-PCS | Performed by: INTERNAL MEDICINE

## 2022-01-01 RX ORDER — INSULIN GLARGINE 100 [IU]/ML
15 INJECTION, SOLUTION SUBCUTANEOUS
Status: DISCONTINUED | OUTPATIENT
Start: 2022-01-01 | End: 2022-01-01

## 2022-01-01 RX ORDER — DEXTROSE AND SODIUM CHLORIDE 5; .45 G/100ML; G/100ML
INJECTION, SOLUTION INTRAVENOUS CONTINUOUS
Status: DISCONTINUED | OUTPATIENT
Start: 2022-01-01 | End: 2022-01-01 | Stop reason: SDUPTHER

## 2022-01-01 RX ORDER — MAG HYDROX/ALUMINUM HYD/SIMETH 200-200-20
15 SUSPENSION, ORAL (FINAL DOSE FORM) ORAL
Status: DISCONTINUED | OUTPATIENT
Start: 2022-01-01 | End: 2022-01-01 | Stop reason: HOSPADM

## 2022-01-01 RX ORDER — DEXTROSE MONOHYDRATE 50 MG/ML
100 INJECTION, SOLUTION INTRAVENOUS PRN
Status: DISCONTINUED | OUTPATIENT
Start: 2022-01-01 | End: 2022-01-01 | Stop reason: HOSPADM

## 2022-01-01 RX ORDER — DEXTROSE MONOHYDRATE 100 MG/ML
125-250 INJECTION, SOLUTION INTRAVENOUS AS NEEDED
Status: DISCONTINUED | OUTPATIENT
Start: 2022-01-01 | End: 2022-01-01 | Stop reason: HOSPADM

## 2022-01-01 RX ORDER — FUROSEMIDE 10 MG/ML
20 INJECTION INTRAMUSCULAR; INTRAVENOUS EVERY 12 HOURS
Status: DISCONTINUED | OUTPATIENT
Start: 2022-01-01 | End: 2022-01-01

## 2022-01-01 RX ORDER — INSULIN GLARGINE 100 [IU]/ML
3 INJECTION, SOLUTION SUBCUTANEOUS NIGHTLY
Status: DISCONTINUED | OUTPATIENT
Start: 2022-01-01 | End: 2022-01-01 | Stop reason: HOSPADM

## 2022-01-01 RX ORDER — SODIUM CHLORIDE 0.9 % (FLUSH) 0.9 %
5-40 SYRINGE (ML) INJECTION EVERY 8 HOURS
Status: DISCONTINUED | OUTPATIENT
Start: 2022-01-01 | End: 2022-01-01 | Stop reason: HOSPADM

## 2022-01-01 RX ORDER — ENOXAPARIN SODIUM 100 MG/ML
30 INJECTION SUBCUTANEOUS DAILY
Status: DISCONTINUED | OUTPATIENT
Start: 2022-01-01 | End: 2022-01-01

## 2022-01-01 RX ORDER — ONDANSETRON 2 MG/ML
4 INJECTION INTRAMUSCULAR; INTRAVENOUS
Status: DISCONTINUED | OUTPATIENT
Start: 2022-01-01 | End: 2022-01-01 | Stop reason: HOSPADM

## 2022-01-01 RX ORDER — BUDESONIDE 0.5 MG/2ML
500 INHALANT ORAL 2 TIMES DAILY
Status: DISCONTINUED | OUTPATIENT
Start: 2022-01-01 | End: 2022-01-01

## 2022-01-01 RX ORDER — MAGNESIUM SULFATE 1 G/100ML
1000 INJECTION INTRAVENOUS PRN
Status: DISCONTINUED | OUTPATIENT
Start: 2022-01-01 | End: 2022-01-01 | Stop reason: HOSPADM

## 2022-01-01 RX ORDER — SODIUM CHLORIDE 0.9 % (FLUSH) 0.9 %
5-40 SYRINGE (ML) INJECTION AS NEEDED
Status: DISCONTINUED | OUTPATIENT
Start: 2022-01-01 | End: 2022-01-01 | Stop reason: HOSPADM

## 2022-01-01 RX ORDER — INSULIN LISPRO 100 [IU]/ML
10 INJECTION, SOLUTION INTRAVENOUS; SUBCUTANEOUS ONCE
Status: DISCONTINUED | OUTPATIENT
Start: 2022-01-01 | End: 2022-01-01

## 2022-01-01 RX ORDER — HYDRALAZINE HYDROCHLORIDE 25 MG/1
25 TABLET, FILM COATED ORAL 3 TIMES DAILY
Qty: 30 TABLET | Refills: 0 | Status: SHIPPED
Start: 2022-01-01 | End: 2022-01-01 | Stop reason: SDUPTHER

## 2022-01-01 RX ORDER — ACETAMINOPHEN 325 MG/1
650 TABLET ORAL EVERY 4 HOURS PRN
Status: DISCONTINUED | OUTPATIENT
Start: 2022-01-01 | End: 2022-01-01 | Stop reason: HOSPADM

## 2022-01-01 RX ORDER — PANTOPRAZOLE SODIUM 20 MG/1
40 TABLET, DELAYED RELEASE ORAL DAILY
Qty: 90 TABLET | Refills: 1 | Status: SHIPPED
Start: 2022-01-01

## 2022-01-01 RX ORDER — ONDANSETRON 4 MG/1
4 TABLET, ORALLY DISINTEGRATING ORAL
Status: DISCONTINUED | OUTPATIENT
Start: 2022-01-01 | End: 2022-01-01 | Stop reason: HOSPADM

## 2022-01-01 RX ORDER — INSULIN LISPRO 100 [IU]/ML
0-8 INJECTION, SOLUTION INTRAVENOUS; SUBCUTANEOUS
Status: DISCONTINUED | OUTPATIENT
Start: 2022-01-01 | End: 2022-01-01 | Stop reason: HOSPADM

## 2022-01-01 RX ORDER — DEXTROSE AND SODIUM CHLORIDE 5; .45 G/100ML; G/100ML
INJECTION, SOLUTION INTRAVENOUS CONTINUOUS PRN
Status: DISCONTINUED | OUTPATIENT
Start: 2022-01-01 | End: 2022-01-01

## 2022-01-01 RX ORDER — ALBUTEROL SULFATE 2.5 MG/3ML
2.5 SOLUTION RESPIRATORY (INHALATION) EVERY 6 HOURS PRN
Status: DISCONTINUED | OUTPATIENT
Start: 2022-01-01 | End: 2022-01-01 | Stop reason: HOSPADM

## 2022-01-01 RX ORDER — METHYLPREDNISOLONE SODIUM SUCCINATE 40 MG/ML
40 INJECTION, POWDER, LYOPHILIZED, FOR SOLUTION INTRAMUSCULAR; INTRAVENOUS EVERY 12 HOURS
Status: DISCONTINUED | OUTPATIENT
Start: 2022-01-01 | End: 2022-01-01

## 2022-01-01 RX ORDER — ACETAMINOPHEN 325 MG/1
650 TABLET ORAL
Status: DISCONTINUED | OUTPATIENT
Start: 2022-01-01 | End: 2022-01-01 | Stop reason: HOSPADM

## 2022-01-01 RX ORDER — HYDRALAZINE HYDROCHLORIDE 25 MG/1
25 TABLET, FILM COATED ORAL 3 TIMES DAILY
Qty: 30 TABLET | Refills: 0 | Status: SHIPPED | OUTPATIENT
Start: 2022-01-01

## 2022-01-01 RX ORDER — INSULIN LISPRO 100 [IU]/ML
INJECTION, SOLUTION INTRAVENOUS; SUBCUTANEOUS
Status: DISCONTINUED | OUTPATIENT
Start: 2022-01-01 | End: 2022-01-01 | Stop reason: HOSPADM

## 2022-01-01 RX ORDER — SERTRALINE HYDROCHLORIDE 50 MG/1
100 TABLET, FILM COATED ORAL DAILY
Status: DISCONTINUED | OUTPATIENT
Start: 2022-01-01 | End: 2022-01-01 | Stop reason: HOSPADM

## 2022-01-01 RX ORDER — AMLODIPINE BESYLATE 10 MG/1
10 TABLET ORAL DAILY
Status: DISCONTINUED | OUTPATIENT
Start: 2022-01-01 | End: 2022-01-01

## 2022-01-01 RX ORDER — ACETAMINOPHEN 500 MG
1000 TABLET ORAL
Status: COMPLETED | OUTPATIENT
Start: 2022-01-01 | End: 2022-01-01

## 2022-01-01 RX ORDER — MORPHINE SULFATE 2 MG/ML
INJECTION, SOLUTION INTRAMUSCULAR; INTRAVENOUS
Status: COMPLETED
Start: 2022-01-01 | End: 2022-01-01

## 2022-01-01 RX ORDER — PRAVASTATIN SODIUM 20 MG
10 TABLET ORAL NIGHTLY
Status: DISCONTINUED | OUTPATIENT
Start: 2022-01-01 | End: 2022-01-01 | Stop reason: HOSPADM

## 2022-01-01 RX ORDER — INSULIN LISPRO 100 [IU]/ML
2 INJECTION, SOLUTION INTRAVENOUS; SUBCUTANEOUS
Status: DISCONTINUED | OUTPATIENT
Start: 2022-01-01 | End: 2022-01-01 | Stop reason: HOSPADM

## 2022-01-01 RX ORDER — LANCETS
EACH MISCELLANEOUS
Qty: 100 EACH | Refills: 3 | Status: SHIPPED | OUTPATIENT
Start: 2022-01-01

## 2022-01-01 RX ORDER — SODIUM CHLORIDE 0.9 % (FLUSH) 0.9 %
5-10 SYRINGE (ML) INJECTION AS NEEDED
Status: DISCONTINUED | OUTPATIENT
Start: 2022-01-01 | End: 2022-01-01 | Stop reason: HOSPADM

## 2022-01-01 RX ORDER — DEXAMETHASONE 4 MG/1
6 TABLET ORAL DAILY
Status: COMPLETED | OUTPATIENT
Start: 2022-01-01 | End: 2022-01-01

## 2022-01-01 RX ORDER — ACETAMINOPHEN 650 MG/1
650 SUPPOSITORY RECTAL
Status: DISCONTINUED | OUTPATIENT
Start: 2022-01-01 | End: 2022-01-01 | Stop reason: HOSPADM

## 2022-01-01 RX ORDER — HYDRALAZINE HYDROCHLORIDE 25 MG/1
25 TABLET, FILM COATED ORAL EVERY 8 HOURS
Status: DISCONTINUED | OUTPATIENT
Start: 2022-01-01 | End: 2022-01-01 | Stop reason: HOSPADM

## 2022-01-01 RX ORDER — POLYETHYLENE GLYCOL 3350 17 G/17G
17 POWDER, FOR SOLUTION ORAL DAILY
Status: DISCONTINUED | OUTPATIENT
Start: 2022-01-01 | End: 2022-01-01 | Stop reason: HOSPADM

## 2022-01-01 RX ORDER — CEPHALEXIN 500 MG/1
500 CAPSULE ORAL
Status: COMPLETED | OUTPATIENT
Start: 2022-01-01 | End: 2022-01-01

## 2022-01-01 RX ORDER — NITROFURANTOIN MACROCRYSTALS 50 MG/1
100 CAPSULE ORAL
Status: DISCONTINUED | OUTPATIENT
Start: 2022-01-01 | End: 2022-01-01 | Stop reason: HOSPADM

## 2022-01-01 RX ORDER — ARFORMOTEROL TARTRATE 15 UG/2ML
15 SOLUTION RESPIRATORY (INHALATION) 2 TIMES DAILY
Qty: 60 EACH | Refills: 0 | Status: SHIPPED | OUTPATIENT
Start: 2022-01-01

## 2022-01-01 RX ORDER — POTASSIUM CHLORIDE 20 MEQ/1
40 TABLET, EXTENDED RELEASE ORAL
Status: COMPLETED | OUTPATIENT
Start: 2022-01-01 | End: 2022-01-01

## 2022-01-01 RX ORDER — ACETAMINOPHEN 500 MG
1000 TABLET ORAL
Status: DISCONTINUED | OUTPATIENT
Start: 2022-01-01 | End: 2022-01-01

## 2022-01-01 RX ORDER — HYDRALAZINE HYDROCHLORIDE 50 MG/1
25 TABLET, FILM COATED ORAL 3 TIMES DAILY
Status: CANCELLED | OUTPATIENT
Start: 2022-01-01

## 2022-01-01 RX ORDER — AMLODIPINE BESYLATE 10 MG/1
10 TABLET ORAL DAILY
Status: CANCELLED | OUTPATIENT
Start: 2022-01-01

## 2022-01-01 RX ORDER — INSULIN GLARGINE 100 [IU]/ML
10 INJECTION, SOLUTION SUBCUTANEOUS DAILY
Status: DISCONTINUED | OUTPATIENT
Start: 2022-01-01 | End: 2022-01-01

## 2022-01-01 RX ORDER — INSULIN LISPRO 100 [IU]/ML
0-4 INJECTION, SOLUTION INTRAVENOUS; SUBCUTANEOUS NIGHTLY
Status: DISCONTINUED | OUTPATIENT
Start: 2022-01-01 | End: 2022-01-01 | Stop reason: HOSPADM

## 2022-01-01 RX ORDER — AMLODIPINE BESYLATE 5 MG/1
5 TABLET ORAL DAILY
Status: DISCONTINUED | OUTPATIENT
Start: 2022-01-01 | End: 2022-01-01 | Stop reason: HOSPADM

## 2022-01-01 RX ORDER — HYDRALAZINE HYDROCHLORIDE 20 MG/ML
20 INJECTION INTRAMUSCULAR; INTRAVENOUS
Status: DISCONTINUED | OUTPATIENT
Start: 2022-01-01 | End: 2022-01-01 | Stop reason: HOSPADM

## 2022-01-01 RX ORDER — SODIUM CHLORIDE 9 MG/ML
INJECTION, SOLUTION INTRAVENOUS CONTINUOUS
Status: DISCONTINUED | OUTPATIENT
Start: 2022-01-01 | End: 2022-01-01

## 2022-01-01 RX ORDER — INSULIN LISPRO 100 [IU]/ML
0-8 INJECTION, SOLUTION INTRAVENOUS; SUBCUTANEOUS
Status: DISCONTINUED | OUTPATIENT
Start: 2022-01-01 | End: 2022-01-01

## 2022-01-01 RX ORDER — HYDRALAZINE HYDROCHLORIDE 20 MG/ML
10 INJECTION INTRAMUSCULAR; INTRAVENOUS
Status: DISCONTINUED | OUTPATIENT
Start: 2022-01-01 | End: 2022-01-01 | Stop reason: HOSPADM

## 2022-01-01 RX ORDER — GLUCAGON 1 MG/ML
1 KIT INJECTION PRN
Status: DISCONTINUED | OUTPATIENT
Start: 2022-01-01 | End: 2022-01-01 | Stop reason: HOSPADM

## 2022-01-01 RX ORDER — BUDESONIDE 0.5 MG/2ML
500 INHALANT ORAL
Status: DISCONTINUED | OUTPATIENT
Start: 2022-01-01 | End: 2022-01-01 | Stop reason: HOSPADM

## 2022-01-01 RX ORDER — HEPARIN SODIUM 5000 [USP'U]/100ML
18-36 INJECTION, SOLUTION INTRAVENOUS
Status: DISCONTINUED | OUTPATIENT
Start: 2022-01-01 | End: 2022-01-01

## 2022-01-01 RX ORDER — INSULIN LISPRO 100 [IU]/ML
0-4 INJECTION, SOLUTION INTRAVENOUS; SUBCUTANEOUS NIGHTLY
Status: DISCONTINUED | OUTPATIENT
Start: 2022-01-01 | End: 2022-01-01

## 2022-01-01 RX ORDER — DEXAMETHASONE SODIUM PHOSPHATE 100 MG/10ML
6 INJECTION INTRAMUSCULAR; INTRAVENOUS EVERY 24 HOURS
Status: DISCONTINUED | OUTPATIENT
Start: 2022-01-01 | End: 2022-01-01

## 2022-01-01 RX ORDER — INSULIN GLARGINE 100 [IU]/ML
8 INJECTION, SOLUTION SUBCUTANEOUS DAILY
Status: DISCONTINUED | OUTPATIENT
Start: 2022-01-01 | End: 2022-01-01 | Stop reason: HOSPADM

## 2022-01-01 RX ORDER — DIPHENOXYLATE HYDROCHLORIDE AND ATROPINE SULFATE 2.5; .025 MG/1; MG/1
2 TABLET ORAL
Status: COMPLETED | OUTPATIENT
Start: 2022-01-01 | End: 2022-01-01

## 2022-01-01 RX ORDER — CEPHALEXIN 250 MG/1
500 CAPSULE ORAL EVERY 8 HOURS SCHEDULED
Status: DISCONTINUED | OUTPATIENT
Start: 2022-01-01 | End: 2022-01-01 | Stop reason: HOSPADM

## 2022-01-01 RX ORDER — INSULIN LISPRO 100 [IU]/ML
2 INJECTION, SOLUTION INTRAVENOUS; SUBCUTANEOUS
Status: DISCONTINUED | OUTPATIENT
Start: 2022-01-01 | End: 2022-01-01

## 2022-01-01 RX ORDER — GABAPENTIN 100 MG/1
100 CAPSULE ORAL 3 TIMES DAILY
Status: DISCONTINUED | OUTPATIENT
Start: 2022-01-01 | End: 2022-01-01 | Stop reason: HOSPADM

## 2022-01-01 RX ORDER — METOPROLOL TARTRATE 25 MG/1
25 TABLET, FILM COATED ORAL 2 TIMES DAILY
Status: DISCONTINUED | OUTPATIENT
Start: 2022-01-01 | End: 2022-01-01 | Stop reason: HOSPADM

## 2022-01-01 RX ORDER — SODIUM CHLORIDE 0.9 % (FLUSH) 0.9 %
10 SYRINGE (ML) INJECTION
Status: COMPLETED | OUTPATIENT
Start: 2022-01-01 | End: 2022-01-01

## 2022-01-01 RX ORDER — PANTOPRAZOLE SODIUM 40 MG/1
40 TABLET, DELAYED RELEASE ORAL
Status: DISCONTINUED | OUTPATIENT
Start: 2022-01-01 | End: 2022-01-01 | Stop reason: HOSPADM

## 2022-01-01 RX ORDER — HEPARIN SODIUM 5000 [USP'U]/ML
5000 INJECTION, SOLUTION INTRAVENOUS; SUBCUTANEOUS EVERY 8 HOURS
Status: DISCONTINUED | OUTPATIENT
Start: 2022-01-01 | End: 2022-01-01

## 2022-01-01 RX ORDER — NITROFURANTOIN 25; 75 MG/1; MG/1
100 CAPSULE ORAL 2 TIMES DAILY
Qty: 10 CAPSULE | Refills: 0 | Status: SHIPPED | OUTPATIENT
Start: 2022-01-01 | End: 2022-01-01

## 2022-01-01 RX ORDER — SERTRALINE HYDROCHLORIDE 100 MG/1
50 TABLET, FILM COATED ORAL DAILY
Qty: 90 TABLET | Refills: 1 | Status: ON HOLD
Start: 2022-01-01 | End: 2022-01-01 | Stop reason: SDUPTHER

## 2022-01-01 RX ORDER — SODIUM CHLORIDE, SODIUM LACTATE, POTASSIUM CHLORIDE, CALCIUM CHLORIDE 600; 310; 30; 20 MG/100ML; MG/100ML; MG/100ML; MG/100ML
100 INJECTION, SOLUTION INTRAVENOUS CONTINUOUS
Status: DISCONTINUED | OUTPATIENT
Start: 2022-01-01 | End: 2022-01-01

## 2022-01-01 RX ORDER — FUROSEMIDE 10 MG/ML
40 INJECTION INTRAMUSCULAR; INTRAVENOUS ONCE
Status: COMPLETED | OUTPATIENT
Start: 2022-01-01 | End: 2022-01-01

## 2022-01-01 RX ORDER — BUDESONIDE 0.5 MG/2ML
500 INHALANT ORAL 2 TIMES DAILY
Qty: 60 EACH | Refills: 0 | Status: SHIPPED | OUTPATIENT
Start: 2022-01-01

## 2022-01-01 RX ORDER — MORPHINE SULFATE 2 MG/ML
1 INJECTION, SOLUTION INTRAMUSCULAR; INTRAVENOUS EVERY 4 HOURS PRN
Status: DISCONTINUED | OUTPATIENT
Start: 2022-01-01 | End: 2022-01-01 | Stop reason: HOSPADM

## 2022-01-01 RX ORDER — VANCOMYCIN/0.9 % SOD CHLORIDE 1.5G/250ML
1500 PLASTIC BAG, INJECTION (ML) INTRAVENOUS ONCE
Status: COMPLETED | OUTPATIENT
Start: 2022-01-01 | End: 2022-01-01

## 2022-01-01 RX ORDER — INSULIN GLARGINE 100 [IU]/ML
4 INJECTION, SOLUTION SUBCUTANEOUS
Status: DISCONTINUED | OUTPATIENT
Start: 2022-01-01 | End: 2022-01-01

## 2022-01-01 RX ORDER — HYDRALAZINE HYDROCHLORIDE 10 MG/1
10 TABLET, FILM COATED ORAL EVERY 8 HOURS
Status: DISCONTINUED | OUTPATIENT
Start: 2022-01-01 | End: 2022-01-01

## 2022-01-01 RX ORDER — LANOLIN ALCOHOL/MO/W.PET/CERES
1 CREAM (GRAM) TOPICAL
Status: DISCONTINUED | OUTPATIENT
Start: 2022-01-01 | End: 2022-01-01 | Stop reason: HOSPADM

## 2022-01-01 RX ORDER — PROPOFOL 10 MG/ML
INJECTION, EMULSION INTRAVENOUS AS NEEDED
Status: DISCONTINUED | OUTPATIENT
Start: 2022-01-01 | End: 2022-01-01 | Stop reason: HOSPADM

## 2022-01-01 RX ORDER — INSULIN LISPRO 100 [IU]/ML
3 INJECTION, SOLUTION INTRAVENOUS; SUBCUTANEOUS
Status: DISCONTINUED | OUTPATIENT
Start: 2022-01-01 | End: 2022-01-01 | Stop reason: HOSPADM

## 2022-01-01 RX ORDER — INSULIN LISPRO 100 [IU]/ML
4 INJECTION, SOLUTION INTRAVENOUS; SUBCUTANEOUS
Status: DISCONTINUED | OUTPATIENT
Start: 2022-01-01 | End: 2022-01-01

## 2022-01-01 RX ORDER — ENOXAPARIN SODIUM 100 MG/ML
40 INJECTION SUBCUTANEOUS DAILY
Status: DISCONTINUED | OUTPATIENT
Start: 2022-01-01 | End: 2022-01-01

## 2022-01-01 RX ORDER — INSULIN LISPRO 100 [IU]/ML
INJECTION, SOLUTION INTRAVENOUS; SUBCUTANEOUS
Qty: 1 EACH | Refills: 0 | Status: SHIPPED
Start: 2022-01-01 | End: 2022-01-01 | Stop reason: SDUPTHER

## 2022-01-01 RX ORDER — HEPARIN SODIUM 1000 [USP'U]/ML
20 INJECTION, SOLUTION INTRAVENOUS; SUBCUTANEOUS ONCE
Status: COMPLETED | OUTPATIENT
Start: 2022-01-01 | End: 2022-01-01

## 2022-01-01 RX ORDER — MAGNESIUM SULFATE HEPTAHYDRATE 40 MG/ML
2 INJECTION, SOLUTION INTRAVENOUS ONCE
Status: COMPLETED | OUTPATIENT
Start: 2022-01-01 | End: 2022-01-01

## 2022-01-01 RX ORDER — POLYETHYLENE GLYCOL 3350 17 G/17G
17 POWDER, FOR SOLUTION ORAL DAILY PRN
Status: DISCONTINUED | OUTPATIENT
Start: 2022-01-01 | End: 2022-01-01 | Stop reason: HOSPADM

## 2022-01-01 RX ORDER — ACETAMINOPHEN 650 MG/1
650 SUPPOSITORY RECTAL
Status: COMPLETED | OUTPATIENT
Start: 2022-01-01 | End: 2022-01-01

## 2022-01-01 RX ORDER — INSULIN GLARGINE 100 [IU]/ML
8 INJECTION, SOLUTION SUBCUTANEOUS
Status: DISCONTINUED | OUTPATIENT
Start: 2022-01-01 | End: 2022-01-01

## 2022-01-01 RX ORDER — GABAPENTIN 100 MG/1
100 CAPSULE ORAL 3 TIMES DAILY
Status: CANCELLED | OUTPATIENT
Start: 2022-01-01

## 2022-01-01 RX ORDER — INSULIN GLARGINE 100 [IU]/ML
7 INJECTION, SOLUTION SUBCUTANEOUS
Status: DISCONTINUED | OUTPATIENT
Start: 2022-01-01 | End: 2022-01-01

## 2022-01-01 RX ORDER — INSULIN LISPRO 100 [IU]/ML
INJECTION, SOLUTION INTRAVENOUS; SUBCUTANEOUS EVERY 4 HOURS
Status: DISCONTINUED | OUTPATIENT
Start: 2022-01-01 | End: 2022-01-01

## 2022-01-01 RX ORDER — HEPARIN SODIUM 1000 [USP'U]/ML
80 INJECTION, SOLUTION INTRAVENOUS; SUBCUTANEOUS ONCE
Status: DISCONTINUED | OUTPATIENT
Start: 2022-01-01 | End: 2022-01-01 | Stop reason: ALTCHOICE

## 2022-01-01 RX ORDER — SODIUM CHLORIDE 0.9 % (FLUSH) 0.9 %
5-10 SYRINGE (ML) INJECTION EVERY 8 HOURS
Status: DISCONTINUED | OUTPATIENT
Start: 2022-01-01 | End: 2022-01-01 | Stop reason: HOSPADM

## 2022-01-01 RX ORDER — BUDESONIDE 0.5 MG/2ML
500 INHALANT ORAL 2 TIMES DAILY
Qty: 60 EACH | Refills: 0 | Status: SHIPPED
Start: 2022-01-01 | End: 2022-01-01 | Stop reason: SDUPTHER

## 2022-01-01 RX ORDER — METOPROLOL TARTRATE 25 MG/1
12.5 TABLET, FILM COATED ORAL 2 TIMES DAILY
Qty: 30 TABLET | Refills: 0 | Status: SHIPPED | OUTPATIENT
Start: 2022-01-01

## 2022-01-01 RX ORDER — DEXTROSE 40 %
15 GEL (GRAM) ORAL AS NEEDED
Status: DISCONTINUED | OUTPATIENT
Start: 2022-01-01 | End: 2022-01-01 | Stop reason: HOSPADM

## 2022-01-01 RX ORDER — ARFORMOTEROL TARTRATE 15 UG/2ML
15 SOLUTION RESPIRATORY (INHALATION)
Status: DISCONTINUED | OUTPATIENT
Start: 2022-01-01 | End: 2022-01-01 | Stop reason: HOSPADM

## 2022-01-01 RX ORDER — POTASSIUM CHLORIDE 7.45 MG/ML
10 INJECTION INTRAVENOUS PRN
Status: DISCONTINUED | OUTPATIENT
Start: 2022-01-01 | End: 2022-01-01 | Stop reason: HOSPADM

## 2022-01-01 RX ORDER — INSULIN LISPRO 100 [IU]/ML
4 INJECTION, SOLUTION INTRAVENOUS; SUBCUTANEOUS EVERY 6 HOURS
Status: DISCONTINUED | OUTPATIENT
Start: 2022-01-01 | End: 2022-01-01

## 2022-01-01 RX ORDER — INSULIN LISPRO 100 [IU]/ML
INJECTION, SOLUTION INTRAVENOUS; SUBCUTANEOUS
Status: DISCONTINUED | OUTPATIENT
Start: 2022-01-01 | End: 2022-01-01

## 2022-01-01 RX ORDER — METOPROLOL TARTRATE 25 MG/1
12.5 TABLET, FILM COATED ORAL 2 TIMES DAILY
Qty: 30 TABLET | Refills: 0 | Status: SHIPPED
Start: 2022-01-01

## 2022-01-01 RX ORDER — SODIUM CHLORIDE 9 MG/ML
250 INJECTION, SOLUTION INTRAVENOUS AS NEEDED
Status: DISCONTINUED | OUTPATIENT
Start: 2022-01-01 | End: 2022-01-01

## 2022-01-01 RX ORDER — TC 99M MEDRONATE 20 MG/10ML
25 INJECTION, POWDER, LYOPHILIZED, FOR SOLUTION INTRAVENOUS
Status: COMPLETED | OUTPATIENT
Start: 2022-01-01 | End: 2022-01-01

## 2022-01-01 RX ORDER — TRAMADOL HYDROCHLORIDE 50 MG/1
50 TABLET ORAL EVERY 6 HOURS PRN
Status: DISCONTINUED | OUTPATIENT
Start: 2022-01-01 | End: 2022-01-01 | Stop reason: HOSPADM

## 2022-01-01 RX ORDER — DEXTROSE MONOHYDRATE 50 MG/ML
INJECTION, SOLUTION INTRAVENOUS CONTINUOUS
Status: DISCONTINUED | OUTPATIENT
Start: 2022-01-01 | End: 2022-01-01

## 2022-01-01 RX ORDER — LIDOCAINE HYDROCHLORIDE 20 MG/ML
INJECTION, SOLUTION EPIDURAL; INFILTRATION; INTRACAUDAL; PERINEURAL AS NEEDED
Status: DISCONTINUED | OUTPATIENT
Start: 2022-01-01 | End: 2022-01-01 | Stop reason: HOSPADM

## 2022-01-01 RX ORDER — LANOLIN ALCOHOL/MO/W.PET/CERES
325 CREAM (GRAM) TOPICAL
Qty: 30 TABLET | Refills: 0 | Status: SHIPPED
Start: 2022-01-01

## 2022-01-01 RX ORDER — 0.9 % SODIUM CHLORIDE 0.9 %
500 INTRAVENOUS SOLUTION INTRAVENOUS
Status: COMPLETED | OUTPATIENT
Start: 2022-01-01 | End: 2022-01-01

## 2022-01-01 RX ORDER — IPRATROPIUM BROMIDE AND ALBUTEROL SULFATE 2.5; .5 MG/3ML; MG/3ML
3 SOLUTION RESPIRATORY (INHALATION)
Status: DISCONTINUED | OUTPATIENT
Start: 2022-01-01 | End: 2022-01-01 | Stop reason: HOSPADM

## 2022-01-01 RX ORDER — CEPHALEXIN 500 MG/1
500 CAPSULE ORAL 2 TIMES DAILY
Qty: 14 CAPSULE | Refills: 0 | Status: SHIPPED | OUTPATIENT
Start: 2022-01-01 | End: 2022-01-01

## 2022-01-01 RX ORDER — FUROSEMIDE 10 MG/ML
40 INJECTION INTRAMUSCULAR; INTRAVENOUS EVERY 12 HOURS
Status: DISCONTINUED | OUTPATIENT
Start: 2022-01-01 | End: 2022-01-01

## 2022-01-01 RX ORDER — DOXYCYCLINE HYCLATE 100 MG/1
100 CAPSULE ORAL EVERY 12 HOURS SCHEDULED
Status: DISCONTINUED | OUTPATIENT
Start: 2022-01-01 | End: 2022-01-01

## 2022-01-01 RX ORDER — METOPROLOL TARTRATE 25 MG/1
12.5 TABLET, FILM COATED ORAL 2 TIMES DAILY
Status: DISCONTINUED | OUTPATIENT
Start: 2022-01-01 | End: 2022-01-01

## 2022-01-01 RX ORDER — INSULIN LISPRO 100 [IU]/ML
5 INJECTION, SOLUTION INTRAVENOUS; SUBCUTANEOUS ONCE
Status: COMPLETED | OUTPATIENT
Start: 2022-01-01 | End: 2022-01-01

## 2022-01-01 RX ORDER — IPRATROPIUM BROMIDE AND ALBUTEROL SULFATE 2.5; .5 MG/3ML; MG/3ML
1 SOLUTION RESPIRATORY (INHALATION)
Status: DISCONTINUED | OUTPATIENT
Start: 2022-01-01 | End: 2022-01-01 | Stop reason: HOSPADM

## 2022-01-01 RX ORDER — CEPHALEXIN 500 MG/1
500 CAPSULE ORAL EVERY 8 HOURS SCHEDULED
Qty: 24 CAPSULE | Refills: 0 | Status: SHIPPED | OUTPATIENT
Start: 2022-01-01 | End: 2022-06-18

## 2022-01-01 RX ORDER — SERTRALINE HYDROCHLORIDE 100 MG/1
100 TABLET, FILM COATED ORAL DAILY
Status: DISCONTINUED | OUTPATIENT
Start: 2022-01-01 | End: 2022-01-01 | Stop reason: HOSPADM

## 2022-01-01 RX ORDER — METFORMIN HYDROCHLORIDE 500 MG/1
500 TABLET ORAL 2 TIMES DAILY WITH MEALS
Qty: 180 TABLET | Refills: 0 | Status: SHIPPED | OUTPATIENT
Start: 2022-01-01

## 2022-01-01 RX ORDER — SODIUM CHLORIDE 9 MG/ML
50 INJECTION, SOLUTION INTRAVENOUS CONTINUOUS
Status: DISPENSED | OUTPATIENT
Start: 2022-01-01 | End: 2022-01-01

## 2022-01-01 RX ORDER — POTASSIUM CHLORIDE 14.9 MG/ML
20 INJECTION INTRAVENOUS
Status: COMPLETED | OUTPATIENT
Start: 2022-01-01 | End: 2022-01-01

## 2022-01-01 RX ORDER — SENNOSIDES 8.6 MG/1
2 TABLET ORAL
Status: DISCONTINUED | OUTPATIENT
Start: 2022-01-01 | End: 2022-01-01 | Stop reason: HOSPADM

## 2022-01-01 RX ORDER — INSULIN LISPRO 100 [IU]/ML
INJECTION, SOLUTION INTRAVENOUS; SUBCUTANEOUS
Qty: 1 EACH | Refills: 0 | Status: SHIPPED
Start: 2022-01-01

## 2022-01-01 RX ORDER — HYDRALAZINE HYDROCHLORIDE 20 MG/ML
5 INJECTION INTRAMUSCULAR; INTRAVENOUS
Status: DISCONTINUED | OUTPATIENT
Start: 2022-01-01 | End: 2022-01-01 | Stop reason: HOSPADM

## 2022-01-01 RX ORDER — ALBUTEROL SULFATE 2.5 MG/3ML
SOLUTION RESPIRATORY (INHALATION)
Status: COMPLETED
Start: 2022-01-01 | End: 2022-01-01

## 2022-01-01 RX ORDER — INSULIN LISPRO 100 [IU]/ML
6 INJECTION, SOLUTION INTRAVENOUS; SUBCUTANEOUS EVERY 6 HOURS
Status: DISCONTINUED | OUTPATIENT
Start: 2022-01-01 | End: 2022-01-01

## 2022-01-01 RX ORDER — LEVOFLOXACIN 500 MG/1
500 TABLET, FILM COATED ORAL ONCE
Status: COMPLETED | OUTPATIENT
Start: 2022-01-01 | End: 2022-01-01

## 2022-01-01 RX ORDER — FUROSEMIDE 10 MG/ML
40 INJECTION INTRAMUSCULAR; INTRAVENOUS
Status: COMPLETED | OUTPATIENT
Start: 2022-01-01 | End: 2022-01-01

## 2022-01-01 RX ORDER — SERTRALINE HYDROCHLORIDE 50 MG/1
50 TABLET, FILM COATED ORAL DAILY
Status: DISCONTINUED | OUTPATIENT
Start: 2022-01-01 | End: 2022-01-01 | Stop reason: HOSPADM

## 2022-01-01 RX ORDER — INSULIN PUMP SYRINGE, 3 ML
EACH MISCELLANEOUS
Qty: 1 KIT | Refills: 0 | Status: SHIPPED | OUTPATIENT
Start: 2022-01-01

## 2022-01-01 RX ORDER — INSULIN LISPRO 100 [IU]/ML
4 INJECTION, SOLUTION INTRAVENOUS; SUBCUTANEOUS ONCE
Status: COMPLETED | OUTPATIENT
Start: 2022-01-01 | End: 2022-01-01

## 2022-01-01 RX ORDER — LANOLIN ALCOHOL/MO/W.PET/CERES
3 CREAM (GRAM) TOPICAL ONCE
Status: COMPLETED | OUTPATIENT
Start: 2022-01-01 | End: 2022-01-01

## 2022-01-01 RX ORDER — HYDRALAZINE HYDROCHLORIDE 25 MG/1
25 TABLET, FILM COATED ORAL 3 TIMES DAILY
Status: DISCONTINUED | OUTPATIENT
Start: 2022-01-01 | End: 2022-01-01 | Stop reason: HOSPADM

## 2022-01-01 RX ORDER — CEPHALEXIN 500 MG/1
500 CAPSULE ORAL 4 TIMES DAILY
Qty: 20 CAPSULE | Refills: 0 | Status: SHIPPED | OUTPATIENT
Start: 2022-01-01 | End: 2022-01-01

## 2022-01-01 RX ORDER — SODIUM BICARBONATE 650 MG/1
1300 TABLET ORAL 2 TIMES DAILY
Status: DISCONTINUED | OUTPATIENT
Start: 2022-01-01 | End: 2022-01-01 | Stop reason: HOSPADM

## 2022-01-01 RX ORDER — BUDESONIDE 0.5 MG/2ML
500 INHALANT ORAL 2 TIMES DAILY
Status: DISCONTINUED | OUTPATIENT
Start: 2022-01-01 | End: 2022-01-01 | Stop reason: HOSPADM

## 2022-01-01 RX ORDER — IPRATROPIUM BROMIDE AND ALBUTEROL SULFATE 2.5; .5 MG/3ML; MG/3ML
3 SOLUTION RESPIRATORY (INHALATION)
Qty: 30 NEBULE | Refills: 0 | Status: SHIPPED | OUTPATIENT
Start: 2022-01-01

## 2022-01-01 RX ORDER — SERTRALINE HYDROCHLORIDE 50 MG/1
100 TABLET, FILM COATED ORAL DAILY
Status: CANCELLED | OUTPATIENT
Start: 2022-01-01

## 2022-01-01 RX ORDER — INSULIN LISPRO 100 [IU]/ML
3 INJECTION, SOLUTION INTRAVENOUS; SUBCUTANEOUS EVERY 4 HOURS
Status: DISCONTINUED | OUTPATIENT
Start: 2022-01-01 | End: 2022-01-01

## 2022-01-01 RX ORDER — INSULIN LISPRO 100 [IU]/ML
6 INJECTION, SOLUTION INTRAVENOUS; SUBCUTANEOUS
Status: DISCONTINUED | OUTPATIENT
Start: 2022-01-01 | End: 2022-01-01

## 2022-01-01 RX ORDER — IBUPROFEN 200 MG
CAPSULE ORAL
Qty: 100 STRIP | Refills: 3 | Status: SHIPPED | OUTPATIENT
Start: 2022-01-01

## 2022-01-01 RX ORDER — LISINOPRIL 20 MG/1
20 TABLET ORAL DAILY
Status: DISCONTINUED | OUTPATIENT
Start: 2022-01-01 | End: 2022-01-01 | Stop reason: HOSPADM

## 2022-01-01 RX ORDER — METOPROLOL TARTRATE 25 MG/1
12.5 TABLET, FILM COATED ORAL 2 TIMES DAILY
Qty: 30 TABLET | Refills: 0 | Status: SHIPPED
Start: 2022-01-01 | End: 2022-01-01 | Stop reason: SDUPTHER

## 2022-01-01 RX ORDER — MAGNESIUM SULFATE 100 %
16 CRYSTALS MISCELLANEOUS AS NEEDED
Status: DISCONTINUED | OUTPATIENT
Start: 2022-01-01 | End: 2022-01-01 | Stop reason: HOSPADM

## 2022-01-01 RX ORDER — SERTRALINE HYDROCHLORIDE 100 MG/1
100 TABLET, FILM COATED ORAL DAILY
Qty: 1 TABLET | Refills: 0 | Status: SHIPPED
Start: 2022-01-01 | End: 2022-01-01 | Stop reason: SDUPTHER

## 2022-01-01 RX ORDER — IPRATROPIUM BROMIDE AND ALBUTEROL SULFATE 2.5; .5 MG/3ML; MG/3ML
3 SOLUTION RESPIRATORY (INHALATION)
Qty: 30 NEBULE | Refills: 0 | Status: SHIPPED
Start: 2022-01-01 | End: 2022-01-01 | Stop reason: SDUPTHER

## 2022-01-01 RX ORDER — DOCUSATE SODIUM 100 MG/1
100 CAPSULE, LIQUID FILLED ORAL DAILY
Status: DISCONTINUED | OUTPATIENT
Start: 2022-01-01 | End: 2022-01-01 | Stop reason: HOSPADM

## 2022-01-01 RX ORDER — AMLODIPINE BESYLATE 10 MG/1
10 TABLET ORAL DAILY
Status: DISCONTINUED | OUTPATIENT
Start: 2022-01-01 | End: 2022-01-01 | Stop reason: HOSPADM

## 2022-01-01 RX ORDER — SODIUM CHLORIDE 9 MG/ML
250 INJECTION, SOLUTION INTRAVENOUS ONCE
Status: COMPLETED | OUTPATIENT
Start: 2022-01-01 | End: 2022-01-01

## 2022-01-01 RX ORDER — INSULIN LISPRO 100 [IU]/ML
3 INJECTION, SOLUTION INTRAVENOUS; SUBCUTANEOUS
Qty: 1 EACH | Refills: 0 | Status: SHIPPED
Start: 2022-01-01

## 2022-01-01 RX ORDER — SERTRALINE HYDROCHLORIDE 100 MG/1
100 TABLET, FILM COATED ORAL DAILY
Qty: 1 TABLET | Refills: 0 | Status: SHIPPED | OUTPATIENT
Start: 2022-01-01

## 2022-01-01 RX ORDER — FAMOTIDINE 20 MG/1
20 TABLET, FILM COATED ORAL DAILY
Status: DISCONTINUED | OUTPATIENT
Start: 2022-01-01 | End: 2022-01-01 | Stop reason: HOSPADM

## 2022-01-01 RX ORDER — CLONIDINE HYDROCHLORIDE 0.1 MG/1
0.2 TABLET ORAL
Status: DISCONTINUED | OUTPATIENT
Start: 2022-01-01 | End: 2022-01-01

## 2022-01-01 RX ORDER — HYDRALAZINE HYDROCHLORIDE 50 MG/1
25 TABLET, FILM COATED ORAL 3 TIMES DAILY
Status: DISCONTINUED | OUTPATIENT
Start: 2022-01-01 | End: 2022-01-01 | Stop reason: HOSPADM

## 2022-01-01 RX ORDER — SODIUM CHLORIDE 450 MG/100ML
INJECTION, SOLUTION INTRAVENOUS CONTINUOUS
Status: DISCONTINUED | OUTPATIENT
Start: 2022-01-01 | End: 2022-01-01

## 2022-01-01 RX ORDER — 0.9 % SODIUM CHLORIDE 0.9 %
15 INTRAVENOUS SOLUTION INTRAVENOUS ONCE
Status: COMPLETED | OUTPATIENT
Start: 2022-01-01 | End: 2022-01-01

## 2022-01-01 RX ORDER — ARFORMOTEROL TARTRATE 15 UG/2ML
15 SOLUTION RESPIRATORY (INHALATION) 2 TIMES DAILY
Qty: 60 EACH | Refills: 0 | Status: SHIPPED
Start: 2022-01-01 | End: 2022-01-01 | Stop reason: SDUPTHER

## 2022-01-01 RX ORDER — INSULIN GLARGINE 100 [IU]/ML
5 INJECTION, SOLUTION SUBCUTANEOUS
Status: DISCONTINUED | OUTPATIENT
Start: 2022-01-01 | End: 2022-01-01 | Stop reason: HOSPADM

## 2022-01-01 RX ADMIN — IPRATROPIUM BROMIDE AND ALBUTEROL SULFATE 1 AMPULE: 2.5; .5 SOLUTION RESPIRATORY (INHALATION) at 19:44

## 2022-01-01 RX ADMIN — PANTOPRAZOLE SODIUM 40 MG: 40 TABLET, DELAYED RELEASE ORAL at 05:16

## 2022-01-01 RX ADMIN — Medication 3 UNITS: at 17:14

## 2022-01-01 RX ADMIN — Medication 4 UNITS: at 08:03

## 2022-01-01 RX ADMIN — DEXTROSE MONOHYDRATE: 50 INJECTION, SOLUTION INTRAVENOUS at 10:25

## 2022-01-01 RX ADMIN — FERROUS SULFATE TAB 325 MG (65 MG ELEMENTAL FE) 325 MG: 325 (65 FE) TAB at 08:12

## 2022-01-01 RX ADMIN — Medication 4 UNITS: at 11:56

## 2022-01-01 RX ADMIN — SODIUM CHLORIDE, PRESERVATIVE FREE 10 ML: 5 INJECTION INTRAVENOUS at 05:37

## 2022-01-01 RX ADMIN — METOPROLOL TARTRATE 12.5 MG: 25 TABLET, FILM COATED ORAL at 09:49

## 2022-01-01 RX ADMIN — INSULIN GLARGINE 5 UNITS: 100 INJECTION, SOLUTION SUBCUTANEOUS at 22:50

## 2022-01-01 RX ADMIN — INSULIN LISPRO 6 UNITS: 100 INJECTION, SOLUTION INTRAVENOUS; SUBCUTANEOUS at 16:00

## 2022-01-01 RX ADMIN — METOPROLOL TARTRATE 12.5 MG: 25 TABLET, FILM COATED ORAL at 08:39

## 2022-01-01 RX ADMIN — ARFORMOTEROL TARTRATE 15 MCG: 15 SOLUTION RESPIRATORY (INHALATION) at 20:31

## 2022-01-01 RX ADMIN — PRAVASTATIN SODIUM 10 MG: 20 TABLET ORAL at 21:03

## 2022-01-01 RX ADMIN — Medication 4 UNITS: at 22:41

## 2022-01-01 RX ADMIN — SODIUM CHLORIDE 500 ML: 900 INJECTION, SOLUTION INTRAVENOUS at 12:48

## 2022-01-01 RX ADMIN — Medication 4 UNITS: at 21:19

## 2022-01-01 RX ADMIN — GABAPENTIN 100 MG: 100 CAPSULE ORAL at 08:39

## 2022-01-01 RX ADMIN — DOCUSATE SODIUM 100 MG: 100 CAPSULE, LIQUID FILLED ORAL at 08:29

## 2022-01-01 RX ADMIN — CEFTRIAXONE 1 G: 1 INJECTION, POWDER, FOR SOLUTION INTRAMUSCULAR; INTRAVENOUS at 13:34

## 2022-01-01 RX ADMIN — Medication 6 UNITS: at 11:31

## 2022-01-01 RX ADMIN — SODIUM CHLORIDE, PRESERVATIVE FREE 10 ML: 5 INJECTION INTRAVENOUS at 06:34

## 2022-01-01 RX ADMIN — SODIUM CHLORIDE, PRESERVATIVE FREE 10 ML: 5 INJECTION INTRAVENOUS at 06:51

## 2022-01-01 RX ADMIN — BUDESONIDE 500 MCG: 0.5 INHALANT RESPIRATORY (INHALATION) at 08:56

## 2022-01-01 RX ADMIN — IPRATROPIUM BROMIDE AND ALBUTEROL SULFATE 1 AMPULE: 2.5; .5 SOLUTION RESPIRATORY (INHALATION) at 20:11

## 2022-01-01 RX ADMIN — APIXABAN 5 MG: 5 TABLET, FILM COATED ORAL at 22:04

## 2022-01-01 RX ADMIN — APIXABAN 10 MG: 5 TABLET, FILM COATED ORAL at 08:12

## 2022-01-01 RX ADMIN — PANTOPRAZOLE SODIUM 40 MG: 40 TABLET, DELAYED RELEASE ORAL at 05:24

## 2022-01-01 RX ADMIN — Medication 6 UNITS: at 12:25

## 2022-01-01 RX ADMIN — APIXABAN 10 MG: 5 TABLET, FILM COATED ORAL at 21:41

## 2022-01-01 RX ADMIN — APIXABAN 2.5 MG: 5 TABLET, FILM COATED ORAL at 21:51

## 2022-01-01 RX ADMIN — BUDESONIDE 500 MCG: 0.5 INHALANT RESPIRATORY (INHALATION) at 20:31

## 2022-01-01 RX ADMIN — INSULIN LISPRO 2 UNITS: 100 INJECTION, SOLUTION INTRAVENOUS; SUBCUTANEOUS at 21:15

## 2022-01-01 RX ADMIN — FUROSEMIDE 40 MG: 10 INJECTION, SOLUTION INTRAMUSCULAR; INTRAVENOUS at 18:42

## 2022-01-01 RX ADMIN — PIPERACILLIN AND TAZOBACTAM 4.5 G: 4; .5 INJECTION, POWDER, FOR SOLUTION INTRAVENOUS at 21:48

## 2022-01-01 RX ADMIN — PIPERACILLIN AND TAZOBACTAM 4.5 G: 4; .5 INJECTION, POWDER, FOR SOLUTION INTRAVENOUS at 12:58

## 2022-01-01 RX ADMIN — Medication 6 UNITS: at 22:07

## 2022-01-01 RX ADMIN — ARFORMOTEROL TARTRATE 15 MCG: 15 SOLUTION RESPIRATORY (INHALATION) at 08:21

## 2022-01-01 RX ADMIN — BUDESONIDE 500 MCG: 0.5 INHALANT RESPIRATORY (INHALATION) at 07:49

## 2022-01-01 RX ADMIN — HYDRALAZINE HYDROCHLORIDE 5 MG: 20 INJECTION INTRAMUSCULAR; INTRAVENOUS at 00:29

## 2022-01-01 RX ADMIN — METOPROLOL TARTRATE 25 MG: 25 TABLET, FILM COATED ORAL at 08:29

## 2022-01-01 RX ADMIN — Medication 3 UNITS: at 17:29

## 2022-01-01 RX ADMIN — METHYLPREDNISOLONE SODIUM SUCCINATE 40 MG: 40 INJECTION, POWDER, FOR SOLUTION INTRAMUSCULAR; INTRAVENOUS at 05:30

## 2022-01-01 RX ADMIN — SODIUM CHLORIDE, PRESERVATIVE FREE 10 ML: 5 INJECTION INTRAVENOUS at 22:55

## 2022-01-01 RX ADMIN — SODIUM CHLORIDE 100 ML: 9 INJECTION, SOLUTION INTRAVENOUS at 16:42

## 2022-01-01 RX ADMIN — SODIUM CHLORIDE, PRESERVATIVE FREE 5 ML: 5 INJECTION INTRAVENOUS at 22:00

## 2022-01-01 RX ADMIN — FERROUS SULFATE TAB 325 MG (65 MG ELEMENTAL FE) 325 MG: 325 (65 FE) TAB at 08:06

## 2022-01-01 RX ADMIN — INSULIN LISPRO 2 UNITS: 100 INJECTION, SOLUTION INTRAVENOUS; SUBCUTANEOUS at 17:11

## 2022-01-01 RX ADMIN — POLYETHYLENE GLYCOL 3350 17 G: 17 POWDER, FOR SOLUTION ORAL at 08:13

## 2022-01-01 RX ADMIN — HYDRALAZINE HYDROCHLORIDE 25 MG: 25 TABLET ORAL at 05:21

## 2022-01-01 RX ADMIN — SODIUM CHLORIDE, PRESERVATIVE FREE 10 ML: 5 INJECTION INTRAVENOUS at 13:00

## 2022-01-01 RX ADMIN — POLYETHYLENE GLYCOL 3350 17 G: 17 POWDER, FOR SOLUTION ORAL at 08:22

## 2022-01-01 RX ADMIN — CEPHALEXIN 500 MG: 250 CAPSULE ORAL at 05:31

## 2022-01-01 RX ADMIN — Medication 8 UNITS: at 17:10

## 2022-01-01 RX ADMIN — SODIUM CHLORIDE, PRESERVATIVE FREE 10 ML: 5 INJECTION INTRAVENOUS at 21:38

## 2022-01-01 RX ADMIN — SODIUM CHLORIDE, PRESERVATIVE FREE 10 ML: 5 INJECTION INTRAVENOUS at 13:38

## 2022-01-01 RX ADMIN — SODIUM CHLORIDE, PRESERVATIVE FREE 5 ML: 5 INJECTION INTRAVENOUS at 05:06

## 2022-01-01 RX ADMIN — DOXYCYCLINE HYCLATE 100 MG: 100 CAPSULE ORAL at 00:02

## 2022-01-01 RX ADMIN — INSULIN GLARGINE 5 UNITS: 100 INJECTION, SOLUTION SUBCUTANEOUS at 22:00

## 2022-01-01 RX ADMIN — METOPROLOL TARTRATE 12.5 MG: 25 TABLET, FILM COATED ORAL at 20:39

## 2022-01-01 RX ADMIN — Medication 2 UNITS: at 11:30

## 2022-01-01 RX ADMIN — HYDRALAZINE HYDROCHLORIDE 25 MG: 25 TABLET, FILM COATED ORAL at 14:53

## 2022-01-01 RX ADMIN — DEXAMETHASONE SODIUM PHOSPHATE 6 MG: 10 INJECTION INTRAMUSCULAR; INTRAVENOUS at 20:13

## 2022-01-01 RX ADMIN — FERROUS SULFATE TAB 325 MG (65 MG ELEMENTAL FE) 325 MG: 325 (65 FE) TAB at 08:20

## 2022-01-01 RX ADMIN — INSULIN LISPRO 2 UNITS: 100 INJECTION, SOLUTION INTRAVENOUS; SUBCUTANEOUS at 07:44

## 2022-01-01 RX ADMIN — SERTRALINE HYDROCHLORIDE 100 MG: 100 TABLET ORAL at 08:41

## 2022-01-01 RX ADMIN — GABAPENTIN 100 MG: 100 CAPSULE ORAL at 09:02

## 2022-01-01 RX ADMIN — INSULIN GLARGINE 3 UNITS: 100 INJECTION, SOLUTION SUBCUTANEOUS at 09:11

## 2022-01-01 RX ADMIN — SODIUM CHLORIDE, PRESERVATIVE FREE 10 ML: 5 INJECTION INTRAVENOUS at 05:21

## 2022-01-01 RX ADMIN — Medication 5 UNITS: at 16:01

## 2022-01-01 RX ADMIN — SODIUM CHLORIDE, PRESERVATIVE FREE 10 ML: 5 INJECTION INTRAVENOUS at 22:08

## 2022-01-01 RX ADMIN — PANTOPRAZOLE SODIUM 40 MG: 40 TABLET, DELAYED RELEASE ORAL at 16:28

## 2022-01-01 RX ADMIN — IPRATROPIUM BROMIDE AND ALBUTEROL SULFATE 1 AMPULE: 2.5; .5 SOLUTION RESPIRATORY (INHALATION) at 11:05

## 2022-01-01 RX ADMIN — GABAPENTIN 100 MG: 100 CAPSULE ORAL at 08:59

## 2022-01-01 RX ADMIN — SODIUM CHLORIDE, PRESERVATIVE FREE 10 ML: 5 INJECTION INTRAVENOUS at 14:26

## 2022-01-01 RX ADMIN — AMLODIPINE BESYLATE 5 MG: 5 TABLET ORAL at 08:37

## 2022-01-01 RX ADMIN — CLONIDINE HYDROCHLORIDE 0.2 MG: 0.1 TABLET ORAL at 20:14

## 2022-01-01 RX ADMIN — SODIUM CHLORIDE, PRESERVATIVE FREE 10 ML: 5 INJECTION INTRAVENOUS at 21:50

## 2022-01-01 RX ADMIN — INSULIN LISPRO 9 UNITS: 100 INJECTION, SOLUTION INTRAVENOUS; SUBCUTANEOUS at 17:03

## 2022-01-01 RX ADMIN — Medication 10 ML: at 16:42

## 2022-01-01 RX ADMIN — SODIUM CHLORIDE, PRESERVATIVE FREE 10 ML: 5 INJECTION INTRAVENOUS at 05:24

## 2022-01-01 RX ADMIN — SODIUM CHLORIDE, PRESERVATIVE FREE 10 ML: 5 INJECTION INTRAVENOUS at 21:27

## 2022-01-01 RX ADMIN — BARICITINIB 2 MG: 2 TABLET, FILM COATED ORAL at 08:33

## 2022-01-01 RX ADMIN — Medication 5 UNITS: at 21:39

## 2022-01-01 RX ADMIN — INSULIN LISPRO 9 UNITS: 100 INJECTION, SOLUTION INTRAVENOUS; SUBCUTANEOUS at 21:08

## 2022-01-01 RX ADMIN — Medication 2 UNITS: at 08:14

## 2022-01-01 RX ADMIN — METOPROLOL TARTRATE 12.5 MG: 25 TABLET, FILM COATED ORAL at 08:49

## 2022-01-01 RX ADMIN — HEPARIN SODIUM 1340 UNITS: 1000 INJECTION INTRAVENOUS; SUBCUTANEOUS at 06:46

## 2022-01-01 RX ADMIN — GABAPENTIN 100 MG: 100 CAPSULE ORAL at 08:17

## 2022-01-01 RX ADMIN — PANTOPRAZOLE SODIUM 40 MG: 40 TABLET, DELAYED RELEASE ORAL at 05:54

## 2022-01-01 RX ADMIN — INSULIN LISPRO 4 UNITS: 100 INJECTION, SOLUTION INTRAVENOUS; SUBCUTANEOUS at 12:53

## 2022-01-01 RX ADMIN — HYDRALAZINE HYDROCHLORIDE 25 MG: 50 TABLET, FILM COATED ORAL at 21:47

## 2022-01-01 RX ADMIN — SERTRALINE HYDROCHLORIDE 100 MG: 50 TABLET ORAL at 08:12

## 2022-01-01 RX ADMIN — INSULIN LISPRO 4 UNITS: 100 INJECTION, SOLUTION INTRAVENOUS; SUBCUTANEOUS at 11:55

## 2022-01-01 RX ADMIN — PANTOPRAZOLE SODIUM 40 MG: 40 TABLET, DELAYED RELEASE ORAL at 16:00

## 2022-01-01 RX ADMIN — ALBUTEROL SULFATE 2.5 MG: 2.5 SOLUTION RESPIRATORY (INHALATION) at 07:38

## 2022-01-01 RX ADMIN — SODIUM CHLORIDE, PRESERVATIVE FREE 10 ML: 5 INJECTION INTRAVENOUS at 14:00

## 2022-01-01 RX ADMIN — IPRATROPIUM BROMIDE AND ALBUTEROL SULFATE 1 AMPULE: 2.5; .5 SOLUTION RESPIRATORY (INHALATION) at 16:20

## 2022-01-01 RX ADMIN — METOPROLOL TARTRATE 12.5 MG: 25 TABLET, FILM COATED ORAL at 09:01

## 2022-01-01 RX ADMIN — HYDRALAZINE HYDROCHLORIDE 25 MG: 25 TABLET, FILM COATED ORAL at 14:32

## 2022-01-01 RX ADMIN — POLYETHYLENE GLYCOL 3350 17 G: 17 POWDER, FOR SOLUTION ORAL at 08:56

## 2022-01-01 RX ADMIN — PROPOFOL 30 MG: 10 INJECTION, EMULSION INTRAVENOUS at 15:41

## 2022-01-01 RX ADMIN — GABAPENTIN 100 MG: 100 CAPSULE ORAL at 08:40

## 2022-01-01 RX ADMIN — SODIUM CHLORIDE, PRESERVATIVE FREE 10 ML: 5 INJECTION INTRAVENOUS at 05:05

## 2022-01-01 RX ADMIN — ARFORMOTEROL TARTRATE 15 MCG: 15 SOLUTION RESPIRATORY (INHALATION) at 08:00

## 2022-01-01 RX ADMIN — HYDRALAZINE HYDROCHLORIDE 25 MG: 25 TABLET, FILM COATED ORAL at 20:27

## 2022-01-01 RX ADMIN — IPRATROPIUM BROMIDE AND ALBUTEROL SULFATE 1 AMPULE: 2.5; .5 SOLUTION RESPIRATORY (INHALATION) at 20:56

## 2022-01-01 RX ADMIN — SODIUM CHLORIDE, PRESERVATIVE FREE 10 ML: 5 INJECTION INTRAVENOUS at 22:04

## 2022-01-01 RX ADMIN — METOPROLOL TARTRATE 12.5 MG: 25 TABLET, FILM COATED ORAL at 20:35

## 2022-01-01 RX ADMIN — APIXABAN 2.5 MG: 5 TABLET, FILM COATED ORAL at 20:35

## 2022-01-01 RX ADMIN — DOCUSATE SODIUM 100 MG: 100 CAPSULE, LIQUID FILLED ORAL at 08:59

## 2022-01-01 RX ADMIN — PIPERACILLIN AND TAZOBACTAM 4.5 G: 4; .5 INJECTION, POWDER, FOR SOLUTION INTRAVENOUS at 03:44

## 2022-01-01 RX ADMIN — PANTOPRAZOLE SODIUM 40 MG: 40 TABLET, DELAYED RELEASE ORAL at 05:49

## 2022-01-01 RX ADMIN — HEPARIN SODIUM 15.3 UNITS/KG/HR: 5000 INJECTION, SOLUTION INTRAVENOUS at 07:52

## 2022-01-01 RX ADMIN — SODIUM CHLORIDE: 9 INJECTION, SOLUTION INTRAVENOUS at 01:58

## 2022-01-01 RX ADMIN — GABAPENTIN 100 MG: 100 CAPSULE ORAL at 21:47

## 2022-01-01 RX ADMIN — INSULIN LISPRO 6 UNITS: 100 INJECTION, SOLUTION INTRAVENOUS; SUBCUTANEOUS at 00:50

## 2022-01-01 RX ADMIN — SODIUM CHLORIDE, PRESERVATIVE FREE 10 ML: 5 INJECTION INTRAVENOUS at 06:22

## 2022-01-01 RX ADMIN — INSULIN GLARGINE 10 UNITS: 100 INJECTION, SOLUTION SUBCUTANEOUS at 09:02

## 2022-01-01 RX ADMIN — INSULIN LISPRO 2 UNITS: 100 INJECTION, SOLUTION INTRAVENOUS; SUBCUTANEOUS at 12:04

## 2022-01-01 RX ADMIN — SERTRALINE HYDROCHLORIDE 100 MG: 50 TABLET ORAL at 08:29

## 2022-01-01 RX ADMIN — PIPERACILLIN AND TAZOBACTAM 4.5 G: 4; .5 INJECTION, POWDER, FOR SOLUTION INTRAVENOUS at 04:00

## 2022-01-01 RX ADMIN — PANTOPRAZOLE SODIUM 40 MG: 40 TABLET, DELAYED RELEASE ORAL at 05:21

## 2022-01-01 RX ADMIN — PIPERACILLIN AND TAZOBACTAM 4.5 G: 4; .5 INJECTION, POWDER, FOR SOLUTION INTRAVENOUS at 11:02

## 2022-01-01 RX ADMIN — HYDRALAZINE HYDROCHLORIDE 25 MG: 25 TABLET, FILM COATED ORAL at 21:52

## 2022-01-01 RX ADMIN — FERROUS SULFATE TAB 325 MG (65 MG ELEMENTAL FE) 325 MG: 325 (65 FE) TAB at 08:59

## 2022-01-01 RX ADMIN — HYDRALAZINE HYDROCHLORIDE 25 MG: 50 TABLET, FILM COATED ORAL at 11:56

## 2022-01-01 RX ADMIN — APIXABAN 10 MG: 5 TABLET, FILM COATED ORAL at 08:43

## 2022-01-01 RX ADMIN — SODIUM CHLORIDE, PRESERVATIVE FREE 10 ML: 5 INJECTION INTRAVENOUS at 22:02

## 2022-01-01 RX ADMIN — SODIUM CHLORIDE, PRESERVATIVE FREE 10 ML: 5 INJECTION INTRAVENOUS at 13:35

## 2022-01-01 RX ADMIN — SODIUM CHLORIDE, PRESERVATIVE FREE 10 ML: 5 INJECTION INTRAVENOUS at 13:01

## 2022-01-01 RX ADMIN — Medication 3 UNITS: at 11:34

## 2022-01-01 RX ADMIN — DEXTROSE AND SODIUM CHLORIDE: 5; 450 INJECTION, SOLUTION INTRAVENOUS at 22:59

## 2022-01-01 RX ADMIN — INSULIN LISPRO 2 UNITS: 100 INJECTION, SOLUTION INTRAVENOUS; SUBCUTANEOUS at 10:12

## 2022-01-01 RX ADMIN — HYDRALAZINE HYDROCHLORIDE 25 MG: 25 TABLET, FILM COATED ORAL at 21:30

## 2022-01-01 RX ADMIN — PANTOPRAZOLE SODIUM 40 MG: 40 TABLET, DELAYED RELEASE ORAL at 17:23

## 2022-01-01 RX ADMIN — Medication 3 UNITS: at 11:37

## 2022-01-01 RX ADMIN — LISINOPRIL 20 MG: 20 TABLET ORAL at 08:33

## 2022-01-01 RX ADMIN — Medication 4 UNITS: at 22:00

## 2022-01-01 RX ADMIN — HYDRALAZINE HYDROCHLORIDE 25 MG: 25 TABLET ORAL at 05:24

## 2022-01-01 RX ADMIN — Medication 2 UNITS: at 21:57

## 2022-01-01 RX ADMIN — APIXABAN 2.5 MG: 5 TABLET, FILM COATED ORAL at 21:30

## 2022-01-01 RX ADMIN — GABAPENTIN 100 MG: 100 CAPSULE ORAL at 17:12

## 2022-01-01 RX ADMIN — SODIUM CHLORIDE, PRESERVATIVE FREE 10 ML: 5 INJECTION INTRAVENOUS at 17:23

## 2022-01-01 RX ADMIN — METHYLPREDNISOLONE SODIUM SUCCINATE 40 MG: 40 INJECTION, POWDER, FOR SOLUTION INTRAMUSCULAR; INTRAVENOUS at 17:03

## 2022-01-01 RX ADMIN — Medication 2 UNITS: at 07:30

## 2022-01-01 RX ADMIN — SERTRALINE HYDROCHLORIDE 100 MG: 100 TABLET ORAL at 09:51

## 2022-01-01 RX ADMIN — GABAPENTIN 100 MG: 100 CAPSULE ORAL at 22:04

## 2022-01-01 RX ADMIN — PRAVASTATIN SODIUM 10 MG: 20 TABLET ORAL at 21:40

## 2022-01-01 RX ADMIN — Medication 6 UNITS: at 22:02

## 2022-01-01 RX ADMIN — SERTRALINE HYDROCHLORIDE 100 MG: 50 TABLET ORAL at 08:59

## 2022-01-01 RX ADMIN — Medication 3 UNITS: at 17:39

## 2022-01-01 RX ADMIN — SERTRALINE HYDROCHLORIDE 100 MG: 100 TABLET ORAL at 08:59

## 2022-01-01 RX ADMIN — BARICITINIB 2 MG: 2 TABLET, FILM COATED ORAL at 10:11

## 2022-01-01 RX ADMIN — LISINOPRIL 20 MG: 20 TABLET ORAL at 08:41

## 2022-01-01 RX ADMIN — Medication 8 UNITS: at 08:45

## 2022-01-01 RX ADMIN — INSULIN LISPRO 8 UNITS: 100 INJECTION, SOLUTION INTRAVENOUS; SUBCUTANEOUS at 22:40

## 2022-01-01 RX ADMIN — BARICITINIB 2 MG: 2 TABLET, FILM COATED ORAL at 08:41

## 2022-01-01 RX ADMIN — BUDESONIDE 500 MCG: 0.5 INHALANT RESPIRATORY (INHALATION) at 08:00

## 2022-01-01 RX ADMIN — DOXYCYCLINE HYCLATE 100 MG: 100 CAPSULE ORAL at 21:03

## 2022-01-01 RX ADMIN — ALBUTEROL SULFATE 2.5 MG: 2.5 SOLUTION RESPIRATORY (INHALATION) at 17:20

## 2022-01-01 RX ADMIN — APIXABAN 5 MG: 5 TABLET, FILM COATED ORAL at 09:03

## 2022-01-01 RX ADMIN — LISINOPRIL 20 MG: 20 TABLET ORAL at 08:28

## 2022-01-01 RX ADMIN — AMLODIPINE BESYLATE 5 MG: 5 TABLET ORAL at 12:03

## 2022-01-01 RX ADMIN — PIPERACILLIN AND TAZOBACTAM 4.5 G: 4; .5 INJECTION, POWDER, FOR SOLUTION INTRAVENOUS at 20:29

## 2022-01-01 RX ADMIN — HYDRALAZINE HYDROCHLORIDE 25 MG: 25 TABLET ORAL at 21:39

## 2022-01-01 RX ADMIN — PANTOPRAZOLE SODIUM 40 MG: 40 TABLET, DELAYED RELEASE ORAL at 06:25

## 2022-01-01 RX ADMIN — ACETAMINOPHEN 1000 MG: 500 TABLET ORAL at 23:21

## 2022-01-01 RX ADMIN — SODIUM CHLORIDE, PRESERVATIVE FREE 10 ML: 5 INJECTION INTRAVENOUS at 13:26

## 2022-01-01 RX ADMIN — CEFTRIAXONE 2 G: 2 INJECTION, POWDER, FOR SOLUTION INTRAMUSCULAR; INTRAVENOUS at 17:57

## 2022-01-01 RX ADMIN — SODIUM CHLORIDE, PRESERVATIVE FREE 10 ML: 5 INJECTION INTRAVENOUS at 06:35

## 2022-01-01 RX ADMIN — DOXYCYCLINE HYCLATE 100 MG: 100 CAPSULE ORAL at 21:30

## 2022-01-01 RX ADMIN — SODIUM CHLORIDE, PRESERVATIVE FREE 10 ML: 5 INJECTION INTRAVENOUS at 05:10

## 2022-01-01 RX ADMIN — INSULIN GLARGINE 5 UNITS: 100 INJECTION, SOLUTION SUBCUTANEOUS at 21:33

## 2022-01-01 RX ADMIN — SODIUM CHLORIDE, PRESERVATIVE FREE 10 ML: 5 INJECTION INTRAVENOUS at 22:00

## 2022-01-01 RX ADMIN — GABAPENTIN 100 MG: 100 CAPSULE ORAL at 20:30

## 2022-01-01 RX ADMIN — AMLODIPINE BESYLATE 10 MG: 10 TABLET ORAL at 08:57

## 2022-01-01 RX ADMIN — Medication 8 UNITS: at 16:30

## 2022-01-01 RX ADMIN — IPRATROPIUM BROMIDE AND ALBUTEROL SULFATE 1 AMPULE: 2.5; .5 SOLUTION RESPIRATORY (INHALATION) at 11:55

## 2022-01-01 RX ADMIN — BUDESONIDE 500 MCG: 0.5 INHALANT RESPIRATORY (INHALATION) at 20:11

## 2022-01-01 RX ADMIN — INSULIN LISPRO 2 UNITS: 100 INJECTION, SOLUTION INTRAVENOUS; SUBCUTANEOUS at 21:47

## 2022-01-01 RX ADMIN — Medication 6 UNITS: at 08:39

## 2022-01-01 RX ADMIN — SODIUM CHLORIDE, PRESERVATIVE FREE 10 ML: 5 INJECTION INTRAVENOUS at 21:48

## 2022-01-01 RX ADMIN — PIPERACILLIN AND TAZOBACTAM 4.5 G: 4; .5 INJECTION, POWDER, FOR SOLUTION INTRAVENOUS at 20:52

## 2022-01-01 RX ADMIN — PIPERACILLIN AND TAZOBACTAM 4.5 G: 4; .5 INJECTION, POWDER, FOR SOLUTION INTRAVENOUS at 20:02

## 2022-01-01 RX ADMIN — GABAPENTIN 100 MG: 100 CAPSULE ORAL at 20:39

## 2022-01-01 RX ADMIN — METOPROLOL TARTRATE 12.5 MG: 25 TABLET, FILM COATED ORAL at 08:17

## 2022-01-01 RX ADMIN — SERTRALINE HYDROCHLORIDE 50 MG: 50 TABLET ORAL at 08:11

## 2022-01-01 RX ADMIN — GABAPENTIN 100 MG: 100 CAPSULE ORAL at 08:20

## 2022-01-01 RX ADMIN — Medication 4 UNITS: at 12:23

## 2022-01-01 RX ADMIN — SODIUM CHLORIDE 6.9 UNITS/HR: 9 INJECTION, SOLUTION INTRAVENOUS at 19:44

## 2022-01-01 RX ADMIN — INSULIN LISPRO 2 UNITS: 100 INJECTION, SOLUTION INTRAVENOUS; SUBCUTANEOUS at 22:40

## 2022-01-01 RX ADMIN — INSULIN LISPRO 2 UNITS: 100 INJECTION, SOLUTION INTRAVENOUS; SUBCUTANEOUS at 22:41

## 2022-01-01 RX ADMIN — APIXABAN 5 MG: 5 TABLET, FILM COATED ORAL at 08:29

## 2022-01-01 RX ADMIN — SODIUM CHLORIDE, PRESERVATIVE FREE 10 ML: 5 INJECTION INTRAVENOUS at 15:02

## 2022-01-01 RX ADMIN — AMLODIPINE BESYLATE 10 MG: 10 TABLET ORAL at 09:43

## 2022-01-01 RX ADMIN — HYDRALAZINE HYDROCHLORIDE 25 MG: 25 TABLET, FILM COATED ORAL at 15:03

## 2022-01-01 RX ADMIN — Medication 3 UNITS: at 16:30

## 2022-01-01 RX ADMIN — DOCUSATE SODIUM 100 MG: 100 CAPSULE, LIQUID FILLED ORAL at 08:38

## 2022-01-01 RX ADMIN — APIXABAN 10 MG: 5 TABLET, FILM COATED ORAL at 08:06

## 2022-01-01 RX ADMIN — CLONIDINE HYDROCHLORIDE 0.2 MG: 0.1 TABLET ORAL at 01:36

## 2022-01-01 RX ADMIN — MORPHINE SULFATE 1 MG: 2 INJECTION, SOLUTION INTRAMUSCULAR; INTRAVENOUS at 07:28

## 2022-01-01 RX ADMIN — SODIUM CHLORIDE, PRESERVATIVE FREE 10 ML: 5 INJECTION INTRAVENOUS at 21:26

## 2022-01-01 RX ADMIN — GABAPENTIN 100 MG: 100 CAPSULE ORAL at 16:01

## 2022-01-01 RX ADMIN — SODIUM CHLORIDE, PRESERVATIVE FREE 10 ML: 5 INJECTION INTRAVENOUS at 14:20

## 2022-01-01 RX ADMIN — PRAVASTATIN SODIUM 10 MG: 20 TABLET ORAL at 21:57

## 2022-01-01 RX ADMIN — SERTRALINE HYDROCHLORIDE 50 MG: 50 TABLET ORAL at 08:56

## 2022-01-01 RX ADMIN — SODIUM CHLORIDE, PRESERVATIVE FREE 5 ML: 5 INJECTION INTRAVENOUS at 05:39

## 2022-01-01 RX ADMIN — GABAPENTIN 100 MG: 100 CAPSULE ORAL at 21:29

## 2022-01-01 RX ADMIN — BARICITINIB 1 MG: 1 TABLET, FILM COATED ORAL at 08:40

## 2022-01-01 RX ADMIN — SODIUM BICARBONATE 650 MG TABLET 1300 MG: at 09:50

## 2022-01-01 RX ADMIN — IPRATROPIUM BROMIDE AND ALBUTEROL SULFATE 1 AMPULE: 2.5; .5 SOLUTION RESPIRATORY (INHALATION) at 11:07

## 2022-01-01 RX ADMIN — SODIUM CHLORIDE, PRESERVATIVE FREE 10 ML: 5 INJECTION INTRAVENOUS at 13:08

## 2022-01-01 RX ADMIN — CEPHALEXIN 500 MG: 250 CAPSULE ORAL at 14:32

## 2022-01-01 RX ADMIN — SODIUM CHLORIDE, PRESERVATIVE FREE 10 ML: 5 INJECTION INTRAVENOUS at 05:14

## 2022-01-01 RX ADMIN — APIXABAN 5 MG: 5 TABLET, FILM COATED ORAL at 08:40

## 2022-01-01 RX ADMIN — PANTOPRAZOLE SODIUM 40 MG: 40 TABLET, DELAYED RELEASE ORAL at 05:58

## 2022-01-01 RX ADMIN — METOPROLOL TARTRATE 12.5 MG: 25 TABLET, FILM COATED ORAL at 08:57

## 2022-01-01 RX ADMIN — APIXABAN 2.5 MG: 5 TABLET, FILM COATED ORAL at 08:49

## 2022-01-01 RX ADMIN — APIXABAN 2.5 MG: 5 TABLET, FILM COATED ORAL at 08:56

## 2022-01-01 RX ADMIN — ACETAMINOPHEN 650 MG: 325 TABLET ORAL at 12:12

## 2022-01-01 RX ADMIN — METHYLPREDNISOLONE SODIUM SUCCINATE 40 MG: 40 INJECTION, POWDER, FOR SOLUTION INTRAMUSCULAR; INTRAVENOUS at 18:19

## 2022-01-01 RX ADMIN — INSULIN LISPRO 3 UNITS: 100 INJECTION, SOLUTION INTRAVENOUS; SUBCUTANEOUS at 17:30

## 2022-01-01 RX ADMIN — SODIUM CHLORIDE, PRESERVATIVE FREE 10 ML: 5 INJECTION INTRAVENOUS at 22:33

## 2022-01-01 RX ADMIN — PIPERACILLIN AND TAZOBACTAM 4.5 G: 4; .5 INJECTION, POWDER, FOR SOLUTION INTRAVENOUS at 03:35

## 2022-01-01 RX ADMIN — SODIUM CHLORIDE, PRESERVATIVE FREE 10 ML: 5 INJECTION INTRAVENOUS at 05:22

## 2022-01-01 RX ADMIN — SODIUM CHLORIDE, PRESERVATIVE FREE 10 ML: 5 INJECTION INTRAVENOUS at 05:48

## 2022-01-01 RX ADMIN — APIXABAN 5 MG: 5 TABLET, FILM COATED ORAL at 20:30

## 2022-01-01 RX ADMIN — AMLODIPINE BESYLATE 10 MG: 10 TABLET ORAL at 08:14

## 2022-01-01 RX ADMIN — GABAPENTIN 100 MG: 100 CAPSULE ORAL at 16:28

## 2022-01-01 RX ADMIN — DEXAMETHASONE 6 MG: 4 TABLET ORAL at 08:42

## 2022-01-01 RX ADMIN — HYDRALAZINE HYDROCHLORIDE 25 MG: 25 TABLET ORAL at 16:00

## 2022-01-01 RX ADMIN — AMLODIPINE BESYLATE 10 MG: 10 TABLET ORAL at 08:28

## 2022-01-01 RX ADMIN — ACETAMINOPHEN 650 MG: 325 TABLET ORAL at 19:02

## 2022-01-01 RX ADMIN — BUDESONIDE 500 MCG: 0.5 INHALANT RESPIRATORY (INHALATION) at 08:18

## 2022-01-01 RX ADMIN — SODIUM CHLORIDE, PRESERVATIVE FREE 10 ML: 5 INJECTION INTRAVENOUS at 22:42

## 2022-01-01 RX ADMIN — POLYETHYLENE GLYCOL 3350 17 G: 17 POWDER, FOR SOLUTION ORAL at 10:09

## 2022-01-01 RX ADMIN — SODIUM CHLORIDE, PRESERVATIVE FREE 10 ML: 5 INJECTION INTRAVENOUS at 06:04

## 2022-01-01 RX ADMIN — SERTRALINE HYDROCHLORIDE 50 MG: 50 TABLET ORAL at 08:29

## 2022-01-01 RX ADMIN — APIXABAN 5 MG: 5 TABLET, FILM COATED ORAL at 21:15

## 2022-01-01 RX ADMIN — SODIUM CHLORIDE, PRESERVATIVE FREE 10 ML: 5 INJECTION INTRAVENOUS at 05:17

## 2022-01-01 RX ADMIN — METOPROLOL TARTRATE 12.5 MG: 25 TABLET, FILM COATED ORAL at 21:58

## 2022-01-01 RX ADMIN — Medication 3 MG: at 01:26

## 2022-01-01 RX ADMIN — PRAVASTATIN SODIUM 10 MG: 20 TABLET ORAL at 20:39

## 2022-01-01 RX ADMIN — PANTOPRAZOLE SODIUM 40 MG: 40 TABLET, DELAYED RELEASE ORAL at 15:48

## 2022-01-01 RX ADMIN — HYDRALAZINE HYDROCHLORIDE 25 MG: 50 TABLET, FILM COATED ORAL at 21:20

## 2022-01-01 RX ADMIN — SODIUM CHLORIDE 966 ML: 900 INJECTION, SOLUTION INTRAVENOUS at 19:51

## 2022-01-01 RX ADMIN — SODIUM CHLORIDE, PRESERVATIVE FREE 10 ML: 5 INJECTION INTRAVENOUS at 15:05

## 2022-01-01 RX ADMIN — IPRATROPIUM BROMIDE AND ALBUTEROL SULFATE 1 AMPULE: 2.5; .5 SOLUTION RESPIRATORY (INHALATION) at 16:39

## 2022-01-01 RX ADMIN — AMLODIPINE BESYLATE 10 MG: 10 TABLET ORAL at 08:06

## 2022-01-01 RX ADMIN — LISINOPRIL 20 MG: 20 TABLET ORAL at 10:11

## 2022-01-01 RX ADMIN — ACETAMINOPHEN 650 MG: 325 TABLET ORAL at 07:53

## 2022-01-01 RX ADMIN — HYDRALAZINE HYDROCHLORIDE 25 MG: 25 TABLET ORAL at 21:33

## 2022-01-01 RX ADMIN — HYDRALAZINE HYDROCHLORIDE 25 MG: 25 TABLET, FILM COATED ORAL at 08:01

## 2022-01-01 RX ADMIN — Medication 2 UNITS: at 11:37

## 2022-01-01 RX ADMIN — SODIUM CHLORIDE, PRESERVATIVE FREE 10 ML: 5 INJECTION INTRAVENOUS at 21:32

## 2022-01-01 RX ADMIN — CEFAZOLIN SODIUM 2000 MG: 100 INJECTION, POWDER, LYOPHILIZED, FOR SOLUTION INTRAVENOUS at 12:02

## 2022-01-01 RX ADMIN — INSULIN LISPRO 8 UNITS: 100 INJECTION, SOLUTION INTRAVENOUS; SUBCUTANEOUS at 09:11

## 2022-01-01 RX ADMIN — SODIUM CHLORIDE, SODIUM LACTATE, POTASSIUM CHLORIDE, AND CALCIUM CHLORIDE 100 ML/HR: 600; 310; 30; 20 INJECTION, SOLUTION INTRAVENOUS at 08:56

## 2022-01-01 RX ADMIN — TUBERCULIN PURIFIED PROTEIN DERIVATIVE 5 UNITS: 5 INJECTION, SOLUTION INTRADERMAL at 21:28

## 2022-01-01 RX ADMIN — INSULIN LISPRO 4 UNITS: 100 INJECTION, SOLUTION INTRAVENOUS; SUBCUTANEOUS at 12:52

## 2022-01-01 RX ADMIN — IPRATROPIUM BROMIDE AND ALBUTEROL SULFATE 1 AMPULE: 2.5; .5 SOLUTION RESPIRATORY (INHALATION) at 07:11

## 2022-01-01 RX ADMIN — HEPARIN SODIUM 13 UNITS/KG/HR: 5000 INJECTION, SOLUTION INTRAVENOUS at 02:26

## 2022-01-01 RX ADMIN — HYDRALAZINE HYDROCHLORIDE 25 MG: 50 TABLET, FILM COATED ORAL at 16:51

## 2022-01-01 RX ADMIN — FERROUS SULFATE TAB 325 MG (65 MG ELEMENTAL FE) 325 MG: 325 (65 FE) TAB at 08:38

## 2022-01-01 RX ADMIN — CEPHALEXIN 500 MG: 250 CAPSULE ORAL at 21:52

## 2022-01-01 RX ADMIN — DOCUSATE SODIUM 100 MG: 100 CAPSULE, LIQUID FILLED ORAL at 08:39

## 2022-01-01 RX ADMIN — SODIUM CHLORIDE, PRESERVATIVE FREE 10 ML: 5 INJECTION INTRAVENOUS at 21:42

## 2022-01-01 RX ADMIN — DOXYCYCLINE HYCLATE 100 MG: 100 CAPSULE ORAL at 09:49

## 2022-01-01 RX ADMIN — INSULIN LISPRO 4 UNITS: 100 INJECTION, SOLUTION INTRAVENOUS; SUBCUTANEOUS at 11:33

## 2022-01-01 RX ADMIN — SODIUM CHLORIDE 500 ML: 900 INJECTION, SOLUTION INTRAVENOUS at 14:57

## 2022-01-01 RX ADMIN — BUDESONIDE 500 MCG: 0.5 INHALANT RESPIRATORY (INHALATION) at 19:17

## 2022-01-01 RX ADMIN — DEXAMETHASONE 6 MG: 4 TABLET ORAL at 08:34

## 2022-01-01 RX ADMIN — APIXABAN 5 MG: 5 TABLET, FILM COATED ORAL at 21:36

## 2022-01-01 RX ADMIN — ARFORMOTEROL TARTRATE 15 MCG: 15 SOLUTION RESPIRATORY (INHALATION) at 07:49

## 2022-01-01 RX ADMIN — INSULIN GLARGINE 5 UNITS: 100 INJECTION, SOLUTION SUBCUTANEOUS at 22:01

## 2022-01-01 RX ADMIN — METOPROLOL TARTRATE 12.5 MG: 25 TABLET, FILM COATED ORAL at 21:40

## 2022-01-01 RX ADMIN — BUDESONIDE 500 MCG: 0.5 INHALANT RESPIRATORY (INHALATION) at 08:21

## 2022-01-01 RX ADMIN — Medication 3 UNITS: at 13:03

## 2022-01-01 RX ADMIN — Medication 5 UNITS: at 08:40

## 2022-01-01 RX ADMIN — APIXABAN 2.5 MG: 5 TABLET, FILM COATED ORAL at 09:01

## 2022-01-01 RX ADMIN — SODIUM CHLORIDE 50 ML/HR: 900 INJECTION, SOLUTION INTRAVENOUS at 10:13

## 2022-01-01 RX ADMIN — TUBERCULIN PURIFIED PROTEIN DERIVATIVE 5 UNITS: 5 INJECTION, SOLUTION INTRADERMAL at 18:09

## 2022-01-01 RX ADMIN — PANTOPRAZOLE SODIUM 40 MG: 40 TABLET, DELAYED RELEASE ORAL at 05:10

## 2022-01-01 RX ADMIN — Medication 5 UNITS: at 11:08

## 2022-01-01 RX ADMIN — FUROSEMIDE 40 MG: 10 INJECTION, SOLUTION INTRAMUSCULAR; INTRAVENOUS at 14:25

## 2022-01-01 RX ADMIN — METOPROLOL TARTRATE 12.5 MG: 25 TABLET, FILM COATED ORAL at 21:52

## 2022-01-01 RX ADMIN — ARFORMOTEROL TARTRATE 15 MCG: 15 SOLUTION RESPIRATORY (INHALATION) at 08:40

## 2022-01-01 RX ADMIN — PANTOPRAZOLE SODIUM 40 MG: 40 TABLET, DELAYED RELEASE ORAL at 17:19

## 2022-01-01 RX ADMIN — METHYLPREDNISOLONE SODIUM SUCCINATE 40 MG: 40 INJECTION, POWDER, FOR SOLUTION INTRAMUSCULAR; INTRAVENOUS at 17:56

## 2022-01-01 RX ADMIN — SERTRALINE HYDROCHLORIDE 50 MG: 50 TABLET ORAL at 08:38

## 2022-01-01 RX ADMIN — SERTRALINE HYDROCHLORIDE 100 MG: 50 TABLET ORAL at 08:20

## 2022-01-01 RX ADMIN — HEPARIN SODIUM 13 UNITS/KG/HR: 5000 INJECTION, SOLUTION INTRAVENOUS at 19:24

## 2022-01-01 RX ADMIN — SODIUM CHLORIDE, PRESERVATIVE FREE 10 ML: 5 INJECTION INTRAVENOUS at 05:45

## 2022-01-01 RX ADMIN — SODIUM CHLORIDE, PRESERVATIVE FREE 10 ML: 5 INJECTION INTRAVENOUS at 14:51

## 2022-01-01 RX ADMIN — LISINOPRIL 20 MG: 20 TABLET ORAL at 08:13

## 2022-01-01 RX ADMIN — INSULIN LISPRO 6 UNITS: 100 INJECTION, SOLUTION INTRAVENOUS; SUBCUTANEOUS at 21:41

## 2022-01-01 RX ADMIN — AMLODIPINE BESYLATE 10 MG: 10 TABLET ORAL at 08:41

## 2022-01-01 RX ADMIN — HYDRALAZINE HYDROCHLORIDE 25 MG: 25 TABLET, FILM COATED ORAL at 14:45

## 2022-01-01 RX ADMIN — Medication 8 UNITS: at 09:00

## 2022-01-01 RX ADMIN — HYDRALAZINE HYDROCHLORIDE 25 MG: 25 TABLET ORAL at 06:00

## 2022-01-01 RX ADMIN — INSULIN GLARGINE 5 UNITS: 100 INJECTION, SOLUTION SUBCUTANEOUS at 22:02

## 2022-01-01 RX ADMIN — METOPROLOL TARTRATE 25 MG: 25 TABLET, FILM COATED ORAL at 16:43

## 2022-01-01 RX ADMIN — GABAPENTIN 100 MG: 100 CAPSULE ORAL at 08:06

## 2022-01-01 RX ADMIN — INSULIN LISPRO 8 UNITS: 100 INJECTION, SOLUTION INTRAVENOUS; SUBCUTANEOUS at 12:17

## 2022-01-01 RX ADMIN — SODIUM CHLORIDE, PRESERVATIVE FREE 10 ML: 5 INJECTION INTRAVENOUS at 06:11

## 2022-01-01 RX ADMIN — INSULIN LISPRO 6 UNITS: 100 INJECTION, SOLUTION INTRAVENOUS; SUBCUTANEOUS at 20:34

## 2022-01-01 RX ADMIN — SODIUM CHLORIDE, PRESERVATIVE FREE 10 ML: 5 INJECTION INTRAVENOUS at 05:53

## 2022-01-01 RX ADMIN — METHYLPREDNISOLONE SODIUM SUCCINATE 40 MG: 40 INJECTION, POWDER, FOR SOLUTION INTRAMUSCULAR; INTRAVENOUS at 04:57

## 2022-01-01 RX ADMIN — Medication 5 UNITS: at 22:01

## 2022-01-01 RX ADMIN — HYDRALAZINE HYDROCHLORIDE 25 MG: 25 TABLET, FILM COATED ORAL at 09:01

## 2022-01-01 RX ADMIN — GABAPENTIN 100 MG: 100 CAPSULE ORAL at 14:45

## 2022-01-01 RX ADMIN — INSULIN GLARGINE 5 UNITS: 100 INJECTION, SOLUTION SUBCUTANEOUS at 21:40

## 2022-01-01 RX ADMIN — INSULIN LISPRO 2 UNITS: 100 INJECTION, SOLUTION INTRAVENOUS; SUBCUTANEOUS at 21:28

## 2022-01-01 RX ADMIN — SERTRALINE HYDROCHLORIDE 100 MG: 100 TABLET ORAL at 08:49

## 2022-01-01 RX ADMIN — DOXYCYCLINE HYCLATE 100 MG: 100 CAPSULE ORAL at 20:30

## 2022-01-01 RX ADMIN — Medication 3 UNITS: at 11:22

## 2022-01-01 RX ADMIN — IPRATROPIUM BROMIDE AND ALBUTEROL SULFATE 1 AMPULE: 2.5; .5 SOLUTION RESPIRATORY (INHALATION) at 15:08

## 2022-01-01 RX ADMIN — BUDESONIDE 500 MCG: 0.5 INHALANT RESPIRATORY (INHALATION) at 21:23

## 2022-01-01 RX ADMIN — APIXABAN 10 MG: 5 TABLET, FILM COATED ORAL at 08:19

## 2022-01-01 RX ADMIN — METOPROLOL TARTRATE 12.5 MG: 25 TABLET, FILM COATED ORAL at 21:30

## 2022-01-01 RX ADMIN — MAGNESIUM SULFATE HEPTAHYDRATE 1000 MG: 1 INJECTION, SOLUTION INTRAVENOUS at 06:03

## 2022-01-01 RX ADMIN — APIXABAN 5 MG: 5 TABLET, FILM COATED ORAL at 08:38

## 2022-01-01 RX ADMIN — BUDESONIDE 500 MCG: 0.5 INHALANT RESPIRATORY (INHALATION) at 20:16

## 2022-01-01 RX ADMIN — SERTRALINE HYDROCHLORIDE 100 MG: 100 TABLET ORAL at 09:01

## 2022-01-01 RX ADMIN — CEPHALEXIN 500 MG: 250 CAPSULE ORAL at 21:30

## 2022-01-01 RX ADMIN — HEPARIN SODIUM 18.3 UNITS/KG/HR: 5000 INJECTION, SOLUTION INTRAVENOUS at 19:01

## 2022-01-01 RX ADMIN — METHYLPREDNISOLONE SODIUM SUCCINATE 125 MG: 125 INJECTION, POWDER, FOR SOLUTION INTRAMUSCULAR; INTRAVENOUS at 12:49

## 2022-01-01 RX ADMIN — GABAPENTIN 100 MG: 100 CAPSULE ORAL at 15:02

## 2022-01-01 RX ADMIN — Medication 3 UNITS: at 11:56

## 2022-01-01 RX ADMIN — Medication 2 UNITS: at 07:44

## 2022-01-01 RX ADMIN — SERTRALINE HYDROCHLORIDE 50 MG: 50 TABLET ORAL at 10:11

## 2022-01-01 RX ADMIN — HYDRALAZINE HYDROCHLORIDE 25 MG: 25 TABLET, FILM COATED ORAL at 14:20

## 2022-01-01 RX ADMIN — TRAMADOL HYDROCHLORIDE 50 MG: 50 TABLET, COATED ORAL at 09:24

## 2022-01-01 RX ADMIN — SODIUM CHLORIDE, PRESERVATIVE FREE 10 ML: 5 INJECTION INTRAVENOUS at 14:46

## 2022-01-01 RX ADMIN — SODIUM CHLORIDE 40 MG: 9 INJECTION INTRAMUSCULAR; INTRAVENOUS; SUBCUTANEOUS at 08:23

## 2022-01-01 RX ADMIN — PANTOPRAZOLE SODIUM 40 MG: 40 TABLET, DELAYED RELEASE ORAL at 06:04

## 2022-01-01 RX ADMIN — APIXABAN 2.5 MG: 5 TABLET, FILM COATED ORAL at 21:59

## 2022-01-01 RX ADMIN — INSULIN LISPRO 4 UNITS: 100 INJECTION, SOLUTION INTRAVENOUS; SUBCUTANEOUS at 16:28

## 2022-01-01 RX ADMIN — GABAPENTIN 100 MG: 100 CAPSULE ORAL at 08:38

## 2022-01-01 RX ADMIN — DOXYCYCLINE HYCLATE 100 MG: 100 CAPSULE ORAL at 08:40

## 2022-01-01 RX ADMIN — GABAPENTIN 100 MG: 100 CAPSULE ORAL at 14:53

## 2022-01-01 RX ADMIN — SERTRALINE HYDROCHLORIDE 100 MG: 50 TABLET ORAL at 08:57

## 2022-01-01 RX ADMIN — PROPOFOL 20 MG: 10 INJECTION, EMULSION INTRAVENOUS at 15:51

## 2022-01-01 RX ADMIN — CEFTRIAXONE 1 G: 1 INJECTION, POWDER, FOR SOLUTION INTRAMUSCULAR; INTRAVENOUS at 13:37

## 2022-01-01 RX ADMIN — HYDRALAZINE HYDROCHLORIDE 25 MG: 25 TABLET, FILM COATED ORAL at 21:03

## 2022-01-01 RX ADMIN — GABAPENTIN 100 MG: 100 CAPSULE ORAL at 21:52

## 2022-01-01 RX ADMIN — PIPERACILLIN AND TAZOBACTAM 4.5 G: 4; .5 INJECTION, POWDER, FOR SOLUTION INTRAVENOUS at 06:16

## 2022-01-01 RX ADMIN — ARFORMOTEROL TARTRATE 15 MCG: 15 SOLUTION RESPIRATORY (INHALATION) at 10:09

## 2022-01-01 RX ADMIN — Medication 4 UNITS: at 11:30

## 2022-01-01 RX ADMIN — INSULIN LISPRO 6 UNITS: 100 INJECTION, SOLUTION INTRAVENOUS; SUBCUTANEOUS at 17:00

## 2022-01-01 RX ADMIN — BUDESONIDE 500 MCG: 0.5 INHALANT RESPIRATORY (INHALATION) at 20:56

## 2022-01-01 RX ADMIN — APIXABAN 2.5 MG: 5 TABLET, FILM COATED ORAL at 08:59

## 2022-01-01 RX ADMIN — GABAPENTIN 100 MG: 100 CAPSULE ORAL at 15:04

## 2022-01-01 RX ADMIN — GABAPENTIN 100 MG: 100 CAPSULE ORAL at 17:23

## 2022-01-01 RX ADMIN — Medication 6 UNITS: at 17:09

## 2022-01-01 RX ADMIN — Medication 7 UNITS: at 12:34

## 2022-01-01 RX ADMIN — DEXTROSE MONOHYDRATE 125 ML: 10 INJECTION, SOLUTION INTRAVENOUS at 08:01

## 2022-01-01 RX ADMIN — PANTOPRAZOLE SODIUM 40 MG: 40 TABLET, DELAYED RELEASE ORAL at 16:01

## 2022-01-01 RX ADMIN — AMLODIPINE BESYLATE 10 MG: 10 TABLET ORAL at 10:11

## 2022-01-01 RX ADMIN — HYDRALAZINE HYDROCHLORIDE 25 MG: 50 TABLET, FILM COATED ORAL at 15:17

## 2022-01-01 RX ADMIN — BUDESONIDE 500 MCG: 0.5 INHALANT RESPIRATORY (INHALATION) at 08:05

## 2022-01-01 RX ADMIN — DEXAMETHASONE 6 MG: 4 TABLET ORAL at 08:56

## 2022-01-01 RX ADMIN — Medication 3 UNITS: at 08:29

## 2022-01-01 RX ADMIN — POTASSIUM CHLORIDE 40 MEQ: 20 TABLET, EXTENDED RELEASE ORAL at 08:43

## 2022-01-01 RX ADMIN — APIXABAN 10 MG: 5 TABLET, FILM COATED ORAL at 21:36

## 2022-01-01 RX ADMIN — SODIUM CHLORIDE, PRESERVATIVE FREE 10 ML: 5 INJECTION INTRAVENOUS at 05:38

## 2022-01-01 RX ADMIN — Medication 8 UNITS: at 08:40

## 2022-01-01 RX ADMIN — INSULIN LISPRO 2 UNITS: 100 INJECTION, SOLUTION INTRAVENOUS; SUBCUTANEOUS at 16:37

## 2022-01-01 RX ADMIN — SODIUM CHLORIDE: 9 INJECTION, SOLUTION INTRAVENOUS at 05:55

## 2022-01-01 RX ADMIN — DEXAMETHASONE SODIUM PHOSPHATE 6 MG: 10 INJECTION INTRAMUSCULAR; INTRAVENOUS at 21:45

## 2022-01-01 RX ADMIN — SODIUM CHLORIDE, SODIUM LACTATE, POTASSIUM CHLORIDE, AND CALCIUM CHLORIDE 100 ML/HR: 600; 310; 30; 20 INJECTION, SOLUTION INTRAVENOUS at 22:39

## 2022-01-01 RX ADMIN — APIXABAN 5 MG: 5 TABLET, FILM COATED ORAL at 08:01

## 2022-01-01 RX ADMIN — INSULIN LISPRO 4 UNITS: 100 INJECTION, SOLUTION INTRAVENOUS; SUBCUTANEOUS at 09:13

## 2022-01-01 RX ADMIN — CEFAZOLIN SODIUM 2000 MG: 100 INJECTION, POWDER, LYOPHILIZED, FOR SOLUTION INTRAVENOUS at 22:46

## 2022-01-01 RX ADMIN — AZITHROMYCIN MONOHYDRATE 500 MG: 500 INJECTION, POWDER, LYOPHILIZED, FOR SOLUTION INTRAVENOUS at 17:58

## 2022-01-01 RX ADMIN — GABAPENTIN 100 MG: 100 CAPSULE ORAL at 14:03

## 2022-01-01 RX ADMIN — ARFORMOTEROL TARTRATE 15 MCG: 15 SOLUTION RESPIRATORY (INHALATION) at 07:56

## 2022-01-01 RX ADMIN — ARFORMOTEROL TARTRATE 15 MCG: 15 SOLUTION RESPIRATORY (INHALATION) at 21:23

## 2022-01-01 RX ADMIN — INSULIN LISPRO 4 UNITS: 100 INJECTION, SOLUTION INTRAVENOUS; SUBCUTANEOUS at 11:38

## 2022-01-01 RX ADMIN — GABAPENTIN 100 MG: 100 CAPSULE ORAL at 21:27

## 2022-01-01 RX ADMIN — GABAPENTIN 100 MG: 100 CAPSULE ORAL at 09:48

## 2022-01-01 RX ADMIN — CEPHALEXIN 500 MG: 500 CAPSULE ORAL at 14:58

## 2022-01-01 RX ADMIN — HYDRALAZINE HYDROCHLORIDE 25 MG: 25 TABLET, FILM COATED ORAL at 00:02

## 2022-01-01 RX ADMIN — Medication 5 UNITS: at 23:56

## 2022-01-01 RX ADMIN — DEXAMETHASONE SODIUM PHOSPHATE 6 MG: 10 INJECTION INTRAMUSCULAR; INTRAVENOUS at 22:32

## 2022-01-01 RX ADMIN — METOPROLOL TARTRATE 12.5 MG: 25 TABLET, FILM COATED ORAL at 08:58

## 2022-01-01 RX ADMIN — IPRATROPIUM BROMIDE AND ALBUTEROL SULFATE 1 AMPULE: 2.5; .5 SOLUTION RESPIRATORY (INHALATION) at 15:11

## 2022-01-01 RX ADMIN — SODIUM CHLORIDE, PRESERVATIVE FREE 5 ML: 5 INJECTION INTRAVENOUS at 04:09

## 2022-01-01 RX ADMIN — INSULIN LISPRO 6 UNITS: 100 INJECTION, SOLUTION INTRAVENOUS; SUBCUTANEOUS at 11:45

## 2022-01-01 RX ADMIN — Medication 2 UNITS: at 08:42

## 2022-01-01 RX ADMIN — HYDRALAZINE HYDROCHLORIDE 25 MG: 25 TABLET, FILM COATED ORAL at 08:49

## 2022-01-01 RX ADMIN — PRAVASTATIN SODIUM 10 MG: 20 TABLET ORAL at 20:36

## 2022-01-01 RX ADMIN — Medication 3 UNITS: at 07:30

## 2022-01-01 RX ADMIN — TRAMADOL HYDROCHLORIDE 50 MG: 50 TABLET, COATED ORAL at 12:38

## 2022-01-01 RX ADMIN — AMLODIPINE BESYLATE 10 MG: 10 TABLET ORAL at 08:56

## 2022-01-01 RX ADMIN — BUDESONIDE 500 MCG: 0.5 INHALANT RESPIRATORY (INHALATION) at 08:32

## 2022-01-01 RX ADMIN — METOPROLOL TARTRATE 12.5 MG: 25 TABLET, FILM COATED ORAL at 18:44

## 2022-01-01 RX ADMIN — AMLODIPINE BESYLATE 10 MG: 10 TABLET ORAL at 08:20

## 2022-01-01 RX ADMIN — Medication 2 UNITS: at 17:10

## 2022-01-01 RX ADMIN — Medication 5 UNITS: at 12:10

## 2022-01-01 RX ADMIN — FAMOTIDINE 20 MG: 20 TABLET, FILM COATED ORAL at 08:59

## 2022-01-01 RX ADMIN — SODIUM CHLORIDE, PRESERVATIVE FREE 10 ML: 5 INJECTION INTRAVENOUS at 05:46

## 2022-01-01 RX ADMIN — BUDESONIDE 500 MCG: 0.5 INHALANT RESPIRATORY (INHALATION) at 21:05

## 2022-01-01 RX ADMIN — IPRATROPIUM BROMIDE AND ALBUTEROL SULFATE 1 AMPULE: 2.5; .5 SOLUTION RESPIRATORY (INHALATION) at 11:29

## 2022-01-01 RX ADMIN — FAMOTIDINE 20 MG: 20 TABLET, FILM COATED ORAL at 08:40

## 2022-01-01 RX ADMIN — SERTRALINE HYDROCHLORIDE 100 MG: 100 TABLET ORAL at 09:50

## 2022-01-01 RX ADMIN — DOXYCYCLINE HYCLATE 100 MG: 100 CAPSULE ORAL at 09:01

## 2022-01-01 RX ADMIN — POTASSIUM CHLORIDE 20 MEQ: 14.9 INJECTION, SOLUTION INTRAVENOUS at 11:01

## 2022-01-01 RX ADMIN — SODIUM CHLORIDE, PRESERVATIVE FREE 10 ML: 5 INJECTION INTRAVENOUS at 21:28

## 2022-01-01 RX ADMIN — HEPARIN SODIUM 13 UNITS/KG/HR: 5000 INJECTION, SOLUTION INTRAVENOUS at 18:07

## 2022-01-01 RX ADMIN — AMLODIPINE BESYLATE 10 MG: 10 TABLET ORAL at 08:59

## 2022-01-01 RX ADMIN — SODIUM CHLORIDE, PRESERVATIVE FREE 10 ML: 5 INJECTION INTRAVENOUS at 16:00

## 2022-01-01 RX ADMIN — POLYETHYLENE GLYCOL 3350 17 G: 17 POWDER, FOR SOLUTION ORAL at 08:41

## 2022-01-01 RX ADMIN — FERROUS SULFATE TAB 325 MG (65 MG ELEMENTAL FE) 325 MG: 325 (65 FE) TAB at 08:57

## 2022-01-01 RX ADMIN — VANCOMYCIN HYDROCHLORIDE 1500 MG: 10 INJECTION, POWDER, LYOPHILIZED, FOR SOLUTION INTRAVENOUS at 00:12

## 2022-01-01 RX ADMIN — FAMOTIDINE 20 MG: 20 TABLET, FILM COATED ORAL at 08:56

## 2022-01-01 RX ADMIN — CEFTRIAXONE 2000 MG: 2 INJECTION, POWDER, FOR SOLUTION INTRAMUSCULAR; INTRAVENOUS at 16:49

## 2022-01-01 RX ADMIN — SODIUM CHLORIDE, PRESERVATIVE FREE 10 ML: 5 INJECTION INTRAVENOUS at 21:36

## 2022-01-01 RX ADMIN — CEFAZOLIN SODIUM 2000 MG: 100 INJECTION, POWDER, LYOPHILIZED, FOR SOLUTION INTRAVENOUS at 00:04

## 2022-01-01 RX ADMIN — SODIUM CHLORIDE, PRESERVATIVE FREE 10 ML: 5 INJECTION INTRAVENOUS at 22:41

## 2022-01-01 RX ADMIN — BUDESONIDE 500 MCG: 0.5 INHALANT RESPIRATORY (INHALATION) at 08:09

## 2022-01-01 RX ADMIN — BARICITINIB 4 MG: 2 TABLET, FILM COATED ORAL at 08:37

## 2022-01-01 RX ADMIN — HYDRALAZINE HYDROCHLORIDE 25 MG: 50 TABLET, FILM COATED ORAL at 08:32

## 2022-01-01 RX ADMIN — PIPERACILLIN AND TAZOBACTAM 4.5 G: 4; .5 INJECTION, POWDER, FOR SOLUTION INTRAVENOUS at 11:58

## 2022-01-01 RX ADMIN — SODIUM CHLORIDE 40 MG: 9 INJECTION, SOLUTION INTRAMUSCULAR; INTRAVENOUS; SUBCUTANEOUS at 21:45

## 2022-01-01 RX ADMIN — AMLODIPINE BESYLATE 5 MG: 5 TABLET ORAL at 16:46

## 2022-01-01 RX ADMIN — APIXABAN 5 MG: 5 TABLET, FILM COATED ORAL at 08:39

## 2022-01-01 RX ADMIN — GABAPENTIN 100 MG: 100 CAPSULE ORAL at 15:48

## 2022-01-01 RX ADMIN — APIXABAN 2.5 MG: 5 TABLET, FILM COATED ORAL at 08:15

## 2022-01-01 RX ADMIN — POLYETHYLENE GLYCOL 3350 17 G: 17 POWDER, FOR SOLUTION ORAL at 08:29

## 2022-01-01 RX ADMIN — TRAMADOL HYDROCHLORIDE 50 MG: 50 TABLET, COATED ORAL at 14:20

## 2022-01-01 RX ADMIN — IPRATROPIUM BROMIDE AND ALBUTEROL SULFATE 1 AMPULE: 2.5; .5 SOLUTION RESPIRATORY (INHALATION) at 08:04

## 2022-01-01 RX ADMIN — SODIUM CHLORIDE, PRESERVATIVE FREE 5 ML: 5 INJECTION INTRAVENOUS at 06:06

## 2022-01-01 RX ADMIN — APIXABAN 5 MG: 5 TABLET, FILM COATED ORAL at 21:47

## 2022-01-01 RX ADMIN — SERTRALINE HYDROCHLORIDE 100 MG: 50 TABLET ORAL at 09:02

## 2022-01-01 RX ADMIN — HYDRALAZINE HYDROCHLORIDE 25 MG: 25 TABLET, FILM COATED ORAL at 15:24

## 2022-01-01 RX ADMIN — ARFORMOTEROL TARTRATE 15 MCG: 15 SOLUTION RESPIRATORY (INHALATION) at 20:56

## 2022-01-01 RX ADMIN — SERTRALINE HYDROCHLORIDE 50 MG: 50 TABLET ORAL at 09:43

## 2022-01-01 RX ADMIN — SERTRALINE HYDROCHLORIDE 50 MG: 50 TABLET ORAL at 08:14

## 2022-01-01 RX ADMIN — BUDESONIDE 500 MCG: 0.5 INHALANT RESPIRATORY (INHALATION) at 20:05

## 2022-01-01 RX ADMIN — INSULIN LISPRO 2 UNITS: 100 INJECTION, SOLUTION INTRAVENOUS; SUBCUTANEOUS at 11:54

## 2022-01-01 RX ADMIN — PANTOPRAZOLE SODIUM 40 MG: 40 TABLET, DELAYED RELEASE ORAL at 08:42

## 2022-01-01 RX ADMIN — Medication 3 UNITS: at 16:37

## 2022-01-01 RX ADMIN — INSULIN GLARGINE 8 UNITS: 100 INJECTION, SOLUTION SUBCUTANEOUS at 21:57

## 2022-01-01 RX ADMIN — CEFTRIAXONE 1 G: 1 INJECTION, POWDER, FOR SOLUTION INTRAMUSCULAR; INTRAVENOUS at 16:00

## 2022-01-01 RX ADMIN — PROPOFOL 30 MG: 10 INJECTION, EMULSION INTRAVENOUS at 15:48

## 2022-01-01 RX ADMIN — APIXABAN 5 MG: 5 TABLET, FILM COATED ORAL at 21:03

## 2022-01-01 RX ADMIN — DEXTROSE 1 TUBE: 15 GEL ORAL at 07:27

## 2022-01-01 RX ADMIN — GABAPENTIN 100 MG: 100 CAPSULE ORAL at 14:20

## 2022-01-01 RX ADMIN — SERTRALINE HYDROCHLORIDE 100 MG: 50 TABLET ORAL at 08:06

## 2022-01-01 RX ADMIN — POTASSIUM CHLORIDE 20 MEQ: 14.9 INJECTION, SOLUTION INTRAVENOUS at 08:43

## 2022-01-01 RX ADMIN — GABAPENTIN 100 MG: 100 CAPSULE ORAL at 08:01

## 2022-01-01 RX ADMIN — FAMOTIDINE 20 MG: 20 TABLET, FILM COATED ORAL at 09:01

## 2022-01-01 RX ADMIN — SODIUM CHLORIDE, PRESERVATIVE FREE 10 ML: 5 INJECTION INTRAVENOUS at 21:16

## 2022-01-01 RX ADMIN — FUROSEMIDE 40 MG: 10 INJECTION, SOLUTION INTRAMUSCULAR; INTRAVENOUS at 11:44

## 2022-01-01 RX ADMIN — METOPROLOL TARTRATE 12.5 MG: 25 TABLET, FILM COATED ORAL at 20:27

## 2022-01-01 RX ADMIN — SODIUM BICARBONATE 650 MG TABLET 1300 MG: at 08:57

## 2022-01-01 RX ADMIN — INSULIN LISPRO 4 UNITS: 100 INJECTION, SOLUTION INTRAVENOUS; SUBCUTANEOUS at 11:45

## 2022-01-01 RX ADMIN — PIPERACILLIN AND TAZOBACTAM 4.5 G: 4; .5 INJECTION, POWDER, FOR SOLUTION INTRAVENOUS at 21:33

## 2022-01-01 RX ADMIN — LISINOPRIL 20 MG: 20 TABLET ORAL at 15:01

## 2022-01-01 RX ADMIN — BUDESONIDE 500 MCG: 0.5 INHALANT RESPIRATORY (INHALATION) at 07:38

## 2022-01-01 RX ADMIN — IPRATROPIUM BROMIDE AND ALBUTEROL SULFATE 1 AMPULE: 2.5; .5 SOLUTION RESPIRATORY (INHALATION) at 19:38

## 2022-01-01 RX ADMIN — PANTOPRAZOLE SODIUM 40 MG: 40 TABLET, DELAYED RELEASE ORAL at 08:40

## 2022-01-01 RX ADMIN — Medication 8 UNITS: at 18:44

## 2022-01-01 RX ADMIN — PANTOPRAZOLE SODIUM 40 MG: 40 TABLET, DELAYED RELEASE ORAL at 05:23

## 2022-01-01 RX ADMIN — SODIUM CHLORIDE 500 ML: 9 INJECTION, SOLUTION INTRAVENOUS at 17:27

## 2022-01-01 RX ADMIN — HEPARIN SODIUM 18 UNITS/KG/HR: 5000 INJECTION, SOLUTION INTRAVENOUS at 21:43

## 2022-01-01 RX ADMIN — SODIUM CHLORIDE, PRESERVATIVE FREE 10 ML: 5 INJECTION INTRAVENOUS at 21:31

## 2022-01-01 RX ADMIN — HYDRALAZINE HYDROCHLORIDE 25 MG: 25 TABLET, FILM COATED ORAL at 08:59

## 2022-01-01 RX ADMIN — HYDRALAZINE HYDROCHLORIDE 25 MG: 25 TABLET ORAL at 13:34

## 2022-01-01 RX ADMIN — INSULIN LISPRO 2 UNITS: 100 INJECTION, SOLUTION INTRAVENOUS; SUBCUTANEOUS at 16:01

## 2022-01-01 RX ADMIN — IPRATROPIUM BROMIDE AND ALBUTEROL SULFATE 1 AMPULE: 2.5; .5 SOLUTION RESPIRATORY (INHALATION) at 07:40

## 2022-01-01 RX ADMIN — BARICITINIB 4 MG: 2 TABLET, FILM COATED ORAL at 13:34

## 2022-01-01 RX ADMIN — SERTRALINE HYDROCHLORIDE 100 MG: 50 TABLET ORAL at 08:38

## 2022-01-01 RX ADMIN — HYDRALAZINE HYDROCHLORIDE 25 MG: 50 TABLET, FILM COATED ORAL at 08:41

## 2022-01-01 RX ADMIN — ARFORMOTEROL TARTRATE 15 MCG: 15 SOLUTION RESPIRATORY (INHALATION) at 20:16

## 2022-01-01 RX ADMIN — GABAPENTIN 100 MG: 100 CAPSULE ORAL at 21:15

## 2022-01-01 RX ADMIN — Medication 2 UNITS: at 16:30

## 2022-01-01 RX ADMIN — LEVOFLOXACIN 500 MG: 500 TABLET, FILM COATED ORAL at 11:58

## 2022-01-01 RX ADMIN — AMLODIPINE BESYLATE 10 MG: 10 TABLET ORAL at 08:34

## 2022-01-01 RX ADMIN — SODIUM CHLORIDE, PRESERVATIVE FREE 10 ML: 5 INJECTION INTRAVENOUS at 06:17

## 2022-01-01 RX ADMIN — HYDRALAZINE HYDROCHLORIDE 25 MG: 25 TABLET, FILM COATED ORAL at 21:40

## 2022-01-01 RX ADMIN — HYDRALAZINE HYDROCHLORIDE 25 MG: 25 TABLET, FILM COATED ORAL at 20:37

## 2022-01-01 RX ADMIN — INSULIN LISPRO 6 UNITS: 100 INJECTION, SOLUTION INTRAVENOUS; SUBCUTANEOUS at 08:05

## 2022-01-01 RX ADMIN — BUDESONIDE 500 MCG: 0.5 INHALANT RESPIRATORY (INHALATION) at 08:40

## 2022-01-01 RX ADMIN — PANTOPRAZOLE SODIUM 40 MG: 40 TABLET, DELAYED RELEASE ORAL at 08:45

## 2022-01-01 RX ADMIN — INSULIN LISPRO 3 UNITS: 100 INJECTION, SOLUTION INTRAVENOUS; SUBCUTANEOUS at 08:24

## 2022-01-01 RX ADMIN — SODIUM CHLORIDE, PRESERVATIVE FREE 10 ML: 5 INJECTION INTRAVENOUS at 21:37

## 2022-01-01 RX ADMIN — LISINOPRIL 20 MG: 20 TABLET ORAL at 08:38

## 2022-01-01 RX ADMIN — PANTOPRAZOLE SODIUM 40 MG: 40 TABLET, DELAYED RELEASE ORAL at 08:12

## 2022-01-01 RX ADMIN — HYDRALAZINE HYDROCHLORIDE 25 MG: 25 TABLET ORAL at 05:48

## 2022-01-01 RX ADMIN — HYDRALAZINE HYDROCHLORIDE 25 MG: 25 TABLET, FILM COATED ORAL at 08:57

## 2022-01-01 RX ADMIN — IPRATROPIUM BROMIDE AND ALBUTEROL SULFATE 1 AMPULE: 2.5; .5 SOLUTION RESPIRATORY (INHALATION) at 07:20

## 2022-01-01 RX ADMIN — SODIUM CHLORIDE 25 MG: 9 INJECTION, SOLUTION INTRAVENOUS at 15:04

## 2022-01-01 RX ADMIN — INSULIN LISPRO 8 UNITS: 100 INJECTION, SOLUTION INTRAVENOUS; SUBCUTANEOUS at 08:24

## 2022-01-01 RX ADMIN — Medication 8 UNITS: at 22:50

## 2022-01-01 RX ADMIN — Medication 3 UNITS: at 08:40

## 2022-01-01 RX ADMIN — METOPROLOL TARTRATE 25 MG: 25 TABLET, FILM COATED ORAL at 17:23

## 2022-01-01 RX ADMIN — Medication 6 UNITS: at 11:55

## 2022-01-01 RX ADMIN — GABAPENTIN 100 MG: 100 CAPSULE ORAL at 08:12

## 2022-01-01 RX ADMIN — BUDESONIDE 500 MCG: 0.5 INHALANT RESPIRATORY (INHALATION) at 07:40

## 2022-01-01 RX ADMIN — SODIUM CHLORIDE, PRESERVATIVE FREE 10 ML: 5 INJECTION INTRAVENOUS at 06:03

## 2022-01-01 RX ADMIN — BUDESONIDE 500 MCG: 0.5 INHALANT RESPIRATORY (INHALATION) at 20:06

## 2022-01-01 RX ADMIN — SODIUM CHLORIDE, SODIUM LACTATE, POTASSIUM CHLORIDE, AND CALCIUM CHLORIDE 100 ML/HR: 600; 310; 30; 20 INJECTION, SOLUTION INTRAVENOUS at 21:46

## 2022-01-01 RX ADMIN — ARFORMOTEROL TARTRATE 15 MCG: 15 SOLUTION RESPIRATORY (INHALATION) at 08:10

## 2022-01-01 RX ADMIN — AMLODIPINE BESYLATE 10 MG: 10 TABLET ORAL at 15:44

## 2022-01-01 RX ADMIN — FERROUS SULFATE TAB 325 MG (65 MG ELEMENTAL FE) 325 MG: 325 (65 FE) TAB at 08:29

## 2022-01-01 RX ADMIN — PROPOFOL 20 MG: 10 INJECTION, EMULSION INTRAVENOUS at 15:44

## 2022-01-01 RX ADMIN — SODIUM CHLORIDE, PRESERVATIVE FREE 10 ML: 5 INJECTION INTRAVENOUS at 14:45

## 2022-01-01 RX ADMIN — SODIUM CHLORIDE, PRESERVATIVE FREE 5 ML: 5 INJECTION INTRAVENOUS at 04:10

## 2022-01-01 RX ADMIN — PANTOPRAZOLE SODIUM 40 MG: 40 TABLET, DELAYED RELEASE ORAL at 06:18

## 2022-01-01 RX ADMIN — Medication 8 UNITS: at 11:00

## 2022-01-01 RX ADMIN — GABAPENTIN 100 MG: 100 CAPSULE ORAL at 21:36

## 2022-01-01 RX ADMIN — INSULIN GLARGINE 5 UNITS: 100 INJECTION, SOLUTION SUBCUTANEOUS at 21:38

## 2022-01-01 RX ADMIN — CEFTRIAXONE 1000 MG: 1 INJECTION, POWDER, FOR SOLUTION INTRAMUSCULAR; INTRAVENOUS at 18:54

## 2022-01-01 RX ADMIN — SERTRALINE HYDROCHLORIDE 100 MG: 100 TABLET ORAL at 08:01

## 2022-01-01 RX ADMIN — SODIUM BICARBONATE 650 MG TABLET 1300 MG: at 21:30

## 2022-01-01 RX ADMIN — SODIUM CHLORIDE 250 ML: 900 INJECTION, SOLUTION INTRAVENOUS at 15:54

## 2022-01-01 RX ADMIN — PANTOPRAZOLE SODIUM 40 MG: 40 TABLET, DELAYED RELEASE ORAL at 06:30

## 2022-01-01 RX ADMIN — PRAVASTATIN SODIUM 10 MG: 20 TABLET ORAL at 21:29

## 2022-01-01 RX ADMIN — SODIUM CHLORIDE, PRESERVATIVE FREE 10 ML: 5 INJECTION INTRAVENOUS at 22:05

## 2022-01-01 RX ADMIN — METHYLPREDNISOLONE SODIUM SUCCINATE 40 MG: 40 INJECTION, POWDER, FOR SOLUTION INTRAMUSCULAR; INTRAVENOUS at 05:34

## 2022-01-01 RX ADMIN — SODIUM CHLORIDE, PRESERVATIVE FREE 10 ML: 5 INJECTION INTRAVENOUS at 21:40

## 2022-01-01 RX ADMIN — Medication 4 UNITS: at 16:49

## 2022-01-01 RX ADMIN — BUDESONIDE 500 MCG: 0.5 INHALANT RESPIRATORY (INHALATION) at 07:11

## 2022-01-01 RX ADMIN — AMLODIPINE BESYLATE 10 MG: 10 TABLET ORAL at 08:40

## 2022-01-01 RX ADMIN — SERTRALINE HYDROCHLORIDE 100 MG: 100 TABLET ORAL at 08:18

## 2022-01-01 RX ADMIN — INSULIN LISPRO 3 UNITS: 100 INJECTION, SOLUTION INTRAVENOUS; SUBCUTANEOUS at 12:48

## 2022-01-01 RX ADMIN — HYDRALAZINE HYDROCHLORIDE 25 MG: 25 TABLET, FILM COATED ORAL at 08:41

## 2022-01-01 RX ADMIN — SODIUM CHLORIDE, PRESERVATIVE FREE 5 ML: 5 INJECTION INTRAVENOUS at 06:07

## 2022-01-01 RX ADMIN — DIPHENOXYLATE HYDROCHLORIDE AND ATROPINE SULFATE 2 TABLET: 2.5; .025 TABLET ORAL at 23:43

## 2022-01-01 RX ADMIN — Medication 4 UNITS: at 21:25

## 2022-01-01 RX ADMIN — TRAMADOL HYDROCHLORIDE 50 MG: 50 TABLET, COATED ORAL at 08:52

## 2022-01-01 RX ADMIN — LIDOCAINE HYDROCHLORIDE 60 MG: 20 INJECTION, SOLUTION EPIDURAL; INFILTRATION; INTRACAUDAL; PERINEURAL at 15:41

## 2022-01-01 RX ADMIN — INSULIN GLARGINE 8 UNITS: 100 INJECTION, SOLUTION SUBCUTANEOUS at 22:41

## 2022-01-01 RX ADMIN — ACETAMINOPHEN 650 MG: 325 TABLET ORAL at 11:40

## 2022-01-01 RX ADMIN — SODIUM CHLORIDE: 9 INJECTION, SOLUTION INTRAVENOUS at 21:54

## 2022-01-01 RX ADMIN — GABAPENTIN 100 MG: 100 CAPSULE ORAL at 15:24

## 2022-01-01 RX ADMIN — Medication 3 UNITS: at 17:17

## 2022-01-01 RX ADMIN — CEFTRIAXONE 2000 MG: 2 INJECTION, POWDER, FOR SOLUTION INTRAMUSCULAR; INTRAVENOUS at 17:04

## 2022-01-01 RX ADMIN — FERROUS SULFATE TAB 325 MG (65 MG ELEMENTAL FE) 325 MG: 325 (65 FE) TAB at 10:11

## 2022-01-01 RX ADMIN — INSULIN LISPRO 2 UNITS: 100 INJECTION, SOLUTION INTRAVENOUS; SUBCUTANEOUS at 11:35

## 2022-01-01 RX ADMIN — SODIUM CHLORIDE, PRESERVATIVE FREE 10 ML: 5 INJECTION INTRAVENOUS at 22:23

## 2022-01-01 RX ADMIN — Medication 6 UNITS: at 17:02

## 2022-01-01 RX ADMIN — APIXABAN 2.5 MG: 5 TABLET, FILM COATED ORAL at 20:39

## 2022-01-01 RX ADMIN — INSULIN GLARGINE 10 UNITS: 100 INJECTION, SOLUTION SUBCUTANEOUS at 08:50

## 2022-01-01 RX ADMIN — DOXYCYCLINE HYCLATE 100 MG: 100 CAPSULE ORAL at 08:00

## 2022-01-01 RX ADMIN — SODIUM CHLORIDE, SODIUM LACTATE, POTASSIUM CHLORIDE, AND CALCIUM CHLORIDE 500 ML: 600; 310; 30; 20 INJECTION, SOLUTION INTRAVENOUS at 23:39

## 2022-01-01 RX ADMIN — Medication 6 UNITS: at 22:01

## 2022-01-01 RX ADMIN — INSULIN GLARGINE 10 UNITS: 100 INJECTION, SOLUTION SUBCUTANEOUS at 08:25

## 2022-01-01 RX ADMIN — POLYETHYLENE GLYCOL 3350 17 G: 17 POWDER, FOR SOLUTION ORAL at 08:34

## 2022-01-01 RX ADMIN — BUDESONIDE 500 MCG: 0.5 INHALANT RESPIRATORY (INHALATION) at 07:20

## 2022-01-01 RX ADMIN — CEPHALEXIN 500 MG: 500 CAPSULE ORAL at 23:43

## 2022-01-01 RX ADMIN — INSULIN LISPRO 2 UNITS: 100 INJECTION, SOLUTION INTRAVENOUS; SUBCUTANEOUS at 08:29

## 2022-01-01 RX ADMIN — BUDESONIDE 500 MCG: 0.5 INHALANT RESPIRATORY (INHALATION) at 19:40

## 2022-01-01 RX ADMIN — APIXABAN 5 MG: 5 TABLET, FILM COATED ORAL at 21:30

## 2022-01-01 RX ADMIN — SODIUM CHLORIDE, SODIUM LACTATE, POTASSIUM CHLORIDE, AND CALCIUM CHLORIDE 100 ML/HR: 600; 310; 30; 20 INJECTION, SOLUTION INTRAVENOUS at 07:33

## 2022-01-01 RX ADMIN — HYDRALAZINE HYDROCHLORIDE 25 MG: 50 TABLET, FILM COATED ORAL at 21:31

## 2022-01-01 RX ADMIN — INSULIN LISPRO 2 UNITS: 100 INJECTION, SOLUTION INTRAVENOUS; SUBCUTANEOUS at 12:44

## 2022-01-01 RX ADMIN — Medication 4 UNITS: at 21:28

## 2022-01-01 RX ADMIN — SODIUM CHLORIDE, PRESERVATIVE FREE 10 ML: 5 INJECTION INTRAVENOUS at 21:35

## 2022-01-01 RX ADMIN — Medication 4 UNITS: at 22:05

## 2022-01-01 RX ADMIN — INSULIN HUMAN 10 UNITS: 100 INJECTION, SOLUTION PARENTERAL at 16:40

## 2022-01-01 RX ADMIN — FERROUS SULFATE TAB 325 MG (65 MG ELEMENTAL FE) 325 MG: 325 (65 FE) TAB at 09:02

## 2022-01-01 RX ADMIN — SODIUM CHLORIDE, PRESERVATIVE FREE 10 ML: 5 INJECTION INTRAVENOUS at 05:20

## 2022-01-01 RX ADMIN — METOPROLOL TARTRATE 12.5 MG: 25 TABLET, FILM COATED ORAL at 08:00

## 2022-01-01 RX ADMIN — GABAPENTIN 100 MG: 100 CAPSULE ORAL at 20:37

## 2022-01-01 RX ADMIN — Medication 3 UNITS: at 11:38

## 2022-01-01 RX ADMIN — Medication 5 UNITS: at 05:04

## 2022-01-01 RX ADMIN — INSULIN LISPRO 2 UNITS: 100 INJECTION, SOLUTION INTRAVENOUS; SUBCUTANEOUS at 11:36

## 2022-01-01 RX ADMIN — SERTRALINE HYDROCHLORIDE 50 MG: 50 TABLET ORAL at 08:41

## 2022-01-01 RX ADMIN — GABAPENTIN 100 MG: 100 CAPSULE ORAL at 08:29

## 2022-01-01 RX ADMIN — GABAPENTIN 100 MG: 100 CAPSULE ORAL at 09:01

## 2022-01-01 RX ADMIN — PIPERACILLIN AND TAZOBACTAM 4.5 G: 4; .5 INJECTION, POWDER, FOR SOLUTION INTRAVENOUS at 12:10

## 2022-01-01 RX ADMIN — VANCOMYCIN HYDROCHLORIDE 1500 MG: 10 INJECTION, POWDER, LYOPHILIZED, FOR SOLUTION INTRAVENOUS at 20:53

## 2022-01-01 RX ADMIN — IPRATROPIUM BROMIDE AND ALBUTEROL SULFATE 1 AMPULE: 2.5; .5 SOLUTION RESPIRATORY (INHALATION) at 19:40

## 2022-01-01 RX ADMIN — CEPHALEXIN 500 MG: 250 CAPSULE ORAL at 09:49

## 2022-01-01 RX ADMIN — MORPHINE SULFATE 1 MG: 2 INJECTION, SOLUTION INTRAMUSCULAR; INTRAVENOUS at 21:50

## 2022-01-01 RX ADMIN — FUROSEMIDE 40 MG: 10 INJECTION, SOLUTION INTRAMUSCULAR; INTRAVENOUS at 08:41

## 2022-01-01 RX ADMIN — ARFORMOTEROL TARTRATE 15 MCG: 15 SOLUTION RESPIRATORY (INHALATION) at 08:12

## 2022-01-01 RX ADMIN — INSULIN LISPRO 6 UNITS: 100 INJECTION, SOLUTION INTRAVENOUS; SUBCUTANEOUS at 21:18

## 2022-01-01 RX ADMIN — BUDESONIDE 500 MCG: 0.5 INHALANT RESPIRATORY (INHALATION) at 00:59

## 2022-01-01 RX ADMIN — INSULIN GLARGINE 7 UNITS: 100 INJECTION, SOLUTION SUBCUTANEOUS at 21:15

## 2022-01-01 RX ADMIN — IOPAMIDOL 80 ML: 755 INJECTION, SOLUTION INTRAVENOUS at 16:39

## 2022-01-01 RX ADMIN — HYDRALAZINE HYDROCHLORIDE 25 MG: 25 TABLET, FILM COATED ORAL at 20:39

## 2022-01-01 RX ADMIN — SERTRALINE HYDROCHLORIDE 50 MG: 50 TABLET ORAL at 08:40

## 2022-01-01 RX ADMIN — SODIUM CHLORIDE 250 ML: 900 INJECTION, SOLUTION INTRAVENOUS at 11:30

## 2022-01-01 RX ADMIN — BUDESONIDE 500 MCG: 0.5 INHALANT RESPIRATORY (INHALATION) at 07:56

## 2022-01-01 RX ADMIN — BARICITINIB 2 MG: 2 TABLET, FILM COATED ORAL at 09:43

## 2022-01-01 RX ADMIN — FAMOTIDINE 20 MG: 20 TABLET, FILM COATED ORAL at 08:16

## 2022-01-01 RX ADMIN — GABAPENTIN 100 MG: 100 CAPSULE ORAL at 21:03

## 2022-01-01 RX ADMIN — PIPERACILLIN AND TAZOBACTAM 4.5 G: 4; .5 INJECTION, POWDER, FOR SOLUTION INTRAVENOUS at 04:07

## 2022-01-01 RX ADMIN — METOPROLOL TARTRATE 12.5 MG: 25 TABLET, FILM COATED ORAL at 08:38

## 2022-01-01 RX ADMIN — BARICITINIB 2 MG: 2 TABLET, FILM COATED ORAL at 08:56

## 2022-01-01 RX ADMIN — POTASSIUM CHLORIDE 40 MEQ: 20 TABLET, EXTENDED RELEASE ORAL at 16:47

## 2022-01-01 RX ADMIN — FERROUS SULFATE TAB 325 MG (65 MG ELEMENTAL FE) 325 MG: 325 (65 FE) TAB at 08:39

## 2022-01-01 RX ADMIN — FUROSEMIDE 40 MG: 10 INJECTION, SOLUTION INTRAMUSCULAR; INTRAVENOUS at 15:40

## 2022-01-01 RX ADMIN — BUDESONIDE 500 MCG: 0.5 INHALANT RESPIRATORY (INHALATION) at 21:10

## 2022-01-01 RX ADMIN — Medication 5 UNITS: at 16:14

## 2022-01-01 RX ADMIN — FERROUS SULFATE TAB 325 MG (65 MG ELEMENTAL FE) 325 MG: 325 (65 FE) TAB at 08:34

## 2022-01-01 RX ADMIN — CEFAZOLIN SODIUM 2000 MG: 100 INJECTION, POWDER, LYOPHILIZED, FOR SOLUTION INTRAVENOUS at 11:44

## 2022-01-01 RX ADMIN — PANTOPRAZOLE SODIUM 40 MG: 40 TABLET, DELAYED RELEASE ORAL at 05:18

## 2022-01-01 RX ADMIN — VANCOMYCIN HYDROCHLORIDE 1000 MG: 1 INJECTION, POWDER, LYOPHILIZED, FOR SOLUTION INTRAVENOUS at 20:01

## 2022-01-01 RX ADMIN — HYDRALAZINE HYDROCHLORIDE 25 MG: 50 TABLET, FILM COATED ORAL at 14:08

## 2022-01-01 RX ADMIN — GABAPENTIN 100 MG: 100 CAPSULE ORAL at 17:17

## 2022-01-01 RX ADMIN — AMLODIPINE BESYLATE 10 MG: 10 TABLET ORAL at 08:38

## 2022-01-01 RX ADMIN — HYDRALAZINE HYDROCHLORIDE 25 MG: 25 TABLET, FILM COATED ORAL at 09:50

## 2022-01-01 RX ADMIN — BUDESONIDE 500 MCG: 0.5 INHALANT RESPIRATORY (INHALATION) at 19:38

## 2022-01-01 RX ADMIN — POLYETHYLENE GLYCOL 3350 17 G: 17 POWDER, FOR SOLUTION ORAL at 08:40

## 2022-01-01 RX ADMIN — BUDESONIDE 500 MCG: 0.5 INHALANT RESPIRATORY (INHALATION) at 10:09

## 2022-01-01 RX ADMIN — APIXABAN 2.5 MG: 5 TABLET, FILM COATED ORAL at 09:49

## 2022-01-01 RX ADMIN — PANTOPRAZOLE SODIUM 40 MG: 40 TABLET, DELAYED RELEASE ORAL at 06:50

## 2022-01-01 RX ADMIN — DEXAMETHASONE 6 MG: 4 TABLET ORAL at 09:43

## 2022-01-01 RX ADMIN — SERTRALINE HYDROCHLORIDE 50 MG: 50 TABLET ORAL at 08:22

## 2022-01-01 RX ADMIN — TUBERCULIN PURIFIED PROTEIN DERIVATIVE 5 UNITS: 5 INJECTION, SOLUTION INTRADERMAL at 18:41

## 2022-01-01 RX ADMIN — Medication 6 UNITS: at 16:26

## 2022-01-01 RX ADMIN — SODIUM CHLORIDE, PRESERVATIVE FREE 10 ML: 5 INJECTION INTRAVENOUS at 05:15

## 2022-01-01 RX ADMIN — SODIUM CHLORIDE, PRESERVATIVE FREE 5 ML: 5 INJECTION INTRAVENOUS at 23:00

## 2022-01-01 RX ADMIN — DOCUSATE SODIUM 100 MG: 100 CAPSULE, LIQUID FILLED ORAL at 09:02

## 2022-01-01 RX ADMIN — ARFORMOTEROL TARTRATE 15 MCG: 15 SOLUTION RESPIRATORY (INHALATION) at 21:10

## 2022-01-01 RX ADMIN — INSULIN GLARGINE 10 UNITS: 100 INJECTION, SOLUTION SUBCUTANEOUS at 08:38

## 2022-01-01 RX ADMIN — DEXAMETHASONE 6 MG: 4 TABLET ORAL at 10:11

## 2022-01-01 RX ADMIN — CEFEPIME 1 G: 1 INJECTION, POWDER, FOR SOLUTION INTRAMUSCULAR; INTRAVENOUS at 22:52

## 2022-01-01 RX ADMIN — GABAPENTIN 100 MG: 100 CAPSULE ORAL at 08:57

## 2022-01-01 RX ADMIN — INSULIN GLARGINE 10 UNITS: 100 INJECTION, SOLUTION SUBCUTANEOUS at 05:59

## 2022-01-01 RX ADMIN — ACETAMINOPHEN 650 MG: 325 TABLET ORAL at 14:48

## 2022-01-01 RX ADMIN — PIPERACILLIN AND TAZOBACTAM 4.5 G: 4; .5 INJECTION, POWDER, FOR SOLUTION INTRAVENOUS at 11:35

## 2022-01-01 RX ADMIN — APIXABAN 5 MG: 5 TABLET, FILM COATED ORAL at 21:27

## 2022-01-01 RX ADMIN — PANTOPRAZOLE SODIUM 40 MG: 40 TABLET, DELAYED RELEASE ORAL at 06:21

## 2022-01-01 RX ADMIN — Medication 3 UNITS: at 22:20

## 2022-01-01 RX ADMIN — CEFTRIAXONE 2000 MG: 2 INJECTION, POWDER, FOR SOLUTION INTRAMUSCULAR; INTRAVENOUS at 17:54

## 2022-01-01 RX ADMIN — SODIUM CHLORIDE, PRESERVATIVE FREE 10 ML: 5 INJECTION INTRAVENOUS at 21:46

## 2022-01-01 RX ADMIN — CEPHALEXIN 500 MG: 250 CAPSULE ORAL at 05:32

## 2022-01-01 RX ADMIN — PRAVASTATIN SODIUM 10 MG: 20 TABLET ORAL at 21:52

## 2022-01-01 RX ADMIN — SERTRALINE HYDROCHLORIDE 100 MG: 50 TABLET ORAL at 08:39

## 2022-01-01 RX ADMIN — LISINOPRIL 20 MG: 20 TABLET ORAL at 09:43

## 2022-01-01 RX ADMIN — GABAPENTIN 100 MG: 100 CAPSULE ORAL at 21:58

## 2022-01-01 RX ADMIN — HYDRALAZINE HYDROCHLORIDE 25 MG: 50 TABLET, FILM COATED ORAL at 17:02

## 2022-01-01 RX ADMIN — HYDRALAZINE HYDROCHLORIDE 25 MG: 25 TABLET ORAL at 22:07

## 2022-01-01 RX ADMIN — Medication 3 UNITS: at 08:22

## 2022-01-01 RX ADMIN — METOPROLOL TARTRATE 12.5 MG: 25 TABLET, FILM COATED ORAL at 17:12

## 2022-01-01 RX ADMIN — FAMOTIDINE 20 MG: 20 TABLET, FILM COATED ORAL at 08:49

## 2022-01-01 RX ADMIN — BUDESONIDE 500 MCG: 0.5 INHALANT RESPIRATORY (INHALATION) at 19:44

## 2022-01-01 RX ADMIN — SODIUM CHLORIDE 500 ML: 9 INJECTION, SOLUTION INTRAVENOUS at 21:27

## 2022-01-01 RX ADMIN — SODIUM CHLORIDE 1000 MG: 9 INJECTION, SOLUTION INTRAVENOUS at 16:14

## 2022-01-01 RX ADMIN — INSULIN LISPRO 4 UNITS: 100 INJECTION, SOLUTION INTRAVENOUS; SUBCUTANEOUS at 17:30

## 2022-01-01 RX ADMIN — GABAPENTIN 100 MG: 100 CAPSULE ORAL at 21:30

## 2022-01-01 RX ADMIN — INSULIN LISPRO 2 UNITS: 100 INJECTION, SOLUTION INTRAVENOUS; SUBCUTANEOUS at 13:03

## 2022-01-01 RX ADMIN — SODIUM CHLORIDE, PRESERVATIVE FREE 10 ML: 5 INJECTION INTRAVENOUS at 05:29

## 2022-01-01 RX ADMIN — SODIUM CHLORIDE: 9 INJECTION, SOLUTION INTRAVENOUS at 00:39

## 2022-01-01 RX ADMIN — INSULIN LISPRO 6 UNITS: 100 INJECTION, SOLUTION INTRAVENOUS; SUBCUTANEOUS at 17:09

## 2022-01-01 RX ADMIN — INSULIN LISPRO 4 UNITS: 100 INJECTION, SOLUTION INTRAVENOUS; SUBCUTANEOUS at 13:15

## 2022-01-01 RX ADMIN — FUROSEMIDE 20 MG: 10 INJECTION, SOLUTION INTRAMUSCULAR; INTRAVENOUS at 22:36

## 2022-01-01 RX ADMIN — TRAMADOL HYDROCHLORIDE 50 MG: 50 TABLET, COATED ORAL at 00:26

## 2022-01-01 RX ADMIN — SODIUM CHLORIDE: 9 INJECTION, SOLUTION INTRAVENOUS at 05:30

## 2022-01-01 RX ADMIN — PANTOPRAZOLE SODIUM 40 MG: 40 TABLET, DELAYED RELEASE ORAL at 06:35

## 2022-01-01 RX ADMIN — INSULIN LISPRO 2 UNITS: 100 INJECTION, SOLUTION INTRAVENOUS; SUBCUTANEOUS at 08:39

## 2022-01-01 RX ADMIN — HEPARIN SODIUM 15 UNITS/KG/HR: 5000 INJECTION, SOLUTION INTRAVENOUS at 07:13

## 2022-01-01 RX ADMIN — LISINOPRIL 20 MG: 20 TABLET ORAL at 08:56

## 2022-01-01 RX ADMIN — Medication 8 UNITS: at 14:31

## 2022-01-01 RX ADMIN — MAGNESIUM SULFATE HEPTAHYDRATE 1000 MG: 1 INJECTION, SOLUTION INTRAVENOUS at 04:02

## 2022-01-01 RX ADMIN — DEXTROSE MONOHYDRATE 125 ML: 10 INJECTION, SOLUTION INTRAVENOUS at 08:23

## 2022-01-01 RX ADMIN — AMLODIPINE BESYLATE 10 MG: 10 TABLET ORAL at 08:13

## 2022-01-01 RX ADMIN — Medication 2 UNITS: at 11:56

## 2022-01-01 RX ADMIN — APIXABAN 5 MG: 5 TABLET, FILM COATED ORAL at 22:40

## 2022-01-01 RX ADMIN — BARICITINIB 2 MG: 2 TABLET, FILM COATED ORAL at 08:13

## 2022-01-01 RX ADMIN — BUDESONIDE 500 MCG: 0.5 INHALANT RESPIRATORY (INHALATION) at 07:58

## 2022-01-01 RX ADMIN — PIPERACILLIN AND TAZOBACTAM 4.5 G: 4; .5 INJECTION, POWDER, FOR SOLUTION INTRAVENOUS at 03:48

## 2022-01-01 RX ADMIN — SODIUM CHLORIDE, PRESERVATIVE FREE 10 ML: 5 INJECTION INTRAVENOUS at 13:55

## 2022-01-01 RX ADMIN — VANCOMYCIN HYDROCHLORIDE 1000 MG: 1 INJECTION, POWDER, LYOPHILIZED, FOR SOLUTION INTRAVENOUS at 11:16

## 2022-01-01 RX ADMIN — SODIUM CHLORIDE, PRESERVATIVE FREE 10 ML: 5 INJECTION INTRAVENOUS at 13:59

## 2022-01-01 RX ADMIN — PANTOPRAZOLE SODIUM 40 MG: 40 TABLET, DELAYED RELEASE ORAL at 05:45

## 2022-01-01 RX ADMIN — PANTOPRAZOLE SODIUM 40 MG: 40 TABLET, DELAYED RELEASE ORAL at 05:28

## 2022-01-01 RX ADMIN — BARICITINIB 1 MG: 1 TABLET, FILM COATED ORAL at 08:22

## 2022-01-01 RX ADMIN — PANTOPRAZOLE SODIUM 40 MG: 40 TABLET, DELAYED RELEASE ORAL at 08:20

## 2022-01-01 RX ADMIN — FAMOTIDINE 20 MG: 20 TABLET, FILM COATED ORAL at 08:01

## 2022-01-01 RX ADMIN — HYDRALAZINE HYDROCHLORIDE 25 MG: 50 TABLET, FILM COATED ORAL at 08:37

## 2022-01-01 RX ADMIN — IPRATROPIUM BROMIDE AND ALBUTEROL SULFATE 1 AMPULE: 2.5; .5 SOLUTION RESPIRATORY (INHALATION) at 12:58

## 2022-01-01 RX ADMIN — INSULIN LISPRO 3 UNITS: 100 INJECTION, SOLUTION INTRAVENOUS; SUBCUTANEOUS at 01:07

## 2022-01-01 RX ADMIN — FAMOTIDINE 20 MG: 20 TABLET, FILM COATED ORAL at 15:03

## 2022-01-01 RX ADMIN — SODIUM CHLORIDE, PRESERVATIVE FREE 20 ML: 5 INJECTION INTRAVENOUS at 13:42

## 2022-01-01 RX ADMIN — SODIUM CHLORIDE: 450 INJECTION, SOLUTION INTRAVENOUS at 22:12

## 2022-01-01 RX ADMIN — SERTRALINE HYDROCHLORIDE 100 MG: 100 TABLET ORAL at 00:02

## 2022-01-01 RX ADMIN — IPRATROPIUM BROMIDE AND ALBUTEROL SULFATE 1 AMPULE: 2.5; .5 SOLUTION RESPIRATORY (INHALATION) at 19:17

## 2022-01-01 RX ADMIN — ARFORMOTEROL TARTRATE 15 MCG: 15 SOLUTION RESPIRATORY (INHALATION) at 21:05

## 2022-01-01 RX ADMIN — INSULIN LISPRO 5 UNITS: 100 INJECTION, SOLUTION INTRAVENOUS; SUBCUTANEOUS at 18:45

## 2022-01-01 RX ADMIN — POLYETHYLENE GLYCOL 3350 17 G: 17 POWDER, FOR SOLUTION ORAL at 08:37

## 2022-01-01 RX ADMIN — GABAPENTIN 100 MG: 100 CAPSULE ORAL at 22:40

## 2022-01-01 RX ADMIN — HYDRALAZINE HYDROCHLORIDE 25 MG: 25 TABLET, FILM COATED ORAL at 14:03

## 2022-01-01 RX ADMIN — INSULIN LISPRO 9 UNITS: 100 INJECTION, SOLUTION INTRAVENOUS; SUBCUTANEOUS at 12:02

## 2022-01-01 RX ADMIN — ACETAMINOPHEN 650 MG: 650 SUPPOSITORY RECTAL at 17:27

## 2022-01-01 RX ADMIN — IPRATROPIUM BROMIDE AND ALBUTEROL SULFATE 1 AMPULE: 2.5; .5 SOLUTION RESPIRATORY (INHALATION) at 20:06

## 2022-01-01 RX ADMIN — SODIUM CHLORIDE, PRESERVATIVE FREE 10 ML: 5 INJECTION INTRAVENOUS at 05:16

## 2022-01-01 RX ADMIN — HYDRALAZINE HYDROCHLORIDE 25 MG: 50 TABLET, FILM COATED ORAL at 21:19

## 2022-01-01 RX ADMIN — FUROSEMIDE 20 MG: 10 INJECTION, SOLUTION INTRAMUSCULAR; INTRAVENOUS at 08:45

## 2022-01-01 RX ADMIN — HYDRALAZINE HYDROCHLORIDE 25 MG: 25 TABLET, FILM COATED ORAL at 08:17

## 2022-01-01 RX ADMIN — BUDESONIDE 500 MCG: 0.5 INHALANT RESPIRATORY (INHALATION) at 07:34

## 2022-01-01 RX ADMIN — IPRATROPIUM BROMIDE AND ALBUTEROL SULFATE 1 AMPULE: 2.5; .5 SOLUTION RESPIRATORY (INHALATION) at 08:56

## 2022-01-01 RX ADMIN — SODIUM CHLORIDE, PRESERVATIVE FREE 10 ML: 5 INJECTION INTRAVENOUS at 21:41

## 2022-01-01 RX ADMIN — HYDRALAZINE HYDROCHLORIDE 25 MG: 50 TABLET, FILM COATED ORAL at 16:47

## 2022-01-01 RX ADMIN — APIXABAN 10 MG: 5 TABLET, FILM COATED ORAL at 21:47

## 2022-01-01 RX ADMIN — DEXAMETHASONE 6 MG: 4 TABLET ORAL at 11:44

## 2022-01-01 RX ADMIN — METOPROLOL TARTRATE 25 MG: 25 TABLET, FILM COATED ORAL at 09:02

## 2022-01-01 RX ADMIN — IPRATROPIUM BROMIDE AND ALBUTEROL SULFATE 1 AMPULE: 2.5; .5 SOLUTION RESPIRATORY (INHALATION) at 08:33

## 2022-01-01 RX ADMIN — HYDRALAZINE HYDROCHLORIDE 25 MG: 50 TABLET, FILM COATED ORAL at 08:43

## 2022-01-01 RX ADMIN — INSULIN LISPRO 12 UNITS: 100 INJECTION, SOLUTION INTRAVENOUS; SUBCUTANEOUS at 17:51

## 2022-01-01 RX ADMIN — DEXAMETHASONE SODIUM PHOSPHATE 6 MG: 10 INJECTION INTRAMUSCULAR; INTRAVENOUS at 22:10

## 2022-01-01 RX ADMIN — GABAPENTIN 100 MG: 100 CAPSULE ORAL at 08:49

## 2022-01-01 RX ADMIN — TC 99M MEDRONATE 25 MILLICURIE: 20 INJECTION, POWDER, LYOPHILIZED, FOR SOLUTION INTRAVENOUS at 09:30

## 2022-01-01 RX ADMIN — SODIUM CHLORIDE 50 ML/HR: 900 INJECTION, SOLUTION INTRAVENOUS at 18:07

## 2022-01-01 RX ADMIN — INSULIN GLARGINE 5 UNITS: 100 INJECTION, SOLUTION SUBCUTANEOUS at 22:08

## 2022-01-01 RX ADMIN — SODIUM CHLORIDE 50 ML/HR: 900 INJECTION, SOLUTION INTRAVENOUS at 23:55

## 2022-01-01 RX ADMIN — INSULIN LISPRO 2 UNITS: 100 INJECTION, SOLUTION INTRAVENOUS; SUBCUTANEOUS at 21:36

## 2022-01-01 RX ADMIN — Medication 8 UNITS: at 12:59

## 2022-01-01 RX ADMIN — SERTRALINE HYDROCHLORIDE 50 MG: 50 TABLET ORAL at 08:34

## 2022-01-01 RX ADMIN — Medication 4 UNITS: at 16:20

## 2022-01-01 RX ADMIN — SODIUM CHLORIDE, PRESERVATIVE FREE 10 ML: 5 INJECTION INTRAVENOUS at 17:24

## 2022-01-01 RX ADMIN — IPRATROPIUM BROMIDE AND ALBUTEROL SULFATE 1 AMPULE: 2.5; .5 SOLUTION RESPIRATORY (INHALATION) at 15:38

## 2022-01-01 RX ADMIN — SODIUM CHLORIDE, PRESERVATIVE FREE 5 ML: 5 INJECTION INTRAVENOUS at 18:10

## 2022-01-01 RX ADMIN — INSULIN LISPRO 4 UNITS: 100 INJECTION, SOLUTION INTRAVENOUS; SUBCUTANEOUS at 21:57

## 2022-01-01 RX ADMIN — FERROUS SULFATE TAB 325 MG (65 MG ELEMENTAL FE) 325 MG: 325 (65 FE) TAB at 08:56

## 2022-01-01 RX ADMIN — METOPROLOL TARTRATE 12.5 MG: 25 TABLET, FILM COATED ORAL at 08:41

## 2022-01-01 RX ADMIN — APIXABAN 5 MG: 5 TABLET, FILM COATED ORAL at 08:59

## 2022-01-01 RX ADMIN — APIXABAN 5 MG: 5 TABLET, FILM COATED ORAL at 08:57

## 2022-01-01 RX ADMIN — SODIUM CHLORIDE, PRESERVATIVE FREE 10 ML: 5 INJECTION INTRAVENOUS at 13:02

## 2022-01-01 RX ADMIN — HYDRALAZINE HYDROCHLORIDE 5 MG: 20 INJECTION INTRAMUSCULAR; INTRAVENOUS at 04:08

## 2022-01-01 RX ADMIN — GABAPENTIN 100 MG: 100 CAPSULE ORAL at 16:00

## 2022-01-01 RX ADMIN — AMLODIPINE BESYLATE 5 MG: 5 TABLET ORAL at 08:43

## 2022-01-01 RX ADMIN — MAGNESIUM SULFATE 2 G: 2 INJECTION INTRAVENOUS at 08:41

## 2022-01-01 RX ADMIN — BUDESONIDE 500 MCG: 0.5 INHALANT RESPIRATORY (INHALATION) at 19:50

## 2022-01-01 RX ADMIN — Medication 3 UNITS: at 08:43

## 2022-01-01 RX ADMIN — BARICITINIB 1 MG: 1 TABLET, FILM COATED ORAL at 08:11

## 2022-01-01 RX ADMIN — PRAVASTATIN SODIUM 10 MG: 20 TABLET ORAL at 20:27

## 2022-01-01 RX ADMIN — GABAPENTIN 100 MG: 100 CAPSULE ORAL at 14:32

## 2022-01-01 RX ADMIN — PANTOPRAZOLE SODIUM 40 MG: 40 TABLET, DELAYED RELEASE ORAL at 17:17

## 2022-01-01 RX ADMIN — SODIUM CHLORIDE, PRESERVATIVE FREE 10 ML: 5 INJECTION INTRAVENOUS at 21:33

## 2022-01-01 RX ADMIN — GABAPENTIN 100 MG: 100 CAPSULE ORAL at 21:41

## 2022-01-01 RX ADMIN — METOPROLOL TARTRATE 12.5 MG: 25 TABLET, FILM COATED ORAL at 21:03

## 2022-01-01 ASSESSMENT — PAIN SCALES - GENERAL
PAINLEVEL_OUTOF10: 0
PAINLEVEL_OUTOF10: 6
PAINLEVEL_OUTOF10: 0
PAINLEVEL_OUTOF10: 5
PAINLEVEL_OUTOF10: 7
PAINLEVEL_OUTOF10: 0
PAINLEVEL_OUTOF10: 0
PAINLEVEL_OUTOF10: 6
PAINLEVEL_OUTOF10: 0
PAINLEVEL_OUTOF10: 5
PAINLEVEL_OUTOF10: 8
PAINLEVEL_OUTOF10: 0
PAINLEVEL_OUTOF10: 7
PAINLEVEL_OUTOF10: 2
PAINLEVEL_OUTOF10: 3
PAINLEVEL_OUTOF10: 0
PAINLEVEL_OUTOF10: 8
PAINLEVEL_OUTOF10: 0
PAINLEVEL_OUTOF10: 0
PAINLEVEL_OUTOF10: 3

## 2022-01-01 ASSESSMENT — PAIN DESCRIPTION - ORIENTATION
ORIENTATION: RIGHT
ORIENTATION: RIGHT;LEFT
ORIENTATION: RIGHT
ORIENTATION: LEFT
ORIENTATION: LEFT

## 2022-01-01 ASSESSMENT — ENCOUNTER SYMPTOMS
COUGH: 0
ABDOMINAL DISTENTION: 0
EYES NEGATIVE: 1
BACK PAIN: 1
BACK PAIN: 0
CHEST TIGHTNESS: 0
DIARRHEA: 0
NAUSEA: 0
RHINORRHEA: 0
SHORTNESS OF BREATH: 0
VOMITING: 0
DIARRHEA: 0
SORE THROAT: 0
SHORTNESS OF BREATH: 0
NAUSEA: 0
EYE REDNESS: 0
ABDOMINAL PAIN: 0

## 2022-01-01 ASSESSMENT — PAIN DESCRIPTION - DESCRIPTORS
DESCRIPTORS: ACHING
DESCRIPTORS: THROBBING
DESCRIPTORS: THROBBING
DESCRIPTORS: SHARP;ACHING
DESCRIPTORS: ACHING
DESCRIPTORS: ACHING

## 2022-01-01 ASSESSMENT — LIFESTYLE VARIABLES
HOW OFTEN DO YOU HAVE A DRINK CONTAINING ALCOHOL: NEVER
HOW MANY STANDARD DRINKS CONTAINING ALCOHOL DO YOU HAVE ON A TYPICAL DAY: PATIENT DECLINED

## 2022-01-01 ASSESSMENT — PAIN DESCRIPTION - LOCATION
LOCATION: HIP
LOCATION: LEG
LOCATION: KNEE
LOCATION: HIP
LOCATION: GENERALIZED
LOCATION: HIP
LOCATION: HIP;BACK
LOCATION: KNEE
LOCATION: HIP;BACK
LOCATION: HIP

## 2022-01-01 ASSESSMENT — PAIN SCALES - WONG BAKER: WONGBAKER_NUMERICALRESPONSE: 0

## 2022-01-01 ASSESSMENT — PAIN - FUNCTIONAL ASSESSMENT
PAIN_FUNCTIONAL_ASSESSMENT: PREVENTS OR INTERFERES SOME ACTIVE ACTIVITIES AND ADLS
PAIN_FUNCTIONAL_ASSESSMENT: PREVENTS OR INTERFERES SOME ACTIVE ACTIVITIES AND ADLS
PAIN_FUNCTIONAL_ASSESSMENT: 0-10

## 2022-01-01 ASSESSMENT — PAIN DESCRIPTION - FREQUENCY: FREQUENCY: INTERMITTENT

## 2022-01-07 NOTE — PROGRESS NOTES
CM received call from patient's daughter requesting assistance getting in contact with patient's TC , Chaya Mccollum, as patient is being discharged from [facility]. CM reviewed notes from previous hospitalization and provided her with [facility]  contact information, [phone].

## 2022-01-23 PROBLEM — K92.2 LOWER GI BLEED: Status: ACTIVE | Noted: 2022-01-01

## 2022-01-23 NOTE — ED TRIAGE NOTES
Patient arrives via [facility] from home with mask in place. Recently in rehab but family took patient home about 1 week ago due to increase in covid patients at facility. No hx a.fib, EMS reports patient in a.fib rvr . Tachypneic.  bgl 592. Cough, congestion that has been present for some time. Blankets removed from patient and significant bloody bowel movement present.

## 2022-01-23 NOTE — ED NOTES
Blood bank has called to report delay in matching of blood transfusion. Patient remains stable at this time.

## 2022-01-23 NOTE — ED PROVIDER NOTES
60-year-old female brought from home by EMS for concern of elevated blood sugar. It was reported there was a sore on her backside by family as well. Patient recently in the hospital for UTI and then went to rehab at [facility] for some functional decline. Patient brought home a few days ago by family due to concerns of her catching coronavirus in the hospital as she is unvaccinated. Upon arrival patient noted to have significant rectal bleeding with a combination of melena and maroon and bright red blood. Patient denies pain. Patient denies blood thinners. Patient is a poor historian           Past Medical History:   Diagnosis Date    Anxiety 2/1/2018    Arrhythmia     palpitations 2/10-went to [facility]    Arthritis     L leg- fell 2008    Asthma     adult onset x 20 yrs    B12 deficiency 8/24/2012    Body mass index 37.0-37.9, adult 2/5/2014    CAD (coronary artery disease)     cardiac work up-9/0909-neg-Holter    Controlled type 2 diabetes mellitus with microalbuminuria, without long-term current use of insulin ([address] 11/22/2016    COPD     Corns and callosities 12/19/2016    CRI (chronic renal insufficiency) 8/24/2012    Depression 6/1/2012    Dermatophytosis of nail 12/19/2016    Diabetes ([address]     type II- home tests fasting-112    DJD (degenerative joint disease) of hip     DM (diabetes mellitus) type II controlled with renal manifestation ([address] 7/16/2010    Encounter for long-term (current) use of other medications 1/8/2013    Essential hypertension 7/16/2010    Gastrointestinal disorder     GERD (gastroesophageal reflux disease)     GI bleed 7/28/2018    Heart failure ([address]     Hiatal hernia 8/24/2012    HLD (hyperlipidemia) 1/8/2013    Neuropathy 8/24/2012    Lower limbs     Obesity (BMI 30.0-39. 9) BMI-43.8    Other and unspecified hyperlipidemia 7/16/2010    Other chest pain 7/16/2010    Other ill-defined conditions(799.89)     high cholesterol    Other ill-defined conditions(799.89)     incont. urine    PAD (peripheral artery disease) ([facility] 8/24/2012    Retinal hemorrhage        Past Surgical History:   Procedure Laterality Date    HX APPENDECTOMY      HX CHOLECYSTECTOMY      HX CHOLECYSTECTOMY  2000    HX ENDOSCOPY  02/27/2018    Dr Everlyn Holstein    HX GI      small intestine obstruction    HX GYN      hyst    HX HEART CATHETERIZATION      HX HYSTERECTOMY      prolapse    HX INTRACRANIAL ANEURYSM REPAIR      HX INTRACRANIAL ANEURYSM REPAIR      HX ORTHOPAEDIC      right wrist 2010    HX OTHER SURGICAL      aneurysm clip-cerebral-2000    HX OTHER SURGICAL      cerebral aneurysm   Dr. Nancy Rosario UNLISTED           Family History:   Problem Relation Age of Onset   Leti Carver Cancer Mother     Diabetes Father     Diabetes Paternal Grandfather        Social History     Socioeconomic History    Marital status: SINGLE     Spouse name: Not on file    Number of children: Not on file    Years of education: Not on file    Highest education level: Not on file   Occupational History    Not on file   Tobacco Use    Smoking status: Never Smoker    Smokeless tobacco: Never Used   Vaping Use    Vaping Use: Never used   Substance and Sexual Activity    Alcohol use: No    Drug use: No    Sexual activity: Never   Other Topics Concern    Not on file   Social History Narrative     with four children     Social Determinants of Health     Financial Resource Strain:     Difficulty of Paying Living Expenses: Not on file   Food Insecurity:     Worried About Running Out of Food in the Last Year: Not on file    Alistair of Food in the Last Year: Not on file   Transportation Needs:     Lack of Transportation (Medical): Not on file    Lack of Transportation (Non-Medical):  Not on file   Physical Activity:     Days of Exercise per Week: Not on file    Minutes of Exercise per Session: Not on file   Stress:     Feeling of Stress : Not on file   Social Connections:     Frequency of Communication with Friends and Family: Not on file    Frequency of Social Gatherings with Friends and Family: Not on file    Attends Alevism Services: Not on file    Active Member of Clubs or Organizations: Not on file    Attends Club or Organization Meetings: Not on file    Marital Status: Not on file   Intimate Partner Violence:     Fear of Current or Ex-Partner: Not on file    Emotionally Abused: Not on file    Physically Abused: Not on file    Sexually Abused: Not on file   Housing Stability:     Unable to Pay for Housing in the Last Year: Not on file    Number of [place] in the Last Year: Not on file    Unstable Housing in the Last Year: Not on file         ALLERGIES: Patient has no known allergies. Review of Systems   Unable to perform ROS: Other       Vitals:    01/23/22 1433 01/23/22 1438   BP: (!) 120/99    Pulse: 90    Resp: 24    SpO2: (!) 86% (!) 86%   Weight: 72.1 kg (159 lb)    Height: 5' 2\" (1.575 m)             Physical Exam  Vitals and nursing note reviewed. Constitutional:       General: She is in acute distress. Appearance: She is well-developed. HENT:      Head: Normocephalic. Mouth/Throat:      Comments: Mucous membranes tacky    Eyes:      Pupils: Pupils are equal, round, and reactive to light. Comments: Conjunctive are pale     Cardiovascular:      Rate and Rhythm: Normal rate and regular rhythm. Heart sounds: Normal heart sounds. Pulmonary:      Effort: Pulmonary effort is normal.      Breath sounds: Normal breath sounds. Abdominal:      General: There is no distension. Palpations: Abdomen is soft. There is no mass. Tenderness: There is abdominal tenderness ( Mild tenderness over hernia and diffusely). There is no guarding or rebound. Hernia: A hernia is present.    Genitourinary:     Comments: Grade 1-2 sacral decubitus present, large amount of melena, maroon and bright red blood from the rectum  Musculoskeletal:         General: Normal range of motion. Lymphadenopathy:      Cervical: No cervical adenopathy. Skin:     General: Skin is warm and dry. Neurological:      Mental Status: She is alert. Comments: Alert and oriented x1          MDM  Number of Diagnoses or Management Options  Diagnosis management comments: Significant GI bleed present. Heart rate in the mid to upper 90s blood pressure stable at this point. We will obtain 2 large-bore IVs and prepare for blood transfusion. IV fluid bolus ordered. 3:05 PM  Discussed with the patient's daughter Sujit Connelly. She spoke with her 2 brothers and family wishes to make the patient DNR and we are not to intubate or do CPR in the event of cardiac arrest.  We are to treat aggressively and may give blood products otherwise.         Amount and/or Complexity of Data Reviewed  Clinical lab tests: ordered  Tests in the radiology section of CPT®: ordered  Tests in the medicine section of CPT®: ordered and reviewed  Independent visualization of images, tracings, or specimens: yes (EKG interpretation in absence of cardiology  EKG shows normal sinus rhythm rate of 93, left axis deviation, right bundle branch block and left anterior fascicular block, premature atrial contraction, nonspecific inferior ST-T changes without overt ischemia, no acute ST elevation, possible peaked T waves lateral leads-injury versus hyperkalemia  Abnormal EKG  Interpreted by ED physician Dr. Amisha Hamlin)           Procedures

## 2022-01-23 NOTE — CONSULTS
[facility] Consult Note       Primary GI Physician:  Dr. Tawny Carroll    Referring Provider:  Dr. Elba Sapp, ER MD    Consult Date:  1/23/2022    Admit Date:  1/23/2022    Chief Complaint:  GI bleeding    Subjective:     History of Present Illness:  Patient is a 80 y.o. female with PMH including but not limited to HTN, HLD, reflux, DM2, COPD - not on home O2, asthma, CKD, prior GI bleeding, CHF - ECHO 16 March 2021 with EF >55%, CAD, PAD, diverticulitis s/p partial colectomy, colon polyps, diverticulosis, who is seen in consultation at the request of Dr. Elba Sapp, ER MD for GI bleeding. She is a poor historian, but able to provide a little history. Information is obtained from chart, RN staff, ER MD, and her daughter by phone. She presented to the ER via EMS with increased blood sugar and sore on her backside. When she arrived at the ER, RN staff noted she had a BM which was large maroon bloody stool with clots. Hgb was 9.6, down from 11.6 on 17 Dec 2021. She denies any abd pain, nausea, or vomiting. She is not aware of any recent bleeding. Her daughter describes she was at rehab after recent admission here through Dec 24th following a fall and noted to have a UTI, recently returning home, and no bleeding had been mentioned by rehab. She is not aware of any bleeding since home. She did have an episode of GI bleeding during admission here in Feb 2020, noted to have positive NM GI bleeding scan at that time to have abnormal radiotracer activity suggesting an active colon bleed, which appeared to originate in the right colon and therefore favored to arise in the ascending colon. She was monitored over that admission and hgb decreased to 6.8, receiving 1 unit of PRBCs, and then the bleeding resolved and hgb stabilized. She did not have colonoscopy at that time.    Her last colo was 8/16/07 for rectal bleed and hx of colon polyps with intestinal anastomosis, diverticulosis, IH and right focal colitis (right colon bx with no sign histopathologic abnormality). She underwent EGD 2/27/18 with gastritis, HH and empiric dilation, and also EGD 8/23/18 for anemia with tagged scan with +tracer in stomach with no bleeding source ID'd, 3 cm HH, and very mild gastritis. PMH:  Past Medical History:   Diagnosis Date    Anxiety 2/1/2018    Arrhythmia     palpitations 2/10-went to [facility]    Arthritis     L leg- fell 2008    Asthma     adult onset x 20 yrs    B12 deficiency 8/24/2012    Body mass index 37.0-37.9, adult 2/5/2014    CAD (coronary artery disease)     cardiac work up-9/0909-neg-Holter    Controlled type 2 diabetes mellitus with microalbuminuria, without long-term current use of insulin ([address] 11/22/2016    COPD     Corns and callosities 12/19/2016    CRI (chronic renal insufficiency) 8/24/2012    Depression 6/1/2012    Dermatophytosis of nail 12/19/2016    Diabetes ([address]     type II- home tests fasting-112    DJD (degenerative joint disease) of hip     DM (diabetes mellitus) type II controlled with renal manifestation ([address] 7/16/2010    Encounter for long-term (current) use of other medications 1/8/2013    Essential hypertension 7/16/2010    Gastrointestinal disorder     GERD (gastroesophageal reflux disease)     GI bleed 7/28/2018    Heart failure ([address]     Hiatal hernia 8/24/2012    HLD (hyperlipidemia) 1/8/2013    Neuropathy 8/24/2012    Lower limbs     Obesity (BMI 30.0-39. 9) BMI-43.8    Other and unspecified hyperlipidemia 7/16/2010    Other chest pain 7/16/2010    Other ill-defined conditions(799.89)     high cholesterol    Other ill-defined conditions(799.89)     incont. urine    PAD (peripheral artery disease) ([address] 8/24/2012    Retinal hemorrhage      [facility] - followed by Dr Greg Bray, hematology    During evaluation in Feb 2020 for GI bleeding - Family wished to avoid colonoscopy unless absolutely necessary.   Her last colo was 8/16/07 for rectal bleed and hx of colon polyps with intestinal anastomosis, diverticulosis, IH and right focal colitis (right colon bx with no sign histopathologic abnormality). She is s/p colectomy for diverticulitis. She underwent EGD 2/27/18 with gastritis, HH and empiric dilation. EGD 8/23/18 for anemia with tagged scan with +tracer in stomach with no bleeding source ID'd, 3 cm HH and very mild gastritis. PSH:  Past Surgical History:   Procedure Laterality Date    HX APPENDECTOMY      HX CHOLECYSTECTOMY      HX CHOLECYSTECTOMY  2000    HX ENDOSCOPY  02/27/2018    Dr Stan Cruz    HX GI      small intestine obstruction    HX GYN      hyst    HX HEART CATHETERIZATION      HX HYSTERECTOMY      prolapse    HX INTRACRANIAL ANEURYSM REPAIR      HX INTRACRANIAL ANEURYSM REPAIR      HX ORTHOPAEDIC      right wrist 2010    HX OTHER SURGICAL      aneurysm clip-cerebral-2000    HX OTHER SURGICAL      cerebral aneurysm   Dr. Scotty Pool UNLISTED         Allergies:  No Known Allergies    Home Medications:  Prior to Admission medications    Medication Sig Start Date End Date Taking? Authorizing Provider   Blood-Glucose Meter monitoring kit Use as directed 3 times daily to check blood sugars. Dx:E11.29 1/13/22   Jonathan Dunn NP   glucose blood VI test strips (blood glucose test) strip Use as directed 3 times daily to check blood sugars. Dx:E11.29 1/13/22   Jonathan Dunn NP   lancets misc Use as directed 3 times daily to check blood sugars. Dx:E11.29 1/13/22   Jonathan Dunn NP   cloNIDine HCL (CATAPRES) 0.2 mg tablet Take 1 Tablet by mouth three (3) times daily for 60 days. 12/24/21 2/22/22  Marshall Fernandez NP   pravastatin (PRAVACHOL) 40 mg tablet TAKE 1 TABLET BY MOUTH EVERY NIGHT AT BEDTIME 11/30/21   Jonathan Dunn NP   pantoprazole (PROTONIX) 20 mg tablet TAKE 1 TABLET BY MOUTH DAILY 11/30/21   Jonathan Dunn NP   metFORMIN (GLUCOPHAGE) 500 mg tablet Take 1 Tablet by mouth two (2) times daily (with meals). 11/30/21   Getachew Marin NP   gabapentin (NEURONTIN) 100 mg capsule Take 1 Capsule by mouth three (3) times daily. 11/30/21   Getachew Marin NP   sertraline (ZOLOFT) 100 mg tablet Take 1 Tablet by mouth daily. Indications: major depressive disorder, am  Patient not taking: Reported on 12/15/2021 11/30/21   Getachew Marin NP   amLODIPine (NORVASC) 10 mg tablet Take 1 Tablet by mouth daily. Patient not taking: Reported on 12/15/2021 11/30/21   Getachew Marin NP   lidocaine 4 % patch Apply to affected area daily prn. 11/30/21   Getachew Marin NP   fluticasone propion-salmeteroL (Advair Diskus) 250-50 mcg/dose diskus inhaler Take 1 Puff by inhalation every twelve (12) hours. 4/27/21   Getachew Marin NP   potassium chloride (K-DUR, KLOR-CON) 20 mEq tablet TAKE 1 TABLET BY MOUTH EVERY MORNING 4/27/21   Getachew Marin NP   benazepriL (LOTENSIN) 40 mg tablet TAKE 1 TABLET BY MOUTH DAILY 4/27/21   Getachew Marin NP   furosemide (LASIX) 20 mg tablet Take 1 Tab by mouth daily. Patient not taking: Reported on 12/15/2021 4/27/21   Getachew Marin NP   fluticasone propionate (FLONASE) 50 mcg/actuation nasal spray 2 Sprays by Both Nostrils route daily. 4/27/21   Getachew Marin NP   hydrALAZINE (APRESOLINE) 100 mg tablet Take 1 Tab by mouth three (3) times daily. 4/27/21   Getachew Marin NP   Wheel Chair kalyn Wheelchair 4/27/21   Getachew Marin NP   spironolactone (ALDACTONE) 25 mg tablet Take 1 Tab by mouth daily. 4/15/21   Getachew Marin NP   acetaminophen (TYLENOL ARTHRITIS PAIN) 650 mg TbER Take 650 mg by mouth every eight (8) hours. Tylenol arthritis as needed    Provider, Malachi   VENTOLIN HFA 90 mcg/actuation inhaler INHALE 2 PUFFS BY MOUTH EVERY 6 HOURS AS NEEDED FOR WHEEZING 8/20/18   Mary Fernando MD   polyethylene glycol (MIRALAX) 17 gram/dose powder Take 17 g by mouth daily.  1 tablespoon with 8 oz of water daily 7/19/17   Villa Scales MD   FOLIC ACID/MULTIVIT-MIN/LUTEIN (CENTRUM SILVER PO) Take  by mouth. Provider, Historical   OTHER One shower chair 4/19/17   Benny Fernando MD   VIT A/VIT C/VIT E/ZINC/COPPER (PRESERVISION AREDS PO) Take 1 Tab by mouth two (2) times a day. Provider, Historical       Hospital Medications:  Current Facility-Administered Medications   Medication Dose Route Frequency    0.9% sodium chloride infusion 250 mL  250 mL IntraVENous PRN     Current Outpatient Medications   Medication Sig    Blood-Glucose Meter monitoring kit Use as directed 3 times daily to check blood sugars. Dx:E11.29    glucose blood VI test strips (blood glucose test) strip Use as directed 3 times daily to check blood sugars. Dx:E11.29    lancets misc Use as directed 3 times daily to check blood sugars. Dx:E11.29    cloNIDine HCL (CATAPRES) 0.2 mg tablet Take 1 Tablet by mouth three (3) times daily for 60 days.  pravastatin (PRAVACHOL) 40 mg tablet TAKE 1 TABLET BY MOUTH EVERY NIGHT AT BEDTIME    pantoprazole (PROTONIX) 20 mg tablet TAKE 1 TABLET BY MOUTH DAILY    metFORMIN (GLUCOPHAGE) 500 mg tablet Take 1 Tablet by mouth two (2) times daily (with meals).  gabapentin (NEURONTIN) 100 mg capsule Take 1 Capsule by mouth three (3) times daily.  sertraline (ZOLOFT) 100 mg tablet Take 1 Tablet by mouth daily. Indications: major depressive disorder, am (Patient not taking: Reported on 12/15/2021)    amLODIPine (NORVASC) 10 mg tablet Take 1 Tablet by mouth daily. (Patient not taking: Reported on 12/15/2021)    lidocaine 4 % patch Apply to affected area daily prn.  fluticasone propion-salmeteroL (Advair Diskus) 250-50 mcg/dose diskus inhaler Take 1 Puff by inhalation every twelve (12) hours.  potassium chloride (K-DUR, KLOR-CON) 20 mEq tablet TAKE 1 TABLET BY MOUTH EVERY MORNING    benazepriL (LOTENSIN) 40 mg tablet TAKE 1 TABLET BY MOUTH DAILY    furosemide (LASIX) 20 mg tablet Take 1 Tab by mouth daily.  (Patient not taking: Reported on 12/15/2021)    fluticasone propionate (FLONASE) 50 mcg/actuation nasal spray 2 Sprays by Both Nostrils route daily.  hydrALAZINE (APRESOLINE) 100 mg tablet Take 1 Tab by mouth three (3) times daily.  Wheel Chair kalyn Wheelchair    spironolactone (ALDACTONE) 25 mg tablet Take 1 Tab by mouth daily.  acetaminophen (TYLENOL ARTHRITIS PAIN) 650 mg TbER Take 650 mg by mouth every eight (8) hours. Tylenol arthritis as needed    VENTOLIN HFA 90 mcg/actuation inhaler INHALE 2 PUFFS BY MOUTH EVERY 6 HOURS AS NEEDED FOR WHEEZING    polyethylene glycol (MIRALAX) 17 gram/dose powder Take 17 g by mouth daily. 1 tablespoon with 8 oz of water daily    FOLIC ACID/MULTIVIT-MIN/LUTEIN (CENTRUM SILVER PO) Take  by mouth.  OTHER One shower chair    VIT A/VIT C/VIT E/ZINC/COPPER (PRESERVISION AREDS PO) Take 1 Tab by mouth two (2) times a day. Social History:  Social History     Tobacco Use    Smoking status: Never Smoker    Smokeless tobacco: Never Used   Substance Use Topics    Alcohol use: No       Pt had children. Family History:  Family History   Problem Relation Age of Onset    Cancer Mother     Diabetes Father     Diabetes Paternal Grandfather        Review of Systems:  A detailed 10 system ROS is obtained, with pertinent positives as listed above. All others are negative. Objective:     Physical Exam:  Vitals:  Visit Vitals  /72   Pulse 95   Temp 98.3 °F (36.8 °C)   Resp 19   Ht 5' 2\" (1.575 m)   Wt 72.1 kg (159 lb)   SpO2 94%   BMI 29.08 kg/m²     Gen:  Pt is alert, cooperative, no acute distress, lying in bed, O2 NC in place, shivering  Skin:  Extremities and face reveal no rashes. HEENT: Sclerae anicteric. Extra-occular muscles are intact. No oral ulcers. No abnormal pigmentation of the lips. The neck is supple. Cardiovascular: Regular rate and rhythm. No murmurs, gallops, or rubs. Respiratory:  Comfortable breathing with no accessory muscle use.  Coarse breath sounds anteriorly. GI:  Abdomen nondistended, soft, and nontender. Normal active bowel sounds. No enlargement of the liver or spleen. No masses palpable. Rectal:  Deferred  Musculoskeletal:  No pitting edema of the lower legs. Neurological:  Patient is alert, but only able to provide a limited history. Psychiatric:  Mood appears appropriate with judgement intact. Lymphatic:  No cervical or supraclavicular adenopathy. Laboratory:    Recent Labs     01/23/22  1500   WBC 9.4   HGB 9.6*   HCT 31.6*      MCV 97.2      K 3.9      CO2 22   BUN 43*   CREA 1.56*   CA 9.8   *   AP 88   AST 23   ALT 38   TBILI 0.3   ALB 2.1*   TP 5.9*   PTP 12.6   INR 0.9     NM ACUTE GI BLEED SCAN 2/22/2020   INDICATION:  Dark maroon stool. Bright red clots. Started last night. History of  GI bleed and partial colectomy.   COMPARISON: CT abdomen and pelvis with contrast 2/22/2020.   TRACER: 25.8 mCi of Tc-99m UltraTag labeled red blood cells.   TECHNIQUE: Imaging of the abdomen was performed from the anterior projection  following intravenous administration of Tc-99m UltraTag labeled red blood cells  through 55 minutes.    FINDINGS: Abnormal activity is seen early in the examination over the right  abdomen. This subsequently progresses in a colonic pattern subsequently seen in  the rectum.   IMPRESSION  IMPRESSION:   1. Abnormal radiotracer activity suggesting an active colon bleed. This appears  to originate in the right colon and therefore is favored to arise in the  ascending colon. Assessment:     Active Problems:    * No active hospital problems.  *    GI bleeding  ABLA    79 yo female pt of Dr. Chucky Nieves with PMH including but not limited to HTN, HLD, reflux, DM2, COPD - not on home O2, asthma, CKD, prior GI bleeding, CHF - ECHO 16 March 2021 with EF >55%, CAD, PAD, diverticulitis s/p partial colectomy, colon polyps, diverticulosis, who is seen in consultation 23 Jan 2022 at the request of Dr. Estrellita Casiano MD for GI bleeding, after presenting to the ER with increased BS and noted here to have GI bleeding, passing large maroon stool with clots. Hgb was 9.6, down from 11.6 on 17 Dec 2021. Bleeding may be due to AVMs, diverticular source, inflammation, polyps, mass. Prior NM GI bleeding scan in 2020 identified source in right colon. Her last colonoscopy was in 2007 and last EGD in 2018. Plan:     - Supportive care, maintain electrolytes. - STAT NM GI bleeding scan to assess source of bleeding.  - NPO. - Monitor hgb and transfuse PRN if hgb <7.  - Protonix 40 mg IV Q 12 hrs. - Follow. I contacted pt's daughter Sigrid Lemus at [phone] and discussed our evaluation and recommendations. She stated understanding and agreed. We discussed her mother's DNR status, wishing not to have CPR or intubation. I discussed based on NM GI bleeding scan results and any persistent bleeding, we may recommend proceeding with procedures including EGD and colonoscopy, which could require intubation. She stated understanding and would consider proceeding at that time if necessary, despite need for intubation. Patient is seen and examined in collaboration with Dr. Tommy Lama. Assessment and plan as per Dr. Usha López. Estrellita Harrison  [facility]

## 2022-01-23 NOTE — H&P
INTERNAL MEDICINE H&P/CONSULT    Subjective:     80-year-old female w/ hx of NIDDM, HTN, CKD3, obstructive lung dz, B12 deficiency and anemia of chronic dz, PAD, retinal hemorrhage, urinary incontinence, brought from home by EMS for concern of elevated blood sugar. It was reported there was a sore on her backside by family as well. Patient recently in the hospital for UTI and then went to rehab at [facility] for some functional decline. Patient brought home a few days ago by family due to concerns of her catching coronavirus in the hospital as she is unvaccinated. Upon arrival patient noted to have significant rectal bleeding with a combination of melena and maroon and bright red blood. Patient denies pain. Patient denies blood thinners. Patient is a poor historian. On further questioning, pt is confused, had some cough and sob but no abdominal pain, not aware of blood or changes in her stools.      Past Medical History:   Diagnosis Date    Anxiety 2/1/2018    Arrhythmia     palpitations 2/10-went to [facility]    Arthritis     L leg- fell 2008    Asthma     adult onset x 20 yrs    B12 deficiency 8/24/2012    Body mass index 37.0-37.9, adult 2/5/2014    CAD (coronary artery disease)     cardiac work up-[id number]    Controlled type 2 diabetes mellitus with microalbuminuria, without long-term current use of insulin ([address] 11/22/2016    COPD     Corns and callosities 12/19/2016    CRI (chronic renal insufficiency) 8/24/2012    Depression 6/1/2012    Dermatophytosis of nail 12/19/2016    Diabetes ([address]     type II- home tests fasting-112    DJD (degenerative joint disease) of hip     DM (diabetes mellitus) type II controlled with renal manifestation ([address] 7/16/2010    Encounter for long-term (current) use of other medications 1/8/2013    Essential hypertension 7/16/2010    Gastrointestinal disorder     GERD (gastroesophageal reflux disease)     GI bleed 7/28/2018    Heart failure ([facility]     Hiatal hernia 8/24/2012    HLD (hyperlipidemia) 1/8/2013    Neuropathy 8/24/2012    Lower limbs     Obesity (BMI 30.0-39. 9) BMI-43.8    Other and unspecified hyperlipidemia 7/16/2010    Other chest pain 7/16/2010    Other ill-defined conditions(799.89)     high cholesterol    Other ill-defined conditions(799.89)     incont. urine    PAD (peripheral artery disease) ([facility] 8/24/2012    Retinal hemorrhage       Past Surgical History:   Procedure Laterality Date    HX APPENDECTOMY      HX CHOLECYSTECTOMY      HX CHOLECYSTECTOMY  2000    HX ENDOSCOPY  02/27/2018    Dr Girish Christopher    HX GI      small intestine obstruction    HX GYN      hyst    HX HEART CATHETERIZATION      HX HYSTERECTOMY      prolapse    HX INTRACRANIAL ANEURYSM REPAIR      HX INTRACRANIAL ANEURYSM REPAIR      HX ORTHOPAEDIC      right wrist 2010    HX OTHER SURGICAL      aneurysm clip-cerebral-2000    HX OTHER SURGICAL      cerebral aneurysm   Dr. Freddie Barney        Prior to Admission medications    Medication Sig Start Date End Date Taking? Authorizing Provider   Blood-Glucose Meter monitoring kit Use as directed 3 times daily to check blood sugars. Dx:E11.29 1/13/22   Johann Rivera NP   glucose blood VI test strips (blood glucose test) strip Use as directed 3 times daily to check blood sugars. Dx:E11.29 1/13/22   Johann Rivera NP   lancets misc Use as directed 3 times daily to check blood sugars. Dx:E11.29 1/13/22   Johann Rivera NP   cloNIDine HCL (CATAPRES) 0.2 mg tablet Take 1 Tablet by mouth three (3) times daily for 60 days.  12/24/21 2/22/22  Renate Moore NP   pravastatin (PRAVACHOL) 40 mg tablet TAKE 1 TABLET BY MOUTH EVERY NIGHT AT BEDTIME 11/30/21   Johann Rivera NP   pantoprazole (PROTONIX) 20 mg tablet TAKE 1 TABLET BY MOUTH DAILY 11/30/21   Johann Rivera NP   metFORMIN (GLUCOPHAGE) 500 mg tablet Take 1 Tablet by mouth two (2) times daily (with meals). 11/30/21   Yamini Burns NP   gabapentin (NEURONTIN) 100 mg capsule Take 1 Capsule by mouth three (3) times daily. 11/30/21   Yamini Burns NP   sertraline (ZOLOFT) 100 mg tablet Take 1 Tablet by mouth daily. Indications: major depressive disorder, am  Patient not taking: Reported on 12/15/2021 11/30/21   Yamini Burns NP   amLODIPine (NORVASC) 10 mg tablet Take 1 Tablet by mouth daily. Patient not taking: Reported on 12/15/2021 11/30/21   Yamini Burns NP   lidocaine 4 % patch Apply to affected area daily prn. 11/30/21   Yamini Burns NP   fluticasone propion-salmeteroL (Advair Diskus) 250-50 mcg/dose diskus inhaler Take 1 Puff by inhalation every twelve (12) hours. 4/27/21   Yamini Burns NP   potassium chloride (K-DUR, KLOR-CON) 20 mEq tablet TAKE 1 TABLET BY MOUTH EVERY MORNING 4/27/21   Yamini Burns NP   benazepriL (LOTENSIN) 40 mg tablet TAKE 1 TABLET BY MOUTH DAILY 4/27/21   Yamini Burns NP   furosemide (LASIX) 20 mg tablet Take 1 Tab by mouth daily. Patient not taking: Reported on 12/15/2021 4/27/21   Yamini Burns NP   fluticasone propionate (FLONASE) 50 mcg/actuation nasal spray 2 Sprays by Both Nostrils route daily. 4/27/21   Yamini Burns NP   hydrALAZINE (APRESOLINE) 100 mg tablet Take 1 Tab by mouth three (3) times daily. 4/27/21   Yamini Burns NP   Wheel Chair kalyn Wheelchair 4/27/21   Yamini Burns NP   spironolactone (ALDACTONE) 25 mg tablet Take 1 Tab by mouth daily. 4/15/21   Yamini Burns NP   acetaminophen (TYLENOL ARTHRITIS PAIN) 650 mg TbER Take 650 mg by mouth every eight (8) hours. Tylenol arthritis as needed    Provider, Historical   VENTOLIN HFA 90 mcg/actuation inhaler INHALE 2 PUFFS BY MOUTH EVERY 6 HOURS AS NEEDED FOR WHEEZING 8/20/18   Mary Fernando MD   polyethylene glycol (MIRALAX) 17 gram/dose powder Take 17 g by mouth daily.  1 tablespoon with 8 oz of water daily 7/19/17   Suzi Wynn MD   FOLIC ACID/MULTIVIT-MIN/LUTEIN (CENTRUM SILVER PO) Take  by mouth. Provider, Historical   OTHER One shower chair 4/19/17   Rhett Fernando MD   VIT A/VIT C/VIT E/ZINC/COPPER (PRESERVISION AREDS PO) Take 1 Tab by mouth two (2) times a day. Provider, Historical     No Known Allergies   Social History     Tobacco Use    Smoking status: Never Smoker    Smokeless tobacco: Never Used   Substance Use Topics    Alcohol use: No        Family History:  HTN    Review of Systems   Unobtainable dt mental status    Objective: Intake / Output:  No intake/output data recorded. No intake/output data recorded. Physical Exam:  Visit Vitals  /72   Pulse 95   Temp 98.3 °F (36.8 °C)   Resp 19   Ht 5' 2\" (1.575 m)   Wt 72.1 kg (159 lb)   SpO2 94%   BMI 29.08 kg/m²     General appearance: awake, alert, cooperative, moderate distress, appears stated age - Pale, No icterus, Dry mucous membranes, Disoriented. Head: Normocephalic, without obvious abnormality, atraumatic  Back: symmetric, no curvature. ROM normal. No CVA tenderness. Lungs: Diminished bs bibasilar. Heart: regular rate and rhythm, S1, S2 normal, no murmur, click, rub or gallop. Abdomen: soft, no tenderness, no distension, normal bowel sound, no masses, no organomegaly  Extremities: atraumatic, no cyanosis - Bilateral lower limbs edema +2. Skin: No rashes or ulceration.   Neurologic: Grossly intact     ECG: sinus rhythm     Data Review (Labs):   Recent Results (from the past 24 hour(s))   EKG, 12 LEAD, INITIAL    Collection Time: 01/23/22  2:53 PM   Result Value Ref Range    Ventricular Rate 93 BPM    Atrial Rate 94 BPM    P-R Interval 164 ms    QRS Duration 124 ms    Q-T Interval 390 ms    QTC Calculation (Bezet) 486 ms    Calculated P Axis 131 degrees    Calculated R Axis -100 degrees    Calculated T Axis 100 degrees    Diagnosis       Sinus rhythm  Atrial premature complex  RBBB and LAFB  Abnormal lateral Q waves  Non-specific ST-t wave changes    Confirmed by [facility]  MD ()TAVO ([id number]) on 1/23/2022 4:53:10 PM     CBC W/O DIFF    Collection Time: 01/23/22  3:00 PM   Result Value Ref Range    WBC 9.4 4.3 - 11.1 K/uL    RBC 3.25 (L) 4.05 - 5.2 M/uL    HGB 9.6 (L) 11.7 - 15.4 g/dL    HCT 31.6 (L) 35.8 - 46.3 %    MCV 97.2 79.6 - 97.8 FL    MCH 29.5 26.1 - 32.9 PG    MCHC 30.4 (L) 31.4 - 35.0 g/dL    RDW 13.4 11.9 - 14.6 %    PLATELET 184 033 - 741 K/uL    MPV 10.6 9.4 - 12.3 FL    ABSOLUTE NRBC 0.00 0.0 - 0.2 K/uL   METABOLIC PANEL, COMPREHENSIVE    Collection Time: 01/23/22  3:00 PM   Result Value Ref Range    Sodium 139 136 - 145 mmol/L    Potassium 3.9 3.5 - 5.1 mmol/L    Chloride 106 98 - 107 mmol/L    CO2 22 21 - 32 mmol/L    Anion gap 11 7 - 16 mmol/L    Glucose 483 (HH) 65 - 100 mg/dL    BUN 43 (H) 8 - 23 MG/DL    Creatinine 1.56 (H) 0.6 - 1.0 MG/DL    GFR est AA 40 (L) >60 ml/min/1.73m2    GFR est non-AA 33 (L) >60 ml/min/1.73m2    Calcium 9.8 8.3 - 10.4 MG/DL    Bilirubin, total 0.3 0.2 - 1.1 MG/DL    ALT (SGPT) 38 12 - 65 U/L    AST (SGOT) 23 15 - 37 U/L    Alk. phosphatase 88 50 - 136 U/L    Protein, total 5.9 (L) 6.3 - 8.2 g/dL    Albumin 2.1 (L) 3.2 - 4.6 g/dL    Globulin 3.8 (H) 2.3 - 3.5 g/dL    A-G Ratio 0.6 (L) 1.2 - 3.5     PROTHROMBIN TIME + INR    Collection Time: 01/23/22  3:00 PM   Result Value Ref Range    Prothrombin time 12.6 12.6 - 14.5 sec    INR 0.9         Assessment:     1- Lower GI bleed, stable clinically, seen by Gi in ED (see note). Hx of B12 deficiency and anemia of chronic dz. PRBC transfusion of 1 unit ordered in ED. NM scan shows active rectal bleeding.   2- Covid-19 pneumonia, bilateral.  3- Acute hypoxic respiratory failure, due to #2, hypoxic earlier on arrival, I turned down O2 to 2L and O2 saturation was sustained at 95%  4- NIDDM, uncontrolled, last A1C in Nov 2021 was 8.4  5- Low normal BP, hx of HTN  6- Bilateral LL edema, possibly related to anemia  7- History of CKD3, obstructive lung dz, PAD, retinal hemorrhage, urinary incontinence . Stable. Plan:     Follow UA, A1C and NM scan  Monitor Hb Q8h for now  PRBC 2U on hold  Surgery consulted, I spoke to surgery  Seen by Vee Tyler iv  Baricitinib, renal dose  Oxygen and bronchodilators as needed  PPD placed  NPO  IVF hydration, gentle  Protonix iv  Blood pressure control  Insulin scale q4h  Continue essential home medications. Patient is full code. Further management depends on patient progress. Thank you for the oppourtinity to contribute in the care of your patient. I called daughter, updated.     Signed By: Lissa Low MD     January 23, 2022

## 2022-01-24 NOTE — PROCEDURES
Flex Sig Procedure Note    Indications: Hematochezia, tagged RBC scan lit up in rectum    Anesthesia/Sedation: MAC IV     Pre-Procedure Physical:  Current Facility-Administered Medications   Medication Dose Route Frequency    sertraline (ZOLOFT) tablet 50 mg  50 mg Oral DAILY    0.9% sodium chloride infusion 250 mL  250 mL IntraVENous PRN    insulin lispro (HUMALOG) injection   SubCUTAneous Q4H    cloNIDine HCL (CATAPRES) tablet 0.2 mg  0.2 mg Oral BID PRN    tuberculin injection 5 Units  5 Units IntraDERMal ONCE    0.9% sodium chloride infusion  50 mL/hr IntraVENous CONTINUOUS    sodium chloride (NS) flush 5-40 mL  5-40 mL IntraVENous Q8H    sodium chloride (NS) flush 5-40 mL  5-40 mL IntraVENous PRN    acetaminophen (TYLENOL) tablet 650 mg  650 mg Oral Q6H PRN    Or    acetaminophen (TYLENOL) suppository 650 mg  650 mg Rectal Q6H PRN    senna (SENOKOT) tablet 17.2 mg  2 Tablet Oral BID PRN    ondansetron (ZOFRAN) injection 4 mg  4 mg IntraVENous Q6H PRN    dexamethasone (DECADRON) 10 mg/mL injection 6 mg  6 mg IntraVENous Q24H    insulin lispro (HUMALOG) injection 3 Units  3 Units SubCUTAneous Q4H    baricitinib (OLUMIANT) tablet 1 mg  1 mg Oral DAILY    0.9% sodium chloride infusion 250 mL  250 mL IntraVENous PRN    [START ON 1/25/2022] pantoprazole (PROTONIX) 40 mg in 0.9% sodium chloride 10 mL injection  40 mg IntraVENous DAILY      Patient has no known allergies.     Patient Vitals for the past 8 hrs:   BP Temp Pulse Resp SpO2   01/24/22 1512 (!) 163/71 -- 92 -- 95 %   01/24/22 1500 (!) 163/71 -- 80 15 94 %   01/24/22 1400 (!) 163/67 -- 92 13 94 %   01/24/22 0925 (!) 150/94 99.5 °F (37.5 °C) 83 13 94 %       Exam:    Airway: clear   Heart: normal S1and S2    Lungs: clear bilateral  Abdomen: soft, nontender, bowel sounds present and normal in all quads   Mental Status: awake, alert and oriented to person, place and time        Procedure Details      Informed consent was obtained for the procedure, including sedation. Risks of perforation, hemorrhage, adverse drug reaction and aspiration were discussed. The patient was placed in the left lateral decubitus position. Based on the pre-procedure assessment, including review of the patient's medical history, medications, allergies, and review of systems, she had been deemed to be an appropriate candidate for conscious sedation; she was therefore sedated with the medications listed below. The patient was monitored continuously with ECG tracing, pulse oximetry, blood pressure monitoring, and direct observations. A rectal examination was performed. The colonoscope was inserted into the rectum and advanced under direct vision to the sigmoid. The quality of the colonic preparation was poor. A careful inspection was made as the colonoscope was withdrawn, including a retroflexed view of the rectum; findings and interventions are described below. Appropriate photodocumentation was obtained. Findings:   Few small non-bleeding erosions in rectum, suspect probably secondary to constipation. No active bleeding. Some solid stool. Specimens: None    Estimated Blood Loss: 0 cc           Complications: None; patient tolerated the procedure well. Attending Attestation: I performed the procedure. Impression:    Few small non-bleeding erosions in rectum, suspect probably secondary to constipation. No active bleeding. Some solid stool.     Recommendations:   Miralax 17 g every day  Can continue monitoring hgb  Advance diet as tolerated  Will sign off for now; please call back if further issues

## 2022-01-24 NOTE — ED NOTES
TRANSFER - OUT REPORT:    Verbal report given to NEMO yo on [employer]  being transferred to 5th floor for routine progression of care       Report consisted of patients Situation, Background, Assessment and   Recommendations(SBAR). Information from the following report(s) SBAR, ED Summary and MAR was reviewed with the receiving nurse. Lines:   Peripheral IV 01/23/22 Left Antecubital (Active)   Site Assessment Clean, dry, & intact 01/23/22 2030   Phlebitis Assessment 0 01/23/22 2030   Infiltration Assessment 0 01/23/22 2030   Dressing Status Clean, dry, & intact 01/23/22 2030   Dressing Type Transparent 01/23/22 2030   Hub Color/Line Status Green;Flushed;Patent 01/23/22 2030       Peripheral IV 01/23/22 Left Forearm (Active)   Site Assessment Clean, dry, & intact 01/23/22 2030   Phlebitis Assessment 0 01/23/22 2030   Infiltration Assessment 0 01/23/22 2030   Dressing Status Clean, dry, & intact 01/23/22 2030   Dressing Type Transparent 01/23/22 2030   Hub Color/Line Status Blue;Flushed;Patent 01/23/22 2030        Opportunity for questions and clarification was provided.       Patient transported with:   O2 @ 3 liters

## 2022-01-24 NOTE — PROGRESS NOTES
Aide Hughes is a 80 y.o. female who is currently COVID + with a past medical History of HTN, HLD, reflux, DM2, COPD, asthma, CKD, prior GI bleeding, CHF - ECHO 16 March 2021 with EF >55%, CAD, PAD, diverticulitis s/p partial colectomy, colon polyps, and diverticulosis. Pt presented to the ER 1/23/22 via EMS with concerns related to increased blood sugar and sore on her backside. In the ED, pt had a large maroon bloody stool with clots. Her Hgb was 9.6, down from 11.6 on 12/17/21. Pt and family deny any recent GI bleeding. Pt was noted with GI bleeding during a previous admission in 12/2020.    1/23/22 Hgb 9.6 and most recently 8.1     Nuclear medicine GI bleed scan 1/23/2022     INDICATION:  Rectal bleeding. History of partial colectomy.     COMPARISON: None.     TRACER: 24.5 mCi of Tc-99m UltraTag labeled red blood cells.     TECHNIQUE: Anterior and lateral planar imaging over the abdomen was performed through 1 hour following the uneventful ministration of radiotracer.     FINDINGS:   Following the administration of radiotracer, increasing activity is seen over the more distal rectum throughout the examination. Additional activity is seen in the stomach which does not progress felt to result from the presence of free pertechnetate.     IMPRESSION  1. Increasing activity over the more distal rectum suggesting an active rectal bleed.     Plan:  Care Management per Hospitalist  NM GI bleeding scan  Trend Hgb  IVF  NPO  Protonix  GI Following  --possible Colonoscopy if bleeding continues  General Surgery will follow  Consider IR embolization if bleeding continues  Full Consult to follow      Kim Molina NP

## 2022-01-24 NOTE — ED NOTES
Pt report given to [employer]. Pt in Nuclear med at this time, pt will be moved to 22 once she gets back.

## 2022-01-24 NOTE — ROUTINE PROCESS
TRANSFER - OUT REPORT:    Verbal report given to [facility] RN on [address]  being transferred to [phone] for routine post - op       Report consisted of patients Situation, Background, Assessment and   Recommendations(SBAR). Information from the following report(s) SBAR was reviewed with the receiving nurse. Lines:   Peripheral IV 01/23/22 Left Antecubital (Active)   Site Assessment Clean, dry, & intact 01/23/22 2030   Phlebitis Assessment 0 01/23/22 2030   Infiltration Assessment 0 01/23/22 2030   Dressing Status Clean, dry, & intact 01/23/22 2030   Dressing Type Transparent 01/23/22 2030   Hub Color/Line Status Green;Flushed;Patent 01/23/22 2030       Peripheral IV 01/23/22 Left Forearm (Active)   Site Assessment Clean, dry, & intact 01/23/22 2030   Phlebitis Assessment 0 01/23/22 2030   Infiltration Assessment 0 01/23/22 2030   Dressing Status Clean, dry, & intact 01/23/22 2030   Dressing Type Transparent 01/23/22 2030   Hub Color/Line Status Blue;Flushed;Patent 01/23/22 2030        Opportunity for questions and clarification was provided.       Patient transported with: RN on oxygen at 3 liters via nc

## 2022-01-24 NOTE — PROGRESS NOTES
[facility] Progress Note         Admit Date:  1/23/2022  Today's Date:  1/24/2022    CC:  GI bleeding     Subjective:     Patient remains in the ED. COVID +. Sats 95% on 3 L oxygen. Denies any SOB. Tagged RBCs scan + 1/23. No bleeding overnight with stable Hgb. Hgb improved to 9.5 from 8.1 after 1u PRBCs. Denies abdominal pain. No N/V.     CXR 1/23  Moderate multifocal bilateral lung infiltrates as described above. Although  nonspecific, some infiltrate favors the periphery of the lung which can been  indicator for an atypical pneumonia such as Covid 19.     Medications:   Current Facility-Administered Medications   Medication Dose Route Frequency    sertraline (ZOLOFT) tablet 50 mg  50 mg Oral DAILY    0.9% sodium chloride infusion 250 mL  250 mL IntraVENous PRN    insulin lispro (HUMALOG) injection   SubCUTAneous Q4H    cloNIDine HCL (CATAPRES) tablet 0.2 mg  0.2 mg Oral BID PRN    tuberculin injection 5 Units  5 Units IntraDERMal ONCE    0.9% sodium chloride infusion  50 mL/hr IntraVENous CONTINUOUS    sodium chloride (NS) flush 5-40 mL  5-40 mL IntraVENous Q8H    sodium chloride (NS) flush 5-40 mL  5-40 mL IntraVENous PRN    acetaminophen (TYLENOL) tablet 650 mg  650 mg Oral Q6H PRN    Or    acetaminophen (TYLENOL) suppository 650 mg  650 mg Rectal Q6H PRN    senna (SENOKOT) tablet 17.2 mg  2 Tablet Oral BID PRN    ondansetron (ZOFRAN) injection 4 mg  4 mg IntraVENous Q6H PRN    dexamethasone (DECADRON) 10 mg/mL injection 6 mg  6 mg IntraVENous Q24H    insulin lispro (HUMALOG) injection 3 Units  3 Units SubCUTAneous Q4H    baricitinib (OLUMIANT) tablet 1 mg  1 mg Oral DAILY    0.9% sodium chloride infusion 250 mL  250 mL IntraVENous PRN    [START ON 1/25/2022] pantoprazole (PROTONIX) 40 mg in 0.9% sodium chloride 10 mL injection  40 mg IntraVENous DAILY     Current Outpatient Medications   Medication Sig    Blood-Glucose Meter monitoring kit Use as directed 3 times daily to check blood sugars. Dx:E11.29    glucose blood VI test strips (blood glucose test) strip Use as directed 3 times daily to check blood sugars. Dx:E11.29    lancets misc Use as directed 3 times daily to check blood sugars. Dx:E11.29    cloNIDine HCL (CATAPRES) 0.2 mg tablet Take 1 Tablet by mouth three (3) times daily for 60 days.  pravastatin (PRAVACHOL) 40 mg tablet TAKE 1 TABLET BY MOUTH EVERY NIGHT AT BEDTIME    pantoprazole (PROTONIX) 20 mg tablet TAKE 1 TABLET BY MOUTH DAILY    metFORMIN (GLUCOPHAGE) 500 mg tablet Take 1 Tablet by mouth two (2) times daily (with meals).  gabapentin (NEURONTIN) 100 mg capsule Take 1 Capsule by mouth three (3) times daily.  sertraline (ZOLOFT) 100 mg tablet Take 1 Tablet by mouth daily. Indications: major depressive disorder, am (Patient not taking: Reported on 12/15/2021)    amLODIPine (NORVASC) 10 mg tablet Take 1 Tablet by mouth daily. (Patient not taking: Reported on 12/15/2021)    lidocaine 4 % patch Apply to affected area daily prn.  fluticasone propion-salmeteroL (Advair Diskus) 250-50 mcg/dose diskus inhaler Take 1 Puff by inhalation every twelve (12) hours.  potassium chloride (K-DUR, KLOR-CON) 20 mEq tablet TAKE 1 TABLET BY MOUTH EVERY MORNING    benazepriL (LOTENSIN) 40 mg tablet TAKE 1 TABLET BY MOUTH DAILY    furosemide (LASIX) 20 mg tablet Take 1 Tab by mouth daily. (Patient not taking: Reported on 12/15/2021)    fluticasone propionate (FLONASE) 50 mcg/actuation nasal spray 2 Sprays by Both Nostrils route daily.  hydrALAZINE (APRESOLINE) 100 mg tablet Take 1 Tab by mouth three (3) times daily.  Wheel Chair kalyn Wheelchair    spironolactone (ALDACTONE) 25 mg tablet Take 1 Tab by mouth daily.  acetaminophen (TYLENOL ARTHRITIS PAIN) 650 mg TbER Take 650 mg by mouth every eight (8) hours.  Tylenol arthritis as needed    VENTOLIN HFA 90 mcg/actuation inhaler INHALE 2 PUFFS BY MOUTH EVERY 6 HOURS AS NEEDED FOR WHEEZING    polyethylene glycol (MIRALAX) 17 gram/dose powder Take 17 g by mouth daily. 1 tablespoon with 8 oz of water daily    FOLIC ACID/MULTIVIT-MIN/LUTEIN (CENTRUM SILVER PO) Take  by mouth.  OTHER One shower chair    VIT A/VIT C/VIT E/ZINC/COPPER (PRESERVISION AREDS PO) Take 1 Tab by mouth two (2) times a day. Review of Systems:  ROS was obtained, with pertinent positives as listed above. No chest pain or SOB. Diet:  NPO    Objective:   Vitals:  Visit Vitals  BP (!) 156/81   Pulse 86   Temp 98.1 °F (36.7 °C)   Resp 14   Ht 5' 2\" (1.575 m)   Wt 72.1 kg (159 lb)   SpO2 92%   BMI 29.08 kg/m²     Intake/Output:  No intake/output data recorded. 01/22 1901 - 01/24 0700  In: 602.5 [I.V.:250]  Out: -   Exam:  General appearance: alert, cooperative, elderly female in no distress  Lungs: CTAB. 3L O2 NC. Heart: RRR  Abdomen: soft, NT/ND with NABS. Extremities: extremities normal, atraumatic, no cyanosis or edema  Neuro:  A&O x1 (oriented to person, not oriented to time / place). Data Review (Labs):    Recent Labs     01/24/22  0338 01/23/22 2047 01/23/22  1500   WBC  --   --  9.4   HGB 9.5* 8.1* 9.6*   HCT 30.0*  --  31.6*   PLT  --   --  170   MCV  --   --  97.2   NA  --   --  139   K  --   --  3.9   CL  --   --  106   CO2  --   --  22   BUN  --   --  43*   CREA  --   --  1.56*   CA  --   --  9.8   GLU  --   --  483*   AP  --   --  88   AST  --   --  23   ALT  --   --  38   TBILI  --   --  0.3   ALB  --   --  2.1*   TP  --   --  5.9*   PTP  --   --  12.6   INR  --   --  0.9       Assessment:     Active Problems:  Lower GI bleed (1/23/2022)    89yo AAF with MMPs presenting with painless hematochezia with mild drop in Hgb. High concern for diverticular bleeding. Tagged RBCs scan positive 1/23 w/ increasing activity over the more distal rectum suggesting an active rectal bleed. No bleeding overnight.  Currently with stable Hgb and vital. She is COVID +, on 3L oxygen NC, no labored breathing.        Plan:     Tap water enema this morning. Plan on Flex Sig later this afternoon. Risk of procedures discussed including anesthesia, bleeding, perforation, and cardiopulmonary complications. Spoke to pt's daughter Prateek Mosher [phone]. Patient and pt's daughter understand the potential risks of having flex sig and are willing to have procedure done. Monitor H/H. Transfuse prn. Continue supportive care. Janie Lea PA-C  [facility].

## 2022-01-24 NOTE — PROGRESS NOTES
TRANSFER - IN REPORT:    Verbal report received from [facility]) on Vincent Perish  being received from ER 22(unit) for ordered procedure      Report consisted of patients Situation, Background, Assessment and   Recommendations(SBAR). Information from the following report(s) SBAR and Kardex was reviewed with the receiving nurse. Opportunity for questions and clarification was provided. Assessment completed upon patients arrival to unit and care assumed.

## 2022-01-24 NOTE — PROGRESS NOTES
Progress Note    Patient: Fausto Curry MRN: [id number]  SSN: [id number]    YOB: 1929  Age: 80 y.o. Sex: female      Admit Date: 1/23/2022    LOS: 1 day     Assessment and Plan:   43-year-old female with past medical history of diabetes, hypertension, CKD, PAD, who presented in the setting of rectal bleeding    1. Acute blood loss anemia secondary to hematochezia likely in the setting of lower GI bleed  N.p.o. for now  Tagged RBC scan suggesting active rectal bleed  Status post transfusion PRBC  Avoid antithrombotics and anticoagulants  Monitor H&H  Transfuse if hemoglobin lower than 7  Continue PPI  Gastroenterology consulted, sigmoidoscopy today  Surgery consulted    2. Acute hypoxic respiratory failure secondary to COVID-19 pneumonia  Oxygen therapy to maintain oxygen saturation more than 92%  Continue Decadron  Continue baricitinib  Symptomatic management  Self proning  Incentive inspirometer    3. Diabetes  Insulin sliding scale  Blood sugar checks before meals and at bedtime    DVT prophylaxis with SCD    Subjective:   43-year-old female with past medical history of diabetes, hypertension, CKD, PAD, who presented in the setting of rectal bleeding. Patient seen and examined at bedside. This morning the patient very little weak. Denies any more rectal bleed. No chest pain, no abdominal pain, no nausea or vomiting.     Objective:     Vitals:    01/24/22 1557 01/24/22 1600 01/24/22 1610 01/24/22 1620   BP: (!) 91/49 139/86 (!) 145/65 (!) 165/59   Pulse: 76 88 83 93   Resp: 15 15 15 15   Temp:       SpO2: 97% 97% 95% 91%   Weight:       Height:            Intake and Output:  Current Shift: 01/24 0701 - 01/24 1900  In: 200 [I.V.:200]  Out: 0   Last three shifts: 01/22 1901 - 01/24 0700  In: 602.5 [I.V.:250]  Out: -     ROS  10 ROS negative except from stated on subjective    Physical Exam:   General: Alert, oriented, NAD  HEENT: NC/AT, EOM are intact  Neck: supple, no JVD  Cardiovascular: RRR, S1, S2, no murmurs  Respiratory: Lungs are clear, no wheezes or rales  Abdomen: Soft, NT, ND  Back: No CVA tenderness, no paraspinal tenderness  Extremities: LE without pedal edema, no erythema  Neuro: A&O, CN are intact, no focal deficits  Skin: no rash or ulcers  Psych: good mood and affect    Lab/Data Review:  I have personally reviewed patients laboratory data showing  Recent Results (from the past 24 hour(s))   GLUCOSE, POC    Collection Time: 01/23/22  6:14 PM   Result Value Ref Range    Glucose (POC) 382 (H) 65 - 100 mg/dL    Performed by Oumou    HEMOGLOBIN    Collection Time: 01/23/22  8:47 PM   Result Value Ref Range    HGB 8.1 (L) 11.7 - 15.4 g/dL   GLUCOSE, POC    Collection Time: 01/23/22  9:50 PM   Result Value Ref Range    Glucose (POC) 384 (H) 65 - 100 mg/dL    Performed by Reji Machuca    GLUCOSE, POC    Collection Time: 01/24/22 12:47 AM   Result Value Ref Range    Glucose (POC) 274 (H) 65 - 100 mg/dL    Performed by Jed    URINALYSIS W/ RFLX MICROSCOPIC    Collection Time: 01/24/22  1:20 AM   Result Value Ref Range    Color YELLOW      Appearance CLOUDY      Specific gravity 1.016 1.001 - 1.023      pH (UA) 5.5 5.0 - 9.0      Protein 100 (A) NEG mg/dL    Glucose Negative NEG mg/dL    Ketone Negative NEG mg/dL    Bilirubin Negative NEG      Blood Negative NEG      Urobilinogen 0.2 0.2 - 1.0 EU/dL    Nitrites Negative NEG      Leukocyte Esterase MODERATE (A) NEG      WBC 0-3 0 /hpf    RBC 0-3 0 /hpf    Epithelial cells 0-3 0 /hpf    Bacteria 0 0 /hpf   HGB & HCT    Collection Time: 01/24/22  3:38 AM   Result Value Ref Range    HGB 9.5 (L) 11.7 - 15.4 g/dL    HCT 30.0 (L) 35.8 - 46.3 %   GLUCOSE, POC    Collection Time: 01/24/22  4:56 AM   Result Value Ref Range    Glucose (POC) 93 65 - 100 mg/dL    Performed by Toney Guadalupe    GLUCOSE, POC    Collection Time: 01/24/22  9:20 AM   Result Value Ref Range    Glucose (POC) 119 (H) 65 - 100 mg/dL    Performed by Elvis    GLUCOSE, POC    Collection Time: 01/24/22 12:43 PM   Result Value Ref Range    Glucose (POC) 110 (H) 65 - 100 mg/dL    Performed by Elvis    RBC, ALLOCATE    Collection Time: 01/24/22  6:00 PM   Result Value Ref Range    HISTORY CHECKED? Historical check performed       All Micro Results     Procedure Component Value Units Date/Time    COVID-19 RAPID TEST [[id number]]  (Abnormal) Collected: 01/23/22 9102    Order Status: Completed Specimen: Nasopharyngeal Updated: 01/23/22 0860     Specimen source Nasopharyngeal        COVID-19 rapid test Detected        Comment:      The specimen is POSITIVE for SARS-CoV-2, the novel coronavirus associated with COVID-19. This test has been authorized by the FDA under an Emergency Use Authorization (EUA) for use by authorized laboratories. Fact sheet for Healthcare Providers: ConventionUpdate.co.nz  Fact sheet for Patients: ConventionMessageGatedate.co.nz       Methodology: Isothermal Nucleic Acid Amplification                Image:  I have personally reviewed patients imaging showing  NM ACUTE GI BLEED SCAN   Final Result   1. Increasing activity over the more distal rectum suggesting an active rectal   bleed. XR CHEST PORT   Final Result   1. Moderate multifocal bilateral lung infiltrates as described above. Although   nonspecific, some infiltrate favors the periphery of the lung which can been   indicator for an atypical pneumonia such as Covid 19. This report was made using voice transcription. Despite my best efforts to avoid   any, transcription errors may persist. If there is any question about the   accuracy of the report or need for clarification, then please call [phone], or text me through [email or web address] for clarification or correction.             Hospital problems     Patient Active Problem List   Diagnosis Code    Essential hypertension I10    GERD (gastroesophageal reflux disease) K21.9    Depression F32. A    Asthma J45.909    B12 deficiency E53.8    Hiatal hernia K44.9    Neuropathy G62.9    PAD (peripheral artery disease) ([facility]) I73.9    HLD (hyperlipidemia) E78.5    DJD (degenerative joint disease) of hip M16.9    Controlled type 2 diabetes mellitus with microalbuminuria, without long-term current use of insulin ([facility]) E11.29, R80.9    Hearing loss H91.90    Callus L84    Claw toe Q66.89    Anxiety F41.9    Type 2 diabetes mellitus with diabetic neuropathy ([facility]) E11.40    COPD (chronic obstructive pulmonary disease) ([facility]) J44.9    DNR (do not resuscitate) Z66    CKD (chronic kidney disease) stage 3, GFR 30-59 ml/min ([facility]) N18.30    Abdominal wall hernia K43.9    Iron deficiency anemia due to chronic blood loss D50.0    Chronic bilateral low back pain without sciatica M54.50, G89.29    Physical debility R53.81    Frequent falls R29.6    Moderate protein-calorie malnutrition ([facility]) E44.0    Lower GI bleed K92.2        I have reviewed, updated, and verified this note's content and spent 38 minutes of my 42 minutes visit performing counseling and coordination of care regarding medical management.        Signed By: Ivonne Horne MD     January 24, 2022

## 2022-01-24 NOTE — CONSULTS
H&P/Consult Note/Progress Note/Office Note:   Patric Morales  MRN: [id number]  :1929  Age:89 y.o. General Surgery Consult ordered by: Dr. Pham Wall  Reason For General Surgery Consult: GI Bleed    HPI: Patric Morales is a 80 y.o. female     who is currently COVID + with a past medical History of HTN, HLD, reflux, DM2, COPD, asthma, CKD, prior GI bleeding, CHF - ECHO 2021 with EF >55%, CAD, PAD, diverticulitis s/p partial colectomy, colon polyps, and diverticulosis.     Pt presented to the ER 22 via EMS with concerns related to increased blood sugar and sore on her backside.  In the ED, pt had a large maroon bloody stool with clots.  Her Hgb was 9.6, down from 11.6 on 21.  Pt and family deny any recent GI bleeding. Pt was noted with GI bleeding during a previous admission in 2020.     22. No current rectal bleeding. Hgb 9.5      Nuclear medicine GI bleed scan 2022     INDICATION:  Rectal bleeding. History of partial colectomy.     COMPARISON: None.     TRACER: 24.5 mCi of Tc-99m UltraTag labeled red blood cells.     TECHNIQUE: Anterior and lateral planar imaging over the abdomen was performed through 1 hour following the uneventful ministration of radiotracer.     FINDINGS:   Following the administration of radiotracer, increasing activity is seen over the more distal rectum throughout the examination. Additional activity is seen in the stomach which does not progress felt to result from the presence of free pertechnetate.     IMPRESSION  1.  Increasing activity over the more distal rectum suggesting an active rectal bleed.            Past Medical History:   Diagnosis Date    Anxiety 2018    Arrhythmia     palpitations 2/10-went to [facility]    Arthritis     L leg- fell     Asthma     adult onset x 20 yrs    B12 deficiency 2012    Body mass index 37.0-37.9, adult 2014    CAD (coronary artery disease)     cardiac work up-[id number]    Controlled type 2 diabetes mellitus with microalbuminuria, without long-term current use of insulin ([facility] 11/22/2016    COPD     Corns and callosities 12/19/2016    CRI (chronic renal insufficiency) 8/24/2012    Depression 6/1/2012    Dermatophytosis of nail 12/19/2016    Diabetes ([facility]     type II- home tests fasting-112    DJD (degenerative joint disease) of hip     DM (diabetes mellitus) type II controlled with renal manifestation ([facility] 7/16/2010    Encounter for long-term (current) use of other medications 1/8/2013    Essential hypertension 7/16/2010    Gastrointestinal disorder     GERD (gastroesophageal reflux disease)     GI bleed 7/28/2018    Heart failure ([facility]     Hiatal hernia 8/24/2012    HLD (hyperlipidemia) 1/8/2013    Neuropathy 8/24/2012    Lower limbs     Obesity (BMI 30.0-39. 9) BMI-43.8    Other and unspecified hyperlipidemia 7/16/2010    Other chest pain 7/16/2010    Other ill-defined conditions(799.89)     high cholesterol    Other ill-defined conditions(799.89)     incont.  urine    PAD (peripheral artery disease) ([facility] 8/24/2012    Retinal hemorrhage      Past Surgical History:   Procedure Laterality Date    HX APPENDECTOMY      HX CHOLECYSTECTOMY      HX CHOLECYSTECTOMY  2000    HX ENDOSCOPY  02/27/2018    Dr Mili Whyte    HX GI      small intestine obstruction    HX GYN      hyst    HX HEART CATHETERIZATION      HX HYSTERECTOMY      prolapse    HX INTRACRANIAL ANEURYSM REPAIR      HX INTRACRANIAL ANEURYSM REPAIR      HX ORTHOPAEDIC      right wrist 2010    HX OTHER SURGICAL      aneurysm clip-cerebral-2000    HX OTHER SURGICAL      cerebral aneurysm   Dr. Spring Boards UNLISTED       Current Facility-Administered Medications   Medication Dose Route Frequency    sertraline (ZOLOFT) tablet 50 mg  50 mg Oral DAILY    0.9% sodium chloride infusion 250 mL  250 mL IntraVENous PRN    insulin lispro (HUMALOG) injection   SubCUTAneous Q4H    cloNIDine HCL (CATAPRES) tablet 0.2 mg  0.2 mg Oral BID PRN    tuberculin injection 5 Units  5 Units IntraDERMal ONCE    0.9% sodium chloride infusion  50 mL/hr IntraVENous CONTINUOUS    sodium chloride (NS) flush 5-40 mL  5-40 mL IntraVENous Q8H    sodium chloride (NS) flush 5-40 mL  5-40 mL IntraVENous PRN    acetaminophen (TYLENOL) tablet 650 mg  650 mg Oral Q6H PRN    Or    acetaminophen (TYLENOL) suppository 650 mg  650 mg Rectal Q6H PRN    senna (SENOKOT) tablet 17.2 mg  2 Tablet Oral BID PRN    ondansetron (ZOFRAN) injection 4 mg  4 mg IntraVENous Q6H PRN    dexamethasone (DECADRON) 10 mg/mL injection 6 mg  6 mg IntraVENous Q24H    insulin lispro (HUMALOG) injection 3 Units  3 Units SubCUTAneous Q4H    baricitinib (OLUMIANT) tablet 1 mg  1 mg Oral DAILY    0.9% sodium chloride infusion 250 mL  250 mL IntraVENous PRN    [START ON 1/25/2022] pantoprazole (PROTONIX) 40 mg in 0.9% sodium chloride 10 mL injection  40 mg IntraVENous DAILY     Current Outpatient Medications   Medication Sig    Blood-Glucose Meter monitoring kit Use as directed 3 times daily to check blood sugars. Dx:E11.29    glucose blood VI test strips (blood glucose test) strip Use as directed 3 times daily to check blood sugars. Dx:E11.29    lancets misc Use as directed 3 times daily to check blood sugars. Dx:E11.29    cloNIDine HCL (CATAPRES) 0.2 mg tablet Take 1 Tablet by mouth three (3) times daily for 60 days.  pravastatin (PRAVACHOL) 40 mg tablet TAKE 1 TABLET BY MOUTH EVERY NIGHT AT BEDTIME    pantoprazole (PROTONIX) 20 mg tablet TAKE 1 TABLET BY MOUTH DAILY    metFORMIN (GLUCOPHAGE) 500 mg tablet Take 1 Tablet by mouth two (2) times daily (with meals).  gabapentin (NEURONTIN) 100 mg capsule Take 1 Capsule by mouth three (3) times daily.  sertraline (ZOLOFT) 100 mg tablet Take 1 Tablet by mouth daily.  Indications: major depressive disorder, am (Patient not taking: Reported on 12/15/2021)    amLODIPine (NORVASC) 10 mg tablet Take 1 Tablet by mouth daily. (Patient not taking: Reported on 12/15/2021)    lidocaine 4 % patch Apply to affected area daily prn.  fluticasone propion-salmeteroL (Advair Diskus) 250-50 mcg/dose diskus inhaler Take 1 Puff by inhalation every twelve (12) hours.  potassium chloride (K-DUR, KLOR-CON) 20 mEq tablet TAKE 1 TABLET BY MOUTH EVERY MORNING    benazepriL (LOTENSIN) 40 mg tablet TAKE 1 TABLET BY MOUTH DAILY    furosemide (LASIX) 20 mg tablet Take 1 Tab by mouth daily. (Patient not taking: Reported on 12/15/2021)    fluticasone propionate (FLONASE) 50 mcg/actuation nasal spray 2 Sprays by Both Nostrils route daily.  hydrALAZINE (APRESOLINE) 100 mg tablet Take 1 Tab by mouth three (3) times daily.  Wheel Chair kalyn Wheelchair    spironolactone (ALDACTONE) 25 mg tablet Take 1 Tab by mouth daily.  acetaminophen (TYLENOL ARTHRITIS PAIN) 650 mg TbER Take 650 mg by mouth every eight (8) hours. Tylenol arthritis as needed    VENTOLIN HFA 90 mcg/actuation inhaler INHALE 2 PUFFS BY MOUTH EVERY 6 HOURS AS NEEDED FOR WHEEZING    polyethylene glycol (MIRALAX) 17 gram/dose powder Take 17 g by mouth daily. 1 tablespoon with 8 oz of water daily    FOLIC ACID/MULTIVIT-MIN/LUTEIN (CENTRUM SILVER PO) Take  by mouth.  OTHER One shower chair    VIT A/VIT C/VIT E/ZINC/COPPER (PRESERVISION AREDS PO) Take 1 Tab by mouth two (2) times a day. Patient has no known allergies.   Social History     Socioeconomic History    Marital status: SINGLE   Tobacco Use    Smoking status: Never Smoker    Smokeless tobacco: Never Used   Vaping Use    Vaping Use: Never used   Substance and Sexual Activity    Alcohol use: No    Drug use: No    Sexual activity: Never   Social History Narrative     with four children     Social History     Tobacco Use   Smoking Status Never Smoker   Smokeless Tobacco Never Used     Family History   Problem Relation Age of Onset    Cancer Mother     Diabetes Father  Diabetes Paternal Grandfather      ROS: The patient has no difficulty with chest pain or shortness of breath. No fever or chills. Comprehensive review of systems was otherwise unremarkable except as noted above. Physical Exam:   Visit Vitals  BP (!) 150/94   Pulse 83   Temp 99.5 °F (37.5 °C)   Resp 13   Ht 5' 2\" (1.575 m)   Wt 159 lb (72.1 kg)   SpO2 94%   BMI 29.08 kg/m²     Vitals:    01/24/22 0500 01/24/22 0600 01/24/22 0629 01/24/22 0925   BP: (!) 127/92 (!) 140/72 (!) 156/81 (!) 150/94   Pulse: 81 79 86 83   Resp: 14 12 14 13   Temp:    99.5 °F (37.5 °C)   SpO2: 97% 93% 92% 94%   Weight:       Height:         No intake/output data recorded. 01/22 1901 - 01/24 0700  In: 602.5 [I.V.:250]  Out: -     Constitutional: Elderly, Alert, cooperative, NAD   Eyes: Sclera are clear. EOMs intact  ENMT: no external lesions gross hearing normal; no obvious neck masses, no ear or lip lesions, nares normal  CV: RRR. Normal perfusion  Resp: No JVD. Breathing is  non-labored; no audible wheezing. 3L O2 via NC   GI: soft and non-distended, non-tender    Musculoskeletal:  No embolic signs or cyanosis.    Neuro: moves all 4; no focal deficits  Psychiatric: normal affect and mood    Recent vitals (if inpt):  Patient Vitals for the past 24 hrs:   BP Temp Pulse Resp SpO2 Height Weight   01/24/22 0925 (!) 150/94 99.5 °F (37.5 °C) 83 13 94 % -- --   01/24/22 0629 (!) 156/81 -- 86 14 92 % -- --   01/24/22 0600 (!) 140/72 -- 79 12 93 % -- --   01/24/22 0500 (!) 127/92 -- 81 14 97 % -- --   01/24/22 0400 123/73 -- 75 15 96 % -- --   01/24/22 0300 (!) 122/108 98.1 °F (36.7 °C) 76 17 97 % -- --   01/24/22 0252 -- 98.4 °F (36.9 °C) 79 16 -- -- --   01/24/22 0230 (!) 133/57 -- 85 13 97 % -- --   01/24/22 0200 -- -- 92 15 97 % -- --   01/24/22 0100 124/62 -- 84 15 97 % -- --   01/24/22 0030 (!) 156/68 -- 92 15 98 % -- --   01/24/22 0000 (!) 151/81 -- 89 15 97 % -- --   01/23/22 2345 (!) 167/76 -- (!) 106 14 97 % -- --   01/23/22 2334 (!) 153/68 99.5 °F (37.5 °C) 99 14 97 % -- --   01/23/22 2330 (!) 153/68 -- 99 14 97 % -- --   01/23/22 2319 (!) 173/72 98.5 °F (36.9 °C) 91 14 97 % -- --   01/23/22 2300 (!) 180/60 -- 92 16 96 % -- --   01/23/22 2200 (!) 144/69 -- 93 14 96 % -- --   01/23/22 2130 (!) 149/68 -- 92 15 92 % -- --   01/23/22 2100 (!) 132/52 -- 81 15 97 % -- --   01/23/22 2030 (!) 123/45 98.1 °F (36.7 °C) 82 14 99 % -- --   01/23/22 1502 137/72 98.3 °F (36.8 °C) 95 19 94 % -- --   01/23/22 1448 (!) 111/46 -- -- -- 90 % -- --   01/23/22 1438 -- -- -- -- (!) 86 % -- --   01/23/22 1433 (!) 120/99 -- 90 24 (!) 86 % 5' 2\" (1.575 m) 159 lb (72.1 kg)       Amount and/or Complexity of Data Reviewed and Analyzed:  I reviewed and analyzed all of the unique labs and radiologic studies that are shown below as well as any that are in the HPI, and any that are in the expanded problem list below  *Each unique test, order, or document contributes to the combination of 2 or combination of 3 in Category 1 below. For this visit I also reviewed old records and prior notes. Recent Labs     01/24/22  0338 01/23/22 2047 01/23/22  1500   WBC  --   --  9.4   HGB 9.5*   < > 9.6*   PLT  --   --  170   NA  --   --  139   K  --   --  3.9   CL  --   --  106   CO2  --   --  22   BUN  --   --  43*   CREA  --   --  1.56*   GLU  --   --  483*   PTP  --   --  12.6   INR  --   --  0.9   TBILI  --   --  0.3   ALT  --   --  38   AP  --   --  88    < > = values in this interval not displayed.      Review of most recent CBC  Lab Results   Component Value Date/Time    WBC 9.4 01/23/2022 03:00 PM    HGB 9.5 (L) 01/24/2022 03:38 AM    HCT 30.0 (L) 01/24/2022 03:38 AM    PLATELET 613 21/20/4268 03:00 PM    MCV 97.2 01/23/2022 03:00 PM       Review of most recent BMP  Lab Results   Component Value Date/Time    Sodium 139 01/23/2022 03:00 PM    Potassium 3.9 01/23/2022 03:00 PM    Chloride 106 01/23/2022 03:00 PM    CO2 22 01/23/2022 03:00 PM    Anion gap 11 01/23/2022 03:00 PM    Glucose 483 (HH) 01/23/2022 03:00 PM    BUN 43 (H) 01/23/2022 03:00 PM    Creatinine 1.56 (H) 01/23/2022 03:00 PM    BUN/Creatinine ratio 30 (H) 08/31/2021 03:26 PM    GFR est AA 40 (L) 01/23/2022 03:00 PM    GFR est non-AA 33 (L) 01/23/2022 03:00 PM    Calcium 9.8 01/23/2022 03:00 PM       Review of most recent LFTs (and lipase if done)  Lab Results   Component Value Date/Time    ALT (SGPT) 38 01/23/2022 03:00 PM    AST (SGOT) 23 01/23/2022 03:00 PM    Alk. phosphatase 88 01/23/2022 03:00 PM    Bilirubin, direct 0.13 08/31/2021 03:26 PM    Bilirubin, total 0.3 01/23/2022 03:00 PM     Lab Results   Component Value Date/Time    Lipase 201 07/19/2017 06:00 AM       Lab Results   Component Value Date/Time    INR 0.9 01/23/2022 03:00 PM    aPTT 27.1 07/27/2018 08:53 PM    Bilirubin, direct 0.13 08/31/2021 03:26 PM    Troponin-I <0.05 03/01/2012 02:53 PM    Troponin-I, Qt. <0.02 (L) 08/06/2019 06:25 PM       Review of most recent HgbA1c  Lab Results   Component Value Date/Time    Hemoglobin A1c 8.9 (H) 01/23/2022 03:00 PM       Nutritional assessment screen for wound healing issues:  Lab Results   Component Value Date/Time    Protein, total 5.9 (L) 01/23/2022 03:00 PM    Albumin 2.1 (L) 01/23/2022 03:00 PM       @lastcovr@  XR Results (most recent):  Results from Hospital Encounter encounter on 01/23/22    XR CHEST PORT    Narrative  CHEST X-RAY, single portable view  1/23/2022    History: Hypoxia and GI bleed. Technique: Single frontal view of the chest.    Comparison: Chest x-ray 3/3/2021    Findings: The cardiac silhouette is mildly enlarged although stable. The lungs are  expanded without evidence for pneumothorax. Moderate multifocal infiltrate is  seen in the bilateral mid and lower lung fields, right greater than left. Infiltrate particularly in the left lung favors the periphery of the lungs. Impression  1. Moderate multifocal bilateral lung infiltrates as described above.  Although  nonspecific, some infiltrate favors the periphery of the lung which can been  indicator for an atypical pneumonia such as Covid 19. This report was made using voice transcription. Despite my best efforts to avoid  any, transcription errors may persist. If there is any question about the  accuracy of the report or need for clarification, then please call [phone], or text me through perfectserv for clarification or correction. CT Results (most recent):  Results from Hospital Encounter encounter on 12/14/21    CT HIP RT WO CONT    Addendum 12/15/2021  1:59 PM  Addendum: Addendum: Correction:    RIGHT hip pain following fall is the correct history of laterality. No fracture    Narrative  CT left hip WITHOUT CONTRAST. INDICATION: Left hip pain following fall. TECHNIQUE: 2.0mm axial scans through the joint with sagittal and coronal  reconstructions. Radiation dose reduction techniques were used for this study. Our CT scanners use one or more of the following:  Automated exposure control,  adjustment of the mA and or kV according to patient size, iterative  reconstruction. FINDINGS: No hip fracture. Osteoarthritis of both hip joints. Sacrum is  unremarkable. Degenerative changes lumbar spine. Pubic rami intact. Moderate  stool in the rectum. Diverticulosis. Aortoiliac atherosclerosis. Impression  Negative for hip fracture. MRI is more sensitive for  radiographically occult fractures if clinical suspicion persists. US Results (most recent):  Results from [place] encounter on 05/28/14    US HEAD NECK SOFT TISSUE    Narrative  Thyroid ultrasound    History: Thyroid nodule. Real-time sonography of the thyroid gland was performed. No prior studies are  available for comparison. The study has just now been brought to my attention  as an unreported case. Findings: The right thyroid lobe measures approximately 5.3 x 1.7 x 2.2 cm.  The  left thyroid lobe measures approximately 5.7 x 2.2 x 1.6 cm. The isthmus  measures 6 mm. There are several nodules within the thyroid gland. The largest  resides at the lower pole on the right measuring 1.8 x 0.9 x 1.3 cm. A 1.2 cm  hypoechoic nodule resides at the lower pole left thyroid lobe. The thyroid  echotexture is mildly heterogeneous. Impression: Mildly enlarged thyroid gland with several nodules. The findings  are most suggestive of a multinodular goiter.         Admission date (for inpatients): 1/23/2022   Day of Surgery  Procedure(s):  SIGMOIDOSCOPY FLEXIBLE COVID POSITIVE PREP/RECOVER IN [place]        ASSESSMENT/PLAN:  Problem List  Date Reviewed: 11/30/2021          Codes Class Noted    Lower GI bleed ICD-10-CM: K92.2  ICD-9-CM: 578.9  1/23/2022        Moderate protein-calorie malnutrition ([address] ICD-10-CM: E44.0  ICD-9-CM: 263.0  12/16/2021        Frequent falls ICD-10-CM: R29.6  ICD-9-CM: V15.88  12/15/2021        Physical debility ICD-10-CM: R53.81  ICD-9-CM: 799.3  12/14/2021        Chronic bilateral low back pain without sciatica ICD-10-CM: M54.50, G89.29  ICD-9-CM: 724.2, 338.29  8/31/2021        Iron deficiency anemia due to chronic blood loss ICD-10-CM: D50.0  ICD-9-CM: 280.0  5/18/2020        Abdominal wall hernia ICD-10-CM: K43.9  ICD-9-CM: 553.20  2/23/2020        CKD (chronic kidney disease) stage 3, GFR 30-59 ml/min (HCC) ICD-10-CM: N18.30  ICD-9-CM: 585.3  4/18/2019        COPD (chronic obstructive pulmonary disease) (HCC) (Chronic) ICD-10-CM: J44.9  ICD-9-CM: 922  8/22/2018        DNR (do not resuscitate) (Chronic) ICD-10-CM: Z66  ICD-9-CM: V49.86  8/22/2018        Type 2 diabetes mellitus with diabetic neuropathy (HCC) (Chronic) ICD-10-CM: E11.40  ICD-9-CM: 250.60, 357.2  5/16/2018        Anxiety ICD-10-CM: F41.9  ICD-9-CM: 300.00  2/1/2018        Claw toe ICD-10-CM: Q66.89  ICD-9-CM: 754.71  7/12/2017        Callus ICD-10-CM: L84  ICD-9-CM: 700  6/15/2017        Controlled type 2 diabetes mellitus with microalbuminuria, without long-term current use of insulin ([facility] ICD-10-CM: E11.29, R80.9  ICD-9-CM: 250.40, 791.0  11/22/2016        Hearing loss ICD-10-CM: H91.90  ICD-9-CM: 389.9  10/19/2015        DJD (degenerative joint disease) of hip ICD-10-CM: M16.9  ICD-9-CM: 715.95  Unknown        HLD (hyperlipidemia) ICD-10-CM: E78.5  ICD-9-CM: 272.4  1/8/2013        B12 deficiency ICD-10-CM: E53.8  ICD-9-CM: 266.2  8/24/2012        Hiatal hernia ICD-10-CM: K44.9  ICD-9-CM: 553.3  8/24/2012        Neuropathy ICD-10-CM: G62.9  ICD-9-CM: 355.9  8/24/2012    Overview Signed 8/24/2012 12:56 PM by Mirian Juarez     Lower limbs             PAD (peripheral artery disease) ([facility] ICD-10-CM: I73.9  ICD-9-CM: 443.9  8/24/2012        Depression ICD-10-CM: F32. A  ICD-9-CM: 557  6/1/2012        Asthma ICD-10-CM: J45.909  ICD-9-CM: 493.90  6/1/2012        Essential hypertension (Chronic) ICD-10-CM: I10  ICD-9-CM: 401.9  7/16/2010        GERD (gastroesophageal reflux disease) (Chronic) ICD-10-CM: K21.9  ICD-9-CM: 530.81  7/16/2010            Active Problems:    Lower GI bleed (1/23/2022)           Number and Complexity of Problems addressed and   Risks of complications and/or morbidity of management    Plan:  Care Management per Hospitalist  NM GI bleeding scan  Trend Hgb  IVF  NPO  Protonix  GI Following  --possible Colonoscopy if bleeding continues  General Surgery will follow      Florence Fontenot NP

## 2022-01-24 NOTE — ED NOTES
Griffin Lombard in lab reports it will be one more hour until blood transfusion is ready. Patient remains in nuclear medicine.

## 2022-01-24 NOTE — PROGRESS NOTES
Patient awake resting in bed. Respirations present. On 3 L NC. No signs of distress. AxO x4. No needs expressed. S1 and S2 noted. Radial and pedal pulses palpable bilaterally. Bowel sounds active. Denies pain. Dual skin assessment completed with Bianca Schumacher RN. Noted bilateral sacral pressure injury. Foam dressing applied. Bed low and locked. Call light within reach. Bedside report given to oncoming RN.

## 2022-01-25 PROBLEM — J96.00 ACUTE RESPIRATORY FAILURE DUE TO COVID-19 (HCC): Status: ACTIVE | Noted: 2022-01-01

## 2022-01-25 PROBLEM — U07.1 ACUTE RESPIRATORY FAILURE DUE TO COVID-19 (HCC): Status: ACTIVE | Noted: 2022-01-01

## 2022-01-25 PROBLEM — U07.1 PNEUMONIA DUE TO COVID-19 VIRUS: Status: ACTIVE | Noted: 2022-01-01

## 2022-01-25 PROBLEM — J12.82 PNEUMONIA DUE TO COVID-19 VIRUS: Status: ACTIVE | Noted: 2022-01-01

## 2022-01-25 NOTE — PROGRESS NOTES
Hospitalist Progress Note   Admit Date:  2022  2:29 PM   Name:  Amee Tellez   Age:  80 y.o. Sex:  female  :  1929   MRN:  [id number]   Room:  [facility]    Presenting Complaint: Melena    Reason(s) for Admission: Lower GI bleed [facility] Course & Interval History:     Ms. Francois Finn is a 81 yo female with PMH of DM2, HTN, CKD, PAD admitted with acute blood loss anemia/GI bleed and found COVID 19 positive. S/p tagged PBC scan that is positive for active bleed distal rectum. S/p PRBC. Seen by GI s/p flex sig   22 showing few small non bleeding erosions of rectum and no active bleeding. Recommendations are for miralax. No surgical needs. She has been on 3 L NC. CXR shows moderate multifocal bilateral infiltrates. CRP 10.5. on decadron/baricitinib. Discharge plans pending. Subjective (22):     Tolerant to clears, no bleeding, no dyspnea,       Assessment & Plan:     Principal Problem:    Lower GI bleed (2022)   acute blood loss anemia:  · S/p PRBC  · appreciate GI  · Daily miralax  · Trend HGB        COPD (chronic obstructive pulmonary disease) (HCC)   COVID 19 pneumonia  · Wean O2 as tolerant  · D3/10 decadron  · D2/14 baricitinib          Active Problems:    Essential hypertension   · Home meds on hold  · Resume norvasc             Depression   · Continued zoloft           Type 2 diabetes mellitus with diabetic neuropathy (HCC)   ·   SSI      CKD2-3:  · Trend BMP        Dispo/Discharge Planning:    Pending     Diet:  ADULT DIET Clear Liquid  DVT PPx:  SCD  Code status: DNR    Hospital Problems as of 2022 Date Reviewed: 2021          Codes Class Noted - Resolved POA    * (Principal) Lower GI bleed ICD-10-CM: K92.2  ICD-9-CM: 578.9  2022 - Present Unknown        COPD (chronic obstructive pulmonary disease) (HCC) (Chronic) ICD-10-CM: J44.9  ICD-9-CM: 496  2018 - Present Yes        Type 2 diabetes mellitus with diabetic neuropathy ([facility] [address] (Chronic) ICD-10-CM: E11.40  ICD-9-CM: 250.60, 357.2  5/16/2018 - Present Yes        Depression ICD-10-CM: F32. A  ICD-9-CM: 129  6/1/2012 - Present Yes        Essential hypertension (Chronic) ICD-10-CM: I10  ICD-9-CM: 401.9  7/16/2010 - Present Yes        GERD (gastroesophageal reflux disease) (Chronic) ICD-10-CM: K21.9  ICD-9-CM: 530.81  7/16/2010 - Present Yes              Objective:     Patient Vitals for the past 24 hrs:   Temp Pulse Resp BP SpO2   01/25/22 1110 99.2 °F (37.3 °C) (!) 107 18 (!) 160/79 96 %   01/25/22 0750 97.8 °F (36.6 °C) 86 18 (!) 180/76 93 %   01/25/22 0215 98 °F (36.7 °C) 83 18 (!) 155/82 94 %   01/24/22 1946 98.5 °F (36.9 °C) 99 18 (!) 150/70 94 %   01/24/22 1658 98.6 °F (37 °C) 93 18 (!) 150/90 90 %   01/24/22 1625 -- 90 15 (!) 174/78 91 %   01/24/22 1620 -- 93 15 (!) 165/59 91 %   01/24/22 1610 -- 83 15 (!) 145/65 95 %   01/24/22 1600 -- 88 15 139/86 97 %   01/24/22 1557 -- 76 15 (!) 91/49 97 %   01/24/22 1556 98.1 °F (36.7 °C) 79 14 139/86 96 %   01/24/22 1512 -- 92 -- (!) 163/71 95 %   01/24/22 1500 -- 80 15 (!) 163/71 94 %     Oxygen Therapy  O2 Sat (%): 96 % (01/25/22 1110)  Pulse via Oximetry: 82 beats per minute (01/24/22 1500)  O2 Device: Nasal cannula (01/24/22 1625)  O2 Flow Rate (L/min): 3 l/min (01/24/22 1625)    Estimated body mass index is 29.08 kg/m² as calculated from the following:    Height as of this encounter: 5' 2\" (1.575 m). Weight as of this encounter: 72.1 kg (159 lb). Intake/Output Summary (Last 24 hours) at 1/25/2022 1410  Last data filed at 1/25/2022 0853  Gross per 24 hour   Intake 643 ml   Output 875 ml   Net -232 ml         Physical Exam:     Blood pressure (!) 160/79, pulse (!) 107, temperature 99.2 °F (37.3 °C), resp. rate 18, height 5' 2\" (1.575 m), weight 72.1 kg (159 lb), SpO2 96 %. General:    Well nourished. No overt distress, elderly, pleasant   CV:   RRR. No m/r/g. No jugular venous distension. No edema   Lungs:   CTAB.   No wheezing, rhonchi, or rales. Respirations even, unlabored, anterior   Abdomen: Bowel sounds present. Soft, nontender, nondistended. Extremities: No cyanosis or clubbing. No edema  Skin:     No rashes and normal coloration. Warm and dry. Neuro:   grossly intact. Psych:  Normal mood and affect.       I have reviewed ordered lab tests and independently visualized imaging below:    Recent Labs:  Recent Results (from the past 48 hour(s))   EKG, 12 LEAD, INITIAL    Collection Time: 01/23/22  2:53 PM   Result Value Ref Range    Ventricular Rate 93 BPM    Atrial Rate 94 BPM    P-R Interval 164 ms    QRS Duration 124 ms    Q-T Interval 390 ms    QTC Calculation (Bezet) 486 ms    Calculated P Axis 131 degrees    Calculated R Axis -100 degrees    Calculated T Axis 100 degrees    Diagnosis       Sinus rhythm  Atrial premature complex  RBBB and LAFB  Abnormal lateral Q waves  Non-specific ST-t wave changes    Confirmed by NIGHAT PELLETIER ()TAVO ([id number]) on 1/23/2022 4:53:10 PM     CBC W/O DIFF    Collection Time: 01/23/22  3:00 PM   Result Value Ref Range    WBC 9.4 4.3 - 11.1 K/uL    RBC 3.25 (L) 4.05 - 5.2 M/uL    HGB 9.6 (L) 11.7 - 15.4 g/dL    HCT 31.6 (L) 35.8 - 46.3 %    MCV 97.2 79.6 - 97.8 FL    MCH 29.5 26.1 - 32.9 PG    MCHC 30.4 (L) 31.4 - 35.0 g/dL    RDW 13.4 11.9 - 14.6 %    PLATELET 422 927 - 325 K/uL    MPV 10.6 9.4 - 12.3 FL    ABSOLUTE NRBC 0.00 0.0 - 0.2 K/uL   METABOLIC PANEL, COMPREHENSIVE    Collection Time: 01/23/22  3:00 PM   Result Value Ref Range    Sodium 139 136 - 145 mmol/L    Potassium 3.9 3.5 - 5.1 mmol/L    Chloride 106 98 - 107 mmol/L    CO2 22 21 - 32 mmol/L    Anion gap 11 7 - 16 mmol/L    Glucose 483 (HH) 65 - 100 mg/dL    BUN 43 (H) 8 - 23 MG/DL    Creatinine 1.56 (H) 0.6 - 1.0 MG/DL    GFR est AA 40 (L) >60 ml/min/1.73m2    GFR est non-AA 33 (L) >60 ml/min/1.73m2    Calcium 9.8 8.3 - 10.4 MG/DL    Bilirubin, total 0.3 0.2 - 1.1 MG/DL    ALT (SGPT) 38 12 - 65 U/L    AST (SGOT) 23 15 - 37 U/L    Alk. phosphatase 88 50 - 136 U/L    Protein, total 5.9 (L) 6.3 - 8.2 g/dL    Albumin 2.1 (L) 3.2 - 4.6 g/dL    Globulin 3.8 (H) 2.3 - 3.5 g/dL    A-G Ratio 0.6 (L) 1.2 - 3.5     PROTHROMBIN TIME + INR    Collection Time: 01/23/22  3:00 PM   Result Value Ref Range    Prothrombin time 12.6 12.6 - 14.5 sec    INR 0.9     TYPE & SCREEN    Collection Time: 01/23/22  3:00 PM   Result Value Ref Range    Crossmatch Expiration 01/26/2022,2359     ABO/Rh(D) AB POSITIVE     Antibody screen POS     Antibody ID NON-SPECIFIC COLD ANTIBODY DETECTED     Comment CALLED NAMAN IN ER @2051 THAT BLOOD WAS READY [place]     Unit number [id number]     Blood component type [facility]     Unit division 00     Status of unit TRANSFUSED     Crossmatch result Compatible     ANTIGEN/ANTIBODY INFO E NEGATIVE,     Unit number [id number]     Blood component type [facility]     Unit division 00     Status of unit ALLOCATED     Crossmatch result Compatible     ANTIGEN/ANTIBODY INFO E NEGATIVE,    RBC, ALLOCATE    Collection Time: 01/23/22  3:00 PM   Result Value Ref Range    HISTORY CHECKED?  Historical check performed    HEMOGLOBIN A1C WITH EAG    Collection Time: 01/23/22  3:00 PM   Result Value Ref Range    Hemoglobin A1c 8.9 (H) 4.20 - 6.30 %    Est. average glucose 209 mg/dL   C REACTIVE PROTEIN, QT    Collection Time: 01/23/22  3:00 PM   Result Value Ref Range    C-Reactive protein 10.5 (H) 0.0 - 0.9 mg/dL   PROCALCITONIN    Collection Time: 01/23/22  3:00 PM   Result Value Ref Range    Procalcitonin 0.17 0.00 - 0.49 ng/mL   FERRITIN    Collection Time: 01/23/22  3:00 PM   Result Value Ref Range    Ferritin 964 (H) 8 - 388 NG/ML   COVID-19 RAPID TEST    Collection Time: 01/23/22  3:57 PM   Result Value Ref Range    Specimen source Nasopharyngeal      COVID-19 rapid test Detected (AA) NOTD     GLUCOSE, POC    Collection Time: 01/23/22  6:14 PM   Result Value Ref Range    Glucose (POC) 382 (H) 65 - 100 mg/dL    Performed by [employer] HEMOGLOBIN    Collection Time: 01/23/22  8:47 PM   Result Value Ref Range    HGB 8.1 (L) 11.7 - 15.4 g/dL   GLUCOSE, POC    Collection Time: 01/23/22  9:50 PM   Result Value Ref Range    Glucose (POC) 384 (H) 65 - 100 mg/dL    Performed by Juan Jose Holloway    GLUCOSE, POC    Collection Time: 01/24/22 12:47 AM   Result Value Ref Range    Glucose (POC) 274 (H) 65 - 100 mg/dL    Performed by Juan Jose Holloway    URINALYSIS W/ RFLX MICROSCOPIC    Collection Time: 01/24/22  1:20 AM   Result Value Ref Range    Color YELLOW      Appearance CLOUDY      Specific gravity 1.016 1.001 - 1.023      pH (UA) 5.5 5.0 - 9.0      Protein 100 (A) NEG mg/dL    Glucose Negative NEG mg/dL    Ketone Negative NEG mg/dL    Bilirubin Negative NEG      Blood Negative NEG      Urobilinogen 0.2 0.2 - 1.0 EU/dL    Nitrites Negative NEG      Leukocyte Esterase MODERATE (A) NEG      WBC 0-3 0 /hpf    RBC 0-3 0 /hpf    Epithelial cells 0-3 0 /hpf    Bacteria 0 0 /hpf   HGB & HCT    Collection Time: 01/24/22  3:38 AM   Result Value Ref Range    HGB 9.5 (L) 11.7 - 15.4 g/dL    HCT 30.0 (L) 35.8 - 46.3 %   GLUCOSE, POC    Collection Time: 01/24/22  4:56 AM   Result Value Ref Range    Glucose (POC) 93 65 - 100 mg/dL    Performed by Kyra Villasenor    GLUCOSE, POC    Collection Time: 01/24/22  9:20 AM   Result Value Ref Range    Glucose (POC) 119 (H) 65 - 100 mg/dL    Performed by Alberto [address], POC    Collection Time: 01/24/22 12:43 PM   Result Value Ref Range    Glucose (POC) 110 (H) 65 - 100 mg/dL    Performed by Elvis    HEMOGLOBIN    Collection Time: 01/24/22 12:45 PM   Result Value Ref Range    HGB 7.7 (L) 11.7 - 15.4 g/dL   GLUCOSE, POC    Collection Time: 01/24/22  4:48 PM   Result Value Ref Range    Glucose (POC) 110 (H) 65 - 100 mg/dL    Performed by Tk Garcia    RBC, ALLOCATE    Collection Time: 01/24/22  6:00 PM   Result Value Ref Range    HISTORY CHECKED?  Historical check performed    PLEASE READ & DOCUMENT PPD TEST IN 24 HRS    Collection Time: 01/24/22  7:04 PM   Result Value Ref Range    PPD Negative Negative    mm Induration 0 0 - 5 mm   HEMOGLOBIN    Collection Time: 01/24/22 10:30 PM   Result Value Ref Range    HGB 8.5 (L) 11.7 - 15.4 g/dL   GLUCOSE, POC    Collection Time: 01/24/22 10:38 PM   Result Value Ref Range    Glucose (POC) 257 (H) 65 - 100 mg/dL    Performed by LuisMandasiaRN    CBC WITH AUTOMATED DIFF    Collection Time: 01/25/22  7:00 AM   Result Value Ref Range    WBC 8.9 4.3 - 11.1 K/uL    RBC 2.99 (L) 4.05 - 5.2 M/uL    HGB 8.5 (L) 11.7 - 15.4 g/dL    HCT 26.8 (L) 35.8 - 46.3 %    MCV 89.6 79.6 - 97.8 FL    MCH 28.4 26.1 - 32.9 PG    MCHC 31.7 31.4 - 35.0 g/dL    RDW 18.6 (H) 11.9 - 14.6 %    PLATELET 925 001 - 318 K/uL    MPV 11.1 9.4 - 12.3 FL    ABSOLUTE NRBC 0.00 0.0 - 0.2 K/uL    DF PENDING    METABOLIC PANEL, BASIC    Collection Time: 01/25/22  7:01 AM   Result Value Ref Range    Sodium 145 136 - 145 mmol/L    Potassium 3.5 3.5 - 5.1 mmol/L    Chloride 117 (H) 98 - 107 mmol/L    CO2 23 21 - 32 mmol/L    Anion gap 5 (L) 7 - 16 mmol/L    Glucose 178 (H) 65 - 100 mg/dL    BUN 42 (H) 8 - 23 MG/DL    Creatinine 1.09 (H) 0.6 - 1.0 MG/DL    GFR est AA >60 >60 ml/min/1.73m2    GFR est non-AA 50 (L) >60 ml/min/1.73m2    Calcium 9.4 8.3 - 10.4 MG/DL   GLUCOSE, POC    Collection Time: 01/25/22  7:18 AM   Result Value Ref Range    Glucose (POC) 181 (H) 65 - 100 mg/dL    Performed by DillardMandasiaRN    GLUCOSE, POC    Collection Time: 01/25/22 10:22 AM   Result Value Ref Range    Glucose (POC) 272 (H) 65 - 100 mg/dL    Performed by [employer]        All Micro Results     Procedure Component Value Units Date/Time    COVID-19 RAPID TEST [[id number]]  (Abnormal) Collected: 01/23/22 5703    Order Status: Completed Specimen: Nasopharyngeal Updated: 01/23/22 6556     Specimen source Nasopharyngeal        COVID-19 rapid test Detected        Comment:      The specimen is POSITIVE for SARS-CoV-2, the novel coronavirus associated with COVID-19. This test has been authorized by the FDA under an Emergency Use Authorization (EUA) for use by authorized laboratories. Fact sheet for Healthcare Providers: ConventionUpdate.co.nz  Fact sheet for Patients: ConventionUpdate.co.nz       Methodology: Isothermal Nucleic Acid Amplification               Other Studies:  No results found. Current Meds:  Current Facility-Administered Medications   Medication Dose Route Frequency    sertraline (ZOLOFT) tablet 50 mg  50 mg Oral DAILY    polyethylene glycol (MIRALAX) packet 17 g  17 g Oral DAILY    dextrose 40% (GLUTOSE) oral gel 1 Tube  15 g Oral PRN    glucagon (GLUCAGEN) injection 1 mg  1 mg IntraMUSCular PRN    dextrose 10% infusion 125-250 mL  125-250 mL IntraVENous PRN    insulin lispro (HUMALOG) injection   SubCUTAneous AC&HS    0.9% sodium chloride infusion 250 mL  250 mL IntraVENous PRN    cloNIDine HCL (CATAPRES) tablet 0.2 mg  0.2 mg Oral BID PRN    sodium chloride (NS) flush 5-40 mL  5-40 mL IntraVENous Q8H    sodium chloride (NS) flush 5-40 mL  5-40 mL IntraVENous PRN    acetaminophen (TYLENOL) tablet 650 mg  650 mg Oral Q6H PRN    Or    acetaminophen (TYLENOL) suppository 650 mg  650 mg Rectal Q6H PRN    senna (SENOKOT) tablet 17.2 mg  2 Tablet Oral BID PRN    ondansetron (ZOFRAN) injection 4 mg  4 mg IntraVENous Q6H PRN    dexamethasone (DECADRON) 10 mg/mL injection 6 mg  6 mg IntraVENous Q24H    baricitinib (OLUMIANT) tablet 1 mg  1 mg Oral DAILY    0.9% sodium chloride infusion 250 mL  250 mL IntraVENous PRN    pantoprazole (PROTONIX) 40 mg in 0.9% sodium chloride 10 mL injection  40 mg IntraVENous DAILY       Signed:  Sharon Rodriguez MD    Part of this note may have been written by using a voice dictation software. The note has been proof read but may still contain some grammatical/other typographical errors.

## 2022-01-25 NOTE — PROGRESS NOTES
Pt is a readmission from December 14-24, 2021. Readmission assessment completed. Chart screened by CM for d/c planning. Pt was d/c on 12-24-21 to [facility] for [address] [place]. Per progress notes pt's family took pt home after approximately one week at the SNF r/t fear that pt would become infected with COVID-19. Pt's contacts are her daughter Shankar Bruce [phone] and her son Erica Farley [phone]. Pt has a [facility] : Luis Shaikh - [phone]. Pt has insurance with pharmacy benefits and a PCP. Pt presented to the ED with c/o elevated blood sugar and \"sore on backside. \"  Pt admitted with Dx of Lower GI bleed, Hx of B12 deficiency and anemia of chronic disease, Bilateral COVID-19 PNA, Acute hypoxic respiratory failure due to COVID-19 PNA, DM-II uncontrolled, Low normal BP, HTN, Bilateral LL edema, CKD-III, Obstructive lung dz, PAD Hx of retinal hemorrhage, and urinary incontinence. On 1-24-22 pt had a Flexible sigmoidoscopy performed. No active bleeding found. Pt is currently NPO. She is requiring 3L O2 NC. PT and OT consults have been ordered. CM is awaiting recommendations. Current d/c plan is undetermined at the present time. CM will continue to follow and remain available if any needs arise. Care Management Interventions  PCP Verified by CM: Yes (Lakesha Burns NP)  Mode of Transport at Discharge:  Other (see comment) (Famiily)  Transition of Care Consult (CM Consult): Discharge Planning  Physical Therapy Consult: Yes  Occupational Therapy Consult: Yes  Speech Therapy Consult: No  Support Systems: Child(edward)  Confirm Follow Up Transport: Family  The Patient and/or Patient Representative was Provided with a Choice of Provider and Agrees with the Discharge Plan?: Yes  Name of the Patient Representative Who was Provided with a Choice of Provider and Agrees with the Discharge Plan: Mayito Koehler Indira Bruce (daughter)  Freedom of Choice List was Provided with Basic Dialogue that Supports the Patient's Individualized Plan of Care/Goals, Treatment Preferences and Shares the Quality Data Associated with the Providers?: Yes   Resource Information Provided?: No  Discharge Location  Patient Expects to be Discharged to[de-identified] Unable to determine at this time

## 2022-01-25 NOTE — ANESTHESIA POSTPROCEDURE EVALUATION
Procedure(s):  SIGMOIDOSCOPY FLEXIBLE COVID POSITIVE PREP/RECOVER IN FLOURO Admit to room 503.     total IV anesthesia    Anesthesia Post Evaluation      Multimodal analgesia: multimodal analgesia used between 6 hours prior to anesthesia start to PACU discharge  Patient location during evaluation: bedside  Patient participation: complete - patient participated  Level of consciousness: awake and alert  Pain management: adequate  Airway patency: patent  Anesthetic complications: no  Cardiovascular status: acceptable  Respiratory status: acceptable  Hydration status: acceptable  Post anesthesia nausea and vomiting:  controlled  Final Post Anesthesia Temperature Assessment:  Normothermia (36.0-37.5 degrees C)      INITIAL Post-op Vital signs:   Vitals Value Taken Time   /90 01/24/22 1658   Temp 37 °C (98.6 °F) 01/24/22 1658   Pulse 93 01/24/22 1658   Resp 18 01/24/22 1658   SpO2 90 % 01/24/22 1658

## 2022-01-25 NOTE — PROGRESS NOTES
H&P/Consult Note/Progress Note/Office Note:   Linda Hurley  MRN: [id number]  :1929  Age:89 y.o. General Surgery Consult ordered by: Dr. Rosemary Knowles  Reason For General Surgery Consult: GI Bleed    HPI: Linda Hurley is a 80 y.o. female     who is currently COVID + with a past medical History of HTN, HLD, reflux, DM2, COPD, asthma, CKD, prior GI bleeding, CHF - ECHO 2021 with EF >55%, CAD, PAD, diverticulitis s/p partial colectomy, colon polyps, and diverticulosis.     Pt presented to the ER 22 via EMS with concerns related to increased blood sugar and sore on her backside.  In the ED, pt had a large maroon bloody stool with clots.  Her Hgb was 9.6, down from 11.6 on 21.  Pt and family deny any recent GI bleeding. Pt was noted with GI bleeding during a previous admission in 2020.     22. No current rectal bleeding. Hgb 9.5      Nuclear medicine GI bleed scan 2022     INDICATION:  Rectal bleeding. History of partial colectomy.     COMPARISON: None.     TRACER: 24.5 mCi of Tc-99m UltraTag labeled red blood cells.     TECHNIQUE: Anterior and lateral planar imaging over the abdomen was performed through 1 hour following the uneventful ministration of radiotracer.     FINDINGS:   Following the administration of radiotracer, increasing activity is seen over the more distal rectum throughout the examination. Additional activity is seen in the stomach which does not progress felt to result from the presence of free pertechnetate.     IMPRESSION  1.  Increasing activity over the more distal rectum suggesting an active rectal bleed. 22: Pt awake in bed. No complaints. No active bleeding. Hgb 8.5. AF, NAD.        Past Medical History:   Diagnosis Date    Anxiety 2018    Arrhythmia     palpitations 2/10-went to [facility]    Arthritis     L leg- fell     Asthma     adult onset x 20 yrs    B12 deficiency 2012    Body mass index 37.0-37.9, adult 2014    CAD (coronary artery disease)     cardiac work [id number]    Controlled type 2 diabetes mellitus with microalbuminuria, without long-term current use of insulin ([address] 11/22/2016    COPD     Corns and callosities 12/19/2016    CRI (chronic renal insufficiency) 8/24/2012    Depression 6/1/2012    Dermatophytosis of nail 12/19/2016    Diabetes ([address]     type II- home tests fasting-112    DJD (degenerative joint disease) of hip     DM (diabetes mellitus) type II controlled with renal manifestation ([address] 7/16/2010    Encounter for long-term (current) use of other medications 1/8/2013    Essential hypertension 7/16/2010    Gastrointestinal disorder     GERD (gastroesophageal reflux disease)     GI bleed 7/28/2018    Heart failure ([address]     Hiatal hernia 8/24/2012    HLD (hyperlipidemia) 1/8/2013    Neuropathy 8/24/2012    Lower limbs     Obesity (BMI 30.0-39. 9) BMI-43.8    Other and unspecified hyperlipidemia 7/16/2010    Other chest pain 7/16/2010    Other ill-defined conditions(799.89)     high cholesterol    Other ill-defined conditions(799.89)     incont.  urine    PAD (peripheral artery disease) ([address] 8/24/2012    Retinal hemorrhage      Past Surgical History:   Procedure Laterality Date    HX APPENDECTOMY      HX CHOLECYSTECTOMY      HX CHOLECYSTECTOMY  2000    HX ENDOSCOPY  02/27/2018    Dr Brian Fernandez    HX GI      small intestine obstruction    HX GYN      hyst    HX HEART CATHETERIZATION      HX HYSTERECTOMY      prolapse    HX INTRACRANIAL ANEURYSM REPAIR      HX INTRACRANIAL ANEURYSM REPAIR      HX ORTHOPAEDIC      right wrist 2010    HX OTHER SURGICAL      aneurysm clip-cerebral-2000    HX OTHER SURGICAL      cerebral aneurysm   Dr. Sina Williamson UNLISTED       Current Facility-Administered Medications   Medication Dose Route Frequency    sertraline (ZOLOFT) tablet 50 mg  50 mg Oral DAILY    polyethylene glycol (MIRALAX) packet 17 g  17 g Oral DAILY    dextrose 40% (GLUTOSE) oral gel 1 Tube  15 g Oral PRN    glucagon (GLUCAGEN) injection 1 mg  1 mg IntraMUSCular PRN    dextrose 10% infusion 125-250 mL  125-250 mL IntraVENous PRN    insulin lispro (HUMALOG) injection   SubCUTAneous AC&HS    0.9% sodium chloride infusion 250 mL  250 mL IntraVENous PRN    cloNIDine HCL (CATAPRES) tablet 0.2 mg  0.2 mg Oral BID PRN    sodium chloride (NS) flush 5-40 mL  5-40 mL IntraVENous Q8H    sodium chloride (NS) flush 5-40 mL  5-40 mL IntraVENous PRN    acetaminophen (TYLENOL) tablet 650 mg  650 mg Oral Q6H PRN    Or    acetaminophen (TYLENOL) suppository 650 mg  650 mg Rectal Q6H PRN    senna (SENOKOT) tablet 17.2 mg  2 Tablet Oral BID PRN    ondansetron (ZOFRAN) injection 4 mg  4 mg IntraVENous Q6H PRN    dexamethasone (DECADRON) 10 mg/mL injection 6 mg  6 mg IntraVENous Q24H    baricitinib (OLUMIANT) tablet 1 mg  1 mg Oral DAILY    0.9% sodium chloride infusion 250 mL  250 mL IntraVENous PRN    pantoprazole (PROTONIX) 40 mg in 0.9% sodium chloride 10 mL injection  40 mg IntraVENous DAILY     Patient has no known allergies. Social History     Socioeconomic History    Marital status: SINGLE   Tobacco Use    Smoking status: Never Smoker    Smokeless tobacco: Never Used   Vaping Use    Vaping Use: Never used   Substance and Sexual Activity    Alcohol use: No    Drug use: No    Sexual activity: Never   Social History Narrative     with four children     Social History     Tobacco Use   Smoking Status Never Smoker   Smokeless Tobacco Never Used     Family History   Problem Relation Age of Onset    Cancer Mother     Diabetes Father     Diabetes Paternal Grandfather      ROS: The patient has no difficulty with chest pain or shortness of breath. No fever or chills. Comprehensive review of systems was otherwise unremarkable except as noted above.     Physical Exam:   Visit Vitals  BP (!) 180/76 (BP 1 Location: Right upper arm, BP Patient Position: At rest)   Pulse 86   Temp 97.8 °F (36.6 °C)   Resp 18   Ht 5' 2\" (1.575 m)   Wt 159 lb (72.1 kg)   SpO2 93%   BMI 29.08 kg/m²     Vitals:    01/24/22 1658 01/24/22 1946 01/25/22 0215 01/25/22 0750   BP: (!) 150/90 (!) 150/70 (!) 155/82 (!) 180/76   Pulse: 93 99 83 86   Resp: 18 18 18 18   Temp: 98.6 °F (37 °C) 98.5 °F (36.9 °C) 98 °F (36.7 °C) 97.8 °F (36.6 °C)   SpO2: 90% 94% 94% 93%   Weight:       Height:         No intake/output data recorded. 01/23 1901 - 01/25 0700  In: 845.5 [I.V.:493]  Out: 651 [Urine:875]    Constitutional: Elderly, Alert, cooperative, NAD   Eyes: Sclera are clear. EOMs intact  ENMT: no external lesions gross hearing normal; no obvious neck masses, no ear or lip lesions, nares normal  CV: RRR. Normal perfusion  Resp: No JVD. Breathing is  non-labored; no audible wheezing. 3L O2 via NC   GI: soft and non-distended, non-tender    Musculoskeletal:  No embolic signs or cyanosis.    Neuro: moves all 4; no focal deficits  Psychiatric: normal affect and mood    Recent vitals (if inpt):  Patient Vitals for the past 24 hrs:   BP Temp Pulse Resp SpO2   01/25/22 0750 (!) 180/76 97.8 °F (36.6 °C) 86 18 93 %   01/25/22 0215 (!) 155/82 98 °F (36.7 °C) 83 18 94 %   01/24/22 1946 (!) 150/70 98.5 °F (36.9 °C) 99 18 94 %   01/24/22 1658 (!) 150/90 98.6 °F (37 °C) 93 18 90 %   01/24/22 1625 (!) 174/78 -- 90 15 91 %   01/24/22 1620 (!) 165/59 -- 93 15 91 %   01/24/22 1610 (!) 145/65 -- 83 15 95 %   01/24/22 1600 139/86 -- 88 15 97 %   01/24/22 1557 (!) 91/49 -- 76 15 97 %   01/24/22 1556 139/86 98.1 °F (36.7 °C) 79 14 96 %   01/24/22 1512 (!) 163/71 -- 92 -- 95 %   01/24/22 1500 (!) 163/71 -- 80 15 94 %   01/24/22 1400 (!) 163/67 -- 92 13 94 %       Amount and/or Complexity of Data Reviewed and Analyzed:  I reviewed and analyzed all of the unique labs and radiologic studies that are shown below as well as any that are in the HPI, and any that are in the expanded problem list below  *Each unique test, order, or document contributes to the combination of 2 or combination of 3 in Category 1 below. For this visit I also reviewed old records and prior notes. Recent Labs     01/25/22  0701 01/25/22  0700 01/23/22  2047 01/23/22  1500 01/23/22  1500   WBC  --  8.9  --   --  9.4   HGB  --  8.5*   < >  --  9.6*   PLT  --  152  --   --  170     --   --    < > 139   K 3.5  --   --    < > 3.9   *  --   --    < > 106   CO2 23  --   --    < > 22   BUN 42*  --   --    < > 43*   CREA 1.09*  --   --    < > 1.56*   *  --   --    < > 483*   PTP  --   --   --   --  12.6   INR  --   --   --   --  0.9   TBILI  --   --   --   --  0.3   ALT  --   --   --   --  38   AP  --   --   --   --  88    < > = values in this interval not displayed. Review of most recent CBC  Lab Results   Component Value Date/Time    WBC 8.9 01/25/2022 07:00 AM    HGB 8.5 (L) 01/25/2022 07:00 AM    HCT 26.8 (L) 01/25/2022 07:00 AM    PLATELET 595 00/96/2707 07:00 AM    MCV 89.6 01/25/2022 07:00 AM       Review of most recent BMP  Lab Results   Component Value Date/Time    Sodium 145 01/25/2022 07:01 AM    Potassium 3.5 01/25/2022 07:01 AM    Chloride 117 (H) 01/25/2022 07:01 AM    CO2 23 01/25/2022 07:01 AM    Anion gap 5 (L) 01/25/2022 07:01 AM    Glucose 178 (H) 01/25/2022 07:01 AM    BUN 42 (H) 01/25/2022 07:01 AM    Creatinine 1.09 (H) 01/25/2022 07:01 AM    BUN/Creatinine ratio 30 (H) 08/31/2021 03:26 PM    GFR est AA >60 01/25/2022 07:01 AM    GFR est non-AA 50 (L) 01/25/2022 07:01 AM    Calcium 9.4 01/25/2022 07:01 AM       Review of most recent LFTs (and lipase if done)  Lab Results   Component Value Date/Time    ALT (SGPT) 38 01/23/2022 03:00 PM    AST (SGOT) 23 01/23/2022 03:00 PM    Alk.  phosphatase 88 01/23/2022 03:00 PM    Bilirubin, direct 0.13 08/31/2021 03:26 PM    Bilirubin, total 0.3 01/23/2022 03:00 PM     Lab Results   Component Value Date/Time    Lipase 201 07/19/2017 06:00 AM       Lab Results   Component Value Date/Time    INR 0.9 01/23/2022 03:00 PM    aPTT 27.1 07/27/2018 08:53 PM    Bilirubin, direct 0.13 08/31/2021 03:26 PM    Troponin-I <0.05 03/01/2012 02:53 PM    Troponin-I, Qt. <0.02 (L) 08/06/2019 06:25 PM       Review of most recent HgbA1c  Lab Results   Component Value Date/Time    Hemoglobin A1c 8.9 (H) 01/23/2022 03:00 PM       Nutritional assessment screen for wound healing issues:  Lab Results   Component Value Date/Time    Protein, total 5.9 (L) 01/23/2022 03:00 PM    Albumin 2.1 (L) 01/23/2022 03:00 PM       @lastcovr@  XR Results (most recent):  Results from Hospital Encounter encounter on 01/23/22    XR CHEST PORT    Narrative  CHEST X-RAY, single portable view  1/23/2022    History: Hypoxia and GI bleed. Technique: Single frontal view of the chest.    Comparison: Chest x-ray 3/3/2021    Findings: The cardiac silhouette is mildly enlarged although stable. The lungs are  expanded without evidence for pneumothorax. Moderate multifocal infiltrate is  seen in the bilateral mid and lower lung fields, right greater than left. Infiltrate particularly in the left lung favors the periphery of the lungs. Impression  1. Moderate multifocal bilateral lung infiltrates as described above. Although  nonspecific, some infiltrate favors the periphery of the lung which can been  indicator for an atypical pneumonia such as Covid 19. This report was made using voice transcription. Despite my best efforts to avoid  any, transcription errors may persist. If there is any question about the  accuracy of the report or need for clarification, then please call [phone], or text me through perfectserv for clarification or correction. CT Results (most recent):  Results from Hospital Encounter encounter on 12/14/21    CT HIP RT WO CONT    Addendum 12/15/2021  1:59 PM  Addendum: Addendum: Correction:    RIGHT hip pain following fall is the correct history of laterality.  No fracture    Narrative  CT left hip WITHOUT CONTRAST. INDICATION: Left hip pain following fall. TECHNIQUE: 2.0mm axial scans through the joint with sagittal and coronal  reconstructions. Radiation dose reduction techniques were used for this study. Our CT scanners use one or more of the following:  Automated exposure control,  adjustment of the mA and or kV according to patient size, iterative  reconstruction. FINDINGS: No hip fracture. Osteoarthritis of both hip joints. Sacrum is  unremarkable. Degenerative changes lumbar spine. Pubic rami intact. Moderate  stool in the rectum. Diverticulosis. Aortoiliac atherosclerosis. Impression  Negative for hip fracture. MRI is more sensitive for  radiographically occult fractures if clinical suspicion persists. US Results (most recent):  Results from [place] encounter on 05/28/14    US HEAD NECK SOFT TISSUE    Narrative  Thyroid ultrasound    History: Thyroid nodule. Real-time sonography of the thyroid gland was performed. No prior studies are  available for comparison. The study has just now been brought to my attention  as an unreported case. Findings: The right thyroid lobe measures approximately 5.3 x 1.7 x 2.2 cm. The  left thyroid lobe measures approximately 5.7 x 2.2 x 1.6 cm. The isthmus  measures 6 mm. There are several nodules within the thyroid gland. The largest  resides at the lower pole on the right measuring 1.8 x 0.9 x 1.3 cm. A 1.2 cm  hypoechoic nodule resides at the lower pole left thyroid lobe. The thyroid  echotexture is mildly heterogeneous. Impression: Mildly enlarged thyroid gland with several nodules. The findings  are most suggestive of a multinodular goiter.         Admission date (for inpatients): 1/23/2022   Day of Surgery  Procedure(s):  SIGMOIDOSCOPY FLEXIBLE COVID POSITIVE PREP/RECOVER IN [place] Admit to room 503        ASSESSMENT/PLAN:  Problem List  Date Reviewed: 11/30/2021          Codes Class Noted    * (Principal) Lower GI bleed ICD-10-CM: K92.2  ICD-9-CM: 578.9  1/23/2022        Moderate protein-calorie malnutrition ([facility] ICD-10-CM: E44.0  ICD-9-CM: 263.0  12/16/2021        Frequent falls ICD-10-CM: R29.6  ICD-9-CM: V15.88  12/15/2021        Physical debility ICD-10-CM: R53.81  ICD-9-CM: 799.3  12/14/2021        Chronic bilateral low back pain without sciatica ICD-10-CM: M54.50, G89.29  ICD-9-CM: 724.2, 338.29  8/31/2021        Iron deficiency anemia due to chronic blood loss ICD-10-CM: D50.0  ICD-9-CM: 280.0  5/18/2020        Abdominal wall hernia ICD-10-CM: K43.9  ICD-9-CM: 553.20  2/23/2020        CKD (chronic kidney disease) stage 3, GFR 30-59 ml/min (HCC) ICD-10-CM: N18.30  ICD-9-CM: 585.3  4/18/2019        COPD (chronic obstructive pulmonary disease) (HCC) (Chronic) ICD-10-CM: J44.9  ICD-9-CM: 698  8/22/2018        DNR (do not resuscitate) (Chronic) ICD-10-CM: B97  ICD-9-CM: V49.86  8/22/2018        Type 2 diabetes mellitus with diabetic neuropathy (HCC) (Chronic) ICD-10-CM: E11.40  ICD-9-CM: 250.60, 357.2  5/16/2018        Anxiety ICD-10-CM: F41.9  ICD-9-CM: 300.00  2/1/2018        Claw toe ICD-10-CM: Q66.89  ICD-9-CM: 754.71  7/12/2017        Callus ICD-10-CM: L84  ICD-9-CM: 700  6/15/2017        Controlled type 2 diabetes mellitus with microalbuminuria, without long-term current use of insulin ([facility] ICD-10-CM: E11.29, R80.9  ICD-9-CM: 250.40, 791.0  11/22/2016        Hearing loss ICD-10-CM: H91.90  ICD-9-CM: 389.9  10/19/2015        DJD (degenerative joint disease) of hip ICD-10-CM: M16.9  ICD-9-CM: 715.95  Unknown        HLD (hyperlipidemia) ICD-10-CM: E78.5  ICD-9-CM: 272.4  1/8/2013        B12 deficiency ICD-10-CM: E53.8  ICD-9-CM: 266.2  8/24/2012        Hiatal hernia ICD-10-CM: K44.9  ICD-9-CM: 553.3  8/24/2012        Neuropathy ICD-10-CM: G62.9  ICD-9-CM: 355.9  8/24/2012    Overview Signed 8/24/2012 12:56 PM by Mirian Joyce     Lower limbs             PAD (peripheral artery disease) ([facility] ICD-10-CM: I73.9  ICD-9-CM: 443.9  8/24/2012        Depression ICD-10-CM: F32. A  ICD-9-CM: 151  6/1/2012        Asthma ICD-10-CM: J45.909  ICD-9-CM: 493.90  6/1/2012        Essential hypertension (Chronic) ICD-10-CM: I10  ICD-9-CM: 401.9  7/16/2010        GERD (gastroesophageal reflux disease) (Chronic) ICD-10-CM: K21.9  ICD-9-CM: 530.81  7/16/2010            Principal Problem:    Lower GI bleed (1/23/2022)    Active Problems:    Essential hypertension (7/16/2010)      GERD (gastroesophageal reflux disease) (7/16/2010)      Depression (6/1/2012)      Type 2 diabetes mellitus with diabetic neuropathy ([facility] (5/16/2018)      COPD (chronic obstructive pulmonary disease) ([facility] (8/22/2018)           Number and Complexity of Problems addressed and   Risks of complications and/or morbidity of management    Plan:  Care Management per Hospitalist  GI has signed off  --Flex Sigmoidoscopy 1/24/22 noted Few small non-bleeding erosions in rectum, suspect probably secondary to constipation. No active bleeding. General Surgery will sign off. Thank you for the consult. Please call for questions or concerns.    --No active bleeding, Hgb stable      Maye Rubinstein, NP

## 2022-01-25 NOTE — ACP (ADVANCE CARE PLANNING)
Advance Care Planning   Healthcare Decision Maker:       Primary Decision Maker: Aubrey Parrish (on [place]) - Son - [phone]    Secondary Decision Maker: Fritz Faith - Other Relative - [phone]    Supplemental (Other) Decision Maker: Duane Moe (on [place]) - Child - [phone]    Click here to complete [address] including selection of the Healthcare Decision Maker Relationship (ie \"Primary\")           No Changes in ACP or information since last admission.

## 2022-01-25 NOTE — ANESTHESIA PREPROCEDURE EVALUATION
Anesthetic History   No history of anesthetic complications            Review of Systems / Medical History  Patient summary reviewed and pertinent labs reviewed    Pulmonary    COPD      Smoker  Asthma     Comments: covid +   Neuro/Psych              Cardiovascular    Hypertension      CHF    CAD, PAD and hyperlipidemia    Exercise tolerance: <4 METS  Comments: Nl stress test and EF (68%) in 2010    TTE 3/2021: lvef 55%, mod MR   GI/Hepatic/Renal     GERD: well controlled    Renal disease: CRI  Hiatal hernia     Endo/Other    Diabetes: well controlled, type 2    Morbid obesity, arthritis and anemia     Other Findings   Comments: DJD  Depression  Anxiety    GI bleed scan demonstrating rectal bleed           Physical Exam    Airway  Mallampati: II  TM Distance: 4 - 6 cm  Neck ROM: normal range of motion   Mouth opening: Normal     Cardiovascular    Rhythm: regular  Rate: normal         Dental    Dentition: Upper partial plate and Poor dentition     Pulmonary  Breath sounds clear to auscultation               Abdominal  GI exam deferred       Other Findings            Anesthetic Plan    ASA: 4, emergent  Anesthesia type: total IV anesthesia          Induction: Intravenous  Anesthetic plan and risks discussed with: Patient

## 2022-01-26 PROBLEM — D62 ACUTE BLOOD LOSS ANEMIA: Status: ACTIVE | Noted: 2022-01-01

## 2022-01-26 PROBLEM — N18.2 CHRONIC RENAL DISEASE, STAGE II: Chronic | Status: ACTIVE | Noted: 2022-01-01

## 2022-01-26 NOTE — PROGRESS NOTES
ACUTE OT GOALS:  (Developed with and agreed upon by patient and/or caregiver.)  1. Patient will complete upper body bathing and dressing with SET UP and adaptive equipment as needed. 2. Patient will complete lower body bathing and dressing with MIN A and adaptive equipment as needed. 2. Patient will complete toileting with MIN A.   3. Patient will complete grooming ADL with SET UP.  4. Patient will tolerate 25 minutes of OT treatment with 1-2 rest breaks to increase activity tolerance for ADLs. 5. Patient will complete functional transfers with MIN A and adaptive equipment as needed. 6. Patient will tolerate 10 minutes BUE exercises to increase strength for safe, functional transfers.      Timeframe: 7 visits     OCCUPATIONAL THERAPY ASSESSMENT: Initial Assessment and Daily Note OT Treatment Day # 1    Beverly Coleman is a 80 y.o. female   PRIMARY DIAGNOSIS: Lower GI bleed  Lower GI bleed [K92.2]  Procedure(s) (LRB):  SIGMOIDOSCOPY FLEXIBLE COVID POSITIVE PREP/RECOVER IN [place]OUR[place] Admit to room 503 (N/A)  2 Days Post-Op  Reason for Referral:    ICD-10: Treatment Diagnosis: Generalized Muscle Weakness (M62.81)  Difficulty in walking, Not elsewhere classified (R26.2)  Other abnormalities of gait and mobility (R26.89)  INPATIENT: Payor: [employer] / Plan: [place] [employer] MMP / Product Type: Managed Care Medicare /   ASSESSMENT:     REHAB RECOMMENDATIONS:   Recommendation to date pending progress:  Setting:   Short-term Rehab  Equipment:    To Be Determined     PRIOR LEVEL OF FUNCTION:  (Prior to Hospitalization)  INITIAL/CURRENT LEVEL OF FUNCTION:  (Based on today's evaluation)   Bathing:   Unknown  Dressing:   Unknown  Feeding/Grooming:   Unknown  Toileting:   Unknown  Functional Mobility:   Unknown Bathing:   Maximal Assistance  Dressing:   Maximal Assistance  Feeding/Grooming:   Minimal Assistance  Toileting:   Maximal Assistance  Functional Mobility:   Moderate Assistance x 2 ASSESSMENT:  Ms. Walter Browne is a 79 y/o female presents with lower GI bleed and found to be Covid+. Pt had recent rehab stay and family elected to take her home due to fear of Covid. Pt is AAO x4 but confused with conversation it seems. Today pt presents with decreased activity tolerance, balance, mobility and increased pain impacting ADLs. Pt has severe sacral wound that causes pain and limits mobility. Per chart pt has [facility] aide, unknown how much assistance pt receives for ADLs or how often aide comes to pt house. Pt overall mod A x2 for bed mobility, sit<>stand and side steps toward HOB with RW. Pt found to be soiled and in a great deal of pain--pt returned to supine positioned on R side and RN notified. Pt is currently functioning below baseline and would benefit from skilled OT services to address OT goals and plan of care. Rec STR at d/c.      SUBJECTIVE:   Ms. Walter Browne states, \"I'd rather be here than at home\" pt stated things were going on at home that makes her want to stay at the hospital but would not elaborate further even with questioning    SOCIAL HISTORY/LIVING ENVIRONMENT: lives with family per chart, recent rehab stay, [address] aide.  PLOF otherwise unknown  Support Systems: Child(edward)    OBJECTIVE:     PAIN: VITAL SIGNS: LINES/DRAINS:   Pre Treatment: Pain Screen  Pain Scale 1: Numeric (0 - 10)  Pain Intensity 1: 5  Pain Onset 1: since admission  Pain Location 1: Buttocks  Pain Orientation 1: Posterior  Pain Description 1: Sore;Aching  Pain Intervention(s) 1: Repositioned;Nurse notified  Post Treatment: same, RN notified and pt positioned on R side Vital Signs  O2 Device: Nasal cannula  O2 Flow Rate (L/min): 3 l/min Landers Catheter and IV  O2 Device: Nasal cannula     GROSS EVALUATION:  BUE Within Functional Limits Abnormal/ Functional Abnormal/ Non-Functional (see comments) Not Tested Comments:   AROM [] [x] [] []    PROM [] [] [] [x]    Strength [] [x] [] [] Generally decreased   Balance [] [x] [] [] Sitting: fair  Standing: fair   Posture [] [x] [] [] Stiff and rigid due to pain in sacrum   Sensation [] [] [] [x]    Coordination [] [] [] [x]    Tone [] [] [] [x]    Edema [] [] [] [x]    Activity Tolerance [] [x] [] [] 3L O2 NC    [] [] [] []      COGNITION/  PERCEPTION: Intact Impaired   (see comments) Comments:   Orientation [x] [] AAO x4, confused with conversation   Vision [x] [] Appeared intact   Hearing [x] [] Appeared intact   Judgment/ Insight [] [x] Decreased safety awareness   Attention [x] []    Memory [] [x] AAO x4, unable to provide PLOF   Command Following [] [x] Multimodal cues   Emotional Regulation [] [x] Cries out in pain with mobility    [] []      ACTIVITIES OF DAILY LIVING: I Mod I S SBA CGA Min Mod Max Total NT Comments   BASIC ADLs:              Bathing/ Showering [] [] [] [] [] [] [] [] [] [x]    Toileting [] [] [] [] [] [] [] [] [] [x]    Dressing [] [] [] [] [] [] [] [] [] [x]    Feeding [] [] [] [] [] [] [] [] [] [x]    Grooming [] [] [] [] [] [] [] [] [] [x]    Personal Device Care [] [] [] [] [] [] [] [] [] [x]    Functional Mobility [] [] [] [] [] [] [x] [] [] [] x2    I=Independent, Mod I=Modified Independent, S=Supervision, SBA=Standby Assistance, CGA=Contact Guard Assistance,   Min=Minimal Assistance, Mod=Moderate Assistance, Max=Maximal Assistance, Total=Total Assistance, NT=Not Tested    MOBILITY: I Mod I S SBA CGA Min Mod Max Total  NT x2 Comments: mod A x2 RW side steps along EOB   Supine to sit [] [] [] [] [] [] [x] [] [] [] [x]    Sit to supine [] [] [] [] [] [] [x] [] [] [] [x]    Sit to stand [] [] [] [] [] [] [x] [] [] [] [x] RW   Bed to chair [] [] [] [] [] [] [] [] [] [] []    I=Independent, Mod I=Modified Independent, S=Supervision, SBA=Standby Assistance, CGA=Contact Guard Assistance,   Min=Minimal Assistance, Mod=Moderate Assistance, Max=Maximal Assistance, Total=Total Assistance, NT=Not Tested    [address] [facility] [address] [address] AM-PAC 6 Clicks   Daily Activity Inpatient Short Form        How much help from another person does the patient currently need. .. Total A Lot A Little None   1. Putting on and taking off regular lower body clothing? [] 1   [x] 2   [] 3   [] 4   2. Bathing (including washing, rinsing, drying)? [] 1   [x] 2   [] 3   [] 4   3. Toileting, which includes using toilet, bedpan or urinal?   [] 1   [x] 2   [] 3   [] 4   4. Putting on and taking off regular upper body clothing? [] 1   [x] 2   [] 3   [] 4   5. Taking care of personal grooming such as brushing teeth? [] 1   [] 2   [x] 3   [] 4   6. Eating meals? [] 1   [] 2   [x] 3   [] 4   © 2007, Trustees of [facility] [employer], under license to [employer]. All rights reserved     Score:  Initial: 14 Most Recent: X (Date: -- )   Interpretation of Tool:  Represents activities that are increasingly more difficult (i.e. Bed mobility, Transfers, Gait). PLAN:   FREQUENCY/DURATION: OT Plan of Care: 3 times/week for duration of hospital stay or until stated goals are met, whichever comes first.    PROBLEM LIST:   (Skilled intervention is medically necessary to address:)  1. Decreased ADL/Functional Activities  2. Decreased Activity Tolerance  3. Decreased AROM/PROM  4. Decreased Balance  5. Decreased Cognition  6. Decreased Coordination  7. Decreased Gait Ability  8. Decreased Strength  9. Decreased Transfer Abilities  10. Increased Pain   INTERVENTIONS PLANNED:   (Benefits and precautions of occupational therapy have been discussed with the patient.)  1. Self Care Training  2. Therapeutic Activity  3. Therapeutic Exercise/HEP  4. Neuromuscular Re-education  5.  Education     TREATMENT:     EVALUATION: Moderate Complexity : (Untimed Charge)    TREATMENT:   ( $$ Neuromuscular Re-Education: 8-22 mins   )  Co-Treatment PT/OT necessary due to patient's decreased overall endurance/tolerance levels, as well as need for high level skilled assistance to complete functional transfers/mobility and functional tasks  Neuromuscular Re-education (10 Minutes): Neuromuscular Re-education included Balance Training, Functional mobility with facilitation, Postural training, Sitting balance training and Standing balance training to improve Balance, Functional Mobility and Postural Control.     TREATMENT GRID:  N/A    AFTER TREATMENT POSITION/PRECAUTIONS:  Alarm Activated, Bed, Needs within reach and RN notified    INTERDISCIPLINARY COLLABORATION:  RN/PCT, PT/PTA and OT/MARIE    TOTAL TREATMENT DURATION:  OT Patient Time In/Time Out  Time In: 3099  Time Out: [address], OT

## 2022-01-26 NOTE — PROGRESS NOTES
Problem: Diabetes Self-Management  Goal: *Disease process and treatment process  Description: Define diabetes and identify own type of diabetes; list 3 options for treating diabetes. Outcome: Progressing Towards Goal  Goal: *Incorporating nutritional management into lifestyle  Description: Describe effect of type, amount and timing of food on blood glucose; list 3 methods for planning meals. Outcome: Progressing Towards Goal  Goal: *Developing strategies to promote health/change behavior  Description: Define the ABC's of diabetes; identify appropriate screenings, schedule and personal plan for screenings. Outcome: Progressing Towards Goal  Goal: *Using medications safely  Description: State effect of diabetes medications on diabetes; name diabetes medication taking, action and side effects. Outcome: Progressing Towards Goal     Problem: Falls - Risk of  Goal: *Absence of Falls  Description: Document Loida Becky Fall Risk and appropriate interventions in the flowsheet. Outcome: Progressing Towards Goal  Note: Fall Risk Interventions:            Medication Interventions: Patient to call before getting OOB         History of Falls Interventions: Evaluate medications/consider consulting pharmacy         Problem: Patient Education: Go to Patient Education Activity  Goal: Patient/Family Education  Outcome: Progressing Towards Goal     Problem: Airway Clearance - Ineffective  Goal: Achieve or maintain patent airway  Outcome: Progressing Towards Goal     Problem: Gas Exchange - Impaired  Goal: Absence of hypoxia  Outcome: Progressing Towards Goal  Goal: Promote optimal lung function  Outcome: Progressing Towards Goal     Problem: Breathing Pattern - Ineffective  Goal: Ability to achieve and maintain a regular respiratory rate  Outcome: Progressing Towards Goal     Problem:  Body Temperature -  Risk of, Imbalanced  Goal: Ability to maintain a body temperature within defined limits  Outcome: Progressing Towards Goal  Goal: Will regain or maintain usual level of consciousness  Outcome: Progressing Towards Goal  Goal: Complications related to the disease process, condition or treatment will be avoided or minimized  Outcome: Progressing Towards Goal     Problem: Isolation Precautions - Risk of Spread of Infection  Goal: Prevent transmission of infectious organism to others  Outcome: Progressing Towards Goal     Problem: Risk for Fluid Volume Deficit  Goal: Maintain normal heart rhythm  Outcome: Progressing Towards Goal     Problem: Pressure Injury - Risk of  Goal: *Prevention of pressure injury  Description: Document Carlos Scale and appropriate interventions in the flowsheet.   Outcome: Progressing Towards Goal  Note: Pressure Injury Interventions:       Moisture Interventions: Absorbent underpads    Activity Interventions: Pressure redistribution bed/mattress(bed type)    Mobility Interventions: PT/OT evaluation    Nutrition Interventions: Offer support with meals,snacks and hydration    Friction and Shear Interventions: Foam dressings/transparent film/skin sealants

## 2022-01-26 NOTE — PROGRESS NOTES
Hospitalist Progress Note   Admit Date:  2022  2:29 PM   Name:  Ernesto Mascorro   Age:  80 y.o. Sex:  female  :  1929   MRN:  [id number]   Room:  [facility]/    Presenting Complaint: Melena    Reason(s) for Admission: Lower GI bleed [facility] Course & Interval History:     Ms. Marisol Villa is a 81 yo female with PMH of DM2, HTN, CKD2, PAD admitted with acute blood loss anemia/GI bleed and found COVID 19 positive. S/p tagged PRBC scan that is positive for active bleed distal rectum. S/p PRBC. Seen by GI s/p flex sig   22 showing few small non bleeding erosions of rectum and no active bleeding. Recommendations are for miralax. No surgical needs. She has been on 3 L NC. CXR shows moderate multifocal bilateral infiltrates. CRP 10.5. on decadron/baricitinib. Discharge plans pending. therapy recommends SNF. Subjective (22):     Tolerant to diet, no gerhard bleed, not sure how she feels yet today, tired, no dyspnea, some cough       Assessment & Plan:     Principal Problem:    Lower GI bleed (2022)   acute blood loss anemia:  · S/p PRBC  · appreciate GI  · Daily miralax  · Trend HGB, 8.0  · Transfuse HGB < 7.0 or symptomatic anemia         COPD (chronic obstructive pulmonary disease) (HCC)   COVID 19 pneumonia  · Wean O2 as tolerant, on 3 L NC  · Assess discharge O2 needs   · D4/10 decadron  · D3/14 baricitinib          Active Problems:    Essential hypertension   · Resumed norvasc 22  · Start lisinopril for substitution of  benazepril  on 22              Depression   · Continued zoloft           Type 2 diabetes mellitus with diabetic neuropathy (HCC)   ·   SSI  · Start lantus 5 units QHS  · Metformin on hold      MIHAELA on   CKD2-3:  · Trend BMP, resolved       ECTOPY:  · EKG ordered  22 ---> NSR with PAC        Dispo/Discharge Planning:    Pending     Diet:  ADULT DIET Regular  DVT PPx:  SCD  Code status: DNR    Hospital Problems as of 2022 Date Reviewed: 11/30/2021          Codes Class Noted - Resolved POA    Pneumonia due to COVID-19 virus ICD-10-CM: U07.1, J12.82  ICD-9-CM: 480.8, 079.89  1/25/2022 - Present Yes        Acute respiratory failure due to COVID-19 [facility]) ICD-10-CM: U07.1, J96.00  ICD-9-CM: 518.81, 079.89  1/25/2022 - Present Yes        * (Principal) Lower GI bleed ICD-10-CM: K92.2  ICD-9-CM: 578.9  1/23/2022 - Present Unknown        COPD (chronic obstructive pulmonary disease) (HCC) (Chronic) ICD-10-CM: J44.9  ICD-9-CM: 496  8/22/2018 - Present Yes        Type 2 diabetes mellitus with diabetic neuropathy (HCC) (Chronic) ICD-10-CM: E11.40  ICD-9-CM: 250.60, 357.2  5/16/2018 - Present Yes        Depression ICD-10-CM: F32. A  ICD-9-CM: 422  6/1/2012 - Present Yes        Essential hypertension (Chronic) ICD-10-CM: I10  ICD-9-CM: 401.9  7/16/2010 - Present Yes        GERD (gastroesophageal reflux disease) (Chronic) ICD-10-CM: K21.9  ICD-9-CM: 530.81  7/16/2010 - Present Yes              Objective:     Patient Vitals for the past 24 hrs:   Temp Pulse Resp BP SpO2   01/26/22 1141 98.5 °F (36.9 °C) 80 18 (!) 159/74 97 %   01/26/22 0838 -- -- -- -- 90 %   01/26/22 0735 98.4 °F (36.9 °C) 76 18 (!) 172/78 94 %   01/26/22 0335 -- -- -- -- 90 %   01/26/22 0329 97.4 °F (36.3 °C) 76 16 (!) 165/97 (!) 88 %   01/25/22 2248 97.8 °F (36.6 °C) 80 15 (!) 163/69 91 %   01/25/22 1919 98 °F (36.7 °C) 83 16 (!) 177/70 90 %   01/25/22 1639 98.3 °F (36.8 °C) (!) 59 18 (!) 156/69 91 %     Oxygen Therapy  O2 Sat (%): 97 % (01/26/22 1141)  Pulse via Oximetry: 85 beats per minute (01/26/22 0838)  O2 Device: Nasal cannula (01/26/22 0905)  O2 Flow Rate (L/min): 3 l/min (01/26/22 0905)    Estimated body mass index is 29.08 kg/m² as calculated from the following:    Height as of this encounter: 5' 2\" (1.575 m). Weight as of this encounter: 72.1 kg (159 lb).     Intake/Output Summary (Last 24 hours) at 1/26/2022 1257  Last data filed at 1/26/2022 0839  Gross per 24 hour Intake 450 ml   Output 1450 ml   Net -1000 ml         Physical Exam:     Blood pressure (!) 159/74, pulse 80, temperature 98.5 °F (36.9 °C), resp. rate 18, height 5' 2\" (1.575 m), weight 72.1 kg (159 lb), SpO2 97 %. General:    Well nourished. No overt distress, elderly  CV:   Regular with ectopy. No m/r/g. No jugular venous distension. No edema   Lungs:   CTAB. No wheezing, rhonchi, or rales. Respirations even, unlabored, anterior   Abdomen: Bowel sounds present. Soft, nontender, nondistended. Extremities: No cyanosis or clubbing. No edema  Skin:     No rashes and normal coloration. Warm and dry. Neuro:   grossly intact. Psych:  Normal mood and affect. I have reviewed ordered lab tests and independently visualized imaging below:    Recent Labs:  Recent Results (from the past 48 hour(s))   GLUCOSE, POC    Collection Time: 01/24/22  4:48 PM   Result Value Ref Range    Glucose (POC) 110 (H) 65 - 100 mg/dL    Performed by Vincent Booth    RBC, ALLOCATE    Collection Time: 01/24/22  6:00 PM   Result Value Ref Range    HISTORY CHECKED?  Historical check performed    PLEASE READ & DOCUMENT PPD TEST IN 24 HRS    Collection Time: 01/24/22  7:04 PM   Result Value Ref Range    PPD Negative Negative    mm Induration 0 0 - 5 mm   HEMOGLOBIN    Collection Time: 01/24/22 10:30 PM   Result Value Ref Range    HGB 8.5 (L) 11.7 - 15.4 g/dL   GLUCOSE, POC    Collection Time: 01/24/22 10:38 PM   Result Value Ref Range    Glucose (POC) 257 (H) 65 - 100 mg/dL    Performed by Yovanny    CBC WITH AUTOMATED DIFF    Collection Time: 01/25/22  7:00 AM   Result Value Ref Range    WBC 8.9 4.3 - 11.1 K/uL    RBC 2.99 (L) 4.05 - 5.2 M/uL    HGB 8.5 (L) 11.7 - 15.4 g/dL    HCT 26.8 (L) 35.8 - 46.3 %    MCV 89.6 79.6 - 97.8 FL    MCH 28.4 26.1 - 32.9 PG    MCHC 31.7 31.4 - 35.0 g/dL    RDW 18.6 (H) 11.9 - 14.6 %    PLATELET 913 350 - 701 K/uL    MPV 11.1 9.4 - 12.3 FL    ABSOLUTE NRBC 0.00 0.0 - 0.2 K/uL NEUTROPHILS 77 43 - 78 %    LYMPHOCYTES 15 13 - 44 %    MONOCYTES 6 4.0 - 12.0 %    EOSINOPHILS 2 0.5 - 7.8 %    BASOPHILS 0 0.0 - 2.0 %    IMMATURE GRANULOCYTES 0 0.0 - 5.0 %    ABS. NEUTROPHILS 6.9 1.7 - 8.2 K/UL    ABS. LYMPHOCYTES 1.3 0.5 - 4.6 K/UL    ABS. MONOCYTES 0.5 0.1 - 1.3 K/UL    ABS. EOSINOPHILS 0.2 0.0 - 0.8 K/UL    ABS. BASOPHILS 0.0 0.0 - 0.2 K/UL    ABS. IMM.  GRANS. 0.0 0.0 - 0.5 K/UL    RBC COMMENTS SLIGHT  ANISOCYTOSIS + POIKILOCYTOSIS        RBC COMMENTS SLIGHT  HYPOCHROMIA        RBC COMMENTS RARE  SCHISTOCYTES        WBC COMMENTS Result Confirmed By Smear      PLATELET COMMENTS ADEQUATE      DF AUTOMATED     METABOLIC PANEL, BASIC    Collection Time: 01/25/22  7:01 AM   Result Value Ref Range    Sodium 145 136 - 145 mmol/L    Potassium 3.5 3.5 - 5.1 mmol/L    Chloride 117 (H) 98 - 107 mmol/L    CO2 23 21 - 32 mmol/L    Anion gap 5 (L) 7 - 16 mmol/L    Glucose 178 (H) 65 - 100 mg/dL    BUN 42 (H) 8 - 23 MG/DL    Creatinine 1.09 (H) 0.6 - 1.0 MG/DL    GFR est AA >60 >60 ml/min/1.73m2    GFR est non-AA 50 (L) >60 ml/min/1.73m2    Calcium 9.4 8.3 - 10.4 MG/DL   GLUCOSE, POC    Collection Time: 01/25/22  7:18 AM   Result Value Ref Range    Glucose (POC) 181 (H) 65 - 100 mg/dL    Performed by [address], POC    Collection Time: 01/25/22 10:22 AM   Result Value Ref Range    Glucose (POC) 272 (H) 65 - 100 mg/dL    Performed by [employer]    GLUCOSE, POC    Collection Time: 01/25/22  3:50 PM   Result Value Ref Range    Glucose (POC) 193 (H) 65 - 100 mg/dL    Performed by [employer]    GLUCOSE, POC    Collection Time: 01/25/22  8:56 PM   Result Value Ref Range    Glucose (POC) 202 (H) 65 - 100 mg/dL    Performed by [employer]    GLUCOSE, POC    Collection Time: 01/26/22  6:37 AM   Result Value Ref Range    Glucose (POC) 270 (H) 65 - 100 mg/dL    Performed by Trevor    CBC WITH AUTOMATED DIFF    Collection Time: 01/26/22  6:45 AM   Result Value Ref Range WBC 6.8 4.3 - 11.1 K/uL    RBC 2.77 (L) 4.05 - 5.2 M/uL    HGB 8.0 (L) 11.7 - 15.4 g/dL    HCT 25.5 (L) 35.8 - 46.3 %    MCV 92.1 79.6 - 97.8 FL    MCH 28.9 26.1 - 32.9 PG    MCHC 31.4 31.4 - 35.0 g/dL    RDW 18.0 (H) 11.9 - 14.6 %    PLATELET 090 (L) 774 - 450 K/uL    MPV 11.4 9.4 - 12.3 FL    ABSOLUTE NRBC 0.00 0.0 - 0.2 K/uL    NEUTROPHILS 74 43 - 78 %    LYMPHOCYTES 20 13 - 44 %    MONOCYTES 5 4.0 - 12.0 %    EOSINOPHILS 0 (L) 0.5 - 7.8 %    BASOPHILS 0 0.0 - 2.0 %    IMMATURE GRANULOCYTES 1 0.0 - 5.0 %    ABS. NEUTROPHILS 5.0 1.7 - 8.2 K/UL    ABS. LYMPHOCYTES 1.4 0.5 - 4.6 K/UL    ABS. MONOCYTES 0.3 0.1 - 1.3 K/UL    ABS. EOSINOPHILS 0.0 0.0 - 0.8 K/UL    ABS. BASOPHILS 0.0 0.0 - 0.2 K/UL    ABS. IMM. GRANS. 0.1 0.0 - 0.5 K/UL    RBC COMMENTS SLIGHT  ANISOCYTOSIS + POIKILOCYTOSIS        RBC COMMENTS SLIGHT  POLYCHROMASIA        WBC COMMENTS Result Confirmed By Smear      PLATELET COMMENTS ADEQUATE      DF AUTOMATED     METABOLIC PANEL, COMPREHENSIVE    Collection Time: 01/26/22  6:45 AM   Result Value Ref Range    Sodium 144 136 - 145 mmol/L    Potassium 3.6 3.5 - 5.1 mmol/L    Chloride 114 (H) 98 - 107 mmol/L    CO2 23 21 - 32 mmol/L    Anion gap 7 7 - 16 mmol/L    Glucose 225 (H) 65 - 100 mg/dL    BUN 31 (H) 8 - 23 MG/DL    Creatinine 0.94 0.6 - 1.0 MG/DL    GFR est AA >60 >60 ml/min/1.73m2    GFR est non-AA 59 (L) >60 ml/min/1.73m2    Calcium 9.2 8.3 - 10.4 MG/DL    Bilirubin, total 0.5 0.2 - 1.1 MG/DL    ALT (SGPT) 38 12 - 65 U/L    AST (SGOT) 26 15 - 37 U/L    Alk.  phosphatase 68 50 - 136 U/L    Protein, total 5.1 (L) 6.3 - 8.2 g/dL    Albumin 1.6 (L) 3.2 - 4.6 g/dL    Globulin 3.5 2.3 - 3.5 g/dL    A-G Ratio 0.5 (L) 1.2 - 3.5     GLUCOSE, POC    Collection Time: 01/26/22 10:57 AM   Result Value Ref Range    Glucose (POC) 308 (H) 65 - 100 mg/dL    Performed by [facility]    EKG, 12 LEAD, INITIAL    Collection Time: 01/26/22 11:45 AM   Result Value Ref Range    Ventricular Rate 85 BPM    Atrial Rate 85 BPM    P-R Interval 162 ms    QRS Duration 128 ms    Q-T Interval 380 ms    QTC Calculation (Bezet) 452 ms    Calculated P Axis 46 degrees    Calculated R Axis -50 degrees    Calculated T Axis -16 degrees    Diagnosis       Sinus rhythm with Premature atrial complexes  Left axis deviation  Right bundle branch block  Moderate voltage criteria for LVH, may be normal variant  Inferior infarct (cited on or before 26-JAN-2022)  Abnormal ECG  When compared with ECG of 26-JAN-2022 11:45,  No significant change was found         All Micro Results     Procedure Component Value Units Date/Time    COVID-19 RAPID TEST [[id number]]  (Abnormal) Collected: 01/23/22 5110    Order Status: Completed Specimen: Nasopharyngeal Updated: 01/23/22 1751     Specimen source Nasopharyngeal        COVID-19 rapid test Detected        Comment:      The specimen is POSITIVE for SARS-CoV-2, the novel coronavirus associated with COVID-19. This test has been authorized by the FDA under an Emergency Use Authorization (EUA) for use by authorized laboratories. Fact sheet for Healthcare Providers: [employer]  Fact sheet for Patients: [employer]       Methodology: Isothermal Nucleic Acid Amplification               Other Studies:  No results found.     Current Meds:  Current Facility-Administered Medications   Medication Dose Route Frequency    pantoprazole (PROTONIX) tablet 40 mg  40 mg Oral ACB    [START ON 1/27/2022] baricitinib (OLUMIANT) tablet 2 mg  2 mg Oral DAILY    amLODIPine (NORVASC) tablet 10 mg  10 mg Oral DAILY    sertraline (ZOLOFT) tablet 50 mg  50 mg Oral DAILY    polyethylene glycol (MIRALAX) packet 17 g  17 g Oral DAILY    dextrose 40% (GLUTOSE) oral gel 1 Tube  15 g Oral PRN    glucagon (GLUCAGEN) injection 1 mg  1 mg IntraMUSCular PRN    dextrose 10% infusion 125-250 mL  125-250 mL IntraVENous PRN    insulin lispro (HUMALOG) injection   SubCUTAneous AC&HS    0.9% sodium chloride infusion 250 mL  250 mL IntraVENous PRN    cloNIDine HCL (CATAPRES) tablet 0.2 mg  0.2 mg Oral BID PRN    sodium chloride (NS) flush 5-40 mL  5-40 mL IntraVENous Q8H    sodium chloride (NS) flush 5-40 mL  5-40 mL IntraVENous PRN    acetaminophen (TYLENOL) tablet 650 mg  650 mg Oral Q6H PRN    Or    acetaminophen (TYLENOL) suppository 650 mg  650 mg Rectal Q6H PRN    senna (SENOKOT) tablet 17.2 mg  2 Tablet Oral BID PRN    ondansetron (ZOFRAN) injection 4 mg  4 mg IntraVENous Q6H PRN    dexamethasone (DECADRON) 10 mg/mL injection 6 mg  6 mg IntraVENous Q24H    0.9% sodium chloride infusion 250 mL  250 mL IntraVENous PRN       Signed:  Derick Hackett MD    Part of this note may have been written by using a voice dictation software. The note has been proof read but may still contain some grammatical/other typographical errors.

## 2022-01-26 NOTE — PROGRESS NOTES
ACUTE PHYSICAL THERAPY GOALS:  (Developed with and agreed upon by patient and/or caregiver.)  1. Ms. Jennifer López will perform supine to sit and sit to supine independently in 7 days. 2.  Ms. Jennifer López will perform sit to stand and bed to chair with least restrictive device with cg in 7 days. 3.  Ms. Jennifer López will perform gait with rolling walker 25 ft and cg in 7 days. PHYSICAL THERAPY ASSESSMENT: Initial Assessment, Daily Note and AM PT Treatment Day # 1      Aide López is a 80 y.o. female   PRIMARY DIAGNOSIS: Lower GI bleed  Lower GI bleed [K92.2]  Procedure(s) (LRB):  SIGMOIDOSCOPY FLEXIBLE COVID POSITIVE PREP/RECOVER IN [place] Admit to room 503 (N/A)  2 Days Post-Op  Reason for Referral:    ICD-10: Treatment Diagnosis: Generalized Muscle Weakness (M62.81)  Difficulty in walking, Not elsewhere classified (R26.2)  INPATIENT: Payor: [employer] / Plan: [place] [employer] / Product Type: Managed Care Medicare /     ASSESSMENT:     REHAB RECOMMENDATIONS:   Recommendation to date pending progress:  Setting:   Short-term Rehab  Equipment:    To Be Determined     PRIOR LEVEL OF FUNCTION:  (Prior to Hospitalization) INITIAL/CURRENT LEVEL OF FUNCTION:  (Most Recently Demonstrated)   Bed Mobility:   Unknown  Sit to Stand:   Unknown  Transfers:   Unknown  Gait/Mobility:   Unknown Bed Mobility:   Moderate Assistance x 2  Sit to Stand:   Moderate Assistance x 2  Transfers:   Moderate Assistance x 2  Gait/Mobility:   Moderate Assistance x 2 with rolling walker      ASSESSMENT:  Ms. Jennifer López presents with decreased mobility and decreased gait. She apparently was at a rehab and was taken home by family after a week because they were afraid she would get covid. Well now she is here in the hospital with Gi bleed and Covid +. She has a sacral wound that causes her a lot of pain. She was noted to have a bloody brief.   PT and OT cotx for complexity of case had her perform bed mobility with mod of 2. Sit to stand and steps to head of bed with RW with mod of 2. All the time with a great deal of pain. She was positioned on her side and nursing was notified of need for clean up. From presentation today Ms. Chiki Guevara would need to return to rehab. She is functioning below a functional baseline and is therefore appropriate for skilled PT to maximize her rehab potential.       SUBJECTIVE:   Ms. Chiki Guevara states, \"I dont want to talk about what is going on at home. Id rather be here than there. \"interesting comments made by Ms. Chiki Guevara who is alert and oriented x 4   Tried to probe for more details but she stopped talking    SOCIAL HISTORY/LIVING ENVIRONMENT: lives with family uses rolling walker  Support Systems: Child(edward)  OBJECTIVE:     PAIN: VITAL SIGNS: LINES/DRAINS:   Pre Treatment: Pain Screen  Pain Scale 1: Numeric (0 - 10)  Post Treatment: no number but a great deal of pain during moving   Landers Catheter  O2 Device: Nasal cannula     GROSS EVALUATION:   Within Functional Limits Abnormal/ Functional Abnormal/ Non-Functional (see comments) Not Tested Comments:   AROM [] [x] [] [] Generally weak   PROM [] [] [] []    Strength [] [x] [] [] Generally weak   Balance [] [x] [] [] Fair - sitting and poor standing   Posture [] [] [] []    Sensation [] [] [] []    Coordination [] [] [] []    Tone [] [] [] []    Edema [] [] [] []    Activity Tolerance [] [] [] []     [] [] [] []      COGNITION/  PERCEPTION: Intact Impaired   (see comments) Comments:   Orientation [x] []    Vision [x] []    Hearing [x] []    Command Following [x] []    Safety Awareness [x] []     [] []      MOBILITY: I Mod I S SBA CGA Min Mod Max Total  NT x2 Comments:   Bed Mobility    Rolling [] [] [] [] [] [] [x] [] [] [] []    Supine to Sit [] [] [] [] [] [] [x] [] [] [] [x]    Scooting [] [] [] [] [] [] [x] [] [] [] []    Sit to Supine [] [] [] [] [] [] [x] [] [] [] [x]    Transfers    Sit to Stand [] [] [] [] [] [] [x] [] [] [] [x] Bed to Chair [] [] [] [] [] [] [] [] [] [x] []    Stand to Sit [] [] [] [] [] [] [x] [] [] [] [x]    I=Independent, Mod I=Modified Independent, S=Supervision, SBA=Standby Assistance, CGA=Contact Guard Assistance,   Min=Minimal Assistance, Mod=Moderate Assistance, Max=Maximal Assistance, Total=Total Assistance, NT=Not Tested  GAIT: I Mod I S SBA CGA Min Mod Max Total  NT x2 Comments:   Level of Assistance [] [] [] [] [] [] [x] [] [] [] [x]    Distance 3 ft    DME Rolling Walker    Gait Quality Narrow base. Leaning backwards    Weightbearing Status N/A     I=Independent, Mod I=Modified Independent, S=Supervision, SBA=Standby Assistance, CGA=Contact Guard Assistance,   Min=Minimal Assistance, Mod=Moderate Assistance, Max=Maximal Assistance, Total=Total Assistance, NT=Not [facility]       How much difficulty does the patient currently have. .. Unable A Lot A Little None   1. Turning over in bed (including adjusting bedclothes, sheets and blankets)? [] 1   [x] 2   [] 3   [] 4   2. Sitting down on and standing up from a chair with arms ( e.g., wheelchair, bedside commode, etc.)   [] 1   [x] 2   [] 3   [] 4   3. Moving from lying on back to sitting on the side of the bed? [] 1   [x] 2   [] 3   [] 4   How much help from another person does the patient currently need. .. Total A Lot A Little None   4. Moving to and from a bed to a chair (including a wheelchair)? [] 1   [x] 2   [] 3   [] 4   5. Need to walk in hospital room? [] 1   [x] 2   [] 3   [] 4   6. Climbing 3-5 steps with a railing? [x] 1   [] 2   [] 3   [] 4   © 2007, Trustees of [facility] [employer], under license to [employer]. All rights reserved     Score:  Initial: 11 Most Recent: X (Date: -- )    Interpretation of Tool:  Represents activities that are increasingly more difficult (i.e. Bed mobility, Transfers, Gait).     PLAN:   FREQUENCY/DURATION: PT Plan of Care: 3 times/week for duration of hospital stay or until stated goals are met, whichever comes first.    PROBLEM LIST:   (Skilled intervention is medically necessary to address:)  1. Decreased Activity Tolerance  2. Decreased AROM/PROM  3. Decreased Balance  4. Decreased Gait Ability  5. Decreased Strength  6. Decreased Transfer Abilities  7. Increased Pain   INTERVENTIONS PLANNED:   (Benefits and precautions of physical therapy have been discussed with the patient.)  1. Therapeutic Activity  2. Therapeutic Exercise/HEP  3. Neuromuscular Re-education  4. Gait Training  5. Education     TREATMENT:     EVALUATION: Moderate Complexity : (Untimed Charge)    TREATMENT:   ($$ Therapeutic Activity: 8-22 mins    )  Co-Treatment PT/OT necessary due to patient's decreased overall endurance/tolerance levels, as well as need for high level skilled assistance to complete functional transfers/mobility and functional tasks  Therapeutic Activity (10 Minutes): Therapeutic activity included Supine to Sit, Sit to Supine, Scooting, Transfer Training, Ambulation on level ground, Sitting balance  and Standing balance to improve functional Mobility.     TREATMENT GRID:  N/A    AFTER TREATMENT POSITION/PRECAUTIONS:  Bed, Needs within reach and RN notified    INTERDISCIPLINARY COLLABORATION:  RN/PCT, PT/PTA and OT/MARIE    TOTAL TREATMENT DURATION:  PT Patient Time In/Time Out  Time In: 5061  Time Out: [address] PT

## 2022-01-27 NOTE — PROGRESS NOTES
Hospitalist Progress Note   Admit Date:  2022  2:29 PM   Name:  Ernesto Mascorro   Age:  80 y.o. Sex:  female  :  1929   MRN:  [id number]   Room:  [facility]    Presenting Complaint: Melena    Reason(s) for Admission: Lower GI bleed [facility] Course & Interval History:     Ms. Marisol Villa is a 81 yo female with PMH of DM2, HTN, CKD2, PAD admitted with acute blood loss anemia/GI bleed and found COVID 19 positive. S/p tagged PRBC scan that is positive for active bleed distal rectum. S/p PRBC. Seen by GI s/p flex sig 22 showing few small non bleeding erosions of rectum and no active bleeding. Recommendations are for miralax. No surgical needs. She has been on 3 L NC. CXR shows moderate multifocal bilateral infiltrates. CRP 10.5. on decadron/baricitinib. Discharge plans pending. SNF pending. Subjective (22): Patient stated that she is doing well this morning. Sitting up and eating breakfast.  Denies any needs. She is alert and oriented x4. Follows commands well. Denies fever, chills, SLB, chest pain, abdominal pain, nausea, vomiting. Assessment & Plan:     Lower GI bleed  Acute blood loss anemia:  S/p tagged PRBC scan that is positive for active bleed distal rectum. S/p PRBC. Seen by GI s/p flex sig 22 showing few small non bleeding erosions of rectum and no active bleeding. Recommendations are for miralax. No surgical needs. GI signed off . General surgery signed off .   · Daily miralax  · Trend HGB, stable ~8  · Transfuse HGB < 7.0 or symptomatic anemia     COPD  COVID 19 pneumonia  · Assess discharge O2 needs   · Cont decadron  · Cont baricitinib  · Wean O2 as tolerant, on 3 L NC  · STR pending  · Needs RT for walking pulse ox prior to discharge    Essential hypertension   · Resumed norvasc 22  · Cont lisinopril for substitution of  benazepril        Depression   · Continued zoloft       Type 2 diabetes mellitus with diabetic neuropathy ([address] [address] [address] · SSI  · Cont lantus   · Metformin on hold    MIHAELA on CKD2-3:  · Trend BMP, resolved     ECTOPY:  · EKG ordered  1-26-22 ---> NSR with PAC        Dispo/Discharge Planning:    STR pending at this point    Diet:  ADULT DIET Regular  DVT PPx:  SCD  Code status: DNR    Hospital Problems as of 1/27/2022 Date Reviewed: 11/30/2021          Codes Class Noted - Resolved POA    Acute blood loss anemia ICD-10-CM: D62  ICD-9-CM: 285.1  1/26/2022 - Present Yes        Chronic renal disease, stage II (Chronic) ICD-10-CM: N18.2  ICD-9-CM: 585.2  1/26/2022 - Present Yes        Pneumonia due to COVID-19 virus ICD-10-CM: U07.1, J12.82  ICD-9-CM: 480.8, 079.89  1/25/2022 - Present Yes        Acute respiratory failure due to COVID-19 [facility]) ICD-10-CM: U07.1, J96.00  ICD-9-CM: 518.81, 079.89  1/25/2022 - Present Yes        * (Principal) Lower GI bleed ICD-10-CM: K92.2  ICD-9-CM: 578.9  1/23/2022 - Present Unknown        COPD (chronic obstructive pulmonary disease) (HCC) (Chronic) ICD-10-CM: J44.9  ICD-9-CM: 496  8/22/2018 - Present Yes        Type 2 diabetes mellitus with diabetic neuropathy (HCC) (Chronic) ICD-10-CM: E11.40  ICD-9-CM: 250.60, 357.2  5/16/2018 - Present Yes        Depression ICD-10-CM: F32. A  ICD-9-CM: 028  6/1/2012 - Present Yes        Essential hypertension (Chronic) ICD-10-CM: I10  ICD-9-CM: 401.9  7/16/2010 - Present Yes        GERD (gastroesophageal reflux disease) (Chronic) ICD-10-CM: K21.9  ICD-9-CM: 530.81  7/16/2010 - Present Yes              Objective:     Patient Vitals for the past 24 hrs:   Temp Pulse Resp BP SpO2   01/27/22 0734 97.8 °F (36.6 °C) (!) 51 15 (!) 158/74 90 %   01/27/22 0300 97.9 °F (36.6 °C) 78 15 (!) 147/86 93 %   01/27/22 0014 98.1 °F (36.7 °C) 75 16 (!) 165/86 92 %   01/26/22 1943 98 °F (36.7 °C) 74 15 (!) 146/49 92 %   01/26/22 1633 98.8 °F (37.1 °C) 81 18 (!) 162/82 90 %   01/26/22 1141 98.5 °F (36.9 °C) 80 18 (!) 159/74 97 %     Oxygen Therapy  O2 Sat (%): 90 % (01/27/22 0734)  Pulse via Oximetry: 85 beats per minute (01/26/22 0838)  O2 Device: Nasal cannula (01/27/22 0300)  O2 Flow Rate (L/min): 3 l/min (01/27/22 0300)    Estimated body mass index is 29.08 kg/m² as calculated from the following:    Height as of this encounter: 5' 2\" (1.575 m). Weight as of this encounter: 72.1 kg (159 lb). Intake/Output Summary (Last 24 hours) at 1/27/2022 0850  Last data filed at 1/27/2022 0520  Gross per 24 hour   Intake 540 ml   Output 1475 ml   Net -935 ml         Physical Exam:     Blood pressure (!) 158/74, pulse (!) 51, temperature 97.8 °F (36.6 °C), resp. rate 15, height 5' 2\" (1.575 m), weight 72.1 kg (159 lb), SpO2 90 %. General:    Well nourished. No overt distress, elderly  CV:   Regular with ectopy. No m/r/g. No jugular venous distension. No edema   Lungs:   CTAB. No wheezing, rhonchi, or rales. Respirations even, unlabored, anterior   Abdomen: Bowel sounds present. Soft, nontender, nondistended. Extremities: No cyanosis or clubbing. No edema  Skin:     No rashes and normal coloration. Warm and dry. Neuro:   grossly intact. Psych:  Normal mood and affect.       I have reviewed ordered lab tests and independently visualized imaging below:    Recent Labs:  Recent Results (from the past 48 hour(s))   GLUCOSE, POC    Collection Time: 01/25/22 10:22 AM   Result Value Ref Range    Glucose (POC) 272 (H) 65 - 100 mg/dL    Performed by [employer]    GLUCOSE, POC    Collection Time: 01/25/22  3:50 PM   Result Value Ref Range    Glucose (POC) 193 (H) 65 - 100 mg/dL    Performed by [employer]    GLUCOSE, POC    Collection Time: 01/25/22  8:56 PM   Result Value Ref Range    Glucose (POC) 202 (H) 65 - 100 mg/dL    Performed by [facility]BLANCO    GLUCOSE, POC    Collection Time: 01/26/22  6:37 AM   Result Value Ref Range    Glucose (POC) 270 (H) 65 - 100 mg/dL    Performed by Trevor    CBC WITH AUTOMATED DIFF    Collection Time: 01/26/22  6:45 AM   Result Value Ref Range    WBC 6.8 4.3 - 11.1 K/uL    RBC 2.77 (L) 4.05 - 5.2 M/uL    HGB 8.0 (L) 11.7 - 15.4 g/dL    HCT 25.5 (L) 35.8 - 46.3 %    MCV 92.1 79.6 - 97.8 FL    MCH 28.9 26.1 - 32.9 PG    MCHC 31.4 31.4 - 35.0 g/dL    RDW 18.0 (H) 11.9 - 14.6 %    PLATELET 527 (L) 776 - 450 K/uL    MPV 11.4 9.4 - 12.3 FL    ABSOLUTE NRBC 0.00 0.0 - 0.2 K/uL    NEUTROPHILS 74 43 - 78 %    LYMPHOCYTES 20 13 - 44 %    MONOCYTES 5 4.0 - 12.0 %    EOSINOPHILS 0 (L) 0.5 - 7.8 %    BASOPHILS 0 0.0 - 2.0 %    IMMATURE GRANULOCYTES 1 0.0 - 5.0 %    ABS. NEUTROPHILS 5.0 1.7 - 8.2 K/UL    ABS. LYMPHOCYTES 1.4 0.5 - 4.6 K/UL    ABS. MONOCYTES 0.3 0.1 - 1.3 K/UL    ABS. EOSINOPHILS 0.0 0.0 - 0.8 K/UL    ABS. BASOPHILS 0.0 0.0 - 0.2 K/UL    ABS. IMM. GRANS. 0.1 0.0 - 0.5 K/UL    RBC COMMENTS SLIGHT  ANISOCYTOSIS + POIKILOCYTOSIS        RBC COMMENTS SLIGHT  POLYCHROMASIA        WBC COMMENTS Result Confirmed By Smear      PLATELET COMMENTS ADEQUATE      DF AUTOMATED     METABOLIC PANEL, COMPREHENSIVE    Collection Time: 01/26/22  6:45 AM   Result Value Ref Range    Sodium 144 136 - 145 mmol/L    Potassium 3.6 3.5 - 5.1 mmol/L    Chloride 114 (H) 98 - 107 mmol/L    CO2 23 21 - 32 mmol/L    Anion gap 7 7 - 16 mmol/L    Glucose 225 (H) 65 - 100 mg/dL    BUN 31 (H) 8 - 23 MG/DL    Creatinine 0.94 0.6 - 1.0 MG/DL    GFR est AA >60 >60 ml/min/1.73m2    GFR est non-AA 59 (L) >60 ml/min/1.73m2    Calcium 9.2 8.3 - 10.4 MG/DL    Bilirubin, total 0.5 0.2 - 1.1 MG/DL    ALT (SGPT) 38 12 - 65 U/L    AST (SGOT) 26 15 - 37 U/L    Alk.  phosphatase 68 50 - 136 U/L    Protein, total 5.1 (L) 6.3 - 8.2 g/dL    Albumin 1.6 (L) 3.2 - 4.6 g/dL    Globulin 3.5 2.3 - 3.5 g/dL    A-G Ratio 0.5 (L) 1.2 - 3.5     GLUCOSE, POC    Collection Time: 01/26/22 10:57 AM   Result Value Ref Range    Glucose (POC) 308 (H) 65 - 100 mg/dL    [facility]    EKG, 12 LEAD, INITIAL    Collection Time: 01/26/22 11:45 AM   Result Value Ref Range    Ventricular Rate 85 BPM Atrial Rate 85 BPM    P-R Interval 162 ms    QRS Duration 128 ms    Q-T Interval 380 ms    QTC Calculation (Bezet) 452 ms    Calculated P Axis 46 degrees    Calculated R Axis -50 degrees    Calculated T Axis -16 degrees    Diagnosis       Sinus rhythm with Premature atrial complexes  Left axis deviation  Right bundle branch block  Moderate voltage criteria for LVH, may be normal variant  Inferior infarct (cited on or before 26-JAN-2022)  Abnormal ECG  When compared with ECG of 26-JAN-2022 11:45,  No significant change was found  Confirmed by ION PELLETIER (), Lindsey Urbano ([id number]) on 1/26/2022 3:21:04 PM     GLUCOSE, POC    Collection Time: 01/26/22  3:34 PM   Result Value Ref Range    Glucose (POC) 286 (H) 65 - 100 mg/dL    Performed by [facility]    GLUCOSE, POC    Collection Time: 01/26/22  8:58 PM   Result Value Ref Range    Glucose (POC) 270 (H) 65 - 100 mg/dL    Performed by [facility] [employer]    GLUCOSE, POC    Collection Time: 01/27/22  6:25 AM   Result Value Ref Range    Glucose (POC) 171 (H) 65 - 100 mg/dL    Performed by [employer]    CBC WITH AUTOMATED DIFF    Collection Time: 01/27/22  7:00 AM   Result Value Ref Range    WBC 7.1 4.3 - 11.1 K/uL    RBC 2.87 (L) 4.05 - 5.2 M/uL    HGB 8.1 (L) 11.7 - 15.4 g/dL    HCT 26.0 (L) 35.8 - 46.3 %    MCV 90.6 79.6 - 97.8 FL    MCH 28.2 26.1 - 32.9 PG    MCHC 31.2 (L) 31.4 - 35.0 g/dL    RDW 17.4 (H) 11.9 - 14.6 %    PLATELET 438 516 - 607 K/uL    MPV 11.0 9.4 - 12.3 FL    ABSOLUTE NRBC 0.00 0.0 - 0.2 K/uL    DF PENDING    METABOLIC PANEL, BASIC    Collection Time: 01/27/22  7:00 AM   Result Value Ref Range    Sodium 144 136 - 145 mmol/L    Potassium 3.9 3.5 - 5.1 mmol/L    Chloride 113 (H) 98 - 107 mmol/L    CO2 25 21 - 32 mmol/L    Anion gap 6 (L) 7 - 16 mmol/L    Glucose 165 (H) 65 - 100 mg/dL    BUN 33 (H) 8 - 23 MG/DL    Creatinine 0.96 0.6 - 1.0 MG/DL    GFR est AA >60 >60 ml/min/1.73m2    GFR est non-AA 58 (L) >60 ml/min/1.73m2    Calcium 9.3 8.3 - 10.4 MG/DL       All Micro Results     Procedure Component Value Units Date/Time    COVID-19 RAPID TEST [[id number]]  (Abnormal) Collected: 01/23/22 8484    Order Status: Completed Specimen: Nasopharyngeal Updated: 01/23/22 3201     Specimen source Nasopharyngeal        COVID-19 rapid test Detected        Comment:      The specimen is POSITIVE for SARS-CoV-2, the novel coronavirus associated with COVID-19. This test has been authorized by the FDA under an Emergency Use Authorization (EUA) for use by authorized laboratories. Fact sheet for Healthcare Providers: ConventionUpdate.co.nz  Fact sheet for Patients: ConventionUpdate.co.nz       Methodology: Isothermal Nucleic Acid Amplification               Other Studies:  No results found.     Current Meds:  Current Facility-Administered Medications   Medication Dose Route Frequency    pantoprazole (PROTONIX) tablet 40 mg  40 mg Oral ACB    baricitinib (OLUMIANT) tablet 2 mg  2 mg Oral DAILY    insulin glargine (LANTUS) injection 5 Units  5 Units SubCUTAneous QHS    lisinopriL (PRINIVIL, ZESTRIL) tablet 20 mg  20 mg Oral DAILY    amLODIPine (NORVASC) tablet 10 mg  10 mg Oral DAILY    sertraline (ZOLOFT) tablet 50 mg  50 mg Oral DAILY    polyethylene glycol (MIRALAX) packet 17 g  17 g Oral DAILY    dextrose 40% (GLUTOSE) oral gel 1 Tube  15 g Oral PRN    glucagon (GLUCAGEN) injection 1 mg  1 mg IntraMUSCular PRN    dextrose 10% infusion 125-250 mL  125-250 mL IntraVENous PRN    insulin lispro (HUMALOG) injection   SubCUTAneous AC&HS    cloNIDine HCL (CATAPRES) tablet 0.2 mg  0.2 mg Oral BID PRN    sodium chloride (NS) flush 5-40 mL  5-40 mL IntraVENous Q8H    sodium chloride (NS) flush 5-40 mL  5-40 mL IntraVENous PRN    acetaminophen (TYLENOL) tablet 650 mg  650 mg Oral Q6H PRN    Or    acetaminophen (TYLENOL) suppository 650 mg  650 mg Rectal Q6H PRN    senna (SENOKOT) tablet 17.2 mg  2 Tablet Oral BID PRN  ondansetron (ZOFRAN) injection 4 mg  4 mg IntraVENous Q6H PRN    dexamethasone (DECADRON) 10 mg/mL injection 6 mg  6 mg IntraVENous Q24H       Signed: Jesus Oh DO    Part of this note may have been written by using a voice dictation software. The note has been proof read but may still contain some grammatical/other typographical errors.

## 2022-01-27 NOTE — PROGRESS NOTES
RN has been notified to place or read PPD that was placed on 1/23. CM attempted to reach patient via room phone, unable to reach her. CM called and spoke with patient's emergency contact, (Daughter, Maricarmen Conner) in regards to STR recommendation. She stated that she wanted to discuss recommendation with her brother's first and would give CM a call back. Per previous CM notes, patient was discharged home from [facility] on 12/24 with fear of patient catching COVID.     4:15 - CM spoke with patient's daughter, Maricarmen Conner who states that she discussed STR recommendation with her brothers and they have decided for patient to go back to rehab. She asked that referral be sent back to [facility] - also request that patient have her own room. Referral has been sent. CM will continue to follow.

## 2022-01-27 NOTE — PROGRESS NOTES
END OF SHIFT NOTE:    Intake/Output  01/26 1901 - 01/27 0700  In: 180 [P.O.:180]  Out: 1100 [Urine:1100]   Voiding: YES  Catheter: YES  Drain:              Stool:  1 occurrences. Stool Assessment  Stool Color: Black; Jose Francisco Somerset (01/27/22 0021)  Stool Appearance: Formed; Soft (01/27/22 0021)  Stool Amount: Small (01/27/22 0021)  Stool Source/Status: Rectum;Ileostomy (01/23/22 2200)    Emesis:  0 occurrences. VITAL SIGNS  Patient Vitals for the past 12 hrs:   Temp Pulse Resp BP SpO2   01/27/22 0300 97.9 °F (36.6 °C) 78 15 (!) 147/86 93 %   01/27/22 0014 98.1 °F (36.7 °C) 75 16 (!) 165/86 92 %   01/26/22 1943 98 °F (36.7 °C) 74 15 (!) 146/49 92 %   01/26/22 1633 98.8 °F (37.1 °C) 81 18 (!) 162/82 90 %       Pain Assessment  Pain 1  Pain Scale 1: Numeric (0 - 10) (01/27/22 0210)  Pain Intensity 1: 0 (01/27/22 0210)  Patient Stated Pain Goal: 0 (01/27/22 0210)  Pain Onset 1: since admission (01/26/22 0905)  Pain Location 1: Buttocks (01/26/22 0905)  Pain Orientation 1: Posterior (01/26/22 0905)  Pain Description 1: Sore;Aching (01/26/22 0905)  Pain Intervention(s) 1: Repositioned;Nurse notified (01/26/22 0905)    Ambulating  No    Additional Information:   Remains on O2 3LPM/NC;sats >90%. No acute respiratory distress. Had BM X1;no bleeding noted. Wound care done to sacral/coccyx wound;repositioned. Shift report given to oncoming nurse at the bedside.     Dudley Zimmer RN

## 2022-01-28 NOTE — PROGRESS NOTES
CM following patient's chart. Referral sent to [facility] at the request of the patient's daughter and sons. CM contacted Fay Kelly (liasion with [facility]) today to check on status of referral. She stated that she is waiting to see if they will have any COVID beds available today or if not today, when they will. She will be contacting CM back to advise. PPD has already been completed. CM will continue to follow.

## 2022-01-28 NOTE — PROGRESS NOTES
ACUTE PHYSICAL THERAPY GOALS:  (Developed with and agreed upon by patient and/or caregiver.)  1. Ms. Chiki Guevara will perform supine to sit and sit to supine independently in 7 days. 2.  Ms. Chiki Guevara will perform sit to stand and bed to chair with least restrictive device with cg in 7 days. 3.  Ms. Chiki Guevara will perform gait with rolling walker 25 ft and cg in 7 days. PHYSICAL THERAPY: Daily Note Treatment Day # 2    Aide Diaz is a 80 y.o. female   PRIMARY DIAGNOSIS: Lower GI bleed  Lower GI bleed [K92.2]  Procedure(s) (LRB):  SIGMOIDOSCOPY FLEXIBLE COVID POSITIVE PREP/RECOVER IN [place] Admit to room 503 (N/A)  4 Days Post-Op    ASSESSMENT:     REHAB RECOMMENDATIONS: CURRENT LEVEL OF FUNCTION:  (Most Recently Demonstrated)   Recommendation to date pending progress:  Setting:   Short-term Rehab  Equipment:    To Be Determined Bed Mobility:   Moderate Assistance x 2  Sit to Stand:   Moderate Assistance x 2  Transfers:   Minimal Assistance x 2  Gait/Mobility:   Minimal Assistance x 2     ASSESSMENT:  Ms. Chiki Guevara was sitting in recliner on 3 L O2. She required 4L to maintain sats during mobility. She did have better balance the second time she stood up. She required slightly less assistance with transfer today, progressing slowly towards all goals. .     SUBJECTIVE:   Ms. Chiki Guevara states, \"Yes. \"    SOCIAL HISTORY/ LIVING ENVIRONMENT: lives with family uses rolling walker  Support Systems: Child(edward)  OBJECTIVE:     PAIN: VITAL SIGNS: LINES/DRAINS:   Pre Treatment: Pain Screen  Pain Scale 1: Numeric (0 - 10)  Pain Intensity 1: 0  Post Treatment: 0   Landers Catheter  O2 Device: Nasal cannula     MOBILITY: I Mod I S SBA CGA Min Mod Max Total  NT x2 Comments:   Bed Mobility    Rolling [] [] [] [] [] [] [] [] [] [] []    Supine to Sit [] [] [] [] [] [] [] [] [] [] []    Scooting [] [] [] [] [] [] [] [] [] [] []    Sit to Supine [] [] [] [] [] [] [x] [] [] [] [x]    Transfers    Sit to Stand [] [] [] [] [] [] [x] [] [] [] [x]    Bed to Chair [] [] [] [] [] [x] [x] [] [] [] [x]    Stand to Sit [] [] [] [] [] [] [x] [] [] [] [x]    I=Independent, Mod I=Modified Independent, S=Supervision, SBA=Standby Assistance, CGA=Contact Guard Assistance,   Min=Minimal Assistance, Mod=Moderate Assistance, Max=Maximal Assistance, Total=Total Assistance, NT=Not Tested    BALANCE: Good Fair+ Fair Fair- Poor NT Comments   Sitting Static [] [x] [] [] [] []    Sitting Dynamic [] [x] [] [] [] []              Standing Static [] [] [] [] [x] []    Standing Dynamic [] [] [] [] [x] []      GAIT: I Mod I S SBA CGA Min Mod Max Total  NT x2 Comments:   Level of Assistance [] [] [] [] [] [x] [x] [] [] [] []    Distance 2    DME Rolling Walker and Gait Belt    Gait Quality Slow, short shuffling steps    Weightbearing  Status N/A     I=Independent, Mod I=Modified Independent, S=Supervision, SBA=Standby Assistance, CGA=Contact Guard Assistance,   Min=Minimal Assistance, Mod=Moderate Assistance, Max=Maximal Assistance, Total=Total Assistance, NT=Not Tested    PLAN:   FREQUENCY/DURATION: PT Plan of Care: 3 times/week for duration of hospital stay or until stated goals are met, whichever comes first.  TREATMENT:     TREATMENT:   ($$ Therapeutic Activity: 38-52 mins    )  Therapeutic Activity (38 Minutes): Therapeutic activity included Sit to Supine, Transfer Training, Ambulation on level ground, Sitting balance , Standing balance and LE ex's to improve functional Mobility, Strength and Activity tolerance.     TREATMENT GRID:      DATE: 1/28/22       Ambulation        Hip Flexion 2x10 AB       Long Arc Quads 2x10 AB       Hip ab/ad        Heel Raises x20 AB       Toe Raises x20 AB                                Key:  A=active, AA=active assisted, P=passive, B=bilaterally, R=right, L=left   DF=dorsiflexion, PF=plantarflexion    AFTER TREATMENT POSITION/PRECAUTIONS:  Bed, Needs within reach and RN notified    INTERDISCIPLINARY COLLABORATION:  RN/PCT and PT/PTA    TOTAL TREATMENT DURATION:  PT Patient Time In/Time Out  Time In: 1410  Time Out: 1448    KIP Gold Quaker, PT, DPT

## 2022-01-28 NOTE — PROGRESS NOTES
Hospitalist Progress Note   Admit Date:  2022  2:29 PM   Name:  Linda Hurley   Age:  80 y.o. Sex:  female  :  1929   MRN:  [id number]   Room:  [facility]– [place]    Presenting Complaint: Melena    Reason(s) for Admission: Lower GI bleed [facility] & Interval History:     Ms. Jaelyn Vasquez is a 79 yo female with PMH of DM2, HTN, CKD2, PAD admitted with acute blood loss anemia/GI bleed and found COVID 19 positive. S/p tagged PRBC scan that is positive for active bleed distal rectum. S/p PRBC. Seen by GI s/p flex sig 22 showing few small non bleeding erosions of rectum and no active bleeding. Recommendations are for miralax. No surgical needs. She has been on 3 L NC. CXR shows moderate multifocal bilateral infiltrates. CRP 10.5. on decadron/baricitinib. Discharge plans pending. SNF pending. Subjective (22): Stated that he is doing well this morning. Denies any needs this morning. She is alert and oriented x4. Follows commands well. Denies fever, chills, SOB, chest pain, abdominal pain, nausea, vomiting. Assessment & Plan:     Lower GI bleed  Acute blood loss anemia:  S/p tagged PRBC scan that is positive for active bleed distal rectum. S/p PRBC. Seen by GI s/p flex sig 22 showing few small non bleeding erosions of rectum and no active bleeding. Recommendations are for miralax. No surgical needs. GI signed off . General surgery signed off .   Daily miralax  Cont PPI  Trend HGB, stable ~8  Transfuse HGB < 7.0 or symptomatic anemia   F/u CBC    COPD  COVID 19 pneumonia  Cont decadron EOT    Cont baricitinib EOT 2  Wean O2 as tolerant, on 3 L NC  Needs RT for walking pulse ox prior to discharge  STR pending at this point    Essential hypertension   Resumed norvasc 22  Cont lisinopril for substitution of  benazepril      Depression   Continued zoloft     Type 2 diabetes mellitus with diabetic neuropathy   SSI  Cont lantus   Metformin on hold    MIHAELA on CKD2-3, resolved     Ectopy:  EKG 1-26-22 shows NSR with PAC      Dispo/Discharge Planning:    STR pending at this point    Diet:  ADULT DIET Regular  DVT PPx:  SCD  Code status: DNR    Hospital Problems as of 1/28/2022 Date Reviewed: 11/30/2021          Codes Class Noted - Resolved POA    Acute blood loss anemia ICD-10-CM: D62  ICD-9-CM: 285.1  1/26/2022 - Present Yes        Chronic renal disease, stage II (Chronic) ICD-10-CM: N18.2  ICD-9-CM: 585.2  1/26/2022 - Present Yes        Pneumonia due to COVID-19 virus ICD-10-CM: U07.1, J12.82  ICD-9-CM: 480.8, 079.89  1/25/2022 - Present Yes        Acute respiratory failure due to COVID-19 [facility]) ICD-10-CM: U07.1, J96.00  ICD-9-CM: 518.81, 079.89  1/25/2022 - Present Yes        * (Principal) Lower GI bleed ICD-10-CM: K92.2  ICD-9-CM: 578.9  1/23/2022 - Present Unknown        COPD (chronic obstructive pulmonary disease) (HCC) (Chronic) ICD-10-CM: J44.9  ICD-9-CM: 496  8/22/2018 - Present Yes        Type 2 diabetes mellitus with diabetic neuropathy (HCC) (Chronic) ICD-10-CM: E11.40  ICD-9-CM: 250.60, 357.2  5/16/2018 - Present Yes        Depression ICD-10-CM: F32. A  ICD-9-CM: 563  6/1/2012 - Present Yes        Essential hypertension (Chronic) ICD-10-CM: I10  ICD-9-CM: 401.9  7/16/2010 - Present Yes        GERD (gastroesophageal reflux disease) (Chronic) ICD-10-CM: K21.9  ICD-9-CM: 530.81  7/16/2010 - Present Yes              Objective:     Patient Vitals for the past 24 hrs:   Temp Pulse Resp BP SpO2   01/28/22 0745 97.8 °F (36.6 °C) 72 -- 136/71 91 %   01/28/22 0354 98.8 °F (37.1 °C) 72 18 (!) 144/88 94 %   01/28/22 0136 -- 74 -- (!) 171/66 --   01/27/22 2332 98.3 °F (36.8 °C) 83 14 (!) 177/80 90 %   01/27/22 1948 99.7 °F (37.6 °C) 83 12 (!) 149/69 96 %   01/27/22 1628 97.7 °F (36.5 °C) 91 15 (!) 175/79 92 %   01/27/22 1220 97.9 °F (36.6 °C) 68 15 (!) 159/69 92 %   01/27/22 0859 -- -- -- -- 90 %     Oxygen Therapy  O2 Sat (%): 91 % (01/28/22 0745)  Pulse via Oximetry: 90 beats per minute (01/27/22 0859)  O2 Device: Nasal cannula (01/28/22 0346)  Skin Assessment: Clean, dry, & intact (01/27/22 1940)  Skin Protection for O2 Device: N/A (01/27/22 1940)  O2 Flow Rate (L/min): 3 l/min (01/28/22 0346)  FIO2 (%): 32 % (01/28/22 0346)    Estimated body mass index is 29.08 kg/m² as calculated from the following:    Height as of this encounter: 5' 2\" (1.575 m). Weight as of this encounter: 72.1 kg (159 lb). Intake/Output Summary (Last 24 hours) at 1/28/2022 0831  Last data filed at 1/27/2022 2054  Gross per 24 hour   Intake 180 ml   Output 550 ml   Net -370 ml         Physical Exam:     Blood pressure 136/71, pulse 72, temperature 97.8 °F (36.6 °C), resp. rate 18, height 5' 2\" (1.575 m), weight 72.1 kg (159 lb), SpO2 91 %. General:    Well nourished. No overt distress, elderly  CV:   Regular with ectopy. No m/r/g. No jugular venous distension. No edema   Lungs:   CTAB. No wheezing, rhonchi, or rales. Respirations even, unlabored, anterior   Abdomen: Bowel sounds present. Soft, nontender, nondistended. Extremities: No cyanosis or clubbing. No edema  Skin:     No rashes and normal coloration. Warm and dry. Neuro:  grossly intact. Psych:  Normal mood and affect.       I have reviewed ordered lab tests and independently visualized imaging below:    Recent Labs:  Recent Results (from the past 48 hour(s))   GLUCOSE, POC    Collection Time: 01/26/22 10:57 AM   Result Value Ref Range    Glucose (POC) 308 (H) 65 - 100 mg/dL    Performed by [facility]    EKG, 12 LEAD, INITIAL    Collection Time: 01/26/22 11:45 AM   Result Value Ref Range    Ventricular Rate 85 BPM    Atrial Rate 85 BPM    P-R Interval 162 ms    QRS Duration 128 ms    Q-T Interval 380 ms    QTC Calculation (Bezet) 452 ms    Calculated P Axis 46 degrees    Calculated R Axis -50 degrees    Calculated T Axis -16 degrees    Diagnosis       Sinus rhythm with Premature atrial complexes  Left axis deviation  Right bundle branch block  Moderate voltage criteria for LVH, may be normal variant  Inferior infarct (cited on or before 26-JAN-2022)  Abnormal ECG  When compared with ECG of 26-JAN-2022 11:45,  No significant change was found  Confirmed by Luis Strauss MD (), Lavinia Donovan ([id number]) on 1/26/2022 3:21:04 PM     GLUCOSE, POC    Collection Time: 01/26/22  3:34 PM   Result Value Ref Range    Glucose (POC) 286 (H) 65 - 100 mg/dL    Performed by [facility]    GLUCOSE, POC    Collection Time: 01/26/22  8:58 PM   Result Value Ref Range    Glucose (POC) 270 (H) 65 - 100 mg/dL    Performed by [facility]    GLUCOSE, POC    Collection Time: 01/27/22  6:25 AM   Result Value Ref Range    Glucose (POC) 171 (H) 65 - 100 mg/dL    Performed by [facility]    CBC WITH AUTOMATED DIFF    Collection Time: 01/27/22  7:00 AM   Result Value Ref Range    WBC 7.1 4.3 - 11.1 K/uL    RBC 2.87 (L) 4.05 - 5.2 M/uL    HGB 8.1 (L) 11.7 - 15.4 g/dL    HCT 26.0 (L) 35.8 - 46.3 %    MCV 90.6 79.6 - 97.8 FL    MCH 28.2 26.1 - 32.9 PG    MCHC 31.2 (L) 31.4 - 35.0 g/dL    RDW 17.4 (H) 11.9 - 14.6 %    PLATELET 946 463 - 730 K/uL    MPV 11.0 9.4 - 12.3 FL    ABSOLUTE NRBC 0.00 0.0 - 0.2 K/uL    NEUTROPHILS 77 43 - 78 %    LYMPHOCYTES 19 13 - 44 %    MONOCYTES 3 (L) 4.0 - 12.0 %    EOSINOPHILS 0 (L) 0.5 - 7.8 %    BASOPHILS 0 0.0 - 2.0 %    IMMATURE GRANULOCYTES 1 0.0 - 5.0 %    ABS. NEUTROPHILS 5.5 1.7 - 8.2 K/UL    ABS. LYMPHOCYTES 1.3 0.5 - 4.6 K/UL    ABS. MONOCYTES 0.2 0.1 - 1.3 K/UL    ABS. EOSINOPHILS 0.0 0.0 - 0.8 K/UL    ABS. BASOPHILS 0.0 0.0 - 0.2 K/UL    ABS. IMM.  GRANS. 0.1 0.0 - 0.5 K/UL    RBC COMMENTS SLIGHT  ANISOCYTOSIS + POIKILOCYTOSIS        RBC COMMENTS SLIGHT  POLYCHROMASIA        WBC COMMENTS Result Confirmed By Smear      PLATELET COMMENTS ADEQUATE      DF AUTOMATED     METABOLIC PANEL, BASIC    Collection Time: 01/27/22  7:00 AM   Result Value Ref Range    Sodium 144 136 - 145 mmol/L    Potassium 3.9 3.5 - 5.1 mmol/L    Chloride 113 (H) 98 - 107 mmol/L    CO2 25 21 - 32 mmol/L    Anion gap 6 (L) 7 - 16 mmol/L    Glucose 165 (H) 65 - 100 mg/dL    BUN 33 (H) 8 - 23 MG/DL    Creatinine 0.96 0.6 - 1.0 MG/DL    GFR est AA >60 >60 ml/min/1.73m2    GFR est non-AA 58 (L) >60 ml/min/1.73m2    Calcium 9.3 8.3 - 10.4 MG/DL   GLUCOSE, POC    Collection Time: 01/27/22 11:13 AM   Result Value Ref Range    Glucose (POC) 202 (H) 65 - 100 mg/dL    [facility]    GLUCOSE, POC    Collection Time: 01/27/22  4:01 PM   Result Value Ref Range    Glucose (POC) 231 (H) 65 - 100 mg/dL    [facility]    GLUCOSE, POC    Collection Time: 01/27/22  9:29 PM   Result Value Ref Range    Glucose (POC) 443 (H) 65 - 100 mg/dL    Performed by [facility]    GLUCOSE, POC    Collection Time: 01/28/22  6:04 AM   Result Value Ref Range    Glucose (POC) 240 (H) 65 - 100 mg/dL    Performed by [facility]    METABOLIC PANEL, BASIC    Collection Time: 01/28/22  6:28 AM   Result Value Ref Range    Sodium 143 136 - 145 mmol/L    Potassium 4.0 3.5 - 5.1 mmol/L    Chloride 114 (H) 98 - 107 mmol/L    CO2 26 21 - 32 mmol/L    Anion gap 3 (L) 7 - 16 mmol/L    Glucose 235 (H) 65 - 100 mg/dL    BUN 34 (H) 8 - 23 MG/DL    Creatinine 1.06 (H) 0.6 - 1.0 MG/DL    GFR est AA >60 >60 ml/min/1.73m2    GFR est non-AA 51 (L) >60 ml/min/1.73m2    Calcium 9.3 8.3 - 10.4 MG/DL       All Micro Results     Procedure Component Value Units Date/Time    COVID-19 RAPID TEST [[id number]]  (Abnormal) Collected: 01/23/22 6379    Order Status: Completed Specimen: Nasopharyngeal Updated: 01/23/22 8681     Specimen source Nasopharyngeal        COVID-19 rapid test Detected        Comment:      The specimen is POSITIVE for SARS-CoV-2, the novel coronavirus associated with COVID-19. This test has been authorized by the FDA under an Emergency Use Authorization (EUA) for use by authorized laboratories.         Fact sheet for Healthcare Providers: ConventionUpdate.co.nz  Fact sheet for Patients: ConventionUpdate.co.nz       Methodology: Isothermal Nucleic Acid Amplification               Other Studies:  No results found. Current Meds:  Current Facility-Administered Medications   Medication Dose Route Frequency    dexAMETHasone (DECADRON) tablet 6 mg  6 mg Oral DAILY    pantoprazole (PROTONIX) tablet 40 mg  40 mg Oral ACB    baricitinib (OLUMIANT) tablet 2 mg  2 mg Oral DAILY    insulin glargine (LANTUS) injection 5 Units  5 Units SubCUTAneous QHS    lisinopriL (PRINIVIL, ZESTRIL) tablet 20 mg  20 mg Oral DAILY    amLODIPine (NORVASC) tablet 10 mg  10 mg Oral DAILY    sertraline (ZOLOFT) tablet 50 mg  50 mg Oral DAILY    polyethylene glycol (MIRALAX) packet 17 g  17 g Oral DAILY    dextrose 40% (GLUTOSE) oral gel 1 Tube  15 g Oral PRN    glucagon (GLUCAGEN) injection 1 mg  1 mg IntraMUSCular PRN    dextrose 10% infusion 125-250 mL  125-250 mL IntraVENous PRN    insulin lispro (HUMALOG) injection   SubCUTAneous AC&HS    cloNIDine HCL (CATAPRES) tablet 0.2 mg  0.2 mg Oral BID PRN    sodium chloride (NS) flush 5-40 mL  5-40 mL IntraVENous Q8H    sodium chloride (NS) flush 5-40 mL  5-40 mL IntraVENous PRN    acetaminophen (TYLENOL) tablet 650 mg  650 mg Oral Q6H PRN    Or    acetaminophen (TYLENOL) suppository 650 mg  650 mg Rectal Q6H PRN    senna (SENOKOT) tablet 17.2 mg  2 Tablet Oral BID PRN    ondansetron (ZOFRAN) injection 4 mg  4 mg IntraVENous Q6H PRN       Signed: Bryson Harrison DO    Part of this note may have been written by using a voice dictation software. The note has been proof read but may still contain some grammatical/other typographical errors.

## 2022-01-28 NOTE — PROGRESS NOTES
ACUTE OT GOALS:  (Developed with and agreed upon by patient and/or caregiver.)  1. Patient will complete upper body bathing and dressing with SET UP and adaptive equipment as needed. 2. Patient will complete lower body bathing and dressing with MIN A and adaptive equipment as needed. 2. Patient will complete toileting with MIN A.   3. Patient will complete grooming ADL with SET UP.  4. Patient will tolerate 25 minutes of OT treatment with 1-2 rest breaks to increase activity tolerance for ADLs. 5. Patient will complete functional transfers with MIN A and adaptive equipment as needed. 6. Patient will tolerate 10 minutes BUE exercises to increase strength for safe, functional transfers.      Timeframe: 7 visits       OCCUPATIONAL THERAPY: Daily Note OT Treatment Day # 2    Justin Charles is a 80 y.o. female   PRIMARY DIAGNOSIS: Lower GI bleed  Lower GI bleed [K92.2]  Procedure(s) (LRB):  SIGMOIDOSCOPY FLEXIBLE COVID POSITIVE PREP/RECOVER IN FLOURO Admit to room 503 (N/A)  4 Days Post-Op  Payor: [employer] / [place] [employer] / Product Type: [employer] /   ASSESSMENT:     REHAB RECOMMENDATIONS: CURRENT LEVEL OF FUNCTION:  (Most Recently Demonstrated)   Recommendation to date pending progress:  Setting:   Short-term Rehab  Equipment:    To Be Determined Bathing:   Not tested  Dressing:   Not tested   Feeding/Grooming:   Not tested  Toileting:   Total Assistance for bowel hygiene  Functional Mobility:   Minimal Assistance     ASSESSMENT:  Ms. Stephanie Pruett is doing fair today. Pt presents supine upon arrival. Pt required min A for bed mobility. Pt demonstrates fair sitting balance. Pt noted to have soiled/saturated brief. Pt dependent for bowel hygiene today. Pt transferred over to chair and set up with lunch. Minimal progress made this session. Will continue to benefit from skilled OT during stay.      SUBJECTIVE:   Ms. Stephanie Pruett states, \"I am doing okay\"    SOCIAL HISTORY/LIVING ENVIRONMENT:   Support Systems: Child(edward)    OBJECTIVE:     PAIN: VITAL SIGNS: LINES/DRAINS:   Pre Treatment: Pain Screen  Pain Scale 1: Numeric (0 - 10)  Pain Intensity 1: 0  Post Treatment: 0   Landers Catheter  O2 Device: Nasal cannula     ACTIVITIES OF DAILY LIVING: I Mod I S SBA CGA Min Mod Max Total NT Comments   BASIC ADLs:              Bathing/ Showering [] [] [] [] [] [] [] [] [] [x]    Toileting [] [] [] [] [] [] [] [] [x] []    Dressing [] [] [] [] [] [] [] [] [] [x]    Feeding [] [] [] [] [] [] [] [] [] [x]    Grooming [] [] [] [] [] [] [] [] [] [x]    Personal Device Care [] [] [] [] [] [] [] [] [] [x]    Functional Mobility [] [] [] [] [] [x] [] [] [] []    I=Independent, Mod I=Modified Independent, S=Supervision, SBA=Standby Assistance, CGA=Contact Guard Assistance,   Min=Minimal Assistance, Mod=Moderate Assistance, Max=Maximal Assistance, Total=Total Assistance, NT=Not Tested    MOBILITY: I Mod I S SBA CGA Min Mod Max Total  NT x2 Comments:   Supine to sit [] [] [] [] [] [x] [] [] [] [] []    Sit to supine [] [] [] [] [] [] [] [] [] [x] []    Sit to stand [] [] [] [] [] [x] [] [] [] [] []    Bed to chair [] [] [] [] [] [x] [] [] [] [] []    I=Independent, Mod I=Modified Independent, S=Supervision, SBA=Standby Assistance, CGA=Contact Guard Assistance,   Min=Minimal Assistance, Mod=Moderate Assistance, Max=Maximal Assistance, Total=Total Assistance, NT=Not Tested    BALANCE: Good Fair+ Fair Fair- Poor NT Comments   Sitting Static [] [] [x] [] [] []    Sitting Dynamic [] [] [x] [] [] []              Standing Static [] [] [x] [] [] []    Standing Dynamic [] [] [] [x] [] []      PLAN:   FREQUENCY/DURATION: OT Plan of Care: 3 times/week for duration of hospital stay or until stated goals are met, whichever comes first.    TREATMENT:   TREATMENT:   ($$ Self Care/Home Management: 8-22 mins$$ Therapeutic Activity: 8-22 mins   )  Therapeutic Activity (15 Minutes):  Therapeutic activity included Supine to Sit, Scooting, Transfer Training, Ambulation on level ground, Sitting balance  and Standing balance to improve functional Mobility, Strength and Activity tolerance. Self Care (10 Minutes): Self care including Toileting to increase independence and decrease level of assistance required.     TREATMENT GRID:  N/A    AFTER TREATMENT POSITION/PRECAUTIONS:  Chair, Needs within reach and RN notified    INTERDISCIPLINARY COLLABORATION:  RN/PCT and OT/MARIE    TOTAL TREATMENT DURATION:  OT Patient Time In/Time Out  Time In: 1200  Time Out: [facility]

## 2022-01-29 NOTE — PROGRESS NOTES
HOB elevated, repositioned for breakfast, no acute distress, fine crackles bases A/P, nasal O2, abd hernia, non-tender, neurovascular checks WDL, pain level 0, no complaints of calf pain.

## 2022-01-29 NOTE — PROGRESS NOTES
Oxygen Qualifier       Room air: SpO2 with O2 and liter flow   Resting SpO2  87%  89% on 1L   92% on 2L   Ambulating SpO2  NA NA       Pt ambulate at this time    Completed by:    Qing Lopez, RT

## 2022-01-29 NOTE — PROGRESS NOTES
Hospitalist Progress Note   Admit Date:  2022  2:29 PM   Name:  Amee Tellez   Age:  80 y.o. Sex:  female  :  1929   MRN:  [id number]   Room:  [facility]    Presenting Complaint: Melena    Reason(s) for Admission: Lower GI bleed [facility] Course & Interval History:     Ms. Francois Finn is a 79 yo female with PMH of DM2, HTN, CKD2, PAD admitted with acute blood loss anemia/GI bleed and found COVID 19 positive. S/p tagged PRBC scan that is positive for active bleed distal rectum. S/p PRBC. Seen by GI s/p flex sig 22 showing few small non bleeding erosions of rectum and no active bleeding. Recommendations are for miralax. No surgical needs. She has been on 3 L NC. CXR shows moderate multifocal bilateral infiltrates. CRP 10.5. on decadron/baricitinib. Discharge plans pending. SNF pending. Subjective (22):    Doing well this AM.  She is alert and oriented x4. Follows commands well. Denies fever, chills, SOB, chest pain, abdominal pain, nausea, vomiting. Assessment & Plan:     Lower GI bleed  Acute blood loss anemia:  Baseline Hgb 10-11  S/p tagged PRBC scan that is positive for active bleed distal rectum. S/p PRBC. Seen by GI s/p flex sig 22 showing few small non bleeding erosions of rectum and no active bleeding. Recommendations are for miralax. No surgical needs. GI signed off . General surgery signed off . Daily miralax  Cont PPI  Trend HGB, stable ~8  Transfuse HGB < 7.0 or symptomatic anemia   F/u CBC.  Check iron studies    COVID 19 pneumonia  Cont decadron EOT  2  Cont baricitinib EOT 2  Wean O2 as tolerant, on 3 L NC  Needs RT for walking pulse ox prior to discharge  STR pending at this point    Essential hypertension   Resumed norvasc 22  Cont lisinopril for substitution of  benazepril      Depression   Continued zoloft     Type 2 diabetes mellitus with diabetic neuropathy   SSI  Cont lantus   Metformin on hold    MIHAELA on CKD2-3, resolved Ectopy:  EKG 1-26-22 shows NSR with PAC      Dispo/Discharge Planning:    STR pending at this point    Diet:  ADULT DIET Regular  DVT PPx:  SCD  Code status: DNR    Hospital Problems as of 1/29/2022 Date Reviewed: 11/30/2021          Codes Class Noted - Resolved POA    Acute blood loss anemia ICD-10-CM: D62  ICD-9-CM: 285.1  1/26/2022 - Present Yes        Chronic renal disease, stage II (Chronic) ICD-10-CM: N18.2  ICD-9-CM: 585.2  1/26/2022 - Present Yes        Pneumonia due to COVID-19 virus ICD-10-CM: U07.1, J12.82  ICD-9-CM: 480.8, 079.89  1/25/2022 - Present Yes        Acute respiratory failure due to COVID-19 [facility]) ICD-10-CM: U07.1, J96.00  ICD-9-CM: 518.81, 079.89  1/25/2022 - Present Yes        * (Principal) Lower GI bleed ICD-10-CM: K92.2  ICD-9-CM: 578.9  1/23/2022 - Present Unknown        COPD (chronic obstructive pulmonary disease) (HCC) (Chronic) ICD-10-CM: J44.9  ICD-9-CM: 496  8/22/2018 - Present Yes        Type 2 diabetes mellitus with diabetic neuropathy (HCC) (Chronic) ICD-10-CM: E11.40  ICD-9-CM: 250.60, 357.2  5/16/2018 - Present Yes        Depression ICD-10-CM: F32. A  ICD-9-CM: 467  6/1/2012 - Present Yes        Essential hypertension (Chronic) ICD-10-CM: I10  ICD-9-CM: 401.9  7/16/2010 - Present Yes        GERD (gastroesophageal reflux disease) (Chronic) ICD-10-CM: K21.9  ICD-9-CM: 530.81  7/16/2010 - Present Yes              Objective:     Patient Vitals for the past 24 hrs:   Temp Pulse Resp BP SpO2   01/29/22 0331 98.8 °F (37.1 °C) 66 16 (!) 157/68 97 %   01/28/22 2332 98.7 °F (37.1 °C) 62 18 (!) 152/61 94 %   01/28/22 1939 99.4 °F (37.4 °C) 69 16 (!) 143/59 98 %   01/28/22 1539 98 °F (36.7 °C) 72 16 (!) 137/52 98 %   01/28/22 1237 97.8 °F (36.6 °C) 85 15 (!) 152/79 93 %     Oxygen Therapy  O2 Sat (%): 97 % (01/29/22 0331)  Pulse via Oximetry: 90 beats per minute (01/27/22 0859)  O2 Device: Nasal cannula (01/28/22 1939)  Skin Assessment: Clean, dry, & intact (01/28/22 1939)  Skin Protection for O2 Device: N/A (01/27/22 1940)  O2 Flow Rate (L/min): 3 l/min (01/28/22 1939)  FIO2 (%): 32 % (01/28/22 0346)    Estimated body mass index is 29.08 kg/m² as calculated from the following:    Height as of this encounter: 5' 2\" (1.575 m). Weight as of this encounter: 72.1 kg (159 lb). Intake/Output Summary (Last 24 hours) at 1/29/2022 0831  Last data filed at 1/29/2022 0506  Gross per 24 hour   Intake 590 ml   Output 1150 ml   Net -560 ml         Physical Exam:     Blood pressure (!) 157/68, pulse 66, temperature 98.8 °F (37.1 °C), resp. rate 16, height 5' 2\" (1.575 m), weight 72.1 kg (159 lb), SpO2 97 %. General:    Well nourished. No overt distress, elderly  CV:   Regular with ectopy. No m/r/g. No jugular venous distension. No edema   Lungs:   CTAB. No wheezing, rhonchi, or rales. Respirations even, unlabored, anterior   Abdomen: Bowel sounds present. Soft, nontender, nondistended. Extremities: No cyanosis or clubbing. No edema  Skin:     No rashes and normal coloration. Warm and dry. Neuro:  grossly intact. Psych:  Normal mood and affect.       I have reviewed ordered lab tests and independently visualized imaging below:    Recent Labs:  Recent Results (from the past 48 hour(s))   GLUCOSE, POC    Collection Time: 01/27/22 11:13 AM   Result Value Ref Range    Glucose (POC) 202 (H) 65 - 100 mg/dL    Performed by [employer]    GLUCOSE, POC    Collection Time: 01/27/22  4:01 PM   Result Value Ref Range    Glucose (POC) 231 (H) 65 - 100 mg/dL    Performed by [employer]    GLUCOSE, POC    Collection Time: 01/27/22  9:29 PM   Result Value Ref Range    Glucose (POC) 443 (H) 65 - 100 mg/dL    Performed by [facility]    GLUCOSE, POC    Collection Time: 01/28/22  6:04 AM   Result Value Ref Range    Glucose (POC) 240 (H) 65 - 100 mg/dL    Performed by [facility]    METABOLIC PANEL, BASIC    Collection Time: 01/28/22  6:28 AM   Result Value Ref Range    Sodium 143 136 - 145 mmol/L    Potassium 4.0 3.5 - 5.1 mmol/L    Chloride 114 (H) 98 - 107 mmol/L    CO2 26 21 - 32 mmol/L    Anion gap 3 (L) 7 - 16 mmol/L    Glucose 235 (H) 65 - 100 mg/dL    BUN 34 (H) 8 - 23 MG/DL    Creatinine 1.06 (H) 0.6 - 1.0 MG/DL    GFR est AA >60 >60 ml/min/1.73m2    GFR est non-AA 51 (L) >60 ml/min/1.73m2    Calcium 9.3 8.3 - 10.4 MG/DL   GLUCOSE, POC    Collection Time: 01/28/22 11:36 AM   Result Value Ref Range    Glucose (POC) 233 (H) 65 - 100 mg/dL    Performed by [employer]    GLUCOSE, POC    Collection Time: 01/28/22  4:04 PM   Result Value Ref Range    Glucose (POC) 291 (H) 65 - 100 mg/dL    Performed by [employer]    GLUCOSE, POC    Collection Time: 01/28/22  9:03 PM   Result Value Ref Range    Glucose (POC) 253 (H) 65 - 100 mg/dL    Performed by [facility]    GLUCOSE, POC    Collection Time: 01/29/22  6:03 AM   Result Value Ref Range    Glucose (POC) 95 65 - 100 mg/dL    Performed by [facility]    METABOLIC PANEL, BASIC    Collection Time: 01/29/22  6:44 AM   Result Value Ref Range    Sodium 144 136 - 145 mmol/L    Potassium 3.9 3.5 - 5.1 mmol/L    Chloride 113 (H) 98 - 107 mmol/L    CO2 27 21 - 32 mmol/L    Anion gap 4 (L) 7 - 16 mmol/L    Glucose 92 65 - 100 mg/dL    BUN 37 (H) 8 - 23 MG/DL    Creatinine 1.14 (H) 0.6 - 1.0 MG/DL    GFR est AA 57 (L) >60 ml/min/1.73m2    GFR est non-AA 47 (L) >60 ml/min/1.73m2    Calcium 9.2 8.3 - 10.4 MG/DL   CBC W/O DIFF    Collection Time: 01/29/22  6:44 AM   Result Value Ref Range    WBC 11.2 (H) 4.3 - 11.1 K/uL    RBC 2.76 (L) 4.05 - 5.2 M/uL    HGB 7.8 (L) 11.7 - 15.4 g/dL    HCT 25.6 (L) 35.8 - 46.3 %    MCV 92.8 79.6 - 97.8 FL    MCH 28.3 26.1 - 32.9 PG    MCHC 30.5 (L) 31.4 - 35.0 g/dL    RDW 17.2 (H) 11.9 - 14.6 %    PLATELET 882 648 - 644 K/uL    MPV 11.5 9.4 - 12.3 FL    ABSOLUTE NRBC 0.00 0.0 - 0.2 K/uL       All Micro Results     Procedure Component Value Units Date/Time    COVID-19 RAPID TEST [[id number]]  (Abnormal) Collected: 01/23/22 1557    Order Status: Completed Specimen: Nasopharyngeal Updated: 01/23/22 3973     Specimen source Nasopharyngeal        COVID-19 rapid test Detected        Comment:      The specimen is POSITIVE for SARS-CoV-2, the novel coronavirus associated with COVID-19. This test has been authorized by the FDA under an Emergency Use Authorization (EUA) for use by authorized laboratories. Fact sheet for Healthcare Providers: Stancedate.cophyllis  Fact sheet for Patients: PureWRXcophyllis       Methodology: Isothermal Nucleic Acid Amplification               Other Studies:  No results found.     Current Meds:  Current Facility-Administered Medications   Medication Dose Route Frequency    dexAMETHasone (DECADRON) tablet 6 mg  6 mg Oral DAILY    pantoprazole (PROTONIX) tablet 40 mg  40 mg Oral ACB    baricitinib (OLUMIANT) tablet 2 mg  2 mg Oral DAILY    insulin glargine (LANTUS) injection 5 Units  5 Units SubCUTAneous QHS    lisinopriL (PRINIVIL, ZESTRIL) tablet 20 mg  20 mg Oral DAILY    amLODIPine (NORVASC) tablet 10 mg  10 mg Oral DAILY    sertraline (ZOLOFT) tablet 50 mg  50 mg Oral DAILY    polyethylene glycol (MIRALAX) packet 17 g  17 g Oral DAILY    dextrose 40% (GLUTOSE) oral gel 1 Tube  15 g Oral PRN    glucagon (GLUCAGEN) injection 1 mg  1 mg IntraMUSCular PRN    dextrose 10% infusion 125-250 mL  125-250 mL IntraVENous PRN    insulin lispro (HUMALOG) injection   SubCUTAneous AC&HS    cloNIDine HCL (CATAPRES) tablet 0.2 mg  0.2 mg Oral BID PRN    sodium chloride (NS) flush 5-40 mL  5-40 mL IntraVENous Q8H    sodium chloride (NS) flush 5-40 mL  5-40 mL IntraVENous PRN    acetaminophen (TYLENOL) tablet 650 mg  650 mg Oral Q6H PRN    Or    acetaminophen (TYLENOL) suppository 650 mg  650 mg Rectal Q6H PRN    senna (SENOKOT) tablet 17.2 mg  2 Tablet Oral BID PRN    ondansetron (ZOFRAN) injection 4 mg  4 mg IntraVENous Q6H PRN Signed: Roshan Alvarez DO    Part of this note may have been written by using a voice dictation software. The note has been proof read but may still contain some grammatical/other typographical errors.

## 2022-01-30 NOTE — PROGRESS NOTES
Hospitalist Progress Note   Admit Date:  2022  2:29 PM   Name:  Carlton Yoder   Age:  80 y.o. Sex:  female  :  1929   MRN:  [id number]   Room:  [facility]/    Presenting Complaint: Melena    Reason(s) for Admission: Lower GI bleed [facility] Course & Interval History:     Ms. Franck Sparrow is a 79 yo female with PMH of DM2, HTN, CKD2, PAD admitted with acute blood loss anemia/GI bleed and found COVID 19 positive. S/p tagged PRBC scan that is positive for active bleed distal rectum. S/p PRBC. Seen by GI s/p flex sig 22 showing few small non bleeding erosions of rectum and no active bleeding. Recommendations are for miralax. No surgical needs. She has been on 3 L NC. CXR shows moderate multifocal bilateral infiltrates. CRP 10.5. on decadron/baricitinib. Discharge plans pending. SNF pending. Subjective (22): Hyperglycemic yesterday but hypoglycemic this AM. Given orange juice and dextrose gel. She is alert and oriented x4. Follows commands well. Denies fever, chills, SOB, chest pain, abdominal pain, nausea, vomiting. Assessment & Plan:     Lower GI bleed  Acute blood loss anemia:  Baseline Hgb 10-11  S/p tagged PRBC scan that is positive for active bleed distal rectum. S/p PRBC. Seen by GI s/p flex sig 22 showing few small non bleeding erosions of rectum and no active bleeding. Recommendations are for miralax. No surgical needs. GI signed off . General surgery signed off .   Daily miralax  Cont PPI  Trend HGB, stable ~8  Transfuse HGB < 7.0 or symptomatic anemia   Start ferrous sulfate  F/u CBC    COVID 19 pneumonia  Cont decadron EOT    Cont baricitinib EOT   Wean O2 as tolerant, RT with walking pulse ox requiring 2LO2NC  STR pending at this point    Essential hypertension   Cont norvasc 22  Cont lisinopril for substitution of  benazepril      Depression   Continued zoloft     Type 2 diabetes mellitus with diabetic neuropathy   SSI and Cont lantus   Metformin on hold    MIHAELA on CKD2-3, resolved     Ectopy:  EKG 1-26-22 shows NSR with PAC      Dispo/Discharge Planning:    STR pending at this point    Diet:  ADULT DIET Regular  DVT PPx:  SCD  Code status: DNR    Hospital Problems as of 1/30/2022 Date Reviewed: 11/30/2021          Codes Class Noted - Resolved POA    Acute blood loss anemia ICD-10-CM: D62  ICD-9-CM: 285.1  1/26/2022 - Present Yes        Chronic renal disease, stage II (Chronic) ICD-10-CM: N18.2  ICD-9-CM: 585.2  1/26/2022 - Present Yes        Pneumonia due to COVID-19 virus ICD-10-CM: U07.1, J12.82  ICD-9-CM: 480.8, 079.89  1/25/2022 - Present Yes        Acute respiratory failure due to COVID-19 [facility]) ICD-10-CM: U07.1, J96.00  ICD-9-CM: 518.81, 079.89  1/25/2022 - Present Yes        * (Principal) Lower GI bleed ICD-10-CM: K92.2  ICD-9-CM: 578.9  1/23/2022 - Present Unknown        COPD (chronic obstructive pulmonary disease) (HCC) (Chronic) ICD-10-CM: J44.9  ICD-9-CM: 496  8/22/2018 - Present Yes        Type 2 diabetes mellitus with diabetic neuropathy (HCC) (Chronic) ICD-10-CM: E11.40  ICD-9-CM: 250.60, 357.2  5/16/2018 - Present Yes        Depression ICD-10-CM: F32. A  ICD-9-CM: 138  6/1/2012 - Present Yes        Essential hypertension (Chronic) ICD-10-CM: I10  ICD-9-CM: 401.9  7/16/2010 - Present Yes        GERD (gastroesophageal reflux disease) (Chronic) ICD-10-CM: K21.9  ICD-9-CM: 530.81  7/16/2010 - Present Yes              Objective:     Patient Vitals for the past 24 hrs:   Temp Pulse Resp BP SpO2   01/30/22 0735 97.8 °F (36.6 °C) 69 16 (!) 171/60 94 %   01/30/22 0341 98 °F (36.7 °C) (!) 40 18 (!) 142/55 91 %   01/29/22 2342 99.8 °F (37.7 °C) 67 18 131/66 91 %   01/29/22 1947 98.5 °F (36.9 °C) 82 16 (!) 146/66 97 %   01/29/22 1803 97.6 °F (36.4 °C) 87 18 (!) 151/73 91 %   01/29/22 1416 98.9 °F (37.2 °C) 76 18 (!) 155/52 92 %     Oxygen Therapy  O2 Sat (%): 94 % (01/30/22 0735)  Pulse via Oximetry: 90 beats per minute (01/27/22 6719)  O2 Device: Nasal cannula (01/30/22 0735)  Skin Assessment: Clean, dry, & intact (01/29/22 1945)  Skin Protection for O2 Device: N/A (01/29/22 1945)  O2 Flow Rate (L/min): 3 l/min (01/29/22 1945)  FIO2 (%): 32 % (01/28/22 8126)    Estimated body mass index is 29.08 kg/m² as calculated from the following:    Height as of this encounter: 5' 2\" (1.575 m). Weight as of this encounter: 72.1 kg (159 lb). Intake/Output Summary (Last 24 hours) at 1/30/2022 0751  Last data filed at 1/30/2022 0015  Gross per 24 hour   Intake --   Output 825 ml   Net -825 ml         Physical Exam:     Blood pressure (!) 171/60, pulse 69, temperature 97.8 °F (36.6 °C), resp. rate 16, height 5' 2\" (1.575 m), weight 72.1 kg (159 lb), SpO2 94 %. General:    Well nourished. No overt distress, elderly  CV:   Regular. No m/r/g. No jugular venous distension. No edema   Lungs:   CTAB. No wheezing, rhonchi, or rales. Respirations even, unlabored, anterior   Abdomen: Bowel sounds present. Soft, nontender, nondistended. Extremities: No cyanosis or clubbing. No edema  Skin:     No rashes and normal coloration. Warm and dry. Neuro:  grossly intact. Psych:  Normal mood and affect.       I have reviewed ordered lab tests and independently visualized imaging below:    Recent Labs:  Recent Results (from the past 48 hour(s))   GLUCOSE, POC    Collection Time: 01/28/22 11:36 AM   Result Value Ref Range    Glucose (POC) 233 (H) 65 - 100 mg/dL    Performed by [employer]    GLUCOSE, POC    Collection Time: 01/28/22  4:04 PM   Result Value Ref Range    Glucose (POC) 291 (H) 65 - 100 mg/dL    Performed by [employer]    GLUCOSE, POC    Collection Time: 01/28/22  9:03 PM   Result Value Ref Range    Glucose (POC) 253 (H) 65 - 100 mg/dL    Performed by [facility]    GLUCOSE, POC    Collection Time: 01/29/22  6:03 AM   Result Value Ref Range    Glucose (POC) 95 65 - 100 mg/dL    Performed by [facility]    METABOLIC PANEL, BASIC    Collection Time: 01/29/22  6:44 AM   Result Value Ref Range    Sodium 144 136 - 145 mmol/L    Potassium 3.9 3.5 - 5.1 mmol/L    Chloride 113 (H) 98 - 107 mmol/L    CO2 27 21 - 32 mmol/L    Anion gap 4 (L) 7 - 16 mmol/L    Glucose 92 65 - 100 mg/dL    BUN 37 (H) 8 - 23 MG/DL    Creatinine 1.14 (H) 0.6 - 1.0 MG/DL    GFR est AA 57 (L) >60 ml/min/1.73m2    GFR est non-AA 47 (L) >60 ml/min/1.73m2    Calcium 9.2 8.3 - 10.4 MG/DL   CBC W/O DIFF    Collection Time: 01/29/22  6:44 AM   Result Value Ref Range    WBC 11.2 (H) 4.3 - 11.1 K/uL    RBC 2.76 (L) 4.05 - 5.2 M/uL    HGB 7.8 (L) 11.7 - 15.4 g/dL    HCT 25.6 (L) 35.8 - 46.3 %    MCV 92.8 79.6 - 97.8 FL    MCH 28.3 26.1 - 32.9 PG    MCHC 30.5 (L) 31.4 - 35.0 g/dL    RDW 17.2 (H) 11.9 - 14.6 %    PLATELET 789 381 - 651 K/uL    MPV 11.5 9.4 - 12.3 FL    ABSOLUTE NRBC 0.00 0.0 - 0.2 K/uL   FERRITIN    Collection Time: 01/29/22  6:44 AM   Result Value Ref Range    Ferritin 714 (H) 8 - 388 NG/ML   TRANSFERRIN SATURATION    Collection Time: 01/29/22  6:44 AM   Result Value Ref Range    Iron 35 35 - 150 ug/dL    TIBC 147 (L) 250 - 450 ug/dL    Transferrin Saturation 24 >20 %   GLUCOSE, POC    Collection Time: 01/29/22 12:09 PM   Result Value Ref Range    Glucose (POC) 215 (H) 65 - 100 mg/dL    Performed by [facility]    GLUCOSE, POC    Collection Time: 01/29/22  4:56 PM   Result Value Ref Range    Glucose (POC) 468 (HH) 65 - 100 mg/dL    Performed by [facility]    GLUCOSE, POC    Collection Time: 01/29/22  5:41 PM   Result Value Ref Range    Glucose (POC) 404 (H) 65 - 100 mg/dL    Performed by Rodolfo    GLUCOSE, POC    Collection Time: 01/29/22  9:46 PM   Result Value Ref Range    Glucose (POC) 293 (H) 65 - 100 mg/dL    Performed by Pravin Taylor    GLUCOSE, POC    Collection Time: 01/30/22  6:28 AM   Result Value Ref Range    Glucose (POC) 59 (L) 65 - 100 mg/dL    Performed by [facility], POC    Collection Time: 01/30/22  6:49 AM Result Value Ref Range    Glucose (POC) 61 (L) 65 - 100 mg/dL    Performed by DillardMandasiaRN    GLUCOSE, POC    Collection Time: 01/30/22  7:08 AM   Result Value Ref Range    Glucose (POC) 60 (L) 65 - 100 mg/dL    Performed by DillardMandasiaRN    GLUCOSE, POC    Collection Time: 01/30/22  7:23 AM   Result Value Ref Range    Glucose (POC) 70 65 - 100 mg/dL    Performed by DillardMandasiaRN        All Micro Results     Procedure Component Value Units Date/Time    COVID-19 RAPID TEST [[id number]]  (Abnormal) Collected: 01/23/22 5086    Order Status: Completed Specimen: Nasopharyngeal Updated: 01/23/22 4324     Specimen source Nasopharyngeal        COVID-19 rapid test Detected        Comment:      The specimen is POSITIVE for SARS-CoV-2, the novel coronavirus associated with COVID-19. This test has been authorized by the FDA under an Emergency Use Authorization (EUA) for use by authorized laboratories. Fact sheet for Healthcare Providers: ConventionUpdate.co.nz  Fact sheet for Patients: ConventionUpdate.co.nz       Methodology: Isothermal Nucleic Acid Amplification               Other Studies:  No results found.     Current Meds:  Current Facility-Administered Medications   Medication Dose Route Frequency    ferrous sulfate tablet 325 mg  1 Tablet Oral DAILY WITH BREAKFAST    dexAMETHasone (DECADRON) tablet 6 mg  6 mg Oral DAILY    pantoprazole (PROTONIX) tablet 40 mg  40 mg Oral ACB    baricitinib (OLUMIANT) tablet 2 mg  2 mg Oral DAILY    insulin glargine (LANTUS) injection 5 Units  5 Units SubCUTAneous QHS    lisinopriL (PRINIVIL, ZESTRIL) tablet 20 mg  20 mg Oral DAILY    amLODIPine (NORVASC) tablet 10 mg  10 mg Oral DAILY    sertraline (ZOLOFT) tablet 50 mg  50 mg Oral DAILY    polyethylene glycol (MIRALAX) packet 17 g  17 g Oral DAILY    dextrose 40% (GLUTOSE) oral gel 1 Tube  15 g Oral PRN    glucagon (GLUCAGEN) injection 1 mg  1 mg IntraMUSCular PRN    dextrose 10% infusion 125-250 mL  125-250 mL IntraVENous PRN    insulin lispro (HUMALOG) injection   SubCUTAneous AC&HS    cloNIDine HCL (CATAPRES) tablet 0.2 mg  0.2 mg Oral BID PRN    sodium chloride (NS) flush 5-40 mL  5-40 mL IntraVENous Q8H    sodium chloride (NS) flush 5-40 mL  5-40 mL IntraVENous PRN    acetaminophen (TYLENOL) tablet 650 mg  650 mg Oral Q6H PRN    Or    acetaminophen (TYLENOL) suppository 650 mg  650 mg Rectal Q6H PRN    senna (SENOKOT) tablet 17.2 mg  2 Tablet Oral BID PRN    ondansetron (ZOFRAN) injection 4 mg  4 mg IntraVENous Q6H PRN       Signed: Devika Wing DO    Part of this note may have been written by using a voice dictation software. The note has been proof read but may still contain some grammatical/other typographical errors.

## 2022-01-30 NOTE — PROGRESS NOTES
Patient's Blood sugar was 59. 2(4oz) cups of orange juice given). Patient's Blood sugar went up to 70. MD notified. Verbal orders to give the GLUTOSE gel (see Mar). Patient is alert and oriented.

## 2022-01-31 NOTE — PROGRESS NOTES
Hospitalist Progress Note   Admit Date:  2022  2:29 PM   Name:  Lucille Dukes   Age:  80 y.o. Sex:  female  :  1929   MRN:  [id number]   Room:  [facility]/01    Presenting Complaint: Melena    Reason(s) for Admission: Lower GI bleed [facility] Course & Interval History:     Ms. Bety Acosta is a 81 yo female with PMH of DM2, HTN, CKD2, PAD admitted with acute blood loss anemia/GI bleed and found COVID 19 positive. S/p tagged PRBC scan that is positive for active bleed distal rectum. S/p PRBC. Seen by GI s/p flex sig 22 showing few small non bleeding erosions of rectum and no active bleeding. Recommendations are for miralax. No surgical needs. She has been on 3 L NC. CXR shows moderate multifocal bilateral infiltrates. CRP 10.5. on decadron/baricitinib. Discharge plans pending. SNF pending. Subjective (22): Pt denies any needs this AM. Waiting for rehab. WBC elevated. UA+UTI. Hgb trended down to 7.2. Pt denies any melena or hematochezia. No event overnight. Denies fever, chills, SOB, chest pain, abdominal pain, nausea, vomiting. Assessment & Plan:     Lower GI bleed  Acute blood loss anemia:  Baseline Hgb 10-11  S/p tagged PRBC scan that is positive for active bleed distal rectum. S/p PRBC. Seen by GI s/p flex sig 22 showing few small non bleeding erosions of rectum and no active bleeding. Recommendations are for miralax. No surgical needs. GI signed off . General surgery signed off . Daily miralax  Cont PPI  Trend HGB, stable ~8  Transfuse HGB < 7.0 or symptomatic anemia   Cont ferrous sulfate  Hgb 7.2 this AM, no melena or hematochezia. Will give IV Infed 1x. F/u CBC    COVID 19 pneumonia  Cont decadron EOT    Cont baricitinib EOT   Wean O2 as tolerant, RT with walking pulse ox requiring 2LO2NC  STR pending at this point    Acute UTI-new   Leukocytosis since . Previous UCx grew Klebsiella. Abx: Rocephin (-. ..)  UCx pending  Remove suero, can do purewic or bedside commode     Essential hypertension   Cont norvasc 1-25-22  Cont lisinopril for substitution of  benazepril      Depression   Continued zoloft     Type 2 diabetes mellitus with diabetic neuropathy   SSI and Cont lantus   Metformin on hold    MIHAELA on CKD2-3, resolved     Ectopy:  EKG 1-26-22 shows NSR with PAC      Dispo/Discharge Planning:    STR pending. Anticipate 2/2. I spent 32 minutes of time caring for this patient at bedside or nearby on the unit, more than 50 percent of which was spent on coordination of care and/or counseling regarding the disease process, treatment options, and treatment plan. Diet:  ADULT DIET Regular  DVT PPx:  SCD  Code status: DNR    Hospital Problems as of 1/31/2022 Date Reviewed: 11/30/2021          Codes Class Noted - Resolved POA    Acute blood loss anemia ICD-10-CM: D62  ICD-9-CM: 285.1  1/26/2022 - Present Yes        Chronic renal disease, stage II (Chronic) ICD-10-CM: N18.2  ICD-9-CM: 585.2  1/26/2022 - Present Yes        Pneumonia due to COVID-19 virus ICD-10-CM: U07.1, J12.82  ICD-9-CM: 480.8, 079.89  1/25/2022 - Present Yes        Acute respiratory failure due to COVID-19 [facility]) ICD-10-CM: U07.1, J96.00  ICD-9-CM: 518.81, 079.89  1/25/2022 - Present Yes        * (Principal) Lower GI bleed ICD-10-CM: K92.2  ICD-9-CM: 578.9  1/23/2022 - Present Unknown        UTI (urinary tract infection) ICD-10-CM: N39.0  ICD-9-CM: 599.0  2/25/2020 - Present Unknown        COPD (chronic obstructive pulmonary disease) (HCC) (Chronic) ICD-10-CM: J44.9  ICD-9-CM: 496  8/22/2018 - Present Yes        Type 2 diabetes mellitus with diabetic neuropathy (HCC) (Chronic) ICD-10-CM: E11.40  ICD-9-CM: 250.60, 357.2  5/16/2018 - Present Yes        Depression ICD-10-CM: F32. A  ICD-9-CM: 496  6/1/2012 - Present Yes        Essential hypertension (Chronic) ICD-10-CM: I10  ICD-9-CM: 401.9  7/16/2010 - Present Yes        GERD (gastroesophageal reflux disease) (Chronic) ICD-10-CM: K21.9  ICD-9-CM: 530.81  7/16/2010 - Present Yes              Objective:     Patient Vitals for the past 24 hrs:   Temp Pulse Resp BP SpO2   01/31/22 1149 97.7 °F (36.5 °C) 75 -- (!) 139/53 93 %   01/31/22 0742 97.9 °F (36.6 °C) 71 14 (!) 162/58 95 %   01/31/22 0529 -- 70 -- (!) 150/50 --   01/31/22 0346 -- -- -- -- 93 %   01/31/22 0338 98.6 °F (37 °C) 72 14 (!) 154/48 (!) 88 %   01/30/22 2319 99.1 °F (37.3 °C) 72 11 (!) 140/67 96 %   01/30/22 2024 -- -- -- -- 95 %   01/30/22 1926 99.3 °F (37.4 °C) 69 12 (!) 130/54 96 %   01/30/22 1753 97.7 °F (36.5 °C) 78 16 (!) 154/63 97 %     Oxygen Therapy  O2 Sat (%): 93 % (01/31/22 1149)  Pulse via Oximetry: 90 beats per minute (01/27/22 0859)  O2 Device: Nasal cannula (01/30/22 2024)  Skin Assessment: Clean, dry, & intact (01/30/22 1905)  Skin Protection for O2 Device: N/A (01/29/22 1945)  O2 Flow Rate (L/min): 3 l/min (01/30/22 2024)  FIO2 (%): 32 % (01/28/22 0346)    Estimated body mass index is 29.08 kg/m² as calculated from the following:    Height as of this encounter: 5' 2\" (1.575 m). Weight as of this encounter: 72.1 kg (159 lb). Intake/Output Summary (Last 24 hours) at 1/31/2022 1339  Last data filed at 1/31/2022 0820  Gross per 24 hour   Intake 400 ml   Output 350 ml   Net 50 ml         Physical Exam:     Blood pressure (!) 139/53, pulse 75, temperature 97.7 °F (36.5 °C), resp. rate 14, height 5' 2\" (1.575 m), weight 72.1 kg (159 lb), SpO2 93 %. General:    Well nourished. No overt distress, elderly  CV:   Regular. No m/r/g. No jugular venous distension. No edema   Lungs:   CTAB. No wheezing, rhonchi, or rales. Respirations even, unlabored, anterior   Abdomen: Bowel sounds present. Soft, nontender, nondistended. Extremities: No cyanosis or clubbing. No edema  Skin:     No rashes and normal coloration. Warm and dry. Neuro:  grossly intact. Psych:  Normal mood and affect.       I have reviewed ordered lab tests and independently visualized imaging below:    Recent Labs:  Recent Results (from the past 48 hour(s))   GLUCOSE, POC    Collection Time: 01/29/22  4:56 PM   Result Value Ref Range    Glucose (POC) 468 (HH) 65 - 100 mg/dL    Performed by LasteElenaPCT    GLUCOSE, POC    Collection Time: 01/29/22  5:41 PM   Result Value Ref Range    Glucose (POC) 404 (H) 65 - 100 mg/dL    Performed by BomigueleElenaPCT    GLUCOSE, POC    Collection Time: 01/29/22  9:46 PM   Result Value Ref Range    Glucose (POC) 293 (H) 65 - 100 mg/dL    Performed by Gisele Moreno    GLUCOSE, POC    Collection Time: 01/30/22  6:28 AM   Result Value Ref Range    Glucose (POC) 59 (L) 65 - 100 mg/dL    Performed by JimlardMandasiaRN    GLUCOSE, POC    Collection Time: 01/30/22  6:49 AM   Result Value Ref Range    Glucose (POC) 61 (L) 65 - 100 mg/dL    Performed by JimlardMandasiaRN    GLUCOSE, POC    Collection Time: 01/30/22  7:08 AM   Result Value Ref Range    Glucose (POC) 60 (L) 65 - 100 mg/dL    Performed by DillardMandasiaRN    GLUCOSE, POC    Collection Time: 01/30/22  7:23 AM   Result Value Ref Range    Glucose (POC) 70 65 - 100 mg/dL    Performed by LuisMandrandolphRN    CBC W/O DIFF    Collection Time: 01/30/22 10:19 AM   Result Value Ref Range    WBC 14.4 (H) 4.3 - 11.1 K/uL    RBC 3.13 (L) 4.05 - 5.2 M/uL    HGB 9.0 (L) 11.7 - 15.4 g/dL    HCT 29.8 (L) 35.8 - 46.3 %    MCV 95.2 79.6 - 97.8 FL    MCH 28.8 26.1 - 32.9 PG    MCHC 30.2 (L) 31.4 - 35.0 g/dL    RDW 17.2 (H) 11.9 - 14.6 %    PLATELET 599 051 - 487 K/uL    MPV 11.9 9.4 - 12.3 FL    ABSOLUTE NRBC 0.00 0.0 - 0.2 K/uL   GLUCOSE, POC    Collection Time: 01/30/22 11:34 AM   Result Value Ref Range    Glucose (POC) 167 (H) 65 - 100 mg/dL    Performed by [address] [place] [address], POC    Collection Time: 01/30/22  4:05 PM   Result Value Ref Range    Glucose (POC) 255 (H) 65 - 100 mg/dL    Performed by Leana    GLUCOSE, POC    Collection Time: 01/30/22  9:41 PM   Result Value Ref Range    Glucose (POC) 224 (H) 65 - 100 mg/dL    Performed by [facility]    METABOLIC PANEL, BASIC    Collection Time: 01/31/22  5:18 AM   Result Value Ref Range    Sodium 142 136 - 145 mmol/L    Potassium 4.3 3.5 - 5.1 mmol/L    Chloride 112 (H) 98 - 107 mmol/L    CO2 27 21 - 32 mmol/L    Anion gap 3 (L) 7 - 16 mmol/L    Glucose 134 (H) 65 - 100 mg/dL    BUN 34 (H) 8 - 23 MG/DL    Creatinine 1.07 (H) 0.6 - 1.0 MG/DL    GFR est AA >60 >60 ml/min/1.73m2    GFR est non-AA 51 (L) >60 ml/min/1.73m2    Calcium 9.0 8.3 - 10.4 MG/DL   CBC W/O DIFF    Collection Time: 01/31/22  5:18 AM   Result Value Ref Range    WBC 11.3 (H) 4.3 - 11.1 K/uL    RBC 2.57 (L) 4.05 - 5.2 M/uL    HGB 7.2 (L) 11.7 - 15.4 g/dL    HCT 23.7 (L) 35.8 - 46.3 %    MCV 92.2 79.6 - 97.8 FL    MCH 28.0 26.1 - 32.9 PG    MCHC 30.4 (L) 31.4 - 35.0 g/dL    RDW 17.2 (H) 11.9 - 14.6 %    PLATELET 666 927 - 448 K/uL    MPV 11.1 9.4 - 12.3 FL    ABSOLUTE NRBC 0.00 0.0 - 0.2 K/uL   GLUCOSE, POC    Collection Time: 01/31/22  6:08 AM   Result Value Ref Range    Glucose (POC) 137 (H) 65 - 100 mg/dL    Performed by [facility]    GLUCOSE, POC    Collection Time: 01/31/22 10:52 AM   Result Value Ref Range    Glucose (POC) 240 (H) 65 - 100 mg/dL    Performed by [facility]    URINALYSIS W/ RFLX MICROSCOPIC    Collection Time: 01/31/22 11:15 AM   Result Value Ref Range    Color YELLOW      Appearance CLOUDY      Specific gravity 1.016 1.001 - 1.023      pH (UA) 6.0 5.0 - 9.0      Protein 300 (A) NEG mg/dL    Glucose Negative mg/dL    Ketone Negative NEG mg/dL    Bilirubin Negative NEG      Blood MODERATE (A) NEG      Urobilinogen 1.0 0.2 - 1.0 EU/dL    Nitrites Negative NEG      Leukocyte Esterase MODERATE (A) NEG      WBC >100 (H) 0 /hpf    RBC 20-50 0 /hpf    Epithelial cells 0-3 0 /hpf    Bacteria 4+ (H) 0 /hpf    Casts 5-10 0 /lpf       All Micro Results     Procedure Component Value Units Date/Time    CULTURE, URINE [[id number]] Collected: 01/31/22 1115    Order Status: Completed Specimen: Urine from Clean catch Updated: 01/31/22 1326    COVID-19 RAPID TEST [[id number]]  (Abnormal) Collected: 01/23/22 1557    Order Status: Completed Specimen: Nasopharyngeal Updated: 01/23/22 1751     Specimen source Nasopharyngeal        COVID-19 rapid test Detected        Comment:      The specimen is POSITIVE for SARS-CoV-2, the novel coronavirus associated with COVID-19. This test has been authorized by the FDA under an Emergency Use Authorization (EUA) for use by authorized laboratories. Fact sheet for Healthcare Providers: appEatITdaGreenDot Trans.co.nz  Fact sheet for Patients: Gigturn.co.nz       Methodology: Isothermal Nucleic Acid Amplification               Other Studies:  No results found.     Current Meds:  Current Facility-Administered Medications   Medication Dose Route Frequency    cefTRIAXone (ROCEPHIN) 1 g in 0.9% sodium chloride (MBP/ADV) 50 mL MBP  1 g IntraVENous Q24H    iron dextran (INFED) 25 mg in 0.9% sodium chloride 50 mL ivpb  25 mg IntraVENous ONCE    iron dextran (INFED) 1,000 mg in 0.9% sodium chloride 500 mL IVPB  1,000 mg IntraVENous ONCE    ferrous sulfate tablet 325 mg  1 Tablet Oral DAILY WITH BREAKFAST    hydrALAZINE (APRESOLINE) 20 mg/mL injection 20 mg  20 mg IntraVENous Q6H PRN    hydrALAZINE (APRESOLINE) tablet 25 mg  25 mg Oral Q8H    dexAMETHasone (DECADRON) tablet 6 mg  6 mg Oral DAILY    pantoprazole (PROTONIX) tablet 40 mg  40 mg Oral ACB    baricitinib (OLUMIANT) tablet 2 mg  2 mg Oral DAILY    insulin glargine (LANTUS) injection 5 Units  5 Units SubCUTAneous QHS    lisinopriL (PRINIVIL, ZESTRIL) tablet 20 mg  20 mg Oral DAILY    amLODIPine (NORVASC) tablet 10 mg  10 mg Oral DAILY    sertraline (ZOLOFT) tablet 50 mg  50 mg Oral DAILY    polyethylene glycol (MIRALAX) packet 17 g  17 g Oral DAILY    dextrose 40% (GLUTOSE) oral gel 1 Tube  15 g Oral PRN    glucagon (GLUCAGEN) injection 1 mg  1 mg IntraMUSCular PRN    dextrose 10% infusion 125-250 mL  125-250 mL IntraVENous PRN    insulin lispro (HUMALOG) injection   SubCUTAneous AC&HS    sodium chloride (NS) flush 5-40 mL  5-40 mL IntraVENous Q8H    sodium chloride (NS) flush 5-40 mL  5-40 mL IntraVENous PRN    acetaminophen (TYLENOL) tablet 650 mg  650 mg Oral Q6H PRN    Or    acetaminophen (TYLENOL) suppository 650 mg  650 mg Rectal Q6H PRN    senna (SENOKOT) tablet 17.2 mg  2 Tablet Oral BID PRN    ondansetron (ZOFRAN) injection 4 mg  4 mg IntraVENous Q6H PRN       Signed: Jesus Oh DO    Part of this note may have been written by using a voice dictation software. The note has been proof read but may still contain some grammatical/other typographical errors.

## 2022-01-31 NOTE — PROGRESS NOTES
ACUTE OT GOALS:  (Developed with and agreed upon by patient and/or caregiver.)  1. Patient will complete upper body bathing and dressing with SET UP and adaptive equipment as needed. 2. Patient will complete lower body bathing and dressing with MIN A and adaptive equipment as needed. 2. Patient will complete toileting with MIN A.   3. Patient will complete grooming ADL with SET UP.  4. Patient will tolerate 25 minutes of OT treatment with 1-2 rest breaks to increase activity tolerance for ADLs. 5. Patient will complete functional transfers with MIN A and adaptive equipment as needed. 6. Patient will tolerate 10 minutes BUE exercises to increase strength for safe, functional transfers.      Timeframe: 7 visits       OCCUPATIONAL THERAPY: Daily Note OT Treatment Day # 3    Aide Em is a 80 y.o. female   PRIMARY DIAGNOSIS: Lower GI bleed  Lower GI bleed [K92.2]  Procedure(s) (LRB):  SIGMOIDOSCOPY FLEXIBLE COVID POSITIVE PREP/RECOVER IN [place]OURO Admit to room 503 (N/A)  7 Days Post-Op  Payor: [employer] / Plan[place] [employer] MMP / Product Type: Managed Care Medicare /   ASSESSMENT:     REHAB RECOMMENDATIONS: CURRENT LEVEL OF FUNCTION:  (Most Recently Demonstrated)   Recommendation to date pending progress:  Setting:   Short-term Rehab  Equipment:    To Be Determined Bathing:   Moderate Assistance  Dressing:   Total Assistance for socks   Feeding/Grooming:   Minimal Assistance for combing hair  Toileting:   Total Assistance for bowel hygiene  Functional Mobility:   Minimal Assistance for sit to stand     ASSESSMENT:  Ms. Ángela Em is doing fair today. Pt presents supine upon arrival. Pt stated she did not feel as well. Pt required min A for bed mobility. Pt demonstrates fair sitting balance. Pt noted to have soiled/saturated brief. Pt dependent for bowel hygiene today. Pt assisted with ADLs at EOB. Pt required more assistance with LB bathing/dressing.  Pt returned to supine due to not feeling well. Minimal progress made this session. Will continue to benefit from skilled OT during stay.      SUBJECTIVE:   Ms. Stephanie Pruett states, \"I am homesick\"    SOCIAL HISTORY/LIVING ENVIRONMENT:   Support Systems: Child(edward)    OBJECTIVE:     PAIN: VITAL SIGNS: LINES/DRAINS:   Pre Treatment: Pain Screen  Pain Scale 1: Numeric (0 - 10)  Pain Intensity 1: 0  Post Treatment: 0   Landers Catheter  O2 Device: Nasal cannula     ACTIVITIES OF DAILY LIVING: I Mod I S SBA CGA Min Mod Max Total NT Comments   BASIC ADLs:              Bathing/ Showering [] [] [] [] [] [] [x] [] [] []    Toileting [] [] [] [] [] [] [] [] [x] []    Dressing [] [] [] [] [] [] [] [] [x] [] socks   Feeding [] [] [] [] [] [] [] [] [] [x]    Grooming [] [] [] [] [] [x] [] [] [] [] For combing hair   Personal Device Care [] [] [] [] [] [] [] [] [] [x]    Functional Mobility [] [] [] [] [] [x] [] [] [] []    I=Independent, Mod I=Modified Independent, S=Supervision, SBA=Standby Assistance, CGA=Contact Guard Assistance,   Min=Minimal Assistance, Mod=Moderate Assistance, Max=Maximal Assistance, Total=Total Assistance, NT=Not Tested    MOBILITY: I Mod I S SBA CGA Min Mod Max Total  NT x2 Comments:   Supine to sit [] [] [] [] [] [x] [] [] [] [] []    Sit to supine [] [] [] [] [] [x] [x] [] [] [] []    Sit to stand [] [] [] [] [] [x] [] [] [] [] []    Bed to chair [] [] [] [] [] [x] [] [] [] [] []    I=Independent, Mod I=Modified Independent, S=Supervision, SBA=Standby Assistance, CGA=Contact Guard Assistance,   Min=Minimal Assistance, Mod=Moderate Assistance, Max=Maximal Assistance, Total=Total Assistance, NT=Not Tested    BALANCE: Good Fair+ Fair Fair- Poor NT Comments   Sitting Static [] [] [x] [] [] []    Sitting Dynamic [] [] [x] [] [] []              Standing Static [] [] [x] [] [] []    Standing Dynamic [] [] [] [x] [] []      PLAN:   FREQUENCY/DURATION: OT Plan of Care: 3 times/week for duration of hospital stay or until stated goals are met, whichever comes first.    TREATMENT:   TREATMENT:   ($$ Self Care/Home Management: 23-37 mins$$ Neuromuscular Re-Education: 8-22 mins   )  Co-Treatment PT/OT necessary due to patient's decreased overall endurance/tolerance levels, as well as need for high level skilled assistance to complete functional transfers/mobility and functional tasks  Self Care (25 Minutes): Self care including Upper Body Bathing, Lower Body Bathing, Toileting, Upper Body Dressing, Lower Body Dressing and Grooming to increase independence and decrease level of assistance required. Neuromuscular Re-education (14 Minutes): Neuromuscular Re-education included Balance Training, Coordination training, Postural training, Sitting balance training and Standing balance training to improve Balance, Coordination, Functional Mobility and Postural Control.     TREATMENT GRID:  N/A    AFTER TREATMENT POSITION/PRECAUTIONS:  Bed, Needs within reach and RN notified    INTERDISCIPLINARY COLLABORATION:  RN/PCT, PT/PTA and OT/MARIE    TOTAL TREATMENT DURATION:  OT Patient Time In/Time Out  Time In: 1004  Time Out: Mitchell Colon, LEANDRA

## 2022-01-31 NOTE — PROGRESS NOTES
Pt's BP is 150/50 (heart rate 70). Perfect serve sent to MD regarding pt's scheduled 0600 hydralazine 25mg. MD ordered to give this medication at this time.

## 2022-01-31 NOTE — PROGRESS NOTES
CM called pt's daughter Shankar Bruce [phone] to f/u on the referral sent on 1-27-22 to [address] [place] [address]. Eileen Rileyneberenice stated this is where they would like pt go to if possible. CM called the SNF liaison and she will be able to admit pt on 2-2-22 which is the 11th day after testing positive for COVID-19. She stated another COVID-19 test will not be needed for STR. Current d/c plan is for pt to d/c to [address] [place] [address] on 2-2-22 for STR. CM will continue to follow and remain available if any needs arise.

## 2022-01-31 NOTE — PROGRESS NOTES
ACUTE PHYSICAL THERAPY GOALS:  (Developed with and agreed upon by patient and/or caregiver.)  1. Ms. Anjana Edgar will perform supine to sit and sit to supine independently in 7 days. 2.  Ms. Anjana Edgar will perform sit to stand and bed to chair with least restrictive device with cg in 7 days. 3.  Ms. Anjana Edgar will perform gait with rolling walker 25 ft and cg in 7 days. PHYSICAL THERAPY: Daily Note and AM Treatment Day # 2    Aide Scott is a 80 y.o. female   PRIMARY DIAGNOSIS: Lower GI bleed  Lower GI bleed [K92.2]  Procedure(s) (LRB):  SIGMOIDOSCOPY FLEXIBLE COVID POSITIVE PREP/RECOVER IN FLOURO Admit to room 503 (N/A)  7 Days Post-Op    ASSESSMENT:     REHAB RECOMMENDATIONS: CURRENT LEVEL OF FUNCTION:  (Most Recently Demonstrated)   Recommendation to date pending progress:  Setting:   Short-term Rehab  Equipment:    To Be Determined Bed Mobility:   Minimal Assistance x 2  Sit to Stand:   Moderate Assistance x 2  Transfers:   Minimal Assistance x 2  Gait/Mobility:   Minimal Assistance x 2     ASSESSMENT:  Ms. Anjana Edgar is supine in bed on  O2. Very soft spoken and states that she does not feel well today but is agreeable to working with therapy. OT present for co treat. Bed mobility is with min assist x 2 with the patient helping as much as she could. Sitting balance edge of bed is unsupported as the patient participated in ADL's. Stood to the walker and found to bed soiled, the patient then needed to sit to stand several times to get clean as she could not tolerate standing for long periods. Patient is returned to supine in bed at the end of the treatment as positioned for comfort however the patient c/o right posterior thigh Progressing slowly towards all goals. Continue PT efforts. Rehab at discharge. SUBJECTIVE:   Ms. Anjana Edgar states, \"I don't feel good this morning. \"    SOCIAL HISTORY/ LIVING ENVIRONMENT: lives with family uses rolling walker  Support Systems: Child(edward)  OBJECTIVE: PAIN: VITAL SIGNS: LINES/DRAINS:   Pre Treatment: Pain Screen  Pain Scale 1: Numeric (0 - 10)  Pain Intensity 1: 0  Post Treatment: no number given  Posterior right thigh   Landers Catheter  O2 Device: Nasal cannula     MOBILITY: I Mod I S SBA CGA Min Mod Max Total  NT x2 Comments:   Bed Mobility    Rolling [] [] [] [] [] [x] [] [] [] [] [x]    Supine to Sit [] [] [] [] [] [x] [] [] [] [] [x]    Scooting [] [] [] [] [] [x] [] [] [] [] [x]    Sit to Supine [] [] [] [] [] [x] [] [] [] [] [x]    Transfers    Sit to Stand [] [] [] [] [] [] [x] [] [] [] [x]    Bed to Chair [] [] [] [] [] [] [] [] [] [] []    Stand to Sit [] [] [] [] [] [] [x] [] [] [] [x]    I=Independent, Mod I=Modified Independent, S=Supervision, SBA=Standby Assistance, CGA=Contact Guard Assistance,   Min=Minimal Assistance, Mod=Moderate Assistance, Max=Maximal Assistance, Total=Total Assistance, NT=Not Tested    BALANCE: Good Fair+ Fair Fair- Poor NT Comments   Sitting Static [] [x] [] [] [] []    Sitting Dynamic [] [x] [] [] [] []              Standing Static [] [] [] [] [x] []    Standing Dynamic [] [] [] [] [x] []      GAIT: I Mod I S SBA CGA Min Mod Max Total  NT x2 Comments:   Level of Assistance [] [] [] [] [] [x] [x] [] [] [] []    Distance 2    DME Rolling Walker    Gait Quality Slow, short shuffling steps    Weightbearing  Status N/A     I=Independent, Mod I=Modified Independent, S=Supervision, SBA=Standby Assistance, CGA=Contact Guard Assistance,   Min=Minimal Assistance, Mod=Moderate Assistance, Max=Maximal Assistance, Total=Total Assistance, NT=Not Tested    PLAN:   FREQUENCY/DURATION: PT Plan of Care: 3 times/week for duration of hospital stay or until stated goals are met, whichever comes first.  TREATMENT:     TREATMENT:   ($$ Therapeutic Activity: 38-52 mins    )  Co-Treatment PT/OT necessary due to patient's decreased overall endurance/tolerance levels, as well as need for high level skilled assistance to complete functional transfers/mobility and functional tasks  Therapeutic Activity (39 Minutes): Therapeutic activity included Rolling, Supine to Sit, Sit to Supine, Scooting, Transfer Training, Ambulation on level ground, Sitting balance  and Standing balance to improve functional Mobility, Strength and Activity tolerance.     TREATMENT GRID:      DATE: 1/28/22       Ambulation        Hip Flexion 2x10 AB       Long Arc Quads 2x10 AB       Hip ab/ad        Heel Raises x20 AB       Toe Raises x20 AB                                Key:  A=active, AA=active assisted, P=passive, B=bilaterally, R=right, L=left   DF=dorsiflexion, PF=plantarflexion    AFTER TREATMENT POSITION/PRECAUTIONS:  Alarm Activated, Bed, Needs within reach and RN notified    INTERDISCIPLINARY COLLABORATION:  RN/PCT, PT/PTA and OT/MARIE    TOTAL TREATMENT DURATION:    Mi Thorne PTA  10:04-10:43 am

## 2022-01-31 NOTE — PROGRESS NOTES
Problem: Diabetes Self-Management  Goal: *Disease process and treatment process  Description: Define diabetes and identify own type of diabetes; list 3 options for treating diabetes. Outcome: Progressing Towards Goal  Goal: *Incorporating nutritional management into lifestyle  Description: Describe effect of type, amount and timing of food on blood glucose; list 3 methods for planning meals. Outcome: Progressing Towards Goal  Goal: *Incorporating physical activity into lifestyle  Description: State effect of exercise on blood glucose levels. Outcome: Progressing Towards Goal  Goal: *Developing strategies to promote health/change behavior  Description: Define the ABC's of diabetes; identify appropriate screenings, schedule and personal plan for screenings. Outcome: Progressing Towards Goal  Goal: *Using medications safely  Description: State effect of diabetes medications on diabetes; name diabetes medication taking, action and side effects. Outcome: Progressing Towards Goal  Goal: *Monitoring blood glucose, interpreting and using results  Description: Identify recommended blood glucose targets  and personal targets. Outcome: Progressing Towards Goal  Goal: *Prevention, detection, treatment of acute complications  Description: List symptoms of hyper- and hypoglycemia; describe how to treat low blood sugar and actions for lowering  high blood glucose level. Outcome: Progressing Towards Goal  Goal: *Prevention, detection and treatment of chronic complications  Description: Define the natural course of diabetes and describe the relationship of blood glucose levels to long term complications of diabetes.   Outcome: Progressing Towards Goal  Goal: *Developing strategies to address psychosocial issues  Description: Describe feelings about living with diabetes; identify support needed and support network  Outcome: Progressing Towards Goal  Goal: *Insulin pump training  Outcome: Progressing Towards Goal  Goal: *Sick day guidelines  Outcome: Progressing Towards Goal  Goal: *Patient Specific Goal (EDIT GOAL, INSERT TEXT)  Outcome: Progressing Towards Goal     Problem: Patient Education: Go to Patient Education Activity  Goal: Patient/Family Education  Outcome: Progressing Towards Goal     Problem: Falls - Risk of  Goal: *Absence of Falls  Description: Document Linden Alfredo Fall Risk and appropriate interventions in the flowsheet. Outcome: Progressing Towards Goal  Note: Fall Risk Interventions:  Mobility Interventions: Bed/chair exit alarm    Mentation Interventions: Bed/chair exit alarm    Medication Interventions: Bed/chair exit alarm    Elimination Interventions: Bed/chair exit alarm,Call light in reach    History of Falls Interventions: Bed/chair exit alarm         Problem: Patient Education: Go to Patient Education Activity  Goal: Patient/Family Education  Outcome: Progressing Towards Goal     Problem: Airway Clearance - Ineffective  Goal: Achieve or maintain patent airway  Outcome: Progressing Towards Goal     Problem: Gas Exchange - Impaired  Goal: Absence of hypoxia  Outcome: Progressing Towards Goal  Goal: Promote optimal lung function  Outcome: Progressing Towards Goal     Problem: Breathing Pattern - Ineffective  Goal: Ability to achieve and maintain a regular respiratory rate  Outcome: Progressing Towards Goal     Problem:  Body Temperature -  Risk of, Imbalanced  Goal: Ability to maintain a body temperature within defined limits  Outcome: Progressing Towards Goal  Goal: Will regain or maintain usual level of consciousness  Outcome: Progressing Towards Goal  Goal: Complications related to the disease process, condition or treatment will be avoided or minimized  Outcome: Progressing Towards Goal     Problem: Isolation Precautions - Risk of Spread of Infection  Goal: Prevent transmission of infectious organism to others  Outcome: Progressing Towards Goal     Problem: Nutrition Deficits  Goal: Optimize nutrtional status  Outcome: Progressing Towards Goal     Problem: Risk for Fluid Volume Deficit  Goal: Maintain normal heart rhythm  Outcome: Progressing Towards Goal  Goal: Maintain absence of muscle cramping  Outcome: Progressing Towards Goal  Goal: Maintain normal serum potassium, sodium, calcium, phosphorus, and pH  Outcome: Progressing Towards Goal     Problem: Loneliness or Risk for Loneliness  Goal: Demonstrate positive use of time alone when socialization is not possible  Outcome: Progressing Towards Goal     Problem: Fatigue  Goal: Verbalize increase energy and improved vitality  Outcome: Progressing Towards Goal     Problem: Patient Education: Go to Patient Education Activity  Goal: Patient/Family Education  Outcome: Progressing Towards Goal     Problem: Pressure Injury - Risk of  Goal: *Prevention of pressure injury  Description: Document Carlos Scale and appropriate interventions in the flowsheet. Outcome: Progressing Towards Goal     Problem: Patient Education: Go to Patient Education Activity  Goal: Patient/Family Education  Outcome: Progressing Towards Goal     Problem: Patient Education: Go to Patient Education Activity  Goal: Patient/Family Education  Outcome: Progressing Towards Goal     Problem: Patient Education: Go to Patient Education Activity  Goal: Patient/Family Education  Outcome: Progressing Towards Goal     Problem: Infection - Risk of, Urinary Catheter-Associated Urinary Tract Infection  Goal: *Absence of infection signs and symptoms  Outcome: Progressing Towards Goal     Problem: Patient Education: Go to Patient Education Activity  Goal: Patient/Family Education  Outcome: Progressing Towards Goal     Problem: Falls - Risk of  Goal: *Absence of Falls  Description: Document Torito Fall Risk and appropriate interventions in the flowsheet.   Outcome: Progressing Towards Goal  Note: Fall Risk Interventions:  Mobility Interventions: Bed/chair exit alarm    Mentation Interventions: Bed/chair exit alarm    Medication Interventions: Bed/chair exit alarm    Elimination Interventions: Bed/chair exit alarm,Call light in reach    History of Falls Interventions: Bed/chair exit alarm         Problem: Patient Education: Go to Patient Education Activity  Goal: Patient/Family Education  Outcome: Progressing Towards Goal

## 2022-02-01 NOTE — PROGRESS NOTES
Hospitalist Progress Note   Admit Date:  2022  2:29 PM   Name:  Fausto Curry   Age:  80 y.o. Sex:  female  :  1929   MRN:  [id number]   Room:  [facility]/    Presenting Complaint: Melena    Reason(s) for Admission: Lower GI bleed [facility] Course & Interval History:     Ms. Inez Olivarez is a 79 yo female with PMH of DM2, HTN, CKD2, PAD admitted with acute blood loss anemia/GI bleed and found COVID 19 positive. S/p tagged PRBC scan that is positive for active bleed distal rectum. S/p PRBC. Seen by GI s/p flex sig 22 showing few small non bleeding erosions of rectum and no active bleeding. Recommendations are for miralax. No surgical needs. She has been on 3 L NC. CXR shows moderate multifocal bilateral infiltrates. CRP 10.5. on decadron/baricitinib. Discharge plans pending. SNF pending. Subjective (22): Landers removed. No acute complaints. On Rocephin for UTI. UCx grew GNR. On 3LO2NC. Pt denies any melena or hematochezia. No event overnight. Denies fever, chills, SOB, chest pain, abdominal pain, nausea, vomiting. Assessment & Plan:     Lower GI bleed  Acute blood loss anemia:  Baseline Hgb 10-11  S/p tagged PRBC scan that is positive for active bleed distal rectum. S/p PRBC. Seen by GI s/p flex sig 22 showing few small non bleeding erosions of rectum and no active bleeding. Recommendations are for miralax. No surgical needs. GI signed off . General surgery signed off . Daily miralax  Cont PPI  Trend HGB, stable ~8  Transfuse HGB < 7.0 or symptomatic anemia   Cont ferrous sulfate  Hgb 7.2 , no melena or hematochezia. s/p IV Infed 1x  AM CBP pending    COVID 19 pneumonia  Cont decadron EOT    Cont baricitinib EOT   Wean O2 as tolerant, RT with walking pulse ox requiring 2LO2NC  STR pending at this point    Acute UTI-new   Leukocytosis since . Previous UCx grew Klebsiella. Abx: Rocephin (-. ..)  UCx: >100K GNR  Landers discontinued    Essential hypertension   Cont norvasc 1-25-22  Cont lisinopril for substitution of  benazepril      Depression   Continued zoloft     Type 2 diabetes mellitus with diabetic neuropathy   SSI and Cont lantus   Metformin on hold    MIHAELA on CKD2-3, resolved     Ectopy:  EKG 1-26-22 shows NSR with PAC      Dispo/Discharge Planning:    STR pending. Anticipate 2/2. I spent 32 minutes of time caring for this patient at bedside or nearby on the unit, more than 50 percent of which was spent on coordination of care and/or counseling regarding the disease process, treatment options, and treatment plan. Diet:  ADULT DIET Regular  DVT PPx:  SCD  Code status: DNR    Hospital Problems as of 2/1/2022 Date Reviewed: 11/30/2021          Codes Class Noted - Resolved POA    Acute blood loss anemia ICD-10-CM: D62  ICD-9-CM: 285.1  1/26/2022 - Present Yes        Chronic renal disease, stage II (Chronic) ICD-10-CM: N18.2  ICD-9-CM: 585.2  1/26/2022 - Present Yes        Pneumonia due to COVID-19 virus ICD-10-CM: U07.1, J12.82  ICD-9-CM: 480.8, 079.89  1/25/2022 - Present Yes        Acute respiratory failure due to COVID-19 [facility]) ICD-10-CM: U07.1, J96.00  ICD-9-CM: 518.81, 079.89  1/25/2022 - Present Yes        * (Principal) Lower GI bleed ICD-10-CM: K92.2  ICD-9-CM: 578.9  1/23/2022 - Present Unknown        UTI (urinary tract infection) ICD-10-CM: N39.0  ICD-9-CM: 599.0  2/25/2020 - Present Unknown        COPD (chronic obstructive pulmonary disease) (HCC) (Chronic) ICD-10-CM: J44.9  ICD-9-CM: 496  8/22/2018 - Present Yes        Type 2 diabetes mellitus with diabetic neuropathy (HCC) (Chronic) ICD-10-CM: E11.40  ICD-9-CM: 250.60, 357.2  5/16/2018 - Present Yes        Depression ICD-10-CM: F32. A  ICD-9-CM: 558  6/1/2012 - Present Yes        Essential hypertension (Chronic) ICD-10-CM: I10  ICD-9-CM: 401.9  7/16/2010 - Present Yes        GERD (gastroesophageal reflux disease) (Chronic) ICD-10-CM: K21.9  ICD-9-CM: 530.81 7/16/2010 - Present Yes              Objective:     Patient Vitals for the past 24 hrs:   Temp Pulse Resp BP SpO2   02/01/22 0736 98 °F (36.7 °C) 70 14 (!) 153/60 91 %   02/01/22 0304 98.3 °F (36.8 °C) 69 15 (!) 168/63 94 %   01/31/22 2310 97.6 °F (36.4 °C) 71 16 (!) 163/69 96 %   01/31/22 1944 98.1 °F (36.7 °C) 73 15 (!) 129/59 95 %   01/31/22 1535 97.6 °F (36.4 °C) 74 16 (!) 132/54 96 %   01/31/22 1149 97.7 °F (36.5 °C) 75 -- (!) 139/53 93 %     Oxygen Therapy  O2 Sat (%): 91 % (02/01/22 0736)  Pulse via Oximetry: 90 beats per minute (01/27/22 0859)  O2 Device: Nasal cannula (02/01/22 0736)  Skin Assessment: Clean, dry, & intact (01/31/22 1935)  Skin Protection for O2 Device: N/A (01/29/22 1945)  O2 Flow Rate (L/min): 3 l/min (01/31/22 1935)  FIO2 (%): 32 % (01/28/22 0346)    Estimated body mass index is 29.08 kg/m² as calculated from the following:    Height as of this encounter: 5' 2\" (1.575 m). Weight as of this encounter: 72.1 kg (159 lb). Intake/Output Summary (Last 24 hours) at 2/1/2022 1054  Last data filed at 2/1/2022 0524  Gross per 24 hour   Intake 500 ml   Output 1200 ml   Net -700 ml         Physical Exam:     Blood pressure (!) 153/60, pulse 70, temperature 98 °F (36.7 °C), resp. rate 14, height 5' 2\" (1.575 m), weight 72.1 kg (159 lb), SpO2 91 %. General:    Well nourished. No overt distress, elderly  CV:   Regular. No m/r/g. No jugular venous distension. No edema   Lungs:   CTAB. No wheezing, rhonchi, or rales. Respirations even, unlabored, anterior   Abdomen: Bowel sounds present. Soft, nontender, nondistended. Extremities: No cyanosis or clubbing. No edema  Skin:     No rashes and normal coloration. Warm and dry. Neuro:  grossly intact. Psych:  Normal mood and affect.       I have reviewed ordered lab tests and independently visualized imaging below:    Recent Labs:  Recent Results (from the past 48 hour(s))   GLUCOSE, POC    Collection Time: 01/30/22 11:34 AM   Result Value Ref Range    Glucose (POC) 167 (H) 65 - 100 mg/dL    Performed by [address] [place] [address] Way, POC    Collection Time: 01/30/22  4:05 PM   Result Value Ref Range    Glucose (POC) 255 (H) 65 - 100 mg/dL    Performed by Leana    GLUCOSE, POC    Collection Time: 01/30/22  9:41 PM   Result Value Ref Range    Glucose (POC) 224 (H) 65 - 100 mg/dL    [facility]    METABOLIC PANEL, BASIC    Collection Time: 01/31/22  5:18 AM   Result Value Ref Range    Sodium 142 136 - 145 mmol/L    Potassium 4.3 3.5 - 5.1 mmol/L    Chloride 112 (H) 98 - 107 mmol/L    CO2 27 21 - 32 mmol/L    Anion gap 3 (L) 7 - 16 mmol/L    Glucose 134 (H) 65 - 100 mg/dL    BUN 34 (H) 8 - 23 MG/DL    Creatinine 1.07 (H) 0.6 - 1.0 MG/DL    GFR est AA >60 >60 ml/min/1.73m2    GFR est non-AA 51 (L) >60 ml/min/1.73m2    Calcium 9.0 8.3 - 10.4 MG/DL   CBC W/O DIFF    Collection Time: 01/31/22  5:18 AM   Result Value Ref Range    WBC 11.3 (H) 4.3 - 11.1 K/uL    RBC 2.57 (L) 4.05 - 5.2 M/uL    HGB 7.2 (L) 11.7 - 15.4 g/dL    HCT 23.7 (L) 35.8 - 46.3 %    MCV 92.2 79.6 - 97.8 FL    MCH 28.0 26.1 - 32.9 PG    MCHC 30.4 (L) 31.4 - 35.0 g/dL    RDW 17.2 (H) 11.9 - 14.6 %    PLATELET 651 936 - 834 K/uL    MPV 11.1 9.4 - 12.3 FL    ABSOLUTE NRBC 0.00 0.0 - 0.2 K/uL   GLUCOSE, POC    Collection Time: 01/31/22  6:08 AM   Result Value Ref Range    Glucose (POC) 137 (H) 65 - 100 mg/dL    [facility]    GLUCOSE, POC    Collection Time: 01/31/22 10:52 AM   Result Value Ref Range    Glucose (POC) 240 (H) 65 - 100 mg/dL    Performed by [facility]    URINALYSIS W/ RFLX MICROSCOPIC    Collection Time: 01/31/22 11:15 AM   Result Value Ref Range    Color YELLOW      Appearance CLOUDY      Specific gravity 1.016 1.001 - 1.023      pH (UA) 6.0 5.0 - 9.0      Protein 300 (A) NEG mg/dL    Glucose Negative mg/dL    Ketone Negative NEG mg/dL    Bilirubin Negative NEG      Blood MODERATE (A) NEG      Urobilinogen 1.0 0.2 - 1.0 EU/dL    Nitrites Negative NEG      Leukocyte Esterase MODERATE (A) NEG      WBC >100 (H) 0 /hpf    RBC 20-50 0 /hpf    Epithelial cells 0-3 0 /hpf    Bacteria 4+ (H) 0 /hpf    Casts 5-10 0 /lpf   CULTURE, URINE    Collection Time: 01/31/22 11:15 AM    Specimen: Clean catch; Urine   Result Value Ref Range    Special Requests: NO SPECIAL REQUESTS      Culture result: (A)       >100,000 COLONIES/mL GRAM NEGATIVE RODS SUBCULTURE IN PROGRESS    Culture result:        10,000 to 50,000 COLONIES/mL MIXED SKIN NGOZI ISOLATED   PROCALCITONIN    Collection Time: 01/31/22  1:30 PM   Result Value Ref Range    Procalcitonin <0.05 0.00 - 0.49 ng/mL   GLUCOSE, POC    Collection Time: 01/31/22  3:32 PM   Result Value Ref Range    Glucose (POC) 258 (H) 65 - 100 mg/dL    Performed by [facility]    GLUCOSE, POC    Collection Time: 01/31/22  9:26 PM   Result Value Ref Range    Glucose (POC) 256 (H) 65 - 100 mg/dL    Performed by [facility]    GLUCOSE, POC    Collection Time: 02/01/22  6:20 AM   Result Value Ref Range    Glucose (POC) 134 (H) 65 - 100 mg/dL    Performed by [facility]        All Micro Results     Procedure Component Value Units Date/Time    CULTURE, URINE [[id number]]  (Abnormal) Collected: 01/31/22 1115    Order Status: Completed Specimen: Urine from Clean catch Updated: 02/01/22 0815     Special Requests: NO SPECIAL REQUESTS        Culture result:       >100,000 COLONIES/mL GRAM NEGATIVE RODS SUBCULTURE IN PROGRESS                  10,000 to 50,000 COLONIES/mL MIXED SKIN NGOZI ISOLATED          COVID-19 RAPID TEST [[id number]]  (Abnormal) Collected: 01/23/22 4727    Order Status: Completed Specimen: Nasopharyngeal Updated: 01/23/22 1751     Specimen source Nasopharyngeal        COVID-19 rapid test Detected        Comment:      The specimen is POSITIVE for SARS-CoV-2, the novel coronavirus associated with COVID-19.        This test has been authorized by the FDA under an Emergency Use Authorization (EUA) for use by authorized laboratories. Fact sheet for Healthcare Providers: ConventionUpdate.co.nz  Fact sheet for Patients: ConventionUpdate.co.nz       Methodology: Isothermal Nucleic Acid Amplification               Other Studies:  No results found. Current Meds:  Current Facility-Administered Medications   Medication Dose Route Frequency    cefTRIAXone (ROCEPHIN) 1 g in 0.9% sodium chloride (MBP/ADV) 50 mL MBP  1 g IntraVENous Q24H    ferrous sulfate tablet 325 mg  1 Tablet Oral DAILY WITH BREAKFAST    hydrALAZINE (APRESOLINE) 20 mg/mL injection 20 mg  20 mg IntraVENous Q6H PRN    hydrALAZINE (APRESOLINE) tablet 25 mg  25 mg Oral Q8H    pantoprazole (PROTONIX) tablet 40 mg  40 mg Oral ACB    baricitinib (OLUMIANT) tablet 2 mg  2 mg Oral DAILY    insulin glargine (LANTUS) injection 5 Units  5 Units SubCUTAneous QHS    lisinopriL (PRINIVIL, ZESTRIL) tablet 20 mg  20 mg Oral DAILY    amLODIPine (NORVASC) tablet 10 mg  10 mg Oral DAILY    sertraline (ZOLOFT) tablet 50 mg  50 mg Oral DAILY    polyethylene glycol (MIRALAX) packet 17 g  17 g Oral DAILY    dextrose 40% (GLUTOSE) oral gel 1 Tube  15 g Oral PRN    glucagon (GLUCAGEN) injection 1 mg  1 mg IntraMUSCular PRN    dextrose 10% infusion 125-250 mL  125-250 mL IntraVENous PRN    insulin lispro (HUMALOG) injection   SubCUTAneous AC&HS    sodium chloride (NS) flush 5-40 mL  5-40 mL IntraVENous Q8H    sodium chloride (NS) flush 5-40 mL  5-40 mL IntraVENous PRN    acetaminophen (TYLENOL) tablet 650 mg  650 mg Oral Q6H PRN    Or    acetaminophen (TYLENOL) suppository 650 mg  650 mg Rectal Q6H PRN    senna (SENOKOT) tablet 17.2 mg  2 Tablet Oral BID PRN    ondansetron (ZOFRAN) injection 4 mg  4 mg IntraVENous Q6H PRN       Signed: Luanne Cannon DO    Part of this note may have been written by using a voice dictation software.   The note has been proof read but may still contain some grammatical/other typographical errors.

## 2022-02-01 NOTE — PROGRESS NOTES
ACUTE OT GOALS:  (Developed with and agreed upon by patient and/or caregiver.)  1. Patient will complete upper body bathing and dressing with SET UP and adaptive equipment as needed. 2. Patient will complete lower body bathing and dressing with MIN A and adaptive equipment as needed. 2. Patient will complete toileting with MIN A.   3. Patient will complete grooming ADL with SET UP.  4. Patient will tolerate 25 minutes of OT treatment with 1-2 rest breaks to increase activity tolerance for ADLs. 5. Patient will complete functional transfers with MIN A and adaptive equipment as needed. 6. Patient will tolerate 10 minutes BUE exercises to increase strength for safe, functional transfers.      Timeframe: 7 visits       OCCUPATIONAL THERAPY: Daily Note OT Treatment Day # 4    Aide Sung is a 80 y.o. female   PRIMARY DIAGNOSIS: Lower GI bleed  Lower GI bleed [K92.2]  Procedure(s) (LRB):  SIGMOIDOSCOPY FLEXIBLE COVID POSITIVE PREP/RECOVER IN [place]OURO Admit to room 503 (N/A)  8 Days Post-Op  Payor: Appear Here MMP / Plan: [place] D[place] Appear Here MMP / Product Type: Managed Care Medicare /   ASSESSMENT:     REHAB RECOMMENDATIONS: CURRENT LEVEL OF FUNCTION:  (Most Recently Demonstrated)   Recommendation to date pending progress:  Setting:   Short-term Rehab  Equipment:    To Be Determined Bathing:   Not tested  Dressing:   Minimal Assistance for gown  Feeding/Grooming:   Minimal Assistance for washing/combing hair  Toileting:   Total Assistance for bowel hygiene  Functional Mobility:   Minimal Assistance x2 for sit to stand     ASSESSMENT:  Ms. Britni Sung is doing fair today. Pt presents supine upon arrival. Pt stated she better today. Pt required min A for bed mobility. Pt demonstrates fair sitting balance. Pt noted to have soiled brief. Pt dependent for bowel hygiene today. Pt assisted to chair with min A x2. Pt required assistance with washing/combing hair.  Pt left in chair with all needs in reach. Some progress made this session. Will continue to benefit from skilled OT during stay.      SUBJECTIVE:   Ms. Shauna Teran states, \"I feel better today\"    SOCIAL HISTORY/LIVING ENVIRONMENT:   Support Systems: Child(edward)    OBJECTIVE:     PAIN: VITAL SIGNS: LINES/DRAINS:   Pre Treatment: Pain Screen  Pain Scale 1: Numeric (0 - 10)  Pain Intensity 1: 0  Post Treatment: 0   Landers Catheter  O2 Device: Nasal cannula     ACTIVITIES OF DAILY LIVING: I Mod I S SBA CGA Min Mod Max Total NT Comments   BASIC ADLs:              Bathing/ Showering [] [] [] [] [] [] [] [] [] [x]    Toileting [] [] [] [] [] [] [] [] [x] []    Dressing [] [] [] [] [] [] [] [] [x] [] gown   Feeding [] [] [] [] [] [] [] [] [] [x]    Grooming [] [] [] [] [] [x] [] [] [] [] For combing/washing hair   Personal Device Care [] [] [] [] [] [] [] [] [] [x]    Functional Mobility [] [] [] [] [] [x] [] [] [] [] x2   I=Independent, Mod I=Modified Independent, S=Supervision, SBA=Standby Assistance, CGA=Contact Guard Assistance,   Min=Minimal Assistance, Mod=Moderate Assistance, Max=Maximal Assistance, Total=Total Assistance, NT=Not Tested    MOBILITY: I Mod I S SBA CGA Min Mod Max Total  NT x2 Comments:   Supine to sit [] [] [] [] [] [x] [] [] [] [] []    Sit to supine [] [] [] [] [] [] [] [] [] [x] []    Sit to stand [] [] [] [] [] [x] [] [] [] [] [x]    Bed to chair [] [] [] [] [] [x] [] [] [] [] [x]    I=Independent, Mod I=Modified Independent, S=Supervision, SBA=Standby Assistance, CGA=Contact Guard Assistance,   Min=Minimal Assistance, Mod=Moderate Assistance, Max=Maximal Assistance, Total=Total Assistance, NT=Not Tested    BALANCE: Good Fair+ Fair Fair- Poor NT Comments   Sitting Static [] [] [x] [] [] []    Sitting Dynamic [] [] [x] [] [] []              Standing Static [] [] [x] [] [] []    Standing Dynamic [] [] [] [x] [] []      PLAN:   FREQUENCY/DURATION: OT Plan of Care: 3 times/week for duration of hospital stay or until stated goals are met, whichever comes first.    TREATMENT:   TREATMENT:   ($$ Self Care/Home Management: 23-37 mins$$ Neuromuscular Re-Education: 8-22 mins   )  Co-Treatment PT/OT necessary due to patient's decreased overall endurance/tolerance levels, as well as need for high level skilled assistance to complete functional transfers/mobility and functional tasks  Self Care (25 Minutes): Self care including Toileting, Upper Body Dressing and Grooming to increase independence and decrease level of assistance required. Neuromuscular Re-education (13 Minutes): Neuromuscular Re-education included Balance Training, Coordination training, Postural training, Sitting balance training and Standing balance training to improve Balance, Coordination, Functional Mobility and Postural Control.     TREATMENT GRID:  N/A    AFTER TREATMENT POSITION/PRECAUTIONS:  Chair, Needs within reach and RN notified    INTERDISCIPLINARY COLLABORATION:  RN/PCT, PT/PTA and OT/MARIE    TOTAL TREATMENT DURATION:  OT Patient Time In/Time Out  Time In: 1045  Time Out: Awilda 82, LEANDRA

## 2022-02-01 NOTE — PROGRESS NOTES
ACUTE PHYSICAL THERAPY GOALS:  (Developed with and agreed upon by patient and/or caregiver.)  1. Ms. Brianne Hermosillo will perform supine to sit and sit to supine independently in 7 days. 2.  Ms. Brianne Hermosillo will perform sit to stand and bed to chair with least restrictive device with cg in 7 days. 3.  Ms. Brianne Hermosillo will perform gait with rolling walker 25 ft and cg in 7 days. PHYSICAL THERAPY: Daily Note and AM Treatment Day # 3    Aide Hermosillo is a 80 y.o. female   PRIMARY DIAGNOSIS: Lower GI bleed  Lower GI bleed [K92.2]  Procedure(s) (LRB):  SIGMOIDOSCOPY FLEXIBLE COVID POSITIVE PREP/RECOVER IN [place] Admit to room 503 (N/A)  8 Days Post-Op    ASSESSMENT:     REHAB RECOMMENDATIONS: CURRENT LEVEL OF FUNCTION:  (Most Recently Demonstrated)   Recommendation to date pending progress:  Setting:   Short-term Rehab  Equipment:    To Be Determined Bed Mobility:   Minimal Assistance x 2  Sit to Stand:   Moderate Assistance x 2  Transfers:   Minimal Assistance x 2  Gait/Mobility:   Minimal Assistance x 2     ASSESSMENT:  Ms. Brianne Hermosillo is supine in bed on  O2 and  is agreeable to working with therapy. OT present for co treat. Bed mobility is with min assist x 2 with the patient helping as much as she could. Sitting balance edge of bed is unsupported as the patient participated in ADL's. Stood to the walker and found to bed soiled, the patient then needed to sit to stand several times to get clean as she could not tolerate standing for long periods. Patient is able to take a few steps over to the recliner, she did require both verbal and tactile cues to manage the walker. Patient is left in the recliner  at the end of the treatment as positioned for comfort with needs within reach and alarm intact. Progressing slowly towards all goals. Continue PT efforts. Rehab at discharge. SUBJECTIVE:   Ms. Brianne Hermosillo states, \"hello. \"    SOCIAL HISTORY/ LIVING ENVIRONMENT: lives with family uses rolling walker  Support Systems: Child(edward)  OBJECTIVE:     PAIN: VITAL SIGNS: LINES/DRAINS:   Pre Treatment: Pain Screen  Pain Scale 1: Numeric (0 - 10)  Pain Intensity 1: 0  Post Treatment: no number given  Posterior right thigh   O2  O2 Device: Nasal cannula     MOBILITY: I Mod I S SBA CGA Min Mod Max Total  NT x2 Comments:   Bed Mobility    Rolling [] [] [] [] [] [x] [] [] [] [] [x]    Supine to Sit [] [] [] [] [] [x] [] [] [] [] [x]    Scooting [] [] [] [] [] [x] [] [] [] [] [x]    Sit to Supine [] [] [] [] [] [x] [] [] [] [] [x]    Transfers    Sit to Stand [] [] [] [] [] [] [x] [] [] [] [x]    Bed to Chair [] [] [] [] [] [] [] [] [] [] []    Stand to Sit [] [] [] [] [] [] [x] [] [] [] [x]    I=Independent, Mod I=Modified Independent, S=Supervision, SBA=Standby Assistance, CGA=Contact Guard Assistance,   Min=Minimal Assistance, Mod=Moderate Assistance, Max=Maximal Assistance, Total=Total Assistance, NT=Not Tested    BALANCE: Good Fair+ Fair Fair- Poor NT Comments   Sitting Static [] [x] [] [] [] []    Sitting Dynamic [] [x] [] [] [] []              Standing Static [] [] [] [] [x] []    Standing Dynamic [] [] [] [] [x] []      GAIT: I Mod I S SBA CGA Min Mod Max Total  NT x2 Comments:   Level of Assistance [] [] [] [] [] [x] [] [] [] [] [x]    Distance 5-6 feet     DME Rolling Walker    Gait Quality Slow, short shuffling steps    Weightbearing  Status N/A     I=Independent, Mod I=Modified Independent, S=Supervision, SBA=Standby Assistance, CGA=Contact Guard Assistance,   Min=Minimal Assistance, Mod=Moderate Assistance, Max=Maximal Assistance, Total=Total Assistance, NT=Not Tested    PLAN:   FREQUENCY/DURATION: PT Plan of Care: 3 times/week for duration of hospital stay or until stated goals are met, whichever comes first.  TREATMENT:     TREATMENT:   ($$ Therapeutic Activity: 38-52 mins    )  Co-Treatment PT/OT necessary due to patient's decreased overall endurance/tolerance levels, as well as need for high level skilled assistance to complete functional transfers/mobility and functional tasks  Therapeutic Activity (38 Minutes): Therapeutic activity included Rolling, Supine to Sit, Sit to Supine, Scooting, Transfer Training, Ambulation on level ground, Sitting balance  and Standing balance to improve functional Mobility, Strength and Activity tolerance.     TREATMENT GRID:      DATE: 1/28/22       Ambulation        Hip Flexion 2x10 AB       Long Arc Quads 2x10 AB       Hip ab/ad        Heel Raises x20 AB       Toe Raises x20 AB                                Key:  A=active, AA=active assisted, P=passive, B=bilaterally, R=right, L=left   DF=dorsiflexion, PF=plantarflexion    AFTER TREATMENT POSITION/PRECAUTIONS:  Alarm Activated, Chair, Needs within reach and RN notified    INTERDISCIPLINARY COLLABORATION:  RN/PCT, PT/PTA and OT/MARIE    TOTAL TREATMENT DURATION:    Mi Thorne PTA  10:04-10:43 am

## 2022-02-01 NOTE — PROGRESS NOTES
Problem: Diabetes Self-Management  Goal: *Disease process and treatment process  Description: Define diabetes and identify own type of diabetes; list 3 options for treating diabetes. Outcome: Progressing Towards Goal  Goal: *Incorporating nutritional management into lifestyle  Description: Describe effect of type, amount and timing of food on blood glucose; list 3 methods for planning meals. Outcome: Progressing Towards Goal  Goal: *Incorporating physical activity into lifestyle  Description: State effect of exercise on blood glucose levels. Outcome: Progressing Towards Goal  Goal: *Developing strategies to promote health/change behavior  Description: Define the ABC's of diabetes; identify appropriate screenings, schedule and personal plan for screenings. Outcome: Progressing Towards Goal  Goal: *Using medications safely  Description: State effect of diabetes medications on diabetes; name diabetes medication taking, action and side effects. Outcome: Progressing Towards Goal  Goal: *Monitoring blood glucose, interpreting and using results  Description: Identify recommended blood glucose targets  and personal targets. Outcome: Progressing Towards Goal  Goal: *Prevention, detection, treatment of acute complications  Description: List symptoms of hyper- and hypoglycemia; describe how to treat low blood sugar and actions for lowering  high blood glucose level. Outcome: Progressing Towards Goal  Goal: *Prevention, detection and treatment of chronic complications  Description: Define the natural course of diabetes and describe the relationship of blood glucose levels to long term complications of diabetes.   Outcome: Progressing Towards Goal  Goal: *Developing strategies to address psychosocial issues  Description: Describe feelings about living with diabetes; identify support needed and support network  Outcome: Progressing Towards Goal  Goal: *Insulin pump training  Outcome: Progressing Towards Goal  Goal: *Sick day guidelines  Outcome: Progressing Towards Goal  Goal: *Patient Specific Goal (EDIT GOAL, INSERT TEXT)  Outcome: Progressing Towards Goal     Problem: Patient Education: Go to Patient Education Activity  Goal: Patient/Family Education  Outcome: Progressing Towards Goal     Problem: Falls - Risk of  Goal: *Absence of Falls  Description: Document Rehan Blight Fall Risk and appropriate interventions in the flowsheet. Outcome: Progressing Towards Goal  Note: Fall Risk Interventions:  Mobility Interventions: Bed/chair exit alarm,Communicate number of staff needed for ambulation/transfer    Mentation Interventions: Bed/chair exit alarm,Adequate sleep, hydration, pain control    Medication Interventions: Bed/chair exit alarm    Elimination Interventions: Bed/chair exit alarm,Call light in reach    History of Falls Interventions: Bed/chair exit alarm         Problem: Patient Education: Go to Patient Education Activity  Goal: Patient/Family Education  Outcome: Progressing Towards Goal     Problem: Airway Clearance - Ineffective  Goal: Achieve or maintain patent airway  Outcome: Progressing Towards Goal     Problem: Gas Exchange - Impaired  Goal: Absence of hypoxia  Outcome: Progressing Towards Goal  Goal: Promote optimal lung function  Outcome: Progressing Towards Goal     Problem: Breathing Pattern - Ineffective  Goal: Ability to achieve and maintain a regular respiratory rate  Outcome: Progressing Towards Goal     Problem:  Body Temperature -  Risk of, Imbalanced  Goal: Ability to maintain a body temperature within defined limits  Outcome: Progressing Towards Goal  Goal: Will regain or maintain usual level of consciousness  Outcome: Progressing Towards Goal  Goal: Complications related to the disease process, condition or treatment will be avoided or minimized  Outcome: Progressing Towards Goal     Problem: Isolation Precautions - Risk of Spread of Infection  Goal: Prevent transmission of infectious organism to others  Outcome: Progressing Towards Goal     Problem: Nutrition Deficits  Goal: Optimize nutrtional status  Outcome: Progressing Towards Goal     Problem: Risk for Fluid Volume Deficit  Goal: Maintain normal heart rhythm  Outcome: Progressing Towards Goal  Goal: Maintain absence of muscle cramping  Outcome: Progressing Towards Goal  Goal: Maintain normal serum potassium, sodium, calcium, phosphorus, and pH  Outcome: Progressing Towards Goal     Problem: Loneliness or Risk for Loneliness  Goal: Demonstrate positive use of time alone when socialization is not possible  Outcome: Progressing Towards Goal     Problem: Fatigue  Goal: Verbalize increase energy and improved vitality  Outcome: Progressing Towards Goal     Problem: Patient Education: Go to Patient Education Activity  Goal: Patient/Family Education  Outcome: Progressing Towards Goal     Problem: Pressure Injury - Risk of  Goal: *Prevention of pressure injury  Description: Document Carlos Scale and appropriate interventions in the flowsheet. Outcome: Progressing Towards Goal     Problem: Patient Education: Go to Patient Education Activity  Goal: Patient/Family Education  Outcome: Progressing Towards Goal     Problem: Patient Education: Go to Patient Education Activity  Goal: Patient/Family Education  Outcome: Progressing Towards Goal     Problem: Patient Education: Go to Patient Education Activity  Goal: Patient/Family Education  Outcome: Progressing Towards Goal     Problem: Infection - Risk of, Urinary Catheter-Associated Urinary Tract Infection  Goal: *Absence of infection signs and symptoms  Outcome: Progressing Towards Goal     Problem: Patient Education: Go to Patient Education Activity  Goal: Patient/Family Education  Outcome: Progressing Towards Goal     Problem: Falls - Risk of  Goal: *Absence of Falls  Description: Document Torito Fall Risk and appropriate interventions in the flowsheet.   Outcome: Progressing Towards Goal  Note: Fall Risk Interventions:  Mobility Interventions: Bed/chair exit alarm,Communicate number of staff needed for ambulation/transfer    Mentation Interventions: Bed/chair exit alarm,Adequate sleep, hydration, pain control    Medication Interventions: Bed/chair exit alarm    Elimination Interventions: Bed/chair exit alarm,Call light in reach    History of Falls Interventions: Bed/chair exit alarm         Problem: Patient Education: Go to Patient Education Activity  Goal: Patient/Family Education  Outcome: Progressing Towards Goal

## 2022-02-01 NOTE — PROGRESS NOTES
FSBS 381  verbally made aware she stated just give as scheduled insulin. Patient has no acute issues noted. Will monitor for changes.

## 2022-02-01 NOTE — PROGRESS NOTES
Problem: Diabetes Self-Management  Goal: *Disease process and treatment process  Description: Define diabetes and identify own type of diabetes; list 3 options for treating diabetes. Outcome: Progressing Towards Goal  Goal: *Incorporating nutritional management into lifestyle  Description: Describe effect of type, amount and timing of food on blood glucose; list 3 methods for planning meals. Outcome: Progressing Towards Goal  Goal: *Incorporating physical activity into lifestyle  Description: State effect of exercise on blood glucose levels. Outcome: Progressing Towards Goal  Goal: *Developing strategies to promote health/change behavior  Description: Define the ABC's of diabetes; identify appropriate screenings, schedule and personal plan for screenings. Outcome: Progressing Towards Goal  Goal: *Using medications safely  Description: State effect of diabetes medications on diabetes; name diabetes medication taking, action and side effects. Outcome: Progressing Towards Goal  Goal: *Monitoring blood glucose, interpreting and using results  Description: Identify recommended blood glucose targets  and personal targets. Outcome: Progressing Towards Goal  Goal: *Prevention, detection, treatment of acute complications  Description: List symptoms of hyper- and hypoglycemia; describe how to treat low blood sugar and actions for lowering  high blood glucose level. Outcome: Progressing Towards Goal  Goal: *Prevention, detection and treatment of chronic complications  Description: Define the natural course of diabetes and describe the relationship of blood glucose levels to long term complications of diabetes.   Outcome: Progressing Towards Goal  Goal: *Developing strategies to address psychosocial issues  Description: Describe feelings about living with diabetes; identify support needed and support network  Outcome: Progressing Towards Goal  Goal: *Insulin pump training  Outcome: Progressing Towards Goal  Goal: *Sick day guidelines  Outcome: Progressing Towards Goal  Goal: *Patient Specific Goal (EDIT GOAL, INSERT TEXT)  Outcome: Progressing Towards Goal     Problem: Patient Education: Go to Patient Education Activity  Goal: Patient/Family Education  Outcome: Progressing Towards Goal     Problem: Falls - Risk of  Goal: *Absence of Falls  Description: Document Nica Harmon Fall Risk and appropriate interventions in the flowsheet. Outcome: Progressing Towards Goal  Note: Fall Risk Interventions:  Mobility Interventions: Bed/chair exit alarm,Communicate number of staff needed for ambulation/transfer    Mentation Interventions: Bed/chair exit alarm,Adequate sleep, hydration, pain control    Medication Interventions: Bed/chair exit alarm    Elimination Interventions: Bed/chair exit alarm,Call light in reach    History of Falls Interventions: Bed/chair exit alarm         Problem: Patient Education: Go to Patient Education Activity  Goal: Patient/Family Education  Outcome: Progressing Towards Goal     Problem: Airway Clearance - Ineffective  Goal: Achieve or maintain patent airway  Outcome: Progressing Towards Goal     Problem: Gas Exchange - Impaired  Goal: Absence of hypoxia  Outcome: Progressing Towards Goal  Goal: Promote optimal lung function  Outcome: Progressing Towards Goal     Problem: Breathing Pattern - Ineffective  Goal: Ability to achieve and maintain a regular respiratory rate  Outcome: Progressing Towards Goal     Problem:  Body Temperature -  Risk of, Imbalanced  Goal: Ability to maintain a body temperature within defined limits  Outcome: Progressing Towards Goal  Goal: Will regain or maintain usual level of consciousness  Outcome: Progressing Towards Goal  Goal: Complications related to the disease process, condition or treatment will be avoided or minimized  Outcome: Progressing Towards Goal     Problem: Isolation Precautions - Risk of Spread of Infection  Goal: Prevent transmission of infectious organism to others  Outcome: Progressing Towards Goal     Problem: Nutrition Deficits  Goal: Optimize nutrtional status  Outcome: Progressing Towards Goal     Problem: Risk for Fluid Volume Deficit  Goal: Maintain normal heart rhythm  Outcome: Progressing Towards Goal  Goal: Maintain absence of muscle cramping  Outcome: Progressing Towards Goal  Goal: Maintain normal serum potassium, sodium, calcium, phosphorus, and pH  Outcome: Progressing Towards Goal     Problem: Loneliness or Risk for Loneliness  Goal: Demonstrate positive use of time alone when socialization is not possible  Outcome: Progressing Towards Goal     Problem: Fatigue  Goal: Verbalize increase energy and improved vitality  Outcome: Progressing Towards Goal     Problem: Patient Education: Go to Patient Education Activity  Goal: Patient/Family Education  Outcome: Progressing Towards Goal     Problem: Pressure Injury - Risk of  Goal: *Prevention of pressure injury  Description: Document Carlos Scale and appropriate interventions in the flowsheet. Outcome: Progressing Towards Goal     Problem: Patient Education: Go to Patient Education Activity  Goal: Patient/Family Education  Outcome: Progressing Towards Goal     Problem: Patient Education: Go to Patient Education Activity  Goal: Patient/Family Education  Outcome: Progressing Towards Goal     Problem: Patient Education: Go to Patient Education Activity  Goal: Patient/Family Education  Outcome: Progressing Towards Goal     Problem: Infection - Risk of, Urinary Catheter-Associated Urinary Tract Infection  Goal: *Absence of infection signs and symptoms  Outcome: Progressing Towards Goal     Problem: Patient Education: Go to Patient Education Activity  Goal: Patient/Family Education  Outcome: Progressing Towards Goal     Problem: Falls - Risk of  Goal: *Absence of Falls  Description: Document Torito Fall Risk and appropriate interventions in the flowsheet.   Outcome: Progressing Towards Goal  Note: Fall Risk Interventions:  Mobility Interventions: Bed/chair exit alarm,Communicate number of staff needed for ambulation/transfer    Mentation Interventions: Bed/chair exit alarm,Adequate sleep, hydration, pain control    Medication Interventions: Bed/chair exit alarm    Elimination Interventions: Bed/chair exit alarm,Call light in reach    History of Falls Interventions: Bed/chair exit alarm         Problem: Patient Education: Go to Patient Education Activity  Goal: Patient/Family Education  Outcome: Progressing Towards Goal

## 2022-02-02 NOTE — PROGRESS NOTES
ACUTE OT GOALS:  (Developed with and agreed upon by patient and/or caregiver.)  1. Patient will complete upper body bathing and dressing with SET UP and adaptive equipment as needed. 2. Patient will complete lower body bathing and dressing with MIN A and adaptive equipment as needed. 2. Patient will complete toileting with MIN A.   3. Patient will complete grooming ADL with SET UP.  4. Patient will tolerate 25 minutes of OT treatment with 1-2 rest breaks to increase activity tolerance for ADLs. 5. Patient will complete functional transfers with MIN A and adaptive equipment as needed. 6. Patient will tolerate 10 minutes BUE exercises to increase strength for safe, functional transfers.      Timeframe: 7 visits       OCCUPATIONAL THERAPY: Daily Note OT Treatment Day # 5    Aide Dixon is a 80 y.o. female   PRIMARY DIAGNOSIS: Lower GI bleed  Lower GI bleed [K92.2]  Procedure(s) (LRB):  SIGMOIDOSCOPY FLEXIBLE COVID POSITIVE PREP/RECOVER IN [place] Admit to room 503 (N/A)  9 Days Post-Op  Payor: [employer] / Plan[place] D[place] [employer] / Product Type: Managed Care Medicare /   ASSESSMENT:     REHAB RECOMMENDATIONS: CURRENT LEVEL OF FUNCTION:  (Most Recently Demonstrated)   Recommendation to date pending progress:  Setting:   Short-term Rehab  Equipment:    To Be Determined Bathing:   Not tested  Dressing:   Minimal Assistance for gown/socks  Feeding/Grooming:   Supervision for washing face  Toileting:   Total Assistance for bowel hygiene  Functional Mobility:   Minimal Assistance      ASSESSMENT:  Ms. Morgan Dixon is doing well today. Pt presents supine upon arrival. Pt stated she better today. Pt required supervision for bed mobility. Pt demonstrates fair (+) sitting balance. Pt assisted with self care tasks today (bathing/dressing/toileting). Pt participates well with ADLs. Required min A (x2) at times to transfer over to [facility]. Fair standing balance appreciated.  Pt returned to supine due to being discharged later. Some progress made this session. Will continue to benefit from skilled OT during stay.      SUBJECTIVE:   Ms. Shaina Bishop states, \"I am going to [employer]"    SOCIAL HISTORY/LIVING ENVIRONMENT:   Support Systems: Child(edward)    OBJECTIVE:     PAIN: VITAL SIGNS: LINES/DRAINS:   Pre Treatment: Pain Screen  Pain Scale 1: Numeric (0 - 10)  Pain Intensity 1: 0  Post Treatment: 0   Landers Catheter  O2 Device: Nasal cannula     ACTIVITIES OF DAILY LIVING: I Mod I S SBA CGA Min Mod Max Total NT Comments   BASIC ADLs:              Bathing/ Showering [] [] [] [] [] [x] [] [] [] []    Toileting [] [] [] [] [] [] [] [] [x] []    Dressing [] [] [] [] [] [x] [] [] [] []    Feeding [] [] [] [] [] [] [] [] [] [x]    Grooming [] [] [x] [] [] [] [] [] [] []    Personal Device Care [] [] [] [] [] [] [] [] [] [x]    Functional Mobility [] [] [] [] [] [x] [] [] [] [] x2   I=Independent, Mod I=Modified Independent, S=Supervision, SBA=Standby Assistance, CGA=Contact Guard Assistance,   Min=Minimal Assistance, Mod=Moderate Assistance, Max=Maximal Assistance, Total=Total Assistance, NT=Not Tested    MOBILITY: I Mod I S SBA CGA Min Mod Max Total  NT x2 Comments:   Supine to sit [] [] [x] [] [] [] [] [] [] [] []    Sit to supine [] [] [] [] [] [x] [] [] [] [] []    Sit to stand [] [] [] [] [] [x] [] [] [] [] [x]    Bed to chair [] [] [] [] [] [] [] [] [] [x] []    I=Independent, Mod I=Modified Independent, S=Supervision, SBA=Standby Assistance, CGA=Contact Guard Assistance,   Min=Minimal Assistance, Mod=Moderate Assistance, Max=Maximal Assistance, Total=Total Assistance, NT=Not Tested    BALANCE: Good Fair+ Fair Fair- Poor NT Comments   Sitting Static [] [] [x] [] [] []    Sitting Dynamic [] [] [x] [] [] []              Standing Static [] [] [x] [] [] []    Standing Dynamic [] [] [] [x] [] []      PLAN:   FREQUENCY/DURATION: OT Plan of Care: 3 times/week for duration of hospital stay or until stated goals are met, whichever comes first.    TREATMENT:   TREATMENT:   ($$ Self Care/Home Management: 23-37 mins$$ Neuromuscular Re-Education: 8-22 mins   )  Co-Treatment PT/OT necessary due to patient's decreased overall endurance/tolerance levels, as well as need for high level skilled assistance to complete functional transfers/mobility and functional tasks  Self Care (25 Minutes): Self care including Upper Body Bathing, Lower Body Bathing, Toileting, Upper Body Dressing, Lower Body Dressing and Grooming to increase independence and decrease level of assistance required. Neuromuscular Re-education (15 Minutes): Neuromuscular Re-education included Balance Training, Coordination training, Postural training, Sitting balance training and Standing balance training to improve Balance, Coordination, Functional Mobility and Postural Control.     TREATMENT GRID:  N/A    AFTER TREATMENT POSITION/PRECAUTIONS:  Alarm Activated, Bed, Needs within reach and RN notified    INTERDISCIPLINARY COLLABORATION:  RN/PCT, PT/PTA and OT/MARIE    TOTAL TREATMENT DURATION:  OT Patient Time In/Time Out  Time In: 1000  Time Out: 1040    LEANDRA Dunlap

## 2022-02-02 NOTE — PROGRESS NOTES
CM received a call from [facility] with pt's STR [employer]. The Teryl Gess is good from Feb. 2-8, 2022. Auth #: [id number]. Per Dr. Stuart Osler pt removed her O2 NC this morning which resulted in pt's O2 stats dropping. Pt is now currently requiring 7L O2 as a result. Pt will now be d/c tomorrow to [facility]. CM notified the SNF liaison. CM will continue to follow and remain available if any needs arise.

## 2022-02-02 NOTE — PROGRESS NOTES
ACUTE PHYSICAL THERAPY GOALS:  (Developed with and agreed upon by patient and/or caregiver.)  1. Ms. Coral De Anda will perform supine to sit and sit to supine independently in 7 days. 2.  Ms. Coral De Anda will perform sit to stand and bed to chair with least restrictive device with cg in 7 days. 3.  Ms. Coral De Anda will perform gait with rolling walker 25 ft and cg in 7 days. PHYSICAL THERAPY: Daily Note and AM Treatment Day # 4    Aide De Anda is a 80 y.o. female   PRIMARY DIAGNOSIS: Lower GI bleed  Lower GI bleed [K92.2]  Procedure(s) (LRB):  SIGMOIDOSCOPY FLEXIBLE COVID POSITIVE PREP/RECOVER IN [place] Admit to room 503 (N/A)  9 Days Post-Op    ASSESSMENT:     REHAB RECOMMENDATIONS: CURRENT LEVEL OF FUNCTION:  (Most Recently Demonstrated)   Recommendation to date pending progress:  Setting:   Short-term Rehab  Equipment:    To Be Determined Bed Mobility:   Supervision x 2  Sit to Stand:   Contact Guard Assistance x 2  Transfers:   Minimal Assistance x 2  Gait/Mobility:   Minimal Assistance x 2     ASSESSMENT:  Ms. Coral De Anda is supine in bed on  O2 and  is agreeable to working with therapy. OT present for co treat. Bed mobility is with supervision  with additional time to complete. Sitting balance edge of bed is unsupported as the patient participated in ADL's. Stood to the walker and found to bed soiled,transferred to the [facility],  the patient then needed to sit to stand several times to get clean. Patient is able to take a few steps back to the bed, she did require both verbal and tactile cues to manage the walker. Needed a little assist with her LE's back onto the bed. She was positioned for comfort with needs within reach and alarm intact. Progressing  towards all goals. Continue PT efforts. Possible discharge to rehab today.      SUBJECTIVE:   Ms. Coral De Anda states, \"good morning\"    SOCIAL HISTORY/ LIVING ENVIRONMENT: lives with family uses rolling walker  Support Systems: Child(edward)  OBJECTIVE: PAIN: VITAL SIGNS: LINES/DRAINS:   Pre Treatment: Pain Screen  Pain Scale 1: Numeric (0 - 10)  Pain Intensity 1: 0/10  Post Treatment: no number given 0/10   O2  O2 Device: Nasal cannula     MOBILITY: I Mod I S SBA CGA Min Mod Max Total  NT x2 Comments:   Bed Mobility    Rolling [] [] [x] [] [] [] [] [] [] [] []    Supine to Sit [] [] [x] [] [] [] [] [] [] [] []    Scooting [] [] [x] [] [] [] [] [] [] [] []    Sit to Supine [] [] [] [] [] [x] [] [] [] [] []    Transfers    Sit to Stand [] [] [] [] [x] [] [] [] [] [] [x]    Bed to Chair [] [] [] [] [] [x] [] [] [] [] [x]    Stand to Sit [] [] [] [] [x] [] [] [] [] [] [x]    I=Independent, Mod I=Modified Independent, S=Supervision, SBA=Standby Assistance, CGA=Contact Guard Assistance,   Min=Minimal Assistance, Mod=Moderate Assistance, Max=Maximal Assistance, Total=Total Assistance, NT=Not Tested    BALANCE: Good Fair+ Fair Fair- Poor NT Comments   Sitting Static [x] [] [] [] [] []    Sitting Dynamic [x] [] [] [] [] []              Standing Static [] [x] [] [] [] []    Standing Dynamic [] [x] [] [] [] []      GAIT: I Mod I S SBA CGA Min Mod Max Total  NT x2 Comments:   Level of Assistance [] [] [] [] [] [x] [] [] [] [] [x]    Distance 4-5 feet     DME Rolling Walker    Gait Quality Slow, short shuffling steps    Weightbearing  Status N/A     I=Independent, Mod I=Modified Independent, S=Supervision, SBA=Standby Assistance, CGA=Contact Guard Assistance,   Min=Minimal Assistance, Mod=Moderate Assistance, Max=Maximal Assistance, Total=Total Assistance, NT=Not Tested    PLAN:   FREQUENCY/DURATION: PT Plan of Care: 3 times/week for duration of hospital stay or until stated goals are met, whichever comes first.  TREATMENT:     TREATMENT:   ($$ Therapeutic Activity: 38-52 mins    )  Co-Treatment PT/OT necessary due to patient's decreased overall endurance/tolerance levels, as well as need for high level skilled assistance to complete functional transfers/mobility and functional tasks  Therapeutic Activity (40 Minutes): Therapeutic activity included Rolling, Supine to Sit, Sit to Supine, Scooting, Transfer Training, Ambulation on level ground, Sitting balance  and Standing balance to improve functional Mobility, Strength and Activity tolerance.     TREATMENT GRID:      DATE: 1/28/22       Ambulation        Hip Flexion 2x10 AB       Long Arc Quads 2x10 AB       Hip ab/ad        Heel Raises x20 AB       Toe Raises x20 AB                                Key:  A=active, AA=active assisted, P=passive, B=bilaterally, R=right, L=left   DF=dorsiflexion, PF=plantarflexion    AFTER TREATMENT POSITION/PRECAUTIONS:  Alarm Activated, Bed, Needs within reach and RN notified    INTERDISCIPLINARY COLLABORATION:  RN/PCT, PT/PTA and OT/MARIE    TOTAL TREATMENT DURATION:    Mi Thorne PTA  10:04-10:43 am

## 2022-02-02 NOTE — PROGRESS NOTES
Problem: Diabetes Self-Management  Goal: *Disease process and treatment process  Description: Define diabetes and identify own type of diabetes; list 3 options for treating diabetes. Outcome: Progressing Towards Goal  Goal: *Incorporating nutritional management into lifestyle  Description: Describe effect of type, amount and timing of food on blood glucose; list 3 methods for planning meals. Outcome: Progressing Towards Goal  Goal: *Incorporating physical activity into lifestyle  Description: State effect of exercise on blood glucose levels. Outcome: Progressing Towards Goal  Goal: *Developing strategies to promote health/change behavior  Description: Define the ABC's of diabetes; identify appropriate screenings, schedule and personal plan for screenings. Outcome: Progressing Towards Goal  Goal: *Using medications safely  Description: State effect of diabetes medications on diabetes; name diabetes medication taking, action and side effects. Outcome: Progressing Towards Goal  Goal: *Monitoring blood glucose, interpreting and using results  Description: Identify recommended blood glucose targets  and personal targets. Outcome: Progressing Towards Goal  Goal: *Prevention, detection, treatment of acute complications  Description: List symptoms of hyper- and hypoglycemia; describe how to treat low blood sugar and actions for lowering  high blood glucose level. Outcome: Progressing Towards Goal  Goal: *Prevention, detection and treatment of chronic complications  Description: Define the natural course of diabetes and describe the relationship of blood glucose levels to long term complications of diabetes.   Outcome: Progressing Towards Goal  Goal: *Developing strategies to address psychosocial issues  Description: Describe feelings about living with diabetes; identify support needed and support network  Outcome: Progressing Towards Goal  Goal: *Insulin pump training  Outcome: Progressing Towards Goal  Goal: *Sick day guidelines  Outcome: Progressing Towards Goal  Goal: *Patient Specific Goal (EDIT GOAL, INSERT TEXT)  Outcome: Progressing Towards Goal     Problem: Patient Education: Go to Patient Education Activity  Goal: Patient/Family Education  Outcome: Progressing Towards Goal     Problem: Falls - Risk of  Goal: *Absence of Falls  Description: Document Dany Fan Fall Risk and appropriate interventions in the flowsheet. Outcome: Progressing Towards Goal  Note: Fall Risk Interventions:  Mobility Interventions: Bed/chair exit alarm    Mentation Interventions: Bed/chair exit alarm    Medication Interventions: Bed/chair exit alarm    Elimination Interventions: Bed/chair exit alarm,Call light in reach    History of Falls Interventions: Bed/chair exit alarm         Problem: Patient Education: Go to Patient Education Activity  Goal: Patient/Family Education  Outcome: Progressing Towards Goal     Problem: Airway Clearance - Ineffective  Goal: Achieve or maintain patent airway  Outcome: Progressing Towards Goal     Problem: Gas Exchange - Impaired  Goal: Absence of hypoxia  Outcome: Progressing Towards Goal  Goal: Promote optimal lung function  Outcome: Progressing Towards Goal     Problem: Breathing Pattern - Ineffective  Goal: Ability to achieve and maintain a regular respiratory rate  Outcome: Progressing Towards Goal     Problem:  Body Temperature -  Risk of, Imbalanced  Goal: Ability to maintain a body temperature within defined limits  Outcome: Progressing Towards Goal  Goal: Will regain or maintain usual level of consciousness  Outcome: Progressing Towards Goal  Goal: Complications related to the disease process, condition or treatment will be avoided or minimized  Outcome: Progressing Towards Goal     Problem: Isolation Precautions - Risk of Spread of Infection  Goal: Prevent transmission of infectious organism to others  Outcome: Progressing Towards Goal     Problem: Nutrition Deficits  Goal: Optimize nutrtional status  Outcome: Progressing Towards Goal     Problem: Risk for Fluid Volume Deficit  Goal: Maintain normal heart rhythm  Outcome: Progressing Towards Goal  Goal: Maintain absence of muscle cramping  Outcome: Progressing Towards Goal  Goal: Maintain normal serum potassium, sodium, calcium, phosphorus, and pH  Outcome: Progressing Towards Goal     Problem: Loneliness or Risk for Loneliness  Goal: Demonstrate positive use of time alone when socialization is not possible  Outcome: Progressing Towards Goal     Problem: Fatigue  Goal: Verbalize increase energy and improved vitality  Outcome: Progressing Towards Goal     Problem: Patient Education: Go to Patient Education Activity  Goal: Patient/Family Education  Outcome: Progressing Towards Goal     Problem: Pressure Injury - Risk of  Goal: *Prevention of pressure injury  Description: Document Carlos Scale and appropriate interventions in the flowsheet. Outcome: Progressing Towards Goal     Problem: Patient Education: Go to Patient Education Activity  Goal: Patient/Family Education  Outcome: Progressing Towards Goal     Problem: Patient Education: Go to Patient Education Activity  Goal: Patient/Family Education  Outcome: Progressing Towards Goal     Problem: Patient Education: Go to Patient Education Activity  Goal: Patient/Family Education  Outcome: Progressing Towards Goal     Problem: Infection - Risk of, Urinary Catheter-Associated Urinary Tract Infection  Goal: *Absence of infection signs and symptoms  Outcome: Progressing Towards Goal     Problem: Patient Education: Go to Patient Education Activity  Goal: Patient/Family Education  Outcome: Progressing Towards Goal     Problem: Falls - Risk of  Goal: *Absence of Falls  Description: Document Torito Fall Risk and appropriate interventions in the flowsheet.   Outcome: Progressing Towards Goal  Note: Fall Risk Interventions:  Mobility Interventions: Bed/chair exit alarm    Mentation Interventions: Bed/chair exit alarm    Medication Interventions: Bed/chair exit alarm    Elimination Interventions: Bed/chair exit alarm,Call light in reach    History of Falls Interventions: Bed/chair exit alarm         Problem: Patient Education: Go to Patient Education Activity  Goal: Patient/Family Education  Outcome: Progressing Towards Goal

## 2022-02-02 NOTE — PROGRESS NOTES
Informed by staff that patients SpO2 had dropped and she was now on a 7LHFNC. Went to check on patient, she had her NC on top of her nose - cleaning out her nares. SpO2 72%. Helped patient recover thru deep breathing and explained she needed to leave oxygen on - states her nose was dry.   Humidity will be added and pt continued to be monitored

## 2022-02-02 NOTE — PROGRESS NOTES
Hospitalist Progress Note   Admit Date:  2022  2:29 PM   Name:  Rosalinda Curling   Age:  80 y.o. Sex:  female  :  1929   MRN:  [id number]   Room:  [facility]/    Presenting Complaint: Melena    Reason(s) for Admission: Lower GI bleed [facility] Course & Interval History:     Ms. Tyshawn Holt is a 79 yo female with PMH of DM2, HTN, CKD2, PAD admitted with acute blood loss anemia/GI bleed and was found COVID 19 positive. GI and General Surgery were consulted. Patient underwent tagged PRBC scan that was positive for active bleed distal rectum. She received 1U PRBC on . She underwent Flex Sig on  showing few small non bleeding erosions of rectum and no active bleeding. Recommendations are for miralax. No surgical needs. GI signed off . General surgery signed off . Her hemoglobin trended down to 7.4 on  with no more signs of GI bleed. She was given IV INFeD . She has been on 3 L NC for Covid-19 PNA. CXR shows moderate multifocal bilateral infiltrates. CRP 10.5. She was started on decadron/baricitinib. She has leukocytosis that was most likely due to Decadron. She was also started on Rocephin on  with UA+ UTI. Urine Cx grew GNR, final sensitivity and susceptibility pending. She has been stable on 3 LO 2 NC and SNF pending at this point to [facility]. Insurance auth pending. Subjective (22): This a.m. patient sitting up eating breakfast.  Denies any needs. Later she pulled off her oxygen, stated that her nose was dry, and O2 sat was in 70s on room air. She was placed on 7L O2 NC with improvement. Pt denies any melena or hematochezia. Denies fever, chills, SOB, chest pain, abdominal pain, nausea, vomiting. Assessment & Plan:     Lower GI bleed  Acute blood loss anemia:  Baseline Hgb 10-11  S/p tagged PRBC scan that is positive for active bleed distal rectum. S/p PRBC.  Seen by GI s/p flex sig 1-24-22 showing few small non bleeding erosions of rectum and no active bleeding. Recommendations are for miralax. No surgical needs. GI signed off 1/24. General surgery signed off 1/25. Daily miralax  Cont PPI  Cont ferrous sulfate  Trend HGB  Transfuse HGB < 7.0 or symptomatic anemia   Hgb 7.2 2/1, no melena or hematochezia. s/p IV Infed 1x  Hgb 8.2 on 2/1>> this AM 7.1. No bleeding. Recheck. COVID 19 pneumonia  Cont decadron EOT  2/1  Cont baricitinib EOT 2/6  Wean O2 as tolerant, RT with walking pulse ox requiring 2LO2NC  2/2: Stable for discharge in AM, however she pulled off her oxygen while on 3LO2 NC and O2 sat dropped to 70's. Now on 7LO2 NC. Add NS nasal spray. Will wean back down to 3L O2 NC as tolerated. If stable, can be discharged to rehab tomorrow once insurance authorization goes through    Acute UTI-new 1/31  Leukocytosis since 1/29. Previous UCx grew Klebsiella. Abx: Rocephin (1/31-. ..)  UCx: >100K GNR    Essential hypertension   Cont norvasc 1-25-22  Cont lisinopril for substitution of benazepril      Depression   Continued zoloft     Type 2 diabetes mellitus with diabetic neuropathy   SSI and Cont lantus   Metformin on hold    MIHAELA on CKD2-3, resolved     Ectopy:  EKG 1-26-22 shows NSR with PAC      Dispo/Discharge Planning:    STR pending. Anticipate 2/3. I spent 32 minutes of time caring for this patient at bedside or nearby on the unit, more than 50 percent of which was spent on coordination of care and/or counseling regarding the disease process, treatment options, and treatment plan.       Diet:  ADULT DIET Regular  ADULT DIET Regular; 3 carb choices (45 gm/meal)  DVT PPx:  SCD  Code status: DNR    Hospital Problems as of 2/2/2022 Date Reviewed: 11/30/2021          Codes Class Noted - Resolved POA    Acute blood loss anemia ICD-10-CM: D62  ICD-9-CM: 285.1  1/26/2022 - Present Yes        Chronic renal disease, stage II (Chronic) ICD-10-CM: N18.2  ICD-9-CM: 585.2  1/26/2022 - Present Yes        Pneumonia due to COVID-19 virus ICD-10-CM: U07.1, J12.82  ICD-9-CM: 480.8, 079.89  1/25/2022 - Present Yes        Acute respiratory failure due to COVID-19 [facility]) ICD-10-CM: U07.1, J96.00  ICD-9-CM: 518.81, 079.89  1/25/2022 - Present Yes        * (Principal) Lower GI bleed ICD-10-CM: K92.2  ICD-9-CM: 578.9  1/23/2022 - Present Unknown        UTI (urinary tract infection) ICD-10-CM: N39.0  ICD-9-CM: 599.0  2/25/2020 - Present Unknown        COPD (chronic obstructive pulmonary disease) (HCC) (Chronic) ICD-10-CM: J44.9  ICD-9-CM: 496  8/22/2018 - Present Yes        Type 2 diabetes mellitus with diabetic neuropathy (HCC) (Chronic) ICD-10-CM: E11.40  ICD-9-CM: 250.60, 357.2  5/16/2018 - Present Yes        Depression ICD-10-CM: F32. A  ICD-9-CM: 297  6/1/2012 - Present Yes        Essential hypertension (Chronic) ICD-10-CM: I10  ICD-9-CM: 401.9  7/16/2010 - Present Yes        GERD (gastroesophageal reflux disease) (Chronic) ICD-10-CM: K21.9  ICD-9-CM: 530.81  7/16/2010 - Present Yes              Objective:     Patient Vitals for the past 24 hrs:   Temp Pulse Resp BP SpO2   02/02/22 1054 98.5 °F (36.9 °C) 81 16 136/79 93 %   02/02/22 0801 -- -- -- -- 91 %   02/02/22 0709 98 °F (36.7 °C) 72 18 (!) 150/65 93 %   02/02/22 0409 98.1 °F (36.7 °C) 72 16 (!) 149/65 90 %   02/01/22 2311 97.9 °F (36.6 °C) 72 16 (!) 145/66 92 %   02/01/22 1922 98.7 °F (37.1 °C) 79 16 (!) 120/91 93 %   02/01/22 1620 98.4 °F (36.9 °C) 77 18 (!) 153/69 95 %     Oxygen Therapy  O2 Sat (%): 93 % (02/02/22 1054)  Pulse via Oximetry: 89 beats per minute (02/02/22 0801)  O2 Device: Nasal cannula (02/02/22 0801)  Skin Assessment: Clean, dry, & intact (02/02/22 0730)  Skin Protection for O2 Device: No (02/02/22 0730)  O2 Flow Rate (L/min): 3 l/min (02/02/22 0801)  FIO2 (%): 32 % (01/28/22 0346)    Estimated body mass index is 29.08 kg/m² as calculated from the following:    Height as of this encounter: 5' 2\" (1.575 m). Weight as of this encounter: 72.1 kg (159 lb).     Intake/Output Summary (Last 24 hours) at 2/2/2022 1247  Last data filed at 2/2/2022 0521  Gross per 24 hour   Intake 300 ml   Output --   Net 300 ml         Physical Exam:     Blood pressure 136/79, pulse 81, temperature 98.5 °F (36.9 °C), resp. rate 16, height 5' 2\" (1.575 m), weight 72.1 kg (159 lb), SpO2 93 %. General:    Well nourished. No overt distress, elderly  CV:   Regular. No m/r/g. No jugular venous distension. No edema   Lungs:   CTAB. No wheezing, rhonchi, or rales. Respirations even, unlabored, anterior   Abdomen: Bowel sounds present. Soft, nontender, nondistended. Extremities: No cyanosis or clubbing. No edema  Skin:     No rashes and normal coloration. Warm and dry. Neuro:  grossly intact. Psych:  Normal mood and affect.       I have reviewed ordered lab tests and independently visualized imaging below:    Recent Labs:  Recent Results (from the past 48 hour(s))   PROCALCITONIN    Collection Time: 01/31/22  1:30 PM   Result Value Ref Range    Procalcitonin <0.05 0.00 - 0.49 ng/mL   GLUCOSE, POC    Collection Time: 01/31/22  3:32 PM   Result Value Ref Range    Glucose (POC) 258 (H) 65 - 100 mg/dL    Performed by [employer]    GLUCOSE, POC    Collection Time: 01/31/22  9:26 PM   Result Value Ref Range    Glucose (POC) 256 (H) 65 - 100 mg/dL    Performed by [employer]    GLUCOSE, POC    Collection Time: 02/01/22  6:20 AM   Result Value Ref Range    Glucose (POC) 134 (H) 65 - 100 mg/dL    Performed by [employer]    CBC W/O DIFF    Collection Time: 02/01/22 11:27 AM   Result Value Ref Range    WBC 17.1 (H) 4.3 - 11.1 K/uL    RBC 2.92 (L) 4.05 - 5.2 M/uL    HGB 8.4 (L) 11.7 - 15.4 g/dL    HCT 27.6 (L) 35.8 - 46.3 %    MCV 94.5 79.6 - 97.8 FL    MCH 28.8 26.1 - 32.9 PG    MCHC 30.4 (L) 31.4 - 35.0 g/dL    RDW 17.6 (H) 11.9 - 14.6 %    PLATELET 004 350 - 953 K/uL    MPV 11.2 9.4 - 12.3 FL    ABSOLUTE NRBC 0.00 0.0 - 0.2 K/uL   GLUCOSE, POC    Collection Time: 02/01/22 11:48 AM   Result Value Ref Range    Glucose (POC) 288 (H) 65 - 100 mg/dL    Performed by [facility]    GLUCOSE, POC    Collection Time: 02/01/22  4:16 PM   Result Value Ref Range    Glucose (POC) 381 (H) 65 - 100 mg/dL    Performed by Gurmeet Shenvd, POC    Collection Time: 02/01/22  8:59 PM   Result Value Ref Range    Glucose (POC) 291 (H) 65 - 100 mg/dL    Performed by [facility]    GLUCOSE, POC    Collection Time: 02/02/22  6:10 AM   Result Value Ref Range    Glucose (POC) 95 65 - 100 mg/dL    [facility]    CBC W/O DIFF    Collection Time: 02/02/22  7:00 AM   Result Value Ref Range    WBC 12.5 (H) 4.3 - 11.1 K/uL    RBC 2.46 (L) 4.05 - 5.2 M/uL    HGB 7.1 (L) 11.7 - 15.4 g/dL    HCT 23.0 (L) 35.8 - 46.3 %    MCV 93.5 79.6 - 97.8 FL    MCH 28.9 26.1 - 32.9 PG    MCHC 30.9 (L) 31.4 - 35.0 g/dL    RDW 17.7 (H) 11.9 - 14.6 %    PLATELET 770 103 - 246 K/uL    MPV 10.6 9.4 - 12.3 FL    ABSOLUTE NRBC 0.02 0.0 - 0.2 K/uL   METABOLIC PANEL, BASIC    Collection Time: 02/02/22  7:00 AM   Result Value Ref Range    Sodium 141 136 - 145 mmol/L    Potassium 4.6 3.5 - 5.1 mmol/L    Chloride 112 (H) 98 - 107 mmol/L    CO2 27 21 - 32 mmol/L    Anion gap 2 (L) 7 - 16 mmol/L    Glucose 93 65 - 100 mg/dL    BUN 31 (H) 8 - 23 MG/DL    Creatinine 1.00 0.6 - 1.0 MG/DL    GFR est AA >60 >60 ml/min/1.73m2    GFR est non-AA 55 (L) >60 ml/min/1.73m2    Calcium 9.4 8.3 - 10.4 MG/DL   GLUCOSE, POC    Collection Time: 02/02/22 10:48 AM   Result Value Ref Range    Glucose (POC) 257 (H) 65 - 100 mg/dL    Performed by [employer]        All Micro Results     Procedure Component Value Units Date/Time    CULTURE, URINE [[id number]]  (Abnormal) Collected: 01/31/22 1115    Order Status: Completed Specimen: Urine from Clean catch Updated: 02/02/22 9794     Special Requests: NO SPECIAL REQUESTS        Culture result:       >100,000 COLONIES/mL GRAM NEGATIVE RODS IDENTIFICATION AND SUSCEPTIBILITY TO FOLLOW 10,000 to 50,000 COLONIES/mL MIXED SKIN NGOZI ISOLATED          COVID-19 RAPID TEST [[id number]]  (Abnormal) Collected: 01/23/22 8193    Order Status: Completed Specimen: Nasopharyngeal Updated: 01/23/22 1751     Specimen source Nasopharyngeal        COVID-19 rapid test Detected        Comment:      The specimen is POSITIVE for SARS-CoV-2, the novel coronavirus associated with COVID-19. This test has been authorized by the FDA under an Emergency Use Authorization (EUA) for use by authorized laboratories. Fact sheet for Healthcare Providers: [email or web address]  Fact sheet for Patients: [email or web address]       Methodology: Isothermal Nucleic Acid Amplification               Other Studies:  No results found.     Current Meds:  Current Facility-Administered Medications   Medication Dose Route Frequency    cefTRIAXone (ROCEPHIN) 1 g in 0.9% sodium chloride (MBP/ADV) 50 mL MBP  1 g IntraVENous Q24H    ferrous sulfate tablet 325 mg  1 Tablet Oral DAILY WITH BREAKFAST    hydrALAZINE (APRESOLINE) 20 mg/mL injection 20 mg  20 mg IntraVENous Q6H PRN    hydrALAZINE (APRESOLINE) tablet 25 mg  25 mg Oral Q8H    pantoprazole (PROTONIX) tablet 40 mg  40 mg Oral ACB    insulin glargine (LANTUS) injection 5 Units  5 Units SubCUTAneous QHS    lisinopriL (PRINIVIL, ZESTRIL) tablet 20 mg  20 mg Oral DAILY    amLODIPine (NORVASC) tablet 10 mg  10 mg Oral DAILY    sertraline (ZOLOFT) tablet 50 mg  50 mg Oral DAILY    polyethylene glycol (MIRALAX) packet 17 g  17 g Oral DAILY    dextrose 40% (GLUTOSE) oral gel 1 Tube  15 g Oral PRN    glucagon (GLUCAGEN) injection 1 mg  1 mg IntraMUSCular PRN    dextrose 10% infusion 125-250 mL  125-250 mL IntraVENous PRN    insulin lispro (HUMALOG) injection   SubCUTAneous AC&HS    sodium chloride (NS) flush 5-40 mL  5-40 mL IntraVENous Q8H    sodium chloride (NS) flush 5-40 mL  5-40 mL IntraVENous PRN    acetaminophen (TYLENOL) tablet 650 mg  650 mg Oral Q6H PRN    Or    acetaminophen (TYLENOL) suppository 650 mg  650 mg Rectal Q6H PRN    senna (SENOKOT) tablet 17.2 mg  2 Tablet Oral BID PRN    ondansetron (ZOFRAN) injection 4 mg  4 mg IntraVENous Q6H PRN       Signed: Uziel Levy DO    Part of this note may have been written by using a voice dictation software. The note has been proof read but may still contain some grammatical/other typographical errors.

## 2022-02-02 NOTE — PROGRESS NOTES
Problem: Diabetes Self-Management  Goal: *Disease process and treatment process  Description: Define diabetes and identify own type of diabetes; list 3 options for treating diabetes. Outcome: Progressing Towards Goal  Goal: *Incorporating nutritional management into lifestyle  Description: Describe effect of type, amount and timing of food on blood glucose; list 3 methods for planning meals. Outcome: Progressing Towards Goal  Goal: *Incorporating physical activity into lifestyle  Description: State effect of exercise on blood glucose levels. Outcome: Progressing Towards Goal  Goal: *Developing strategies to promote health/change behavior  Description: Define the ABC's of diabetes; identify appropriate screenings, schedule and personal plan for screenings. Outcome: Progressing Towards Goal  Goal: *Using medications safely  Description: State effect of diabetes medications on diabetes; name diabetes medication taking, action and side effects. Outcome: Progressing Towards Goal  Goal: *Monitoring blood glucose, interpreting and using results  Description: Identify recommended blood glucose targets  and personal targets. Outcome: Progressing Towards Goal  Goal: *Prevention, detection, treatment of acute complications  Description: List symptoms of hyper- and hypoglycemia; describe how to treat low blood sugar and actions for lowering  high blood glucose level. Outcome: Progressing Towards Goal  Goal: *Prevention, detection and treatment of chronic complications  Description: Define the natural course of diabetes and describe the relationship of blood glucose levels to long term complications of diabetes.   Outcome: Progressing Towards Goal  Goal: *Developing strategies to address psychosocial issues  Description: Describe feelings about living with diabetes; identify support needed and support network  Outcome: Progressing Towards Goal  Goal: *Insulin pump training  Outcome: Progressing Towards Goal  Goal: *Sick day guidelines  Outcome: Progressing Towards Goal  Goal: *Patient Specific Goal (EDIT GOAL, INSERT TEXT)  Outcome: Progressing Towards Goal     Problem: Patient Education: Go to Patient Education Activity  Goal: Patient/Family Education  Outcome: Progressing Towards Goal     Problem: Falls - Risk of  Goal: *Absence of Falls  Description: Document [place] Fail Fall Risk and appropriate interventions in the flowsheet. Outcome: Progressing Towards Goal  Note: Fall Risk Interventions:  Mobility Interventions: Bed/chair exit alarm    Mentation Interventions: Bed/chair exit alarm    Medication Interventions: Bed/chair exit alarm    Elimination Interventions: Bed/chair exit alarm,Call light in reach    History of Falls Interventions: Bed/chair exit alarm         Problem: Patient Education: Go to Patient Education Activity  Goal: Patient/Family Education  Outcome: Progressing Towards Goal     Problem: Airway Clearance - Ineffective  Goal: Achieve or maintain patent airway  Outcome: Progressing Towards Goal     Problem: Gas Exchange - Impaired  Goal: Absence of hypoxia  Outcome: Progressing Towards Goal  Goal: Promote optimal lung function  Outcome: Progressing Towards Goal     Problem: Breathing Pattern - Ineffective  Goal: Ability to achieve and maintain a regular respiratory rate  Outcome: Progressing Towards Goal     Problem:  Body Temperature -  Risk of, Imbalanced  Goal: Ability to maintain a body temperature within defined limits  Outcome: Progressing Towards Goal  Goal: Will regain or maintain usual level of consciousness  Outcome: Progressing Towards Goal  Goal: Complications related to the disease process, condition or treatment will be avoided or minimized  Outcome: Progressing Towards Goal     Problem: Isolation Precautions - Risk of Spread of Infection  Goal: Prevent transmission of infectious organism to others  Outcome: Progressing Towards Goal     Problem: Nutrition Deficits  Goal: Optimize nutrtional status  Outcome: Progressing Towards Goal     Problem: Risk for Fluid Volume Deficit  Goal: Maintain normal heart rhythm  Outcome: Progressing Towards Goal  Goal: Maintain absence of muscle cramping  Outcome: Progressing Towards Goal  Goal: Maintain normal serum potassium, sodium, calcium, phosphorus, and pH  Outcome: Progressing Towards Goal     Problem: Loneliness or Risk for Loneliness  Goal: Demonstrate positive use of time alone when socialization is not possible  Outcome: Progressing Towards Goal     Problem: Fatigue  Goal: Verbalize increase energy and improved vitality  Outcome: Progressing Towards Goal     Problem: Patient Education: Go to Patient Education Activity  Goal: Patient/Family Education  Outcome: Progressing Towards Goal     Problem: Pressure Injury - Risk of  Goal: *Prevention of pressure injury  Description: Document Carlos Scale and appropriate interventions in the flowsheet. Outcome: Progressing Towards Goal     Problem: Patient Education: Go to Patient Education Activity  Goal: Patient/Family Education  Outcome: Progressing Towards Goal     Problem: Patient Education: Go to Patient Education Activity  Goal: Patient/Family Education  Outcome: Progressing Towards Goal     Problem: Patient Education: Go to Patient Education Activity  Goal: Patient/Family Education  Outcome: Progressing Towards Goal     Problem: Infection - Risk of, Urinary Catheter-Associated Urinary Tract Infection  Goal: *Absence of infection signs and symptoms  Outcome: Progressing Towards Goal     Problem: Patient Education: Go to Patient Education Activity  Goal: Patient/Family Education  Outcome: Progressing Towards Goal     Problem: Falls - Risk of  Goal: *Absence of Falls  Description: Document [place] Fall Risk and appropriate interventions in the flowsheet.   Outcome: Progressing Towards Goal  Note: Fall Risk Interventions:  Mobility Interventions: Bed/chair exit alarm    Mentation Interventions: Bed/chair exit alarm    Medication Interventions: Bed/chair exit alarm    Elimination Interventions: Bed/chair exit alarm,Call light in reach    History of Falls Interventions: Bed/chair exit alarm         Problem: Patient Education: Go to Patient Education Activity  Goal: Patient/Family Education  Outcome: Progressing Towards Goal

## 2022-02-02 NOTE — PROGRESS NOTES
Called daughter Camila العراقي but unable to get in touch with her. Left VM. Will try back another time. Addendum: Able to reach Camila العراقي. All questions answered.     Bryson Harrison, DO

## 2022-02-02 NOTE — PROGRESS NOTES
Comprehensive Nutrition Assessment    Type and Reason for Visit: Initial,RD nutrition re-screen/LOS    Nutrition Recommendations/Plan:    Continue current diet     Malnutrition Assessment:  Malnutrition Status: Insufficient data    Nutrition Assessment:   Nutrition History: Unable to assess. Per RD assessment 12/2021 patient reported 3 meals per day, snacks, and oral nutrition supplements. Nutrition Background: Patient with PMH significant for DM, HTN, CKD, obstructive lung disease, B12 deficiency, anemia, GIB, HLD. She was recently admitted for UTI and discharged to rehab. Family took her home from rehab for fear that she would get COVID at facility as she is not vaccinated. She was brought to ER with concerns of low blood sugar and wound on her backside. Upon presentation to ER she had bright red blood from rectum. She was also found COVID positive. She is now s/p flex sig with only small erosions in rectum noted and GI and surgery have signed off. Nutrition Interval:  Attempted call to patient due to isolation without answer. Discussed with RN, Xiomara. She states that patient is eating well. She states she ate all of breakfast this am and nearly all of lunch. Nutrition Related Findings:   NFPE deferred due to isolation status      Current Nutrition Therapies:  ADULT DIET Regular  ADULT DIET Regular; 3 carb choices (45 gm/meal)    Current Intake:   Average Meal Intake:  (variable but mostly %)        Anthropometric Measures:  Height: 5' 5\" (165.1 cm)  Current Body Wt: 72.1 kg (158 lb 15.2 oz) (1/23), Weight source: Not specified  BMI: 26.5, Overweight (BMI 25.0-29. 9)  Admission Body Weight: 158 lb 15.2 oz  Ideal Body Weight (lbs) (Calculated): 125 lbs (57 kg), 127.2 %  Usual Body Wt: 68 kg (149 lb 14.6 oz) (per review of office weight), Percent weight change: 6          Edema: LLE: 1+ (2/2/2022  7:30 AM)  RLE: 1+ (2/2/2022  7:30 AM)     Estimated Daily Nutrient Needs:  Energy (kcal/day): 9299-2778 (Kcal/kg (23-28), Weight Used: Ideal (57 kg))  Protein (g/day): 57-68 (1-1.2 g/kg) Weight Used: (Ideal)  Fluid (ml/day):   (1 ml/kcal)    Nutrition Diagnosis:   · No nutrition diagnosis at this time       Nutrition Interventions:   Food and/or Nutrient Delivery: Continue current diet     Coordination of Nutrition Care: Continue to monitor while inpatient    Goals:       Active Goal: Continue to meet at least 75% nutrition needs    Nutrition Monitoring and Evaluation:      Food/Nutrient Intake Outcomes: Food and nutrient intake       Discharge Planning:    No discharge needs at this time    [address] [address] [place] Hawkins on 2/2/2022 at 2:45 PM  Contact: [phone]          Disaster Mode Active

## 2022-02-03 NOTE — DISCHARGE SUMMARY
Hospitalist Discharge Summary   Admit Date:  2022  2:29 PM   DC Note date: 2/3/2022  Name:  Patric Morales   Age:  80 y.o. Sex:  female  :  1929   MRN:  [id number]   Room:  [facility]  PCP:  Sharren Cogan, NP    Presenting Complaint: Melena    Initial Admission Diagnosis: Lower GI bleed [K92.2]     Problem List for this Hospitalization:  Hospital Problems as of 2/3/2022 Date Reviewed: 2021          Codes Class Noted - Resolved POA    Acute blood loss anemia ICD-10-CM: D62  ICD-9-CM: 285.1  2022 - Present Yes        Chronic renal disease, stage II (Chronic) ICD-10-CM: N18.2  ICD-9-CM: 585.2  2022 - Present Yes        Pneumonia due to COVID-19 virus ICD-10-CM: U07.1, J12.82  ICD-9-CM: 480.8, 079.89  2022 - Present Yes        Acute respiratory failure due to COVID-19 [facility]) ICD-10-CM: U07.1, J96.00  ICD-9-CM: 518.81, 079.89  2022 - Present Yes        * (Principal) Lower GI bleed ICD-10-CM: K92.2  ICD-9-CM: 578.9  2022 - Present Unknown        UTI (urinary tract infection) ICD-10-CM: N39.0  ICD-9-CM: 599.0  2020 - Present Unknown        COPD (chronic obstructive pulmonary disease) ([facility] (Chronic) ICD-10-CM: J44.9  ICD-9-CM: 983  2018 - Present Yes        Type 2 diabetes mellitus with diabetic neuropathy ([facility] (Chronic) ICD-10-CM: E11.40  ICD-9-CM: 250.60, 357.2  2018 - Present Yes        Depression ICD-10-CM: F32. A  ICD-9-CM: 443  2012 - Present Yes        Essential hypertension (Chronic) ICD-10-CM: I10  ICD-9-CM: 401.9  2010 - Present Yes        GERD (gastroesophageal reflux disease) (Chronic) ICD-10-CM: K21.9  ICD-9-CM: 530.81  2010 - Present Yes            Did Patient have Sepsis (YES OR NO): No    Hospital Course:  Please refer to the admission H&P for details of presentation.  In summary, Patric Morales is a 80 y.o. female with past medical history significant for DM2, HTN, CKD2, PAD admitted with acute blood loss anemia/GI bleed and was found COVID 19 positive. GI and General Surgery were consulted. Patient underwent tagged PRBC scan that was positive for active bleed distal rectum. She received 1U PRBC on 1/23. She underwent Flex Sig on 1/24 showing few small non bleeding erosions of rectum and no active bleeding. Recommendations are for miralax. No surgical needs. GI signed off 1/24. General surgery signed off 1/25. Her hemoglobin trended down to 7.4 on 1/31st with no more signs of GI bleed. She was given IV INFeD 1/31.     She has been on 3 L NC for Covid-19 PNA. CXR shows moderate multifocal bilateral infiltrates. CRP 10.5. She was started on decadron/baricitinib. She has leukocytosis that was most likely due to Decadron. She was also started on Rocephin on 1/31 with UA+ UTI. Urine Cx grew GNR, final sensitivity and susceptibility pending. She has been stable on 3 LO 2 NC    Patient is medically stable for discharge to [facility]. Patient is to continue taking medications as prescribed and to follow up with PCP on discharge. Patient is instructed to to call a physician or return to ED if any concerns/symptoms worsened. Discharge summary and encounter summary was sent to PCP electronically via \"Comm Mgt\" link in [facility], if possible. Disposition: Skilled Nursing Facility  Diet: ADULT DIET Regular  ADULT DIET Regular; 3 carb choices (45 gm/meal)  Code Status: DNR    Follow Up Orders:   Follow-up Appointments   Procedures    FOLLOW UP VISIT Appointment in: 3 - 5 Days Follow up with Primary Care Provider in 3-5 days     Follow up with Primary Care Provider in 3-5 days     Standing Status:   Standing     Number of Occurrences:   1     Standing Expiration Date:   2/3/2022     Order Specific Question:   Appointment in     Answer:   3 - 5 Days       Follow-up Information     Follow up With Specialties Details Why [address] [facility], [facility]   [address] [address]  [facility], [address] [facility] [address], NP Family Medicine, Nurse Practitioner   [address] [facility] [address]  [phone]            Follow up labs/diagnostics (ultimately defer to outpatient provider): as above    Time spent in patient discharge and coordination 36 minutes. Plan was discussed with patient and family. All questions answered. Patient was stable at time of discharge. Instructions given to call a physician or return if any concerns. Discharge Info:   Current Discharge Medication List      START taking these medications    Details   ferrous sulfate 325 mg (65 mg iron) tablet Take 1 Tablet by mouth daily (with breakfast). Qty: 30 Tablet, Refills: 0  Start date: 2/2/2022      cephALEXin (KEFLEX) 500 mg capsule Take 1 Capsule by mouth four (4) times daily for 5 days. Qty: 20 Capsule, Refills: 0  Start date: 2/2/2022, End date: 2/7/2022         CONTINUE these medications which have CHANGED    Details   hydrALAZINE (APRESOLINE) 25 mg tablet Take 1 Tablet by mouth three (3) times daily. Qty: 30 Tablet, Refills: 0  Start date: 2/2/2022    Associated Diagnoses: Essential hypertension      pantoprazole (PROTONIX) 20 mg tablet Take 2 Tablets by mouth daily. TAKE 1 TABLET BY MOUTH DAILY  Qty: 90 Tablet, Refills: 1  Start date: 2/2/2022    Comments: **Patient requests 90 days supply**  Associated Diagnoses: Gastroesophageal reflux disease, unspecified whether esophagitis present      sertraline (ZOLOFT) 100 mg tablet Take 0.5 Tablets by mouth daily.  Indications: major depressive disorder, am  Qty: 90 Tablet, Refills: 1  Start date: 2/2/2022    Associated Diagnoses: Anxiety and depression         CONTINUE these medications which have NOT CHANGED    Details   pravastatin (PRAVACHOL) 40 mg tablet TAKE 1 TABLET BY MOUTH EVERY NIGHT AT BEDTIME  Qty: 90 Tablet, Refills: 1    Associated Diagnoses: Controlled type 2 diabetes mellitus with microalbuminuria, without long-term current use of insulin ([address] Mixed hyperlipidemia      metFORMIN (GLUCOPHAGE) 500 mg tablet Take 1 Tablet by mouth two (2) times daily (with meals). Qty: 180 Tablet, Refills: 1    Associated Diagnoses: Controlled type 2 diabetes mellitus with microalbuminuria, without long-term current use of insulin ([facility])      gabapentin (NEURONTIN) 100 mg capsule Take 1 Capsule by mouth three (3) times daily. Qty: 270 Capsule, Refills: 1    Associated Diagnoses: Controlled type 2 diabetes mellitus with microalbuminuria, without long-term current use of insulin ([address] Neuropathy      amLODIPine (NORVASC) 10 mg tablet Take 1 Tablet by mouth daily. Qty: 90 Tablet, Refills: 1    Associated Diagnoses: Essential hypertension      fluticasone propion-salmeteroL (Advair Diskus) 250-50 mcg/dose diskus inhaler Take 1 Puff by inhalation every twelve (12) hours. Qty: 3 Inhaler, Refills: 1    Associated Diagnoses: Mild persistent asthma without complication      benazepriL (LOTENSIN) 40 mg tablet TAKE 1 TABLET BY MOUTH DAILY  Qty: 90 Tab, Refills: 1    Associated Diagnoses: Controlled type 2 diabetes mellitus with microalbuminuria, without long-term current use of insulin ([address] Essential hypertension      fluticasone propionate (FLONASE) 50 mcg/actuation nasal spray 2 Sprays by Both Nostrils route daily. Qty: 1 Bottle, Refills: 5    Associated Diagnoses: Allergic rhinitis, unspecified seasonality, unspecified trigger      acetaminophen (TYLENOL ARTHRITIS PAIN) 650 mg TbER Take 650 mg by mouth every eight (8) hours. Tylenol arthritis as needed      Blood-Glucose Meter monitoring kit Use as directed 3 times daily to check blood sugars. Dx:E11.29  Qty: 1 Kit, Refills: 0    Associated Diagnoses: Type 2 diabetes mellitus with diabetic neuropathy, without long-term current use of insulin ([address]  Controlled type 2 diabetes mellitus with microalbuminuria, without long-term current use of insulin ([facility])      glucose blood VI test strips (blood glucose test) strip Use as directed 3 times daily to check blood sugars. Dx:E11.29  Qty: 100 Strip, Refills: 3    Comments: Please dispense any brand her insurance will cover. Associated Diagnoses: Type 2 diabetes mellitus with diabetic neuropathy, without long-term current use of insulin ([address] Controlled type 2 diabetes mellitus with microalbuminuria, without long-term current use of insulin ([facility])      lancets misc Use as directed 3 times daily to check blood sugars. Dx:E11.29  Qty: 100 Each, Refills: 3    Comments: Please dispense any brand her insurance will cover. Associated Diagnoses: Type 2 diabetes mellitus with diabetic neuropathy, without long-term current use of insulin ([address] Controlled type 2 diabetes mellitus with microalbuminuria, without long-term current use of insulin ([facility])      lidocaine 4 % patch Apply to affected area daily prn. Qty: 30 Each, Refills: 2    Associated Diagnoses: Chronic bilateral low back pain without sciatica      Wheel Chair kalyn Wheelchair  Qty: 1 Each, Refills: 0    Associated Diagnoses: Neuropathy; Chronic bilateral low back pain without sciatica; Debility      VENTOLIN HFA 90 mcg/actuation inhaler INHALE 2 PUFFS BY MOUTH EVERY 6 HOURS AS NEEDED FOR WHEEZING  Qty: 1 Inhaler, Refills: 12      polyethylene glycol (MIRALAX) 17 gram/dose powder Take 17 g by mouth daily. 1 tablespoon with 8 oz of water daily  Qty: 119 g, Refills: 0      FOLIC ACID/MULTIVIT-MIN/LUTEIN (CENTRUM SILVER PO) Take  by mouth. OTHER One shower chair  Qty: 1 Each, Refills: 0      VIT A/VIT C/VIT E/ZINC/COPPER (PRESERVISION AREDS PO) Take 1 Tab by mouth two (2) times a day.          STOP taking these medications       cloNIDine HCL (CATAPRES) 0.2 mg tablet Comments:   Reason for Stopping:         furosemide (LASIX) 20 mg tablet Comments:   Reason for Stopping:         spironolactone (ALDACTONE) 25 mg tablet Comments:   Reason for Stopping:         potassium chloride (K-DUR, KLOR-CON) 20 mEq tablet Comments:   Reason for Stopping:               Procedures done this admission:  Procedure(s):  SIGMOIDOSCOPY FLEXIBLE COVID POSITIVE PREP/RECOVER IN FLOURO Admit to room 503    Consults this admission:  IP CONSULT TO GASTROENTEROLOGY  IP CONSULT TO GASTROENTEROLOGY  IP CONSULT TO GENERAL SURGERY    Echocardiogram/EKG results:  No results found for this or any previous visit.        EKG Results     Procedure 720 Value Units Date/Time    EKG, 12 LEAD, INITIAL [[id number]] Collected: 01/26/22 1145    Order Status: Completed Updated: 01/26/22 1521     Ventricular Rate 85 BPM      Atrial Rate 85 BPM      P-R Interval 162 ms      QRS Duration 128 ms      Q-T Interval 380 ms      QTC Calculation (Bezet) 452 ms      Calculated P Axis 46 degrees      Calculated R Axis -50 degrees      Calculated T Axis -16 degrees      Diagnosis --     Sinus rhythm with Premature atrial complexes  Left axis deviation  Right bundle branch block  Moderate voltage criteria for LVH, may be normal variant  Inferior infarct (cited on or before 26-JAN-2022)  Abnormal ECG  When compared with ECG of 26-JAN-2022 11:45,  No significant change was found  Confirmed by [facility] NEGRA [facility] NICOL[facility]S [facility]  MD ()Link ([id number]) on 1/26/2022 3:21:04 PM      EKG, 12 LEAD, INITIAL [[id number]] Collected: 01/23/22 1453    Order Status: Completed Updated: 01/23/22 1653     Ventricular Rate 93 BPM      Atrial Rate 94 BPM      P-R Interval 164 ms      QRS Duration 124 ms      Q-T Interval 390 ms      QTC Calculation (Bezet) 486 ms      Calculated P Axis 131 degrees      Calculated R Axis -100 degrees      Calculated T Axis 100 degrees      Diagnosis --     Sinus rhythm  Atrial premature complex  RBBB and LAFB  Abnormal lateral Q waves  Non-specific ST-t wave changes    Confirmed by Otis Harmon MD (), TAVO MEDRANO ([id number]) on 1/23/2022 4:53:10 PM            Diagnostic Imaging/Tests:   NM ACUTE GI BLEED SCAN    Result Date: 1/23/2022  Nuclear medicine GI bleed scan 1/23/2022 INDICATION:  Rectal bleeding. History of partial colectomy. COMPARISON: None. TRACER: 24.5 mCi of Tc-99m UltraTag labeled red blood cells. TECHNIQUE: Anterior and lateral planar imaging over the abdomen was performed through 1 hour following the uneventful ministration of radiotracer. FINDINGS: Following the administration of radiotracer, increasing activity is seen over the more distal rectum throughout the examination. Additional activity is seen in the stomach which does not progress felt to result from the presence of free pertechnetate. 1.  Increasing activity over the more distal rectum suggesting an active rectal bleed. XR CHEST PORT    Result Date: 1/23/2022  CHEST X-RAY, single portable view  1/23/2022 History: Hypoxia and GI bleed. Technique: Single frontal view of the chest. Comparison: Chest x-ray 3/3/2021 Findings: The cardiac silhouette is mildly enlarged although stable. The lungs are expanded without evidence for pneumothorax. Moderate multifocal infiltrate is seen in the bilateral mid and lower lung fields, right greater than left. Infiltrate particularly in the left lung favors the periphery of the lungs. 1.  Moderate multifocal bilateral lung infiltrates as described above. Although nonspecific, some infiltrate favors the periphery of the lung which can been indicator for an atypical pneumonia such as Covid 19. This report was made using voice transcription. Despite my best efforts to avoid any, transcription errors may persist. If there is any question about the accuracy of the report or need for clarification, then please call [phone], or text me through [email or web address] for clarification or correction.        All Micro Results     Procedure Component Value Units Date/Time    CULTURE, URINE [[id number]]  (Abnormal)  (Susceptibility) Collected: 01/31/22 1115    Order Status: Completed Specimen: Urine from Clean catch Updated: 02/03/22 0732     Special Requests: NO SPECIAL REQUESTS Culture result:       >100,000 COLONIES/mL KLEBSIELLA PNEUMONIAE                  50,000-100,000 COLONIES/mL MIXED SKIN NGOZI ISOLATED          COVID-19 RAPID TEST [[id number]]  (Abnormal) Collected: 01/23/22 1076    Order Status: Completed Specimen: Nasopharyngeal Updated: 01/23/22 3742     Specimen source Nasopharyngeal        COVID-19 rapid test Detected        Comment:      The specimen is POSITIVE for SARS-CoV-2, the novel coronavirus associated with COVID-19. This test has been authorized by the FDA under an Emergency Use Authorization (EUA) for use by authorized laboratories. Fact sheet for Healthcare Providers: ConventionMusicNowdate.co.nz  Fact sheet for Patients: [employer] Academydate.co.nz       Methodology: Isothermal Nucleic Acid Amplification               Labs: Results:       BMP, Mg, Phos Recent Labs     02/02/22  0700      K 4.6   *   CO2 27   AGAP 2*   BUN 31*   CREA 1.00   CA 9.4   GLU 93      CBC Recent Labs     02/02/22  1303 02/02/22  0700 02/01/22  1127   WBC 15.6* 12.5* 17.1*   RBC 2.86* 2.46* 2.92*   HGB 8.1* 7.1* 8.4*   HCT 27.0* 23.0* 27.6*    286 356      LFT No results for input(s): ALT, TBIL, AP, TP, ALB, GLOB, AGRAT in the last 72 hours.     No lab exists for component: SGOT, GPT   Cardiac Testing Lab Results   Component Value Date/Time    BNP 38 09/10/2016 10:15 AM    BNP 54 04/19/2016 03:30 PM    BNP 22 03/01/2012 02:53 PM    CK 95 12/14/2021 07:31 PM    CK 64 08/21/2018 09:25 PM    CK 38 07/16/2010 04:05 PM    CK - MB 0.7 07/16/2010 04:05 PM    CK-MB Index 1.8 07/16/2010 04:05 PM    Troponin-I <0.05 03/01/2012 02:53 PM    Troponin-I, Qt. <0.02 (L) 08/06/2019 06:25 PM    Troponin-I, Qt. <0.02 (L) 08/21/2018 03:09 PM    Troponin-I, Qt. <0.04 (L) 07/17/2010 06:52 AM      Coagulation Tests Lab Results   Component Value Date/Time    Prothrombin time 12.6 01/23/2022 03:00 PM    Prothrombin time 13.1 07/27/2018 08:53 PM Prothrombin time 10.4 03/01/2012 02:08 PM    INR 0.9 01/23/2022 03:00 PM    INR 1.0 07/27/2018 08:53 PM    INR 1.0 03/01/2012 02:08 PM    aPTT 27.1 07/27/2018 08:53 PM    aPTT 27.3 07/12/2014 02:30 PM    aPTT 23.3 (L) 03/01/2012 02:08 PM      A1c Lab Results   Component Value Date/Time    Hemoglobin A1c 8.9 (H) 01/23/2022 03:00 PM    Hemoglobin A1c 8.3 (H) 11/30/2021 03:41 PM    Hemoglobin A1c 6.1 (H) 08/31/2021 03:26 PM      Lipid Panel Lab Results   Component Value Date/Time    Cholesterol, total 153 11/30/2021 03:41 PM    HDL Cholesterol 53 11/30/2021 03:41 PM    LDL, calculated 70 11/30/2021 03:41 PM    LDL, calculated 72 10/07/2019 10:16 AM    VLDL, calculated 30 11/30/2021 03:41 PM    VLDL, calculated 15 10/07/2019 10:16 AM    Triglyceride 179 (H) 11/30/2021 03:41 PM    CHOL/HDL Ratio 2.8 02/22/2017 09:54 AM      Thyroid Panel Lab Results   Component Value Date/Time    TSH 1.080 11/30/2021 03:41 PM    TSH 1.960 07/08/2019 09:55 AM    TSH 0.888 08/22/2018 06:01 AM        Most Recent UA Lab Results   Component Value Date/Time    Color YELLOW 01/31/2022 11:15 AM    Appearance CLOUDY 01/31/2022 11:15 AM    Specific gravity 1.035 (H) 02/22/2020 05:19 PM    pH (UA) 6.0 01/31/2022 11:15 AM    Protein 300 (A) 01/31/2022 11:15 AM    Glucose Negative 01/31/2022 11:15 AM    Ketone Negative 01/31/2022 11:15 AM    Bilirubin Negative 01/31/2022 11:15 AM    Blood MODERATE (A) 01/31/2022 11:15 AM    Urobilinogen 1.0 01/31/2022 11:15 AM    Nitrites Negative 01/31/2022 11:15 AM    Leukocyte Esterase MODERATE (A) 01/31/2022 11:15 AM    WBC >100 (H) 01/31/2022 11:15 AM    RBC 20-50 01/31/2022 11:15 AM    Epithelial cells 0-3 01/31/2022 11:15 AM    Bacteria 4+ (H) 01/31/2022 11:15 AM    Casts 5-10 01/31/2022 11:15 AM    Crystals, urine 0 02/21/2010 01:15 PM    Mucus 0 12/14/2021 10:08 PM          All Labs from Last 24 Hrs:  Recent Results (from the past 24 hour(s))   CBC W/O DIFF    Collection Time: 02/02/22  1:03 PM   Result Value Ref Range    WBC 15.6 (H) 4.3 - 11.1 K/uL    RBC 2.86 (L) 4.05 - 5.2 M/uL    HGB 8.1 (L) 11.7 - 15.4 g/dL    HCT 27.0 (L) 35.8 - 46.3 %    MCV 94.4 79.6 - 97.8 FL    MCH 28.3 26.1 - 32.9 PG    MCHC 30.0 (L) 31.4 - 35.0 g/dL    RDW 17.8 (H) 11.9 - 14.6 %    PLATELET 307 411 - 488 K/uL    MPV 10.8 9.4 - 12.3 FL    ABSOLUTE NRBC 0.00 0.0 - 0.2 K/uL   GLUCOSE, POC    Collection Time: 02/02/22  4:23 PM   Result Value Ref Range    Glucose (POC) 178 (H) 65 - 100 mg/dL    Performed by Shanae    GLUCOSE, POC    Collection Time: 02/02/22  9:31 PM   Result Value Ref Range    Glucose (POC) 236 (H) 65 - 100 mg/dL    Performed by Chantell    GLUCOSE, POC    Collection Time: 02/03/22  6:33 AM   Result Value Ref Range    Glucose (POC) 58 (L) 65 - 100 mg/dL    Performed by Lukaszmbamari    GLUCOSE, POC    Collection Time: 02/03/22  7:13 AM   Result Value Ref Range    Glucose (POC) 115 (H) 65 - 100 mg/dL    Performed by Lukaszmbamari    GLUCOSE, POC    Collection Time: 02/03/22 11:15 AM   Result Value Ref Range    Glucose (POC) 180 (H) 65 - 100 mg/dL    Performed by SynergEyes        Current Med List in Hospital:   Current Facility-Administered Medications   Medication Dose Route Frequency    baricitinib (OLUMIANT) tablet 4 mg  4 mg Oral DAILY    cefTRIAXone (ROCEPHIN) 1 g in 0.9% sodium chloride (MBP/ADV) 50 mL MBP  1 g IntraVENous Q24H    ferrous sulfate tablet 325 mg  1 Tablet Oral DAILY WITH BREAKFAST    hydrALAZINE (APRESOLINE) 20 mg/mL injection 20 mg  20 mg IntraVENous Q6H PRN    hydrALAZINE (APRESOLINE) tablet 25 mg  25 mg Oral Q8H    pantoprazole (PROTONIX) tablet 40 mg  40 mg Oral ACB    insulin glargine (LANTUS) injection 5 Units  5 Units SubCUTAneous QHS    lisinopriL (PRINIVIL, ZESTRIL) tablet 20 mg  20 mg Oral DAILY    amLODIPine (NORVASC) tablet 10 mg  10 mg Oral DAILY    sertraline (ZOLOFT) tablet 50 mg  50 mg Oral DAILY    polyethylene glycol (MIRALAX) packet 17 g  17 g Oral DAILY    dextrose 40% (GLUTOSE) oral gel 1 Tube  15 g Oral PRN    glucagon (GLUCAGEN) injection 1 mg  1 mg IntraMUSCular PRN    dextrose 10% infusion 125-250 mL  125-250 mL IntraVENous PRN    insulin lispro (HUMALOG) injection   SubCUTAneous AC&HS    sodium chloride (NS) flush 5-40 mL  5-40 mL IntraVENous Q8H    sodium chloride (NS) flush 5-40 mL  5-40 mL IntraVENous PRN    acetaminophen (TYLENOL) tablet 650 mg  650 mg Oral Q6H PRN    Or    acetaminophen (TYLENOL) suppository 650 mg  650 mg Rectal Q6H PRN    senna (SENOKOT) tablet 17.2 mg  2 Tablet Oral BID PRN    ondansetron (ZOFRAN) injection 4 mg  4 mg IntraVENous Q6H PRN       No Known Allergies  Immunization History   Administered Date(s) Administered    (RETIRED) Pneumococcal Vaccine (Unspecified Type) 01/01/2006    Influenza High Dose Vaccine PF 09/14/2016, 10/25/2017, 12/13/2018    Influenza Vaccine 11/05/2013, 09/16/2014, 10/19/2015    Influenza Vaccine (Tri) Adjuvanted (>65 Yrs FLUAD TRI 56551) 10/14/2019    Influenza Vaccine PF 09/22/2015    Influenza Vaccine Whole 01/01/2011    Influenza, Quadrivalent, Adjuvanted (>65 Yrs FLUAD QUAD 91578) 12/30/2020, 11/30/2021    Pneumococcal Conjugate (PCV-13) 07/14/2015    Pneumococcal Vaccine (Unspecified Type) 01/01/2006    TB Skin Test (PPD) Intradermal 07/31/2018, 08/21/2018, 12/15/2021, 01/23/2022    Tdap 11/04/2014       Recent Vital Data:  Patient Vitals for the past 24 hrs:   Temp Pulse Resp BP SpO2   02/03/22 1114 98.4 °F (36.9 °C) 79 18 (!) 141/59 93 %   02/03/22 0847 -- -- -- -- 92 %   02/03/22 0746 98.2 °F (36.8 °C) 81 18 (!) 166/60 93 %   02/03/22 0548 -- 76 -- (!) 177/64 --   02/03/22 0403 98.7 °F (37.1 °C) 81 18 (!) 151/59 92 %   02/02/22 2212 98 °F (36.7 °C) 73 20 (!) 153/62 93 %   02/02/22 2139 -- 75 -- 131/83 --   02/02/22 1916 98.5 °F (36.9 °C) 80 18 132/68 96 %   02/02/22 1622 98.2 °F (36.8 °C) 77 16 129/70 94 %     Oxygen Therapy  O2 Sat (%): 93 % (02/03/22 1114)  Pulse via Oximetry: 85 beats per minute (02/03/22 0847)  O2 Device: Nasal cannula (02/03/22 0847)  Skin Assessment: Clean, dry, & intact (02/02/22 1842)  Skin Protection for O2 Device: No (02/02/22 1842)  O2 Flow Rate (L/min): 4 l/min (02/03/22 0800)  FIO2 (%): 32 % (01/28/22 0346)    Estimated body mass index is 26.46 kg/m² as calculated from the following:    Height as of this encounter: 5' 5\" (1.651 m). Weight as of this encounter: 72.1 kg (159 lb). No intake or output data in the 24 hours ending 02/03/22 1135      Physical Exam:    General:    Well nourished. No overt distress, elderly female  Head:  Normocephalic, atraumatic  Eyes:  Sclerae appear normal.  Pupils equally round. HENT:  Nares appear normal, no drainage. Moist mucous membranes  Neck:  No restricted ROM. Trachea midline  CV:   RRR. No m/r/g. No JVD  Lungs:   CTAB. No wheezing,  Even, unlabored  Abdomen:   Soft, nontender, nondistended. Extremities: Warm and dry. No cyanosis or clubbing. No edema. Skin:     No rashes. Normal coloration  Neuro:  grossly intact. Psych:  Normal mood and affect. Signed:  Neal Bradley MD    Part of this note may have been written by using a voice dictation software. The note has been proof read but may still contain some grammatical/other typographical errors.

## 2022-02-03 NOTE — PROGRESS NOTES
ACUTE PHYSICAL THERAPY GOALS:  (Developed with and agreed upon by patient and/or caregiver.)  1. Ms. Stef Brown will perform supine to sit and sit to supine independently in 7 days. 2.  Ms. Stef Brown will perform sit to stand and bed to chair with least restrictive device with cg in 7 days. 3.  Ms. Stef Brown will perform gait with rolling walker 25 ft and cg in 7 days. PHYSICAL THERAPY: Daily Note and PM Treatment Day # 5    Jemima Serrano is a 80 y.o. female   PRIMARY DIAGNOSIS: Lower GI bleed  Lower GI bleed [K92.2]  Procedure(s) (LRB):  SIGMOIDOSCOPY FLEXIBLE COVID POSITIVE PREP/RECOVER IN [place] Admit to room 503 (N/A)  10 Days Post-Op    ASSESSMENT:     REHAB RECOMMENDATIONS: CURRENT LEVEL OF FUNCTION:  (Most Recently Demonstrated)   Recommendation to date pending progress:  Setting:   Short-term Rehab  Equipment:    To Be Determined Bed Mobility:   Contact Guard Assistance x 2  Sit to Stand:   Not tested  Transfers:   Not tested  Gait/Mobility:   Not tested     ASSESSMENT:  Ms. Stef Brown is supine in bed on  O2 and found to be soiled. OT present for co treat. Bed mobility is with contact guard assist x 2  with additional time to complete as the patient is experiencing some thigh pain. Rolling side to side several times for brief change. At the end of the session she was positioned for comfort with needs within reach and alarm intact. Progressing  towards all goals. Continue PT efforts. Possible discharge to rehab today.      SUBJECTIVE:   Ms. Stef Brown states, \"Hey\"    SOCIAL HISTORY/ LIVING ENVIRONMENT: lives with family uses rolling walker  Support Systems: Child(edward)  OBJECTIVE:     PAIN: VITAL SIGNS: LINES/DRAINS:   Pre Treatment: Pain Screen  Pain Scale 1: Numeric (0 - 10)  Pain Intensity 1:  (no number given)/10  Post Treatment: no number given 0/10   O2  O2 Device: Nasal cannula     MOBILITY: I Mod I S SBA CGA Min Mod Max Total  NT x2 Comments:   Bed Mobility    Rolling [] [] [] [] [x] [] [] [] [] [] [x]    Supine to Sit [] [] [] [] [] [] [] [] [] [x] []    Scooting [] [] [] [] [] [] [] [] [] [x] []    Sit to Supine [] [] [] [] [] [] [] [] [] [x] []    Transfers    Sit to Stand [] [] [] [] [] [] [] [] [] [x] []    Bed to Chair [] [] [] [] [] [] [] [] [] [x] []    Stand to Sit [] [] [] [] [] [] [] [] [] [x] []    I=Independent, Mod I=Modified Independent, S=Supervision, SBA=Standby Assistance, CGA=Contact Guard Assistance,   Min=Minimal Assistance, Mod=Moderate Assistance, Max=Maximal Assistance, Total=Total Assistance, NT=Not Tested    BALANCE: Good Fair+ Fair Fair- Poor NT Comments   Sitting Static [] [] [] [] [] [x]    Sitting Dynamic [] [] [] [] [] [x]              Standing Static [] [] [] [] [] [x]    Standing Dynamic [] [] [] [] [] [x]      GAIT: I Mod I S SBA CGA Min Mod Max Total  NT x2 Comments:   Level of Assistance [] [] [] [] [] [x] [] [] [] [x] []    Distance n/a    DME N/A    Gait Quality n/a    Weightbearing  Status N/A     I=Independent, Mod I=Modified Independent, S=Supervision, SBA=Standby Assistance, CGA=Contact Guard Assistance,   Min=Minimal Assistance, Mod=Moderate Assistance, Max=Maximal Assistance, Total=Total Assistance, NT=Not Tested    PLAN:   FREQUENCY/DURATION: PT Plan of Care: 3 times/week for duration of hospital stay or until stated goals are met, whichever comes first.  TREATMENT:     TREATMENT:   ($$ Therapeutic Activity: 8-22 mins    )  Co-Treatment PT/OT necessary due to patient's decreased overall endurance/tolerance levels, as well as need for high level skilled assistance to complete functional transfers/mobility and functional tasks  Therapeutic Activity (10 Minutes): Therapeutic activity included Rolling, Supine to Sit, Sit to Supine and Scooting to improve functional Mobility, Strength and Activity tolerance.     TREATMENT GRID:      DATE: 1/28/22       Ambulation        Hip Flexion 2x10 AB       Long Arc Quads 2x10 AB       Hip ab/ad        Heel Raises x20 AB Toe Raises x20 AB                                Key:  A=active, AA=active assisted, P=passive, B=bilaterally, R=right, L=left   DF=dorsiflexion, PF=plantarflexion    AFTER TREATMENT POSITION/PRECAUTIONS:  Alarm Activated, Bed, Needs within reach and RN notified    INTERDISCIPLINARY COLLABORATION:  RN/PCT, PT/PTA and OT/MARIE    TOTAL TREATMENT DURATION:    Mi Thorne, PTA  10:04-10:43 am

## 2022-02-03 NOTE — PROGRESS NOTES
ACUTE OT GOALS:  (Developed with and agreed upon by patient and/or caregiver.)  1. Patient will complete upper body bathing and dressing with SET UP and adaptive equipment as needed. 2. Patient will complete lower body bathing and dressing with MIN A and adaptive equipment as needed. 2. Patient will complete toileting with MIN A.   3. Patient will complete grooming ADL with SET UP.  4. Patient will tolerate 25 minutes of OT treatment with 1-2 rest breaks to increase activity tolerance for ADLs. 5. Patient will complete functional transfers with MIN A and adaptive equipment as needed. 6. Patient will tolerate 10 minutes BUE exercises to increase strength for safe, functional transfers.      Timeframe: 7 visits       OCCUPATIONAL THERAPY: Daily Note OT Treatment Day # 6    Ana العلي is a 80 y.o. female   PRIMARY DIAGNOSIS: Lower GI bleed  Lower GI bleed [K92.2]  Procedure(s) (LRB):  SIGMOIDOSCOPY FLEXIBLE COVID POSITIVE PREP/RECOVER IN [place] Admit to room 503 (N/A)  10 Days Post-Op  Payor: [employer] / Plan: [place] [employer] MMP / Product Type: [employer] /   ASSESSMENT:     REHAB RECOMMENDATIONS: CURRENT LEVEL OF FUNCTION:  (Most Recently Demonstrated)   Recommendation to date pending progress:  Setting:   Short-term Rehab  Equipment:    To Be Determined Bathing:   Not tested  Dressing:   Not tested   Feeding/Grooming:   Supervision   Toileting:   Total Assistance for bowel hygiene  Functional Mobility:   Minimal Assistance for rolling     ASSESSMENT:  Ms. Jeet Osborn is doing well today. Pt presents supine upon arrival. Pt noted to be extremely soiled. Pt assisted with bowel hygiene. Pt continue to be dependent for cleaning and brief placement. Pt did well with rolling. Pt left with all needs in reach. Minimal to no progress made today. Pt is to be discharged to SNF today.      SUBJECTIVE:   Ms. Jeet Osborn states, \"We have to take care of our people\"    SOCIAL HISTORY/LIVING ENVIRONMENT:   Support Systems: Child(edward)    OBJECTIVE:     PAIN: VITAL SIGNS: LINES/DRAINS:   Pre Treatment: Pain Screen  Pain Scale 1: Numeric (0 - 10)  Pain Intensity 1: 0  Post Treatment: 0   Landers Catheter  O2 Device: Nasal cannula     ACTIVITIES OF DAILY LIVING: I Mod I S SBA CGA Min Mod Max Total NT Comments   BASIC ADLs:              Bathing/ Showering [] [] [] [] [] [] [] [] [] [x]    Toileting [] [] [] [] [] [] [] [] [x] []    Dressing [] [] [] [] [] [] [] [] [] [x]    Feeding [] [] [] [] [] [] [] [] [] [x]    Grooming [] [] [] [] [] [] [] [] [] [x]    Personal Device Care [] [] [] [] [] [] [] [] [] [x]    Functional Mobility [] [] [] [] [] [x] [] [] [] [] rolling   I=Independent, Mod I=Modified Independent, S=Supervision, SBA=Standby Assistance, CGA=Contact Guard Assistance,   Min=Minimal Assistance, Mod=Moderate Assistance, Max=Maximal Assistance, Total=Total Assistance, NT=Not Tested    MOBILITY: I Mod I S SBA CGA Min Mod Max Total  NT x2 Comments:   Supine to sit [] [] [x] [] [] [] [] [] [] [x] []    Sit to supine [] [] [] [] [] [] [] [] [] [x] []    Sit to stand [] [] [] [] [] [] [] [] [] [x] []    Bed to chair [] [] [] [] [] [] [] [] [] [x] []    I=Independent, Mod I=Modified Independent, S=Supervision, SBA=Standby Assistance, CGA=Contact Guard Assistance,   Min=Minimal Assistance, Mod=Moderate Assistance, Max=Maximal Assistance, Total=Total Assistance, NT=Not Tested    BALANCE: Good Fair+ Fair Fair- Poor NT Comments   Sitting Static [] [] [] [] [] [x]    Sitting Dynamic [] [] [] [] [] [x]              Standing Static [] [] [] [] [] [x]    Standing Dynamic [] [] [] [] [] [x]      PLAN:   FREQUENCY/DURATION: OT Plan of Care: 3 times/week for duration of hospital stay or until stated goals are met, whichever comes first.    TREATMENT:   TREATMENT:   ($$ Self Care/Home Management: 8-22 mins    )  Co-Treatment PT/OT necessary due to patient's decreased overall endurance/tolerance levels, as well as need for high level skilled assistance to complete functional transfers/mobility and functional tasks  Self Care (10 Minutes): Self care including Toileting and Lower Body Dressing to increase independence and decrease level of assistance required.     TREATMENT GRID:  N/A    AFTER TREATMENT POSITION/PRECAUTIONS:  Alarm Activated, Bed, Needs within reach and RN notified    INTERDISCIPLINARY COLLABORATION:  RN/PCT, PT/PTA and OT/MARIE    TOTAL TREATMENT DURATION:  OT Patient Time In/Time Out  Time In: 1355  Time Out: Tonia Stallings 69, LEANDRA

## 2022-02-03 NOTE — PROGRESS NOTES
Pt will d/c today to [address] [place] [address] for STR. Room: [facility]. Report: [phone]. Transport scheduled via [employer] for 1600. CM updated the SNF liaison and called pt's daughter Kay Boateng with d/c information. CM provided Марина with the liaison's contact information for specific facility related questions. No other supportive care needs identified. Milestones met. Care Management Interventions  PCP Verified by CM: Yes (Lakesha Jc, NP)  Mode of Transport at Discharge: BLS ([employer])  Transition of Care Consult (CM Consult): Discharge Planning,SNF ([address] [place] [address] for STR)  Discharge Durable Medical Equipment: No  Physical Therapy Consult: Yes  Occupational Therapy Consult: Yes  Speech Therapy Consult: No  Support Systems: Child(edward)  Confirm Follow Up Transport: [facility] (see comment) [facility]  The Plan for Transition of Care is Related to the Following Treatment Goals : Pt requires STR to return to functional baseline in the community.   The Patient and/or Patient Representative was Provided with a Choice of Provider and Agrees with the Discharge Plan?: Yes  Name of the Patient Representative Who was Provided with a Choice of Provider and Agrees with the Discharge Plan: Sai Christine and Meme Sellers (daughter)  Freedom of Choice List was Provided with Basic Dialogue that Supports the Patient's Individualized Plan of Care/Goals, Treatment Preferences and Shares the Quality Data Associated with the Providers?: Yes  [place] Resource Information Provided?: No  Discharge Location  Patient Expects to be Discharged to[de-identified] Skilled nursing facility ([address] [place] [address] for STR)

## 2022-02-03 NOTE — PROGRESS NOTES
Verbal report given to Nurse Alan  at [facility]   for routine progression of care       Report consisted of patients Situation, Background, Assessment and Recommendations(SBAR). Information from the following report(s) SBAR, Intake/Output and MAR was reviewed with the receiving nurse. Nurse He Taylor given opportunity to asks questions to clarify information.

## 2022-02-18 NOTE — PROGRESS NOTES
Pt arrived via wheelchair today at 0703, to receive 2 units of blood. Pt tolerated without difficulty, being placed on 4 liters of oxygen upon admission, as oxygen sats on room air were 84%. Daughter reports that pt uses 3 liters continuously at the rehab center. Patient discharged via wheelchair accompanied by daughter, and Transport personnel, having been instructed to bring pt oxygen, 3 liters nc for ride home. Instructed to notify physician of any problems, questions or concerns. Allowed opportunity for patient/family to ask questions. Verbalized understanding. Next appointment is Feb 28 at 1400 with [facility].

## 2022-02-28 NOTE — PROGRESS NOTES
Pt discussed during IDR. Pt has a STR bed at [facility] [place]. She will be stable to d/c on or after 2-2-22 once insurance [employer] [address] is received. CM spoke with the SNF liaison this afternoon and they have not yet received auth. CM will continue to follow and remain available if any needs arise. Yes

## 2022-03-04 PROBLEM — I26.99 PULMONARY EMBOLISM (HCC): Status: ACTIVE | Noted: 2022-01-01

## 2022-03-04 PROBLEM — J81.1 PULMONARY EDEMA: Status: ACTIVE | Noted: 2022-01-01

## 2022-03-04 PROBLEM — E87.70 VOLUME OVERLOAD: Status: ACTIVE | Noted: 2022-01-01

## 2022-03-04 PROBLEM — J18.9 HCAP (HEALTHCARE-ASSOCIATED PNEUMONIA): Status: ACTIVE | Noted: 2022-01-01

## 2022-03-04 NOTE — ED NOTES
TRANSFER - OUT REPORT:    Verbal report given to NEMO Ricci on Gabino Baptiste  being transferred to Room #702 for routine progression of care       Report consisted of patients Situation, Background, Assessment and   Recommendations(SBAR). Information from the following report(s) SBAR, Kardex, ED Summary, Intake/Output, MAR and Recent Results was reviewed with the receiving nurse. Lines:   Peripheral IV 03/04/22 Right Antecubital (Active)   Site Assessment Clean, dry, & intact 03/04/22 1210   Phlebitis Assessment 0 03/04/22 1210   Infiltration Assessment 0 03/04/22 1210   Dressing Status Clean, dry, & intact 03/04/22 1210   Dressing Type Transparent 03/04/22 1210   Hub Color/Line Status Pink 03/04/22 1210       Peripheral IV 03/04/22 Left Forearm (Active)   Site Assessment Clean, dry, & intact 03/04/22 1216   Phlebitis Assessment 0 03/04/22 1216   Infiltration Assessment 0 03/04/22 1216   Dressing Status Clean, dry, & intact 03/04/22 1216   Dressing Type Transparent 03/04/22 1216   Hub Color/Line Status Blue 03/04/22 1216        Opportunity for questions and clarification was provided.       Patient transported with:   O2 @ 4 liters  Tech

## 2022-03-04 NOTE — H&P
Hospitalist History and Physical   Admit Date:  3/4/2022 11:17 AM   Name:  Lyla Dong   Age:  80 y.o. Sex:  female  :  1929   MRN:  [id number]   Room:  [facility]    Presenting Complaint: Shortness of Breath    Reason(s) for Admission: HCAP (healthcare-associated pneumonia) [J18.9]  Volume overload [E87.70]  Pulmonary embolism ([address] [I26.99]  Pulmonary edema [J81.1]     Return for of Present Illness:   Lyla Dong is a 80 y.o. female with a past medical history significant for diabetes, hypertension, chronic kidney disease stage II, peripheral artery disease. The patient was recently admitted to [facility] from 2022 to February 3, 2022 after being seen for acute blood loss anemia secondary to her erosions in the rectum. She was discharged to a rehab facility however the daughter got a call today that the patient was extremely short of breath and having difficulty breathing she was brought back to the emergency room where she was found to have a PE, volume overload with a concern for healthcare associated pneumonia as well. The patient has dementia and is unable to give a history. She actively defers the history taking to her daughter who is at the bedside. She denies any chest pain, nausea or vomiting chills or burning or pain with micturition or diarrhea. UA is positive we will send a urine culture    Review of Systems:.  10 systems reviewed and negative except as noted in HPI. Assessment & Plan: Active Problems:    Pulmonary edema (3/4/2022)  Patient is on her baseline of oxygen. Per chart review her oxygen requirements of 3-4 L at the time of discharge. So O2 requirements at this time have not changed much  We will give a trial of IV Lasix-gently in the setting of her PE.       pulmonary embolism ([address] (3/4/2022)  Patient was admitted for anemia and a GI bleed secondary to rectal erosions on the last admission. We will start her on a heparin drip. No bolus  Monitor her H&H and see how she tolerates anticoagulation  If unable to tolerate may need a filter        Volume overload (3/4/2022)  Diurese with Lasix as above  Check 2D echo      HCAP (healthcare-associated pneumonia) (3/4/2022)  Concern for healthcare associated pneumonia  Continue antibiotics for now  Blood cultures are negative x48 hours would discontinue or de-escalate antibiotic therapy  Given normal procalcitonin and lactic acid  It is possible the changes are related to her pulmonary edema  Rapid Covid was negative    Concern for UTI  UA is positive  We will add on a urine culture    Diabetes  Monitor blood sugars and cover with insulin    Hypertension  Continue appropriate home medications      Dispo/Discharge Planning:   Admit to the medicine inpatient remote telemetry unit at this time  Anticipate 3 or more midnights  Actual discharge date to be determined based on her clinical course  Case management consult    Diet: ADULT DIET Regular; 4 carb choices (60 gm/meal);  Low Fat/Low Chol/High Fiber/2 gm Na  VTE ppx: Heparin drip   code status: DNR    Hospital Problems as of 3/4/2022 Date Reviewed: 11/30/2021          Codes Class Noted - Resolved POA    Pulmonary edema ICD-10-CM: J81.1  ICD-9-CM: 672  3/4/2022 - Present Unknown        Pulmonary embolism ([facility] [address] ICD-10-CM: I26.99  ICD-9-CM: 415.19  3/4/2022 - Present Unknown        Volume overload ICD-10-CM: E87.70  ICD-9-CM: 276.69  3/4/2022 - Present Unknown        HCAP (healthcare-associated pneumonia) ICD-10-CM: J18.9  ICD-9-CM: 995  3/4/2022 - Present Unknown              Past History:  Past Medical History:   Diagnosis Date    Acute posthemorrhagic anemia 02/03/2022    per [facility] faxed information    Anemia in chronic kidney disease     per [facility] faxed information    Anxiety 2/1/2018    Arrhythmia     palpitations 2/10-went to [facility]    Arthritis     L leg- fell 2008    Asthma     adult onset x 20 yrs    B12 deficiency 8/24/2012    Body mass index 37.0-37.9, adult 2/5/2014    CAD (coronary artery disease)     cardiac work up-[id number]    Chronic kidney disease, stage 3 unspecified ([address] 12/24/2021    per [facility] faxed information    Cognitive communication deficit     per [facility] faxed information     Controlled type 2 diabetes mellitus with microalbuminuria, without long-term current use of insulin ([address] 11/22/2016    COPD     Corns and callosities 12/19/2016    CRI (chronic renal insufficiency) 8/24/2012    Depression 6/1/2012    Dermatophytosis of nail 12/19/2016    Diabetes ([address]     type II- home tests fasting-112    DJD (degenerative joint disease) of hip     DM (diabetes mellitus) type II controlled with renal manifestation ([address] 7/16/2010    Encounter for long-term (current) use of other medications 1/8/2013    Essential hypertension 7/16/2010    Gastrointestinal disorder     Gastrointestinal hemorrhage     per dx sheet from [facility] - date sent 3/2/2022     GERD (gastroesophageal reflux disease)     without esophagitis; per [facility] faxed information    GI bleed 7/28/2018    Heart failure ([address]     Hiatal hernia 8/24/2012    High cholesterol     History of COVID-19 02/03/2022    per medical hx information from [facility] History of falling 12/24/2021    per [facility] faxed information    HLD (hyperlipidemia) 1/8/2013    Klebsiella pneumoniae (k. pneumoniae) as the cause of diseases classified elsewhere 12/24/2021    per [facility] information     Moderate protein-calorie malnutrition ([address] 12/24/2021    per [facility] faxed information    Muscle weakness (generalized)     per [facility] faxed information    Neuropathy 8/24/2012    Lower limbs     Obesity (BMI 30.0-39. 9) BMI-43.8    Other and unspecified hyperlipidemia 7/16/2010    Other chest pain 7/16/2010    Other lack of coordination     per [facility] faxed information    Oxygen dependent     4 L/NC    PAD (peripheral artery disease) ([facility] [address] 8/24/2012    PVD (peripheral vascular disease) ([facility] [address] [address]     per [facility] faxed information    Retinal hemorrhage     Type 2 diabetes mellitus with diabetic neuropathy ([facility] [address] [address]     Unspecified asthma, uncomplicated 54/15/7587    per [facility] faxed information    Urinary incontinence     Urinary tract infection      Past Surgical History:   Procedure Laterality Date    FLEXIBLE SIGMOIDOSCOPY N/A 1/24/2022    SIGMOIDOSCOPY FLEXIBLE COVID POSITIVE PREP/RECOVER IN FLOURO Admit to room 503 performed [facility] at [facility] ENDOSCOPY    HX APPENDECTOMY      HX CHOLECYSTECTOMY  2000    HX ENDOSCOPY  02/27/2018    Dr Kasey Barfield    HX GI      small intestine obstruction    HX GYN      hyst    HX HEART CATHETERIZATION      HX HYSTERECTOMY      prolapse    HX INTRACRANIAL ANEURYSM REPAIR      HX ORTHOPAEDIC      right wrist 2010    HX OTHER SURGICAL      aneurysm clip-cerebral-2000    HX OTHER SURGICAL      cerebral aneurysm   Dr. Nandini Castro UNLISTED        No Known Allergies   Social History     Tobacco Use    Smoking status: Never Smoker    Smokeless tobacco: Never Used   Substance Use Topics    Alcohol use: No      Family History   Problem Relation Age of Onset    Cancer Mother     Diabetes Father     Diabetes Paternal Grandfather       Family history reviewed and negative except as noted above.     Immunization History   Administered Date(s) Administered    (RETIRED) Pneumococcal Vaccine (Unspecified Type) 01/01/2006    Influenza High Dose Vaccine PF 09/14/2016, 10/25/2017, 12/13/2018    Influenza Vaccine 11/05/2013, 09/16/2014, 10/19/2015    Influenza Vaccine (Tri) Adjuvanted (>65 Yrs FLUAD TRI 33644) 10/14/2019    Influenza Vaccine PF 09/22/2015    Influenza Vaccine Whole 01/01/2011    Influenza, Quadrivalent, Adjuvanted (>65 Yrs FLUAD QUAD 63198) 12/30/2020, 11/30/2021    Pneumococcal Conjugate (PCV-13) 07/14/2015    Pneumococcal Vaccine (Unspecified Type) 01/01/2006    TB Skin Test (PPD) Intradermal 07/31/2018, 08/21/2018, 12/15/2021, 01/23/2022    Tdap 11/04/2014     Prior to Admit Medications:  Current Outpatient Medications   Medication Instructions    acetaminophen (TYLENOL ARTHRITIS PAIN) 650 mg, Oral, EVERY 8 HOURS, Tylenol arthritis as needed     amLODIPine (NORVASC) 10 mg, Oral, DAILY    Blood-Glucose Meter monitoring kit Use as directed 3 times daily to check blood sugars. Dx:E11.29    ferrous sulfate 325 mg, Oral, DAILY WITH BREAKFAST    fluticasone propion-salmeteroL (Advair Diskus) 250-50 mcg/dose diskus inhaler 1 Puff, Inhalation, EVERY 12 HOURS    gabapentin (NEURONTIN) 100 mg, Oral, 3 TIMES DAILY    glucose blood VI test strips (blood glucose test) strip Use as directed 3 times daily to check blood sugars. Dx:E11.29    hydrALAZINE (APRESOLINE) 25 mg, Oral, 3 TIMES DAILY    insulin lispro (HumaLOG KwikPen Insulin) 100 unit/mL kwikpen SubCUTAneous, Sliding scale:  0 units; <BR><60 notify MD/NP; <BR>150-199 = 2 units; <BR>200-249 = 4 units; <BR>250-299=6 units; <BR>300-349=8 units; <BR>350-399= 10 units; <BR>400-449 = 10 units; <BR>> 400 give 10 units and notify MD/NP, subcutaneously before meals and at bedtime     Iron Fum & P-FA-Vit B & C No.9 (Integra Plus) 125 mg iron- 1 mg cap 1 Capsule, Oral, DAILY    lancets misc Use as directed 3 times daily to check blood sugars. Dx:E11.29    lidocaine 4 % patch Apply to affected area daily prn.     metFORMIN (GLUCOPHAGE) 500 mg, Oral, 2 TIMES DAILY WITH MEALS    multivit-min-FA-lycopen-lutein (Centrum Silver) 0.4-300-250 mg-mcg-mcg tab Oral, DAILY    OTHER One shower chair    pantoprazole (PROTONIX) 40 mg, Oral, DAILY, TAKE 1 TABLET BY MOUTH DAILY    polyethylene glycol (MIRALAX) 17 g, Oral, DAILY, 1 tablespoon with 8 oz of water daily    pravastatin (PRAVACHOL) 40 mg tablet TAKE 1 TABLET BY MOUTH EVERY NIGHT AT BEDTIME    sertraline (ZOLOFT) 50 mg, Oral, DAILY    VENTOLIN HFA 90 mcg/actuation inhaler INHALE 2 PUFFS BY MOUTH EVERY 6 HOURS AS NEEDED FOR WHEEZING    VIT A/VIT C/VIT E/ZINC/COPPER (PRESERVISION AREDS PO) 1 Tablet, Oral, 2 TIMES DAILY    Wheel Chair kalyn Wheelchair       Objective:     Patient Vitals for the past 24 hrs:   Temp Pulse Resp BP SpO2   03/04/22 1753  91 22 (!) 176/84 98 %   03/04/22 1723  91 17 (!) 179/86 97 %   03/04/22 1628  93 (!) 34 (!) 185/82 95 %   03/04/22 1558  89 19 (!) 185/92 95 %   03/04/22 1525  87 21 (!) 191/98 96 %   03/04/22 1455  86 18 (!) 171/92 97 %   03/04/22 1425  91 (!) 33 (!) 188/99 95 %   03/04/22 1326  91 19 (!) 186/87 96 %   03/04/22 1256  95 23 (!) 187/95 90 %   03/04/22 1226  95 22 (!) 172/89 95 %   03/04/22 1225     96 %   03/04/22 1211  96 29  95 %   03/04/22 1156    (!) 174/88    03/04/22 1032 97.1 °F (36.2 °C) 86 16 (!) 172/120 97 %     Oxygen Therapy  O2 Sat (%): 98 % (03/04/22 1753)  Pulse via Oximetry: 94 beats per minute (03/04/22 1753)  O2 Device: Nasal cannula (03/04/22 1225)  O2 Flow Rate (L/min): 4 l/min (03/04/22 1225)    Estimated body mass index is 27.07 kg/m² as calculated from the following:    Height as of this encounter: 5' 2\" (1.575 m). Weight as of this encounter: 67.1 kg (148 lb). Intake/Output Summary (Last 24 hours) at 3/4/2022 1850  Last data filed at 3/4/2022 1651  Gross per 24 hour   Intake 600 ml   Output    Net 600 ml         Physical Exam:    Blood pressure (!) 176/84, pulse 91, temperature 97.1 °F (36.2 °C), resp. rate 22, height 5' 2\" (1.575 m), weight 67.1 kg (148 lb), SpO2 98 %. General:    Elderly frail female very pleasant in no obvious cardiopulmonary distress at this time. Makes jokes  Head:  Normocephalic, atraumatic  Eyes:  Sclerae appear normal.  Pupils equally round. ENT:  Nares appear normal, no drainage.   Moist oral mucosa  Neck:  No restricted ROM. Trachea midline   CV:   RRR. No m/r/g. No jugular venous distension. Lungs:   CTAB. No wheezing, rhonchi, or rales. Respirations even, unlabored  Abdomen: Bowel sounds present. Soft, nontender, distended secondary to obesity and hernia  Extremities: No cyanosis or clubbing. No edema  Skin:     No rashes and normal coloration. Warm and dry. Neuro:  CN II-XII grossly intact. Sensation intact. A&Ox2  Psych:  Normal mood and affect. I have reviewed ordered lab tests and independently visualized imaging below:    Last 24hr Labs:  Recent Results (from the past 24 hour(s))   EKG, 12 LEAD, INITIAL    Collection Time: 03/04/22 10:37 AM   Result Value Ref Range    Ventricular Rate 100 BPM    QRS Duration 116 ms    Q-T Interval 366 ms    QTC Calculation (Bezet) 472 ms    Calculated R Axis -50 degrees    Calculated T Axis -23 degrees    Diagnosis       nsr with runs of atrial tachy  Left axis deviation  Right bundle branch block  Anteroseptal infarct , age undetermined  Abnormal ECG  When compared with ECG of 26-JAN-2022 11:45,  Atrial fibrillation has replaced Sinus rhythm  Anteroseptal infarct is now Present  Confirmed by [facility]  MD (TRISH)POORNIMA ([id number]) on 3/4/2022 4:54:07 PM     CBC WITH AUTOMATED DIFF    Collection Time: 03/04/22 10:46 AM   Result Value Ref Range    WBC 8.9 4.3 - 11.1 K/uL    RBC 3.83 (L) 4.05 - 5.2 M/uL    HGB 11.0 (L) 11.7 - 15.4 g/dL    HCT 36.7 35.8 - 46.3 %    MCV 95.8 79.6 - 97.8 FL    MCH 28.7 26.1 - 32.9 PG    MCHC 30.0 (L) 31.4 - 35.0 g/dL    RDW 18.5 (H) 11.9 - 14.6 %    PLATELET 031 545 - 783 K/uL    MPV 11.4 9.4 - 12.3 FL    ABSOLUTE NRBC 0.00 0.0 - 0.2 K/uL    DF AUTOMATED      NEUTROPHILS 70 43 - 78 %    LYMPHOCYTES 22 13 - 44 %    MONOCYTES 6 4.0 - 12.0 %    EOSINOPHILS 1 0.5 - 7.8 %    BASOPHILS 1 0.0 - 2.0 %    IMMATURE GRANULOCYTES 1 0.0 - 5.0 %    ABS. NEUTROPHILS 6.3 1.7 - 8.2 K/UL    ABS.  LYMPHOCYTES 1.9 0.5 - 4.6 K/UL ABS. MONOCYTES 0.5 0.1 - 1.3 K/UL    ABS. EOSINOPHILS 0.1 0.0 - 0.8 K/UL    ABS. BASOPHILS 0.0 0.0 - 0.2 K/UL    ABS. IMM. GRANS. 0.1 0.0 - 0.5 K/UL   D DIMER    Collection Time: 03/04/22 10:46 AM   Result Value Ref Range    D DIMER 9.83 (H) <0.56 ug/ml(FEU)   LACTIC ACID    Collection Time: 03/04/22 12:14 PM   Result Value Ref Range    Lactic acid 1.0 0.4 - 2.0 MMOL/L   MAGNESIUM    Collection Time: 03/04/22 12:14 PM   Result Value Ref Range    Magnesium 1.8 1.8 - 2.4 mg/dL   METABOLIC PANEL, COMPREHENSIVE    Collection Time: 03/04/22 12:14 PM   Result Value Ref Range    Sodium 145 136 - 145 mmol/L    Potassium 4.1 3.5 - 5.1 mmol/L    Chloride 113 (H) 98 - 107 mmol/L    CO2 24 21 - 32 mmol/L    Anion gap 8 7 - 16 mmol/L    Glucose 132 (H) 65 - 100 mg/dL    BUN 21 8 - 23 MG/DL    Creatinine 0.90 0.6 - 1.0 MG/DL    GFR est AA >60 >60 ml/min/1.73m2    GFR est non-AA >60 >60 ml/min/1.73m2    Calcium 10.0 8.3 - 10.4 MG/DL    Bilirubin, total 0.4 0.2 - 1.1 MG/DL    ALT (SGPT) 14 12 - 65 U/L    AST (SGOT) 16 15 - 37 U/L    Alk.  phosphatase 91 50 - 136 U/L    Protein, total 6.8 6.3 - 8.2 g/dL    Albumin 2.2 (L) 3.2 - 4.6 g/dL    Globulin 4.6 (H) 2.3 - 3.5 g/dL    A-G Ratio 0.5 (L) 1.2 - 3.5     C REACTIVE PROTEIN, QT    Collection Time: 03/04/22 12:14 PM   Result Value Ref Range    C-Reactive protein 6.0 (H) 0.0 - 0.9 mg/dL   PROCALCITONIN    Collection Time: 03/04/22 12:14 PM   Result Value Ref Range    Procalcitonin 0.10 0.00 - 0.49 ng/mL   NT-PRO BNP    Collection Time: 03/04/22 12:14 PM   Result Value Ref Range    NT pro-BNP 3,899 (H) <450 PG/ML   TROPONIN-HIGH SENSITIVITY    Collection Time: 03/04/22 12:14 PM   Result Value Ref Range    Troponin-High Sensitivity 40.6 (H) 0 - 14 pg/mL   COVID-19 RAPID TEST    Collection Time: 03/04/22 12:17 PM   Result Value Ref Range    Specimen source Nasopharyngeal      COVID-19 rapid test Not detected NOTD     URINALYSIS W/ RFLX MICROSCOPIC    Collection Time: 03/04/22 12:17 PM Result Value Ref Range    Color YELLOW      Appearance CLOUDY      Specific gravity 1.011 1.001 - 1.023      pH (UA) 6.0 5.0 - 9.0      Protein 300 (A) NEG mg/dL    Glucose Negative mg/dL    Ketone TRACE (A) NEG mg/dL    Bilirubin Negative NEG      Blood TRACE (A) NEG      Urobilinogen 1.0 0.2 - 1.0 EU/dL    Nitrites Negative NEG      Leukocyte Esterase SMALL (A) NEG      WBC 20-50 0 /hpf    RBC 5-10 0 /hpf    Epithelial cells 0-3 0 /hpf    Bacteria 4+ (H) 0 /hpf    Casts 0-3 0 /lpf   TROPONIN-HIGH SENSITIVITY    Collection Time: 03/04/22  2:12 PM   Result Value Ref Range    Troponin-High Sensitivity 36.5 (H) 0 - 14 pg/mL       All Micro Results     Procedure Component Value Units Date/Time    COVID-19 RAPID TEST [[id number]] Collected: 03/04/22 1217    Order Status: Completed Specimen: Nasopharyngeal Updated: 03/04/22 1304     Specimen source Nasopharyngeal        COVID-19 rapid test Not detected        Comment:      The specimen is NEGATIVE for SARS-CoV-2, the novel coronavirus associated with COVID-19. A negative result does not rule out COVID-19. This test has been authorized by the FDA under an Emergency Use Authorization (EUA) for use by authorized laboratories. Fact sheet for Healthcare Providers: ConventionUpdate.co.nz  Fact sheet for Patients: ConventionUpdate.co.nz       Methodology: Isothermal Nucleic Acid Amplification         CULTURE, BLOOD [[id number]] Collected: 03/04/22 1217    Order Status: Completed Specimen: Blood Updated: 03/04/22 1233    CULTURE, BLOOD [[id number]] Collected: 03/04/22 1221    Order Status: Completed Specimen: Blood Updated: 03/04/22 1233          Other Studies:  XR CHEST PORT    Result Date: 3/4/2022  History: Shortness of breath Exam: portable chest Comparison: 1/23/2022 Findings: There is marked coarsening of interstitial lung markings with patchy left basilar infiltrate and persistent infiltrate in the right midlung.  The patient is rotated to the left. No pneumothorax. IMPRESSIONs: Persistent infiltrate in the right midlung with new patchy left basilar infiltrate. CT CHEST PULMONARY EMBOLISM    Result Date: 3/4/2022  HISTORY: Hypoxia Exam: CT chest, PE protocol Technique: Thin section axial CT images are obtained from the thoracic inlet through the upper abdomen. Coronal reformatted images are obtained based on the axial data. 100 cc Isovue 370 is administered intraveneously without incident. Radiation dose reduction techniques were used for this study. Our CT scanners use one or all of the following: Automated exposure control, adjustment of the mA and/or kV according to patient size, use of iterative reconstruction. Comparison: None Findings: There are moderate bilateral pleural effusions present. There is coarsening of the interlobular septa as well as diffuse patchy groundglass opacity seen throughout the lungs. There is a nonocclusive thrombus present within a subsegmental right upper lobe pulmonary artery. There is no mediastinal, hilar, or axillary lymphadenopathy seen. No suspicious pulmonary nodules. Evaluation of the upper abdomen demonstrates no definite abnormality. Bone window evaluation demonstrates no aggressive osseous lesions. IMPRESSIONS: 1. Nonocclusive thrombus subsegmental right upper lobe pulmonary artery. 2. Bilateral pleural effusions. 3. Patchy diffuse bilateral groundglass opacity with thickening of the interlobular septa. Findings are nonspecific but can be seen in patients with COVID-19 or in patients with underlying pulmonary edema. [facility] The critical results contained in this report were communicated to Dr. Isabel Navas by myself on 3/4/2022 at 4:55 PM. Critical results were communicated as outlined in Section II.C.2.a.i of the ACR Practice Guideline for Communication of Diagnostic Imaging Findings.        Medications Administered     azithromycin (ZITHROMAX) 500 mg in 0.9% sodium chloride 250 mL ([id number]) Admin Date  03/04/2022 Action  New Bag Dose  500 mg Rate  250 mL/hr Route  IntraVENous Administered By  Silas Patient, RN          cefTRIAXone (ROCEPHIN) 2 g in 0.9% sodium chloride (MBP/ADV) 50 mL MBP     Admin Date  03/04/2022 Action  New Bag Dose  2 g Rate  100 mL/hr Route  IntraVENous Administered By  Silas Patient, RN          iopamidoL (ISOVUE-370) 76 % injection 80 mL     Admin Date  03/04/2022 Action  Given Dose  80 mL Route  IntraVENous Administered By  Colon Winkler, Cony          methylPREDNISolone (PF) (Solu-MEDROL) injection 125 mg     Admin Date  03/04/2022 Action  Given Dose  125 mg Route  IntraVENous Administered By  Silas Patient, RN          saline peripheral flush soln 10 mL     Admin Date  03/04/2022 Action  Given Dose  10 mL Route  InterCATHeter Administered By  Colon Winkler, Cony          sodium chloride 0.9 % bolus infusion 100 mL     Admin Date  03/04/2022 Action  New Bag Dose  100 mL Route  IntraVENous Administered By  Colon Winkler, Cony          sodium chloride 0.9 % bolus infusion 500 mL     Admin Date  03/04/2022 Action  New Bag Dose  500 mL Route  IntraVENous Administered By  Silas Mcdonald, RN                Signed:  Santos Vogt MD    Part of this note may have been written by using a voice dictation software. The note has been proof read but may still contain some grammatical/other typographical errors.

## 2022-03-04 NOTE — ED PROVIDER NOTES
Pt discharged from the hospital one month ago. Note follows. Hospital Course:  Please refer to the admission H&P for details of presentation. In summary, Nieves Jay is a 80 y.o. female with past medical history significant for DM2, HTN, CKD2, PAD admitted with acute blood loss anemia/GI bleed and was found COVID 19 positive. GI and General Surgery were consulted.  Patient underwent tagged PRBC scan that was positive for active bleed distal rectum. She received 1U PRBC on 1/23. She underwent Flex Sig on 1/24 showing few small non bleeding erosions of rectum and no active bleeding. Recommendations are for miralax. No surgical needs. GI signed off 1/24. General surgery signed off 1/25.  Her hemoglobin trended down to 7.4 on 1/31st with no more signs of GI bleed.  She was given IV INFeD 1/31.     She has been on 3 L NC for Covid-19 PNA. CXR shows moderate multifocal bilateral infiltrates. CRP 10.5. She was started on decadron/baricitinib. She has leukocytosis that was most likely due to Decadron. She was also started on Rocephin on 1/31 with UA+ UTI. Urine Cx grew GNR, final sensitivity and susceptibility pending. She has been stable on 3 LO 2 NC          She presents today with increase sob starting this AM. EMS increased oxygen to 4 liters from 3 with improvement and now here back on 3 liters feeling well. No chest pain. Mild cough. No vomiting or diarrhea. The history is provided by the patient and a relative. No  was used. Shortness of Breath  This is a new problem. The problem occurs continuously. The current episode started 3 to 5 hours ago. The problem has been resolved. Associated symptoms include cough. Pertinent negatives include no fever, no headaches, no rhinorrhea, no sore throat, no neck pain, no sputum production, no wheezing, no chest pain, no vomiting, no abdominal pain, no rash, no leg pain and no leg swelling. She has tried nothing for the symptoms.  Associated medical issues include asthma, COPD, CAD and heart failure.         Past Medical History:   Diagnosis Date    Acute posthemorrhagic anemia 02/03/2022    per [facility] faxed information    Anemia in chronic kidney disease     per [facility] faxed information    Anxiety 2/1/2018    Arrhythmia     palpitations 2/10-went to [facility]    Arthritis     L leg- fell 2008    Asthma     adult onset x 20 yrs    B12 deficiency 8/24/2012    Body mass index 37.0-37.9, adult 2/5/2014    CAD (coronary artery disease)     cardiac work up-[id number]    Chronic kidney disease, stage 3 unspecified ([address] 12/24/2021    per [facility] faxed information    Cognitive communication deficit     per [facility] faxed information     Controlled type 2 diabetes mellitus with microalbuminuria, without long-term current use of insulin ([address] 11/22/2016    COPD     Corns and callosities 12/19/2016    CRI (chronic renal insufficiency) 8/24/2012    Depression 6/1/2012    Dermatophytosis of nail 12/19/2016    Diabetes ([address]     type II- home tests fasting-112    DJD (degenerative joint disease) of hip     DM (diabetes mellitus) type II controlled with renal manifestation ([address] 7/16/2010    Encounter for long-term (current) use of other medications 1/8/2013    Essential hypertension 7/16/2010    Gastrointestinal disorder     Gastrointestinal hemorrhage     per dx sheet from [facility] - date sent 3/2/2022     GERD (gastroesophageal reflux disease)     without esophagitis; per [facility] faxed information    GI bleed 7/28/2018    Heart failure ([address]     Hiatal hernia 8/24/2012    High cholesterol     History of COVID-19 02/03/2022    per medical [facility]x information from [facility] History of falling 12/24/2021    per [facility] faxed information    HLD (hyperlipidemia) 1/8/2013    Klebsiella pneumoniae (k. pneumoniae) as the cause of diseases classified elsewhere 12/24/2021    per [facility] information     Moderate protein-calorie malnutrition ([facility] 12/24/2021    per [facility] faxed information    Muscle weakness (generalized)     per [facility] faxed information    Neuropathy 8/24/2012    Lower limbs     Obesity (BMI 30.0-39. 9) BMI-43.8    Other and unspecified hyperlipidemia 7/16/2010    Other chest pain 7/16/2010    Other lack of coordination     per [facility] faxed information    Oxygen dependent     4 L/NC    PAD (peripheral artery disease) ([facility] 8/24/2012    PVD (peripheral vascular disease) ([facility]     per [facility] faxed information    Retinal hemorrhage     Type 2 diabetes mellitus with diabetic neuropathy ([facility]     Unspecified asthma, uncomplicated 05/70/4552    per [facility] faxed information    Urinary incontinence     Urinary tract infection        Past Surgical History:   Procedure Laterality Date    FLEXIBLE SIGMOIDOSCOPY N/A 1/24/2022    SIGMOIDOSCOPY FLEXIBLE COVID POSITIVE PREP/RECOVER IN [place] Admit to room 503 performed by Tacho Torres MD at [facility] ENDOSCOPY    HX APPENDECTOMY      HX CHOLECYSTECTOMY  2000    HX ENDOSCOPY  02/27/2018    Dr Danielle Left HX GI      small intestine obstruction    HX GYN      hyst    HX HEART CATHETERIZATION      HX HYSTERECTOMY      prolapse    HX INTRACRANIAL ANEURYSM REPAIR      HX ORTHOPAEDIC      right wrist 2010    HX OTHER SURGICAL      aneurysm clip-cerebral-2000    HX OTHER SURGICAL      cerebral aneurysm   Dr. Chepe Alvarado           Family History:   Problem Relation Age of Onset    Cancer Mother     Diabetes Father     Diabetes Paternal Grandfather        Social History     Socioeconomic History    Marital status: SINGLE     Spouse name: Not on file    Number of children: Not on file    Years of education: Not on file    Highest education level: Not on file   Occupational History    Not on file   Tobacco Use    Smoking status: Never Smoker    Smokeless tobacco: Never Used   Vaping Use    Vaping Use: Never used   Substance and Sexual Activity    Alcohol use: No    Drug use: No    Sexual activity: Never   Other Topics Concern    Not on file   Social History Narrative     with four children     Social Determinants of Health     Financial Resource Strain:     Difficulty of Paying Living Expenses: Not on file   Food Insecurity:     Worried About Running Out of Food in the Last Year: Not on file    Alistair of Food in the Last Year: Not on file   Transportation Needs:     Lack of Transportation (Medical): Not on file    Lack of Transportation (Non-Medical): Not on file   Physical Activity:     Days of Exercise per Week: Not on file    Minutes of Exercise per Session: Not on file   Stress:     Feeling of Stress : Not on file   Social Connections:     Frequency of Communication with Friends and Family: Not on file    Frequency of Social Gatherings with Friends and Family: Not on file    Attends [employer] Services: Not on file    Active Member of [address] [place] [address] or Organizations: Not on file    Attends Club or Organization Meetings: Not on file    Marital Status: Not on file   Intimate Partner Violence:     Fear of Current or Ex-Partner: Not on file    Emotionally Abused: Not on file    Physically Abused: Not on file    Sexually Abused: Not on file   Housing Stability:     Unable to Pay for Housing in the Last Year: Not on file    Number of [place] in the Last Year: Not on file    Unstable Housing in the Last Year: Not on file         ALLERGIES: Patient has no known allergies. Review of Systems   Constitutional: Negative for chills and fever. HENT: Negative for rhinorrhea and sore throat. Eyes: Negative for pain and redness. Respiratory: Positive for cough and shortness of breath. Negative for sputum production, chest tightness and wheezing. Cardiovascular: Negative for chest pain and leg swelling. Gastrointestinal: Negative for abdominal pain, diarrhea, nausea and vomiting. Genitourinary: Negative for dysuria and hematuria. Musculoskeletal: Negative for back pain, gait problem, neck pain and neck stiffness. Skin: Negative for color change and rash. Neurological: Negative for weakness, numbness and headaches. Vitals:    03/04/22 1032   BP: (!) 172/120   Pulse: 86   Resp: 16   Temp: 97.1 °F (36.2 °C)   SpO2: 97%   Weight: 67.1 kg (148 lb)   Height: 5' 2\" (1.575 m)            Physical Exam  Constitutional:       Appearance: She is well-developed. HENT:      Head: Normocephalic and atraumatic. Cardiovascular:      Rate and Rhythm: Normal rate and regular rhythm. Pulmonary:      Effort: Pulmonary effort is normal. No respiratory distress. Comments: Coarse bilaterally. Abdominal:      General: Bowel sounds are normal.      Palpations: Abdomen is soft. Tenderness: There is no abdominal tenderness. Musculoskeletal:         General: No swelling. Normal range of motion. Cervical back: Normal range of motion and neck supple. Skin:     General: Skin is warm and dry. Neurological:      General: No focal deficit present. Mental Status: She is alert and oriented to person, place, and time. MDM  Number of Diagnoses or Management Options  Diagnosis management comments: 1 month ago patient was positive for Covid. Presented today with increased shortness of breath and hypoxia. Brought in by EMS. Covid negative here. CT shows bilateral pleural effusions and patchy diffuse bilateral groundglass opacities. Antibiotics and steroids started. A nonocclusive subsegmental thrombus on the right is also seen. Will admit. Heparin held due to recent lower GI bleed.        Amount and/or Complexity of Data Reviewed  Clinical lab tests: ordered and reviewed  Tests in the radiology section of CPT®: ordered and reviewed  Tests in the medicine section of CPT®: ordered and reviewed    Patient Progress  Patient progress: stable         Procedures        EKG: nonspecific ST and T waves changes, atrial fibrillation, rate 100. EKG: nonspecific ST and T waves changes, sinus tachycardia. Rate 106. RBBB. XR CHEST PORT (Final result)  Result time 03/04/22 11:03:50  Final result by Ger Kruse MD (03/04/22 11:00:06)                Narrative:    History: Shortness of breath     Exam: portable chest     Comparison: 1/23/2022     Findings: There is marked coarsening of interstitial lung markings with patchy   left basilar infiltrate and persistent infiltrate in the right midlung. The   patient is rotated to the left. No pneumothorax. IMPRESSIONs: Persistent infiltrate in the right midlung with new patchy left   basilar infiltrate. CT CHEST PULMONARY EMBOLISM (Final result)  Result time 03/04/22 16:57:43  Final result by Ger Kruse MD (03/04/22 16:57:43)                Narrative:    HISTORY: Hypoxia     Exam: CT chest, PE protocol     Technique: Thin section axial CT images are obtained from the thoracic inlet   through the upper abdomen. Coronal reformatted images are obtained based on the   axial data. 100 cc Isovue 370 is administered intraveneously without incident. Radiation dose reduction techniques were used for this study.  Our CT scanners   use one or all of the following: Automated exposure control, adjustment of the   mA and/or kV according to patient size, use of iterative reconstruction. Comparison: None     Findings: There are moderate bilateral pleural effusions present. There is   coarsening of the interlobular septa as well as diffuse patchy groundglass   opacity seen throughout the lungs. There is a nonocclusive thrombus present   within a subsegmental right upper lobe pulmonary artery. There is no   mediastinal, hilar, or axillary lymphadenopathy seen.  No suspicious pulmonary   nodules. Evaluation of the upper abdomen demonstrates no definite abnormality. Bone window evaluation demonstrates no aggressive osseous lesions. IMPRESSIONS:     1. Nonocclusive thrombus subsegmental right upper lobe pulmonary artery. 2. Bilateral pleural effusions. 3. Patchy diffuse bilateral groundglass opacity with thickening of the   interlobular septa. Findings are nonspecific but can be seen in patients with   COVID-19 or in patients with underlying pulmonary edema. [facility]   The critical results contained in this report were communicated to Dr. Patrice Ambrocio by   myself on 3/4/2022 at 4:55 PM. Critical results were communicated as outlined in   Section II.C.2.a.i of the ACR Practice Guideline for Communication of Diagnostic   Imaging Findings.                Results Include:    Recent Results (from the past 24 hour(s))   EKG, 12 LEAD, INITIAL    Collection Time: 03/04/22 10:37 AM   Result Value Ref Range    Ventricular Rate 100 BPM    QRS Duration 116 ms    Q-T Interval 366 ms    QTC Calculation (Bezet) 472 ms    Calculated R Axis -50 degrees    Calculated T Axis -23 degrees    Diagnosis       nsr with runs of atrial tachy  Left axis deviation  Right bundle branch block  Anteroseptal infarct , age undetermined  Abnormal ECG  When compared with ECG of 26-JAN-2022 11:45,  Atrial fibrillation has replaced Sinus rhythm  Anteroseptal infarct is now Present  Confirmed by [facility]  MD ()POORNIMA ([id number]) on 3/4/2022 4:54:07 PM     CBC WITH AUTOMATED DIFF    Collection Time: 03/04/22 10:46 AM   Result Value Ref Range    WBC 8.9 4.3 - 11.1 K/uL    RBC 3.83 (L) 4.05 - 5.2 M/uL    HGB 11.0 (L) 11.7 - 15.4 g/dL    HCT 36.7 35.8 - 46.3 %    MCV 95.8 79.6 - 97.8 FL    MCH 28.7 26.1 - 32.9 PG    MCHC 30.0 (L) 31.4 - 35.0 g/dL    RDW 18.5 (H) 11.9 - 14.6 %    PLATELET 919 567 - 995 K/uL    MPV 11.4 9.4 - 12.3 FL    ABSOLUTE NRBC 0.00 0.0 - 0.2 K/uL    DF AUTOMATED      NEUTROPHILS 70 43 - 78 % LYMPHOCYTES 22 13 - 44 %    MONOCYTES 6 4.0 - 12.0 %    EOSINOPHILS 1 0.5 - 7.8 %    BASOPHILS 1 0.0 - 2.0 %    IMMATURE GRANULOCYTES 1 0.0 - 5.0 %    ABS. NEUTROPHILS 6.3 1.7 - 8.2 K/UL    ABS. LYMPHOCYTES 1.9 0.5 - 4.6 K/UL    ABS. MONOCYTES 0.5 0.1 - 1.3 K/UL    ABS. EOSINOPHILS 0.1 0.0 - 0.8 K/UL    ABS. BASOPHILS 0.0 0.0 - 0.2 K/UL    ABS. IMM. GRANS. 0.1 0.0 - 0.5 K/UL   D DIMER    Collection Time: 03/04/22 10:46 AM   Result Value Ref Range    D DIMER 9.83 (H) <0.56 ug/ml(FEU)   LACTIC ACID    Collection Time: 03/04/22 12:14 PM   Result Value Ref Range    Lactic acid 1.0 0.4 - 2.0 MMOL/L   MAGNESIUM    Collection Time: 03/04/22 12:14 PM   Result Value Ref Range    Magnesium 1.8 1.8 - 2.4 mg/dL   METABOLIC PANEL, COMPREHENSIVE    Collection Time: 03/04/22 12:14 PM   Result Value Ref Range    Sodium 145 136 - 145 mmol/L    Potassium 4.1 3.5 - 5.1 mmol/L    Chloride 113 (H) 98 - 107 mmol/L    CO2 24 21 - 32 mmol/L    Anion gap 8 7 - 16 mmol/L    Glucose 132 (H) 65 - 100 mg/dL    BUN 21 8 - 23 MG/DL    Creatinine 0.90 0.6 - 1.0 MG/DL    GFR est AA >60 >60 ml/min/1.73m2    GFR est non-AA >60 >60 ml/min/1.73m2    Calcium 10.0 8.3 - 10.4 MG/DL    Bilirubin, total 0.4 0.2 - 1.1 MG/DL    ALT (SGPT) 14 12 - 65 U/L    AST (SGOT) 16 15 - 37 U/L    Alk.  phosphatase 91 50 - 136 U/L    Protein, total 6.8 6.3 - 8.2 g/dL    Albumin 2.2 (L) 3.2 - 4.6 g/dL    Globulin 4.6 (H) 2.3 - 3.5 g/dL    A-G Ratio 0.5 (L) 1.2 - 3.5     C REACTIVE PROTEIN, QT    Collection Time: 03/04/22 12:14 PM   Result Value Ref Range    C-Reactive protein 6.0 (H) 0.0 - 0.9 mg/dL   PROCALCITONIN    Collection Time: 03/04/22 12:14 PM   Result Value Ref Range    Procalcitonin 0.10 0.00 - 0.49 ng/mL   NT-PRO BNP    Collection Time: 03/04/22 12:14 PM   Result Value Ref Range    NT pro-BNP 3,899 (H) <450 PG/ML   TROPONIN-HIGH SENSITIVITY    Collection Time: 03/04/22 12:14 PM   Result Value Ref Range    Troponin-High Sensitivity 40.6 (H) 0 - 14 pg/mL COVID-19 RAPID TEST    Collection Time: 03/04/22 12:17 PM   Result Value Ref Range    Specimen source Nasopharyngeal      COVID-19 rapid test Not detected NOTD     URINALYSIS W/ RFLX MICROSCOPIC    Collection Time: 03/04/22 12:17 PM   Result Value Ref Range    Color YELLOW      Appearance CLOUDY      Specific gravity 1.011 1.001 - 1.023      pH (UA) 6.0 5.0 - 9.0      Protein 300 (A) NEG mg/dL    Glucose Negative mg/dL    Ketone TRACE (A) NEG mg/dL    Bilirubin Negative NEG      Blood TRACE (A) NEG      Urobilinogen 1.0 0.2 - 1.0 EU/dL    Nitrites Negative NEG      Leukocyte Esterase SMALL (A) NEG      WBC 20-50 0 /hpf    RBC 5-10 0 /hpf    Epithelial cells 0-3 0 /hpf    Bacteria 4+ (H) 0 /hpf    Casts 0-3 0 /lpf   TROPONIN-HIGH SENSITIVITY    Collection Time: 03/04/22  2:12 PM   Result Value Ref Range    Troponin-High Sensitivity 36.5 (H) 0 - 14 pg/mL

## 2022-03-04 NOTE — ED TRIAGE NOTES
Pt arrive via EMS from [facility] post acute brought for hypoxia was on 3L 74%, per EMS now on 4L at 94%, no complaints. AOx3. Hx of COPD uses 3L at home.

## 2022-03-05 NOTE — PROGRESS NOTES
Problem: Pressure Injury - Risk of  Goal: *Prevention of pressure injury  Description: Document Carlos Scale and appropriate interventions in the flowsheet. Outcome: Progressing Towards Goal  Note: Pressure Injury Interventions:  Sensory Interventions: Assess need for specialty bed,Check visual cues for pain,Float heels,Keep linens dry and wrinkle-free,Minimize linen layers,Monitor skin under medical devices    Moisture Interventions: Absorbent underpads,Apply protective barrier, creams and emollients,Assess need for specialty bed,Check for incontinence Q2 hours and as needed,Internal/External urinary devices,Limit adult briefs    Activity Interventions: Assess need for specialty bed,Chair cushion,Increase time out of bed,Pressure redistribution bed/mattress(bed type)    Mobility Interventions: Assess need for specialty bed,Float heels,HOB 30 degrees or less,Pressure redistribution bed/mattress (bed type),Turn and reposition approx. every two hours(pillow and wedges)    Nutrition Interventions: Document food/fluid/supplement intake,Offer support with meals,snacks and hydration    Friction and Shear Interventions: Apply protective barrier, creams and emollients,Foam dressings/transparent film/skin sealants,HOB 30 degrees or less,Minimize layers,Transferring/repositioning devices                Problem: Patient Education: Go to Patient Education Activity  Goal: Patient/Family Education  Outcome: Progressing Towards Goal     Problem: Falls - Risk of  Goal: *Absence of Falls  Description: Document Torito Fall Risk and appropriate interventions in the flowsheet.   Outcome: Progressing Towards Goal  Note: Fall Risk Interventions:            Medication Interventions: Bed/chair exit alarm,Patient to call before getting OOB,Teach patient to arise slowly    Elimination Interventions: Bed/chair exit alarm,Call light in reach,Patient to call for help with toileting needs,Toilet paper/wipes in reach    History of Falls Interventions: Bed/chair exit alarm,Door open when patient unattended,Room close to nurse's station         Problem: Patient Education: Go to Patient Education Activity  Goal: Patient/Family Education  Outcome: Progressing Towards Goal

## 2022-03-05 NOTE — PROGRESS NOTES
Care Management Interventions  PCP Verified by CM: Yes  Mode of Transport at Discharge: BLS  Transition of Care Consult (CM Consult): Discharge Planning,SNF,Home Health  Discharge Durable Medical Equipment: No  Physical Therapy Consult: Yes  Occupational Therapy Consult: Yes  Speech Therapy Consult: No  Support Systems: Child(edward)  Confirm Follow Up Transport: Family  The Plan for Transition of Care is Related to the Following Treatment Goals : SNF vs HH  The Patient and/or Patient Representative was Provided with a Choice of Provider and Agrees with the Discharge Plan?: Yes  Name of the Patient Representative Who was Provided with a Choice of Provider and Agrees with the Discharge Plan: Patient's daughter  Freedom of Choice List was Provided with Basic Dialogue that Supports the Patient's Individualized Plan of Care/Goals, Treatment Preferences and Shares the Quality Data Associated with the Providers?: Yes  Discharge Location  Patient Expects to be Discharged to[de-identified] Unable to determine at this time      Pt aadmitted to 7th floor for HCAP. CM met with pt to discuss CM needs & DCP. Pt is alert but very [place], agreeable to CM speaking with daughter. Pt is partially dependent at home with all ADLS. Patient has been at [facility] rehab for the past 3 weeks, daughter does not want her to return. Prefers paient be dc home with her. She will be moving in to care for patient. Pt has rollator, [place]; no further DME needs. PT OT evals pending. Patient will need new auth if transfereed to another facility. Per daughter, [facility] Sena has been approved once patient returns home. DCP TBD CM to continue to monitor.

## 2022-03-05 NOTE — PROGRESS NOTES
Called down to telemetry monitoring room to order patient a tele box, Breanna Matias in the monitor room informed this RN that they are currently out of boxes but they have added her to the waiting list. Primary RN aware.

## 2022-03-05 NOTE — PROGRESS NOTES
TRANSFER - IN REPORT:    Verbal report received from [id number]  RN(name) on [address]  being received from ED(unit) for routine progression of care      Report consisted of patients Situation, Background, Assessment and   Recommendations(SBAR). Information from the following report(s) SBAR was reviewed with the receiving nurse. Opportunity for questions and clarification was provided. Assessment completed upon patients arrival to unit and care assumed.

## 2022-03-05 NOTE — PROGRESS NOTES
[facility] Pharmacokinetic Monitoring Service - Vancomycin     Aide Cano is a 80 y.o. female starting on vancomycin therapy for HAP. Pharmacy consulted by Dr Franklin Stevenson for monitoring and adjustment. Target Concentration: Goal AUC/AMELIA 400-600 mg*hr/L    Additional Antimicrobials: Zosyn    Pertinent Laboratory Values: Wt Readings from Last 1 Encounters:   03/04/22 68.2 kg (150 lb 6 oz)     Temp Readings from Last 1 Encounters:   03/04/22 98.3 °F (36.8 °C)     No components found for: PROCAL  Recent Labs     03/04/22 2020 03/04/22  1214 03/04/22  1046   BUN  --  21  --    CREA  --  0.90  --    WBC 7.0  --  8.9   PCT  --  0.10  --    LAC  --  1.0  --      Estimated Creatinine Clearance: 35.3 mL/min (based on SCr of 0.9 mg/dL). No results found for: [place]    MRSA Nasal Swab: not ordered. Order placed by pharmacy. .    Plan:  1. Dosing recommendations based on Bayesian software  2. Start vancomycin 1000mg q24h.  a. Anticipated AUC of 494 and trough concentration of 15.5 at steady state  3. Renal labs as indicated   4. Vancomycin concentrations will be ordered as clinically appropriate   5.  Pharmacy will continue to monitor patient and adjust therapy as indicated    Thank you for the consult,  CULLEN Villafana

## 2022-03-05 NOTE — PROGRESS NOTES
VANCO DAILY FOLLOW UP NOTE  [address] [facility] Pharmacokinetic Monitoring Service - Vancomycin    Consulting Provider: Dr. Anthony Robbins   Indication: HAP  Target Concentration: Goal AUC/AMELIA 400-600 mg*hr/L  Day of Therapy: 2  Additional Antimicrobials: pip/tazo    Pertinent Laboratory Values: Wt Readings from Last 1 Encounters:   03/04/22 68.2 kg (150 lb 6 oz)     Temp Readings from Last 1 Encounters:   03/05/22 98.5 °F (36.9 °C)     No components found for: PROCAL  Recent Labs     03/05/22  0407 03/04/22 2020 03/04/22  1214 03/04/22  1046   BUN  --   --  21  --    CREA  --   --  0.90  --    WBC 9.3 7.0  --  8.9   PCT  --   --  0.10  --    LAC  --   --  1.0  --      Estimated Creatinine Clearance: 35.3 mL/min (based on SCr of 0.9 mg/dL). No results found for: Sandrine Andrews    MRSA Nasal Swab: not ordered. Order placed by pharmacy. .      Assessment:  Date/Time Dose Concentration AUC         Note: Serum concentrations collected for AUC dosing may appear elevated if collected in close proximity to the dose administered, this is not necessarily an indication of toxicity    Plan:  Dosing recommendations based on Bayesian software  Continue vancomycin 1000 mg IV q24h  Anticipated AUC of 507 and trough concentration of 15.9 at steady state  Renal labs as indicated   Vancomycin concentration ordered for 3/6 @ 0400    Pharmacy will continue to monitor patient and adjust therapy as indicated    Thank you for the consult,  Jefferson Samuel, RONALDD

## 2022-03-05 NOTE — ACP (ADVANCE CARE PLANNING)
[facility] Hospitalist Service  At the heart of better care     Advance Care Planning   Admit Date:  3/4/2022 11:17 AM   Name:  Ana العلي   Age:  80 y.o. Sex:  female  :  1929   MRN:  [id number]   Room:  80/65    Ana العلي is able to make her own decisions: No    If pt unable to make decisions, POA/surrogate decision maker:  Hollis Yuen at [phone]    Other members present in the meeting:   n/a    Patient / surrogate decision-maker directed:  DNR    Other ACP topics discussed, if applicable:       Patient or surrogate consented to discussion of the current conditions, workup, management plans, prognosis, and understand the risk for further deterioration. Time spent: 10 minutes in direct discussion (face to face and/or over phone).     Signed:  Luke Guzman MD

## 2022-03-05 NOTE — PROGRESS NOTES
Hospitalist History and Physical   Admit Date:  3/4/2022 11:17 AM   Name:  Ashley Villasenor   Age:  80 y.o. Sex:  female  :  1929   MRN:  [id number]   Room:  /    Presenting Complaint: Shortness of Breath    Reason(s) for Admission: HCAP (healthcare-associated pneumonia) [J18.9]  Volume overload [E87.70]  Pulmonary embolism ([address] [I26.99]  Pulmonary edema [J81.1]     Return for of Present Illness:   Ashley Villasenor is a 80 y.o. female with a past medical history significant for diabetes, hypertension, chronic kidney disease stage II, peripheral artery disease. The patient was recently admitted to [facility] from 2022 to February 3, 2022 after being seen for acute blood loss anemia secondary to her erosions in the rectum. She was discharged to a rehab facility however the daughter got a call today that the patient was extremely short of breath and having difficulty breathing she was brought back to the emergency room where she was found to have a PE, volume overload with a concern for healthcare associated pneumonia as well. The patient has dementia and is unable to give a history. She actively defers the history taking to her daughter who is at the bedside. : Brief HPI: 80-year-old female with history of diabetes, hypertension, chronic kidney disease stage II per records, peripheral arterial disease and recent hospitalization for blood loss anemia secondary to rectal erosions presented with shortness of breath and found to have pulmonary embolism, volume overload and concern for healthcare associated pneumonia. Patient is AOx3 but when I inquired why she is in the hospital she told me that nobody told her why she is in the hospital.  Could not provide significant history or review of system. Patient is also extremely hard of hearing     Assessment & Plan: Active Problems:         # Pulmonary edema (3/4/2022)  : Continue Lasix. Echo pending. pulmonary embolism ([facility] [address] (3/4/2022)  March 5: Continue heparin drip. Hemoglobin stable. Will monitor      #  HCAP (healthcare-associated pneumonia) (3/4/2022)  Concern for healthcare associated pneumonia  Continue vancomycin and Zosyn. Follow-up panculture. Concern for UTI  Follow-up culture. Diabetes  Monitor blood sugars and cover with insulin    Hypertension  Continue appropriate home medications  March 5: Blood pressure above goal.  Home hydralazine added back to help with decreasing afterload. Diet: ADULT DIET Regular; 3 carb choices (45 gm/meal); Low Fat/Low Chol/High Fiber/2 gm Na; 2000 ml bedside swallow before any p.o. intake. VTE ppx: Heparin drip   code status: DNR   Patient is AOx3 but has cognitive impairment. I have called Ms. Shankar Bruce who is patient's daughter and power of . She is continuously updating patient's other family. She verbalized understanding that patient condition may deteriorate in spite of treatment.   CODE STATUS of DNR and DNI verified      Hospital Problems as of 3/5/2022 Date Reviewed: 11/30/2021          Codes Class Noted - Resolved POA    Pulmonary edema ICD-10-CM: J81.1  ICD-9-CM: 850  3/4/2022 - Present Unknown        Pulmonary embolism ([facility] ICD-10-CM: I26.99  ICD-9-CM: 415.19  3/4/2022 - Present Unknown        Volume overload ICD-10-CM: E87.70  ICD-9-CM: 276.69  3/4/2022 - Present Unknown        HCAP (healthcare-associated pneumonia) ICD-10-CM: J18.9  ICD-9-CM: 100  3/4/2022 - Present Unknown                Objective:     Patient Vitals for the past 24 hrs:   Temp Pulse Resp BP SpO2   03/05/22 1156 98.5 °F (36.9 °C) 87 16 (!) 189/88 97 %   03/05/22 0828 98.5 °F (36.9 °C) 89 18 (!) 162/73 91 %   03/05/22 0401 98.3 °F (36.8 °C) (!) 107 18 (!) 155/94 94 %   03/05/22 0348  84      03/04/22 2348  88      03/04/22 2309 98.3 °F (36.8 °C) 90 20 (!) 184/90 95 %   03/04/22 2042     98 %   03/04/22 2000  90      03/04/22 1907 97.5 °F (36.4 °C) 98 22 (!) 171/86 91 %   03/04/22 1753  91 22 (!) 176/84 98 %   03/04/22 1723  91 17 (!) 179/86 97 %   03/04/22 1628  93 (!) 34 (!) 185/82 95 %   03/04/22 1558  89 19 (!) 185/92 95 %   03/04/22 1525  87 21 (!) 191/98 96 %   03/04/22 1455  86 18 (!) 171/92 97 %   03/04/22 1425  91 (!) 33 (!) 188/99 95 %     Oxygen Therapy  O2 Sat (%): 97 % (03/05/22 1156)  Pulse via Oximetry: 95 beats per minute (03/04/22 2042)  O2 Device: Hi flow nasal cannula (03/04/22 2042)  O2 Flow Rate (L/min): 7 l/min (weaned to 5L) (03/04/22 2042)    Estimated body mass index is 27.5 kg/m² as calculated from the following:    Height as of this encounter: 5' 2\" (1.575 m). Weight as of this encounter: 68.2 kg (150 lb 6 oz). Intake/Output Summary (Last 24 hours) at 3/5/2022 1341  Last data filed at 3/5/2022 1306  Gross per 24 hour   Intake 600 ml   Output 1100 ml   Net -500 ml         Physical Exam:    Blood pressure (!) 189/88, pulse 87, temperature 98.5 °F (36.9 °C), resp. rate 16, height 5' 2\" (1.575 m), weight 68.2 kg (150 lb 6 oz), SpO2 97 %, not currently breastfeeding. General:    Elderly frail female very pleasant in no obvious cardiopulmonary distress at this time. Head:  Normocephalic, atraumatic  Eyes:  Sclerae appear normal.  Pupils equally round. ENT:   . Moist oral mucosa  Neck:  No restric JVD present e   CV:   RRR. No m/r/g. JVD present  Lungs:   Is entry bilateral lower lobe otherwise CTAB. No wheezing, rhonchi, or rales. Respirations even, unlabored  Abdomen: Bowel sounds present. Soft, nontender, distended secondary to obesity and hernia  Extremities: No cyanosis or clubbing. Edema present  Skin:     No rashes and normal coloration. Warm and dry.     Neuro:   AOx3, moving all 4 extremities, speech normal  Psych:  Cognitive impairment noted  I have reviewed ordered lab tests and independently visualized imaging below:    Last 24hr Labs:    Procedures done this admission:  * No surgery found *    All Micro Results     Procedure Component Value Units Date/Time    CULTURE, BLOOD [[id number]] Collected: 03/04/22 1221    Order Status: Completed Specimen: Blood Updated: 03/05/22 0733     Special Requests: --        LEFT  ARM       Culture result: NO GROWTH AFTER 19 HOURS       CULTURE, BLOOD [[id number]] Collected: 03/04/22 1217    Order Status: Completed Specimen: Blood Updated: 03/05/22 0733     Special Requests: --        RIGHT  Antecubital       Culture result: NO GROWTH AFTER 19 HOURS       CULTURE, URINE [[id number]] Collected: 03/04/22 1915    Order Status: Completed Specimen: Urine from Clean catch Updated: 03/05/22 0709     Special Requests: NO SPECIAL REQUESTS        Culture result:       NO GROWTH AFTER SHORT PERIOD OF INCUBATION. FURTHER RESULTS TO FOLLOW AFTER OVERNIGHT INCUBATION. MSSA/MRSA SC BY PCR, NASAL SWAB [[id number]]     Order Status: Sent Specimen: Nasal swab     COVID-19 RAPID TEST [[id number]] Collected: 03/04/22 1217    Order Status: Completed Specimen: Nasopharyngeal Updated: 03/04/22 1304     Specimen source Nasopharyngeal        COVID-19 rapid test Not detected        Comment:      The specimen is NEGATIVE for SARS-CoV-2, the novel coronavirus associated with COVID-19. A negative result does not rule out COVID-19. This test has been authorized by the FDA under an Emergency Use Authorization (EUA) for use by authorized laboratories.         Fact sheet for Healthcare Providers: ConventionUpdate.co.nz  Fact sheet for Patients: ConventionUpdate.co.nz       Methodology: Isothermal Nucleic Acid Amplification               SARS-CoV-2 Lab Results  \"Novel Coronavirus\" Test: No results found for: COV2NT   \"Emergent Disease\" Test: No results found for: EDPR  \"SARS-COV-2\" Test: No results found for: XGCOVT  \"Precision Labs\" Test: No results found for: RSLT  Rapid Test:   Lab Results   Component Value Date/Time    COVR Not detected 03/04/2022 12:17 PM            Labs: Results:       BMP, Mg, Phos Recent Labs     03/04/22  1214      K 4.1   *   CO2 24   AGAP 8   BUN 21   CREA 0.90   CA 10.0   *   MG 1.8      CBC Recent Labs     03/05/22  0407 03/04/22 2020 03/04/22  1046   WBC 9.3 7.0 8.9   RBC 3.74* 3.51* 3.83*   HGB 10.6* 10.2* 11.0*   HCT 35.2* 33.3* 36.7    230 239   GRANS 69 79* 70   LYMPH 24 18 22   EOS 0* 0* 1   MONOS 6 2* 6   BASOS 0 0 1   IG 1 1 1   ANEU 6.4 5.5 6.3   ABL 2.2 1.3 1.9   DELL 0.0 0.0 0.1   ABM 0.6 0.1 0.5   ABB 0.0 0.0 0.0   AIG 0.1 0.1 0.1      LFT Recent Labs     03/04/22  1214   ALT 14   AP 91   TP 6.8   ALB 2.2*   GLOB 4.6*   AGRAT 0.5*      Cardiac Testing Lab Results   Component Value Date/Time    BNP 38 09/10/2016 10:15 AM    BNP 54 04/19/2016 03:30 PM    BNP 22 03/01/2012 02:53 PM    CK 95 12/14/2021 07:31 PM    CK 64 08/21/2018 09:25 PM    CK 38 07/16/2010 04:05 PM    CK - MB 0.7 07/16/2010 04:05 PM    CK-MB Index 1.8 07/16/2010 04:05 PM    Troponin-I <0.05 03/01/2012 02:53 PM    Troponin-I, Qt. <0.02 (L) 08/06/2019 06:25 PM    Troponin-I, Qt. <0.02 (L) 08/21/2018 03:09 PM    Troponin-I, Qt. <0.04 (L) 07/17/2010 06:52 AM      Coagulation Tests Lab Results   Component Value Date/Time    Prothrombin time 12.6 01/23/2022 03:00 PM    Prothrombin time 13.1 07/27/2018 08:53 PM    Prothrombin time 10.4 03/01/2012 02:08 PM    INR 0.9 01/23/2022 03:00 PM    INR 1.0 07/27/2018 08:53 PM    INR 1.0 03/01/2012 02:08 PM    aPTT 34.6 03/04/2022 08:20 PM    aPTT 27.1 07/27/2018 08:53 PM    aPTT 27.3 07/12/2014 02:30 PM      A1c Lab Results   Component Value Date/Time    Hemoglobin A1c 8.9 (H) 01/23/2022 03:00 PM    Hemoglobin A1c 8.3 (H) 11/30/2021 03:41 PM    Hemoglobin A1c 6.1 (H) 08/31/2021 03:26 PM      Lipid Panel Lab Results   Component Value Date/Time    Cholesterol, total 153 11/30/2021 03:41 PM    HDL Cholesterol 53 11/30/2021 03:41 PM    LDL, calculated 70 11/30/2021 03:41 PM    LDL, calculated 72 10/07/2019 10:16 AM    VLDL, calculated 30 11/30/2021 03:41 PM    VLDL, calculated 15 10/07/2019 10:16 AM    Triglyceride 179 (H) 11/30/2021 03:41 PM    CHOL/HDL Ratio 2.8 02/22/2017 09:54 AM      Thyroid Panel Lab Results   Component Value Date/Time    TSH 1.080 11/30/2021 03:41 PM    TSH 1.960 07/08/2019 09:55 AM    TSH 0.888 08/22/2018 06:01 AM        Most Recent UA Lab Results   Component Value Date/Time    Color YELLOW 03/04/2022 12:17 PM    Appearance CLOUDY 03/04/2022 12:17 PM    Specific gravity 1.035 (H) 02/22/2020 05:19 PM    pH (UA) 6.0 03/04/2022 12:17 PM    Protein 300 (A) 03/04/2022 12:17 PM    Glucose Negative 03/04/2022 12:17 PM    Ketone TRACE (A) 03/04/2022 12:17 PM    Bilirubin Negative 03/04/2022 12:17 PM    Blood TRACE (A) 03/04/2022 12:17 PM    Urobilinogen 1.0 03/04/2022 12:17 PM    Nitrites Negative 03/04/2022 12:17 PM    Leukocyte Esterase SMALL (A) 03/04/2022 12:17 PM    WBC 20-50 03/04/2022 12:17 PM    RBC 5-10 03/04/2022 12:17 PM    Epithelial cells 0-3 03/04/2022 12:17 PM    Bacteria 4+ (H) 03/04/2022 12:17 PM    Casts 0-3 03/04/2022 12:17 PM    Crystals, urine 0 02/21/2010 01:15 PM    Mucus 0 12/14/2021 10:08 PM            Other Studies:  CT CHEST PULMONARY EMBOLISM    Result Date: 3/4/2022  HISTORY: Hypoxia Exam: CT chest, PE protocol Technique: Thin section axial CT images are obtained from the thoracic inlet through the upper abdomen. Coronal reformatted images are obtained based on the axial data. 100 cc Isovue 370 is administered intraveneously without incident. Radiation dose reduction techniques were used for this study. Our CT scanners use one or all of the following: Automated exposure control, adjustment of the mA and/or kV according to patient size, use of iterative reconstruction. Comparison: None Findings: There are moderate bilateral pleural effusions present.  There is coarsening of the interlobular septa as well as diffuse patchy groundglass opacity seen throughout the lungs. There is a nonocclusive thrombus present within a subsegmental right upper lobe pulmonary artery. There is no mediastinal, hilar, or axillary lymphadenopathy seen. No suspicious pulmonary nodules. Evaluation of the upper abdomen demonstrates no definite abnormality. Bone window evaluation demonstrates no aggressive osseous lesions. IMPRESSIONS: 1. Nonocclusive thrombus subsegmental right upper lobe pulmonary artery. 2. Bilateral pleural effusions. 3. Patchy diffuse bilateral groundglass opacity with thickening of the interlobular septa. Findings are nonspecific but can be seen in patients with COVID-19 or in patients with underlying pulmonary edema. [facility] The critical results contained in this report were communicated to Dr. Kvng Mcbride by myself on 3/4/2022 at 4:55 PM. Critical results were communicated as outlined in Section II.C.2.a.i of the ACR Practice Guideline for Communication of Diagnostic Imaging Findings. Signed:  Cristian Gage MD    Part of this note may have been written by using a voice dictation software. The note has been proof read but may still contain some grammatical/other typographical errors.

## 2022-03-06 NOTE — PROGRESS NOTES
Hospitalist History and Physical   Admit Date:  3/4/2022 11:17 AM   Name:  Justin Charles   Age:  80 y.o. Sex:  female  :  1929   MRN:  [id number]   Room:  [facility]    Presenting Complaint: Shortness of Breath    Reason(s) for Admission: HCAP (healthcare-associated pneumonia) [J18.9]  Volume overload [E87.70]  Pulmonary embolism ([address] [I26.99]  Pulmonary edema [J81.1]     Return for of Present Illness:   Justin Charles is a 80 y.o. female with a past medical history significant for diabetes, hypertension, chronic kidney disease stage II, peripheral artery disease. The patient was recently admitted to [facility] from 2022 to February 3, 2022 after being seen for acute blood loss anemia secondary to her erosions in the rectum. She was discharged to a rehab facility however the daughter got a call today that the patient was extremely short of breath and having difficulty breathing she was brought back to the emergency room where she was found to have a PE, volume overload with a concern for healthcare associated pneumonia as well. The patient has dementia and is unable to give a history. She actively defers the history taking to her daughter who is at the bedside. : Brief HPI: 49-year-old female with history of diabetes, hypertension, chronic kidney disease stage II per records, peripheral arterial disease and recent hospitalization for blood loss anemia secondary to rectal erosions presented with shortness of breath and found to have pulmonary embolism, volume overload and concern for healthcare associated pneumonia. : Patient is AOx3. As per patient she is feeling better. Denies any bleeding from anywhere. Denies any chest pain, palpitation, nausea or vomiting. Again, she told me that nobody told her why she is in the hospital.  Again I have informed her that we are treating her for pneumonia, volume overload and pulmonary embolism.     Rest review of system negative except mentioned above  Assessment & Plan: Active Problems:         # Pulmonary edema (3/4/2022)  March 5: Continue Lasix. Echo pending. March 6: Continue Lasix. Echo showed EF of 40 to 50%. Unable to review previous echo but per last cardiology report, patient had normal echo. Cardiology consulted. Will monitor. Potassium and magnesium repleted     pulmonary embolism ([address] (3/4/2022)  March 5: Continue heparin drip. Hemoglobin stable. Will monitor  March 6: Last month patient hemoglobin was in the sevens. Today has some drop in hemoglobin but no active bleeding. Continue anticoagulation and monitor CBC. Nurse to notify on-call provider whenever CBC resulted with any active bleeding. #  HCAP (healthcare-associated pneumonia) (3/4/2022)  Concern for healthcare associated pneumonia  Continue vancomycin and Zosyn. Follow-up panculture. Lasix: Culture negative for 48 hours. Discontinue vancomycin. Continue Zosyn. Concern for UTI  Follow-up culture. Diabetes  Monitor blood sugars and cover with insulin    March 6: Blood sugar elevated. Started on Lantus 8 units. Hypertension  Continue appropriate home medications  March 5: Blood pressure above goal.  Home hydralazine added back to help with decreasing afterload. Diet: ADULT DIET Regular; 3 carb choices (45 gm/meal); Low Fat/Low Chol/High Fiber/2 gm Na; 2000 ml bedside swallow before any p.o. intake. VTE ppx: Heparin drip   code status: DNR   Patient is AOx3 but has cognitive impairment. I have called Ms. Nadiya Langley who is patient's daughter and power of . She is continuously updating patient's other family. She verbalized understanding that patient condition may deteriorate in spite of treatment. CODE STATUS of DNR and DNI verified    March 6: Updated patient's daughter. All questions were answered. She said she may like to talk with me again later today.   I have asked her to ask the nurse to page me if she has any more questions. She verbalized understanding. Hospital Problems as of 3/6/2022 Date Reviewed: 11/30/2021          Codes Class Noted - Resolved POA    Pulmonary edema ICD-10-CM: J81.1  ICD-9-CM: 009  3/4/2022 - Present Unknown        Pulmonary embolism ([address] ICD-10-CM: I26.99  ICD-9-CM: 415.19  3/4/2022 - Present Unknown        Volume overload ICD-10-CM: E87.70  ICD-9-CM: 276.69  3/4/2022 - Present Unknown        HCAP (healthcare-associated pneumonia) ICD-10-CM: J18.9  ICD-9-CM: 803  3/4/2022 - Present Unknown                Objective:     Patient Vitals for the past 24 hrs:   Temp Pulse Resp BP SpO2   03/06/22 1132 98.2 °F (36.8 °C) 82 16 (!) 151/74 96 %   03/06/22 0722 98.8 °F (37.1 °C) 73 17 (!) 175/72 94 %   03/06/22 0507    138/72    03/06/22 0401 98.5 °F (36.9 °C) 78 20 (!) 160/98 97 %   03/06/22 0000  90      03/05/22 2322 98.7 °F (37.1 °C) 99 17 (!) 156/67 92 %   03/05/22 2000  99      03/05/22 1944 98.5 °F (36.9 °C) 82 18 (!) 155/79 93 %   03/05/22 1602 98.7 °F (37.1 °C) 82 16 (!) 156/84 93 %     Oxygen Therapy  O2 Sat (%): 96 % (03/06/22 1132)  Pulse via Oximetry: 95 beats per minute (03/04/22 2042)  O2 Device: None (Room air) (03/06/22 1132)  O2 Flow Rate (L/min): 7 l/min (03/06/22 0401)    Estimated body mass index is 27.5 kg/m² as calculated from the following:    Height as of this encounter: 5' 2\" (1.575 m). Weight as of this encounter: 68.2 kg (150 lb 6 oz). Intake/Output Summary (Last 24 hours) at 3/6/2022 1334  Last data filed at 3/6/2022 0701  Gross per 24 hour   Intake 1588 ml   Output 2450 ml   Net -862 ml         Physical Exam:    Blood pressure (!) 151/74, pulse 82, temperature 98.2 °F (36.8 °C), resp. rate 16, height 5' 2\" (1.575 m), weight 68.2 kg (150 lb 6 oz), SpO2 96 %, not currently breastfeeding. General:    Elderly frail female very pleasant in no obvious cardiopulmonary distress at this time.      Head:  Normocephalic, atraumatic  Eyes:  Sclerae appear normal.  Pupils equally round. ENT:   . Moist oral mucosa  Neck:  No restric JVD present e   CV:   RRR. No m/r/g. JVD present  Lungs:   Is entry bilateral lower lobe otherwise CTAB. No wheezing, rhonchi, or rales. Respirations even, unlabored  Abdomen: Bowel sounds present. Soft, nontender, distended secondary to obesity and hernia  Extremities: No cyanosis or clubbing. Edema present  Skin:     No rashes and normal coloration. Warm and dry. Neuro:   AOx3, moving all 4 extremities, speech normal  Psych:  Cognitive impairment noted  I have reviewed ordered lab tests and independently visualized imaging below:    Last 24hr Labs:    Procedures done this admission:  * No surgery found *    All Micro Results     Procedure Component Value Units Date/Time    CULTURE, BLOOD [[id number]] Collected: 03/04/22 1217    Order Status: Completed Specimen: Blood Updated: 03/06/22 1201     Special Requests: --        RIGHT  Antecubital       Culture result: NO GROWTH 2 DAYS       CULTURE, BLOOD [[id number]] Collected: 03/04/22 1221    Order Status: Completed Specimen: Blood Updated: 03/06/22 1201     Special Requests: --        LEFT  ARM       Culture result: NO GROWTH 2 DAYS       CULTURE, URINE [[id number]] Collected: 03/04/22 1915    Order Status: Completed Specimen: Urine from Clean catch Updated: 03/06/22 0959     Special Requests: NO SPECIAL REQUESTS        Culture result:       50,000-100,000 COLONIES/mL MIXED SKIN NGOZI ISOLATED          MSSA/MRSA SC BY PCR, NASAL SWAB [[id number]]     Order Status: Sent Specimen: Nasal swab     COVID-19 RAPID TEST [[id number]] Collected: 03/04/22 1217    Order Status: Completed Specimen: Nasopharyngeal Updated: 03/04/22 1304     Specimen source Nasopharyngeal        COVID-19 rapid test Not detected        Comment:      The specimen is NEGATIVE for SARS-CoV-2, the novel coronavirus associated with COVID-19.   A negative result does not rule out COVID-19. This test has been authorized by the FDA under an Emergency Use Authorization (EUA) for use by authorized laboratories.         Fact sheet for Healthcare Providers: ConventionUpdate.co.nz  Fact sheet for Patients: ConventionUpdate.co.nz       Methodology: Isothermal Nucleic Acid Amplification               SARS-CoV-2 Lab Results  \"Novel Coronavirus\" Test: No results found for: COV2NT   \"Emergent Disease\" Test: No results found for: EDPR  \"SARS-COV-2\" Test: No results found for: XGCOVT  \"Precision Labs\" Test: No results found for: RSLT  Rapid Test:   Lab Results   Component Value Date/Time    COVR Not detected 03/04/2022 12:17 PM            Labs: Results:       BMP, Mg, Phos Recent Labs     03/06/22  0412 03/04/22  1214   * 145   K 3.1* 4.1   * 113*   CO2 30 24   AGAP 5* 8   BUN 27* 21   CREA 1.10* 0.90   CA 8.7 10.0   * 132*   MG 1.6* 1.8   PHOS 3.0  --       CBC Recent Labs     03/06/22  0412 03/05/22  0407 03/04/22 2020   WBC 9.4 9.3 7.0   RBC 3.07* 3.74* 3.51*   HGB 8.7* 10.6* 10.2*   HCT 28.5* 35.2* 33.3*    247 230   GRANS 69 69 79*   LYMPH 22 24 18   EOS 1 0* 0*   MONOS 8 6 2*   BASOS 0 0 0   IG 1 1 1   ANEU 6.5 6.4 5.5   ABL 2.1 2.2 1.3   DELL 0.1 0.0 0.0   ABM 0.7 0.6 0.1   ABB 0.0 0.0 0.0   AIG 0.1 0.1 0.1      LFT Recent Labs     03/04/22  1214   ALT 14   AP 91   TP 6.8   ALB 2.2*   GLOB 4.6*   AGRAT 0.5*      Cardiac Testing Lab Results   Component Value Date/Time    BNP 38 09/10/2016 10:15 AM    BNP 54 04/19/2016 03:30 PM    BNP 22 03/01/2012 02:53 PM    CK 95 12/14/2021 07:31 PM    CK 64 08/21/2018 09:25 PM    CK 38 07/16/2010 04:05 PM    CK - MB 0.7 07/16/2010 04:05 PM    CK-MB Index 1.8 07/16/2010 04:05 PM    Troponin-I <0.05 03/01/2012 02:53 PM    Troponin-I, Qt. <0.02 (L) 08/06/2019 06:25 PM    Troponin-I, Qt. <0.02 (L) 08/21/2018 03:09 PM    Troponin-I, Qt. <0.04 (L) 07/17/2010 06:52 AM      Coagulation Tests Lab Results Component Value Date/Time    Prothrombin time 12.6 01/23/2022 03:00 PM    Prothrombin time 13.1 07/27/2018 08:53 PM    Prothrombin time 10.4 03/01/2012 02:08 PM    INR 0.9 01/23/2022 03:00 PM    INR 1.0 07/27/2018 08:53 PM    INR 1.0 03/01/2012 02:08 PM    aPTT 34.6 03/04/2022 08:20 PM    aPTT 27.1 07/27/2018 08:53 PM    aPTT 27.3 07/12/2014 02:30 PM      A1c Lab Results   Component Value Date/Time    Hemoglobin A1c 8.9 (H) 01/23/2022 03:00 PM    Hemoglobin A1c 8.3 (H) 11/30/2021 03:41 PM    Hemoglobin A1c 6.1 (H) 08/31/2021 03:26 PM      Lipid Panel Lab Results   Component Value Date/Time    Cholesterol, total 153 11/30/2021 03:41 PM    HDL Cholesterol 53 11/30/2021 03:41 PM    LDL, calculated 70 11/30/2021 03:41 PM    LDL, calculated 72 10/07/2019 10:16 AM    VLDL, calculated 30 11/30/2021 03:41 PM    VLDL, calculated 15 10/07/2019 10:16 AM    Triglyceride 179 (H) 11/30/2021 03:41 PM    CHOL/HDL Ratio 2.8 02/22/2017 09:54 AM      Thyroid Panel Lab Results   Component Value Date/Time    TSH 1.080 11/30/2021 03:41 PM    TSH 1.960 07/08/2019 09:55 AM    TSH 0.888 08/22/2018 06:01 AM        Most Recent UA Lab Results   Component Value Date/Time    Color YELLOW 03/04/2022 12:17 PM    Appearance CLOUDY 03/04/2022 12:17 PM    Specific gravity 1.035 (H) 02/22/2020 05:19 PM    pH (UA) 6.0 03/04/2022 12:17 PM    Protein 300 (A) 03/04/2022 12:17 PM    Glucose Negative 03/04/2022 12:17 PM    Ketone TRACE (A) 03/04/2022 12:17 PM    Bilirubin Negative 03/04/2022 12:17 PM    Blood TRACE (A) 03/04/2022 12:17 PM    Urobilinogen 1.0 03/04/2022 12:17 PM    Nitrites Negative 03/04/2022 12:17 PM    Leukocyte Esterase SMALL (A) 03/04/2022 12:17 PM    WBC 20-50 03/04/2022 12:17 PM    RBC 5-10 03/04/2022 12:17 PM    Epithelial cells 0-3 03/04/2022 12:17 PM    Bacteria 4+ (H) 03/04/2022 12:17 PM    Casts 0-3 03/04/2022 12:17 PM    Crystals, urine 0 02/21/2010 01:15 PM    Mucus 0 12/14/2021 10:08 PM            Other Studies:  DUPLEX LOWER EXT VENOUS BILAT    Result Date: 3/5/2022  EXAMINATION: DUPLEX LOWER EXT VENOUS BILAT 3/5/2022 3:02 PM COMPARISON: None available. INDICATION: PE. TECHNIQUE: Doppler ultrasound of the bilateral lower extremities was performed. FINDINGS: Right leg: There is normal flow in the common femoral, superficial femoral, and popliteal veins. Normal compression and augmentation is demonstrated. The proximal calf veins are also patent. Left leg: There is normal flow in the common femoral, superficial femoral, and popliteal veins. Normal compression and augmentation is demonstrated. The one of the peroneal veins is occluded. The other has nonocclusive thrombus within it. 1. No evidence of deep venous thrombosis in either lower extremity 2. Occlusion of left peroneal veins. This is age indeterminate. Signed:  Waleska Goel MD    Part of this note may have been written by using a voice dictation software. The note has been proof read but may still contain some grammatical/other typographical errors.

## 2022-03-06 NOTE — PROGRESS NOTES
LTG: Pt will tolerate the least restrictive diet at discharge without respiratory compromise    SPEECH LANGUAGE PATHOLOGY: DYSPHAGIA- Initial Assessment and Discharge    NAME/AGE/GENDER: Alonso Hall is a 80 y.o. female  DATE: 3/6/2022  PRIMARY DIAGNOSIS: HCAP (healthcare-associated pneumonia) [J18.9]  Volume overload [E87.70]  Pulmonary embolism (HCC) [I26.99]  Pulmonary edema [J81.1]       ICD-10: Treatment Diagnosis: R13.12 Dysphagia, Oropharyngeal Phase    RECOMMENDATIONS   DIET:   Regular Consistency  Thin Liquids    MEDICATIONS: With liquid     PRECAUTIONS, MODIFICATIONS, AND STRATEGIES  Upright at 90 degrees during meal  None     RECOMMENDATIONS FOR CONTINUED SPEECH THERAPY:   No further speech therapy indicated at this time. ASSESSMENT   Patient presents with oropharyngeal swallow function that is within normal limits. No s/sx of dysphagia identified. Patient assessed bedside with no signs of aspiration or dysphagia. Patient with an upper partial - dentition intact. Recommend REgular consistency with thin liquids with patient sitting upright at 90 degrees for all PO intake. EDUCATION:  Recommendations discussed with Patient and RN    REHABILITATION POTENTIAL FOR STATED GOALS: Good    PLAN    FREQUENCY/DURATION:   No further speech therapy indicated at this time as oropharyngeal swallow function is within normal limits. CONTINUATION OF SKILLED SERVICES/MEDICAL NECESSITY:  No additional speech services warranted. SUBJECTIVE   \"I am hurting today\"  Patient [place] hearing and history of dementia - followed simple commands.     Oxygen Device: nasal cannula  Pain: Pain Scale 1: Numeric (0 - 10)  Pain Intensity 1: 0    History of Present Injury/Illness: Ms. Renu Kendall  has a past medical history of Acute posthemorrhagic anemia (02/03/2022), Anemia in chronic kidney disease, Anxiety (2/1/2018), Arrhythmia, Arthritis, Asthma, B12 deficiency (8/24/2012), Body mass index 37.0-37.9, adult (2/5/2014), CAD (coronary artery disease), Chronic kidney disease, stage 3 unspecified ([address] (12/24/2021), Cognitive communication deficit, Controlled type 2 diabetes mellitus with microalbuminuria, without long-term current use of insulin ([address] (11/22/2016), COPD, Corns and callosities (12/19/2016), CRI (chronic renal insufficiency) (8/24/2012), Depression (6/1/2012), Dermatophytosis of nail (12/19/2016), Diabetes ([address] DJD (degenerative joint disease) of hip, DM (diabetes mellitus) type II controlled with renal manifestation ([address] (7/16/2010), Encounter for long-term (current) use of other medications (1/8/2013), Essential hypertension (7/16/2010), Gastrointestinal disorder, Gastrointestinal hemorrhage, GERD (gastroesophageal reflux disease), GI bleed (7/28/2018), Heart failure ([address] Hiatal hernia (8/24/2012), High cholesterol, History of COVID-19 (02/03/2022), History of falling (12/24/2021), HLD (hyperlipidemia) (1/8/2013), Klebsiella pneumoniae (k. pneumoniae) as the cause of diseases classified elsewhere (12/24/2021), Moderate protein-calorie malnutrition ([address] (12/24/2021), Muscle weakness (generalized), Neuropathy (8/24/2012), Obesity (BMI 30.0-39.9) (BMI-43.8), Other and unspecified hyperlipidemia (7/16/2010), Other chest pain (7/16/2010), Other lack of coordination, Oxygen dependent, PAD (peripheral artery disease) ([address] (8/24/2012), PVD (peripheral vascular disease) ([address] Retinal hemorrhage, Type 2 diabetes mellitus with diabetic neuropathy ([address] Unspecified asthma, uncomplicated (37/95/1035), Urinary incontinence, and Urinary tract infection. . She also  has a past surgical history that includes hx appendectomy; hx other surgical; hx gyn; hx gi; hx orthopaedic; hx hysterectomy; hx other surgical; hx heart catheterization; hx endoscopy (02/27/2018); flexible sigmoidoscopy (N/A, 1/24/2022); pr abdomen surgery proc unlisted; hx cholecystectomy (2000); and hx intracranial aneurysm repair. PRECAUTIONS/ALLERGIES: Patient has no known allergies. Problem List:  (Impairments causing functional limitations):  No dysphagia at this time    Previous Dysphagia: NONE REPORTED  Diet Prior to Evaluation: Regular / thin liquids    Orientation:  Person  Place    Cognitive-Linguistic Screening:  Alertness  Alert  Speech Production:   Fully intelligible  Expressive Language:  Fluent speech  Receptive Language: Follows commands  Cognition:   Did not formally assess  Prior Level of Function: regular consistency / thin liquids  Recommendations: Given results of screening, patient appears to be functioning at baseline. No acute assessment of speech, language, or cognition warranted. OBJECTIVE   Oral Motor:   Labial: No impairment  Dentition: Intact  Oral Hygiene: Adequate  Lingual: No impairment    Dysphagia evaluation:   Patient consumed trials of thin liquids, ice, applesauce. crackers    Tool Used: Dysphagia Outcome and Severity Scale (ASHOK)    Score Comments   Normal Diet  [x] 7 With no strategies or extra time needed   Functional Swallow  [] 6 May have mild oral or pharyngeal delay   Mild Dysphagia  [] 5 Which may require one diet consistency restricted    Mild-Moderate Dysphagia  [] 4 With 1-2 diet consistencies restricted   Moderate Dysphagia  [] 3 With 2 or more diet consistencies restricted   Moderate-Severe Dysphagia  [] 2 With partial PO strategies (trials with ST only)   Severe Dysphagia  [] 1 With inability to tolerate any PO safely      Score:  Initial: 7 Most Recent: 7 (Date 03/06/22 )   Interpretation of Tool: The Dysphagia Outcome and Severity Scale (ASHOK) is a simple, easy-to-use, 7-point scale developed to systematically rate the functional severity of dysphagia based on objective assessment and make recommendations for diet level, independence level, and type of nutrition. Current Medications:   No current facility-administered medications on file prior to encounter.      Current Outpatient Medications on File Prior to Encounter   Medication Sig Dispense Refill    multivit-min-FA-lycopen-lutein (Centrum Silver) 0.4-300-250 mg-mcg-mcg tab Take  by mouth daily. insulin lispro (HumaLOG KwikPen Insulin) 100 unit/mL kwikpen by SubCUTAneous route. Sliding scale:  0 units;   <60 notify MD/NP;   150-199 = 2 units;   200-249 = 4 units;   250-299=6 units;   300-349=8 units;   350-399= 10 units;   400-449 = 10 units;   > 400 give 10 units and notify MD/NP, subcutaneously before meals and at bedtime      Iron Fum & P-FA-Vit B & C No.9 (Integra Plus) 125 mg iron- 1 mg cap Take 1 Capsule by mouth daily. hydrALAZINE (APRESOLINE) 25 mg tablet Take 1 Tablet by mouth three (3) times daily. 30 Tablet 0    pantoprazole (PROTONIX) 20 mg tablet Take 2 Tablets by mouth daily. TAKE 1 TABLET BY MOUTH DAILY (Patient taking differently: Take 40 mg by mouth daily. TAKE 40 mg BY MOUTH DAILY) 90 Tablet 1    sertraline (ZOLOFT) 100 mg tablet Take 0.5 Tablets by mouth daily. Indications: major depressive disorder, am (Patient taking differently: Take 100 mg by mouth daily. Indications: major depressive disorder, am) 90 Tablet 1    ferrous sulfate 325 mg (65 mg iron) tablet Take 1 Tablet by mouth daily (with breakfast). 30 Tablet 0    pravastatin (PRAVACHOL) 40 mg tablet TAKE 1 TABLET BY MOUTH EVERY NIGHT AT BEDTIME 90 Tablet 1    metFORMIN (GLUCOPHAGE) 500 mg tablet Take 1 Tablet by mouth two (2) times daily (with meals). 180 Tablet 1    gabapentin (NEURONTIN) 100 mg capsule Take 1 Capsule by mouth three (3) times daily. 270 Capsule 1    amLODIPine (NORVASC) 10 mg tablet Take 1 Tablet by mouth daily. 90 Tablet 1    lidocaine 4 % patch Apply to affected area daily prn. 30 Each 2    fluticasone propion-salmeteroL (Advair Diskus) 250-50 mcg/dose diskus inhaler Take 1 Puff by inhalation every twelve (12) hours.  3 Inhaler 1    acetaminophen (TYLENOL ARTHRITIS PAIN) 650 mg TbER Take 650 mg by mouth every eight (8) hours. Tylenol arthritis as needed      VENTOLIN HFA 90 mcg/actuation inhaler INHALE 2 PUFFS BY MOUTH EVERY 6 HOURS AS NEEDED FOR WHEEZING 1 Inhaler 12    polyethylene glycol (MIRALAX) 17 gram/dose powder Take 17 g by mouth daily. 1 tablespoon with 8 oz of water daily 119 g 0    VIT A/VIT C/VIT E/ZINC/COPPER (PRESERVISION AREDS PO) Take 1 Tab by mouth two (2) times a day. Blood-Glucose Meter monitoring kit Use as directed 3 times daily to check blood sugars. Dx:E11.29 1 Kit 0    glucose blood VI test strips (blood glucose test) strip Use as directed 3 times daily to check blood sugars. Dx:E11.29 100 Strip 3    lancets misc Use as directed 3 times daily to check blood sugars.  Dx:E11.29 100 Each 3    Wheel Chair kalyn Wheelchair 1 Each 0    OTHER One shower chair 1 Each 0        INTERDISCIPLINARY COLLABORATION: RN    After treatment position/precautions:  Upright in bed  Call light within reach    Total Treatment Duration:           Rasheed Mancilla, SLP

## 2022-03-06 NOTE — PROGRESS NOTES
Patient calling out for help. Stated that a man she didn't know had been in her room. Explained that one of her neighbors had gotten confused and accidentally come into the wrong room while walking in the [place]que. Reassured her that he was now safe in his room and that she was safe in her room. Sat on edge of bed and held her hand until she said she was ok and was going to go to sleep. Reassured her that someone would be in hallway to make sure she was safe.

## 2022-03-06 NOTE — PROGRESS NOTES
VANCO DAILY FOLLOW UP NOTE  [address] Pharmacokinetic Monitoring Service - Vancomycin    Consulting Provider: Dr. Izzy Garcia   Indication: HAP  Target Concentration: Goal AUC/AMELIA 400-600 mg*hr/L  Day of Therapy: 3  Additional Antimicrobials: pip/tazo    Pertinent Laboratory Values: Wt Readings from Last 1 Encounters:   03/04/22 68.2 kg (150 lb 6 oz)     Temp Readings from Last 1 Encounters:   03/06/22 98.8 °F (37.1 °C)     No components found for: PROCAL  Recent Labs     03/06/22  0412 03/05/22  0407 03/04/22 2020 03/04/22  1214 03/04/22  1046   BUN 27*  --   --  21  --    CREA 1.10*  --   --  0.90  --    WBC 9.4 9.3 7.0  --    < >   PCT  --   --   --  0.10  --    LAC  --   --   --  1.0  --     < > = values in this interval not displayed. Estimated Creatinine Clearance: 28.9 mL/min (A) (based on SCr of 1.1 mg/dL (H)). Lab Results   Component Value Date/Time    Vancomycin, random 25.4 03/06/2022 04:12 AM       MRSA Nasal Swab: not ordered. Order placed by pharmacy. .      Assessment:  Date/Time Dose Concentration AUC   3/6 0412 1000 mg q24h 25.4 696   Note: Serum concentrations collected for AUC dosing may appear elevated if collected in close proximity to the dose administered, this is not necessarily an indication of toxicity    Plan:  Current dosing regimen is supra-therapeutic  Decrease dose to 750 mg IV q24h for predicted AUC/Tr of 536/17  Repeat vancomycin concentrations will be ordered as clinically appropriate   Pharmacy will continue to monitor patient and adjust therapy as indicated    Thank you for the consult,  Zeeshan Elliott, PHARMD

## 2022-03-06 NOTE — PROGRESS NOTES
ACUTE PHYSICAL THERAPY GOALS:  (Developed with and agreed upon by patient and/or caregiver.)  (1.)Ms. Efrain Sanchez will move from supine to sit and sit to supine , scoot up and down and roll side to side in bed with MODIFIED INDEPENDENCE within 7 treatment day(s). (2.)Ms. Efrain Sanchez will transfer from bed to chair and chair to bed with CONTACT GUARD ASSIST using the least restrictive device within 7 treatment day(s). (3.)Ms. Efrain Sanchez will ambulate with MINIMAL ASSIST for a minimum of 50 feet with the least restrictive device within 7 treatment day(s). ________________________________________________________________________________________________    PHYSICAL THERAPY ASSESSMENT: Initial Assessment and Daily Note PT Treatment Day # 1      Aide Sanchez is a 80 y.o. female   PRIMARY DIAGNOSIS: <principal problem not specified>  HCAP (healthcare-associated pneumonia) [J18.9]  Volume overload [E87.70]  Pulmonary embolism (HCC) [I26.99]  Pulmonary edema [J81.1]       Reason for Referral:    ICD-10: Treatment Diagnosis: Generalized Muscle Weakness (M62.81)  Difficulty in walking, Not elsewhere classified (R26.2)  Other abnormalities of gait and mobility (R26.89)  INPATIENT: Payor: [employer] / Plan: [facility] [employer] / Product Type: Managed Care Medicare /     ASSESSMENT:     REHAB RECOMMENDATIONS:   Recommendation to date pending progress:  Setting:   Short-term Rehab  Equipment:    To Be Determined     PRIOR LEVEL OF FUNCTION:  (Prior to Hospitalization) INITIAL/CURRENT LEVEL OF FUNCTION:  (Most Recently Demonstrated)   Bed Mobility:   Unknown  Sit to Stand:   Unknown  Transfers:   Unknown  Gait/Mobility:   Unknown Bed Mobility:   Minimal Assistance  Sit to Stand:   Not tested  Transfers:   Not tested  Gait/Mobility:   Not tested     ASSESSMENT:  Ms. Efrain Sanchez presents with pulmonary edema and a PMH significant for dementia.  She is a poor historian and her previous functional level is unknown. She was admitted from recent STR stay. Pt experiences increased dizziness with all position changes and fluctuating emotions. Sitting EOB she is able to maintain with CGA-min A but becomes agitated and frustrated and complains of increased dizziness. Once back into bed she is able to roll R/L with min A for dressing and brief changes. She would continue to benefit from skilled PT to facilitate improvements in functional mobility, BLE strength, increased trunk control and safety with transfers. It is recommended she return to STR post DC but I understand her family is wanting her to return home. If she DC home it will be important that she is with someone 24/7 for increased safety.       SUBJECTIVE:   Ms. Anjana Edgar states, \"In a little bit we will go out\"    SOCIAL HISTORY/LIVING ENVIRONMENT: Poor historian, unclear of prior levels  Home Environment: Rehabilitation facility  One/Two Story Residence: One story  Living Alone: No  Support Systems: Child(edward)  OBJECTIVE:     PAIN: VITAL SIGNS: LINES/DRAINS:   Pre Treatment: Pain Screen  Pain Scale 1: Numeric (0 - 10)  Pain Intensity 1: 0  Post Treatment: 0   IV and Purewick  O2 Device: None (Room air)     GROSS EVALUATION:   Within Functional Limits Abnormal/ Functional Abnormal/ Non-Functional (see comments) Not Tested Comments:   AROM [] [x] [] []    PROM [] [] [] []    Strength [] [x] [] []    Balance [] [] [x] []    Posture [] [] [] []    Sensation [x] [] [] []    Coordination [] [] [] []    Tone [] [] [] []    Edema [] [] [] []    Activity Tolerance [] [] [x] [] Increased dizziness and discomfort with position changes    [] [] [] []      COGNITION/  PERCEPTION: Intact Impaired   (see comments) Comments:   Orientation [] [x]    Vision [] [x]    Hearing [x] []    Command Following [] [x]    Safety Awareness [] [x]     [] []      MOBILITY: I Mod I S SBA CGA Min Mod Max Total  NT x2 Comments:   Bed Mobility    Rolling [] [] [] [] [] [x] [] [] [] [] []    Supine to Sit [] [] [] [] [] [x] [] [] [] [] []    Scooting [] [] [] [] [] [] [] [] [x] [] [x]    Sit to Supine [] [] [] [] [] [x] [] [] [] [] []    Transfers    Sit to Stand [] [] [] [] [] [] [] [] [] [x] []    Bed to Chair [] [] [] [] [] [] [] [] [] [x] []    Stand to Sit [] [] [] [] [] [] [] [] [] [x] []    I=Independent, Mod I=Modified Independent, S=Supervision, SBA=Standby Assistance, CGA=Contact Guard Assistance,   Min=Minimal Assistance, Mod=Moderate Assistance, Max=Maximal Assistance, Total=Total Assistance, NT=Not Tested  GAIT: I Mod I S SBA CGA Min Mod Max Total  NT x2 Comments:   Level of Assistance [] [] [] [] [] [] [] [] [] [x] []    Distance     DME N/A    Gait Quality     Weightbearing Status N/A     I=Independent, Mod I=Modified Independent, S=Supervision, SBA=Standby Assistance, CGA=Contact Guard Assistance,   Min=Minimal Assistance, Mod=Moderate Assistance, Max=Maximal Assistance, Total=Total Assistance, NT=Not Tested    [address] [facility] [address] [address] AM-[facility]       How much difficulty does the patient currently have. .. Unable A Lot A Little None   1. Turning over in bed (including adjusting bedclothes, sheets and blankets)? [] 1   [] 2   [x] 3   [] 4   2. Sitting down on and standing up from a chair with arms ( e.g., wheelchair, bedside commode, etc.)   [x] 1   [] 2   [] 3   [] 4   3. Moving from lying on back to sitting on the side of the bed? [] 1   [] 2   [x] 3   [] 4   How much help from another person does the patient currently need. .. Total A Lot A Little None   4. Moving to and from a bed to a chair (including a wheelchair)? [] 1   [x] 2   [] 3   [] 4   5. Need to walk in hospital room? [] 1   [x] 2   [] 3   [] 4   6. Climbing 3-5 steps with a railing? [x] 1   [] 2   [] 3   [] 4   © 2007, Trustees of [address] [place] [address] [address] [id number], under license to [employer].  All rights reserved     Score:  Initial: 12 Most Recent: X (Date: -- )    Interpretation of Tool: Represents activities that are increasingly more difficult (i.e. Bed mobility, Transfers, Gait). PLAN:   FREQUENCY/DURATION: PT Plan of Care: 3 times/week for duration of hospital stay or until stated goals are met, whichever comes first.    PROBLEM LIST:   (Skilled intervention is medically necessary to address:)  1. Decreased ADL/Functional Activities  2. Decreased Activity Tolerance  3. Decreased AROM/PROM  4. Decreased Balance  5. Decreased Cognition  6. Decreased Coordination  7. Decreased Gait Ability  8. Decreased Strength  9. Decreased Transfer Abilities   INTERVENTIONS PLANNED:   (Benefits and precautions of physical therapy have been discussed with the patient.)  1. Therapeutic Activity  2. Therapeutic Exercise/HEP  3. Neuromuscular Re-education  4. Gait Training  5. Manual Therapy  6. Education     TREATMENT:     EVALUATION: Moderate Complexity : (Untimed Charge)    TREATMENT:   ($$ Therapeutic Activity: 8-22 mins    )  Co-Treatment PT/OT necessary due to patient's decreased overall endurance/tolerance levels, as well as need for high level skilled assistance to complete functional transfers/mobility and functional tasks  Therapeutic Activity (8 Minutes): Therapeutic activity included Rolling, Supine to Sit, Sit to Supine, Scooting and Sitting balance  to improve functional Mobility, Strength, Activity tolerance and balance.     TREATMENT GRID:  N/A    AFTER TREATMENT POSITION/PRECAUTIONS:  Bed and Needs within reach    INTERDISCIPLINARY COLLABORATION:  RN/PCT, PT/PTA and OT/MARIE    TOTAL TREATMENT DURATION:  PT Patient Time In/Time Out  Time In: 0940  Time Out: [address], PT

## 2022-03-06 NOTE — PROGRESS NOTES
Problem: Pressure Injury - Risk of  Goal: *Prevention of pressure injury  Description: Document Carlos Scale and appropriate interventions in the flowsheet. Outcome: Progressing Towards Goal  Note: Pressure Injury Interventions:  Sensory Interventions: Assess need for specialty bed,Check visual cues for pain,Float heels,Keep linens dry and wrinkle-free,Minimize linen layers,Monitor skin under medical devices    Moisture Interventions: Absorbent underpads,Apply protective barrier, creams and emollients,Assess need for specialty bed,Check for incontinence Q2 hours and as needed,Internal/External urinary devices,Limit adult briefs    Activity Interventions: Assess need for specialty bed,Chair cushion,Increase time out of bed,Pressure redistribution bed/mattress(bed type)    Mobility Interventions: Assess need for specialty bed,Float heels,HOB 30 degrees or less,Pressure redistribution bed/mattress (bed type),Turn and reposition approx. every two hours(pillow and wedges)    Nutrition Interventions: Document food/fluid/supplement intake,Offer support with meals,snacks and hydration    Friction and Shear Interventions: Apply protective barrier, creams and emollients,Foam dressings/transparent film/skin sealants,HOB 30 degrees or less,Minimize layers,Transferring/repositioning devices                Problem: Falls - Risk of  Goal: *Absence of Falls  Description: Document Torito Fall Risk and appropriate interventions in the flowsheet.   Outcome: Progressing Towards Goal  Note: Fall Risk Interventions:            Medication Interventions: Bed/chair exit alarm,Patient to call before getting OOB,Teach patient to arise slowly    Elimination Interventions: Bed/chair exit alarm,Call light in reach,Patient to call for help with toileting needs,Toilet paper/wipes in reach    History of Falls Interventions: Bed/chair exit alarm,Door open when patient unattended,Room close to nurse's station

## 2022-03-06 NOTE — PROGRESS NOTES
ACUTE OT GOALS:  (Developed with and agreed upon by patient and/or caregiver.)  1. Patient will complete lower body bathing and dressing with SUPERVISION and adaptive equipment as needed. 2.Patient will complete upper body bathing and dressing with SUPERVISION and adaptive equipment as needed. 3. Patient will complete toileting with MIN A.   4. Patient will tolerate 25 minutes of OT treatment with 1-2 rest breaks to increase activity tolerance for ADLs. 5. Patient will complete functional transfers with CGA and adaptive equipment as needed. 6. Patient will complete functional activity with SUPERVISION and adaptive equipment as needed.     Timeframe: 7 visits     OCCUPATIONAL THERAPY ASSESSMENT: Initial Assessment and Daily Note OT Treatment Day # 1    Aide Gutierrez is a 80 y.o. female   PRIMARY DIAGNOSIS: <principal problem not specified>  HCAP (healthcare-associated pneumonia) [J18.9]  Volume overload [E87.70]  Pulmonary embolism (HCC) [I26.99]  Pulmonary edema [J81.1]       Reason for Referral:    ICD-10: Treatment Diagnosis: Generalized Muscle Weakness (M62.81)  Other lack of cordination (R27.8)  Difficulty in walking, Not elsewhere classified (R26.2)  INPATIENT: Payor: [employer] / Plan: [place] [employer] / Product Type: Managed Care Medicare /   ASSESSMENT:     REHAB RECOMMENDATIONS:   Recommendation to date pending progress:  Setting:   Short-term Rehab  Equipment:    To Be Determined     PRIOR LEVEL OF FUNCTION:  (Prior to Hospitalization)  INITIAL/CURRENT LEVEL OF FUNCTION:  (Based on today's evaluation)   Bathing:   Unknown  Dressing:   Unknown  Feeding/Grooming:   Unknown  Toileting:   Unknown  Functional Mobility:   Unknown Bathing:   Moderate Assistance  Dressing:   Moderate Assistance  Feeding/Grooming:   Contact Guard Assistance  Toileting:   Maximal Assistance  Functional Mobility:   Minimal Assistance     ASSESSMENT:  Ms. Vonnie Gutierrez presented to the hospital from [facility] rehab in with increased SOB, volume overload, and possible pneumonia. Pt recently admitted from 1/23-2/3 for blood loss anemia. Pt with advanced dementia--unknown PLOF due to her cognition today. Today, pt was received supine in bed. Completed bed mobility Dmitry. Sat EOB with CGA. Once EOB, pt became very anxious and increasingly agitated. Unable to re-direct and calm for further mobility. Returned to bed and noted to be soiled in urine. Dmitry for L and R log rolling. MaxA for toileting in supine. By the end of the session, pt was calm and very appreciative of therapist's assistance. Recommend pt return to STR though daughter wishes to take pt home. Will require 24/7 supervision and assistance at home. Alonso Hall currently demonstrates overall deficits in strength, balance, activity tolerance, and ADL performance. Patient would benefit from skilled OT services at this time in order to address functional deficits and OT goals stated above. SUBJECTIVE:   Ms. Renu Kendall states, \"Let's go party. \"    SOCIAL HISTORY/LIVING ENVIRONMENT: reported from [facility], unable to report further PLOF information due to advanced dementia, no family at bedside  Home Environment: Rehabilitation facility  One/Two Story Residence: One story  Living Alone: No  Support Systems: Child(edward)    OBJECTIVE:     PAIN: VITAL SIGNS: LINES/DRAINS:   Pre Treatment: Pain Screen  Pain Scale 1: Numeric (0 - 10)  Pain Intensity 1: 0  Post Treatment: no change    IV  O2 Device: None (Room air)     GROSS EVALUATION:  BUEs Within Functional Limits Abnormal/ Functional Abnormal/ Non-Functional (see comments) Not Tested Comments:   AROM [x] [] [] []    PROM [] [] [] [x]    Strength [] [x] [] [] Functional for bADLs though generalized weakness noted   Balance [] [x] [] [] Fair+ sitting    Posture [] [x] [] [] Flexed posture    Sensation [] [] [] [x]    Coordination [] [x] [] []    Tone [] [] [] [x]    Edema [] [] [] [x]    Activity Tolerance [] [x] [] []     [] [] [] []      COGNITION/  PERCEPTION: Intact Impaired   (see comments) Comments:   Orientation [] [x] Dementia    Vision [x] []    Hearing [] [x] [place]   Judgment/ Insight [] [x] Decreased insight    Attention [x] []    Memory [] [] Dementia    Command Following [] [x] Simple, one-step, commands   Emotional Regulation [] [x] Emotionally labile     [] []      ACTIVITIES OF DAILY LIVING: I Mod I S SBA CGA Min Mod Max Total NT Comments   BASIC ADLs:              Bathing/ Showering [] [] [] [] [] [] [] [] [] [x]    Toileting [] [] [] [] [] [] [] [x] [] [] Soiled in urine--maxA for ousmane-care and brief management in supine    Dressing [] [] [] [] [] [] [x] [] [] [] Socks    Feeding [] [] [] [] [] [] [] [] [] [x]    Grooming [] [] [] [] [] [] [] [] [] [x]    Personal Device Care [] [] [] [] [] [] [] [] [] [x]    Functional Mobility [] [] [] [] [] [x] [] [] [] [] Bed mobility    I=Independent, Mod I=Modified Independent, S=Supervision, SBA=Standby Assistance, CGA=Contact Guard Assistance,   Min=Minimal Assistance, Mod=Moderate Assistance, Max=Maximal Assistance, Total=Total Assistance, NT=Not Tested    MOBILITY: I Mod I S SBA CGA Min Mod Max Total  NT x2 Comments:   Supine to sit [] [] [] [] [] [x] [] [] [] [] []    Sit to supine [] [] [] [] [] [x] [] [] [] [] []    Sit to stand [] [] [] [] [] [] [] [] [] [x] []    Bed to chair [] [] [] [] [] [] [] [] [] [x] []    I=Independent, Mod I=Modified Independent, S=Supervision, SBA=Standby Assistance, CGA=Contact Guard Assistance,   Min=Minimal Assistance, Mod=Moderate Assistance, Max=Maximal Assistance, Total=Total Assistance, NT=Not Tested    [address] [facility] [address] [address] AM-[facility] 6 Clicks   Daily Activity Inpatient Short Form        How much help from another person does the patient currently need. .. Total A Lot A Little None   1. Putting on and taking off regular lower body clothing? [] 1   [x] 2   [] 3   [] 4   2.   Bathing (including washing, rinsing, drying)? [] 1   [x] 2   [] 3   [] 4   3. Toileting, which includes using toilet, bedpan or urinal?   [] 1   [x] 2   [] 3   [] 4   4. Putting on and taking off regular upper body clothing? [] 1   [] 2   [x] 3   [] 4   5. Taking care of personal grooming such as brushing teeth? [] 1   [] 2   [x] 3   [] 4   6. Eating meals? [] 1   [] 2   [] 3   [x] 4   © 2007, Trustees of [address] [place] [address] [address] [address], under license to [employer]. All rights reserved     Score:  Initial: 16 completed on 3/6/22 Most Recent: X (Date: -- )   Interpretation of Tool:  Represents activities that are increasingly more difficult (i.e. Bed mobility, Transfers, Gait). PLAN:   FREQUENCY/DURATION: OT Plan of Care: 3 times/week for duration of hospital stay or until stated goals are met, whichever comes first.    PROBLEM LIST:   (Skilled intervention is medically necessary to address:)  1. Decreased ADL/Functional Activities  2. Decreased Activity Tolerance  3. Decreased Balance  4. Decreased Cognition  5. Decreased Coordination  6. Decreased Strength  7. Decreased Transfer Abilities   INTERVENTIONS PLANNED:   (Benefits and precautions of occupational therapy have been discussed with the patient.)  1. Self Care Training  2. Therapeutic Activity  3. Therapeutic Exercise/HEP  4. Neuromuscular Re-education  5. Education     TREATMENT:     EVALUATION: Low Complexity : (Untimed Charge)    TREATMENT:   ($$ Self Care/Home Management: 8-22 mins    )  Co-Treatment PT/OT necessary due to patient's decreased overall endurance/tolerance levels, as well as need for high level skilled assistance to complete functional transfers/mobility and functional tasks  Self Care (8 Minutes): Self care including Toileting and Lower Body Dressing to increase independence and decrease level of assistance required.     TREATMENT GRID:  N/A    AFTER TREATMENT POSITION/PRECAUTIONS:  Alarm Activated, Bed, Needs within reach and RN notified    INTERDISCIPLINARY COLLABORATION:  RN/PCT, PT/PTA and OT/MARIE    TOTAL TREATMENT DURATION:  OT Patient Time In/Time Out  Time In: 0940  Time Out: 26 Fatih Harrell OT

## 2022-03-06 NOTE — CONSULTS
[facility] Cardiology Initial Cardiac Evaluation   Primary Cardiologist: Dr. Selin Alford  Attending Cardiologist: Dr. Leti Herron     Date of  Admission: 3/4/2022 11:17 AM     HPI:  Rosalinda Curling is a 80 y.o. AAF with h/o DM II, HTN, dyslipidemia, OSITO, CKD, COPD on home O2 3-4 L and GERD who was recently admitted to [facility] from 1/23/22 until 2/3/22 for acute blood loss anemia secondary to her erosions in the rectum. She was discharged to a rehab facility. Patient returned to [facility] ED with c/o SOB and difficulty breathing. She has dementia and is unable to give a history. In the ED, BP elevated at 172/120, pro BNP elevated at 3,899. CXR showed persistent infiltrate in the right midlung with new patchy left basilar infiltrate. CTA chest showed nonocclusive thrombus subsegmental right upper lobe pulmonary artery, bilateral pleural effusions, patchy diffuse bilateral groundglass opacity with thickening of the interlobular septa. Pt was admitted by the hospitalist with PE started on heparin, volume overload started on IV Lasix and a concern for healthcare associated pneumonia on IV AB. Echo showed EF 40-50%, mild AR and MR (previous echo 3/2021 showed EF 55%, LAE, mild-mod MR and mild TR). Cardiology was consulted for low EF. Today, labs show Pt is anemic with Hgb down to 8.7, Hct 28.5, hypokalemia with K+ 3.1 (replaced by primary), BUN elevated at 27, Cr 1.10 and hypomagnesemia with Mg 1.6 (replaced by primary). On exam, Pt is alone and smiling and answers questions but doesn't know why she is here or where exactly she is currently. She tells me she is not SOB, denies CP, palpitations or LE swelling. Review of I&O documented shows Pt is 1362 cc net negative. Pt appears dry with a decrease in skin turgor and she has no peripheral edema.      Past Medical History:   Diagnosis Date    Acute posthemorrhagic anemia 02/03/2022    per [facility] faxed information    Anemia in chronic kidney disease     per [facility] faxed information    Anxiety 2/1/2018    Arrhythmia     palpitations 2/10-went to [facility]    Arthritis     L leg- fell 2008    Asthma     adult onset x 20 yrs    B12 deficiency 8/24/2012    Body mass index 37.0-37.9, adult 2/5/2014    CAD (coronary artery disease)     cardiac work [id number]    Chronic kidney disease, stage 3 unspecified ([address] 12/24/2021    per [facility] faxed information    Cognitive communication deficit     per [facility] faxed information     Controlled type 2 diabetes mellitus with microalbuminuria, without long-term current use of insulin ([address] 11/22/2016    COPD     Corns and callosities 12/19/2016    CRI (chronic renal insufficiency) 8/24/2012    Depression 6/1/2012    Dermatophytosis of nail 12/19/2016    Diabetes ([address]     type II- home tests fasting-112    DJD (degenerative joint disease) of hip     DM (diabetes mellitus) type II controlled with renal manifestation ([address] 7/16/2010    Encounter for long-term (current) use of other medications 1/8/2013    Essential hypertension 7/16/2010    Gastrointestinal disorder     Gastrointestinal hemorrhage     per dx sheet from [facility] - date sent 3/2/2022     GERD (gastroesophageal reflux disease)     without esophagitis; per [facility] faxed information    GI bleed 7/28/2018    Heart failure ([address]     Hiatal hernia 8/24/2012    High cholesterol     History of COVID-19 02/03/2022    per medical hx information from [facility] History of falling 12/24/2021    per [facility] faxed information    HLD (hyperlipidemia) 1/8/2013    Klebsiella pneumoniae (k. pneumoniae) as the cause of diseases classified elsewhere 12/24/2021    [facility]     Moderate protein-calorie malnutrition ([address] 12/24/2021    per [facility] faxed information    Muscle weakness (generalized)     per [facility] faxed information    Neuropathy 8/24/2012    Lower limbs     Obesity (BMI 30.0-39. 9) BMI-43.8    Other and unspecified hyperlipidemia 7/16/2010    Other chest pain 7/16/2010    Other lack of coordination     per [facility] faxed information    Oxygen dependent     4 L/NC    PAD (peripheral artery disease) ([facility] 8/24/2012    PVD (peripheral vascular disease) ([facility]     per [facility] faxed information    Retinal hemorrhage     Type 2 diabetes mellitus with diabetic neuropathy ([facility]     Unspecified asthma, uncomplicated [id number]/[id number]    per [facility] faxed information    Urinary incontinence     Urinary tract infection       Past Surgical History:   Procedure Laterality Date    FLEXIBLE SIGMOIDOSCOPY N/A 1/24/2022    SIGMOIDOSCOPY FLEXIBLE COVID POSITIVE PREP/RECOVER IN [place] Admit to room 503 performed by Michelle Fischer MD at [facility] ENDOSCOPY    HX APPENDECTOMY      HX CHOLECYSTECTOMY  2000    HX ENDOSCOPY  02/27/2018    Dr Victorina Villela    HX GI      small intestine obstruction    HX GYN      hyst    HX HEART CATHETERIZATION      HX HYSTERECTOMY      prolapse    HX INTRACRANIAL ANEURYSM REPAIR      HX ORTHOPAEDIC      right wrist 2010    HX OTHER SURGICAL      aneurysm clip-cerebral-2000    HX OTHER SURGICAL      cerebral aneurysm   Dr. Veryl Ahumada UNLISTED         No Known Allergies   Social History     Socioeconomic History    Marital status: SINGLE     Spouse name: Not on file    Number of children: Not on file    Years of education: Not on file    Highest education level: Not on file   Occupational History    Not on file   Tobacco Use    Smoking status: Never Smoker    Smokeless tobacco: Never Used   Vaping Use    Vaping Use: Never used   Substance and Sexual Activity    Alcohol use: No    Drug use: No    Sexual activity: Never   Other Topics Concern    Not on file   Social History Narrative     with four children     Social Determinants of Health     Financial Resource Strain:     Difficulty of Paying Living Expenses: Not on file   Food Insecurity:     Worried About Running Out of Food in the Last Year: Not on file    Alistair of Food in the Last Year: Not on file   Transportation Needs:     Lack of Transportation (Medical): Not on file    Lack of Transportation (Non-Medical):  Not on file   Physical Activity:     Days of Exercise per Week: Not on file    Minutes of Exercise per Session: Not on file   Stress:     Feeling of Stress : Not on file   Social Connections:     Frequency of Communication with Friends and Family: Not on file    Frequency of Social Gatherings with Friends and Family: Not on file    Attends Anabaptism Services: Not on file    Active Member of Clubs or Organizations: Not on file    Attends Club or Organization Meetings: Not on file    Marital Status: Not on file   Intimate Partner Violence:     Fear of Current or Ex-Partner: Not on file    Emotionally Abused: Not on file    Physically Abused: Not on file    Sexually Abused: Not on file   Housing Stability:     Unable to Pay for Housing in the Last Year: Not on file    Number of Places Lived in the Last Year: Not on file    Unstable Housing in the Last Year: Not on file     Family History   Problem Relation Age of Onset    Cancer Mother     Diabetes Father     Diabetes Paternal Grandfather         Current Facility-Administered Medications   Medication Dose Route Frequency    insulin glargine (LANTUS) injection 8 Units  8 Units SubCUTAneous DAILY    hydrALAZINE (APRESOLINE) 20 mg/mL injection 5 mg  5 mg IntraVENous Q6H PRN    pantoprazole (PROTONIX) tablet 40 mg  40 mg Oral ACB    hydrALAZINE (APRESOLINE) tablet 25 mg  25 mg Oral TID    hydrALAZINE (APRESOLINE) 20 mg/mL injection 10 mg  10 mg IntraVENous Q6H PRN    piperacillin-tazobactam (ZOSYN) 4.5 g in 0.9% sodium chloride (MBP/ADV) 100 mL MBP  4.5 g IntraVENous Q8H    furosemide (LASIX) injection 40 mg  40 mg IntraVENous Q12H    sodium chloride (NS) flush 5-10 mL  5-10 mL IntraVENous Q8H    sodium chloride (NS) flush 5-10 mL  5-10 mL IntraVENous PRN    sodium chloride (NS) flush 5-40 mL  5-40 mL IntraVENous Q8H    sodium chloride (NS) flush 5-40 mL  5-40 mL IntraVENous PRN    acetaminophen (TYLENOL) tablet 650 mg  650 mg Oral Q6H PRN    Or    acetaminophen (TYLENOL) suppository 650 mg  650 mg Rectal Q6H PRN    polyethylene glycol (MIRALAX) packet 17 g  17 g Oral DAILY PRN    ondansetron (ZOFRAN ODT) tablet 4 mg  4 mg Oral Q8H PRN    Or    ondansetron (ZOFRAN) injection 4 mg  4 mg IntraVENous Q6H PRN    insulin lispro (HUMALOG) injection   SubCUTAneous AC&HS    heparin 25,000 units in dextrose 500 mL infusion  18-36 Units/kg/hr IntraVENous TITRATE    lactated Ringers infusion  100 mL/hr IntraVENous CONTINUOUS    lactated Ringers infusion  100 mL/hr IntraVENous CONTINUOUS    sodium chloride (NS) flush 5-40 mL  5-40 mL IntraVENous Q8H    sodium chloride (NS) flush 5-40 mL  5-40 mL IntraVENous PRN       Review of symptoms:  Unable to obtain secondary to Pt condition- dementia     Subjective:   Physical Exam  Visit Vitals  BP (!) 151/74 (BP 1 Location: Left upper arm, BP Patient Position: Sitting)   Pulse 82   Temp 98.2 °F (36.8 °C)   Resp 16   Ht 5' 2\" (1.575 m)   Wt 68.2 kg (150 lb 6 oz)   SpO2 96%   Breastfeeding No   BMI 27.50 kg/m²     General Appearance:  Well developed, well nourished, alert and pleasantly demented, not oriented, and individual in no acute distress. Ears/Nose/Mouth/Throat:   Hearing diminished. Neck: Supple, no JVD   Chest:   Lungs few crackles   Cardiovascular:  Regular rate and rhythm, S1, S2, 2/6 murmur. Abdomen:   Soft, non-tender, bowel sounds are active. Extremities: No edema bilaterally. Skin: Warm and dry.  Skin turgor decreased           Labs:   Recent Results (from the past 24 hour(s))   GLUCOSE, POC    Collection Time: 03/05/22  4:02 PM   Result Value Ref Range    Glucose (POC) 167 (H) 65 - 100 mg/dL    Performed by [email or web address]    GLUCOSE, POC    Collection Time: 03/05/22  9:12 PM   Result Value Ref Range    Glucose (POC) 243 (H) 65 - 100 mg/dL    Performed by [employer]    CBC WITH AUTOMATED DIFF    Collection Time: 03/06/22  4:12 AM   Result Value Ref Range    WBC 9.4 4.3 - 11.1 K/uL    RBC 3.07 (L) 4.05 - 5.2 M/uL    HGB 8.7 (L) 11.7 - 15.4 g/dL    HCT 28.5 (L) 35.8 - 46.3 %    MCV 92.8 79.6 - 97.8 FL    MCH 28.3 26.1 - 32.9 PG    MCHC 30.5 (L) 31.4 - 35.0 g/dL    RDW 18.1 (H) 11.9 - 14.6 %    PLATELET 067 844 - 155 K/uL    MPV 10.8 9.4 - 12.3 FL    ABSOLUTE NRBC 0.00 0.0 - 0.2 K/uL    DF AUTOMATED      NEUTROPHILS 69 43 - 78 %    LYMPHOCYTES 22 13 - 44 %    MONOCYTES 8 4.0 - 12.0 %    EOSINOPHILS 1 0.5 - 7.8 %    BASOPHILS 0 0.0 - 2.0 %    IMMATURE GRANULOCYTES 1 0.0 - 5.0 %    ABS. NEUTROPHILS 6.5 1.7 - 8.2 K/UL    ABS. LYMPHOCYTES 2.1 0.5 - 4.6 K/UL    ABS. MONOCYTES 0.7 0.1 - 1.3 K/UL    ABS. EOSINOPHILS 0.1 0.0 - 0.8 K/UL    ABS. BASOPHILS 0.0 0.0 - 0.2 K/UL    ABS. IMM.  GRANS. 0.1 0.0 - 0.5 K/UL   VANCOMYCIN, RANDOM    Collection Time: 03/06/22  4:12 AM   Result Value Ref Range    Vancomycin, random 25.4 UG/ML   HEPARIN XA UFH    Collection Time: 03/06/22  4:12 AM   Result Value Ref Range    Heparin Xa UFH 1.04 (H) 0.3 - 0.7 IU/mL   METABOLIC PANEL, BASIC    Collection Time: 03/06/22  4:12 AM   Result Value Ref Range    Sodium 146 (H) 136 - 145 mmol/L    Potassium 3.1 (L) 3.5 - 5.1 mmol/L    Chloride 111 (H) 98 - 107 mmol/L    CO2 30 21 - 32 mmol/L    Anion gap 5 (L) 7 - 16 mmol/L    Glucose 225 (H) 65 - 100 mg/dL    BUN 27 (H) 8 - 23 MG/DL    Creatinine 1.10 (H) 0.6 - 1.0 MG/DL    GFR est AA 60 (L) >60 ml/min/1.73m2    GFR est non-AA 49 (L) >60 ml/min/1.73m2    Calcium 8.7 8.3 - 10.4 MG/DL   MAGNESIUM    Collection Time: 03/06/22  4:12 AM   Result Value Ref Range    Magnesium 1.6 (L) 1.8 - 2.4 mg/dL   PHOSPHORUS    Collection Time: 03/06/22  4:12 AM   Result Value Ref Range    Phosphorus 3.0 2.3 - 3.7 MG/DL   TRANSFERRIN SATURATION    Collection Time: 03/06/22  4:12 AM   Result Value Ref Range    Iron 47 35 - 150 ug/dL    TIBC 107 (L) 250 - 450 ug/dL    Transferrin Saturation 44 >20 %   VITAMIN B12    Collection Time: 03/06/22  4:12 AM   Result Value Ref Range    Vitamin B12 861 193 - 986 pg/mL   FERRITIN    Collection Time: 03/06/22  4:12 AM   Result Value Ref Range    Ferritin 1,635 (H) 8 - 388 NG/ML   FOLATE    Collection Time: 03/06/22  4:12 AM   Result Value Ref Range    Folate 12.7 3.1 - 17.5 ng/mL   GLUCOSE, POC    Collection Time: 03/06/22  6:16 AM   Result Value Ref Range    Glucose (POC) 248 (H) 65 - 100 mg/dL    Performed by [employer]    GLUCOSE, POC    Collection Time: 03/06/22 10:59 AM   Result Value Ref Range    Glucose (POC) 302 (H) 65 - 100 mg/dL    Performed by [employer]    HEPARIN XA UFH    Collection Time: 03/06/22  1:32 PM   Result Value Ref Range    Heparin Xa UFH 0.84 (H) 0.3 - 0.7 IU/mL       Pt was seen and examined by Dr. Maggy Riggs, he agrees with the following A&P.      Assessment/Plan:        Diagnosis    Pulmonary embolism- nonocclusive thrombus subsegmental right upper lobe pulmonary artery per CT- on IV heparin, monitor H&H closely with recent GIB    Volume overload- per admitting MD started on IV lasix- she is 1362 cc net negative according to documented I&Os, Pt has no peripheral edema, skin turgor is decreased and she appears dry, recommend stopping IV lasix, control BP    HCAP (healthcare-associated pneumonia)- IV AB per primary team    Cardiomyopathy- EF 40-50% on echo- Pt appears dry currently, recommend stopping IV lasix, controlling BP- optimize medical therapy with Coreg, hydralazine, consider ACE-I/ARB if BUN/Cr stable    Chronic respiratory failure with hypoxia- on home O2 - 3 to 4 L NC per primary team    Recent acute blood loss anemia/GIB- monitor H&H closely on IV heparin now per primary team    Type 2 diabetes mellitus- per primary team    COPD (chronic obstructive pulmonary disease)- per primary team    Essential hypertension- uncontrolled, continue home dose hydralazine, add low dose Coreg, consider adding ACE-I/ARB if CKD stable    Physical debility    Chronic bilateral low back pain without sciatica    Iron deficiency anemia    CKD (chronic kidney disease) stage 3, GFR 30-59 ml/min- monitor per primary team    COPD (chronic obstructive pulmonary disease) [address]    DNR (do not resuscitate)    HLD (hyperlipidemia)- statin    Hiatal hernia    PAD (peripheral artery disease) ([facility]    Frequent falls    GERD (gastroesophageal reflux disease)- PPI per primary team       Thank you for allowing us to participate in the care of this patient, we will continue to follow along with you.     Lidya Florez PA-C

## 2022-03-07 NOTE — PROGRESS NOTES
Hospitalist History and Physical   Admit Date:  3/4/2022 11:17 AM   Name:  Evens Zamora   Age:  80 y.o. Sex:  female  :  1929   MRN:  [id number]   Room:  /    Presenting Complaint: Shortness of Breath    Reason(s) for Admission: HCAP (healthcare-associated pneumonia) [J18.9]  Volume overload [E87.70]  Pulmonary embolism ([address] [I26.99]  Pulmonary edema [J81.1]     Return for of Present Illness:   Evens Zamora is a 80 y.o. female with a past medical history significant for diabetes, hypertension, chronic kidney disease stage II, peripheral artery disease. The patient was recently admitted to [facility] from 2022 to February 3, 2022 after being seen for acute blood loss anemia secondary to her erosions in the rectum. She was discharged to a rehab facility however the daughter got a call today that the patient was extremely short of breath and having difficulty breathing she was brought back to the emergency room where she was found to have a PE, volume overload with a concern for healthcare associated pneumonia as well. The patient has dementia and is unable to give a history. She actively defers the history taking to her daughter who is at the bedside. : Brief HPI: 51-year-old female with history of diabetes, hypertension, chronic kidney disease stage II per records, peripheral arterial disease and recent hospitalization for blood loss anemia secondary to rectal erosions presented with shortness of breath and found to have pulmonary embolism, volume overload and concern for healthcare associated pneumonia. : Patient is AOx3. As per patient she is feeling better. Denies any bleeding from anywhere. Denies any chest pain, palpitation, nausea or vomiting. : Eating her breakfast.  Doing much better. Oxygen requirement coming down. Denies any chest pain, nausea, vomiting or palpitations.       Rest review of system negative except mentioned above  Assessment & Plan: Active Problems:         # Pulmonary edema (3/4/2022)  March 5: Continue Lasix. Echo pending. March 6: Continue Lasix. Echo showed EF of 40 to 50%. Unable to review previous echo but per last cardiology report, patient had normal echo. Cardiology consulted. Will monitor. Potassium and magnesium repleted    March 7: Resolved. Will monitor off Lasix. pulmonary embolism ([address] (3/4/2022)  March 5: Continue heparin drip. Hemoglobin stable. Will monitor  March 6: Last month patient hemoglobin was in the sevens. Today has some drop in hemoglobin but no active bleeding. Continue anticoagulation and monitor CBC. Nurse to notify on-call provider whenever CBC resulted with any active bleeding. March 7: Hemoglobin stable. Will monitor. #  HCAP (healthcare-associated pneumonia) (3/4/2022)  Concern for healthcare associated pneumonia  Continue vancomycin and Zosyn. Follow-up panculture. March 6: Culture negative for 48 hours. Discontinue vancomycin. Continue Zosyn. Concern for UTI  Follow-up culture. Diabetes  Monitor blood sugars and cover with insulin    March 6: Blood sugar elevated. Started on Lantus 8 units. March 7: Blood sugar above goal.  Lispro 2 units with meals ordered    Hypertension  Continue appropriate home medications  March 5: Blood pressure above goal.  Home hydralazine added back to help with decreasing afterload. March 7: Started amlodipine 5 mg daily. Diet: ADULT DIET Regular; 3 carb choices (45 gm/meal); Low Fat/Low Chol/High Fiber/2 gm Na; 2000 ml  ADULT ORAL NUTRITION SUPPLEMENT Breakfast, Lunch, Dinner; Low Calorie/High Protein bedside swallow before any p.o. intake. VTE ppx: Heparin drip   code status: DNR   Patient is AOx3 but has cognitive impairment. I have called Ms. Kerrie Dad who is patient's daughter and power of . She is continuously updating patient's other family.   She verbalized understanding that patient condition may deteriorate in spite of treatment. CODE STATUS of DNR and DNI verified    March 7: Updated patient's daughter. All questions were answered. Possible discharge in next 24 to 48 hours based on clinical course      Hospital Problems as of 3/7/2022 Date Reviewed: 11/30/2021          Codes Class Noted - Resolved POA    Pulmonary edema ICD-10-CM: J81.1  ICD-9-CM: 939  3/4/2022 - Present Unknown        Pulmonary embolism ([address] ICD-10-CM: I26.99  ICD-9-CM: 415.19  3/4/2022 - Present Unknown        Volume overload ICD-10-CM: E87.70  ICD-9-CM: 276.69  3/4/2022 - Present Unknown        HCAP (healthcare-associated pneumonia) ICD-10-CM: J18.9  ICD-9-CM: 309  3/4/2022 - Present Unknown                Objective:     Patient Vitals for the past 24 hrs:   Temp Pulse Resp BP SpO2   03/07/22 1134 98.6 °F (37 °C) 78 18 (!) 153/76 97 %   03/07/22 0802 97.6 °F (36.4 °C) 88 18 (!) 169/96 93 %   03/07/22 0340     99 %   03/07/22 0337 98.9 °F (37.2 °C) 76  (!) 157/71 90 %   03/06/22 2354 99 °F (37.2 °C) 75 16 (!) 143/82 97 %   03/06/22 2017 98.5 °F (36.9 °C) 78 16 (!) 146/69 98 %   03/06/22 1558 98.3 °F (36.8 °C) 81 16 (!) 146/70 98 %     Oxygen Therapy  O2 Sat (%): 97 % (03/07/22 1134)  Pulse via Oximetry: 95 beats per minute (03/04/22 2042)  O2 Device: Nasal cannula (03/07/22 0337)  O2 Flow Rate (L/min): 4 l/min (03/07/22 0337)    Estimated body mass index is 27.11 kg/m² as calculated from the following:    Height as of this encounter: 5' 2\" (1.575 m). Weight as of this encounter: 67.2 kg (148 lb 3.2 oz). Intake/Output Summary (Last 24 hours) at 3/7/2022 1314  Last data filed at 3/7/2022 1241  Gross per 24 hour   Intake 420 ml   Output 600 ml   Net -180 ml         Physical Exam:    Blood pressure (!) 153/76, pulse 78, temperature 98.6 °F (37 °C), resp. rate 18, height 5' 2\" (1.575 m), weight 67.2 kg (148 lb 3.2 oz), SpO2 97 %, not currently breastfeeding.   General:    Elderly frail female very pleasant in no obvious cardiopulmonary distress at this time. Head:  Normocephalic, atraumatic  Eyes:  Sclerae appear normal.  Pupils equally round. ENT:   . Moist oral mucosa  Neck:  No restric JVD present e   CV:   RRR. No m/r/g. JVD present  Lungs:   Is entry bilateral lower lobe otherwise CTAB. No wheezing, rhonchi, or rales. Respirations even, unlabored  Abdomen: Bowel sounds present. Soft, nontender, distended secondary to obesity and hernia  Extremities: No cyanosis or clubbing. Edema present  Skin:     No rashes and normal coloration. Warm and dry. Neuro:   AOx3, moving all 4 extremities, speech normal  Psych:  Cognitive impairment noted  I have reviewed ordered lab tests and independently visualized imaging below:    Last 24hr Labs:    Procedures done this admission:  * No surgery found *    All Micro Results     Procedure Component Value Units Date/Time    CULTURE, BLOOD [[id number]] Collected: 03/04/22 1217    Order Status: Completed Specimen: Blood Updated: 03/07/22 0733     Special Requests: --        RIGHT  Antecubital       Culture result: NO GROWTH 3 DAYS       CULTURE, BLOOD [[id number]] Collected: 03/04/22 1221    Order Status: Completed Specimen: Blood Updated: 03/07/22 0733     Special Requests: --        LEFT  ARM       Culture result: NO GROWTH 3 DAYS       CULTURE, URINE [[id number]] Collected: 03/04/22 1915    Order Status: Completed Specimen: Urine from Clean catch Updated: 03/07/22 0729     Special Requests: NO SPECIAL REQUESTS        Culture result:       >100,000 COLONIES/mL MIXED SKIN NGOZI ISOLATED                  THREE OR MORE TYPES OF ORGANISMS ARE PRESENT. THIS IS INDICATIVE OF CONTAMINATION DUE TO IMPROPER COLLECTION TECHNIQUE. PLEASE REPEAT COLLECTION UNLESS PATIENT HAS STARTED ANTIBIOTIC TREATMENT.           MSSA/MRSA SC BY PCR, NASAL SWAB [[id number]]     Order Status: Sent Specimen: Nasal swab     COVID-19 RAPID TEST [[id number]] Collected: 03/04/22 1217    Order Status: Completed Specimen: Nasopharyngeal Updated: 03/04/22 1304     Specimen source Nasopharyngeal        COVID-19 rapid test Not detected        Comment:      The specimen is NEGATIVE for SARS-CoV-2, the novel coronavirus associated with COVID-19. A negative result does not rule out COVID-19. This test has been authorized by the FDA under an Emergency Use Authorization (EUA) for use by authorized laboratories. Fact sheet for Healthcare Providers: [employer]  Fact sheet for Patients: [employer]       Methodology: Isothermal Nucleic Acid Amplification               SARS-CoV-2 Lab Results  \"Novel Coronavirus\" Test: No results found for: COV2NT   \"Emergent Disease\" Test: No results found for: EDPR  \"SARS-COV-2\" Test: No results found for: XGCOVT  \"Precision Labs\" Test: No results found for: RSLT  Rapid Test:   Lab Results   Component Value Date/Time    COVR Not detected 03/04/2022 12:17 PM            Labs: Results:       BMP, Mg, Phos Recent Labs     03/07/22  0357 03/06/22  0412    146*   K 3.5 3.1*   * 111*   CO2 29 30   AGAP 6* 5*   BUN 26* 27*   CREA 1.20* 1.10*   CA 8.8 8.7   * 225*   MG  --  1.6*   PHOS  --  3.0      CBC Recent Labs     03/07/22  0632 03/07/22  0357 03/06/22  1554   WBC 7.8 7.8 9.8   RBC 3.17* 2.96* 3.11*   HGB 8.7* 8.3* 9.0*   HCT 29.3* 27.5* 29.5*    208 246   GRANS 61 62 68   LYMPH 27 26 22   EOS 2 2 1   MONOS 9 9 8   BASOS 0 0 0   IG 1 1 1   ANEU 4.8 4.8 6.7   ABL 2.1 2.1 2.2   DELL 0.2 0.2 0.1   ABM 0.7 0.7 0.8   ABB 0.0 0.0 0.0   AIG 0.1 0.1 0.1      LFT No results for input(s): ALT, TBIL, AP, TP, ALB, GLOB, AGRAT in the last 72 hours.     No lab exists for component: SGOT, GPT   Cardiac Testing Lab Results   Component Value Date/Time    BNP 38 09/10/2016 10:15 AM    BNP 54 04/19/2016 03:30 PM    BNP 22 03/01/2012 02:53 PM    CK 95 12/14/2021 07:31 PM    CK 64 08/21/2018 09:25 PM    CK 38 07/16/2010 04:05 PM    CK - MB 0.7 07/16/2010 04:05 PM    CK-MB Index 1.8 07/16/2010 04:05 PM    Troponin-I <0.05 03/01/2012 02:53 PM    Troponin-I, Qt. <0.02 (L) 08/06/2019 06:25 PM    Troponin-I, Qt. <0.02 (L) 08/21/2018 03:09 PM    Troponin-I, Qt. <0.04 (L) 07/17/2010 06:52 AM      Coagulation Tests Lab Results   Component Value Date/Time    Prothrombin time 12.6 01/23/2022 03:00 PM    Prothrombin time 13.1 07/27/2018 08:53 PM    Prothrombin time 10.4 03/01/2012 02:08 PM    INR 0.9 01/23/2022 03:00 PM    INR 1.0 07/27/2018 08:53 PM    INR 1.0 03/01/2012 02:08 PM    aPTT 34.6 03/04/2022 08:20 PM    aPTT 27.1 07/27/2018 08:53 PM    aPTT 27.3 07/12/2014 02:30 PM      A1c Lab Results   Component Value Date/Time    Hemoglobin A1c 8.9 (H) 01/23/2022 03:00 PM    Hemoglobin A1c 8.3 (H) 11/30/2021 03:41 PM    Hemoglobin A1c 6.1 (H) 08/31/2021 03:26 PM      Lipid Panel Lab Results   Component Value Date/Time    Cholesterol, total 153 11/30/2021 03:41 PM    HDL Cholesterol 53 11/30/2021 03:41 PM    LDL, calculated 70 11/30/2021 03:41 PM    LDL, calculated 72 10/07/2019 10:16 AM    VLDL, calculated 30 11/30/2021 03:41 PM    VLDL, calculated 15 10/07/2019 10:16 AM    Triglyceride 179 (H) 11/30/2021 03:41 PM    CHOL/HDL Ratio 2.8 02/22/2017 09:54 AM      Thyroid Panel Lab Results   Component Value Date/Time    TSH 1.080 11/30/2021 03:41 PM    TSH 1.960 07/08/2019 09:55 AM    TSH 0.888 08/22/2018 06:01 AM        Most Recent UA Lab Results   Component Value Date/Time    Color YELLOW 03/04/2022 12:17 PM    Appearance CLOUDY 03/04/2022 12:17 PM    Specific gravity 1.035 (H) 02/22/2020 05:19 PM    pH (UA) 6.0 03/04/2022 12:17 PM    Protein 300 (A) 03/04/2022 12:17 PM    Glucose Negative 03/04/2022 12:17 PM    Ketone TRACE (A) 03/04/2022 12:17 PM    Bilirubin Negative 03/04/2022 12:17 PM    Blood TRACE (A) 03/04/2022 12:17 PM    Urobilinogen 1.0 03/04/2022 12:17 PM    Nitrites Negative 03/04/2022 12:17 PM    Leukocyte Esterase SMALL (A) 03/04/2022 12:17 PM    WBC 20-50 03/04/2022 12:17 PM    RBC 5-10 03/04/2022 12:17 PM    Epithelial cells 0-3 03/04/2022 12:17 PM    Bacteria 4+ (H) 03/04/2022 12:17 PM    Casts 0-3 03/04/2022 12:17 PM    Crystals, urine 0 02/21/2010 01:15 PM    Mucus 0 12/14/2021 10:08 PM            Other Studies:  No results found. Signed:  Jony Graham MD    Part of this note may have been written by using a voice dictation software. The note has been proof read but may still contain some grammatical/other typographical errors.

## 2022-03-07 NOTE — PROGRESS NOTES
Comprehensive Nutrition Assessment    Type and Reason for Visit: Initial,Positive nutrition screen  Best Practice Alert for Pressure Injury stage 2 or greater    Nutrition Recommendations/Plan:   Meals and Snacks:  Continue current diet. Nutrition Supplement Therapy:   Medical food supplement therapy:  Initiate Ensure High Protein three times per day (this provides 160 kcal and 16 grams protein per bottle)     Malnutrition Assessment:  Malnutrition Status: Moderate malnutrition  Context: Chronic illness  Findings of clinical characteristics of malnutrition:   Energy Intake:  Unable to assess (reports obtaining meals from [facility])  Weight Loss:  Mild weight loss (specify amount and time period) (potential wt loss of 18% over 1 year)     Body Fat Loss:  1 - Mild body fat loss, Triceps,Orbital   Muscle Mass Loss:  1 - Mild muscle mass loss, Temples (temporalis),Clavicles (pectoralis &deltoids)  Fluid Accumulation:  Unable to assess,     Strength:  Not performed     Nutrition Assessment:   Nutrition History: Pt reports appetite and intake at baseline, states she obtains meals from [employer]. Pt states eating \"amost all the meals they provide me. \" Pt reports UBW of ~136lb. Pt also states prior use of ONS, however not consistently. Nutrition Background: Patient with PMH significant for DM, HTN, CKD, obstructive lung disease, B12 deficiency, anemia, GIB, HLD. Pt presented to [facility] 3/4 w/  PE, volume overload, worsening SOB with a concern for healthcare associated pneumonia as well. Nutrition Interval:  Pt seen at bedside, [facility]. Pt provides nutrition hx as above. Pt reports \"good\" intake of meals thus far in admission, reports typically eating >50% of all meals served. Pt denies any chewing/swallowing difficulties. Pt is agreeable to starting ONS, reports trying these in the past and enjoying them. Noted preference of vanilla flavor. Nutrition Related Findings:   NFPE findings as above.  Wound Type: Unstageable,Pressure injury (see WC documentation)    Current Nutrition Therapies:  ADULT DIET Regular; 3 carb choices (45 gm/meal); Low Fat/Low Chol/High Fiber/2 gm Na; 2000 ml    Current Intake:   Average Meal Intake: % Average Supplement Intake: None ordered      Anthropometric Measures:  Height: 5' 2\" (157.5 cm)  Current Body Wt: 67.2 kg (148 lb 2.4 oz) (3/7), Weight source: Bed scale  BMI: 27.1, Overweight (BMI 25.0-29. 9)     Ideal Body Weight (lbs) (Calculated): 110 lbs (50 kg), 134.7 %  Usual Body Wt: 68 kg (149 lb 14.6 oz) (per EMR review of MD office weights), Percent weight change: -1.2          Edema: No data recorded   Estimated Daily Nutrient Needs:  Energy (kcal/day): 6640-7228 (Kcal/kg (23-28), Weight Used: Ideal (57kg))  Protein (g/day): 57-74 (1-1.3g/kg ; wounds) Weight Used: (Ideal (57kg))  Fluid (ml/day):   (1 ml/kcal)    Nutrition Diagnosis:   · Inadequate protein-energy intake related to increased demand for energy/nutrients as evidenced by wounds    · Moderate malnutrition,In context of chronic illness related to inadequate protein-energy intake as evidenced by  (malnutrition criteria as above)    Nutrition Interventions:   Food and/or Nutrient Delivery: Continue current diet,Start oral nutrition supplement     Coordination of Nutrition Care: Continue to monitor while inpatient,Interdisciplinary rounds    Goals:       Active Goal: Meet >75% of estimated nutrition needs via PO intake by next RD assessment    Nutrition Monitoring and Evaluation:      Food/Nutrient Intake Outcomes: Food and nutrient intake,Supplement intake  Physical Signs/Symptoms Outcomes: Biochemical data,Meal time behavior,Weight    Discharge Planning:    Continue oral nutrition supplement    Ish Faust RD, LD  Contact: [phone]

## 2022-03-07 NOTE — PROGRESS NOTES
's visit attempted. Ms. Shaina Bishop appeared asleep. Chaplains remain available for follow-up.      Viviane Mullen 68  Board Certified

## 2022-03-07 NOTE — PROGRESS NOTES
ACUTE PHYSICAL THERAPY GOALS:  (Developed with and agreed upon by patient and/or caregiver.)  (1.)Ms. Jennifer Killian will move from supine to sit and sit to supine , scoot up and down and roll side to side in bed with MODIFIED INDEPENDENCE within 7 treatment day(s). (2.)Ms. Jennifer Killian will transfer from bed to chair and chair to bed with CONTACT GUARD ASSIST using the least restrictive device within 7 treatment day(s). (3.)Ms. Jennifer Killian will ambulate with MINIMAL ASSIST for a minimum of 50 feet with the least restrictive device within 7 treatment day(s). ________________________________________________________________________________________________      PHYSICAL THERAPY: Daily Note Treatment Day # 1    Carmencita Ingram is a 80 y.o. female   PRIMARY DIAGNOSIS: <principal problem not specified>  HCAP (healthcare-associated pneumonia) [J18.9]  Volume overload [E87.70]  Pulmonary embolism (HCC) [I26.99]  Pulmonary edema [J81.1]         ASSESSMENT:     REHAB RECOMMENDATIONS: CURRENT LEVEL OF FUNCTION:  (Most Recently Demonstrated)   Recommendation to date pending progress:  Setting:   Short-term Rehab  Equipment:    To Be Determined Bed Mobility:   Minimal Assistance  Sit to Stand:   Minimal Assistance  Transfers:   Minimal Assistance  Gait/Mobility:   Not tested     ASSESSMENT:  Ms. Jennifer Killian presents reclined in bed and agreeable to therapy. She was able to perform bed mobility with decreased assistance today and demonstrated improved seated balance while performing therapeutic exercises with occasional CGA. She was also able to transfer today with min A and perform static standing for a prolonged period while she was cleaned up after a BM. Overall significant improvement in functional mobility today. PT will continue to follow.       SUBJECTIVE:   Ms. Jennifer Killian states, \"I feel much better\"    SOCIAL HISTORY/ LIVING ENVIRONMENT: Refer to [facility]: Rehabilitation facility  One/Two Story Residence: [facility] story  Living Alone: No  Support Systems: Child(edward)  OBJECTIVE:     PAIN: VITAL SIGNS: LINES/DRAINS:   Pre Treatment: Pain Screen  Pain Scale 1: Numeric (0 - 10)  Pain Intensity 1: 0  Post Treatment: 0   Continuous Pulse Oximetry, IV and Purewick  O2 Device: Nasal cannula     MOBILITY: I Mod I S SBA CGA Min Mod Max Total  NT x2 Comments:   Bed Mobility    Rolling [] [] [] [] [] [] [] [] [] [x] []    Supine to Sit [] [] [] [] [] [x] [] [] [] [] []    Scooting [] [] [] [] [] [] [] [] [] [x] []    Sit to Supine [] [] [] [] [] [] [] [] [] [x] []    Transfers    Sit to Stand [] [] [] [] [] [x] [] [] [] [] []    Bed to Chair [] [] [] [] [] [x] [] [] [] [] []    Stand to Sit [] [] [] [] [] [x] [] [] [] [] []    I=Independent, Mod I=Modified Independent, S=Supervision, SBA=Standby Assistance, CGA=Contact Guard Assistance,   Min=Minimal Assistance, Mod=Moderate Assistance, Max=Maximal Assistance, Total=Total Assistance, NT=Not Tested    BALANCE: Good Fair+ Fair Fair- Poor NT Comments   Sitting Static [] [x] [] [] [] []    Sitting Dynamic [] [] [x] [] [] []              Standing Static [] [] [x] [] [] []    Standing Dynamic [] [] [x] [] [] []      GAIT: I Mod I S SBA CGA Min Mod Max Total  NT x2 Comments:   Level of Assistance [] [] [] [] [] [] [] [] [] [x] []    Distance     DME N/A    Gait Quality     Weightbearing  Status N/A     I=Independent, Mod I=Modified Independent, S=Supervision, SBA=Standby Assistance, CGA=Contact Guard Assistance,   Min=Minimal Assistance, Mod=Moderate Assistance, Max=Maximal Assistance, Total=Total Assistance, NT=Not Tested    PLAN:   FREQUENCY/DURATION: PT Plan of Care: 3 times/week for duration of hospital stay or until stated goals are met, whichever comes first.  TREATMENT:     TREATMENT:   ($$ Therapeutic Activity: 23-37 mins  $$ Therapeutic Exercises: 8-22 mins    )  Therapeutic Activity (23 Minutes):  Therapeutic activity included Supine to Sit, Transfer Training, Sitting balance  and Standing balance to improve functional Mobility, Strength and Activity tolerance. Therapeutic Exercise (15 Minutes): Therapeutic exercises noted below to improve functional activity tolerance, AROM, strength and mobility.      TREATMENT GRID:   Date:  3/7/22 Date:   Date:     Activity/Exercise Parameters Parameters Parameters   Heel raise 2x10     Toe raise 2x10     Seated march 2x10     LAQ 2x10     Seated ABD/ADD 2x10                       AFTER TREATMENT POSITION/PRECAUTIONS:  Chair, Needs within reach and RN notified    INTERDISCIPLINARY COLLABORATION:  RN/PCT and PT/PTA    TOTAL TREATMENT DURATION:  PT Patient Time In/Time Out  Time In: 1049  Time Out: [address] [place], [place]

## 2022-03-08 NOTE — PROGRESS NOTES
Hospitalist History and Physical   Admit Date:  3/4/2022 11:17 AM   Name:  Lyla Dong   Age:  80 y.o. Sex:  female  :  1929   MRN:  [id number]   Room:      Presenting Complaint: Shortness of Breath    Reason(s) for Admission: HCAP (healthcare-associated pneumonia) [J18.9]  Volume overload [E87.70]  Pulmonary embolism ([address] [I26.99]  Pulmonary edema [J81.1]     Return for of Present Illness:   Lyla Dong is a 80 y.o. female with a past medical history significant for diabetes, hypertension, chronic kidney disease stage II, peripheral artery disease. The patient was recently admitted to [address] [place] [address] from 2022 to February 3, 2022 after being seen for acute blood loss anemia secondary to her erosions in the rectum. She was discharged to a rehab facility however the daughter got a call today that the patient was extremely short of breath and having difficulty breathing she was brought back to the emergency room where she was found to have a PE, volume overload with a concern for healthcare associated pneumonia as well. Brief HPI: 35-year-old female with history of diabetes, hypertension, chronic kidney disease stage II per records, peripheral arterial disease and recent hospitalization for blood loss anemia secondary to rectal erosions presented with shortness of breath and found to have pulmonary embolism, volume overload and concern for healthcare associated pneumonia. Patient was diuresed adequately ultimately her diagnosis was stopped. Echo did not show any acute finding and patient was evaluated by cardiology. Hemoglobin stayed stable on heparin drip as patient has history of GI bleed in the past.  Vancomycin was stopped when culture was negative for 2 days. : Patient was sleepy but easily awakened back to normal.  AOx3. Denies any chest pain, shortness of breath or palpitations.   As per patient she does not feel back to normal although she would like to go home when she gets better but she is not ready yet. Rest review of system negative except mentioned above  Assessment & Plan: Active Problems:         # Pulmonary edema (3/4/2022)  Resolved. Status post Lasix. Echo without any acute finding. #Pulmonary embolism and DVTs:  Hemoglobin stable on heparin drip. Will transition to oral anticoagulation on discharge. #  HCAP (healthcare-associated pneumonia) (3/4/2022)  Vancomycin discontinued after cultures negative for 48 hours. Continue Zosyn. Concern for UTI  Mixed organisms. Diabetes  Blood sugar controlled on 8 units of Lantus and 2 units lispro with meals along with sliding scale. Hypertension  Controlled on amlodipine and hydralazine. Diet: ADULT DIET Regular; 3 carb choices (45 gm/meal); Low Fat/Low Chol/High Fiber/2 gm Na; 2000 ml  ADULT ORAL NUTRITION SUPPLEMENT Breakfast, Lunch, Dinner; Low Calorie/High Protein bedside swallow before any p.o. intake. VTE ppx: Heparin drip   code status: DNR   Patient is AOx3 but has cognitive impairment. I have called Ms. Prateek Mosher who is patient's daughter and power of . She is continuously updating patient's other family. She verbalized understanding that patient condition may deteriorate in spite of treatment.   CODE STATUS of DNR and DNI verified        Possible discharge in next 24 to 48 hours based on clinical course      Hospital Problems as of 3/8/2022 Date Reviewed: 11/30/2021          Codes Class Noted - Resolved POA    Pulmonary edema ICD-10-CM: J81.1  ICD-9-CM: 372  3/4/2022 - Present Unknown        Pulmonary embolism ([facility] ICD-10-CM: I26.99  ICD-9-CM: 415.19  3/4/2022 - Present Unknown        Volume overload ICD-10-CM: E87.70  ICD-9-CM: 276.69  3/4/2022 - Present Unknown        HCAP (healthcare-associated pneumonia) ICD-10-CM: J18.9  ICD-9-CM: 764  3/4/2022 - Present Unknown        Moderate protein-calorie malnutrition ([facility] ICD-10-CM: E44.0  ICD-9-CM: 263.0 12/16/2021 - Present Yes                Objective:     Patient Vitals for the past 24 hrs:   Temp Pulse Resp BP SpO2   03/08/22 1115 97.2 °F (36.2 °C) 79  138/82 91 %   03/08/22 0801     96 %   03/08/22 0725 97.6 °F (36.4 °C) 73 16 (!) 159/71 95 %   03/08/22 0453 98.1 °F (36.7 °C) 79 18 (!) 160/68 95 %   03/08/22 0041 98.7 °F (37.1 °C) 78 18 (!) 153/65 96 %   03/07/22 2027     98 %   03/07/22 2010 98.6 °F (37 °C) 79 18 127/66 98 %   03/07/22 1548 98.1 °F (36.7 °C) 74 18 124/72 97 %     Oxygen Therapy  O2 Sat (%): 91 % (03/08/22 1115)  Pulse via Oximetry: 74 beats per minute (03/08/22 0801)  O2 Device: Nasal cannula (03/08/22 1310)  O2 Flow Rate (L/min): 2.5 l/min (03/08/22 1310)    Estimated body mass index is 27.11 kg/m² as calculated from the following:    Height as of this encounter: 5' 2\" (1.575 m). Weight as of this encounter: 67.2 kg (148 lb 3.2 oz). Intake/Output Summary (Last 24 hours) at 3/8/2022 1510  Last data filed at 3/7/2022 1548  Gross per 24 hour   Intake 120 ml   Output    Net 120 ml         Physical Exam:    Blood pressure 138/82, pulse 79, temperature 97.2 °F (36.2 °C), resp. rate 16, height 5' 2\" (1.575 m), weight 67.2 kg (148 lb 3.2 oz), SpO2 91 %, not currently breastfeeding. General:    Elderly frail female very pleasant in no obvious cardiopulmonary distress at this time. Head:  Normocephalic, atraumatic  Eyes:  Sclerae appear normal.  Pupils equally round. ENT:   . Moist oral mucosa  Neck:  No restric JVD present e   CV:   RRR. No m/r/g. JVD present  Lungs:   Is entry bilateral lower lobe otherwise CTAB. No wheezing, rhonchi, or rales. Respirations even, unlabored  Abdomen: Bowel sounds present. Soft, nontender, distended secondary to obesity and hernia  Extremities: No cyanosis or clubbing. Edema present  Skin:     No rashes and normal coloration. Warm and dry.     Neuro:   AOx3, moving all 4 extremities, speech normal  Psych:  Cognitive impairment noted  I have reviewed ordered lab tests and independently visualized imaging below:    Last 24hr Labs:    Procedures done this admission:  * No surgery found *    All Micro Results     Procedure Component Value Units Date/Time    CULTURE, BLOOD [[id number]] Collected: 03/04/22 1217    Order Status: Completed Specimen: Blood Updated: 03/08/22 0811     Special Requests: --        RIGHT  Antecubital       Culture result: NO GROWTH 4 DAYS       CULTURE, BLOOD [[id number]] Collected: 03/04/22 1221    Order Status: Completed Specimen: Blood Updated: 03/08/22 0811     Special Requests: --        LEFT  ARM       Culture result: NO GROWTH 4 DAYS       CULTURE, URINE [[id number]] Collected: 03/04/22 1915    Order Status: Completed Specimen: Urine from Clean catch Updated: 03/07/22 0729     Special Requests: NO SPECIAL REQUESTS        Culture result:       >100,000 COLONIES/mL MIXED SKIN NGOZI ISOLATED                  THREE OR MORE TYPES OF ORGANISMS ARE PRESENT. THIS IS INDICATIVE OF CONTAMINATION DUE TO IMPROPER COLLECTION TECHNIQUE. PLEASE REPEAT COLLECTION UNLESS PATIENT HAS STARTED ANTIBIOTIC TREATMENT. MSSA/MRSA SC BY PCR, NASAL SWAB [[id number]] Collected: 03/05/22 0500    Order Status: Canceled Specimen: Nasal swab     COVID-19 RAPID TEST [[id number]] Collected: 03/04/22 1217    Order Status: Completed Specimen: Nasopharyngeal Updated: 03/04/22 1304     Specimen source Nasopharyngeal        COVID-19 rapid test Not detected        Comment:      The specimen is NEGATIVE for SARS-CoV-2, the novel coronavirus associated with COVID-19. A negative result does not rule out COVID-19. This test has been authorized by the FDA under an Emergency Use Authorization (EUA) for use by authorized laboratories.         Fact sheet for Healthcare Providers: ConventionUpdate.co.nz  Fact sheet for Patients: ConventionUpdate.co.nz       Methodology: Isothermal Nucleic Acid Amplification SARS-CoV-2 Lab Results  \"Novel Coronavirus\" Test: No results found for: COV2NT   \"Emergent Disease\" Test: No results found for: EDPR  \"SARS-COV-2\" Test: No results found for: XGCOVT  \"Precision Labs\" Test: No results found for: RSLT  Rapid Test:   Lab Results   Component Value Date/Time    COVR Not detected 03/04/2022 12:17 PM            Labs: Results:       BMP, Mg, Phos Recent Labs     03/08/22 0417 03/07/22 0357 03/06/22 0412    143 146*   K 3.8 3.5 3.1*   * 108* 111*   CO2 28 29 30   AGAP 6* 6* 5*   BUN 24* 26* 27*   CREA 1.20* 1.20* 1.10*   CA 8.8 8.8 8.7   GLU 85 147* 225*   MG  --   --  1.6*   PHOS  --   --  3.0      CBC Recent Labs     03/08/22 0417 03/07/22 0632 03/07/22 0357   WBC 7.2 7.8 7.8   RBC 3.15* 3.17* 2.96*   HGB 9.1* 8.7* 8.3*   HCT 30.7* 29.3* 27.5*    224 208   GRANS 57 61 62   LYMPH 29 27 26   EOS 3 2 2   MONOS 9 9 9   BASOS 1 0 0   IG 1 1 1   ANEU 4.1 4.8 4.8   ABL 2.1 2.1 2.1   DELL 0.2 0.2 0.2   ABM 0.6 0.7 0.7   ABB 0.1 0.0 0.0   AIG 0.1 0.1 0.1      LFT No results for input(s): ALT, TBIL, AP, TP, ALB, GLOB, AGRAT in the last 72 hours.     No lab exists for component: SGOT, GPT   Cardiac Testing Lab Results   Component Value Date/Time    BNP 38 09/10/2016 10:15 AM    BNP 54 04/19/2016 03:30 PM    BNP 22 03/01/2012 02:53 PM    CK 95 12/14/2021 07:31 PM    CK 64 08/21/2018 09:25 PM    CK 38 07/16/2010 04:05 PM    CK - MB 0.7 07/16/2010 04:05 PM    CK-MB Index 1.8 07/16/2010 04:05 PM    Troponin-I <0.05 03/01/2012 02:53 PM    Troponin-I, Qt. <0.02 (L) 08/06/2019 06:25 PM    Troponin-I, Qt. <0.02 (L) 08/21/2018 03:09 PM    Troponin-I, Qt. <0.04 (L) 07/17/2010 06:52 AM      Coagulation Tests Lab Results   Component Value Date/Time    Prothrombin time 12.6 01/23/2022 03:00 PM    Prothrombin time 13.1 07/27/2018 08:53 PM    Prothrombin time 10.4 03/01/2012 02:08 PM    INR 0.9 01/23/2022 03:00 PM    INR 1.0 07/27/2018 08:53 PM    INR 1.0 03/01/2012 02:08 PM    aPTT 34.6 03/04/2022 08:20 PM    aPTT 27.1 07/27/2018 08:53 PM    aPTT 27.3 07/12/2014 02:30 PM      A1c Lab Results   Component Value Date/Time    Hemoglobin A1c 8.9 (H) 01/23/2022 03:00 PM    Hemoglobin A1c 8.3 (H) 11/30/2021 03:41 PM    Hemoglobin A1c 6.1 (H) 08/31/2021 03:26 PM      Lipid Panel Lab Results   Component Value Date/Time    Cholesterol, total 153 11/30/2021 03:41 PM    HDL Cholesterol 53 11/30/2021 03:41 PM    LDL, calculated 70 11/30/2021 03:41 PM    LDL, calculated 72 10/07/2019 10:16 AM    VLDL, calculated 30 11/30/2021 03:41 PM    VLDL, calculated 15 10/07/2019 10:16 AM    Triglyceride 179 (H) 11/30/2021 03:41 PM    CHOL/HDL Ratio 2.8 02/22/2017 09:54 AM      Thyroid Panel Lab Results   Component Value Date/Time    TSH 1.080 11/30/2021 03:41 PM    TSH 1.960 07/08/2019 09:55 AM    TSH 0.888 08/22/2018 06:01 AM        Most Recent UA Lab Results   Component Value Date/Time    Color YELLOW 03/04/2022 12:17 PM    Appearance CLOUDY 03/04/2022 12:17 PM    Specific gravity 1.035 (H) 02/22/2020 05:19 PM    pH (UA) 6.0 03/04/2022 12:17 PM    Protein 300 (A) 03/04/2022 12:17 PM    Glucose Negative 03/04/2022 12:17 PM    Ketone TRACE (A) 03/04/2022 12:17 PM    Bilirubin Negative 03/04/2022 12:17 PM    Blood TRACE (A) 03/04/2022 12:17 PM    Urobilinogen 1.0 03/04/2022 12:17 PM    Nitrites Negative 03/04/2022 12:17 PM    Leukocyte Esterase SMALL (A) 03/04/2022 12:17 PM    WBC 20-50 03/04/2022 12:17 PM    RBC 5-10 03/04/2022 12:17 PM    Epithelial cells 0-3 03/04/2022 12:17 PM    Bacteria 4+ (H) 03/04/2022 12:17 PM    Casts 0-3 03/04/2022 12:17 PM    Crystals, urine 0 02/21/2010 01:15 PM    Mucus 0 12/14/2021 10:08 PM            Other Studies:  No results found. Signed:  Jose Armando Holt MD    Part of this note may have been written by using a voice dictation software. The note has been proof read but may still contain some grammatical/other typographical errors.

## 2022-03-08 NOTE — PROGRESS NOTES
ACUTE PHYSICAL THERAPY GOALS:  (Developed with and agreed upon by patient and/or caregiver.)  (1.)Ms. Hatfield will move from supine to sit and sit to supine , scoot up and down and roll side to side in bed with MODIFIED INDEPENDENCE within 7 treatment day(s).    (2.)Ms. Hatfield will transfer from bed to chair and chair to bed with CONTACT GUARD ASSIST using the least restrictive device within 7 treatment day(s).    (3.)Ms. Hatfield will ambulate with MINIMAL ASSIST for a minimum of 50 feet with the least restrictive device within 7 treatment day(s). PHYSICAL THERAPY: Daily Note and AM Treatment Day # 2    Aide Gibbs is a 80 y.o. female   PRIMARY DIAGNOSIS: <principal problem not specified>  HCAP (healthcare-associated pneumonia) [J18.9]  Volume overload [E87.70]  Pulmonary embolism (HCC) [I26.99]  Pulmonary edema [J81.1]         ASSESSMENT:     REHAB RECOMMENDATIONS: CURRENT LEVEL OF FUNCTION:  (Most Recently Demonstrated)   Recommendation to date pending progress:  Setting:   Short-term Rehab but DOES NOT want to go  Equipment:    To Be Determined Bed Mobility:   Minimal Assistance  Sit to Stand:   Minimal Assistance  Transfers:   Minimal Assistance  Gait/Mobility:   Minimal Assistance     ASSESSMENT:  Ms. Xu Hughes is supine in bed and agreeable to therapy. Has O2 saturations high 90's. Bed mobility is with min assist however the patient is doing well. Sitting balance good. Patient is eager to get to the recliner. Sit to stand and transfer with min assist with the use of the walker. Patient is able to position herself in the chair. Good session as the patient is making progress with her goals. Left in the recliner with needs within reach and RN aware of her location. Set up her breakfast.  Continue PT efforts.   Patient states that she DOES NOT want to return to rehab, therefore we need to find out if she will have assistance at home upon d/c    Returned several times this pm to see if the patient wanted to get back to bed, she declined.      SUBJECTIVE:   Ms. Coral De Anda states, \"Let's get up\"    SOCIAL HISTORY/ LIVING ENVIRONMENT: see eval  Home Environment: Rehabilitation facility  One/Two Story Residence: One story  Living Alone: No  Support Systems: Child(edward)  OBJECTIVE:     PAIN: VITAL SIGNS: LINES/DRAINS:   Pre Treatment: Pain Screen  Pain Scale 1: Numeric (0 - 10)  Pain Intensity 1: 0/10  Post Treatment: 0/10   Continuous Pulse Oximetry, Landers Catheter and IV  O2 Device: Nasal cannula     MOBILITY: I Mod I S SBA CGA Min Mod Max Total  NT x2 Comments:   Bed Mobility    Rolling [] [] [] [] [] [x] [] [] [] [] []    Supine to Sit [] [] [] [] [] [x] [] [] [] [] []    Scooting [] [] [] [] [] [x] [] [] [] [] []    Sit to Supine [] [] [] [] [] [] [] [] [] [x] []    Transfers    Sit to Stand [] [] [] [] [] [x] [] [] [] [] []    Bed to Chair [] [] [] [] [] [x] [] [] [] [] []    Stand to Sit [] [] [] [] [] [x] [] [] [] [] []    I=Independent, Mod I=Modified Independent, S=Supervision, SBA=Standby Assistance, CGA=Contact Guard Assistance,   Min=Minimal Assistance, Mod=Moderate Assistance, Max=Maximal Assistance, Total=Total Assistance, NT=Not Tested    BALANCE: Good Fair+ Fair Fair- Poor NT Comments   Sitting Static [x] [] [] [] [] []    Sitting Dynamic [x] [] [] [] [] []              Standing Static [] [x] [] [] [] []    Standing Dynamic [] [x] [] [] [] []      GAIT: I Mod I S SBA CGA Min Mod Max Total  NT x2 Comments:   Level of Assistance [] [] [] [] [] [x] [] [] [] [] []    Distance 3-5 steps    DME Rolling Walker    Gait Quality slow    Weightbearing  Status N/A     I=Independent, Mod I=Modified Independent, S=Supervision, SBA=Standby Assistance, CGA=Contact Guard Assistance,   Min=Minimal Assistance, Mod=Moderate Assistance, Max=Maximal Assistance, Total=Total Assistance, NT=Not Tested    PLAN:   FREQUENCY/DURATION: PT Plan of Care: 3 times/week for duration of hospital stay or until stated goals are met, whichever comes first.  TREATMENT:     TREATMENT:   ($$ Therapeutic Activity: 23-37 mins    )  Co-Treatment PT/OT necessary due to patient's decreased overall endurance/tolerance levels, as well as need for high level skilled assistance to complete functional transfers/mobility and functional tasks  Therapeutic Activity (23 Minutes): Therapeutic activity included Rolling, Supine to Sit, Scooting, Transfer Training, Ambulation on level ground, Sitting balance  and Standing balance to improve functional Mobility, Strength and Activity tolerance.     TREATMENT GRID:  N/A    AFTER TREATMENT POSITION/PRECAUTIONS:  Chair, Needs within reach and RN notified    INTERDISCIPLINARY COLLABORATION:  RN/PCT, PT/PTA and OT/MARIE    TOTAL TREATMENT DURATION:  PT Patient Time In/Time Out  Time In: 1022  Time Out: Alberto Yun-Itzel, PTA

## 2022-03-08 NOTE — PROGRESS NOTES
ACUTE OT GOALS:  (Developed with and agreed upon by patient and/or caregiver.)  1. Patient will complete lower body bathing and dressing with SUPERVISION and adaptive equipment as needed. 2.Patient will complete upper body bathing and dressing with SUPERVISION and adaptive equipment as needed. 3. Patient will complete toileting with MIN A.   4. Patient will tolerate 25 minutes of OT treatment with 1-2 rest breaks to increase activity tolerance for ADLs. 5. Patient will complete functional transfers with CGA and adaptive equipment as needed. 6. Patient will complete functional activity with SUPERVISION and adaptive equipment as needed. OCCUPATIONAL THERAPY: Daily Note OT Treatment Day # 2    Kali Medley is a 80 y.o. female   PRIMARY DIAGNOSIS: <principal problem not specified>  HCAP (healthcare-associated pneumonia) [J18.9]  Volume overload [E87.70]  Pulmonary embolism (HCC) [I26.99]  Pulmonary edema [J81.1]       Payor: [employer] / Plan: [employer] / Product Type: Managed Care Medicare /   ASSESSMENT:     REHAB RECOMMENDATIONS: CURRENT LEVEL OF FUNCTION:  (Most Recently Demonstrated)   Recommendation to date pending progress:  [address] [place] [address] if 24 hour supervision; pt does not want STR again  Equipment:    Rolling Walker Bathing:   Not tested  Dressing:   Maximal Assistance  Feeding/Grooming:   Set Up  Toileting:   Total Assistance  Functional Mobility:   Minimal Assistance x 2     ASSESSMENT:  Ms. Nora Stanley presents in supine upon arrival. Pt agreeable to treatment with some encouragement. Pt was soiled and required total assist with toilet hygiene and required max a with LB dressing. Pt transferred to sitting with min a x 2 and sat edge of bed with fair + balance to increase activity tolerance, balance, and trunk strengthening. Pt stood with min a x 2 and a rolling walker and completed functional mobility from the bed to the chair.  Pt participated in a couple of exercises and therapist heated pt's breakfast. Pt able to complete self feeding with set up. Good effort. Continue POC. SUBJECTIVE:   Ms. Stephanie Pruett states, \"I don't like that doctor asking me who the President is! \"    SOCIAL HISTORY/LIVING ENVIRONMENT:   Home Environment: Rehabilitation facility  One/Two Story Residence: One story  Living Alone: No  Support Systems: Child(edward)    OBJECTIVE:     PAIN: VITAL SIGNS: LINES/DRAINS:   Pre Treatment: Pain Screen  Pain Scale 1: Numeric (0 - 10)  Pain Intensity 1: 0  Post Treatment: none   IV  O2 Device: Nasal cannula     ACTIVITIES OF DAILY LIVING: I Mod I S SBA CGA Min Mod Max Total NT Comments   BASIC ADLs:              Bathing/ Showering [] [] [] [] [] [] [] [] [] [x]    Toileting [] [] [] [] [] [] [] [x] [x] []    Dressing [] [] [] [] [] [] [] [x] [x] [] LB dsg   Feeding [] [] [] [] [] [] [] [] [] [x]    Grooming [] [] [] [] [] [] [] [] [] [x]    Personal Device Care [] [] [] [] [] [] [] [] [] [x]    Functional Mobility [] [] [] [] [] [x] [] [] [] [] Assist x 2 with a rolling walker   I=Independent, Mod I=Modified Independent, S=Supervision, SBA=Standby Assistance, CGA=Contact Guard Assistance,   Min=Minimal Assistance, Mod=Moderate Assistance, Max=Maximal Assistance, Total=Total Assistance, NT=Not Tested    MOBILITY: I Mod I S SBA CGA Min Mod Max Total  NT x2 Comments:   Supine to sit [] [] [] [] [] [x] [] [] [] [] [x]    Sit to supine [] [] [] [] [] [] [] [] [] [x] []    Sit to stand [] [] [] [] [] [x] [] [] [] [] []    Bed to chair [] [] [] [] [] [x] [] [] [] [] [x]    I=Independent, Mod I=Modified Independent, S=Supervision, SBA=Standby Assistance, CGA=Contact Guard Assistance,   Min=Minimal Assistance, Mod=Moderate Assistance, Max=Maximal Assistance, Total=Total Assistance, NT=Not Tested    BALANCE: Good Fair+ Fair Fair- Poor NT Comments   Sitting Static [] [x] [] [] [] []    Sitting Dynamic [] [x] [] [] [] []              Standing Static [] [] [x] [] [] []    Standing Dynamic [] [] [x] [] [] []      PLAN:   FREQUENCY/DURATION: OT Plan of Care: 3 times/week for duration of hospital stay or until stated goals are met, whichever comes first.    TREATMENT:   TREATMENT:   ($$ Self Care/Home Management: 8-22 mins$$ Neuromuscular Re-Education: 8-22 mins   )  Co-Treatment PT/OT necessary due to patient's decreased overall endurance/tolerance levels, as well as need for high level skilled assistance to complete functional transfers/mobility and functional tasks  Self Care (10 Minutes): Self care including Toileting, Upper Body Dressing and Lower Body Dressing to increase independence and decrease level of assistance required. Neuromuscular Re-education (13 Minutes): Neuromuscular Re-education included Balance Training, Postural training, Sitting balance training, Standing balance training and transfers and functional mobility to improve Balance, Functional Mobility and Postural Control.     TREATMENT GRID:  N/A    AFTER TREATMENT POSITION/PRECAUTIONS:  Chair, Needs within reach and RN notified    INTERDISCIPLINARY COLLABORATION:  RN/PCT, PT/PTA and OT/MARIE    TOTAL TREATMENT DURATION:  OT Patient Time In/Time Out  Time In: 1022  Time Out: Luc Gibbs

## 2022-03-09 NOTE — PROGRESS NOTES
Hospitalist History and Physical   Admit Date:  3/4/2022 11:17 AM   Name:  Jonathan Kimble   Age:  80 y.o. Sex:  female  :  1929   MRN:  [id number]   Room:      Presenting Complaint: Shortness of Breath    Reason(s) for Admission: HCAP (healthcare-associated pneumonia) [J18.9]  Volume overload [E87.70]  Pulmonary embolism ([address] [I26.99]  Pulmonary edema [J81.1]     Return for of Present Illness:   Jonathan Kimble is a 80 y.o. female with a past medical history significant for diabetes, hypertension, chronic kidney disease stage II, peripheral artery disease. The patient was recently admitted to [facility] from 2022 to February 3, 2022 after being seen for acute blood loss anemia secondary to her erosions in the rectum. She was discharged to a rehab facility however the daughter got a call today that the patient was extremely short of breath and having difficulty breathing she was brought back to the emergency room where she was found to have a PE, volume overload with a concern for healthcare associated pneumonia as well. Brief HPI: 70-year-old female with history of diabetes, hypertension, chronic kidney disease stage II per records, peripheral arterial disease and recent hospitalization for blood loss anemia secondary to rectal erosions presented with shortness of breath and found to have pulmonary embolism, volume overload and concern for healthcare associated pneumonia. Patient was diuresed adequately ultimately her diagnosis was stopped. Echo did not show any acute finding and patient was evaluated by cardiology. Hemoglobin stayed stable on heparin drip as patient has history of GI bleed in the past.  Vancomycin was stopped when culture was negative for 2 days. : Patient was sleepy but easily awakened back to normal.  AOx3. Denies any chest pain, shortness of breath or palpitations.   As per patient she does not feel back to normal although she would like to go home when she gets better but she is not ready yet. Rest review of system negative except mentioned above  Assessment & Plan: Active Problems:         # Pulmonary edema (3/4/2022)  Resolved. Status post Lasix. Echo without any acute finding. #Pulmonary embolism and DVTs:  Hemoglobin stable on heparin drip. Willchange heparin drip to apixaban    #  HCAP (healthcare-associated pneumonia) (3/4/2022)  Vancomycin discontinued after cultures negative for 48 hours. Continue Zosyn. Concern for UTI  Mixed organisms. Diabetes  Blood sugar controlled on 8 units of Lantus and 2 units lispro with meals along with sliding scale. Hypertension  Controlled on amlodipine and hydralazine. Diet: ADULT DIET Regular; 3 carb choices (45 gm/meal); Low Fat/Low Chol/High Fiber/2 gm Na; 2000 ml  ADULT ORAL NUTRITION SUPPLEMENT Breakfast, Lunch, Dinner; Low Calorie/High Protein bedside swallow before any p.o. intake. VTE ppx: Heparin drip   code status: DNR   Patient is AOx3 but has cognitive impairment. I have called MsDai Burden who is patient's daughter and power of . She is continuously updating patient's other family. She verbalized understanding that patient condition may deteriorate in spite of treatment.   CODE STATUS of DNR and DNI verified        Possible discharge in next 24 to 48 hours based on clinical course      Hospital Problems as of 3/9/2022 Date Reviewed: 11/30/2021          Codes Class Noted - Resolved POA    Pulmonary edema ICD-10-CM: J81.1  ICD-9-CM: 628  3/4/2022 - Present Unknown        Pulmonary embolism ([facility] ICD-10-CM: I26.99  ICD-9-CM: 415.19  3/4/2022 - Present Unknown        Volume overload ICD-10-CM: E87.70  ICD-9-CM: 276.69  3/4/2022 - Present Unknown        HCAP (healthcare-associated pneumonia) ICD-10-CM: J18.9  ICD-9-CM: 091  3/4/2022 - Present Unknown        Moderate protein-calorie malnutrition ([facility] ICD-10-CM: E44.0  ICD-9-CM: 263.0  12/16/2021 - Present Yes                Objective:     Patient Vitals for the past 24 hrs:   Temp Pulse Resp BP SpO2   03/09/22 1101 98.6 °F (37 °C) 90 17 (!) 132/57 98 %   03/09/22 0822 98.8 °F (37.1 °C) 89 16 (!) 149/95 95 %   03/09/22 0327 97.4 °F (36.3 °C) 77 17 (!) 142/67 96 %   03/08/22 2312 98.2 °F (36.8 °C) 77 17 (!) 144/73 95 %   03/08/22 1944     97 %   03/08/22 1914 97.8 °F (36.6 °C) 79 17 (!) 151/67 98 %   03/08/22 1554 97.3 °F (36.3 °C) 76  129/68 99 %     Oxygen Therapy  O2 Sat (%): 98 % (03/09/22 1101)  Pulse via Oximetry: 70 beats per minute (03/08/22 1944)  O2 Device: Nasal cannula (03/09/22 1101)  O2 Flow Rate (L/min): 3 l/min (03/09/22 1101)    Estimated body mass index is 27.11 kg/m² as calculated from the following:    Height as of this encounter: 5' 2\" (1.575 m). Weight as of this encounter: 67.2 kg (148 lb 3.2 oz). Intake/Output Summary (Last 24 hours) at 3/9/2022 1315  Last data filed at 3/8/2022 1825  Gross per 24 hour   Intake 240 ml   Output    Net 240 ml         Physical Exam:    Blood pressure (!) 132/57, pulse 90, temperature 98.6 °F (37 °C), resp. rate 17, height 5' 2\" (1.575 m), weight 67.2 kg (148 lb 3.2 oz), SpO2 98 %, not currently breastfeeding. General:    Elderly frail female very pleasant in no obvious cardiopulmonary distress at this time. Head:  Normocephalic, atraumatic  Eyes:  Sclerae appear normal.  Pupils equally round. ENT:   . Moist oral mucosa  Neck:  No restric JVD present e   CV:   RRR. No m/r/g. JVD present  Lungs:   Is entry bilateral lower lobe otherwise CTAB. No wheezing, rhonchi, or rales. Respirations even, unlabored  Abdomen: Bowel sounds present. Soft, nontender, distended secondary to obesity and hernia  Extremities: No cyanosis or clubbing. Edema present  Skin:     No rashes and normal coloration. Warm and dry.     Neuro:   AOx3, moving all 4 extremities, speech normal  Psych:  Cognitive impairment noted  I have reviewed ordered lab tests and independently visualized imaging below:    Last 24hr Labs:    Procedures done this admission:  * No surgery found *    All Micro Results     Procedure Component Value Units Date/Time    CULTURE, BLOOD [[id number]] Collected: 03/04/22 1217    Order Status: Completed Specimen: Blood Updated: 03/08/22 0811     Special Requests: --        RIGHT  Antecubital       Culture result: NO GROWTH 4 DAYS       CULTURE, BLOOD [[id number]] Collected: 03/04/22 1221    Order Status: Completed Specimen: Blood Updated: 03/08/22 0811     Special Requests: --        LEFT  ARM       Culture result: NO GROWTH 4 DAYS       CULTURE, URINE [[id number]] Collected: 03/04/22 1915    Order Status: Completed Specimen: Urine from Clean catch Updated: 03/07/22 0729     Special Requests: NO SPECIAL REQUESTS        Culture result:       >100,000 COLONIES/mL MIXED SKIN NGOZI ISOLATED                  THREE OR MORE TYPES OF ORGANISMS ARE PRESENT. THIS IS INDICATIVE OF CONTAMINATION DUE TO IMPROPER COLLECTION TECHNIQUE. PLEASE REPEAT COLLECTION UNLESS PATIENT HAS STARTED ANTIBIOTIC TREATMENT. MSSA/MRSA SC BY PCR, NASAL SWAB [[id number]] Collected: 03/05/22 0500    Order Status: Canceled Specimen: Nasal swab     COVID-19 RAPID TEST [[id number]] Collected: 03/04/22 1217    Order Status: Completed Specimen: Nasopharyngeal Updated: 03/04/22 1304     Specimen source Nasopharyngeal        COVID-19 rapid test Not detected        Comment:      The specimen is NEGATIVE for SARS-CoV-2, the novel coronavirus associated with COVID-19. A negative result does not rule out COVID-19. This test has been authorized by the FDA under an Emergency Use Authorization (EUA) for use by authorized laboratories.         Fact sheet for Healthcare Providers: iTendency.uy  Fact sheet for Patients: iTendency.uy       Methodology: Isothermal Nucleic Acid Amplification               SARS-CoV-2 Lab Results  \"Novel Coronavirus\" Test: No results found for: COV2NT   \"Emergent Disease\" Test: No results found for: EDPR  \"SARS-COV-2\" Test: No results found for: XGCOVT  \"Precision Labs\" Test: No results found for: RSLT  Rapid Test:   Lab Results   Component Value Date/Time    COVR Not detected 03/04/2022 12:17 PM            Labs: Results:       BMP, Mg, Phos Recent Labs     03/09/22  0405 03/08/22  0417 03/07/22  0357    145 143   K 3.6 3.8 3.5   * 111* 108*   CO2 28 28 29   AGAP 5* 6* 6*   BUN 22 24* 26*   CREA 1.30* 1.20* 1.20*   CA 9.3 8.8 8.8   * 85 147*      CBC Recent Labs     03/09/22 0405 03/08/22  0417 03/07/22  0632   WBC 9.4 7.2 7.8   RBC 3.20* 3.15* 3.17*   HGB 9.1* 9.1* 8.7*   HCT 30.3* 30.7* 29.3*    191 224   GRANS 66 57 61   LYMPH 24 29 27   EOS 3 3 2   MONOS 7 9 9   BASOS 0 1 0   IG 1 1 1   ANEU 6.2 4.1 4.8   ABL 2.2 2.1 2.1   DELL 0.3 0.2 0.2   ABM 0.6 0.6 0.7   ABB 0.0 0.1 0.0   AIG 0.1 0.1 0.1      LFT No results for input(s): ALT, TBIL, AP, TP, ALB, GLOB, AGRAT in the last 72 hours.     No lab exists for component: SGOT, GPT   Cardiac Testing Lab Results   Component Value Date/Time    BNP 38 09/10/2016 10:15 AM    BNP 54 04/19/2016 03:30 PM    BNP 22 03/01/2012 02:53 PM    CK 95 12/14/2021 07:31 PM    CK 64 08/21/2018 09:25 PM    CK 38 07/16/2010 04:05 PM    CK - MB 0.7 07/16/2010 04:05 PM    CK-MB Index 1.8 07/16/2010 04:05 PM    Troponin-I <0.05 03/01/2012 02:53 PM    Troponin-I, Qt. <0.02 (L) 08/06/2019 06:25 PM    Troponin-I, Qt. <0.02 (L) 08/21/2018 03:09 PM    Troponin-I, Qt. <0.04 (L) 07/17/2010 06:52 AM      Coagulation Tests Lab Results   Component Value Date/Time    Prothrombin time 12.6 01/23/2022 03:00 PM    Prothrombin time 13.1 07/27/2018 08:53 PM    Prothrombin time 10.4 03/01/2012 02:08 PM    INR 0.9 01/23/2022 03:00 PM    INR 1.0 07/27/2018 08:53 PM    INR 1.0 03/01/2012 02:08 PM    aPTT 34.6 03/04/2022 08:20 PM    aPTT 27.1 07/27/2018 08:53 PM    aPTT 27.3 07/12/2014 02:30 PM      A1c Lab Results   Component Value Date/Time    Hemoglobin A1c 8.9 (H) 01/23/2022 03:00 PM    Hemoglobin A1c 8.3 (H) 11/30/2021 03:41 PM    Hemoglobin A1c 6.1 (H) 08/31/2021 03:26 PM      Lipid Panel Lab Results   Component Value Date/Time    Cholesterol, total 153 11/30/2021 03:41 PM    HDL Cholesterol 53 11/30/2021 03:41 PM    LDL, calculated 70 11/30/2021 03:41 PM    LDL, calculated 72 10/07/2019 10:16 AM    VLDL, calculated 30 11/30/2021 03:41 PM    VLDL, calculated 15 10/07/2019 10:16 AM    Triglyceride 179 (H) 11/30/2021 03:41 PM    CHOL/HDL Ratio 2.8 02/22/2017 09:54 AM      Thyroid Panel Lab Results   Component Value Date/Time    TSH 1.080 11/30/2021 03:41 PM    TSH 1.960 07/08/2019 09:55 AM    TSH 0.888 08/22/2018 06:01 AM        Most Recent UA Lab Results   Component Value Date/Time    Color YELLOW 03/04/2022 12:17 PM    Appearance CLOUDY 03/04/2022 12:17 PM    Specific gravity 1.035 (H) 02/22/2020 05:19 PM    pH (UA) 6.0 03/04/2022 12:17 PM    Protein 300 (A) 03/04/2022 12:17 PM    Glucose Negative 03/04/2022 12:17 PM    Ketone TRACE (A) 03/04/2022 12:17 PM    Bilirubin Negative 03/04/2022 12:17 PM    Blood TRACE (A) 03/04/2022 12:17 PM    Urobilinogen 1.0 03/04/2022 12:17 PM    Nitrites Negative 03/04/2022 12:17 PM    Leukocyte Esterase SMALL (A) 03/04/2022 12:17 PM    WBC 20-50 03/04/2022 12:17 PM    RBC 5-10 03/04/2022 12:17 PM    Epithelial cells 0-3 03/04/2022 12:17 PM    Bacteria 4+ (H) 03/04/2022 12:17 PM    Casts 0-3 03/04/2022 12:17 PM    Crystals, urine 0 02/21/2010 01:15 PM    Mucus 0 12/14/2021 10:08 PM            Other Studies:  No results found. Signed:  Nadine Solares MD    Part of this note may have been written by using a voice dictation software. The note has been proof read but may still contain some grammatical/other typographical errors.

## 2022-03-09 NOTE — PROGRESS NOTES
Problem: Pressure Injury - Risk of  Goal: *Prevention of pressure injury  Description: Document Carlos Scale and appropriate interventions in the flowsheet. Outcome: Progressing Towards Goal  Note: Pressure Injury Interventions:  Sensory Interventions: Assess changes in LOC    Moisture Interventions: Absorbent underpads    Activity Interventions: Increase time out of bed    Mobility Interventions: PT/OT evaluation    Nutrition Interventions: Document food/fluid/supplement intake    Friction and Shear Interventions: Minimize layers                Problem: Patient Education: Go to Patient Education Activity  Goal: Patient/Family Education  Outcome: Progressing Towards Goal     Problem: Falls - Risk of  Goal: *Absence of Falls  Description: Document Torito Fall Risk and appropriate interventions in the flowsheet.   Outcome: Progressing Towards Goal  Note: Fall Risk Interventions:  Mobility Interventions: Bed/chair exit alarm         Medication Interventions: Bed/chair exit alarm    Elimination Interventions: Bed/chair exit alarm    History of Falls Interventions: Bed/chair exit alarm         Problem: Patient Education: Go to Patient Education Activity  Goal: Patient/Family Education  Outcome: Progressing Towards Goal     Problem: Patient Education: Go to Patient Education Activity  Goal: Patient/Family Education  Outcome: Progressing Towards Goal

## 2022-03-09 NOTE — PROGRESS NOTES
ACUTE PHYSICAL THERAPY GOALS:  (Developed with and agreed upon by patient and/or caregiver.)  (1.)Ms. Hatfield will move from supine to sit and sit to supine , scoot up and down and roll side to side in bed with MODIFIED INDEPENDENCE within 7 treatment day(s).    (2.)Ms. Hatfield will transfer from bed to chair and chair to bed with CONTACT GUARD ASSIST using the least restrictive device within 7 treatment day(s).    (3.)Ms. Hatfield will ambulate with MINIMAL ASSIST for a minimum of 50 feet with the least restrictive device within 7 treatment day(s). PHYSICAL THERAPY: Daily Note and PM Treatment Day # 3    Aide Teran is a 80 y.o. female   PRIMARY DIAGNOSIS: Pulmonary embolism ([facility]  HCAP (healthcare-associated pneumonia) [J18.9]  Volume overload [E87.70]  Pulmonary embolism (HCC) [I26.99]  Pulmonary edema [J81.1]         ASSESSMENT:     REHAB RECOMMENDATIONS: CURRENT LEVEL OF FUNCTION:  (Most Recently Demonstrated)   Recommendation to date pending progress:  Setting:   Short-term Rehab but DOES NOT want to go  Equipment:    To Be Determined Bed Mobility:   Minimal Assistance  Sit to Stand:   Minimal Assistance  Transfers:   Minimal Assistance  Gait/Mobility:   Minimal Assistance     ASSESSMENT:  Ms. Shauna Teran is supine in bed and agreeable to therapy. Has O2 saturations high 90's. Bed mobility is with min assist however the patient is doing well. Found to be soiled, patient cleaned up with RN at bedside. Sitting balance good. Patient is eager to get to the recliner. Sit to stand and gait with rolling walker around the bed to the recliner. Gait is slow and the patient does have a little unsteadiness but otherwise is going really good. Patient is able to position herself in the chair. Good session as the patient is making progress with her goals. Left in the recliner with needs within reach and RN aware of her location. Continue PT efforts.   Patient states that she DOES NOT want to return to rehab, therefore we need to find out if she will have assistance at home upon d/c     SUBJECTIVE:   Ms. Jeet Osborn states, \"OK\"    SOCIAL HISTORY/ LIVING ENVIRONMENT: see eval  Home Environment: Rehabilitation facility  One/Two Story Residence: One story  Living Alone: No  Support Systems: Child(edward)  OBJECTIVE:     PAIN: VITAL SIGNS: LINES/DRAINS:   Pre Treatment: Pain Screen  Pain Scale 1: Numeric (0 - 10)  Pain Intensity 1: 0/10  Post Treatment: 0/10   Continuous Pulse Oximetry, Landers Catheter and IV  O2 Device: Nasal cannula     MOBILITY: I Mod I S SBA CGA Min Mod Max Total  NT x2 Comments:   Bed Mobility    Rolling [] [] [] [] [] [x] [] [] [] [] []    Supine to Sit [] [] [] [] [] [x] [] [] [] [] []    Scooting [] [] [] [] [] [x] [] [] [] [] []    Sit to Supine [] [] [] [] [] [] [] [] [] [x] []    Transfers    Sit to Stand [] [] [] [] [] [x] [] [] [] [] []    Bed to Chair [] [] [] [] [] [x] [] [] [] [] []    Stand to Sit [] [] [] [] [] [x] [] [] [] [] []    I=Independent, Mod I=Modified Independent, S=Supervision, SBA=Standby Assistance, CGA=Contact Guard Assistance,   Min=Minimal Assistance, Mod=Moderate Assistance, Max=Maximal Assistance, Total=Total Assistance, NT=Not Tested    BALANCE: Good Fair+ Fair Fair- Poor NT Comments   Sitting Static [x] [] [] [] [] []    Sitting Dynamic [x] [] [] [] [] []              Standing Static [] [x] [] [] [] []    Standing Dynamic [] [x] [] [] [] []      GAIT: I Mod I S SBA CGA Min Mod Max Total  NT x2 Comments:   Level of Assistance [] [] [] [] [] [x] [] [] [] [] []    Distance  20 feet     DME Rolling Walker    Gait Quality slow    Weightbearing  Status N/A     I=Independent, Mod I=Modified Independent, S=Supervision, SBA=Standby Assistance, CGA=Contact Guard Assistance,   Min=Minimal Assistance, Mod=Moderate Assistance, Max=Maximal Assistance, Total=Total Assistance, NT=Not Tested    PLAN:   FREQUENCY/DURATION: PT Plan of Care: 3 times/week for duration of hospital stay or until stated goals are met, whichever comes first.  TREATMENT:     TREATMENT:   ($$ Therapeutic Activity: 23-37 mins    )  Therapeutic Activity (24 Minutes): Therapeutic activity included Rolling, Supine to Sit, Scooting, Transfer Training, Ambulation on level ground, Sitting balance  and Standing balance to improve functional Mobility, Strength and Activity tolerance.     TREATMENT GRID:  N/A    AFTER TREATMENT POSITION/PRECAUTIONS:  Chair, Needs within reach and RN notified    INTERDISCIPLINARY COLLABORATION:  RN/PCT and PT/PTA    TOTAL TREATMENT DURATION:  PT Patient Time In/Time Out  Time In: 1302  Time Out: 26    Contreras Chung PTA

## 2022-03-09 NOTE — PROGRESS NOTES
SW met with pt/dtr at the bedside to discuss dc plans. Dtr confirmed that the family does still want to take her home with [facility] vs the pt returning to [facility] facility. She expressed no preference of [facility] agency. Referral submitted to [facility] for RN/PT/OT services. Dtr confirmed that she will be in contact with [facility] to reinstate homemaker/aide services. Pt possibly will need ambulance transport home. O2 needs still undetermined although they are trying to wean her to RA. SW following to assist with pt's dc needs. Care Management Interventions  PCP Verified by CM:  Yes  Mode of Transport at Discharge: BLS  Transition of Care Consult (CM Consult): Discharge [address] [place]: Yes  Discharge Durable Medical Equipment: No  Physical Therapy Consult: Yes  Occupational Therapy Consult: Yes  Speech Therapy Consult: No  Support Systems: Child(edward)  Confirm Follow Up Transport: Family  The Plan for Transition of Care is Related to the Following Treatment Goals : [facility] RN & therapy services to improve pt's strength and functional abilities  The Patient and/or Patient Representative was Provided with a Choice of Provider and Agrees with the Discharge Plan?: Yes  Name of the Patient Representative Who was Provided with a Choice of Provider and Agrees with the Discharge Plan: [facility]  [place] of Choice List was Provided with Basic Dialogue that Supports the Patient's Individualized Plan of Care/Goals, Treatment Preferences and Shares the Quality Data Associated with the Providers?: Yes  Discharge Location  Patient Expects to be Discharged to[de-identified] Home with [place] [facility])

## 2022-03-10 NOTE — PROGRESS NOTES
Pt is medically cleared for dc to home today with [place] RN/PT/OT services through [facility] [place]. Pt will need home O2 at 2L continuous. Order received and submitted to [facility].  Pt will be provided with a portable tank for the transport home. [facility] will contact the dtr to arrange delivery of the concentrator to the home. Awaiting dtr's arrival to determine if ambulance transport home is needed at [place]. No other dc needs or concerns identified or reported. SW remains available to assist as needed. 1514:  Pt's dtr is transporting the pt home. Portable tank provided. She requested a hospital bed. SW explained that the pt may not qualify for one through insurance but an order will be sent to [facility] and they can try to qualify her. She verbalized understanding. Referral sent to [facility] and SW spoke with their office to alert them to the order. Care Management Interventions  PCP Verified by CM:  Yes  Mode of Transport at Discharge: S  Transition of Care Consult (CM Consult): Discharge [address]: Yes  Discharge Durable Medical Equipment: Yes (home O2 from [facility])  Physical Therapy Consult: Yes  Occupational Therapy Consult: Yes  Speech Therapy Consult: No  Support Systems: Child(edward)  Confirm Follow Up Transport: Family  The Plan for Transition of Care is Related to the Following Treatment Goals : [place] RN & therapy services to improve pt's strength and functional abilities  The Patient and/or Patient Representative was Provided with a Choice of Provider and Agrees with the Discharge Plan?: Yes  Name of the Patient Representative Who was Provided with a Choice of Provider and Agrees with the Discharge Plan: Patric Lafleur  [place] of Choice List was Provided with Basic Dialogue that Supports the Patient's Individualized Plan of Care/Goals, Treatment Preferences and Shares the Quality Data Associated with the Providers?: Yes  Discharge Location  Patient Expects to be Discharged to[de-identified] Home with [facility])

## 2022-03-10 NOTE — PROGRESS NOTES
Problem: Pressure Injury - Risk of  Goal: *Prevention of pressure injury  Description: Document Carlos Scale and appropriate interventions in the flowsheet. Outcome: Progressing Towards Goal  Note: Pressure Injury Interventions:  Sensory Interventions: Assess changes in LOC    Moisture Interventions: Absorbent underpads    Activity Interventions: Increase time out of bed    Mobility Interventions: HOB 30 degrees or less    Nutrition Interventions: Document food/fluid/supplement intake    Friction and Shear Interventions: Minimize layers                Problem: Patient Education: Go to Patient Education Activity  Goal: Patient/Family Education  Outcome: Progressing Towards Goal     Problem: Falls - Risk of  Goal: *Absence of Falls  Description: Document Torito Fall Risk and appropriate interventions in the flowsheet.   Outcome: Progressing Towards Goal  Note: Fall Risk Interventions:  Mobility Interventions: Bed/chair exit alarm         Medication Interventions: Bed/chair exit alarm    Elimination Interventions: Bed/chair exit alarm    History of Falls Interventions: Bed/chair exit alarm         Problem: Patient Education: Go to Patient Education Activity  Goal: Patient/Family Education  Outcome: Progressing Towards Goal

## 2022-03-10 NOTE — PROGRESS NOTES
Oxygen Qualifier       Room air: SpO2 with O2 and liter flow   Resting SpO2  86%  88% 1L  91% 2L   Ambulating SpO2  91%      At rest and ambulation patient requires 2L. Completed by:     Oscar Faustin

## 2022-03-10 NOTE — PROGRESS NOTES
ACUTE PHYSICAL THERAPY GOALS:  (Developed with and agreed upon by patient and/or caregiver.)  (1.)Ms. Hatfield will move from supine to sit and sit to supine , scoot up and down and roll side to side in bed with MODIFIED INDEPENDENCE within 7 treatment day(s).    (2.)Ms. Hatfield will transfer from bed to chair and chair to bed with CONTACT GUARD ASSIST using the least restrictive device within 7 treatment day(s).    (3.)Ms. Hatfield will ambulate with MINIMAL ASSIST for a minimum of 50 feet with the least restrictive device within 7 treatment day(s). PHYSICAL THERAPY: Daily Note and PM Treatment Day # 4    Aide Willson is a 80 y.o. female   PRIMARY DIAGNOSIS: Pulmonary embolism ([facility]  HCAP (healthcare-associated pneumonia) [J18.9]  Volume overload [E87.70]  Pulmonary embolism (HCC) [I26.99]  Pulmonary edema [J81.1]         ASSESSMENT:     REHAB RECOMMENDATIONS: CURRENT LEVEL OF FUNCTION:  (Most Recently Demonstrated)   Recommendation to date pending progress:  Setting:   Short-term Rehab but DOES NOT want to go  Equipment:    To Be Determined Bed Mobility:   Minimal Assistance  Sit to Stand:   Minimal Assistance  Transfers:   Minimal Assistance  Gait/Mobility:   Minimal Assistance     ASSESSMENT:  Ms. Madelaine Willson is supine in bed and agreeable to therapy. A little [place]. Has O2 saturations in the  90's. Cues for proper breathing reviewed with the patient and her daughter. Patient appears a little upset about her IV site bleeding but this writer did assure the patient that it looked okay(IV had been removed and a pressure dressing had been applied). Bed mobility is with min assist however the patient is doing well. Sitting balance good. Patient is eager to get to the recliner. Sit to stand and gait with rolling walker around the bed to the recliner. Gait is slow and the patient does have a little unsteadiness but otherwise is going really good. Patient is able to position herself in the chair. Daughter arrives with her clothes for d/c, patient stood and found to be soiled therefore she was cleaned and clean brief appiled and assisted patient with her clothes. Patient did well with standing. Good session as the patient is making progress with her goals. Left in the recliner with daughter and RN at bedside. Patient is discharging home now with daughter.  HHPT to follow     SUBJECTIVE:   Ms. Jeet Osborn states, \"I want to get up\"    SOCIAL HISTORY/ LIVING ENVIRONMENT: see eval  Home Environment: Rehabilitation facility  One/Two Story Residence: One story  Living Alone: No  Support Systems: Child(edward)  OBJECTIVE:     PAIN: VITAL SIGNS: LINES/DRAINS:   Pre Treatment: Pain Screen  Pain Scale 1: Numeric (0 - 10)  Pain Intensity 1: 0/10  Post Treatment: 0/10   O2  O2 Device: Nasal cannula     MOBILITY: I Mod I S SBA CGA Min Mod Max Total  NT x2 Comments:   Bed Mobility    Rolling [] [] [] [] [] [x] [] [] [] [] []    Supine to Sit [] [] [] [] [] [x] [] [] [] [] []    Scooting [] [] [] [] [] [x] [] [] [] [] []    Sit to Supine [] [] [] [] [] [] [] [] [] [x] []    Transfers    Sit to Stand [] [] [] [] [] [x] [] [] [] [] []    Bed to Chair [] [] [] [] [] [x] [] [] [] [] []    Stand to Sit [] [] [] [] [] [x] [] [] [] [] []    I=Independent, Mod I=Modified Independent, S=Supervision, SBA=Standby Assistance, CGA=Contact Guard Assistance,   Min=Minimal Assistance, Mod=Moderate Assistance, Max=Maximal Assistance, Total=Total Assistance, NT=Not Tested    BALANCE: Good Fair+ Fair Fair- Poor NT Comments   Sitting Static [x] [] [] [] [] []    Sitting Dynamic [x] [] [] [] [] []              Standing Static [] [x] [] [] [] []    Standing Dynamic [] [x] [] [] [] []      GAIT: I Mod I S SBA CGA Min Mod Max Total  NT x2 Comments:   Level of Assistance [] [] [] [] [] [x] [] [] [] [] []    Distance  20 feet     DME Rolling Walker    Gait Quality slow    Weightbearing  Status N/A     I=Independent, Mod I=Modified Independent, S=Supervision, SBA=Standby Assistance, CGA=Contact Guard Assistance,   Min=Minimal Assistance, Mod=Moderate Assistance, Max=Maximal Assistance, Total=Total Assistance, NT=Not Tested    PLAN:   FREQUENCY/DURATION: PT Plan of Care: 3 times/week for duration of hospital stay or until stated goals are met, whichever comes first.  TREATMENT:     TREATMENT:   ($$ Therapeutic Activity: 23-37 mins    )  Therapeutic Activity (23 Minutes): Therapeutic activity included Rolling, Supine to Sit, Scooting, Transfer Training, Ambulation on level ground, Sitting balance  and Standing balance to improve functional Mobility, Strength and Activity tolerance.     TREATMENT GRID:  N/A    AFTER TREATMENT POSITION/PRECAUTIONS:  Chair, Needs within reach, Visitors at bedside and RN at bedside    INTERDISCIPLINARY COLLABORATION:  RN/PCT and PT/PTA    TOTAL TREATMENT DURATION:  PT Patient Time In/Time Out  Time In: 1440  Time Out: 1512 [address] [place] [address] [facility]

## 2022-03-10 NOTE — DISCHARGE SUMMARY
Hospitalist Discharge Summary     Patient ID:  Carlton Yoder  [id number]  12 y.o.  1/28/1929  Admit date: 3/4/2022  Discharge date: 3/10/2022  Attending: Austin Ferreira DO  PCP:  Amaya Weller NP  Treatment Team: Attending Provider: Austin Ferreira DO; Hospitalist: Mame Romero MD; Utilization Review: Kylie Deleonland; Staff Nurse: Marino Jauregui RN; Care Manager: Redd Yuen LM; Utilization Review: Lalo Norman; Tech: Kvng Scott; Charge Nurse: Tosin Eubanks RN; Hospitalist: Austin Ferreira DO; Physical Therapy Assistant: Archie Moses; Occupational Therapy Assistant: Timothy Ferreira    Principal Diagnosis Pulmonary embolism [facility])    Hospital Problems as of 3/10/2022 Date Reviewed: 3/9/2022          Codes Class Noted - Resolved POA    Pulmonary edema ICD-10-CM: J81.1  ICD-9-CM: 881  3/4/2022 - Present Unknown        * (Principal) Pulmonary embolism ([facility] ICD-10-CM: I26.99  ICD-9-CM: 415.19  3/4/2022 - Present Unknown        Volume overload ICD-10-CM: E87.70  ICD-9-CM: 276.69  3/4/2022 - Present Unknown        HCAP (healthcare-associated pneumonia) ICD-10-CM: J18.9  ICD-9-CM: 592  3/4/2022 - Present Unknown        Moderate protein-calorie malnutrition ([facility] ICD-10-CM: E44.0  ICD-9-CM: 263.0  12/16/2021 - Present Yes                  Hospital Course:  Please refer to the admission H&P for details of presentation. In summary, the patient is a 20-year-old female with history of diabetes, hypertension, chronic kidney disease stage II per records, peripheral arterial disease and recent hospitalization for blood loss anemia secondary to rectal erosions presented with shortness of breath and found to have pulmonary embolism, volume overload and concern for healthcare associated pneumonia. Patient was diuresed adequately ultimately her diagnosis was stopped. Echo did not show any acute finding and patient was evaluated by cardiology.   Hemoglobin stayed stable on heparin drip as patient has history of GI bleed in the past.  Vancomycin was stopped when culture was negative for 2 days. She qualified for home oxygen. She remained stable and was discharged home with her daughter after they refused to send her to SNF. Did Patient Have Sepsis: NO    Procedures done this admission:  * No surgery found *    Consults this admission:  IP CONSULT TO CARDIOLOGY    Significant Diagnostic Studies:    Labs: Results:       Chemistry Recent Labs     03/10/22  0229 03/09/22  0405 03/08/22  0417   * 111* 85    143 145   K 3.9 3.6 3.8   * 110* 111*   CO2 29 28 28   BUN 30* 22 24*   CREA 1.30* 1.30* 1.20*   CA 9.1 9.3 8.8   AGAP 2* 5* 6*      CBC w/Diff Recent Labs     03/10/22  0229 03/09/22  0405 03/08/22  0417   WBC 9.3 9.4 7.2   RBC 3.03* 3.20* 3.15*   HGB 8.6* 9.1* 9.1*   HCT 28.7* 30.3* 30.7*    212 191   GRANS 67 66 57   LYMPH 22 24 29   EOS 2 3 3      Cardiac Enzymes No results for input(s): CPK, CKND1, SIRIA in the last 72 hours. No lab exists for component: CKRMB, TROIP   Coagulation No results for input(s): PTP, INR, APTT, INREXT in the last 72 hours. Lipid Panel Lab Results   Component Value Date/Time    Cholesterol, total 153 11/30/2021 03:41 PM    HDL Cholesterol 53 11/30/2021 03:41 PM    LDL, calculated 70 11/30/2021 03:41 PM    LDL, calculated 72 10/07/2019 10:16 AM    VLDL, calculated 30 11/30/2021 03:41 PM    VLDL, calculated 15 10/07/2019 10:16 AM    Triglyceride 179 (H) 11/30/2021 03:41 PM    CHOL/HDL Ratio 2.8 02/22/2017 09:54 AM      BNP No results for input(s): BNPP in the last 72 hours. Liver Enzymes No results for input(s): TP, ALB, TBIL, AP in the last 72 hours. No lab exists for component: SGOT, GPT, DBIL   Thyroid Studies Lab Results   Component Value Date/Time    TSH 1.080 11/30/2021 03:41 PM    TSH 1.960 07/08/2019 09:55 AM            Imaging:  DUPLEX LOWER EXT VENOUS BILAT    Result Date: 3/5/2022  1.  No evidence of deep venous thrombosis in either lower extremity 2. Occlusion of left peroneal veins. This is age indeterminate. Microbiology/Cultures: All Micro Results     Procedure Component Value Units Date/Time    CULTURE, BLOOD [[id number]] Collected: 03/04/22 1217    Order Status: Completed Specimen: Blood Updated: 03/10/22 0911     Special Requests: --        RIGHT  Antecubital       Culture result: NO GROWTH 6 DAYS       CULTURE, BLOOD [[id number]] Collected: 03/04/22 1221    Order Status: Completed Specimen: Blood Updated: 03/10/22 0911     Special Requests: --        LEFT  ARM       Culture result: NO GROWTH 6 DAYS       CULTURE, URINE [[id number]] Collected: 03/04/22 1915    Order Status: Completed Specimen: Urine from Clean catch Updated: 03/07/22 0729     Special Requests: NO SPECIAL REQUESTS        Culture result:       >100,000 COLONIES/mL MIXED SKIN NGOZI ISOLATED                  THREE OR MORE TYPES OF ORGANISMS ARE PRESENT. THIS IS INDICATIVE OF CONTAMINATION DUE TO IMPROPER COLLECTION TECHNIQUE. PLEASE REPEAT COLLECTION UNLESS PATIENT HAS STARTED ANTIBIOTIC TREATMENT. MSSA/MRSA SC BY PCR, NASAL SWAB [[id number]] Collected: 03/05/22 0500    Order Status: Canceled Specimen: Nasal swab     COVID-19 RAPID TEST [[id number]] Collected: 03/04/22 1217    Order Status: Completed Specimen: Nasopharyngeal Updated: 03/04/22 1304     Specimen source Nasopharyngeal        COVID-19 rapid test Not detected        Comment:      The specimen is NEGATIVE for SARS-CoV-2, the novel coronavirus associated with COVID-19. A negative result does not rule out COVID-19. This test has been authorized by the FDA under an Emergency Use Authorization (EUA) for use by authorized laboratories.         Fact sheet for Healthcare Providers: ConventionUpdate.co.nz  Fact sheet for Patients: ConventionUpdate.co.nz       Methodology: Isothermal Nucleic Acid Amplification               Discharge Exam:  Visit Vitals  BP (!) 148/76 (BP 1 Location: Left upper arm, BP Patient Position: Other (Comment)) Comment: Nurse Al notified. Pulse 80   Temp 98.3 °F (36.8 °C)   Resp 17   Ht 5' 2\" (1.575 m)   Wt 67.2 kg (148 lb 3.2 oz)   SpO2 92%   Breastfeeding No   BMI 27.11 kg/m²     General appearance: alert, cooperative, no distress, appears stated age  Lungs: clear to auscultation bilaterally  Heart: regular rate and rhythm, S1, S2 normal, no murmur, click, rub or gallop  Abdomen: soft, non-tender. Bowel sounds normal. No masses,  no organomegaly  Extremities: no cyanosis or edema  Neurologic: Grossly normal    Disposition: Home Health Care [facility]  Discharge Condition: stable  Patient Instructions:   Current Discharge Medication List      START taking these medications    Details   apixaban (ELIQUIS) 5 mg tablet Take 2 Tablets by mouth two (2) times a day for 7 days, THEN 1 Tablet two (2) times a day for 30 days. Refill at 5mg BID x 30 days Refills: 2  Qty: 88 Tablet, Refills: 2  Start date: 3/10/2022, End date: 4/16/2022         CONTINUE these medications which have CHANGED    Details   sertraline (ZOLOFT) 100 mg tablet Take 1 Tablet by mouth daily. Indications: major depressive disorder, am  Qty: 1 Tablet, Refills: 0  Start date: 3/10/2022    Associated Diagnoses: Anxiety and depression         CONTINUE these medications which have NOT CHANGED    Details   multivit-min-FA-lycopen-lutein (Centrum Silver) 0.4-300-250 mg-mcg-mcg tab Take  by mouth daily. insulin lispro (HumaLOG KwikPen Insulin) 100 unit/mL kwikpen by SubCUTAneous route. Sliding scale:  0 units;   <60 notify MD/NP;   150-199 = 2 units;   200-249 = 4 units;   250-299=6 units;   300-349=8 units;   350-399= 10 units;   400-449 = 10 units;   > 400 give 10 units and notify MD/NP, subcutaneously before meals and at bedtime      Iron Fum & P-FA-Vit B & C No.9 (Integra Plus) 125 mg iron- 1 mg cap Take 1 Capsule by mouth daily.       hydrALAZINE (APRESOLINE) 25 mg tablet Take 1 Tablet by mouth three (3) times daily. Qty: 30 Tablet, Refills: 0    Associated Diagnoses: Essential hypertension      pantoprazole (PROTONIX) 20 mg tablet Take 2 Tablets by mouth daily. TAKE 1 TABLET BY MOUTH DAILY  Qty: 90 Tablet, Refills: 1    Comments: **Patient requests 90 days supply**  Associated Diagnoses: Gastroesophageal reflux disease, unspecified whether esophagitis present      ferrous sulfate 325 mg (65 mg iron) tablet Take 1 Tablet by mouth daily (with breakfast). Qty: 30 Tablet, Refills: 0      pravastatin (PRAVACHOL) 40 mg tablet TAKE 1 TABLET BY MOUTH EVERY NIGHT AT BEDTIME  Qty: 90 Tablet, Refills: 1    Associated Diagnoses: Controlled type 2 diabetes mellitus with microalbuminuria, without long-term current use of insulin ([address] Mixed hyperlipidemia      metFORMIN (GLUCOPHAGE) 500 mg tablet Take 1 Tablet by mouth two (2) times daily (with meals). Qty: 180 Tablet, Refills: 1    Associated Diagnoses: Controlled type 2 diabetes mellitus with microalbuminuria, without long-term current use of insulin (HCC)      gabapentin (NEURONTIN) 100 mg capsule Take 1 Capsule by mouth three (3) times daily. Qty: 270 Capsule, Refills: 1    Associated Diagnoses: Controlled type 2 diabetes mellitus with microalbuminuria, without long-term current use of insulin ([address] Neuropathy      amLODIPine (NORVASC) 10 mg tablet Take 1 Tablet by mouth daily. Qty: 90 Tablet, Refills: 1    Associated Diagnoses: Essential hypertension      lidocaine 4 % patch Apply to affected area daily prn. Qty: 30 Each, Refills: 2    Associated Diagnoses: Chronic bilateral low back pain without sciatica      fluticasone propion-salmeteroL (Advair Diskus) 250-50 mcg/dose diskus inhaler Take 1 Puff by inhalation every twelve (12) hours.   Qty: 3 Inhaler, Refills: 1    Associated Diagnoses: Mild persistent asthma without complication      acetaminophen (TYLENOL ARTHRITIS PAIN) 650 mg TbER Take 650 mg by mouth every eight (8) hours. Tylenol arthritis as needed      VENTOLIN HFA 90 mcg/actuation inhaler INHALE 2 PUFFS BY MOUTH EVERY 6 HOURS AS NEEDED FOR WHEEZING  Qty: 1 Inhaler, Refills: 12      polyethylene glycol (MIRALAX) 17 gram/dose powder Take 17 g by mouth daily. 1 tablespoon with 8 oz of water daily  Qty: 119 g, Refills: 0      VIT A/VIT C/VIT E/ZINC/COPPER (PRESERVISION AREDS PO) Take 1 Tab by mouth two (2) times a day. Blood-Glucose Meter monitoring kit Use as directed 3 times daily to check blood sugars. Dx:E11.29  Qty: 1 Kit, Refills: 0    Associated Diagnoses: Type 2 diabetes mellitus with diabetic neuropathy, without long-term current use of insulin ([address] Controlled type 2 diabetes mellitus with microalbuminuria, without long-term current use of insulin ([facility])      glucose blood VI test strips (blood glucose test) strip Use as directed 3 times daily to check blood sugars. Dx:E11.29  Qty: 100 Strip, Refills: 3    Comments: Please dispense any brand her insurance will cover. Associated Diagnoses: Type 2 diabetes mellitus with diabetic neuropathy, without long-term current use of insulin ([address] Controlled type 2 diabetes mellitus with microalbuminuria, without long-term current use of insulin ([facility])      lancets misc Use as directed 3 times daily to check blood sugars. Dx:E11.29  Qty: 100 Each, Refills: 3    Comments: Please dispense any brand her insurance will cover. Associated Diagnoses: Type 2 diabetes mellitus with diabetic neuropathy, without long-term current use of insulin ([address] Controlled type 2 diabetes mellitus with microalbuminuria, without long-term current use of insulin [facility])      Wheel Chair kalyn Wheelchair  Qty: 1 Each, Refills: 0    Associated Diagnoses: Neuropathy; Chronic bilateral low back pain without sciatica;  Debility      OTHER One shower chair  Qty: 1 Each, Refills: 0             Activity: Activity as tolerated  Diet: Regular Diet  Wound Care: None needed    Follow-up:  Follow-up Appointments   Procedures    FOLLOW UP VISIT Appointment in: One Week PCP within one week     PCP within one week     Standing Status:   Standing     Number of Occurrences:   1     Order Specific Question:   Appointment in     Answer: One Week       Follow-up Information     Follow up With Specialties Details [facility] [address] [facility] therapy [facility]. A home health representative will contact you to schedule the initial home visit. [address]  [phone]    Tali Wilhelm NP Family Medicine, Nurse Practitioner   [address] [facility] [address]  [phone]            Follow up labs/diagnostics (ultimately defer to outpatient provider):  Oxygen recheck in PCP office    Plan was discussed with daughter, patient, nursing, and case management. All questions answered. Patient was stable at time of discharge. Instructions given to call a physician or return if any concerns. Time spent in patient discharge and coordination 33 minutes.   Signed:  Mandy Patel DO  3/10/2022  10:44 AM

## 2022-03-14 PROBLEM — R53.2 FUNCTIONAL QUADRIPLEGIA (HCC): Status: ACTIVE | Noted: 2022-01-01

## 2022-03-14 NOTE — ED PROVIDER NOTES
[address] EMERGENCY DEPARTMENT     Kali Medley is a 80 y.o. female seen on 3/14/2022 in the [facility] EMERGENCY DEPT in room ER14/14. Chief Complaint   Patient presents with    Irregular Heart Beat    Shortness of Breath     HPI: 80-year-old -American female presented to the emergency department after recommendation from home health to come back to the emergency department to be evaluated. Patient was discharged from the hospital last Thursday and it was recommended that time the patient go to rehab for physical therapy and Occupational Therapy. Patient's family felt like they could take care of patient at home so they declined going to rehab facility. When home health arrived today to evaluate patient, patient had not been out of the bed since she had come home on Thursday. Family was unable to take care of patient as they thought and were waiting for home health to come to evaluate patient for assistance. Patient had been admitted to the hospital for Covid pneumonia with respiratory failure. While in hospital she was also noted to have pulmonary embolisms. Patient wears 2 L of oxygen at home. Patient has been coughing and feels short of breath. She denies any fevers, abdominal pain, nausea, vomiting, diarrhea or other concerns. Historian: Patient/EMS/Home health    REVIEW OF SYSTEMS     Review of Systems   Constitutional: Positive for fatigue. HENT: Negative. Respiratory: Positive for shortness of breath. Cardiovascular: Negative. Gastrointestinal: Negative. Genitourinary: Negative. Musculoskeletal: Negative. Skin: Negative. Neurological: Positive for weakness (Generalized). Psychiatric/Behavioral: Negative. All other systems reviewed and are negative.       PAST MEDICAL HISTORY     Past Medical History:   Diagnosis Date    Acute posthemorrhagic anemia 02/03/2022    per [facility] faxed information    Anemia in chronic kidney disease per [facility] faxed information    Anxiety 2/1/2018    Arrhythmia     palpitations 2/10-went to [facility]    Arthritis     L leg- fell 2008    Asthma     adult onset x 20 yrs    B12 deficiency 8/24/2012    Body mass index 37.0-37.9, adult 2/5/2014    CAD (coronary artery disease)     cardiac work up-[id number]    Chronic kidney disease, stage 3 unspecified ([address] 12/24/2021    per [facility] faxed information    Cognitive communication deficit     per [facility] faxed information     Controlled type 2 diabetes mellitus with microalbuminuria, without long-term current use of insulin ([address] 11/22/2016    COPD     Corns and callosities 12/19/2016    CRI (chronic renal insufficiency) 8/24/2012    Depression 6/1/2012    Dermatophytosis of nail 12/19/2016    Diabetes ([address]     type II- home tests fasting-112    DJD (degenerative joint disease) of hip     DM (diabetes mellitus) type II controlled with renal manifestation ([address] 7/16/2010    Encounter for long-term (current) use of other medications 1/8/2013    Essential hypertension 7/16/2010    Gastrointestinal disorder     Gastrointestinal hemorrhage     per dx sheet from [facility] - date sent 3/2/2022     GERD (gastroesophageal reflux disease)     without esophagitis; per [facility] faxed information    GI bleed 7/28/2018    Heart failure ([address]     Hiatal hernia 8/24/2012    High cholesterol     History of COVID-19 02/03/2022    [facility] History of falling 12/24/2021    per [facility] faxed information    HLD (hyperlipidemia) 1/8/2013    Klebsiella pneumoniae (k. pneumoniae) as the cause of diseases classified elsewhere 12/24/2021    [facility]     Moderate protein-calorie malnutrition ([address] 12/24/2021    per [facility] faxed information    Muscle weakness (generalized)     per [facility] faxed information    Neuropathy 8/24/2012    Lower limbs     Obesity (BMI 30.0-39. 9) BMI-43.8    Other and unspecified hyperlipidemia 7/16/2010    Other chest pain 7/16/2010    Other lack of coordination     per [facility] faxed information    Oxygen dependent     4 L/NC    PAD (peripheral artery disease) ([facility] 8/24/2012    PVD (peripheral vascular disease) ([facility]     [facility]    Retinal hemorrhage     Type 2 diabetes mellitus with diabetic neuropathy ([facility]     Unspecified asthma, uncomplicated 47/53/9617    per [facility] faxed information    Urinary incontinence     Urinary tract infection      Past Surgical History:   Procedure Laterality Date    FLEXIBLE SIGMOIDOSCOPY N/A 1/24/2022    SIGMOIDOSCOPY FLEXIBLE COVID POSITIVE PREP/RECOVER IN [place] Admit to room 503 performed by [facility] at [facility] ENDOSCOPY    HX APPENDECTOMY      HX CHOLECYSTECTOMY  2000    HX ENDOSCOPY  02/27/2018    Dr Juan Carlos Sanchez    HX GI      small intestine obstruction    HX GYN      hyst    HX HEART CATHETERIZATION      HX HYSTERECTOMY      prolapse    HX INTRACRANIAL ANEURYSM REPAIR      HX ORTHOPAEDIC      right wrist 2010    HX OTHER SURGICAL      aneurysm clip-cerebral-2000    HX OTHER SURGICAL      cerebral aneurysm   Dr. Audrey Agustin       Social History     Socioeconomic History    Marital status: SINGLE   Tobacco Use    Smoking status: Never Smoker    Smokeless tobacco: Never Used   Vaping Use    Vaping Use: Never used   Substance and Sexual Activity    Alcohol use: No    Drug use: No    Sexual activity: Never   Social History Narrative     with four children     Prior to Admission Medications   Prescriptions Last Dose Informant Patient Reported? Taking? Blood-Glucose Meter monitoring kit   No No   Sig: Use as directed 3 times daily to check blood sugars.  Dx:E11.29   Iron Fum & P-FA-Vit B & C No.9 (Integra Plus) 125 mg iron- 1 mg cap   Yes No   Sig: Take 1 Capsule by mouth daily. OTHER   No No   Sig: One shower chair   VENTOLIN HFA 90 mcg/actuation inhaler   No No   Sig: INHALE 2 PUFFS BY MOUTH EVERY 6 HOURS AS NEEDED FOR WHEEZING   VIT A/VIT C/VIT E/ZINC/COPPER (PRESERVISION AREDS PO)   Yes No   Sig: Take 1 Tab by mouth two (2) times a day. Wheel Chair kalyn   No No   Sig: Wheelchair   acetaminophen (TYLENOL ARTHRITIS PAIN) 650 mg TbER   Yes No   Sig: Take 650 mg by mouth every eight (8) hours. Tylenol arthritis as needed   amLODIPine (NORVASC) 10 mg tablet   No No   Sig: Take 1 Tablet by mouth daily. apixaban (ELIQUIS) 5 mg tablet   No No   Sig: Take 2 Tablets by mouth two (2) times a day for 7 days, THEN 1 Tablet two (2) times a day for 30 days. Refill at 5mg BID x 30 days Refills: 2   ferrous sulfate 325 mg (65 mg iron) tablet   No No   Sig: Take 1 Tablet by mouth daily (with breakfast). fluticasone propion-salmeteroL (Advair Diskus) 250-50 mcg/dose diskus inhaler   No No   Sig: Take 1 Puff by inhalation every twelve (12) hours. gabapentin (NEURONTIN) 100 mg capsule   No No   Sig: Take 1 Capsule by mouth three (3) times daily. glucose blood VI test strips (blood glucose test) strip   No No   Sig: Use as directed 3 times daily to check blood sugars. Dx:E11.29   hydrALAZINE (APRESOLINE) 25 mg tablet   No No   Sig: Take 1 Tablet by mouth three (3) times daily. insulin lispro (HumaLOG KwikPen Insulin) 100 unit/mL kwikpen   Yes No   Sig: by SubCUTAneous route. Sliding scale:  0 units;   <60 notify MD/NP;   150-199 = 2 units;   200-249 = 4 units;   250-299=6 units;   300-349=8 units;   350-399= 10 units;   400-449 = 10 units;   > 400 give 10 units and notify MD/NP, subcutaneously before meals and at bedtime   lancets misc   No No   Sig: Use as directed 3 times daily to check blood sugars.  Dx:E11.29   lidocaine 4 % patch   No No   Sig: Apply to affected area daily prn.   metFORMIN (GLUCOPHAGE) 500 mg tablet   No No   Sig: Take 1 Tablet by mouth two (2) times daily (with meals). multivit-min-FA-lycopen-lutein (Centrum Silver) 0.4-300-250 mg-mcg-mcg tab   Yes No   Sig: Take  by mouth daily. pantoprazole (PROTONIX) 20 mg tablet   No No   Sig: Take 2 Tablets by mouth daily. TAKE 1 TABLET BY MOUTH DAILY   Patient taking differently: Take 40 mg by mouth daily. TAKE 40 mg BY MOUTH DAILY   polyethylene glycol (MIRALAX) 17 gram/dose powder   No No   Sig: Take 17 g by mouth daily. 1 tablespoon with 8 oz of water daily   pravastatin (PRAVACHOL) 40 mg tablet   No No   Sig: TAKE 1 TABLET BY MOUTH EVERY NIGHT AT BEDTIME   sertraline (ZOLOFT) 100 mg tablet   No No   Sig: Take 1 Tablet by mouth daily. Indications: major depressive disorder, am      Facility-Administered Medications: None     No Known Allergies     PHYSICAL EXAM       Vitals:    03/14/22 2110 03/14/22 2120 03/14/22 2130 03/14/22 2140   BP:       Pulse: 76 75 77 75   Resp: 21 21 17 15   Temp:       SpO2:  (!) 85% 100% 98%    Vital signs were reviewed. Physical Exam  Vitals and nursing note reviewed. Constitutional:       General: She is not in acute distress. Appearance: She is well-developed. She is not ill-appearing or toxic-appearing. HENT:      Head: Normocephalic and atraumatic. Mouth/Throat:      Mouth: Mucous membranes are moist.   Eyes:      Extraocular Movements: Extraocular movements intact. Pupils: Pupils are equal, round, and reactive to light. Cardiovascular:      Rate and Rhythm: Normal rate and regular rhythm. Musculoskeletal:      Cervical back: Normal range of motion. Neurological:      Mental Status: She is alert.           MEDICAL DECISION MAKING     ED Course:    Orders Placed This Encounter    CULTURE, BLOOD    CULTURE, BLOOD    XR Chest Port    CBC    CMP    MAGNESIUM    Troponin High Sensitivity    Repeat Troponin at 2 hours    URINALYSIS W/ RFLX MICROSCOPIC    NT-PRO BNP    LACTIC ACID    PROCALCITONIN    CARDIAC MONITOR - ED ONLY    PULSE OXIMETRY CONTINUOUS    EKG    SALINE LOCK IV ONE TIME Routine    sodium chloride (NS) flush 5-10 mL    sodium chloride (NS) flush 5-10 mL    furosemide (LASIX) injection 40 mg    vancomycin (VANCOCIN) 1,000 mg in 0.9% sodium chloride 250 mL (Vpho1Rac)    cefepime (MAXIPIME) 1 g in 0.9% sodium chloride (MBP/ADV) 50 mL MBP     Recent Results (from the past 8 hour(s))   CBC WITH AUTOMATED DIFF    Collection Time: 03/14/22  4:40 PM   Result Value Ref Range    WBC 9.9 4.3 - 11.1 K/uL    RBC 3.77 (L) 4.05 - 5.2 M/uL    HGB 10.9 (L) 11.7 - 15.4 g/dL    HCT 36.6 35.8 - 46.3 %    MCV 97.1 79.6 - 97.8 FL    MCH 28.9 26.1 - 32.9 PG    MCHC 29.8 (L) 31.4 - 35.0 g/dL    RDW 19.7 (H) 11.9 - 14.6 %    PLATELET 249 160 - 961 K/uL    MPV 11.6 9.4 - 12.3 FL    ABSOLUTE NRBC 0.00 0.0 - 0.2 K/uL    DF AUTOMATED      NEUTROPHILS 72 43 - 78 %    LYMPHOCYTES 20 13 - 44 %    MONOCYTES 6 4.0 - 12.0 %    EOSINOPHILS 1 0.5 - 7.8 %    BASOPHILS 0 0.0 - 2.0 %    IMMATURE GRANULOCYTES 1 0.0 - 5.0 %    ABS. NEUTROPHILS 7.1 1.7 - 8.2 K/UL    ABS. LYMPHOCYTES 2.0 0.5 - 4.6 K/UL    ABS. MONOCYTES 0.5 0.1 - 1.3 K/UL    ABS. EOSINOPHILS 0.1 0.0 - 0.8 K/UL    ABS. BASOPHILS 0.0 0.0 - 0.2 K/UL    ABS. IMM. GRANS. 0.1 0.0 - 0.5 K/UL   METABOLIC PANEL, COMPREHENSIVE    Collection Time: 03/14/22  4:40 PM   Result Value Ref Range    Sodium 143 136 - 145 mmol/L    Potassium 4.2 3.5 - 5.1 mmol/L    Chloride 113 (H) 98 - 107 mmol/L    CO2 27 21 - 32 mmol/L    Anion gap 3 (L) 7 - 16 mmol/L    Glucose 328 (H) 65 - 100 mg/dL    BUN 20 8 - 23 MG/DL    Creatinine 1.20 (H) 0.6 - 1.0 MG/DL    GFR est AA 54 (L) >60 ml/min/1.73m2    GFR est non-AA 45 (L) >60 ml/min/1.73m2    Calcium 9.8 8.3 - 10.4 MG/DL    Bilirubin, total 0.3 0.2 - 1.1 MG/DL    ALT (SGPT) 23 12 - 65 U/L    AST (SGOT) 16 15 - 37 U/L    Alk.  phosphatase 76 50 - 136 U/L    Protein, total 6.5 6.3 - 8.2 g/dL    Albumin 2.0 (L) 3.2 - 4.6 g/dL Globulin 4.5 (H) 2.3 - 3.5 g/dL    A-G Ratio 0.4 (L) 1.2 - 3.5     MAGNESIUM    Collection Time: 03/14/22  4:40 PM   Result Value Ref Range    Magnesium 2.0 1.8 - 2.4 mg/dL   TROPONIN-HIGH SENSITIVITY    Collection Time: 03/14/22  4:40 PM   Result Value Ref Range    Troponin-High Sensitivity 23.3 (H) 0 - 14 pg/mL   NT-PRO BNP    Collection Time: 03/14/22  4:40 PM   Result Value Ref Range    NT pro-BNP 3,697 (H) <450 PG/ML   PROCALCITONIN    Collection Time: 03/14/22  4:40 PM   Result Value Ref Range    Procalcitonin 0.20 0.00 - 0.49 ng/mL   LACTIC ACID    Collection Time: 03/14/22  5:44 PM   Result Value Ref Range    Lactic acid 1.4 0.4 - 2.0 MMOL/L   TROPONIN-HIGH SENSITIVITY    Collection Time: 03/14/22  6:41 PM   Result Value Ref Range    Troponin-High Sensitivity 21.2 (H) 0 - 14 pg/mL   URINALYSIS W/ RFLX MICROSCOPIC    Collection Time: 03/14/22  9:59 PM   Result Value Ref Range    Color YELLOW      Appearance CLEAR      Specific gravity 1.012 1.001 - 1.023      pH (UA) 6.0 5.0 - 9.0      Protein 300 (A) NEG mg/dL    Glucose 100 mg/dL    Ketone Negative NEG mg/dL    Bilirubin Negative NEG      Blood Negative NEG      Urobilinogen 0.2 0.2 - 1.0 EU/dL    Nitrites Negative NEG      Leukocyte Esterase Negative NEG      WBC 0-3 0 /hpf    RBC 0-3 0 /hpf    Epithelial cells 0-3 0 /hpf    Bacteria 0 0 /hpf    Casts 3-5 0 /lpf     XR CHEST PORT    Result Date: 3/14/2022  PORTABLE CHEST ONE VIEW HISTORY: Shortness of breath COMPARISON: Chest radiograph 3/4/2022 and chest CT 3/4/2022 FINDINGS: Edema-like interstitial and airspace opacities are present throughout the lungs. Lung volumes are diminished. Asymmetric interstitial and airspace opacities particularly in the right upper lobe. This may be caused by pulmonary edema or infectious infiltrates. EKG interpretation personally: Rate 80. Sinus rhythm. Atrial premature complexes. Right bundle branch block and left anterior fascicular block.   Normal NH and QT intervals. ED Course as of 03/14/22 2257   Mon Mar 14, 2022   2241 Patient's pro-Jhon is normal, her lactic acid is normal in service for white blood cell count however when reviewing patient's previous chart, this is also the case when she was admitted to the hospital with bilateral pneumonia. Patient will be given antibiotics for healthcare associated pneumonia admitted to hospital for further evaluation. [JL]      ED Course User Index  [JL] Verle Ileen, DO     MDM  Number of Diagnoses or Management Options  Acute pulmonary edema (HCC)  Generalized weakness  HAP (hospital-acquired pneumonia)  Hypoxia  Pleural effusion  Post-COVID-19 condition  Diagnosis management comments: 77-year-old female presented back to the emergency department for evaluation secondary to home health being concerned the patient was not able to be cared for appropriately at home. Patient with new right upper lobe infiltrate which could be healthcare associated pneumonia, aspiration or pulmonary edema. Patient given IV Lasix in the emergency department. She did have an episode of hypoxia as well. After significant work delving through patient's previous medical record, I did decide to start patient on antibiotics for possible associated pneumonia. Patient had a normal lactic acid, procalcitonin and white blood cell count initially however in reading old charts, this was the case when patient was admitted to the hospital before. Patient seems to be worse than when she was discharged. Discussed with patient and with her family. Patient will be admitted to hospitalist for further treatment evaluation.        Amount and/or Complexity of Data Reviewed  Clinical lab tests: ordered and reviewed  Tests in the radiology section of CPT®: ordered and reviewed  Decide to obtain previous medical records or to obtain history from someone other than the patient: yes  Obtain history from someone other than the patient: yes  Review and summarize past medical records: yes  Discuss the patient with other providers: yes  Independent visualization of images, tracings, or specimens: yes    Patient Progress  Patient progress: stable        Disposition: Admitted   diagnosis:     ICD-10-CM ICD-9-CM   1. Hypoxia  R09.02 799.02   2. Acute pulmonary edema (HCC)  J81.0 518.4   3. Pleural effusion  J90 511.9   4. Generalized weakness  R53.1 780.79   5. Post-COVID-19 condition  U09.9 139.8   6. HAP (hospital-acquired pneumonia)  J18.9 5    Y95      ____________________________________________________________________  A portion of this note was generated using voice recognition dictation software. While the note has been reviewed for accuracy, please note certain words and phrases may not be transcribed as intended and some grammatical and/or typographical errors may be present.

## 2022-03-14 NOTE — ED TRIAGE NOTES
Pt arrives via [facility] from home. Pt was discharged home from the hospital on Thursday after being admitted for a PE. Per EMS Pt's home health nurse states her heart rate was irregular today without a known hx of afib. Pt complains of SOB. Wears 3LNC at baseline. Hx COPD.

## 2022-03-15 NOTE — PROGRESS NOTES
Pt admitted for irregular heartbeat with no history of afib. Pt was recently discharged from [facility] 3/10 with [facility]. Pt has had 3 admissions this year. CM met with pt and her daughter, Arianne Vázquez, who lives with pt now. Pt is Tel[place] and has ear plugs in due to construction outside her room. CM received answers from assessment from pt's daughter. Pt lives with in a 1 level house with 2 ABDIAS. Pt has been partially dependent of ADLs since discharge. Pt had declined after discharge as she remained in bed. DME: [employer]. A WC and hospital bed are on order from [address] [place] [address] after last discharge. Pt had been approved for [facility] last month. Pt has history of [facility] rehab. Pt's daughter does not wish pt to return. Pt is able to afford medications with insurance benefit. Pt relies on family for transportation. PT/OT recommending rehab. CM offered list of rehabs to pt's daughter. CM will follow up for preferences.

## 2022-03-15 NOTE — PROGRESS NOTES
Problem: Pressure Injury - Risk of  Goal: *Prevention of pressure injury  Description: Document Carlos Scale and appropriate interventions in the flowsheet. Outcome: Progressing Towards Goal  Note: Pressure Injury Interventions:       Moisture Interventions: Absorbent underpads,Apply protective barrier, creams and emollients,Assess need for specialty bed,Check for incontinence Q2 hours and as needed,Internal/External urinary devices,Maintain skin hydration (lotion/cream),Minimize layers,Moisture barrier    Activity Interventions: Assess need for specialty bed,Pressure redistribution bed/mattress(bed type)    Mobility Interventions: Assess need for specialty bed,Float heels,HOB 30 degrees or less,Pressure redistribution bed/mattress (bed type),Turn and reposition approx. every two hours(pillow and wedges)    Nutrition Interventions: Document food/fluid/supplement intake,Discuss nutritional consult with provider,Offer support with meals,snacks and hydration    Friction and Shear Interventions: Apply protective barrier, creams and emollients,Foam dressings/transparent film/skin sealants,HOB 30 degrees or less,Lift sheet,Lift team/patient mobility team,Minimize layers,Transferring/repositioning devices                Problem: Patient Education: Go to Patient Education Activity  Goal: Patient/Family Education  Outcome: Progressing Towards Goal     Problem: Falls - Risk of  Goal: *Absence of Falls  Description: Document Torito Fall Risk and appropriate interventions in the flowsheet.   Outcome: Progressing Towards Goal  Note: Fall Risk Interventions:            Medication Interventions: Bed/chair exit alarm,Patient to call before getting OOB,Teach patient to arise slowly    Elimination Interventions: Bed/chair exit alarm,Call light in reach,Patient to call for help with toileting needs,Toileting schedule/hourly rounds    History of Falls Interventions: Bed/chair exit alarm,Door open when patient unattended,Investigate reason for fall,Room close to nurse's station         Problem: Patient Education: Go to Patient Education Activity  Goal: Patient/Family Education  Outcome: Progressing Towards Goal

## 2022-03-15 NOTE — DIABETES MGMT
Patient seen for assessment regarding diabetes management. Patient in bed and daughter at bedside. Patient has a past medical history of HTN, GERD, Neuropathy, PAD, DM, COPD, HLD, CKD. Patient states they have been living with diabetes for 12 years and voices no family history of diabetes. Patient states they do have a working glucometer with supplies at home. Patient's daughter states she checked patient's glucose for the first time at home yesterday, per daughter glucose level was 189. Patient states they are currently taking Metformin at home for management of diabetes, likely was also recently taking glipizide as patient and daughter were not aware this medication has not been stopped. Patient's daughter states patient has not had any glipizide in the past few days. Patient voices that they have experienced hypoglycemia in the past. Educated regarding hypoglycemia signs, symptoms, and treatment. Patient given educational material, \"Diabetes Self-Management: A Patient Teaching Guide\", which was reviewed with patient. Explained basic physiology of diabetes, as well as causes, signs and symptoms, and treatments for hypoglycemia and hyperglycemia. Per patient they typically drink water, ensure, coffee, orange juice. Reviewed effects of sweetened beverages on glycemic control and discussed alternative beverages to help improve glycemic control. Patient's daughter given information regarding diabetic diet. Daughter states she limits patient's sugar intake. Educated patient regarding the benefits of physical activity (as cleared by provider) on glycemic control. Encouraged compliance with PT. Also explained the relationship between hyperglycemia and infection and delayed healing. Discussed target goals for blood glucose and A1C in the older adult per ADA standards. Educated patient regarding diabetic medications including mechanism of action, timing, and possible side effects.  Patient verbalizes understanding of teaching. Educated patient and daughter regarding current basal/bolus regimen of Lantus and Humalog correctional scale including type of insulin, timing with meals, onset, duration of effect, and peak of insulin dose. Educated patient on the differences between prandial insulin and correctional insulin. Patient daughter successful with practice insulin dose calculation using example blood glucose level and correctional insulin scale. Instructed patient and daughter to always seek guidance from their primary care provider about adjusting their insulin doses and not to adjust them on their own as this could negatively impact their glycemic control or result in hypoglycemia. Patient daughter verbalizes understanding. Patient daughter instructed on the injection of insulin dose via vial and syringe method. Patient daughter returned demonstrated proper insulin dose injection technique using injection model, syringe, and vial of saline. Nursing will continue to have patient practice insulin self-injection when insulin dose is due and document progress or refusals of insulin practice in progress notes. Patient daughter educated regarding insulin administration sites. Patient daughter also educated regarding the importance of site rotation, proper storage of insulin, and proper sharps disposal. Patient daughter was also instructed in proper use of insulin pens. Patient daughter was able to return demonstrate proper use of insulin pen using injection model and saline demo pen. Patient prefers insulin pens. Patient's daughter states she was given insulin pen from Rehab. Does not know name, but plans to take picture or bring in pen for PTA med list update. Patient and daughter encouraged to follow up with PCP (Dr. Carlos Alvarez) for further management of diabetes. Patient and daughter have no further questions regarding diabetes management.

## 2022-03-15 NOTE — WOUND CARE
Patient seen for left lateral foot wound and buttocks. Sacrum clear. Noted moisture associated skin damage (MASD) to buttock and gluteal cleft fold. Will start zinc paste to this site. Left foot had thick loose brown scab. Removed easily. Reveal nearly healed dry pink area 0.4x0.2x0cm. continue silicone foam pad for now. Daughter present and updated along with patient. Will follow loosely, please call if needed.

## 2022-03-15 NOTE — ROUTINE PROCESS
Bedside and verbal shift report received from [facility].

## 2022-03-15 NOTE — PROGRESS NOTES
Comprehensive Nutrition Assessment    Type and Reason for Visit: Initial,Positive nutrition screen  Best Practice Alert for Malnutrition Screening Tool: Recently Lost Weight Without Trying: Yes, If Yes, How Much Weight Loss: >33 lbs (within the past year), Eating Poorly Due to Decreased Appetite: Yes    Nutrition Recommendations/Plan:    Continue current diet. Regular, 4 CCHO per meal.    Start glucerna shakes TID with meals - provides 220 kcal and 10 gm PRO per bottle.  Encouraged continued use of glucerna shakes at discharge. Malnutrition Assessment:  Malnutrition Status: Moderate malnutrition  Context: Chronic illness  Findings of clinical characteristics of malnutrition:   Energy Intake:  Mild decrease in energy intake (specify) (unable to quantify)  Weight Loss:  Mild weight loss (specify amount and time period) (14 lb, 8.6% wt loss x ~1 yr, 11 lb, 6.9% x 3 months)     Body Fat Loss:  1 - Mild body fat loss, Orbital   Muscle Mass Loss:  7 - Severe muscle mass loss, Clavicles (pectoralis &deltoids),Temples (temporalis)  Fluid Accumulation:  Unable to assess,     Strength:  Not performed       Nutrition Assessment:   Nutrition History: Pt seen by RD during recent admission. 3/7: \"Pt reports appetite and intake at baseline, states she obtains meals from [employer]. Pt states eating \"amost all the meals they provide me. \" Pt reports UBW of ~136lb. Pt also states prior use of ONS, however not consistently. \" Nutrition history discussed with pt and pt's daughter 3/15. Pt's daughter states that prior to this past admission, pt was living at home alone receivng meals on wheels and eating a lot of soups. She states that in-between the two admissions she was living with the pt and preparing meals. She states that the pt would eat a good breakfast (reports pt liking various breakfast foods), and a decent lunch (sandwich or soup).  She states that she has received ensure during a stay at rehab previously but she did not have any at home. Pt's daughter states that the pt used to weigh ~180 lbs a year or so ago. Cultural/Samaritan/Ethnic Food Preference(s): None   Nutrition Background: Pt admitted with SOB, volume overload. Recently admitted and discharged Thursday (3/10) w/ PNA + PEs. PMH notable for COPD, PE, CKD, DM, OSITO, HTN, Covid (january 2022). Nutrition Interval:  Pt seen sitting up in bedside chair w/ daughter at bedside. [place] She reports eating well today. No c/o difficulty chewing/swallowing, barriers to PO intake. She is receptive to receiving ONS w/ meals and provides a flavor preference. WT / BMI 3/14/2022 3/7/2022 3/4/2022 3/3/2022 1/23/2022   WEIGHT 148 lb 2.4 oz 148 lb 3.2 oz  148 lb 159 lb     WT / BMI 12/24/2021 11/30/2021 10/27/2021 9/15/2021 8/31/2021   WEIGHT 159 lb 8 oz 151 lb 6.4 oz 147 lb 14.4 oz 152 lb 152 lb 6.4 oz     WT / BMI 7/28/2021 6/22/2021 6/22/2021 4/27/2021 4/6/2021 3/3/2021   WEIGHT 151 lb 154 lb 154 lb 160 lb 161 lb 162 lb     Per weights listed in EMR, potential for a 14 lb, 8.6% weight loss over ~1 year, 11 lb, 6.9% weight loss within 3 months - both of which are clinically insignificant. Nutrition Related Findings:     Wound Type:  (see wound care note)    Current Nutrition Therapies:  ADULT DIET Regular; 4 carb choices (60 gm/meal)    Current Intake:   Average Meal Intake: 51-75% Average Supplement Intake: None ordered      Anthropometric Measures:  Height: 5' 2\" (157.5 cm)  Current Body Wt: 67.2 kg (148 lb 2.4 oz) (3/14), Weight source: Not specified  BMI: 27.1, Overweight (BMI 25.0-29. 9) (BMI potentially skewed d/t fluid retention)  Admission Body Weight: 148 lb 2.4 oz  Ideal Body Weight (lbs) (Calculated): 110 lbs (50 kg), 134.7 %  Usual Body Wt:  (150-160 lbs per review of EMR)   Edema: No data recorded   Estimated Daily Nutrient Needs:  Energy (kcal/day): 1945-2530 (Kcal/kg (25-30), Weight Used: Ideal (50 kg))  Protein (g/day): 50-60 (1-1.2) Weight Used: (Ideal (50 kg))  Fluid (ml/day):   (1 ml/kcal (or per MD))    Nutrition Diagnosis:   · Moderate malnutrition,In context of chronic illness related to  (presumed inadequate PO intake) as evidenced by  (criteria as noted above)    Nutrition Interventions:   Food and/or Nutrient Delivery: Continue current diet,Start oral nutrition supplement     Coordination of Nutrition Care: Continue to monitor while inpatient    Goals: Active Goal: Meet >75% of estimated needs at time of nutrition follow up.     Nutrition Monitoring and Evaluation:      Food/Nutrient Intake Outcomes: Food and nutrient intake,Supplement intake  Physical Signs/Symptoms Outcomes: Fluid status or edema,Hemodynamic status,Meal time behavior,Weight    Discharge Planning:    Continue oral nutrition supplement    Marisabel Recinos [address], [facility], [address] [place], [address]

## 2022-03-15 NOTE — PROGRESS NOTES
Bedside verbal report received from CHERELLE SHAY [facility]. Pt sleeping, no distress.

## 2022-03-15 NOTE — PROGRESS NOTES
TRANSFER - IN REPORT:    Verbal report received from [place] (name) on Ernesto Mascorro  being received from ED(unit) for routine progression of care      Report consisted of patients Situation, Background, Assessment and   Recommendations(SBAR). Information from the following report(s) SBAR, Intake/Output, MAR, Recent Results, Med Rec Status and Alarm Parameters  was reviewed with the receiving nurse. Opportunity for questions and clarification was provided. Assessment completed upon patients arrival to unit and care assumed.

## 2022-03-15 NOTE — PROGRESS NOTES
Bedside verbal report given to [address] [facility] [address] Patient transferred back to bed, zinc cream applied, new allevin placed to sacrum.

## 2022-03-15 NOTE — H&P
Hospitalist History and Physical   Admit Date:  3/14/2022  4:22 PM   Name:  Ashley Villasenor   Age:  80 y.o. Sex:  female  :  1929   MRN:  [id number]   Room:  ER    Presenting Complaint: Irregular Heart Beat and Shortness of Breath    Reason(s) for Admission: Functional quadriplegia ([address] [R53.2]     History of Present Illness:   Ashley Villasenor is a 80 y.o. female with medical history of COVID, COPD on 2-3L, Acute PE, debility, GIB who presented from home with SOB with recent admission for PE and discharged home on Thursday despite PT/OT SNF recommendation. When [facility] saw patient today, she had not been out of bed since discharge. Family has been unable to take care of patient. CBC and CMP unrevealing. procal 0.2. CXR with asymmetric airspace dz particularly in the RUL could represent pulm edema or infectious infiltrates. She denies CP, coughs, abdominal pain, pain, n/v, fever or chills. Daughter at bedside. S/p lasix 40 mg IV x 1, cefepime/vanc in ED empirically treated for PNA and volume overload. Hospitalist consulted for admission   Review of Systems:  10 systems reviewed and negative except as noted in HPI. Assessment & Plan: Active Problems:    Functional quadriplegia - requiring max assistance. Bed bound since discharge. PT/OT PPD. Volume overload - suspected from CXR and elevated BNP. S/p lasix IV. Euvolemic on eval. Monitor. COPD // Chronic hypoxic respiratory failure - CXR BL infiltrates - history of COVID. s/p cefepime/vanc. Recently treated for HAP IP. Re-assess abx need in AM. Brovana/pulmicort, prn duonebs      H/o GIB - PPI BID     PE - resume apixaban     T2DM - start glargine plus SSI     OSITO - iron supplement     Mood d/o - zoloft     HTN - stable.  Amlodipine       Dispo/Discharge Planning:     Observation     Diet: ADULT DIET Regular  VTE ppx: apixaban   Code status: DNR    Hospital Problems as of 3/14/2022 Date Reviewed: 3/9/2022          Codes Class Noted - Resolved POA    Functional quadriplegia ([facility] ICD-10-CM: R53.2  ICD-9-CM: 780.72  3/14/2022 - Present Unknown              Past History:  Past Medical History:   Diagnosis Date    Acute posthemorrhagic anemia 02/03/2022    per [facility] faxed information    Anemia in chronic kidney disease     per [facility] faxed information    Anxiety 2/1/2018    Arrhythmia     palpitations 2/10-went to [facility]    Arthritis     L leg- fell 2008    Asthma     adult onset x 20 yrs    B12 deficiency 8/24/2012    Body mass index 37.0-37.9, adult 2/5/2014    CAD (coronary artery disease)     cardiac work [id number]    Chronic kidney disease, stage 3 unspecified ([facility] 12/24/2021    per [facility] faxed information    Cognitive communication deficit     per [facility] faxed information     Controlled type 2 diabetes mellitus with microalbuminuria, without long-term current use of insulin ([address] 11/22/2016    COPD     Corns and callosities 12/19/2016    CRI (chronic renal insufficiency) 8/24/2012    Depression 6/1/2012    Dermatophytosis of nail 12/19/2016    Diabetes ([address]     type II- home tests fasting-112    DJD (degenerative joint disease) of hip     DM (diabetes mellitus) type II controlled with renal manifestation ([address] 7/16/2010    Encounter for long-term (current) use of other medications 1/8/2013    Essential hypertension 7/16/2010    Gastrointestinal disorder     Gastrointestinal hemorrhage     per dx sheet from [facility] - date sent 3/2/2022     GERD (gastroesophageal reflux disease)     without esophagitis; per [facility] faxed information    GI bleed 7/28/2018    Heart failure ([address]     Hiatal hernia 8/24/2012    High cholesterol     History of COVID-19 02/03/2022    per medical hx information from [place] [address] History of falling 12/24/2021    per [facility] faxed information    HLD (hyperlipidemia) 1/8/2013    Klebsiella pneumoniae (k. pneumoniae) as the cause of diseases classified elsewhere 12/24/2021    per [facility] information     Moderate protein-calorie malnutrition ([facility] [address] 12/24/2021    per [facility] faxed information    Muscle weakness (generalized)     per [facility] faxed information    Neuropathy 8/24/2012    Lower limbs     Obesity (BMI 30.0-39. 9) BMI-43.8    Other and unspecified hyperlipidemia 7/16/2010    Other chest pain 7/16/2010    Other lack of coordination     per [facility] faxed information    Oxygen dependent     4 L/NC    PAD (peripheral artery disease) ([facility] [facility] 8/24/2012    PVD (peripheral vascular disease) ([facility] [address]     per [facility] faxed information    Retinal hemorrhage     Type 2 diabetes mellitus with diabetic neuropathy ([facility]     Unspecified asthma, uncomplicated 46/48/0605    per [facility] faxed information    Urinary incontinence     Urinary tract infection      Past Surgical History:   Procedure Laterality Date    FLEXIBLE SIGMOIDOSCOPY N/A 1/24/2022    SIGMOIDOSCOPY FLEXIBLE COVID POSITIVE PREP/RECOVER IN [place] Admit to room 503 performed by Tomi Mckinley [facility] at [facility] ENDOSCOPY    HX APPENDECTOMY      HX CHOLECYSTECTOMY  2000    HX ENDOSCOPY  02/27/2018    Dr Abilio Castillo    HX GI      small intestine obstruction    HX GYN      hyst    HX HEART CATHETERIZATION      HX HYSTERECTOMY      prolapse    HX INTRACRANIAL ANEURYSM REPAIR      HX ORTHOPAEDIC      right wrist 2010    HX OTHER SURGICAL      aneurysm clip-cerebral-2000    HX OTHER SURGICAL      cerebral aneurysm   Dr. Brandin Francis UNLISTED        No Known Allergies   Social History     Tobacco Use    Smoking status: Never Smoker    Smokeless tobacco: Never Used   Substance Use Topics    Alcohol use: No      Family History   Problem Relation Age of Onset    Cancer Mother     Diabetes Father     Diabetes Paternal Grandfather Family history reviewed and negative except as noted above. Immunization History   Administered Date(s) Administered    (RETIRED) Pneumococcal Vaccine (Unspecified Type) 01/01/2006    Influenza High Dose Vaccine PF 09/14/2016, 10/25/2017, 12/13/2018    Influenza Vaccine 11/05/2013, 09/16/2014, 10/19/2015    Influenza Vaccine (Tri) Adjuvanted (>65 Yrs FLUAD TRI 09114) 10/14/2019    Influenza Vaccine PF 09/22/2015    Influenza Vaccine Whole 01/01/2011    Influenza, Quadrivalent, Adjuvanted (>65 Yrs FLUAD QUAD 50112) 12/30/2020, 11/30/2021    Pneumococcal Conjugate (PCV-13) 07/14/2015    Pneumococcal Vaccine (Unspecified Type) 01/01/2006    TB Skin Test (PPD) Intradermal 07/31/2018, 08/21/2018, 12/15/2021, 01/23/2022, 03/06/2022    Tdap 11/04/2014     Prior to Admit Medications:  Current Outpatient Medications   Medication Instructions    acetaminophen (TYLENOL ARTHRITIS PAIN) 650 mg, Oral, EVERY 8 HOURS, Tylenol arthritis as needed     amLODIPine (NORVASC) 10 mg, Oral, DAILY    apixaban (ELIQUIS) 5 mg tablet Take 2 Tablets by mouth two (2) times a day for 7 days, THEN 1 Tablet two (2) times a day for 30 days. Refill at 5mg BID x 30 days Refills: 2    Blood-Glucose Meter monitoring kit Use as directed 3 times daily to check blood sugars. Dx:E11.29    ferrous sulfate 325 mg, Oral, DAILY WITH BREAKFAST    fluticasone propion-salmeteroL (Advair Diskus) 250-50 mcg/dose diskus inhaler 1 Puff, Inhalation, EVERY 12 HOURS    gabapentin (NEURONTIN) 100 mg, Oral, 3 TIMES DAILY    glucose blood VI test strips (blood glucose test) strip Use as directed 3 times daily to check blood sugars.  Dx:E11.29    hydrALAZINE (APRESOLINE) 25 mg, Oral, 3 TIMES DAILY    insulin lispro (HumaLOG KwikPen Insulin) 100 unit/mL kwikpen SubCUTAneous, Sliding scale:  0 units; <BR><60 notify MD/NP; <BR>150-199 = 2 units; <BR>200-249 = 4 units; <BR>250-299=6 units; <BR>300-349=8 units; <BR>350-399= 10 units; <BR>400-449 = 10 units; <BR>> 400 give 10 units and notify MD/NP, subcutaneously before meals and at bedtime     Iron Fum & P-FA-Vit B & C No.9 (Integra Plus) 125 mg iron- 1 mg cap 1 Capsule, Oral, DAILY    lancets misc Use as directed 3 times daily to check blood sugars. Dx:E11.29    lidocaine 4 % patch Apply to affected area daily prn.     metFORMIN (GLUCOPHAGE) 500 mg, Oral, 2 TIMES DAILY WITH MEALS    multivit-min-FA-lycopen-lutein (Centrum Silver) 0.4-300-250 mg-mcg-mcg tab Oral, DAILY    OTHER One shower chair    pantoprazole (PROTONIX) 40 mg, Oral, DAILY, TAKE 1 TABLET BY MOUTH DAILY    polyethylene glycol (MIRALAX) 17 g, Oral, DAILY, 1 tablespoon with 8 oz of water daily    pravastatin (PRAVACHOL) 40 mg tablet TAKE 1 TABLET BY MOUTH EVERY NIGHT AT BEDTIME    sertraline (ZOLOFT) 100 mg, Oral, DAILY    VENTOLIN HFA 90 mcg/actuation inhaler INHALE 2 PUFFS BY MOUTH EVERY 6 HOURS AS NEEDED FOR WHEEZING    VIT A/VIT C/VIT E/ZINC/COPPER (PRESERVISION AREDS PO) 1 Tablet, Oral, 2 TIMES DAILY    Wheel Chair kalyn Wheelchair       Objective:     Patient Vitals for the past 24 hrs:   Temp Pulse Resp BP SpO2   03/14/22 2140  75 15  98 %   03/14/22 2130  77 17  100 %   03/14/22 2120  75 21  (!) 85 %   03/14/22 2110  76 21     03/14/22 2100  76 12     03/14/22 2050  76 14     03/14/22 2044  74 15  100 %   03/14/22 2034  74 15  100 %   03/14/22 2024  73 15  98 %   03/14/22 2014  75 15 (!) 141/73 96 %   03/14/22 2004  79 23 (!) 140/72    03/14/22 1954  77 15     03/14/22 1944  75 15 131/66 98 %   03/1934  76 18 (!) 116/96 96 %   03/14/22 1924  78 21  99 %   03/14/22 1914  76 16 138/68 97 %   03/14/22 1904  84 23 (!) 146/66 98 %   03/14/22 1854  80 14  99 %   03/14/22 1844  78 28 (!) 148/67 98 %   03/14/22 1842  79  (!) 147/69    03/14/22 1834  75 18 (!) 147/69 98 %   03/14/22 1824  82 16  90 %   03/14/22 1814  82 17 (!) 140/77    03/14/22 1804  80 19 135/80    03/14/22 1754  81 13  98 %   03/14/22 1744  79 20 (!) 141/62 98 %   03/14/22 1734  79 15 132/63 97 %   03/14/22 1714  87 17 (!) 142/76 (!) 88 %   03/14/22 1704  80 20 (!) 148/61 97 %   03/14/22 1644  81 14 (!) 143/71 96 %   03/14/22 1630  85 16 (!) 142/61 94 %   03/14/22 1625 98.6 °F (37 °C) 84 18 (!) 146/67 96 %   03/14/22 1624    (!) 146/67      Oxygen Therapy  O2 Sat (%): 98 % (03/14/22 2140)  Pulse via Oximetry: 74 beats per minute (03/14/22 2140)  O2 Device: Nasal cannula (03/14/22 1625)  O2 Flow Rate (L/min): 3 l/min (03/14/22 1625)    Estimated body mass index is 27.11 kg/m² as calculated from the following:    Height as of 3/7/22: 5' 2\" (1.575 m). Weight as of 3/7/22: 67.2 kg (148 lb 3.2 oz). No intake or output data in the 24 hours ending 03/14/22 2259      Physical Exam:    Blood pressure (!) 141/73, pulse 75, temperature 98.6 °F (37 °C), resp. rate 15, SpO2 98 %. General:    Elderly female. Hard of hearing. Pleasant. NAD   Head:  Normocephalic, atraumatic  Eyes:  Sclerae appear normal.  Pupils equally round. ENT:  Nares appear normal, no drainage. Moist oral mucosa  Neck:  No restricted ROM. Trachea midline   CV:   RRR. No m/r/g. No jugular venous distension. Lungs:   CTAB. No wheezing, rhonchi, or rales. Respirations even, unlabored  Abdomen: Bowel sounds present. Soft, nontender, nondistended. Extremities: No peripheral edema. Skin:     No rashes and normal coloration. Warm and dry. Neuro:  CN II-XII grossly intact. Sensation intact. A&Ox3  Psych:  Normal mood and affect.       I have reviewed ordered lab tests and independently visualized imaging below:    Last 24hr Labs:  Recent Results (from the past 24 hour(s))   CBC WITH AUTOMATED DIFF    Collection Time: 03/14/22  4:40 PM   Result Value Ref Range    WBC 9.9 4.3 - 11.1 K/uL    RBC 3.77 (L) 4.05 - 5.2 M/uL    HGB 10.9 (L) 11.7 - 15.4 g/dL    HCT 36.6 35.8 - 46.3 %    MCV 97.1 79.6 - 97.8 FL    MCH 28.9 26.1 - 32.9 PG    MCHC 29.8 (L) 31.4 - 35.0 g/dL    RDW 19.7 (H) 11.9 - 14.6 %    PLATELET 425 449 - 626 K/uL    MPV 11.6 9.4 - 12.3 FL    ABSOLUTE NRBC 0.00 0.0 - 0.2 K/uL    DF AUTOMATED      NEUTROPHILS 72 43 - 78 %    LYMPHOCYTES 20 13 - 44 %    MONOCYTES 6 4.0 - 12.0 %    EOSINOPHILS 1 0.5 - 7.8 %    BASOPHILS 0 0.0 - 2.0 %    IMMATURE GRANULOCYTES 1 0.0 - 5.0 %    ABS. NEUTROPHILS 7.1 1.7 - 8.2 K/UL    ABS. LYMPHOCYTES 2.0 0.5 - 4.6 K/UL    ABS. MONOCYTES 0.5 0.1 - 1.3 K/UL    ABS. EOSINOPHILS 0.1 0.0 - 0.8 K/UL    ABS. BASOPHILS 0.0 0.0 - 0.2 K/UL    ABS. IMM. GRANS. 0.1 0.0 - 0.5 K/UL   METABOLIC PANEL, COMPREHENSIVE    Collection Time: 03/14/22  4:40 PM   Result Value Ref Range    Sodium 143 136 - 145 mmol/L    Potassium 4.2 3.5 - 5.1 mmol/L    Chloride 113 (H) 98 - 107 mmol/L    CO2 27 21 - 32 mmol/L    Anion gap 3 (L) 7 - 16 mmol/L    Glucose 328 (H) 65 - 100 mg/dL    BUN 20 8 - 23 MG/DL    Creatinine 1.20 (H) 0.6 - 1.0 MG/DL    GFR est AA 54 (L) >60 ml/min/1.73m2    GFR est non-AA 45 (L) >60 ml/min/1.73m2    Calcium 9.8 8.3 - 10.4 MG/DL    Bilirubin, total 0.3 0.2 - 1.1 MG/DL    ALT (SGPT) 23 12 - 65 U/L    AST (SGOT) 16 15 - 37 U/L    Alk.  phosphatase 76 50 - 136 U/L    Protein, total 6.5 6.3 - 8.2 g/dL    Albumin 2.0 (L) 3.2 - 4.6 g/dL    Globulin 4.5 (H) 2.3 - 3.5 g/dL    A-G Ratio 0.4 (L) 1.2 - 3.5     MAGNESIUM    Collection Time: 03/14/22  4:40 PM   Result Value Ref Range    Magnesium 2.0 1.8 - 2.4 mg/dL   TROPONIN-HIGH SENSITIVITY    Collection Time: 03/14/22  4:40 PM   Result Value Ref Range    Troponin-High Sensitivity 23.3 (H) 0 - 14 pg/mL   NT-PRO BNP    Collection Time: 03/14/22  4:40 PM   Result Value Ref Range    NT pro-BNP 3,697 (H) <450 PG/ML   PROCALCITONIN    Collection Time: 03/14/22  4:40 PM   Result Value Ref Range    Procalcitonin 0.20 0.00 - 0.49 ng/mL   LACTIC ACID    Collection Time: 03/14/22  5:44 PM   Result Value Ref Range    Lactic acid 1.4 0.4 - 2.0 MMOL/L   TROPONIN-HIGH SENSITIVITY    Collection Time: 03/14/22  6:41 PM   Result Value Ref Range    Troponin-High Sensitivity 21.2 (H) 0 - 14 pg/mL   URINALYSIS W/ RFLX MICROSCOPIC    Collection Time: 03/14/22  9:59 PM   Result Value Ref Range    Color YELLOW      Appearance CLEAR      Specific gravity 1.012 1.001 - 1.023      pH (UA) 6.0 5.0 - 9.0      Protein 300 (A) NEG mg/dL    Glucose 100 mg/dL    Ketone Negative NEG mg/dL    Bilirubin Negative NEG      Blood Negative NEG      Urobilinogen 0.2 0.2 - 1.0 EU/dL    Nitrites Negative NEG      Leukocyte Esterase Negative NEG      WBC 0-3 0 /hpf    RBC 0-3 0 /hpf    Epithelial cells 0-3 0 /hpf    Bacteria 0 0 /hpf    Casts 3-5 0 /lpf       All Micro Results     Procedure Component Value Units Date/Time    CULTURE, BLOOD [[id number]] Collected: 03/14/22 1744    Order Status: Completed Specimen: Blood Updated: 03/14/22 1812    CULTURE, BLOOD [[id number]]     Order Status: Sent Specimen: Blood           Other Studies:  XR CHEST PORT    Result Date: 3/14/2022  PORTABLE CHEST ONE VIEW HISTORY: Shortness of breath COMPARISON: Chest radiograph 3/4/2022 and chest CT 3/4/2022 FINDINGS: Edema-like interstitial and airspace opacities are present throughout the lungs. Lung volumes are diminished. Asymmetric interstitial and airspace opacities particularly in the right upper lobe. This may be caused by pulmonary edema or infectious infiltrates. Medications Administered     furosemide (LASIX) injection 40 mg     Admin Date  03/14/2022 Action  Given Dose  40 mg Route  IntraVENous Administered By  Morenita Low RN                Signed:  Fabian Jeffery DO    Part of this note may have been written by using a voice dictation software. The note has been proof read but may still contain some grammatical/other typographical errors.

## 2022-03-15 NOTE — PROGRESS NOTES
Hospitalist Progress Note   Admit Date:  3/14/2022  4:22 PM   Name:  Kali Medley   Age:  80 y.o. Sex:  female  :  1929   MRN:  [id number]   Room:  [facility]/    Presenting Complaint: Irregular Heart Beat and Shortness of Breath    Reason(s) for Admission: Functional quadriplegia [facility]) [R53.2]     Hospital Course & Interval History:   Mrs. Nora Stanley is a very nice 81 y/o AAF with a h/o COVID 2022, CKD3, COPD with chronic hypoxemic respiratory failure on 2-3L, PE (2022), GIB who was admitted to our service on 3/14 for weakness. She was just discharged home on 3/10 after being hospitalized with HAP, AFib and PE. Placement was recommended but family and patient opted to go home. Reportedly the patient had not been out of bed for several days. HH went to see the patient on 3/14 and became concerned so they advised that she be brought to the ER. No leukocytosis on labs. LA 1.4, pct 0.2, BNP 3700. CXR showed bilateral infiltrates and she was given antibiotics. She was also given IV Lasix. Subjective/24hr Events (03/15/22):  Currently sitting up in chair, daughter present. Patient very pleasant, feels well, denies SOB, N/V/D, chest pain. ROS:  10 systems reviewed and negative except as noted above. Assessment & Plan:   # Generalized weakness/functional quadriplegia   - Con't therapy efforts. Up to chair today. May be amenable to placement this time. PPD, CM consult. # Atrial fibrillation   - Con't Eliquis    # RUL subsegmental PE   - Noted on CT 3/4/22. Con't Eliquis. # Chronic hypoxemic respiratory failure   - Per chart review discharged on 3L after her COVID hospitalization -2022. Euvolemic on exam. No wheezing. # HTN   - Norvasc    # MDD, anxiety, mood disorder   - Con't Zoloft    # DM2   - Decrease Lantus to 8U today, con't SSI. # GERD   - PPI    Dispo/Discharge Planning: Pending.   Diet:  ADULT DIET Regular; 4 carb choices (60 gm/meal)  DVT PPx: Eliquis  Code status: DNR    Hospital Problems as of 3/15/2022 Date Reviewed: 3/9/2022          Codes Class Noted - Resolved POA    * (Principal) Functional quadriplegia ([facility] ICD-10-CM: R53.2  ICD-9-CM: 780.72  3/14/2022 - Present Unknown        Moderate protein-calorie malnutrition ([facility] ICD-10-CM: E44.0  ICD-9-CM: 263.0  12/16/2021 - Present Yes        Iron deficiency anemia due to chronic blood loss ICD-10-CM: D50.0  ICD-9-CM: 280.0  5/18/2020 - Present Yes        COPD (chronic obstructive pulmonary disease) (HCC) (Chronic) ICD-10-CM: J44.9  ICD-9-CM: 496  8/22/2018 - Present Yes        Type 2 diabetes mellitus with diabetic neuropathy (HCC) (Chronic) ICD-10-CM: E11.40  ICD-9-CM: 250.60, 357.2  5/16/2018 - Present Yes        Neuropathy ICD-10-CM: G62.9  ICD-9-CM: 355.9  8/24/2012 - Present Yes    Overview Signed 8/24/2012 12:56 PM by Mirian Joyce     Lower limbs             PAD (peripheral artery disease) ([facility] ICD-10-CM: I73.9  ICD-9-CM: 443.9  8/24/2012 - Present Yes        Essential hypertension (Chronic) ICD-10-CM: I10  ICD-9-CM: 401.9  7/16/2010 - Present Yes        GERD (gastroesophageal reflux disease) (Chronic) ICD-10-CM: K21.9  ICD-9-CM: 530.81  7/16/2010 - Present Yes              Objective:     Patient Vitals for the past 24 hrs:   Temp Pulse Resp BP SpO2   03/15/22 1122 97.9 °F (36.6 °C) 73 18 136/61 98 %   03/15/22 0758     99 %   03/15/22 0727 97.9 °F (36.6 °C) 75 17 (!) 140/76 100 %   03/15/22 0255    (!) 156/63    03/15/22 0231 97.4 °F (36.3 °C) 78 22 (!) 177/70    03/15/22 0128 97.6 °F (36.4 °C) 76 16 (!) 170/90 97 %   03/14/22 2140  75 15  98 %   03/14/22 2130  77 17  100 %   03/14/22 2120  75 21  (!) 85 %   03/14/22 2110  76 21     03/14/22 2100  76 12     03/14/22 2050  76 14     03/14/22 2044  74 15  100 %   03/14/22 2034  74 15  100 %   03/14/22 2024  73 15  98 %   03/14/22 2014  75 15 (!) 141/73 96 %   03/14/22 2004  79 23 (!) 140/72    03/14/22 1954  77 15   03/14/22 1944  75 15 131/66 98 %   03/1934  76 18 (!) 116/96 96 %   03/14/22 1924  78 21  99 %   03/14/22 1914  76 16 138/68 97 %   03/14/22 1904  84 23 (!) 146/66 98 %   03/14/22 1854  80 14  99 %   03/14/22 1844  78 28 (!) 148/67 98 %   03/14/22 1842  79  (!) 147/69    03/14/22 1834  75 18 (!) 147/69 98 %   03/14/22 1824  82 16  90 %   03/14/22 1814  82 17 (!) 140/77    03/14/22 1804  80 19 135/80    03/14/22 1754  81 13  98 %   03/14/22 1744  79 20 (!) 141/62 98 %   03/14/22 1734  79 15 132/63 97 %   03/14/22 1714  87 17 (!) 142/76 (!) 88 %   03/14/22 1704  80 20 (!) 148/61 97 %   03/14/22 1644  81 14 (!) 143/71 96 %   03/14/22 1630  85 16 (!) 142/61 94 %   03/14/22 1625 98.6 °F (37 °C) 84 18 (!) 146/67 96 %   03/14/22 1624    (!) 146/67      Oxygen Therapy  O2 Sat (%): 98 % (03/15/22 1122)  Pulse via Oximetry: 74 beats per minute (03/15/22 0758)  O2 Device: Nasal cannula (03/15/22 1127)  Skin Assessment: Clean, dry, & intact (03/15/22 5946)  Skin Protection for O2 Device: No (03/15/22 0128)  O2 Flow Rate (L/min): 3 l/min (03/15/22 1127)  FIO2 (%): 32 % (03/15/22 0758)    Estimated body mass index is 27.1 kg/m² as calculated from the following:    Height as of 3/7/22: 5' 2\" (1.575 m). Weight as of this encounter: 67.2 kg (148 lb 2.4 oz). Intake/Output Summary (Last 24 hours) at 3/15/2022 1239  Last data filed at 3/15/2022 1207  Gross per 24 hour   Intake 120 ml   Output 450 ml   Net -330 ml         Physical Exam:   Blood pressure 136/61, pulse 73, temperature 97.9 °F (36.6 °C), resp. rate 18, weight 67.2 kg (148 lb 2.4 oz), SpO2 98 %, not currently breastfeeding. General:    Well nourished but thin. No overt distress. Pleasant and cooperative with exam  Head:  Normocephalic, atraumatic  Eyes:  Sclerae appear normal.  Pupils equally round. ENT:  Nares appear normal, no drainage. Moist oral mucosa  Neck:  No restricted ROM.   Trachea midline   CV:   Irreg irreg rhythm, regular rate. No m/r/g. No jugular venous distension. Lungs:   Mild bb rales. No wheezing, rhonchi. Respirations even, unlabored. 2L NC O2. Abdomen: Bowel sounds present. Soft, nontender, nondistended. Extremities: No cyanosis or clubbing. No edema. Skin:     No rashes and normal coloration. Warm and dry. Neuro:  CN II-XII grossly intact. Sensation intact. A&Ox3  Psych:  Normal mood and affect. I have reviewed ordered lab tests and independently visualized imaging below:    Recent Labs:  Recent Results (from the past 48 hour(s))   CBC WITH AUTOMATED DIFF    Collection Time: 03/14/22  4:40 PM   Result Value Ref Range    WBC 9.9 4.3 - 11.1 K/uL    RBC 3.77 (L) 4.05 - 5.2 M/uL    HGB 10.9 (L) 11.7 - 15.4 g/dL    HCT 36.6 35.8 - 46.3 %    MCV 97.1 79.6 - 97.8 FL    MCH 28.9 26.1 - 32.9 PG    MCHC 29.8 (L) 31.4 - 35.0 g/dL    RDW 19.7 (H) 11.9 - 14.6 %    PLATELET 887 050 - 757 K/uL    MPV 11.6 9.4 - 12.3 FL    ABSOLUTE NRBC 0.00 0.0 - 0.2 K/uL    DF AUTOMATED      NEUTROPHILS 72 43 - 78 %    LYMPHOCYTES 20 13 - 44 %    MONOCYTES 6 4.0 - 12.0 %    EOSINOPHILS 1 0.5 - 7.8 %    BASOPHILS 0 0.0 - 2.0 %    IMMATURE GRANULOCYTES 1 0.0 - 5.0 %    ABS. NEUTROPHILS 7.1 1.7 - 8.2 K/UL    ABS. LYMPHOCYTES 2.0 0.5 - 4.6 K/UL    ABS. MONOCYTES 0.5 0.1 - 1.3 K/UL    ABS. EOSINOPHILS 0.1 0.0 - 0.8 K/UL    ABS. BASOPHILS 0.0 0.0 - 0.2 K/UL    ABS. IMM.  GRANS. 0.1 0.0 - 0.5 K/UL   METABOLIC PANEL, COMPREHENSIVE    Collection Time: 03/14/22  4:40 PM   Result Value Ref Range    Sodium 143 136 - 145 mmol/L    Potassium 4.2 3.5 - 5.1 mmol/L    Chloride 113 (H) 98 - 107 mmol/L    CO2 27 21 - 32 mmol/L    Anion gap 3 (L) 7 - 16 mmol/L    Glucose 328 (H) 65 - 100 mg/dL    BUN 20 8 - 23 MG/DL    Creatinine 1.20 (H) 0.6 - 1.0 MG/DL    GFR est AA 54 (L) >60 ml/min/1.73m2    GFR est non-AA 45 (L) >60 ml/min/1.73m2    Calcium 9.8 8.3 - 10.4 MG/DL    Bilirubin, total 0.3 0.2 - 1.1 MG/DL    ALT (SGPT) 23 12 - 65 U/L    AST (SGOT) 16 15 - 37 U/L    Alk.  phosphatase 76 50 - 136 U/L    Protein, total 6.5 6.3 - 8.2 g/dL    Albumin 2.0 (L) 3.2 - 4.6 g/dL    Globulin 4.5 (H) 2.3 - 3.5 g/dL    A-G Ratio 0.4 (L) 1.2 - 3.5     MAGNESIUM    Collection Time: 03/14/22  4:40 PM   Result Value Ref Range    Magnesium 2.0 1.8 - 2.4 mg/dL   TROPONIN-HIGH SENSITIVITY    Collection Time: 03/14/22  4:40 PM   Result Value Ref Range    Troponin-High Sensitivity 23.3 (H) 0 - 14 pg/mL   NT-PRO BNP    Collection Time: 03/14/22  4:40 PM   Result Value Ref Range    NT pro-BNP 3,697 (H) <450 PG/ML   PROCALCITONIN    Collection Time: 03/14/22  4:40 PM   Result Value Ref Range    Procalcitonin 0.20 0.00 - 0.49 ng/mL   LACTIC ACID    Collection Time: 03/14/22  5:44 PM   Result Value Ref Range    Lactic acid 1.4 0.4 - 2.0 MMOL/L   CULTURE, BLOOD    Collection Time: 03/14/22  5:44 PM    Specimen: Blood   Result Value Ref Range    Special Requests: NO SPECIAL REQUESTS  RIGHT  FOREARM        Culture result: NO GROWTH AFTER 13 HOURS     TROPONIN-HIGH SENSITIVITY    Collection Time: 03/14/22  6:41 PM   Result Value Ref Range    Troponin-High Sensitivity 21.2 (H) 0 - 14 pg/mL   URINALYSIS W/ RFLX MICROSCOPIC    Collection Time: 03/14/22  9:59 PM   Result Value Ref Range    Color YELLOW      Appearance CLEAR      Specific gravity 1.012 1.001 - 1.023      pH (UA) 6.0 5.0 - 9.0      Protein 300 (A) NEG mg/dL    Glucose 100 mg/dL    Ketone Negative NEG mg/dL    Bilirubin Negative NEG      Blood Negative NEG      Urobilinogen 0.2 0.2 - 1.0 EU/dL    Nitrites Negative NEG      Leukocyte Esterase Negative NEG      WBC 0-3 0 /hpf    RBC 0-3 0 /hpf    Epithelial cells 0-3 0 /hpf    Bacteria 0 0 /hpf    Casts 3-5 0 /lpf   GLUCOSE, POC    Collection Time: 03/15/22  7:37 AM   Result Value Ref Range    Glucose (POC) 342 (H) 65 - 100 mg/dL    Performed by Gila    GLUCOSE, POC    Collection Time: 03/15/22 11:09 AM   Result Value Ref Range    Glucose (POC) 213 (H) 65 - 100 mg/dL Performed by Gila        All Micro Results     Procedure Component Value Units Date/Time    CULTURE, BLOOD [[id number]] Collected: 03/14/22 1744    Order Status: Completed Specimen: Blood Updated: 03/15/22 0727     Special Requests: --        NO SPECIAL REQUESTS  RIGHT  FOREARM       Culture result: NO GROWTH AFTER 13 HOURS       CULTURE, BLOOD [[id number]]     Order Status: Sent Specimen: Blood           Other Studies:  XR CHEST PORT    Result Date: 3/14/2022  PORTABLE CHEST ONE VIEW HISTORY: Shortness of breath COMPARISON: Chest radiograph 3/4/2022 and chest CT 3/4/2022 FINDINGS: Edema-like interstitial and airspace opacities are present throughout the lungs. Lung volumes are diminished. Asymmetric interstitial and airspace opacities particularly in the right upper lobe. This may be caused by pulmonary edema or infectious infiltrates.       Current Meds:  Current Facility-Administered Medications   Medication Dose Route Frequency    tuberculin injection 5 Units  5 Units IntraDERMal ONCE    insulin glargine (LANTUS) injection 8 Units  8 Units SubCUTAneous QHS    sodium chloride (NS) flush 5-10 mL  5-10 mL IntraVENous Q8H    sodium chloride (NS) flush 5-10 mL  5-10 mL IntraVENous PRN    sodium chloride (NS) flush 5-40 mL  5-40 mL IntraVENous Q8H    sodium chloride (NS) flush 5-40 mL  5-40 mL IntraVENous PRN    acetaminophen (TYLENOL) tablet 650 mg  650 mg Oral Q6H PRN    Or    acetaminophen (TYLENOL) suppository 650 mg  650 mg Rectal Q6H PRN    polyethylene glycol (MIRALAX) packet 17 g  17 g Oral DAILY PRN    ondansetron (ZOFRAN ODT) tablet 4 mg  4 mg Oral Q8H PRN    Or    ondansetron (ZOFRAN) injection 4 mg  4 mg IntraVENous Q6H PRN    pantoprazole (PROTONIX) tablet 40 mg  40 mg Oral ACB&D    alum-mag hydroxide-simeth (MYLANTA) oral suspension 15 mL  15 mL Oral Q6H PRN    glucose chewable tablet 16 g  16 g Oral PRN    glucagon (GLUCAGEN) injection 1 mg  1 mg IntraMUSCular PRN    dextrose 10% infusion 125-250 mL  125-250 mL IntraVENous PRN    insulin lispro (HUMALOG) injection   SubCUTAneous AC&HS    arformoteroL (BROVANA) neb solution 15 mcg  15 mcg Nebulization BID RT    budesonide (PULMICORT) 500 mcg/2 ml nebulizer suspension  500 mcg Nebulization BID RT    albuterol-ipratropium (DUO-NEB) 2.5 MG-0.5 MG/3 ML  3 mL Nebulization Q4H PRN    gabapentin (NEURONTIN) capsule 100 mg  100 mg Oral TID    amLODIPine (NORVASC) tablet 10 mg  10 mg Oral DAILY    sertraline (ZOLOFT) tablet 100 mg  100 mg Oral DAILY    ferrous sulfate tablet 325 mg  1 Tablet Oral DAILY WITH BREAKFAST    apixaban (ELIQUIS) tablet 10 mg  10 mg Oral Q12H    [START ON 3/17/2022] apixaban (ELIQUIS) tablet 5 mg  5 mg Oral Q12H       Signed:  Grazyna Villalta MD    Part of this note may have been written by using a voice dictation software. The note has been proof read but may still contain some grammatical/other typographical errors.

## 2022-03-15 NOTE — DIABETES MGMT
Patient admitted with functional quadripledgia. Admitting blood glucose 328. Noted per [facility], patient did not receive any insulin yesterday. Blood glucose this morning was 342 . Reviewed patient current regimen: Lantus 15 units nightly, Humalog correctional scale. Noted patient readmitted to hospital.  Previously per chart review, patient on Lantus 8 units nightly, Humalog 2 units with meals, with fasting sugars in the low 100s. Patient would likely benefit from a change in Lantus dosing from nightly to daily to help improve hyperglycemia today, with reduced dose to reduce risk of morning hypoglycemia tomorrow. Provider updated via [employer]aps[employer] regarding recommendations and patient glycemic control. Per primary RN patient family would like insulin pen instruction. Plan for education later today as patient condition allows.

## 2022-03-15 NOTE — PROGRESS NOTES
ACUTE OT GOALS:  (Developed with and agreed upon by patient and/or caregiver.)  1. Patient will complete lower body bathing and dressing with supervision and adaptive equipment as needed. 2. Patient will complete toileting with supervision. 3. Patient will tolerate 30 minutes of OT treatment with 1-2 rest breaks to increase activity tolerance for ADLs. 4. Patient will complete functional transfers with supervision and adaptive equipment as needed. 5. Patient will complete functional mobility for household distances with supervision and adaptive equipment as needed. 6. Patient will complete self-grooming while standing edge of sink with supervision and adaptive equipment as needed. Timeframe: 7 visits         OCCUPATIONAL THERAPY ASSESSMENT: Initial Assessment and Daily Note OT Treatment Day # 1    Aide Son is a 80 y.o. female   PRIMARY DIAGNOSIS: Functional quadriplegia ([address]  Functional quadriplegia ([address] [R53.2]       Reason for Referral:   ICD-10: Treatment Diagnosis: Generalized Muscle Weakness (M62.81)  OBSERVATION: Payor: [employer] / Plan[place] [employer] MMP / Product Type: Managed Care Medicare /   ASSESSMENT:     REHAB RECOMMENDATIONS:   Recommendation to date pending progress:  Setting:   Short-term Rehab  Equipment:    To Be Determined     PRIOR LEVEL OF FUNCTION:  (Prior to Hospitalization)  INITIAL/CURRENT LEVEL OF FUNCTION:  (Based on today's evaluation)   Bathing:   Independent  Dressing:   Independent  Feeding/Grooming:   Independent  Toileting:   Independent  Functional Mobility:   Modified Independent x rollator Bathing:   Minimal Assistance  Dressing:   Minimal Assistance  Feeding/Grooming:   Set Up  Toileting:   Minimal Assistance  Functional Mobility:   Minimal Assistance x RW     ASSESSMENT:  Ms. John Son presents with deficits in overall strength, activity tolerance, ADL performance and functional mobility. Presents for ongoing weakness. BUE AROM and strength (3+/5) are generally decreased but WFL. SBA/CGA for functional bed mobility/transfers; good EOB sitting balance. CGA/min A for sit <> stand; min A x RW for mobility in room; fair standing balance d/t generalized weakness. Set-up for self-grooming tasks. At this time, Amee Tellez is functioning below baseline for ADLs and functional mobility. Pt would benefit from skilled OT services to address OT goals and and plan of care. .     SUBJECTIVE:   Ms. Xu Hughes states, \"I feel pretty good. \"    SOCIAL HISTORY/LIVING ENVIRONMENT: Lives with daughter in [facility]. Independent at baseline for ADLs and MOD I for functional mobility however since last hospitalization she has become progressively weaker. Since return home states that she hasn't been able to perform tasks.    Home Environment: Private residence  # Steps to Enter: 5 (2 steps on back, 4-5 in front)  One/Two Story Residence: One story  Living Alone: No  Support Systems: Child(edward),Caregiver/Home Care Staff    OBJECTIVE:     PAIN: VITAL SIGNS: LINES/DRAINS:   Pre Treatment: Pain Screen  Pain Scale 1: Numeric (0 - 10)  Pain Intensity 1: 0  Post Treatment: 0   None  O2 Device: Nasal cannula     GROSS EVALUATION:  BUEs Within Functional Limits Abnormal/ Functional Abnormal/ Non-Functional (see comments) Not Tested Comments:   AROM [] [x] [] []    PROM [] [] [] []    Strength [] [x] [] [] Generally weak   Balance [] [x] [] []    Posture [x] [] [] []    Sensation [x] [] [] []    Coordination [x] [] [] []    Tone [x] [] [] []    Edema [x] [] [] []    Activity Tolerance [] [x] [] [] Generally decreased    [] [] [] []      COGNITION/  PERCEPTION: Intact Impaired   (see comments) Comments:   Orientation [x] []    Vision [x] []    Hearing [x] []    Judgment/ Insight [x] []    Attention [x] []    Memory [x] []    Command Following [x] []    Emotional Regulation [x] []     [] []      ACTIVITIES OF DAILY LIVING: I Mod I S SBA CGA Min Mod Max Total NT Comments   BASIC ADLs:              Bathing/ Showering [] [] [] [] [] [] [] [] [] [x]    Toileting [] [] [] [] [] [] [] [] [] [x]    Dressing [] [] [] [] [] [] [] [] [] [x]    Feeding [] [] [] [] [] [] [] [] [] [x]    Grooming [] [] [x] [] [] [] [] [] [] []    Personal Device Care [] [] [] [] [] [] [] [] [] [x]    Functional Mobility [] [] [] [] [] [x] [] [] [] []    I=Independent, Mod I=Modified Independent, S=Supervision, SBA=Standby Assistance, CGA=Contact Guard Assistance,   Min=Minimal Assistance, Mod=Moderate Assistance, Max=Maximal Assistance, Total=Total Assistance, NT=Not Tested    MOBILITY: I Mod I S SBA CGA Min Mod Max Total  NT x2 Comments:   Supine to sit [] [] [] [] [] [x] [] [] [] [] []    Sit to supine [] [] [] [] [] [] [] [] [] [x] []    Sit to stand [] [] [] [] [] [x] [] [] [] [] []    Bed to chair [] [] [] [] [] [x] [] [] [] [] []    I=Independent, Mod I=Modified Independent, S=Supervision, SBA=Standby Assistance, CGA=Contact Guard Assistance,   Min=Minimal Assistance, Mod=Moderate Assistance, Max=Maximal Assistance, Total=Total Assistance, NT=Not Tested    [address] [facility] [address] [id number] AM-PAC 6 Clicks   Daily Activity Inpatient Short Form        How much help from another person does the patient currently need. .. Total A Lot A Little None   1. Putting on and taking off regular lower body clothing? [] 1   [] 2   [x] 3   [] 4   2. Bathing (including washing, rinsing, drying)? [] 1   [] 2   [x] 3   [] 4   3. Toileting, which includes using toilet, bedpan or urinal?   [] 1   [] 2   [x] 3   [] 4   4. Putting on and taking off regular upper body clothing? [] 1   [] 2   [x] 3   [] 4   5. Taking care of personal grooming such as brushing teeth? [] 1   [] 2   [x] 3   [] 4   6. Eating meals? [] 1   [] 2   [x] 3   [] 4   © 2007, Trustees of [address] [place] [address] [address] [id number], under license to [facility].  All rights reserved     Score:  Initial: 18 Most Recent: X (Date: -- )   Interpretation of Tool:  Represents activities that are increasingly more difficult (i.e. Bed mobility, Transfers, Gait). PLAN:   FREQUENCY/DURATION: OT Plan of Care: 3 times/week for duration of hospital stay or until stated goals are met, whichever comes first.    PROBLEM LIST:   (Skilled intervention is medically necessary to address:)  1. Decreased ADL/Functional Activities  2. Decreased Activity Tolerance  3. Decreased AROM/PROM  4. Decreased Balance  5. Decreased Coordination  6. Decreased Gait Ability  7. Decreased Strength  8. Decreased Transfer Abilities   INTERVENTIONS PLANNED:   (Benefits and precautions of occupational therapy have been discussed with the patient.)  1. Self Care Training  2. Therapeutic Activity  3. Therapeutic Exercise/HEP  4. Neuromuscular Re-education  5.  Education     TREATMENT:     EVALUATION: Low Complexity : (Untimed Charge)    TREATMENT:   (     )  evaluation only    TREATMENT GRID:  N/A    AFTER TREATMENT POSITION/PRECAUTIONS:  Chair, Needs within reach and RN notified    INTERDISCIPLINARY COLLABORATION:  RN/PCT and OT/MARIE    TOTAL TREATMENT DURATION:  OT Patient Time In/Time Out  Time In: 1116  Time Out: [address], OT

## 2022-03-15 NOTE — PROGRESS NOTES
ACUTE PHYSICAL THERAPY GOALS:  (Developed with and agreed upon by patient and/or caregiver. )  LTG:  (1.)Ms. Chiki Guevara will move from supine to sit and sit to supine  in bed with MODIFIED INDEPENDENCE within 7 treatment day(s). (2.)Ms. Chiki Guevara will transfer from bed to chair and chair to bed with SUPERVISION using the least restrictive device within 7 treatment day(s). (3.)Ms. Chiki Guevara will ambulate with SUPERVISION for 100 feet with the least restrictive device within 7 treatment day(s). (4.)Ms. Chiki Guevara will perform standing static and dynamic balance activities x 23 minutes with SUPERVISION to improve safety within 7 treatment day(s). (5.)Ms. Chiki Guevara will ambulate and/or perform functional activities for  23 consecutive minutes with stable vital signs and no rests required to improve activity tolerance within 7 treatment days.   ________________________________________________________________________________________________      _______________________________________________________________________        PHYSICAL THERAPY ASSESSMENT: Initial Assessment, Daily Note and AM PT Treatment Day # 1      Aide Guevara is a 80 y.o. female   PRIMARY DIAGNOSIS: Functional quadriplegia ([address]  Functional quadriplegia (HCC) [R53.2]       Reason for Referral:    ICD-10: Treatment Diagnosis: Generalized Muscle Weakness (M62.81)  Difficulty in walking, Not elsewhere classified (R26.2)  OBSERVATION: Payor: [employer] / Plan: [place] [employer] / Product Type: Managed Care Medicare /     ASSESSMENT:     REHAB RECOMMENDATIONS:   Recommendation to date pending progress:  Setting:   Short-term Rehab  Equipment:    To Be Determined     PRIOR LEVEL OF FUNCTION:  (Prior to Hospitalization) INITIAL/CURRENT LEVEL OF FUNCTION:  (Most Recently Demonstrated)   Bed Mobility:   Unknown  Sit to Stand:   Unknown  Transfers:   Unknown  Gait/Mobility:   Unknown Bed Mobility:   Standby Assistance  Sit to Stand:   Minimal Assistance  Transfers:   Minimal Assistance to CGA  Gait/Mobility:   Minimal Assistance to CGA     ASSESSMENT:  Ms. Xu Hughes is a 80year old F who presents re-admitted from the hospital after discharge last Thursday, by [place] therapist who noticed pt had SOB and irregular HR and has been bed bound since d/c. Ifeoma De La Cruz Pt states she was at rehab prior to her last hospital admission and was using a RW for ambulation and refused rehab after last hospital admission. Pt states her daughter now lives with her (daughter present in room) in 1 level home with 2STE and has been assisting her with bathing and changing her brief as patient has not been out of bed since last Thursday. Pt denies any recent falls and states she is getting a w/c and hospital bed at home. This date pt performs mobility including supine>sit with SBA and fair+/good seated balance and Min A for STS to RW. Pt demonstrates fair/fair+ standing balance while wound RN assessed sacral wound. Min A/CGA for gait with RW x 3ft to chair with tactile/manual cues for RW safety. Pt left seated reclined in chair with needs in reach and alarm activated and daughter present in room. Pt presents as functioning below her baseline, with deficits in mobility including transfers, gait, balance, and activity tolerance. Pt will benefit from skilled therapy services to address stated deficits to promote return to highest level of function, independence, and safety. Rehab recommended at d/c from hospital. Will continue to follow. SUBJECTIVE:   Ms. Xu Hughes states, \"I was doing well at rehab\"    SOCIAL HISTORY/LIVING ENVIRONMENT: Lives with daughter in 1 level home 2 ABDIAS. No hx of falls. PLOF, bed bound since last hospital admission, but according to patient and patient's daughter she was ambulating with PT at rehab with RW. Pt has a rollator and plans to get w/c and hospital bed.   Home Environment: Private residence  # Steps to Enter: 5 (2 steps on back, 4-5 in front)  One/Two Story Residence: One story  Living Alone: No  Support Systems: Child(edward),Caregiver/Home Care Staff  OBJECTIVE:     PAIN: VITAL SIGNS: LINES/DRAINS:   Pre Treatment:    Post Treatment: 0 Vital Signs  O2 Device: Nasal cannula  O2 Flow Rate (L/min): 3 l/min Purewick  O2 Device: Nasal cannula     GROSS EVALUATION:  B LE Within Functional Limits Abnormal/ Functional Abnormal/ Non-Functional (see comments) Not Tested Comments:   AROM [] [] [] []    PROM [] [] [] []    Strength [] [] [] []    Balance [] [] [] []    Posture [] [] [] []    Sensation [] [] [] []    Coordination [] [] [] []    Tone [] [] [] []    Edema [] [] [] []    Activity Tolerance [] [] [] []     [] [] [] []      COGNITION/  PERCEPTION: Intact Impaired   (see comments) Comments:   Orientation [] []    Vision [] []    Hearing [] []    Command Following [] []    Safety Awareness [] []     [] []      MOBILITY: I Mod I S SBA CGA Min Mod Max Total  NT x2 Comments:   Bed Mobility    Rolling [] [] [] [] [] [] [] [] [] [] []    Supine to Sit [] [] [] [] [] [] [] [] [] [] []    Scooting [] [] [] [] [] [] [] [] [] [] []    Sit to Supine [] [] [] [] [] [] [] [] [] [] []    Transfers    Sit to Stand [] [] [] [] [] [] [] [] [] [] []    Bed to Chair [] [] [] [] [] [] [] [] [] [] []    Stand to Sit [] [] [] [] [] [] [] [] [] [] []    I=Independent, Mod I=Modified Independent, S=Supervision, SBA=Standby Assistance, CGA=Contact Guard Assistance,   Min=Minimal Assistance, Mod=Moderate Assistance, Max=Maximal Assistance, Total=Total Assistance, NT=Not Tested  GAIT: I Mod I S SBA CGA Min Mod Max Total  NT x2 Comments:   Level of Assistance [] [] [] [] [] [] [] [] [] [] []    Distance 3ft    DME Rolling Walker    Gait Quality Shuffled and slow    Weightbearing Status N/A     I=Independent, Mod I=Modified Independent, S=Supervision, SBA=Standby Assistance, CGA=Contact Guard Assistance,   Min=Minimal Assistance, Mod=Moderate Assistance, Max=Maximal Assistance, Total=Total Assistance, NT=Not Tested    [address] [place] [address] AM-PAC [facility],[address] Floor Inpatient Short Form       How much difficulty does the patient currently have. .. Unable A Lot A Little None   1. Turning over in bed (including adjusting bedclothes, sheets and blankets)? [] 1   [] 2   [] 3   [x] 4   2. Sitting down on and standing up from a chair with arms ( e.g., wheelchair, bedside commode, etc.)   [] 1   [] 2   [x] 3   [] 4   3. Moving from lying on back to sitting on the side of the bed? [] 1   [] 2   [] 3   [x] 4   How much help from another person does the patient currently need. .. Total A Lot A Little None   4. Moving to and from a bed to a chair (including a wheelchair)? [] 1   [] 2   [x] 3   [] 4   5. Need to walk in hospital room? [] 1   [] 2   [x] 3   [] 4   6. Climbing 3-5 steps with a railing? [] 1   [] 2   [x] 3   [] 4   © 2007, Trustees of [address] [place] [id number], under license to [employer]. All rights reserved     Score:  Initial: 20 Most Recent: X (Date: -- )    Interpretation of Tool:  Represents activities that are increasingly more difficult (i.e. Bed mobility, Transfers, Gait). PLAN:   FREQUENCY/DURATION: PT Plan of Care: 3 times/week for duration of hospital stay or until stated goals are met, whichever comes first.    PROBLEM LIST:   (Skilled intervention is medically necessary to address:)  1. Decreased ADL/Functional Activities  2. Decreased Activity Tolerance  3. Decreased Balance  4. Decreased Gait Ability  5. Decreased Strength  6. Decreased Transfer Abilities   INTERVENTIONS PLANNED:   (Benefits and precautions of physical therapy have been discussed with the patient.)  1. Therapeutic Activity  2. Therapeutic Exercise/HEP  3. Neuromuscular Re-education  4. Gait Training  5. Education     TREATMENT:     EVALUATION: Low Complexity : (Untimed Charge)    TREATMENT:   (     )  Therapeutic Activity (23 Minutes):  Therapeutic activity included Supine to Sit, Scooting, Transfer Training, Ambulation on level ground, Sitting balance  and Standing balance to improve functional Mobility, Strength and Activity tolerance.     TREATMENT GRID:  N/A    AFTER TREATMENT POSITION/PRECAUTIONS:  Alarm Activated, Chair, Needs within reach and Visitors at bedside    INTERDISCIPLINARY COLLABORATION:  RN/PCT and PT/PTA    TOTAL TREATMENT DURATION:  PT Patient Time In/Time Out  Time In: 1037  Time Out: [address]

## 2022-03-15 NOTE — PROGRESS NOTES
Patient arrived to room 324 at this time via stretcher and 5L O2 NC. Daughter at bedside. Dual skin assessment performed with second RN. Some blanchable redness and excoriation to sacrum. Allevyn applied. Patient also has darkened area on L lateral foot which she states is a \"corn\". Allevyn applied to this area as well. Some scattered scarring also noted. Patient in no apparent distress at this time, and VSS. Patient given call light and instructed on use. Will continue to monitor.

## 2022-03-16 NOTE — PROGRESS NOTES
ACUTE PHYSICAL THERAPY GOALS:  (Developed with and agreed upon by patient and/or caregiver. )  LTG:  (1.)Ms. Chiki Guevara will move from supine to sit and sit to supine  in bed with MODIFIED INDEPENDENCE within 7 treatment day(s). (2.)Ms. Chiki Guevara will transfer from bed to chair and chair to bed with SUPERVISION using the least restrictive device within 7 treatment day(s). (3.)Ms. Chiki Guevara will ambulate with SUPERVISION for 100 feet with the least restrictive device within 7 treatment day(s). (4.)Ms. Chiki Guevara will perform standing static and dynamic balance activities x 23 minutes with SUPERVISION to improve safety within 7 treatment day(s). (5.)Ms. Chiki Guevara will ambulate and/or perform functional activities for  23 consecutive minutes with stable vital signs and no rests required to improve activity tolerance within 7 treatment days. ________________________________________________________________________________________________    PHYSICAL THERAPY: Daily Note and AM Treatment Day # 2    Devon Jacobs is a 80 y.o. female   PRIMARY DIAGNOSIS: Functional quadriplegia ([address]  Functional quadriplegia ([address] [R53.2]         ASSESSMENT:     REHAB RECOMMENDATIONS: CURRENT LEVEL OF FUNCTION:  (Most Recently Demonstrated)   Recommendation to date pending progress:  Setting:   Short-term Rehab  Equipment:    To Be Determined Bed Mobility:   Standby Assistance to CGA  Sit to Stand:   Moderate Assistance  Transfers:   Minimal Assistance to Mod A  Gait/Mobility:   Minimal Assistance to Moderate A     ASSESSMENT:  Ms. Chiki Guevara is supine in bed with R LE hanging off the side of the bed performing \"exercises\" and willing to work with PT. Pt SBA/CGA for bed mobility today with fair+ seated balance demonstrated performing LE exercises seated. Pt required Mod A for STS from bed to ambulate with RW 15ft to a chair in her room for a seated rest break.  Pt required Min/Mod A for ambulation with RW due to posterior lean and poor safety management of RW and requries a lot of re-direction for stand>sit transfer. Slight desat to 88% with gait, but increased with pursed lip breathing education and seated rest. Mod A for STS from chair with arm rails and to stand and gain standing balance once standing at RW. Min/ModA for gait to her chair in room with RW x15ft with cues for stepping and turning. Pt left reclined in chair with needs in reach and RN notified and alarm activated. Today patient demonstrated more unsteadiness with longer gait distance and overall poor standing dynamic and static balance requiring extra time for finding her center of gravity and cues for RW safety management. PT to continue to follow.      SUBJECTIVE:   Ms. Shauna Teran states, \"You got it [place]\"    SOCIAL HISTORY/ LIVING ENVIRONMENT: See eval  Home Environment: Private residence  # Steps to Enter: 5 (2 steps on back, 4-5 in front)  One/Two Story Residence: One story  Living Alone: No  Support Systems: Child(edward)  OBJECTIVE:     PAIN: VITAL SIGNS: LINES/DRAINS:   Pre Treatment:    Post Treatment: 0 Vital Signs  O2 Device: Nasal cannula  O2 Flow Rate (L/min): 3 l/min Purewick  O2 Device: Nasal cannula     MOBILITY: I Mod I S SBA CGA Min Mod Max Total  NT x2 Comments:   Bed Mobility    Rolling [] [] [] [] [] [] [] [] [] [x] []    Supine to Sit [] [] [] [x] [x] [] [] [] [] [] []    Scooting [] [] [] [x] [x] [] [] [] [] [] []    Sit to Supine [] [] [] [] [] [] [] [] [] [x] []    Transfers    Sit to Stand [] [] [] [] [] [x] [x] [] [] [] []    Bed to Chair [] [] [] [] [] [x] [x] [] [] [] []    Stand to Sit [] [] [] [] [] [x] [x] [] [] [] []    I=Independent, Mod I=Modified Independent, S=Supervision, SBA=Standby Assistance, CGA=Contact Guard Assistance,   Min=Minimal Assistance, Mod=Moderate Assistance, Max=Maximal Assistance, Total=Total Assistance, NT=Not Tested    BALANCE: Good Fair+ Fair Fair- Poor NT Comments   Sitting Static [] [x] [x] [] [] []    Sitting Dynamic [] [] [x] [] [] []              Standing Static [] [] [] [] [x] []    Standing Dynamic [] [] [] [] [x] []      GAIT: I Mod I S SBA CGA Min Mod Max Total  NT x2 Comments:   Level of Assistance [] [] [] [] [] [x] [x] [] [] [] []    Distance 15ft 2x    DME Rolling Walker    Gait Quality Short step length, posterior lean, unsteady    Weightbearing  Status N/A     I=Independent, Mod I=Modified Independent, S=Supervision, SBA=Standby Assistance, CGA=Contact Guard Assistance,   Min=Minimal Assistance, Mod=Moderate Assistance, Max=Maximal Assistance, Total=Total Assistance, NT=Not Tested    PLAN:   FREQUENCY/DURATION: PT Plan of Care: 3 times/week for duration of hospital stay or until stated goals are met, whichever comes first.  TREATMENT:     TREATMENT:   ($$ Gait Trainin-22 mins  $$ Therapeutic Activity: 8-22 mins    )  Therapeutic Activity (14 Minutes): Therapeutic activity included Supine to Sit, Scooting, Transfer Training, Ambulation on level ground, Sitting balance , Standing balance and LE exercises to improve functional Mobility, Strength and Activity tolerance. Gait Training (15 Minutes): Gait training for 15ft 2x feet utilizing [facility]. Patient required Manual, Verbal and Visual cueing to improve Activity Pacing, Assistive Device Utilization, Dynamic Standing Balance and Gait Mechanics.      TREATMENT GRID:   Date:  3/16/ Date:   Date:     Activity/Exercise Parameters Parameters Parameters   Ankle pumps 2x20     LAQ 2x20                                         AFTER TREATMENT POSITION/PRECAUTIONS:  Alarm Activated, Chair, Needs within reach and RN notified    INTERDISCIPLINARY COLLABORATION:  RN/PCT and PT/PTA    TOTAL TREATMENT DURATION:  PT Patient Time In/Time Out  Time In: 928  Time Out: [address].

## 2022-03-16 NOTE — PROGRESS NOTES
Problem: Pressure Injury - Risk of  Goal: *Prevention of pressure injury  Description: Document Carlos Scale and appropriate interventions in the flowsheet. Outcome: Progressing Towards Goal  Note: Pressure Injury Interventions:       Moisture Interventions: Absorbent underpads,Internal/External urinary devices,Minimize layers    Activity Interventions: Increase time out of bed,Pressure redistribution bed/mattress(bed type),PT/OT evaluation    Mobility Interventions: Float heels,HOB 30 degrees or less,Pressure redistribution bed/mattress (bed type),PT/OT evaluation    Nutrition Interventions: Document food/fluid/supplement intake,Offer support with meals,snacks and hydration    Friction and Shear Interventions: Feet elevated on foot rest,Foam dressings/transparent film/skin sealants,HOB 30 degrees or less,Minimize layers,Apply protective barrier, creams and emollients                Problem: Falls - Risk of  Goal: *Absence of Falls  Description: Document Torito Fall Risk and appropriate interventions in the flowsheet.   Outcome: Progressing Towards Goal  Note: Fall Risk Interventions:  Mobility Interventions: Bed/chair exit alarm,Communicate number of staff needed for ambulation/transfer,Patient to call before getting OOB         Medication Interventions: Bed/chair exit alarm,Evaluate medications/consider consulting pharmacy,Patient to call before getting OOB    Elimination Interventions: Call light in reach,Bed/chair exit alarm,Patient to call for help with toileting needs    History of Falls Interventions: Bed/chair exit alarm,Door open when patient unattended,Investigate reason for fall,Room close to nurse's station

## 2022-03-16 NOTE — DIABETES MGMT
Patient blood glucose ranged 182-342 yesterday with patient receiving Lantus 8 units and Humalog 18 units. Blood glucose this morning was 131. Reviewed patient current regimen: Lantus 8 units nightly and Humalog correctional insulin. Patient would likely benefit from a reduction in basal insulin to reduce risk of morning hypoglycemia. Provider updated via [facility] regarding recommendations and patient glycemic control. Update at 1035: attempted to see patient and patient daughter for clarification regarding what insulin pen they have at home. Patient in recliner, lights off. Patient voices no needs at this time, call bell in reach, and requests lights stay off. Patient daughter not present at this time. Will follow along.

## 2022-03-16 NOTE — PROGRESS NOTES
Hospitalist Progress Note   Admit Date:  3/14/2022  4:22 PM   Name:  Ashley Villasenor   Age:  80 y.o. Sex:  female  :  1929   MRN:  [id number]   Room:  324/01    Presenting Complaint: Irregular Heart Beat and Shortness of Breath    Reason(s) for Admission: Functional quadriplegia [facility]) [R53.2]     Hospital Course & Interval History:   Mrs. Morgan Dixon is a very nice 79 y/o AAF with a h/o COVID 2022, CKD3, COPD with chronic hypoxemic respiratory failure on 2-3L, PE (2022), GIB who was admitted to our service on 3/14 for weakness. She was just discharged home on 3/10 after being hospitalized with HAP, AFib and PE. Placement was recommended but family and patient opted to go home. Reportedly the patient had not been out of bed for several days. HH went to see the patient on 3/14 and became concerned so they advised that she be brought to the ER. No leukocytosis on labs. LA 1.4, pct 0.2, BNP 3700. CXR showed bilateral infiltrates and she was given antibiotics. She was also given IV Lasix. She has worked with therapy, now amenable to placement. Subjective/24hr Events (22): Continues to feel well, eating lunch. AM glucose ok however afternoon spiked to 300s. Denies pain or SOB, N/V/D.     ROS:  10 systems reviewed and negative except as noted above. Assessment & Plan:   # Generalized weakness/functional quadriplegia              - Con't therapy efforts. Patient and daughter now agreeable to placement (recommended during last hospitalization but they ultimately declined).    # Atrial fibrillation              - Con't Eliquis     # RUL subsegmental PE              - Noted on CT 3/4/22. Con't Eliquis.     # Chronic hypoxemic respiratory failure ? - Per chart review discharged on 3L after her COVID hospitalization -2022, patient told me today 3/16 that she does not wear o2? . Euvolemic on exam. No wheezing.     # HTN              - Norvasc     # MDD, anxiety, mood disorder                - Con't Zoloft     # DM2              - Con't Lantus 8U, add 6U prandial today 3/16, con't SSI     # GERD              - PPI    Dispo/Discharge Planning: Placement.   Diet:  ADULT DIET Regular; 4 carb choices (60 gm/meal)  ADULT ORAL NUTRITION SUPPLEMENT Breakfast, Lunch, Dinner; Diabetic Supplement  Code status: DNR    Hospital Problems as of 3/16/2022 Date Reviewed: 3/9/2022          Codes Class Noted - Resolved POA    * (Principal) Functional quadriplegia ([facility] ICD-10-CM: R53.2  ICD-9-CM: 780.72  3/14/2022 - Present Unknown        Moderate protein-calorie malnutrition ([facility] ICD-10-CM: E44.0  ICD-9-CM: 263.0  12/16/2021 - Present Yes        Iron deficiency anemia due to chronic blood loss ICD-10-CM: D50.0  ICD-9-CM: 280.0  5/18/2020 - Present Yes        COPD (chronic obstructive pulmonary disease) (HCC) (Chronic) ICD-10-CM: J44.9  ICD-9-CM: 496  8/22/2018 - Present Yes        Type 2 diabetes mellitus with diabetic neuropathy (HCC) (Chronic) ICD-10-CM: E11.40  ICD-9-CM: 250.60, 357.2  5/16/2018 - Present Yes        Neuropathy ICD-10-CM: G62.9  ICD-9-CM: 355.9  8/24/2012 - Present Yes    Overview Signed 8/24/2012 12:56 PM by Mirian Joyce     Lower limbs             PAD (peripheral artery disease) ([facility] ICD-10-CM: I73.9  ICD-9-CM: 443.9  8/24/2012 - Present Yes        Essential hypertension (Chronic) ICD-10-CM: I10  ICD-9-CM: 401.9  7/16/2010 - Present Yes        GERD (gastroesophageal reflux disease) (Chronic) ICD-10-CM: K21.9  ICD-9-CM: 530.81  7/16/2010 - Present Yes              Objective:     Patient Vitals for the past 24 hrs:   Temp Pulse Resp BP SpO2   03/16/22 1133 98 °F (36.7 °C) 74 17 (!) 143/92 98 %   03/16/22 0749     97 %   03/16/22 0736 97.5 °F (36.4 °C) 71 16 (!) 152/66 99 %   03/16/22 0531 98.7 °F (37.1 °C) 79 18 135/74 98 %   03/16/22 0125 98.4 °F (36.9 °C) 75 18 132/61 98 %   03/15/22 2016     95 %   03/15/22 2004 98.2 °F (36.8 °C) 77 18 (!) 123/59 94 %   03/15/22 1600 97 °F (36.1 °C) 78 18 (!) 114/55 98 %     Oxygen Therapy  O2 Sat (%): 98 % (03/16/22 1133)  Pulse via Oximetry: 73 beats per minute (03/16/22 0749)  O2 Device: Nasal cannula (03/16/22 1217)  Skin Assessment: Clean, dry, & intact (03/16/22 0810)  Skin Protection for O2 Device: No (03/16/22 0035)  O2 Flow Rate (L/min): 3 l/min (03/16/22 1217)  FIO2 (%): 32 % (03/15/22 0758)    Estimated body mass index is 25.85 kg/m² as calculated from the following:    Height as of this encounter: 5' 2\" (1.575 m). Weight as of this encounter: 64.1 kg (141 lb 5 oz). Intake/Output Summary (Last 24 hours) at 3/16/2022 1340  Last data filed at 3/16/2022 0635  Gross per 24 hour   Intake    Output 200 ml   Net -200 ml         Physical Exam:   Blood pressure (!) 143/92, pulse 74, temperature 98 °F (36.7 °C), resp. rate 17, height 5' 2\" (1.575 m), weight 64.1 kg (141 lb 5 oz), SpO2 98 %, not currently breastfeeding. General:    Well nourished. No overt distress. Pleasant, oriented, cooperative. Head:  Normocephalic, atraumatic  Eyes:  Sclerae appear normal.  Pupils equally round. ENT:  Nares appear normal, no drainage. Moist oral mucosa  Neck:  No restricted ROM. Trachea midline   CV:   RRR. No m/r/g. No jugular venous distension. Lungs:   CTAB. No wheezing, rhonchi, or rales. Respirations even, unlabored. Abdomen: Bowel sounds present. Soft, nontender, nondistended. Extremities: No cyanosis or clubbing. No edema  Skin:     No rashes and normal coloration. Warm and dry. Neuro:  CN II-XII grossly intact. Sensation intact. A&Ox3  Psych:  Normal mood and affect.       I have reviewed ordered lab tests and independently visualized imaging below:    Recent Labs:  Recent Results (from the past 48 hour(s))   CBC WITH AUTOMATED DIFF    Collection Time: 03/14/22  4:40 PM   Result Value Ref Range    WBC 9.9 4.3 - 11.1 K/uL    RBC 3.77 (L) 4.05 - 5.2 M/uL    HGB 10.9 (L) 11.7 - 15.4 g/dL    HCT 36.6 35.8 - 46.3 % MCV 97.1 79.6 - 97.8 FL    MCH 28.9 26.1 - 32.9 PG    MCHC 29.8 (L) 31.4 - 35.0 g/dL    RDW 19.7 (H) 11.9 - 14.6 %    PLATELET 734 455 - 419 K/uL    MPV 11.6 9.4 - 12.3 FL    ABSOLUTE NRBC 0.00 0.0 - 0.2 K/uL    DF AUTOMATED      NEUTROPHILS 72 43 - 78 %    LYMPHOCYTES 20 13 - 44 %    MONOCYTES 6 4.0 - 12.0 %    EOSINOPHILS 1 0.5 - 7.8 %    BASOPHILS 0 0.0 - 2.0 %    IMMATURE GRANULOCYTES 1 0.0 - 5.0 %    ABS. NEUTROPHILS 7.1 1.7 - 8.2 K/UL    ABS. LYMPHOCYTES 2.0 0.5 - 4.6 K/UL    ABS. MONOCYTES 0.5 0.1 - 1.3 K/UL    ABS. EOSINOPHILS 0.1 0.0 - 0.8 K/UL    ABS. BASOPHILS 0.0 0.0 - 0.2 K/UL    ABS. IMM. GRANS. 0.1 0.0 - 0.5 K/UL   METABOLIC PANEL, COMPREHENSIVE    Collection Time: 03/14/22  4:40 PM   Result Value Ref Range    Sodium 143 136 - 145 mmol/L    Potassium 4.2 3.5 - 5.1 mmol/L    Chloride 113 (H) 98 - 107 mmol/L    CO2 27 21 - 32 mmol/L    Anion gap 3 (L) 7 - 16 mmol/L    Glucose 328 (H) 65 - 100 mg/dL    BUN 20 8 - 23 MG/DL    Creatinine 1.20 (H) 0.6 - 1.0 MG/DL    GFR est AA 54 (L) >60 ml/min/1.73m2    GFR est non-AA 45 (L) >60 ml/min/1.73m2    Calcium 9.8 8.3 - 10.4 MG/DL    Bilirubin, total 0.3 0.2 - 1.1 MG/DL    ALT (SGPT) 23 12 - 65 U/L    AST (SGOT) 16 15 - 37 U/L    Alk.  phosphatase 76 50 - 136 U/L    Protein, total 6.5 6.3 - 8.2 g/dL    Albumin 2.0 (L) 3.2 - 4.6 g/dL    Globulin 4.5 (H) 2.3 - 3.5 g/dL    A-G Ratio 0.4 (L) 1.2 - 3.5     MAGNESIUM    Collection Time: 03/14/22  4:40 PM   Result Value Ref Range    Magnesium 2.0 1.8 - 2.4 mg/dL   TROPONIN-HIGH SENSITIVITY    Collection Time: 03/14/22  4:40 PM   Result Value Ref Range    Troponin-High Sensitivity 23.3 (H) 0 - 14 pg/mL   NT-PRO BNP    Collection Time: 03/14/22  4:40 PM   Result Value Ref Range    NT pro-BNP 3,697 (H) <450 PG/ML   PROCALCITONIN    Collection Time: 03/14/22  4:40 PM   Result Value Ref Range    Procalcitonin 0.20 0.00 - 0.49 ng/mL   LACTIC ACID    Collection Time: 03/14/22  5:44 PM   Result Value Ref Range    Lactic acid 1.4 0.4 - 2.0 MMOL/L   CULTURE, BLOOD    Collection Time: 03/14/22  5:44 PM    Specimen: Blood   Result Value Ref Range    Special Requests: NO SPECIAL REQUESTS  RIGHT  FOREARM        Culture result: NO GROWTH 2 DAYS     TROPONIN-HIGH SENSITIVITY    Collection Time: 03/14/22  6:41 PM   Result Value Ref Range    Troponin-High Sensitivity 21.2 (H) 0 - 14 pg/mL   URINALYSIS W/ RFLX MICROSCOPIC    Collection Time: 03/14/22  9:59 PM   Result Value Ref Range    Color YELLOW      Appearance CLEAR      Specific gravity 1.012 1.001 - 1.023      pH (UA) 6.0 5.0 - 9.0      Protein 300 (A) NEG mg/dL    Glucose 100 mg/dL    Ketone Negative NEG mg/dL    Bilirubin Negative NEG      Blood Negative NEG      Urobilinogen 0.2 0.2 - 1.0 EU/dL    Nitrites Negative NEG      Leukocyte Esterase Negative NEG      WBC 0-3 0 /hpf    RBC 0-3 0 /hpf    Epithelial cells 0-3 0 /hpf    Bacteria 0 0 /hpf    Casts 3-5 0 /lpf   GLUCOSE, POC    Collection Time: 03/15/22  7:37 AM   Result Value Ref Range    Glucose (POC) 342 (H) 65 - 100 mg/dL    Performed by WhoisEDIaddiGRAYLT    GLUCOSE, POC    Collection Time: 03/15/22 11:09 AM   Result Value Ref Range    Glucose (POC) 213 (H) 65 - 100 mg/dL    Performed by [employer]GRAYLT    GLUCOSE, POC    Collection Time: 03/15/22  3:19 PM   Result Value Ref Range    Glucose (POC) 182 (H) 65 - 100 mg/dL    Performed by ChristoferPCT    GLUCOSE, POC    Collection Time: 03/15/22  9:27 PM   Result Value Ref Range    Glucose (POC) 233 (H) 65 - 100 mg/dL    Performed by Dirk    PLEASE READ & DOCUMENT PPD TEST IN 48 HRS    Collection Time: 03/16/22  5:41 AM   Result Value Ref Range    PPD Negative Negative    mm Induration 0 0 - 5 mm   GLUCOSE, POC    Collection Time: 03/16/22  7:50 AM   Result Value Ref Range    Glucose (POC) 131 (H) 65 - 100 mg/dL    Performed by [address], POC    Collection Time: 03/16/22 11:30 AM   Result Value Ref Range    Glucose (POC) 338 (H) 65 - 100 mg/dL    Performed by Angel Vance        All Micro Results     Procedure Component Value Units Date/Time    CULTURE, BLOOD [[id number]] Collected: 03/14/22 1744    Order Status: Completed Specimen: Blood Updated: 03/16/22 0946     Special Requests: --        NO SPECIAL REQUESTS  RIGHT  FOREARM       Culture result: NO GROWTH 2 DAYS       CULTURE, BLOOD [[id number]]     Order Status: Canceled Specimen: Blood           Other Studies:  No results found.     Current Meds:  Current Facility-Administered Medications   Medication Dose Route Frequency    insulin glargine (LANTUS) injection 8 Units  8 Units SubCUTAneous QHS    sodium chloride (NS) flush 5-10 mL  5-10 mL IntraVENous Q8H    sodium chloride (NS) flush 5-10 mL  5-10 mL IntraVENous PRN    sodium chloride (NS) flush 5-40 mL  5-40 mL IntraVENous Q8H    sodium chloride (NS) flush 5-40 mL  5-40 mL IntraVENous PRN    acetaminophen (TYLENOL) tablet 650 mg  650 mg Oral Q6H PRN    Or    acetaminophen (TYLENOL) suppository 650 mg  650 mg Rectal Q6H PRN    polyethylene glycol (MIRALAX) packet 17 g  17 g Oral DAILY PRN    ondansetron (ZOFRAN ODT) tablet 4 mg  4 mg Oral Q8H PRN    Or    ondansetron (ZOFRAN) injection 4 mg  4 mg IntraVENous Q6H PRN    pantoprazole (PROTONIX) tablet 40 mg  40 mg Oral ACB&D    alum-mag hydroxide-simeth (MYLANTA) oral suspension 15 mL  15 mL Oral Q6H PRN    glucose chewable tablet 16 g  16 g Oral PRN    glucagon (GLUCAGEN) injection 1 mg  1 mg IntraMUSCular PRN    dextrose 10% infusion 125-250 mL  125-250 mL IntraVENous PRN    insulin lispro (HUMALOG) injection   SubCUTAneous AC&HS    arformoteroL (BROVANA) neb solution 15 mcg  15 mcg Nebulization BID RT    budesonide (PULMICORT) 500 mcg/2 ml nebulizer suspension  500 mcg Nebulization BID RT    albuterol-ipratropium (DUO-NEB) 2.5 MG-0.5 MG/3 ML  3 mL Nebulization Q4H PRN    gabapentin (NEURONTIN) capsule 100 mg  100 mg Oral TID    amLODIPine (NORVASC) tablet 10 mg  10 mg Oral DAILY    sertraline (ZOLOFT) tablet 100 mg  100 mg Oral DAILY    ferrous sulfate tablet 325 mg  1 Tablet Oral DAILY WITH BREAKFAST    apixaban (ELIQUIS) tablet 10 mg  10 mg Oral Q12H    [START ON 3/17/2022] apixaban (ELIQUIS) tablet 5 mg  5 mg Oral Q12H       Signed:  Chaim Mayer MD    Part of this note may have been written by using a voice dictation software. The note has been proof read but may still contain some grammatical/other typographical errors.

## 2022-03-17 NOTE — ROUTINE PROCESS
Bedside and Verbal shift change report given to myself (oncoming nurse) by My Caruso RN (offgoing nurse). Report included the following information SBAR, Kardex, MAR and Recent Results.

## 2022-03-17 NOTE — ROUTINE PROCESS
Bedside and Verbal shift change report given to [facility], RN (oncoming nurse) by myself (offgoing nurse). Report included the following information SBAR, Kardex, MAR and Recent Results.

## 2022-03-17 NOTE — PROGRESS NOTES
MASON was contacted by [facility], with [facility] that pt is accepted for rehab bed. CM informed pt and her daughter , Evens Angry [phone]. Both are agreeable. No beds available today.    DCP: [facility].

## 2022-03-17 NOTE — PROGRESS NOTES
ACUTE OT GOALS:  (Developed with and agreed upon by patient and/or caregiver.)  1. Patient will complete lower body bathing and dressing with supervision and adaptive equipment as needed. 2. Patient will complete toileting with supervision. 3. Patient will tolerate 30 minutes of OT treatment with 1-2 rest breaks to increase activity tolerance for ADLs. 4. Patient will complete functional transfers with supervision and adaptive equipment as needed. 5. Patient will complete functional mobility for household distances with supervision and adaptive equipment as needed. 6. Patient will complete self-grooming while standing edge of sink with supervision and adaptive equipment as needed.     Timeframe: 7 visits     OCCUPATIONAL THERAPY: Daily Note OT Treatment Day # 2    Carmencita Ingram is a 80 y.o. female   PRIMARY DIAGNOSIS: Functional quadriplegia ([address]  Functional quadriplegia ([address] [R53.2]       Payor: [employer] / Plan: [place] [employer] / Product Type: Managed Care Medicare /   ASSESSMENT:     REHAB RECOMMENDATIONS: CURRENT LEVEL OF FUNCTION:  (Most Recently Demonstrated)   Recommendation to date pending progress:  Setting:   Short-term Rehab  Equipment:    To Be Determined Bathing:   Not tested  Dressing:   Not tested  Feeding/Grooming:   Set Up  Toileting:   Not tested  Functional Mobility:   Not tested Pt refused to stand     ASSESSMENT:  Ms. Jennifer Killian received sitting up in chair with chair alarm on. OT encouraged pt to stand at sink to completed grooming tasks today. Pt refused. Pt was willing to participate in grooming tasks (brushing teeth and hair) seated in chair with set up only. SUBJECTIVE:   Ms. Jennifer Killian states, \"I'm too old and tired and I don't want to stand up today this late in the day. \"    SOCIAL HISTORY/LIVING ENVIRONMENT:   Home Environment: Private residence  # Steps to Enter: 5 (2 steps on back, 4-5 in front)  One/Two Story Residence: One story  Living Alone: No  Support Systems: Child(edward)    OBJECTIVE:     PAIN: VITAL SIGNS: LINES/DRAINS:   Pre Treatment:    Post Treatment:  Vital Signs  Heart Rate Source: Monitor  BP Patient Position: At rest  O2 Sat (%): 98 % IV and Purewick  O2 Device: Nasal cannula     ACTIVITIES OF DAILY LIVING: I Mod I S SBA CGA Min Mod Max Total NT Comments   BASIC ADLs:              Bathing/ Showering [] [] [] [] [] [] [] [] [] [x]    Toileting [] [] [] [] [] [] [] [] [] [x]    Dressing [] [] [] [] [] [] [] [] [] [x]    Feeding [] [] [] [] [] [] [] [] [] [x]    Grooming [] [] [x] [] [] [] [] [] [] [] Brushing teeth, combing hair seated in chair   Personal Device Care [] [] [] [] [] [] [] [] [] [x]    Functional Mobility [] [] [] [] [] [] [] [] [] [x]    I=Independent, Mod I=Modified Independent, S=Supervision, SBA=Standby Assistance, CGA=Contact Guard Assistance,   Min=Minimal Assistance, Mod=Moderate Assistance, Max=Maximal Assistance, Total=Total Assistance, NT=Not Tested    MOBILITY: I Mod I S SBA CGA Min Mod Max Total  NT x2 Comments:   Supine to sit [] [] [] [] [] [] [] [] [] [x] []    Sit to supine [] [] [] [] [] [] [] [] [] [x] []    Sit to stand [] [] [] [] [] [] [] [] [] [x] []    Bed to chair [] [] [] [] [] [] [] [] [] [x] []    I=Independent, Mod I=Modified Independent, S=Supervision, SBA=Standby Assistance, CGA=Contact Guard Assistance,   Min=Minimal Assistance, Mod=Moderate Assistance, Max=Maximal Assistance, Total=Total Assistance, NT=Not Tested    BALANCE: Good Fair+ Fair Fair- Poor NT Comments   Sitting Static [x] [] [] [] [] []    Sitting Dynamic [] [x] [] [] [] []              Standing Static [] [] [] [] [] [x]    Standing Dynamic [] [] [] [] [] [x]      PLAN:   FREQUENCY/DURATION: OT Plan of Care: 3 times/week for duration of hospital stay or until stated goals are met, whichever comes first.    TREATMENT:   TREATMENT:   (     )  Self Care (35 Minutes): Self care including Grooming to increase independence and decrease level of assistance required.     TREATMENT GRID:  N/A    AFTER TREATMENT POSITION/PRECAUTIONS:  Alarm Activated, Chair, Needs within reach, RN notified and Visitors at bedside    INTERDISCIPLINARY COLLABORATION:  RN/PCT and OT/MARIE    TOTAL TREATMENT DURATION:  OT Patient Time In/Time Out  Time In: [address] [facility]  Time Out: [address] [facility],

## 2022-03-17 NOTE — PROGRESS NOTES
Hospitalist Progress Note   Admit Date:  3/14/2022  4:22 PM   Name:  Kali Medley   Age:  80 y.o. Sex:  female  :  1929   MRN:  [id number]   Room:  [facility]/01    Presenting Complaint: Irregular Heart Beat and Shortness of Breath    Reason(s) for Admission: Functional quadriplegia [facility]) [R53.2]     Hospital Course & Interval History:   80-year-old female with history of Covid 2022, COPD with chronic hypoxemic respiratory failure on 2 to 3 L home oxygen, CKD stage III, PE in 2022, GI bleed who was admitted on 314 for weakness. Which was just recently discharged on 310 after being hospitalized for hospital-acquired pneumonia, A. fib and PE. Placement was recommended but patient opted to go home. Patient had not been out of bed over several days. Home health once outpatient on 314 and was concerned and told her to go to the emergency room. Chest x-ray showed bilateral infiltrates and she was given antibiotics. Also given IV Lasix. Continue to work with physical therapy and now acknowledges the benefits of going to rehab. Subjective/24hr Events (22): Patient was seen and examined at the bedside. No overnight events. Patient without any major complaints such as cardiac chest pain, shortness of breath, abdominal pain, fever/chills. ROS:  10 systems reviewed and negative except as noted above.      Assessment & Plan:   Generalized weakness/functional quadriplegia  Continue PT/OT  Patient and daughter agreeable to rehab  Patient was accepted to [facility] rehab unfortunately no beds available  Discharge pending bed availability    Atrial fibrillation  Continue Eliquis    Right upper lobe subsegmental PE  Noted on CT 3/4/2022  Continue Eliquis    Chronic hypoxemic respiratory failure  Discharged on 3 L after Covid hospitalization January to 2022  Patient advised that she does not wear the oxygen all the time  Patient clear to auscultation bilaterally    Hypertension  Continue Norvasc    MDD, anxiety, mood disorder  Continue Zoloft    Type 2 diabetes  Continue Lantus 8 units  6 units premeal  Sliding scale insulin    GERD  Continue PPI      Dispo/Discharge Planning:    Dispo pending clinical course. Patient has been accepted to rehab. Hopefully patient will be discharged tomorrow pending bed availability at the rehab.     Diet:  ADULT DIET Regular; 4 carb choices (60 gm/meal)  ADULT ORAL NUTRITION SUPPLEMENT Breakfast, Lunch, Dinner; Diabetic Supplement  DVT PPx: Eliquis  Code status: DNR    Hospital Problems as of 3/17/2022 Date Reviewed: 3/9/2022          Codes Class Noted - Resolved POA    * (Principal) Functional quadriplegia ([facility].) ICD-10-CM: R53.2  ICD-9-CM: 780.72  3/14/2022 - Present Unknown        Moderate protein-calorie malnutrition ([facility].) ICD-10-CM: E44.0  ICD-9-CM: 263.0  12/16/2021 - Present Yes        Iron deficiency anemia due to chronic blood loss ICD-10-CM: D50.0  ICD-9-CM: 280.0  5/18/2020 - Present Yes        COPD (chronic obstructive pulmonary disease) (HCC) (Chronic) ICD-10-CM: J44.9  ICD-9-CM: 496  8/22/2018 - Present Yes        Type 2 diabetes mellitus with diabetic neuropathy (HCC) (Chronic) ICD-10-CM: E11.40  ICD-9-CM: 250.60, 357.2  5/16/2018 - Present Yes        Neuropathy ICD-10-CM: G62.9  ICD-9-CM: 355.9  8/24/2012 - Present Yes    Overview Signed 8/24/2012 12:56 PM by Mirian Joyce     Lower limbs             PAD (peripheral artery disease) ([facility].) ICD-10-CM: I73.9  ICD-9-CM: 443.9  8/24/2012 - Present Yes        Essential hypertension (Chronic) ICD-10-CM: I10  ICD-9-CM: 401.9  7/16/2010 - Present Yes        GERD (gastroesophageal reflux disease) (Chronic) ICD-10-CM: K21.9  ICD-9-CM: 530.81  7/16/2010 - Present Yes              Objective:     Patient Vitals for the past 24 hrs:   Temp Pulse Resp BP SpO2   03/17/22 1129 98.1 °F (36.7 °C) 65 18 137/62 100 %   03/17/22 0844     94 %   03/17/22 0747 98 °F (36.7 °C) 73 16 (!) 151/59 96 %   03/17/22 0445 98.1 °F (36.7 °C) 71 18 (!) 156/65 97 %   03/17/22 0058 98.4 °F (36.9 °C) (!) 106 18 (!) 126/57 98 %   03/16/22 2139 98.5 °F (36.9 °C) 75 17 115/63 98 %   03/16/22 2031     96 %   03/16/22 1515 98.3 °F (36.8 °C) 70 16 131/80 95 %     Oxygen Therapy  O2 Sat (%): 100 % (03/17/22 1129)  Pulse via Oximetry: 75 beats per minute (03/17/22 0844)  O2 Device: Nasal cannula (03/17/22 0844)  Skin Assessment: Clean, dry, & intact (03/17/22 0747)  Skin Protection for O2 Device: No (03/16/22 0035)  O2 Flow Rate (L/min): 3 l/min (03/17/22 0844)  FIO2 (%): 32 % (03/15/22 0758)    Estimated body mass index is 25.97 kg/m² as calculated from the following:    Height as of this encounter: 5' 2\" (1.575 m). Weight as of this encounter: 64.4 kg (141 lb 15.6 oz). Intake/Output Summary (Last 24 hours) at 3/17/2022 1354  Last data filed at 3/17/2022 1329  Gross per 24 hour   Intake    Output 1000 ml   Net -1000 ml         Physical Exam:   Blood pressure 137/62, pulse 65, temperature 98.1 °F (36.7 °C), resp. rate 18, height 5' 2\" (1.575 m), weight 64.4 kg (141 lb 15.6 oz), SpO2 100 %, not currently breastfeeding. General:    Well nourished. No overt distress  Head:  Normocephalic, atraumatic  Eyes:  Sclerae appear normal.  Pupils equally round. ENT:  Nares appear normal, no drainage. Moist oral mucosa  Neck:  No restricted ROM. Trachea midline   CV:   RRR. No m/r/g. No jugular venous distension. Lungs:   CTAB. No wheezing, rhonchi, or rales. Respirations even, unlabored  Abdomen: Bowel sounds present. Soft, nontender, nondistended. Extremities: No cyanosis or clubbing. No edema  Skin:     No rashes and normal coloration. Warm and dry. Neuro:  CN II-XII grossly intact. Sensation intact. A&Ox3  Psych:  Normal mood and affect.       I have reviewed ordered lab tests and independently visualized imaging below:    Recent Labs:  Recent Results (from the past 48 hour(s)) GLUCOSE, POC    Collection Time: 03/15/22  3:19 PM   Result Value Ref Range    Glucose (POC) 182 (H) 65 - 100 mg/dL    Performed by Rupa    GLUCOSE, POC    Collection Time: 03/15/22  9:27 PM   Result Value Ref Range    Glucose (POC) 233 (H) 65 - 100 mg/dL    Performed by Dirk    PLEASE READ & DOCUMENT PPD TEST IN 48 HRS    Collection Time: 03/16/22  5:41 AM   Result Value Ref Range    PPD Negative Negative    mm Induration 0 0 - 5 mm   GLUCOSE, POC    Collection Time: 03/16/22  7:50 AM   Result Value Ref Range    Glucose (POC) 131 (H) 65 - 100 mg/dL    Performed by Carol Ann Melo    GLUCOSE, POC    Collection Time: 03/16/22 11:30 AM   Result Value Ref Range    Glucose (POC) 338 (H) 65 - 100 mg/dL    Performed by Carol Ann Melo    GLUCOSE, POC    Collection Time: 03/16/22  3:13 PM   Result Value Ref Range    Glucose (POC) 284 (H) 65 - 100 mg/dL    Performed by Carol Ann Melo    GLUCOSE, POC    Collection Time: 03/16/22 10:36 PM   Result Value Ref Range    Glucose (POC) 166 (H) 65 - 100 mg/dL    Performed by Boyd Flores PPD TEST IN 72 HRS    Collection Time: 03/17/22  5:24 AM   Result Value Ref Range    PPD Negative Negative    mm Induration 0 0 - 5 mm   GLUCOSE, POC    Collection Time: 03/17/22  7:16 AM   Result Value Ref Range    Glucose (POC) 142 (H) 65 - 100 mg/dL    Performed by Regi    GLUCOSE, POC    Collection Time: 03/17/22 10:45 AM   Result Value Ref Range    Glucose (POC) 211 (H) 65 - 100 mg/dL    Performed by [facility]        All Micro Results     Procedure Component Value Units Date/Time    CULTURE, BLOOD [[id number]] Collected: 03/14/22 1744    Order Status: Completed Specimen: Blood Updated: 03/17/22 0625     Special Requests: --        NO SPECIAL REQUESTS  RIGHT  FOREARM       Culture result: NO GROWTH 3 DAYS       CULTURE, BLOOD [[id number]]     Order Status: Canceled Specimen: Blood           Other Studies:  No results found.     Current Meds:  Current Facility-Administered Medications   Medication Dose Route Frequency    insulin glargine (LANTUS) injection 7 Units  7 Units SubCUTAneous QHS    insulin lispro (HUMALOG) injection 4 Units  4 Units SubCUTAneous TIDAC    sodium chloride (NS) flush 5-10 mL  5-10 mL IntraVENous Q8H    sodium chloride (NS) flush 5-10 mL  5-10 mL IntraVENous PRN    sodium chloride (NS) flush 5-40 mL  5-40 mL IntraVENous Q8H    sodium chloride (NS) flush 5-40 mL  5-40 mL IntraVENous PRN    acetaminophen (TYLENOL) tablet 650 mg  650 mg Oral Q6H PRN    Or    acetaminophen (TYLENOL) suppository 650 mg  650 mg Rectal Q6H PRN    polyethylene glycol (MIRALAX) packet 17 g  17 g Oral DAILY PRN    ondansetron (ZOFRAN ODT) tablet 4 mg  4 mg Oral Q8H PRN    Or    ondansetron (ZOFRAN) injection 4 mg  4 mg IntraVENous Q6H PRN    pantoprazole (PROTONIX) tablet 40 mg  40 mg Oral ACB&D    alum-mag hydroxide-simeth (MYLANTA) oral suspension 15 mL  15 mL Oral Q6H PRN    glucose chewable tablet 16 g  16 g Oral PRN    glucagon (GLUCAGEN) injection 1 mg  1 mg IntraMUSCular PRN    dextrose 10% infusion 125-250 mL  125-250 mL IntraVENous PRN    insulin lispro (HUMALOG) injection   SubCUTAneous AC&HS    arformoteroL (BROVANA) neb solution 15 mcg  15 mcg Nebulization BID RT    budesonide (PULMICORT) 500 mcg/2 ml nebulizer suspension  500 mcg Nebulization BID RT    albuterol-ipratropium (DUO-NEB) 2.5 MG-0.5 MG/3 ML  3 mL Nebulization Q4H PRN    gabapentin (NEURONTIN) capsule 100 mg  100 mg Oral TID    amLODIPine (NORVASC) tablet 10 mg  10 mg Oral DAILY    sertraline (ZOLOFT) tablet 100 mg  100 mg Oral DAILY    ferrous sulfate tablet 325 mg  1 Tablet Oral DAILY WITH BREAKFAST    apixaban (ELIQUIS) tablet 10 mg  10 mg Oral Q12H    apixaban (ELIQUIS) tablet 5 mg  5 mg Oral Q12H       Signed:  Vinita Rodriguez MD    Part of this note may have been written by using a voice dictation software.   The note has been proof read but may still contain some grammatical/other typographical errors.

## 2022-03-17 NOTE — PROGRESS NOTES
Physical therapy note: Attempted PT this PM. Pt with another discipline. Will continue to treat as pt is able and time/schedule permit.     Corbin Israel, PTA

## 2022-03-17 NOTE — DIABETES MGMT
Patient blood glucose ranged 131-338 yesterday with patient receiving Lantus 8 units and Humalog 22 units. Blood glucose this morning was 142. Reviewed patient current regimen: Lantus 8 units nightly, Humalog 6 units with meals, and Humalog correctional insulin. Patient would likely benefit from a reduction in basal and prandial insulin to reduce risk of hypoglycemia. Provider updated via [employer] regarding recommendations and patient glycemic control. Update at 1333: new orders received to reduce Lantus to 7 units nightly and prandial insulin to 4 units with meals. Update at 26: patient daughter at bedside seen for diabetes education. Patient daughter has insulin pen that was sent home from rehab it was a Humalog pen dated opened 12/28/21. Pen out of date as it has been opened and at room temperature. Pen discarded with daughter's permission. Discussed if patient needs insulin, rehab would need to write for new insulin prescriptions once patient was discharged home as daughter states patient will be going to rehab tomorrow. Patient daughter brought glucometers from home and was instructed in glucometer and lancet use. Patient daughter successful with return demonstration of lancing device. Patient daughter encouraged to review diabetes education with rehab staff and home health staff in the future prior to patient discharge home. Patient daughter educated regarding current insulin regimen and vrbalized understanding and voices no further questions at this time.

## 2022-03-18 PROBLEM — R53.2 FUNCTIONAL QUADRIPLEGIA (HCC): Status: ACTIVE | Noted: 2022-01-01

## 2022-03-18 PROBLEM — J81.1 PULMONARY EDEMA: Status: ACTIVE | Noted: 2022-01-01

## 2022-03-18 PROBLEM — R29.6 FREQUENT FALLS: Status: ACTIVE | Noted: 2021-01-01

## 2022-03-18 PROBLEM — M54.50 CHRONIC BILATERAL LOW BACK PAIN WITHOUT SCIATICA: Status: ACTIVE | Noted: 2021-01-01

## 2022-03-18 PROBLEM — D62 ACUTE BLOOD LOSS ANEMIA: Status: ACTIVE | Noted: 2022-01-01

## 2022-03-18 PROBLEM — N18.2 CHRONIC RENAL DISEASE, STAGE II: Status: ACTIVE | Noted: 2022-01-01

## 2022-03-18 PROBLEM — J96.00 ACUTE RESPIRATORY FAILURE DUE TO COVID-19 (HCC): Status: ACTIVE | Noted: 2022-01-01

## 2022-03-18 PROBLEM — G89.29 CHRONIC BILATERAL LOW BACK PAIN WITHOUT SCIATICA: Status: ACTIVE | Noted: 2021-01-01

## 2022-03-18 PROBLEM — U07.1 ACUTE RESPIRATORY FAILURE DUE TO COVID-19 (HCC): Status: ACTIVE | Noted: 2022-01-01

## 2022-03-18 NOTE — ROUTINE PROCESS
Bedside and Verbal shift change report received from [address]. Report included the following information SBAR, Kardex, Intake/Output, MAR and Recent Results.

## 2022-03-18 NOTE — DIABETES MGMT
Patient blood glucose ranged 134-211 yesterday with patient receiving Lantus 7 units and Humalog 22 units. Blood glucose this morning was 104. Reviewed patient current regimen: Lantus 7 units nightly, Humalog 4 units with meals, and Humalog correctional insulin. Patient would likely benefit from a reduction in basal and prandial insulin to reduce risk of hypoglycemia. Provider updated via [employer] regarding recommendations and patient glycemic control. Patient will likely benefit from discharge on hospital insulin regimen as hospital diet similar to rehab diet.

## 2022-03-18 NOTE — PROGRESS NOTES
Problem: Pressure Injury - Risk of  Goal: *Prevention of pressure injury  Description: Document Carlos Scale and appropriate interventions in the flowsheet.   Outcome: Progressing Towards Goal  Note: Pressure Injury Interventions:  Sensory Interventions: Assess changes in LOC,Float heels,Keep linens dry and wrinkle-free,Maintain/enhance activity level,Minimize linen layers,Pressure redistribution bed/mattress (bed type)    Moisture Interventions: Absorbent underpads,Apply protective barrier, creams and emollients,Internal/External urinary devices,Limit adult briefs,Maintain skin hydration (lotion/cream),Minimize layers    Activity Interventions: Increase time out of bed,Pressure redistribution bed/mattress(bed type),PT/OT evaluation    Mobility Interventions: HOB 30 degrees or less,Pressure redistribution bed/mattress (bed type),PT/OT evaluation    Nutrition Interventions: Document food/fluid/supplement intake,Offer support with meals,snacks and hydration    Friction and Shear Interventions: Apply protective barrier, creams and emollients,Foam dressings/transparent film/skin sealants,HOB 30 degrees or less,Minimize layers

## 2022-03-18 NOTE — ROUTINE PROCESS
Bedside and Verbal shift change report given to Everardo Carvajal RN (oncoming nurse) by myself (offgoing nurse). Report included the following information SBAR, Kardex, MAR and Recent Results.

## 2022-03-18 NOTE — PROGRESS NOTES
CM following pt's status to discharge to [facility]. CM contacted Fate, with [facility]. No beds available until Monday. Pt's Laymon Poles at bedside and is agreeable.   DCP: [facility] with bed availability on Mon. 21st.

## 2022-03-18 NOTE — PROGRESS NOTES
Hospitalist Progress Note   Admit Date:  3/14/2022  4:22 PM   Name:  Alin Acosta   Age:  80 y.o. Sex:  female  :  1929   MRN:  [id number]   Room:  [facility]/01    Presenting Complaint: Irregular Heart Beat and Shortness of Breath    Reason(s) for Admission: Functional quadriplegia [facility]) [R53.2]     Hospital Course & Interval History:   22-year-old female with history of Covid 2022, COPD with chronic hypoxemic respiratory failure on 2 to 3 L home oxygen, CKD stage III, PE in 2022, GI bleed who was admitted on 314 for weakness. Which was just recently discharged on 310 after being hospitalized for hospital-acquired pneumonia, A. fib and PE. Placement was recommended but patient opted to go home. Patient had not been out of bed over several days. Home health once outpatient on 314 and was concerned and told her to go to the emergency room. Chest x-ray showed bilateral infiltrates and she was given antibiotics. Also given IV Lasix. Continue to work with physical therapy and now acknowledges the benefits of going to rehab. Subjective/24hr Events (22): Patient was seen and examined at the bedside. No overnight events. Patient without any major complaints such as cardiac chest pain, shortness of breath, abdominal pain, fever/chills. Daughter at bedside. ROS:  10 systems reviewed and negative except as noted above.      Assessment & Plan:   Generalized weakness/functional quadriplegia  Continue PT/OT  Patient and daughter agreeable to rehab  Patient was accepted to [facility] rehab unfortunately no beds available  Discharge pending bed availability  3/18 with discussion with case management bed will not be available until Monday 3/21    Atrial fibrillation  Continue Eliquis    Right upper lobe subsegmental PE  Noted on CT 3/4/2022  Continue Eliquis    Chronic hypoxemic respiratory failure  Discharged on 3 L after Covid hospitalization January to February 2022  Patient advised that she does not wear the oxygen all the time  Patient clear to auscultation bilaterally    Hypertension  Continue Norvasc    MDD, anxiety, mood disorder  Continue Zoloft    Type 2 diabetes  Continue Lantus 8 units  6 units premeal  Sliding scale insulin    GERD  Continue PPI      Dispo/Discharge Planning:    Dispo pending clinical course. Patient has been accepted to rehab. Patient to be discharged to rehab on Monday 3/21.     Diet:  ADULT DIET Regular; 4 carb choices (60 gm/meal)  ADULT ORAL NUTRITION SUPPLEMENT Breakfast, Lunch, Dinner; Diabetic Supplement  DVT PPx: Eliquis  Code status: DNR    Hospital Problems as of 3/18/2022 Date Reviewed: 3/9/2022          Codes Class Noted - Resolved POA    * (Principal) Functional quadriplegia ([facility].) ICD-10-CM: R53.2  ICD-9-CM: 780.72  3/14/2022 - Present Unknown        Moderate protein-calorie malnutrition ([facility].) ICD-10-CM: E44.0  ICD-9-CM: 263.0  12/16/2021 - Present Yes        Iron deficiency anemia due to chronic blood loss ICD-10-CM: D50.0  ICD-9-CM: 280.0  5/18/2020 - Present Yes        COPD (chronic obstructive pulmonary disease) (HCC) (Chronic) ICD-10-CM: J44.9  ICD-9-CM: 496  8/22/2018 - Present Yes        Type 2 diabetes mellitus with diabetic neuropathy (HCC) (Chronic) ICD-10-CM: E11.40  ICD-9-CM: 250.60, 357.2  5/16/2018 - Present Yes        Neuropathy ICD-10-CM: G62.9  ICD-9-CM: 355.9  8/24/2012 - Present Yes    Overview Signed 8/24/2012 12:56 PM by Mirian Joyce     Lower limbs             PAD (peripheral artery disease) ([facility] ICD-10-CM: I73.9  ICD-9-CM: 443.9  8/24/2012 - Present Yes        Essential hypertension (Chronic) ICD-10-CM: I10  ICD-9-CM: 401.9  7/16/2010 - Present Yes        GERD (gastroesophageal reflux disease) (Chronic) ICD-10-CM: K21.9  ICD-9-CM: 530.81  7/16/2010 - Present Yes              Objective:     Patient Vitals for the past 24 hrs:   Temp Pulse Resp BP SpO2   03/18/22 1236 98.2 °F (36.8 °C) 78 17 (!) 151/61 97 %   03/18/22 0910     99 %   03/18/22 0743 98.2 °F (36.8 °C) 71 16 (!) 151/77 99 %   03/18/22 0442 98.2 °F (36.8 °C) 76 18 (!) 168/65 99 %   03/18/22 0022 99.4 °F (37.4 °C) 76 19 (!) 141/59 99 %   03/17/22 2026 98.4 °F (36.9 °C) 79 17 (!) 147/63 98 %   03/17/22 1954     98 %   03/17/22 1600 98.4 °F (36.9 °C) 70 16 (!) 137/53 100 %   03/17/22 1500     98 %     Oxygen Therapy  O2 Sat (%): 97 % (03/18/22 1236)  Pulse via Oximetry: 73 beats per minute (03/18/22 0910)  O2 Device: Nasal cannula (03/18/22 1236)  Skin Assessment: Clean, dry, & intact (03/18/22 1236)  Skin Protection for O2 Device: N/A (03/18/22 1236)  O2 Flow Rate (L/min): 2 l/min (03/18/22 1236)  FIO2 (%): 32 % (03/15/22 0758)    Estimated body mass index is 27.74 kg/m² as calculated from the following:    Height as of this encounter: 5' 2\" (1.575 m). Weight as of this encounter: 68.8 kg (151 lb 10.8 oz). Intake/Output Summary (Last 24 hours) at 3/18/2022 1354  Last data filed at 3/18/2022 1236  Gross per 24 hour   Intake 170 ml   Output 100 ml   Net 70 ml         Physical Exam:   Blood pressure (!) 151/61, pulse 78, temperature 98.2 °F (36.8 °C), resp. rate 17, height 5' 2\" (1.575 m), weight 68.8 kg (151 lb 10.8 oz), SpO2 97 %, not currently breastfeeding. General:    Well nourished. No overt distress  Head:  Normocephalic, atraumatic  Eyes:  Sclerae appear normal.  Pupils equally round. ENT:  Nares appear normal, no drainage. Moist oral mucosa  Neck:  No restricted ROM. Trachea midline   CV:   RRR. No m/r/g. No jugular venous distension. Lungs:   CTAB. No wheezing, rhonchi, or rales. Respirations even, unlabored  Abdomen: Bowel sounds present. Soft, nontender, nondistended. Extremities: No cyanosis or clubbing. No edema  Skin:     No rashes and normal coloration. Warm and dry. Neuro:  CN II-XII grossly intact. Sensation intact. A&Ox3  Psych:  Normal mood and affect.       I have reviewed ordered lab tests and independently visualized imaging below:    Recent Labs:  Recent Results (from the past 48 hour(s))   GLUCOSE, POC    Collection Time: 03/16/22  3:13 PM   Result Value Ref Range    Glucose (POC) 284 (H) 65 - 100 mg/dL    Performed by [address], POC    Collection Time: 03/16/22 10:36 PM   Result Value Ref Range    Glucose (POC) 166 (H) 65 - 100 mg/dL    Performed by Lucio Dials PPD TEST IN 72 HRS    Collection Time: 03/17/22  5:24 AM   Result Value Ref Range    PPD Negative Negative    mm Induration 0 0 - 5 mm   GLUCOSE, POC    Collection Time: 03/17/22  7:16 AM   Result Value Ref Range    Glucose (POC) 142 (H) 65 - 100 mg/dL    Performed by [employer]    GLUCOSE, POC    Collection Time: 03/17/22 10:45 AM   Result Value Ref Range    Glucose (POC) 211 (H) 65 - 100 mg/dL    Performed by Florencio Rice    GLUCOSE, POC    Collection Time: 03/17/22  4:10 PM   Result Value Ref Range    Glucose (POC) 134 (H) 65 - 100 mg/dL    Performed by [employer]    GLUCOSE, POC    Collection Time: 03/17/22  8:31 PM   Result Value Ref Range    Glucose (POC) 178 (H) 65 - 100 mg/dL    Performed by Grady)HollyPCT    GLUCOSE, POC    Collection Time: 03/18/22  8:03 AM   Result Value Ref Range    Glucose (POC) 104 (H) 65 - 100 mg/dL    Performed by [address], POC    Collection Time: 03/18/22 11:52 AM   Result Value Ref Range    Glucose (POC) 215 (H) 65 - 100 mg/dL    Performed by Sherry Ramos        All Micro Results     Procedure Component Value Units Date/Time    CULTURE, BLOOD [[id number]] Collected: 03/14/22 1744    Order Status: Completed Specimen: Blood Updated: 03/18/22 0708     Special Requests: --        NO SPECIAL REQUESTS  RIGHT  FOREARM       Culture result: NO GROWTH 4 DAYS       CULTURE, BLOOD [[id number]]     Order Status: Canceled Specimen: Blood           Other Studies:  No results found.     Current Meds:  Current Facility-Administered Medications Medication Dose Route Frequency    [START ON 3/19/2022] docusate sodium (COLACE) capsule 100 mg  100 mg Oral DAILY    insulin glargine (LANTUS) injection 4 Units  4 Units SubCUTAneous QHS    insulin lispro (HUMALOG) injection 3 Units  3 Units SubCUTAneous TIDAC    sodium chloride (NS) flush 5-10 mL  5-10 mL IntraVENous Q8H    sodium chloride (NS) flush 5-10 mL  5-10 mL IntraVENous PRN    sodium chloride (NS) flush 5-40 mL  5-40 mL IntraVENous Q8H    sodium chloride (NS) flush 5-40 mL  5-40 mL IntraVENous PRN    acetaminophen (TYLENOL) tablet 650 mg  650 mg Oral Q6H PRN    Or    acetaminophen (TYLENOL) suppository 650 mg  650 mg Rectal Q6H PRN    polyethylene glycol (MIRALAX) packet 17 g  17 g Oral DAILY PRN    ondansetron (ZOFRAN ODT) tablet 4 mg  4 mg Oral Q8H PRN    Or    ondansetron (ZOFRAN) injection 4 mg  4 mg IntraVENous Q6H PRN    pantoprazole (PROTONIX) tablet 40 mg  40 mg Oral ACB&D    alum-mag hydroxide-simeth (MYLANTA) oral suspension 15 mL  15 mL Oral Q6H PRN    glucose chewable tablet 16 g  16 g Oral PRN    glucagon (GLUCAGEN) injection 1 mg  1 mg IntraMUSCular PRN    dextrose 10% infusion 125-250 mL  125-250 mL IntraVENous PRN    insulin lispro (HUMALOG) injection   SubCUTAneous AC&HS    arformoteroL (BROVANA) neb solution 15 mcg  15 mcg Nebulization BID RT    budesonide (PULMICORT) 500 mcg/2 ml nebulizer suspension  500 mcg Nebulization BID RT    albuterol-ipratropium (DUO-NEB) 2.5 MG-0.5 MG/3 ML  3 mL Nebulization Q4H PRN    gabapentin (NEURONTIN) capsule 100 mg  100 mg Oral TID    amLODIPine (NORVASC) tablet 10 mg  10 mg Oral DAILY    sertraline (ZOLOFT) tablet 100 mg  100 mg Oral DAILY    ferrous sulfate tablet 325 mg  1 Tablet Oral DAILY WITH BREAKFAST    apixaban (ELIQUIS) tablet 5 mg  5 mg Oral Q12H       Signed:  Lucille Dakin, MD    Part of this note may have been written by using a voice dictation software.   The note has been proof read but may still contain some grammatical/other typographical errors.

## 2022-03-18 NOTE — ROUTINE PROCESS
Bedside and Verbal shift change report given to [address], RN (oncoming nurse) by self (offgoing nurse). Report included the following information SBAR, Kardex, Intake/Output, MAR and Recent Results.

## 2022-03-18 NOTE — ROUTINE PROCESS
Bedside and Verbal shift change report given to self (oncoming nurse) by Dominga Palmer RN (offgoing nurse). Report included the following information SBAR, Kardex, Intake/Output, MAR and Recent Results.

## 2022-03-19 PROBLEM — J12.82 PNEUMONIA DUE TO COVID-19 VIRUS: Status: ACTIVE | Noted: 2022-01-01

## 2022-03-19 PROBLEM — K43.9 ABDOMINAL WALL HERNIA: Status: ACTIVE | Noted: 2020-02-23

## 2022-03-19 PROBLEM — L84 CALLUS: Status: ACTIVE | Noted: 2017-06-15

## 2022-03-19 PROBLEM — E44.0 MODERATE PROTEIN-CALORIE MALNUTRITION (HCC): Status: ACTIVE | Noted: 2021-01-01

## 2022-03-19 PROBLEM — Z66 DNR (DO NOT RESUSCITATE): Status: ACTIVE | Noted: 2018-08-22

## 2022-03-19 PROBLEM — D50.0 IRON DEFICIENCY ANEMIA DUE TO CHRONIC BLOOD LOSS: Status: ACTIVE | Noted: 2020-05-18

## 2022-03-19 PROBLEM — E11.40 TYPE 2 DIABETES MELLITUS WITH DIABETIC NEUROPATHY (HCC): Status: ACTIVE | Noted: 2018-05-16

## 2022-03-19 PROBLEM — F41.9 ANXIETY: Status: ACTIVE | Noted: 2018-02-01

## 2022-03-19 PROBLEM — I26.99 PULMONARY EMBOLISM (HCC): Status: ACTIVE | Noted: 2022-01-01

## 2022-03-19 PROBLEM — J18.9 HCAP (HEALTHCARE-ASSOCIATED PNEUMONIA): Status: ACTIVE | Noted: 2022-01-01

## 2022-03-19 PROBLEM — E87.70 VOLUME OVERLOAD: Status: ACTIVE | Noted: 2022-01-01

## 2022-03-19 PROBLEM — N39.0 UTI (URINARY TRACT INFECTION): Status: ACTIVE | Noted: 2020-02-25

## 2022-03-19 PROBLEM — U07.1 PNEUMONIA DUE TO COVID-19 VIRUS: Status: ACTIVE | Noted: 2022-01-01

## 2022-03-19 NOTE — PROGRESS NOTES
TRANSFER - OUT REPORT:    Verbal report given to Manuel Cordova RN(name) on Alonso Hall  being transferred to 808(unit) for routine progression of care       Report consisted of patients Situation, Background, Assessment and   Recommendations(SBAR). Information from the following report(s) SBAR, Kardex, ED Summary, Intake/Output, MAR, Accordion, Recent Results and Med Rec Status was reviewed with the receiving nurse. Lines:   Peripheral IV 03/14/22 Left Antecubital (Active)   Site Assessment Clean, dry, & intact 03/19/22 0745   Phlebitis Assessment 0 03/19/22 0745   Infiltration Assessment 0 03/19/22 0745   Dressing Status Clean, dry, & intact 03/19/22 0745   Dressing Type Transparent;Tape 03/19/22 0745   Hub Color/Line Status Pink;Patent; Flushed 03/19/22 0745   Alcohol Cap Used Yes 03/19/22 0745       Peripheral IV 03/15/22 Anterior;Distal;Right Forearm (Active)   Site Assessment Clean, dry, & intact 03/19/22 0745   Phlebitis Assessment 0 03/19/22 0745   Infiltration Assessment 0 03/19/22 0745   Dressing Status Clean, dry, & intact 03/19/22 0745   Dressing Type Transparent;Tape 03/19/22 0745   Hub Color/Line Status Pink;Patent; Flushed 03/19/22 0745   Alcohol Cap Used Yes 03/19/22 0745        Opportunity for questions and clarification was provided.       Patient transported with:   O2 @ 2 liters

## 2022-03-19 NOTE — PROGRESS NOTES
Hospitalist Progress Note   Admit Date:  3/14/2022  4:22 PM   Name:  Lyla Dong   Age:  80 y.o. Sex:  female  :  1929   MRN:  [id number]   Room:  [facility]/    Presenting Complaint: Irregular Heart Beat and Shortness of Breath    Reason(s) for Admission: Functional quadriplegia [facility]) [R53.2]     Hospital Course & Interval History:   70-year-old female with history of Covid 2022, COPD with chronic hypoxemic respiratory failure on 2 to 3 L home oxygen, CKD stage III, PE in 2022, GI bleed who was admitted on 314 for weakness. Which was just recently discharged on 310 after being hospitalized for hospital-acquired pneumonia, A. fib and PE. Placement was recommended but patient opted to go home. Patient had not been out of bed over several days. Home health once outpatient on 314 and was concerned and told her to go to the emergency room. Chest x-ray showed bilateral infiltrates and she was given antibiotics. Also given IV Lasix. Continue to work with physical therapy and now acknowledges the benefits of going to rehab. Subjective/24hr Events (22): Patient was seen and examined at the bedside. No overnight events. Patient without any major complaints such as cardiac chest pain, shortness of breath, abdominal pain, fever/chills. Pt is saturating well on 2L. ROS:  10 systems reviewed and negative except as noted above.      Assessment & Plan:   Generalized weakness/functional quadriplegia  Continue PT/OT  Patient and daughter agreeable to rehab  Patient was accepted to [facility] rehab unfortunately no beds available  Discharge pending bed availability  3/18 with discussion with case management bed will not be available until Monday 3/21    Atrial fibrillation  NSR   Started on metoprolol  Continue Eliquis    Right upper lobe subsegmental PE  Noted on CT 3/4/2022  Continue Eliquis    Chronic hypoxemic respiratory failure  Discharged on 3 L after Covid hospitalization January to February 2022  Patient advised that she does not wear the oxygen all the time. Pt is saturating well on 2L. Hypertension  Continue Lopressor     MDD, anxiety, mood disorder  Continue Zoloft    Type 2 diabetes  Continue Lantus 8 units  6 units premeal  Sliding scale insulin    GERD  Continue PPI      Dispo/Discharge Planning:     Patient has been accepted to rehab. Patient to be discharged to rehab on Monday 3/21.     Diet:  ADULT DIET Regular; 4 carb choices (60 gm/meal)  ADULT ORAL NUTRITION SUPPLEMENT Breakfast, Lunch, Dinner; Diabetic Supplement  DVT PPx: Eliquis  Code status: DNR    Hospital Problems as of 3/19/2022 Date Reviewed: 3/9/2022          Codes Class Noted - Resolved POA    * (Principal) Functional quadriplegia ([facility] ICD-10-CM: R53.2  ICD-9-CM: 780.72  3/14/2022 - Present Unknown        Moderate protein-calorie malnutrition ([facility] ICD-10-CM: E44.0  ICD-9-CM: 263.0  12/16/2021 - Present Yes        Iron deficiency anemia due to chronic blood loss ICD-10-CM: D50.0  ICD-9-CM: 280.0  5/18/2020 - Present Yes        COPD (chronic obstructive pulmonary disease) (HCC) (Chronic) ICD-10-CM: J44.9  ICD-9-CM: 496  8/22/2018 - Present Yes        Type 2 diabetes mellitus with diabetic neuropathy (HCC) (Chronic) ICD-10-CM: E11.40  ICD-9-CM: 250.60, 357.2  5/16/2018 - Present Yes        Neuropathy ICD-10-CM: G62.9  ICD-9-CM: 355.9  8/24/2012 - Present Yes    Overview Signed 8/24/2012 12:56 PM by Mirian Joyce     Lower limbs             PAD (peripheral artery disease) ([facility] ICD-10-CM: I73.9  ICD-9-CM: 443.9  8/24/2012 - Present Yes        Essential hypertension (Chronic) ICD-10-CM: I10  ICD-9-CM: 401.9  7/16/2010 - Present Yes        GERD (gastroesophageal reflux disease) (Chronic) ICD-10-CM: K21.9  ICD-9-CM: 530.81  7/16/2010 - Present Yes              Objective:     Patient Vitals for the past 24 hrs:   Temp Pulse Resp BP SpO2   03/19/22 1202 97.4 °F (36.3 °C) 81 16 (!) 171/67 96 %   03/19/22 0807 97.5 °F (36.4 °C) 69 16 (!) 160/60 97 %   03/19/22 0400 98.1 °F (36.7 °C) 70 18 (!) 149/59 98 %   03/19/22 0219 98.1 °F (36.7 °C) 72  138/63 99 %   03/19/22 0001 98.5 °F (36.9 °C) 77 18 (!) 147/60 99 %   03/18/22 2022 98 °F (36.7 °C) 78 16 (!) 140/54 95 %   03/18/22 2006     96 %   03/18/22 1746 98.1 °F (36.7 °C) 76 18 (!) 133/59 96 %     Oxygen Therapy  O2 Sat (%): 96 % (03/19/22 1202)  Pulse via Oximetry: 78 beats per minute (03/18/22 2006)  O2 Device: None (Room air) (03/19/22 1202)  Skin Assessment: Clean, dry, & intact (03/18/22 1628)  Skin Protection for O2 Device: N/A (03/18/22 1628)  O2 Flow Rate (L/min): 2 l/min (03/19/22 1009)  FIO2 (%): 32 % (03/15/22 0758)    Estimated body mass index is 26.69 kg/m² as calculated from the following:    Height as of this encounter: 5' 2\" (1.575 m). Weight as of this encounter: 66.2 kg (145 lb 15.1 oz). Intake/Output Summary (Last 24 hours) at 3/19/2022 1304  Last data filed at 3/19/2022 0035  Gross per 24 hour   Intake 120 ml   Output 400 ml   Net -280 ml         Physical Exam:   Blood pressure (!) 171/67, pulse 81, temperature 97.4 °F (36.3 °C), resp. rate 16, height 5' 2\" (1.575 m), weight 66.2 kg (145 lb 15.1 oz), SpO2 96 %, not currently breastfeeding. General:    Well nourished. No overt distress  Head:  Normocephalic, atraumatic  Eyes:  Sclerae appear normal.  Pupils equally round. ENT:  Nares appear normal, no drainage. Moist oral mucosa  Neck:  No restricted ROM. Trachea midline   CV:   RRR. No m/r/g. No jugular venous distension. Lungs:   CTAB. No wheezing, rhonchi, or rales. Respirations even, unlabored, on 2L of NC   Abdomen: Bowel sounds present. Soft, nontender, nondistended. Extremities: No cyanosis or clubbing. No edema  Skin:     No rashes and normal coloration. Warm and dry. Neuro:  CN II-XII grossly intact. Sensation intact. A&Ox3  Psych:  Normal mood and affect.       I have reviewed ordered lab tests and independently visualized imaging below:    Recent Labs:  Recent Results (from the past 48 hour(s))   GLUCOSE, POC    Collection Time: 03/17/22  4:10 PM   Result Value Ref Range    Glucose (POC) 134 (H) 65 - 100 mg/dL    Performed by Regi    GLUCOSE, POC    Collection Time: 03/17/22  8:31 PM   Result Value Ref Range    Glucose (POC) 178 (H) 65 - 100 mg/dL    Performed by Nicole (Mitchell)    GLUCOSE, POC    Collection Time: 03/18/22  8:03 AM   Result Value Ref Range    Glucose (POC) 104 (H) 65 - 100 mg/dL    Performed by Alysia Erickson, POC    Collection Time: 03/18/22 11:52 AM   Result Value Ref Range    Glucose (POC) 215 (H) 65 - 100 mg/dL    Performed by Alysia Erickson, POC    Collection Time: 03/18/22  4:33 PM   Result Value Ref Range    Glucose (POC) 147 (H) 65 - 100 mg/dL    Performed by Alysia Erickson, POC    Collection Time: 03/18/22  8:59 PM   Result Value Ref Range    Glucose (POC) 189 (H) 65 - 100 mg/dL    Performed by Alexei    GLUCOSE, POC    Collection Time: 03/19/22  7:33 AM   Result Value Ref Range    Glucose (POC) 89 65 - 100 mg/dL    Performed by Hernandez    CBC W/O DIFF    Collection Time: 03/19/22 10:22 AM   Result Value Ref Range    WBC 6.8 4.3 - 11.1 K/uL    RBC 3.19 (L) 4.05 - 5.2 M/uL    HGB 9.1 (L) 11.7 - 15.4 g/dL    HCT 30.8 (L) 35.8 - 46.3 %    MCV 96.6 79.6 - 97.8 FL    MCH 28.5 26.1 - 32.9 PG    MCHC 29.5 (L) 31.4 - 35.0 g/dL    RDW 19.3 (H) 11.9 - 14.6 %    PLATELET 503 411 - 168 K/uL    MPV 9.4 9.4 - 12.3 FL    ABSOLUTE NRBC 0.00 0.0 - 0.2 K/uL   METABOLIC PANEL, BASIC    Collection Time: 03/19/22 10:22 AM   Result Value Ref Range    Sodium 142 136 - 145 mmol/L    Potassium 4.3 3.5 - 5.1 mmol/L    Chloride 112 (H) 98 - 107 mmol/L    CO2 29 21 - 32 mmol/L    Anion gap 1 (L) 7 - 16 mmol/L    Glucose 142 (H) 65 - 100 mg/dL    BUN 44 (H) 8 - 23 MG/DL    Creatinine 1.30 (H) 0.6 - 1.0 MG/DL    GFR est AA 49 (L) >60 ml/min/1.73m2    GFR est non-AA 41 (L) >60 ml/min/1.73m2    Calcium 9.6 8.3 - 10.4 MG/DL   MAGNESIUM    Collection Time: 03/19/22 10:22 AM   Result Value Ref Range    Magnesium 2.4 1.8 - 2.4 mg/dL   PHOSPHORUS    Collection Time: 03/19/22 10:22 AM   Result Value Ref Range    Phosphorus 2.7 2.3 - 3.7 MG/DL   GLUCOSE, POC    Collection Time: 03/19/22 11:30 AM   Result Value Ref Range    Glucose (POC) 164 (H) 65 - 100 mg/dL    Performed by Eugene Nash        All Micro Results     Procedure Component Value Units Date/Time    CULTURE, BLOOD [[id number]] Collected: 03/14/22 1744    Order Status: Completed Specimen: Blood Updated: 03/19/22 0641     Special Requests: --        NO SPECIAL REQUESTS  RIGHT  FOREARM       Culture result: NO GROWTH 5 DAYS       CULTURE, BLOOD [[id number]]     Order Status: Canceled Specimen: Blood           Other Studies:  No results found.     Current Meds:  Current Facility-Administered Medications   Medication Dose Route Frequency    docusate sodium (COLACE) capsule 100 mg  100 mg Oral DAILY    insulin glargine (LANTUS) injection 4 Units  4 Units SubCUTAneous QHS    insulin lispro (HUMALOG) injection 3 Units  3 Units SubCUTAneous TIDAC    sodium chloride (NS) flush 5-10 mL  5-10 mL IntraVENous Q8H    sodium chloride (NS) flush 5-10 mL  5-10 mL IntraVENous PRN    sodium chloride (NS) flush 5-40 mL  5-40 mL IntraVENous Q8H    sodium chloride (NS) flush 5-40 mL  5-40 mL IntraVENous PRN    acetaminophen (TYLENOL) tablet 650 mg  650 mg Oral Q6H PRN    Or    acetaminophen (TYLENOL) suppository 650 mg  650 mg Rectal Q6H PRN    polyethylene glycol (MIRALAX) packet 17 g  17 g Oral DAILY PRN    ondansetron (ZOFRAN ODT) tablet 4 mg  4 mg Oral Q8H PRN    Or    ondansetron (ZOFRAN) injection 4 mg  4 mg IntraVENous Q6H PRN    pantoprazole (PROTONIX) tablet 40 mg  40 mg Oral ACB&D    alum-mag hydroxide-simeth (MYLANTA) oral suspension 15 mL  15 mL Oral Q6H PRN    glucose chewable tablet 16 g  16 g Oral PRN    glucagon (GLUCAGEN) injection 1 mg  1 mg IntraMUSCular PRN    dextrose 10% infusion 125-250 mL  125-250 mL IntraVENous PRN    insulin lispro (HUMALOG) injection   SubCUTAneous AC&HS    arformoteroL (BROVANA) neb solution 15 mcg  15 mcg Nebulization BID RT    budesonide (PULMICORT) 500 mcg/2 ml nebulizer suspension  500 mcg Nebulization BID RT    albuterol-ipratropium (DUO-NEB) 2.5 MG-0.5 MG/3 ML  3 mL Nebulization Q4H PRN    gabapentin (NEURONTIN) capsule 100 mg  100 mg Oral TID    amLODIPine (NORVASC) tablet 10 mg  10 mg Oral DAILY    sertraline (ZOLOFT) tablet 100 mg  100 mg Oral DAILY    ferrous sulfate tablet 325 mg  1 Tablet Oral DAILY WITH BREAKFAST    apixaban (ELIQUIS) tablet 5 mg  5 mg Oral Q12H       Signed:  Maria A Lew MD    Part of this note may have been written by using a voice dictation software. The note has been proof read but may still contain some grammatical/other typographical errors.

## 2022-03-19 NOTE — PROGRESS NOTES
TRANSFER - IN REPORT:    Verbal report received from NEMO Medley(name) on Gabriel Rosas  being received from ER Overflow(unit) for routine progression of care      Report consisted of patients Situation, Background, Assessment and   Recommendations(SBAR). Information from the following report(s) SBAR, Kardex, ED Summary, MAR, Recent Results and Med Rec Status was reviewed with the receiving nurse. Opportunity for questions and clarification was provided. Patient was transported with 2L of NC    Assessment completed upon patients arrival to unit and care assumed.

## 2022-03-19 NOTE — PROGRESS NOTES
Problem: Pressure Injury - Risk of  Goal: *Prevention of pressure injury  Description: Document Carlos Scale and appropriate interventions in the flowsheet. Outcome: Progressing Towards Goal  Note: Pressure Injury Interventions:  Sensory Interventions: Pressure redistribution bed/mattress (bed type),Keep linens dry and wrinkle-free,Maintain/enhance activity level,Minimize linen layers    Moisture Interventions: Internal/External urinary devices,Minimize layers,Absorbent underpads    Activity Interventions: Pressure redistribution bed/mattress(bed type),Increase time out of bed,PT/OT evaluation    Mobility Interventions: Pressure redistribution bed/mattress (bed type),PT/OT evaluation,HOB 30 degrees or less    Nutrition Interventions: Document food/fluid/supplement intake    Friction and Shear Interventions: Minimize layers,Apply protective barrier, creams and emollients,HOB 30 degrees or less,Foam dressings/transparent film/skin sealants                Problem: Patient Education: Go to Patient Education Activity  Goal: Patient/Family Education  Outcome: Progressing Towards Goal     Problem: Falls - Risk of  Goal: *Absence of Falls  Description: Document Torito Fall Risk and appropriate interventions in the flowsheet.   Outcome: Progressing Towards Goal  Note: Fall Risk Interventions:  Mobility Interventions: Communicate number of staff needed for ambulation/transfer,OT consult for ADLs,PT Consult for mobility concerns         Medication Interventions: Patient to call before getting OOB,Evaluate medications/consider consulting pharmacy    Elimination Interventions: Call light in reach,Toileting schedule/hourly rounds,Patient to call for help with toileting needs    History of Falls Interventions: Door open when patient unattended,Investigate reason for fall,Room close to nurse's station

## 2022-03-19 NOTE — ROUTINE PROCESS
TRANSFER - OUT REPORT:    Verbal report given to Rigo Sow RN on Lyla Dong  being transferred to Observation 1 for routine progression of care       Report consisted of patients Situation, Background, Assessment and   Recommendations(SBAR). Information from the following report(s) SBAR, Kardex, Intake/Output, MAR and Recent Results was reviewed with the receiving nurse. Lines:   Peripheral IV 03/14/22 Left Antecubital (Active)   Site Assessment Clean, dry, & intact 03/19/22 0035   Phlebitis Assessment 0 03/19/22 0035   Infiltration Assessment 0 03/19/22 0035   Dressing Status Clean, dry, & intact 03/19/22 0035   Dressing Type Tape;Transparent 03/19/22 0035   Hub Color/Line Status Patent; Flushed 03/19/22 0035   Alcohol Cap Used Yes 03/19/22 0035       Peripheral IV 03/15/22 Anterior;Distal;Right Forearm (Active)   Site Assessment Clean, dry, & intact 03/19/22 0035   Phlebitis Assessment 0 03/19/22 0035   Infiltration Assessment 0 03/19/22 0035   Dressing Status Clean, dry, & intact 03/19/22 0035   Dressing Type Tape;Transparent 03/19/22 0035   Hub Color/Line Status Patent; Flushed 03/19/22 0035   Alcohol Cap Used Yes 03/19/22 0035        Opportunity for questions and clarification was provided.       Patient transported with:   O2 @ 2 liters  Registered Nurse

## 2022-03-19 NOTE — PROGRESS NOTES
Patient transferred from ER overflow and is in 808. Patient is A&Ox4. On 2L NC to keep O2 sat > 90%. Elex Gu is in place.

## 2022-03-20 PROBLEM — N18.30 CKD (CHRONIC KIDNEY DISEASE) STAGE 3, GFR 30-59 ML/MIN (HCC): Status: ACTIVE | Noted: 2019-04-18

## 2022-03-20 PROBLEM — J44.9 COPD (CHRONIC OBSTRUCTIVE PULMONARY DISEASE) (HCC): Status: ACTIVE | Noted: 2018-08-22

## 2022-03-20 PROBLEM — D64.9 NORMOCYTIC ANEMIA: Status: ACTIVE | Noted: 2022-01-01

## 2022-03-20 PROBLEM — R53.81 PHYSICAL DEBILITY: Status: ACTIVE | Noted: 2021-01-01

## 2022-03-20 PROBLEM — K92.2 LOWER GI BLEED: Status: ACTIVE | Noted: 2022-01-01

## 2022-03-20 PROBLEM — Q66.89 CLAW TOE: Status: ACTIVE | Noted: 2017-07-12

## 2022-03-20 PROBLEM — N18.31 STAGE 3A CHRONIC KIDNEY DISEASE (HCC): Status: ACTIVE | Noted: 2019-04-18

## 2022-03-20 NOTE — PROGRESS NOTES
Patient resting in bed, alert and oriented, cooperative with care. Patient on 2 liters of Oxygen via N/C. Family member at bedside for comfort. Patient denies pain or distress, safety measures in place, call light within reach.

## 2022-03-20 NOTE — PROGRESS NOTES
03/20/22 0756   Oxygen Therapy   O2 Sat (%) 95 %   Pulse via Oximetry 70 beats per minute   O2 Device Nasal cannula   O2 Flow Rate (L/min) 1 l/min  (weaned to room air)

## 2022-03-20 NOTE — PROGRESS NOTES
Problem: Pressure Injury - Risk of  Goal: *Prevention of pressure injury  Description: Document Carlos Scale and appropriate interventions in the flowsheet. Outcome: Progressing Towards Goal  Note: Pressure Injury Interventions:  Sensory Interventions: Assess changes in LOC    Moisture Interventions: Absorbent underpads    Activity Interventions: Assess need for specialty bed    Mobility Interventions: Assess need for specialty bed    Nutrition Interventions: Document food/fluid/supplement intake    Friction and Shear Interventions: Apply protective barrier, creams and emollients                Problem: Patient Education: Go to Patient Education Activity  Goal: Patient/Family Education  Outcome: Progressing Towards Goal     Problem: Falls - Risk of  Goal: *Absence of Falls  Description: Document Lettie Duverney Fall Risk and appropriate interventions in the flowsheet.   Outcome: Progressing Towards Goal  Note: Fall Risk Interventions:  Mobility Interventions: Bed/chair exit alarm         Medication Interventions: Bed/chair exit alarm    Elimination Interventions: Bed/chair exit alarm    History of Falls Interventions: Bed/chair exit alarm         Problem: Patient Education: Go to Patient Education Activity  Goal: Patient/Family Education  Outcome: Progressing Towards Goal     Problem: Patient Education: Go to Patient Education Activity  Goal: Patient/Family Education  Outcome: Progressing Towards Goal     Problem: Patient Education: Go to Patient Education Activity  Goal: Patient/Family Education  Outcome: Progressing Towards Goal

## 2022-03-20 NOTE — PROGRESS NOTES
Hospitalist Progress Note   Admit Date:  3/14/2022  4:22 PM   Name:  Nieves Jay   Age:  80 y.o. Sex:  female  :  1929   MRN:  [id number]   Room:  [facility]/    Presenting Complaint: Irregular Heart Beat and Shortness of Breath    Reason(s) for Admission: Functional quadriplegia [facility]) [R53.2]     Hospital Course & Interval History:     From prior note HPI:  70-year-old female with history of Covid 2022, COPD with chronic hypoxemic respiratory failure on 2 to 3 L home oxygen, CKD stage III, PE in 2022, GI bleed who was admitted on 314 for weakness. Which was just recently discharged on 310 after being hospitalized for hospital-acquired pneumonia, A. fib and PE. Placement was recommended but patient opted to go home. Patient had not been out of bed over several days. Home health once outpatient on 314 and was concerned and told her to go to the emergency room. Chest x-ray showed bilateral infiltrates and she was given antibiotics. Also given IV Lasix. Continue to work with physical therapy and now acknowledges the benefits of going to rehab.     Subjective/24hr Events (2022): No obvious distress or any new complaints. Discussed probable discharge to skilled nursing facility for rehabilitation/functional quadriplegia weakness recent pneumonia. Reviewed laboratory data and baseline creatinine appears to be around 1.3 with estimated GFR around 49. She has chronic anemia normocytic hemoglobin is 9.1 and stablemost recent lab for comparison was likely outlier looking at recent past CBCs. She has chronic home oxygen 2-3 L and is currently wearing 2 L nasal cannula and is stable. She denies any new complaints. She is hearing impaired.   Review of systems negative for any new complaints.     ROS:  10 systems reviewed and negative except as noted above.      Assessment & Plan:   Generalized weakness/functional quadriplegia  Continue PT/OT  Patient and daughter agreeable to rehab  Patient was accepted to [facility] rehab unfortunately no beds available  Discharge pending bed availability  3/20discussed likely discharge to skilled nursing facility for rehabilitation tomorrow    Normocytic anemia  March 20chronicreviewed multiple recent CBCssuspect most recent was outlier as stable around 9.1. Atrial fibrillation  NSR   Started on metoprolol  Continue Eliquis  March 20no changes.     Right upper lobe subsegmental PE  Noted on CT 3/4/2022  Continue Eliquis     Chronic hypoxemic respiratory failure  Discharged on 3 L after Covid hospitalization January to February 2022  Patient advised that she does not wear the oxygen all the time. Pt is saturating well on 2L.        Hypertension  Continue Lopressor      MDD, anxiety, mood disorder  Continue Zoloft     Type 2 diabetes  Continue Lantus 8 units  6 units premeal  Sliding scale insulin  March 20last 3 fingerstick blood sugars 164, 175 and 174 this a. m.continue same regimen     GERD  Continue PPI       Dispo/Discharge Planning:     Patient has been accepted to rehab. Patient to be discharged to rehab on Monday 3/21.   This is unchanged     Diet:  ADULT DIET Regular; 4 carb choices (60 gm/meal)  ADULT ORAL NUTRITION SUPPLEMENT Breakfast, Lunch, Dinner; Diabetic Supplement  DVT PPx: Eliquis  Code status: DNR          Hospital Problems as of 3/20/2022 Date Reviewed: 3/9/2022          Codes Class Noted - Resolved POA    Normocytic anemia ICD-10-CM: D64.9  ICD-9-CM: 285.9  3/20/2022 - Present Unknown        * (Principal) Functional quadriplegia ([facility] ICD-10-CM: R53.2  ICD-9-CM: 780.72  3/14/2022 - Present Unknown        Moderate protein-calorie malnutrition ([facility] ICD-10-CM: E44.0  ICD-9-CM: 263.0  12/16/2021 - Present Yes        Iron deficiency anemia due to chronic blood loss ICD-10-CM: D50.0  ICD-9-CM: 280.0  5/18/2020 - Present Yes        Stage 3a chronic kidney disease (HCC) ICD-10-CM: N18.31  ICD-9-CM: 585.3  4/18/2019 - Present Unknown        COPD (chronic obstructive pulmonary disease) (HCC) (Chronic) ICD-10-CM: J44.9  ICD-9-CM: 496  8/22/2018 - Present Yes        Type 2 diabetes mellitus with diabetic neuropathy (HCC) (Chronic) ICD-10-CM: E11.40  ICD-9-CM: 250.60, 357.2  5/16/2018 - Present Yes        Neuropathy ICD-10-CM: G62.9  ICD-9-CM: 355.9  8/24/2012 - Present Yes    Overview Signed 8/24/2012 12:56 PM by Mirian Joyce     Lower limbs             PAD (peripheral artery disease) ([facility] ICD-10-CM: I73.9  ICD-9-CM: 443.9  8/24/2012 - Present Yes        Essential hypertension (Chronic) ICD-10-CM: I10  ICD-9-CM: 401.9  7/16/2010 - Present Yes        GERD (gastroesophageal reflux disease) (Chronic) ICD-10-CM: K21.9  ICD-9-CM: 530.81  7/16/2010 - Present Yes              Objective:     Patient Vitals for the past 24 hrs:   Temp Pulse Resp BP SpO2   03/20/22 1154 97.5 °F (36.4 °C) 79 19 (!) 172/71 92 %   03/20/22 0756     95 %   03/20/22 0755 97.7 °F (36.5 °C) 72 19 (!) 161/91 99 %   03/20/22 0436 98.1 °F (36.7 °C) 73 18 (!) 149/70 99 %   03/20/22 0024 98.5 °F (36.9 °C) 76 18 (!) 152/54 96 %   03/19/22 2105     95 %   03/19/22 2018 98.2 °F (36.8 °C) 69 18 (!) 146/62 96 %   03/19/22 1535 97.5 °F (36.4 °C) 72 16 (!) 164/64 96 %     Oxygen Therapy  O2 Sat (%): 92 % (03/20/22 1154)  Pulse via Oximetry: 70 beats per minute (03/20/22 0756)  O2 Device: None (Room air) (03/20/22 0759)  Skin Assessment: Clean, dry, & intact (03/18/22 1628)  Skin Protection for O2 Device: N/A (03/18/22 1628)  O2 Flow Rate (L/min): 0 l/min (03/20/22 0759)  FIO2 (%): 21 % (03/20/22 0759)    Estimated body mass index is 26.69 kg/m² as calculated from the following:    Height as of this encounter: 5' 2\" (1.575 m). Weight as of this encounter: 66.2 kg (145 lb 15.1 oz).     Intake/Output Summary (Last 24 hours) at 3/20/2022 1238  Last data filed at 3/20/2022 1154  Gross per 24 hour   Intake 360 ml   Output 1200 ml   Net -840 ml Physical Exam:     Blood pressure (!) 172/71, pulse 79, temperature 97.5 °F (36.4 °C), resp. rate 19, height 5' 2\" (1.575 m), weight 66.2 kg (145 lb 15.1 oz), SpO2 92 %, not currently breastfeeding. General:    Hearing impaired but able to communicate. No overt distress  Head:  Normocephalic, atraumatic  Eyes:  Sclerae appear normal.  Pupils equally round. ENT:  Nares appear normal, no drainage. Moist oral mucosa  Neck:  No restricted ROM. Trachea midline   CV:   Irregular but rate is controlled. No jugular venous distension. Lungs:   CTAB. No wheezing, rhonchi, or rales. Respirations even, unlabored  Abdomen: Bowel sounds present. Soft, nontender, nondistended. Extremities: No cyanosis or clubbing. No edema  Skin:     No rashes and normal coloration. Warm and dry. Neuro:  CN II-XII grossly intact. Sensation intact. A&Ox3  Psych:  Normal mood and affect.       I have reviewed ordered lab tests and independently visualized imaging below:    Recent Labs:  Recent Results (from the past 48 hour(s))   GLUCOSE, POC    Collection Time: 03/18/22  4:33 PM   Result Value Ref Range    Glucose (POC) 147 (H) 65 - 100 mg/dL    Performed by [address], POC    Collection Time: 03/18/22  8:59 PM   Result Value Ref Range    Glucose (POC) 189 (H) 65 - 100 mg/dL    Performed by Alexei    GLUCOSE, POC    Collection Time: 03/19/22  7:33 AM   Result Value Ref Range    Glucose (POC) 89 65 - 100 mg/dL    Performed by Hernandez    CBC W/O DIFF    Collection Time: 03/19/22 10:22 AM   Result Value Ref Range    WBC 6.8 4.3 - 11.1 K/uL    RBC 3.19 (L) 4.05 - 5.2 M/uL    HGB 9.1 (L) 11.7 - 15.4 g/dL    HCT 30.8 (L) 35.8 - 46.3 %    MCV 96.6 79.6 - 97.8 FL    MCH 28.5 26.1 - 32.9 PG    MCHC 29.5 (L) 31.4 - 35.0 g/dL    RDW 19.3 (H) 11.9 - 14.6 %    PLATELET 249 862 - 042 K/uL    MPV 9.4 9.4 - 12.3 FL    ABSOLUTE NRBC 0.00 0.0 - 0.2 K/uL   METABOLIC PANEL, BASIC    Collection Time: 03/19/22 10:22 AM   Result Value Ref Range    Sodium 142 136 - 145 mmol/L    Potassium 4.3 3.5 - 5.1 mmol/L    Chloride 112 (H) 98 - 107 mmol/L    CO2 29 21 - 32 mmol/L    Anion gap 1 (L) 7 - 16 mmol/L    Glucose 142 (H) 65 - 100 mg/dL    BUN 44 (H) 8 - 23 MG/DL    Creatinine 1.30 (H) 0.6 - 1.0 MG/DL    GFR est AA 49 (L) >60 ml/min/1.73m2    GFR est non-AA 41 (L) >60 ml/min/1.73m2    Calcium 9.6 8.3 - 10.4 MG/DL   MAGNESIUM    Collection Time: 03/19/22 10:22 AM   Result Value Ref Range    Magnesium 2.4 1.8 - 2.4 mg/dL   PHOSPHORUS    Collection Time: 03/19/22 10:22 AM   Result Value Ref Range    Phosphorus 2.7 2.3 - 3.7 MG/DL   GLUCOSE, POC    Collection Time: 03/19/22 11:30 AM   Result Value Ref Range    Glucose (POC) 164 (H) 65 - 100 mg/dL    Performed by Hernandez    GLUCOSE, POC    Collection Time: 03/19/22  4:33 PM   Result Value Ref Range    Glucose (POC) 175 (H) 65 - 100 mg/dL    Performed by Rodolfo    GLUCOSE, POC    Collection Time: 03/19/22  9:22 PM   Result Value Ref Range    Glucose (POC) 174 (H) 65 - 100 mg/dL    Performed by Stan    GLUCOSE, POC    Collection Time: 03/20/22  8:37 AM   Result Value Ref Range    Glucose (POC) 123 (H) 65 - 100 mg/dL    Performed by [address] [place], [place] [address], POC    Collection Time: 03/20/22 11:26 AM   Result Value Ref Range    Glucose (POC) 197 (H) 65 - 100 mg/dL    Performed by [facility]        All Micro Results     Procedure Component Value Units Date/Time    CULTURE, BLOOD [[id number]] Collected: 03/14/22 1744    Order Status: Completed Specimen: Blood Updated: 03/19/22 0641     Special Requests: --        NO SPECIAL REQUESTS  RIGHT  FOREARM       Culture result: NO GROWTH 5 DAYS       CULTURE, BLOOD [[id number]]     Order Status: Canceled Specimen: Blood           Other Studies:  No results found.     Current Meds:  Current Facility-Administered Medications   Medication Dose Route Frequency    metoprolol tartrate (LOPRESSOR) tablet 12.5 mg 12.5 mg Oral BID    docusate sodium (COLACE) capsule 100 mg  100 mg Oral DAILY    insulin glargine (LANTUS) injection 4 Units  4 Units SubCUTAneous QHS    insulin lispro (HUMALOG) injection 3 Units  3 Units SubCUTAneous TIDAC    sodium chloride (NS) flush 5-10 mL  5-10 mL IntraVENous Q8H    sodium chloride (NS) flush 5-10 mL  5-10 mL IntraVENous PRN    sodium chloride (NS) flush 5-40 mL  5-40 mL IntraVENous Q8H    sodium chloride (NS) flush 5-40 mL  5-40 mL IntraVENous PRN    acetaminophen (TYLENOL) tablet 650 mg  650 mg Oral Q6H PRN    Or    acetaminophen (TYLENOL) suppository 650 mg  650 mg Rectal Q6H PRN    polyethylene glycol (MIRALAX) packet 17 g  17 g Oral DAILY PRN    ondansetron (ZOFRAN ODT) tablet 4 mg  4 mg Oral Q8H PRN    Or    ondansetron (ZOFRAN) injection 4 mg  4 mg IntraVENous Q6H PRN    pantoprazole (PROTONIX) tablet 40 mg  40 mg Oral ACB&D    alum-mag hydroxide-simeth (MYLANTA) oral suspension 15 mL  15 mL Oral Q6H PRN    glucose chewable tablet 16 g  16 g Oral PRN    glucagon (GLUCAGEN) injection 1 mg  1 mg IntraMUSCular PRN    dextrose 10% infusion 125-250 mL  125-250 mL IntraVENous PRN    insulin lispro (HUMALOG) injection   SubCUTAneous AC&HS    arformoteroL (BROVANA) neb solution 15 mcg  15 mcg Nebulization BID RT    budesonide (PULMICORT) 500 mcg/2 ml nebulizer suspension  500 mcg Nebulization BID RT    albuterol-ipratropium (DUO-NEB) 2.5 MG-0.5 MG/3 ML  3 mL Nebulization Q4H PRN    gabapentin (NEURONTIN) capsule 100 mg  100 mg Oral TID    sertraline (ZOLOFT) tablet 100 mg  100 mg Oral DAILY    ferrous sulfate tablet 325 mg  1 Tablet Oral DAILY WITH BREAKFAST    apixaban (ELIQUIS) tablet 5 mg  5 mg Oral Q12H       Signed:  Patricia Joseph DO    Part of this note may have been written by using a voice dictation software. The note has been proof read but may still contain some grammatical/other typographical errors.

## 2022-03-21 NOTE — PROGRESS NOTES
ACUTE PHYSICAL THERAPY GOALS:  (Developed with and agreed upon by patient and/or caregiver. )  LTG:  (1.)Ms. Renu Kendall will move from supine to sit and sit to supine  in bed with MODIFIED INDEPENDENCE within 7 treatment day(s). (2.)Ms. Renu Kendall will transfer from bed to chair and chair to bed with SUPERVISION using the least restrictive device within 7 treatment day(s). (3.)Ms. Renu Kendall will ambulate with SUPERVISION for 100 feet with the least restrictive device within 7 treatment day(s). (4.)Ms. Renu Kendall will perform standing static and dynamic balance activities x 23 minutes with SUPERVISION to improve safety within 7 treatment day(s). (5.)Ms. Renu Kendall will ambulate and/or perform functional activities for  23 consecutive minutes with stable vital signs and no rests required to improve activity tolerance within 7 treatment days. ________________________________________________________________________________________________    PHYSICAL THERAPY: Daily Note and AM Treatment Day # 3    Aide Kendall is a 80 y.o. female   PRIMARY DIAGNOSIS: Functional quadriplegia ([address]  Functional quadriplegia ([address] [R53.2]         ASSESSMENT:     REHAB RECOMMENDATIONS: CURRENT LEVEL OF FUNCTION:  (Most Recently Demonstrated)   Recommendation to date pending progress:  Setting:   Short-term Rehab  Equipment:    To Be Determined Bed Mobility:   Standby Assistance to CGA  Sit to Stand:   Minimal Assistance  Transfers:   Minimal Assistance to Mod A  Gait/Mobility:   Minimal Assistance to Moderate A     ASSESSMENT:  Ms. Renu Kendall is making slow progress toward goals. She presents with mild confusion requiring additional cues and time to maintain focus on task. Bed mobility with CGA and additional time. STS with min A and rocking for momentum. Postierior lean noted in static standing and during gait requiring min/mod A to correct. On 0.5L but did de-sat to mid 80's% with activity.   Increased O2 to 1L and cued pursed lipped breathing to recover. Presents a fall risk at this time and would be unsafe to go home without further rehab.       SUBJECTIVE:   Ms. Sara Canchola states, \"[place] more [place]\"    SOCIAL HISTORY/ LIVING ENVIRONMENT: See eval  Home Environment: Private residence  # Steps to Enter: 5 (2 steps on back, 4-5 in front)  One/Two Story Residence: One story  Living Alone: No  Support Systems: Child(edward)  OBJECTIVE:     PAIN: VITAL SIGNS: LINES/DRAINS:   Pre Treatment: Pain Screen  Pain Scale 1: Visual  Pain Intensity 1: 0  Post Treatment: 0   [place]  O2 Device: Nasal cannula     MOBILITY: I Mod I S SBA CGA Min Mod Max Total  NT x2 Comments:   Bed Mobility    Rolling [] [] [] [] [] [] [] [] [] [x] []    Supine to Sit [] [] [] [x] [x] [] [] [] [] [] []    Scooting [] [] [] [x] [x] [] [] [] [] [] []    Sit to Supine [] [] [] [] [] [] [] [] [] [x] []    Transfers    Sit to Stand [] [] [] [] [] [x] [x] [] [] [] []    Bed to Chair [] [] [] [] [] [x] [x] [] [] [] []    Stand to Sit [] [] [] [] [] [x] [x] [] [] [] []    I=Independent, Mod I=Modified Independent, S=Supervision, SBA=Standby Assistance, CGA=Contact Guard Assistance,   Min=Minimal Assistance, Mod=Moderate Assistance, Max=Maximal Assistance, Total=Total Assistance, NT=Not Tested    BALANCE: Good Fair+ Fair Fair- Poor NT Comments   Sitting Static [] [x] [x] [] [] []    Sitting Dynamic [] [] [x] [] [] []              Standing Static [] [] [] [] [x] []    Standing Dynamic [] [] [] [] [x] []      GAIT: I Mod I S SBA CGA Min Mod Max Total  NT x2 Comments:   Level of Assistance [] [] [] [] [] [x] [x] [] [] [] []    Distance 15ft 2x    DME Rolling Walker    Gait Quality Short step length, posterior lean, unsteady    Weightbearing  Status N/A     I=Independent, Mod I=Modified Independent, S=Supervision, SBA=Standby Assistance, CGA=Contact Guard Assistance,   Min=Minimal Assistance, Mod=Moderate Assistance, Max=Maximal Assistance, Total=Total Assistance, NT=Not Tested    PLAN:   FREQUENCY/DURATION: PT Plan of Care: 3 times/week for duration of hospital stay or until stated goals are met, whichever comes first.  TREATMENT:     TREATMENT:   ($$ Therapeutic Activity: 23-37 mins    )  Therapeutic Activity (23 Minutes): Therapeutic activity included Supine to Sit, Scooting, Transfer Training, Ambulation on level ground, Sitting balance  and Standing balance to improve functional Mobility, Strength and Activity tolerance.     TREATMENT GRID:   Date:  3/16/ Date:   Date:     Activity/Exercise Parameters Parameters Parameters   Ankle pumps 2x20     LAQ 2x20                                         AFTER TREATMENT POSITION/PRECAUTIONS:  Chair, Needs within reach, RN notified and Visitors at bedside    INTERDISCIPLINARY COLLABORATION:  RN/PCT and PT/PTA    TOTAL TREATMENT DURATION:  PT Patient Time In/Time Out  Time In: 1144  Time Out: [address], PTA

## 2022-03-21 NOTE — PROGRESS NOTES
Patient resting in bed, alert and oriented, cooperative with care. Patient on 2 liters of Oxygen via N/C. Patient denies pain or distress, safety measures in place, call light within reach.

## 2022-03-21 NOTE — PROGRESS NOTES
Problem: Falls - Risk of  Goal: *Absence of Falls  Description: Document Danyel Mancilla Fall Risk and appropriate interventions in the flowsheet.   Outcome: Progressing Towards Goal  Note: Fall Risk Interventions:  Mobility Interventions: Communicate number of staff needed for ambulation/transfer         Medication Interventions: Evaluate medications/consider consulting pharmacy    Elimination Interventions: Call light in reach,Patient to call for help with toileting needs,Toileting schedule/hourly rounds    History of Falls Interventions: Evaluate medications/consider consulting pharmacy

## 2022-03-21 NOTE — PROGRESS NOTES
Received report from Tony, [facility]. Patient awake resting in bed. Respirations present. On 0.5 L NC. No signs of distress. AXO x3. No needs expressed. Bed low and locked. Call light within reach. Will continue to monitor.

## 2022-03-21 NOTE — PROGRESS NOTES
Patient awake resting in bed. Respirations present. On 1 L NC. No signs of distress. AXO x4. No needs expressed. Bedside report given to oncoming RNBenita.

## 2022-03-21 NOTE — PROGRESS NOTES
MSN CM:  Patient will discharge to [facility] when insurance approves, and patient medically stable. Case Management will continue to follow.

## 2022-03-21 NOTE — DISCHARGE SUMMARY
Hospitalist Discharge Summary   Admit Date:  3/14/2022  4:22 PM   DC Note date: 3/21/2022  Name:  Alonso Hall   Age:  80 y.o.   Sex:  female  :  1929   MRN:  [id number]   Room:  [facility]  PCP:  Lilia Wright NP    Presenting Complaint: Irregular Heart Beat and Shortness of Breath    Initial Admission Diagnosis: Functional quadriplegia (HCC) [R53.2]     Problem List for this Hospitalization:  Hospital Problems as of 3/21/2022 Date Reviewed: 3/9/2022          Codes Class Noted - Resolved POA    Normocytic anemia ICD-10-CM: D64.9  ICD-9-CM: 285.9  3/20/2022 - Present Unknown        * (Principal) Functional quadriplegia ([facility] ICD-10-CM: R53.2  ICD-9-CM: 780.72  3/14/2022 - Present Unknown        Moderate protein-calorie malnutrition ([facility] ICD-10-CM: E44.0  ICD-9-CM: 263.0  2021 - Present Yes        Iron deficiency anemia due to chronic blood loss ICD-10-CM: D50.0  ICD-9-CM: 280.0  2020 - Present Yes        Stage 3a chronic kidney disease ([facility] ICD-10-CM: N18.31  ICD-9-CM: 585.3  2019 - Present Unknown        COPD (chronic obstructive pulmonary disease) ([facility] (Chronic) ICD-10-CM: J44.9  ICD-9-CM: 492  2018 - Present Yes        Type 2 diabetes mellitus with diabetic neuropathy ([facility] (Chronic) ICD-10-CM: E11.40  ICD-9-CM: 250.60, 357.2  2018 - Present Yes        Neuropathy ICD-10-CM: G62.9  ICD-9-CM: 355.9  2012 - Present Yes    Overview Signed 2012 12:56 PM by Mirian Joyce     Lower limbs             PAD (peripheral artery disease) ([facility] ICD-10-CM: I73.9  ICD-9-CM: 443.9  2012 - Present Yes        Essential hypertension (Chronic) ICD-10-CM: I10  ICD-9-CM: 401.9  2010 - Present Yes        GERD (gastroesophageal reflux disease) (Chronic) ICD-10-CM: K21.9  ICD-9-CM: 530.81  2010 - Present Yes            Did Patient have Sepsis (YES OR NO): no    Hospital Course:  80year-old female status post Covid in January of this year chronic hypoxemic respiratory failure secondary to COPD with 2 to 3 L nasal cannula chronic oxygen, stage III chronic kidney disease, pulmonary embolus January 2022 previous GI bleed. Recent admission hospital-acquired pneumonia atrial fibrillation and pulmonary embolus and was discharged on March 10 and short-term rehab recommended but patient refused and went home. Home health saw her 1 time and recommend she go to emergency room. She has functional quadriplegiashe will be discharged to skilled nursing facility for short-term rehab possible long-term placement pending clinical course. Disposition: Skilled Nursing Facility  Diet: ADULT DIET Regular; 4 carb choices (60 gm/meal)  ADULT ORAL NUTRITION SUPPLEMENT Breakfast, Lunch, Dinner; Diabetic Supplement  Code Status: DNR    Follow Up Orders: Follow-up Appointments   Procedures    FOLLOW UP VISIT Appointment in: 3 - 5 Days Follow-up house providers as soon as able. Follow-up house providers as soon as able. Standing Status:   Standing     Number of Occurrences:   1     Order Specific Question:   Appointment in     Answer:   3 - 5 Days       Follow-up Information     Follow up With Specialties Details Why Contact Info    Kirstin Mazariegos NP Family Medicine, Nurse Practitioner On 3/24/2022 1:40 pm [address]  [address] [address]  [phone]          Follow up labs/diagnostics (ultimately defer to outpatient provider): Follow-up chronic anemia, intermittent monitoring basic metabolic panel    Time spent in patient discharge and coordination 38 minutes. Plan was discussed with patient and staff. All questions answered. Patient was stable at time of discharge. Instructions given to call a physician or return if any concerns.     Discharge Info:   Current Discharge Medication List      START taking these medications    Details   arformoteroL (BROVANA) 15 mcg/2 mL nebu neb solution 2 mL by Nebulization route two (2) times a day. Qty: 60 Each, Refills: 0  Start date: 3/21/2022      budesonide (PULMICORT) 0.5 mg/2 mL nbsp 2 mL by Nebulization route two (2) times a day. Qty: 60 Each, Refills: 0  Start date: 3/21/2022      !! insulin lispro (HUMALOG) 100 unit/mL injection INITIATE INSULIN CORRECTIVE PROTOCOL:  Normal Insulin Sensitivity   For Blood Sugar (mg/dL) of:     Less than 150 =   0 units           150 -199 =   2 units  200 -249 =   4 units  250 -299 =   6 units  300 -349 =   8 units  350 and above = 10 units and Call Physician     Initiate Hypoglycemia protocol if blood glucose is <70 mg/dL Fast Acting - Administer Immediately - or within 15 minutes of start of meal, if mealtime coverage. Qty: 1 Each, Refills: 0  Start date: 3/21/2022      !! insulin lispro (HUMALOG) 100 unit/mL injection 3 Units by SubCUTAneous route Before breakfast, lunch, and dinner. Qty: 1 Each, Refills: 0  Start date: 3/21/2022      !! metoprolol tartrate (LOPRESSOR) 25 mg tablet Take 0.5 Tablets by mouth two (2) times a day. Qty: 30 Tablet, Refills: 0  Start date: 3/21/2022      albuterol-ipratropium (DUO-NEB) 2.5 mg-0.5 mg/3 ml nebu 3 mL by Nebulization route every four (4) hours as needed for Shortness of Breath or Respiratory Distress. Qty: 30 Nebule, Refills: 0  Start date: 3/21/2022      !! metoprolol tartrate (LOPRESSOR) 25 mg tablet Take 0.5 Tablets by mouth two (2) times a day. Qty: 30 Tablet, Refills: 0  Start date: 3/21/2022       !! - Potential duplicate medications found. Please discuss with provider. CONTINUE these medications which have NOT CHANGED    Details   sertraline (ZOLOFT) 100 mg tablet Take 1 Tablet by mouth daily.  Indications: major depressive disorder, am  Qty: 1 Tablet, Refills: 0    Associated Diagnoses: Anxiety and depression      apixaban (ELIQUIS) 5 mg tablet Take 2 Tablets by mouth two (2) times a day for 7 days, THEN 1 Tablet two (2) times a day for 30 days. Refill at 5mg BID x 30 days Refills: 2  Qty: 88 Tablet, Refills: 2      multivit-min-FA-lycopen-lutein (Centrum Silver) 0.4-300-250 mg-mcg-mcg tab Take  by mouth daily. Iron Fum & P-FA-Vit B & C No.9 (Integra Plus) 125 mg iron- 1 mg cap Take 1 Capsule by mouth daily. hydrALAZINE (APRESOLINE) 25 mg tablet Take 1 Tablet by mouth three (3) times daily. Qty: 30 Tablet, Refills: 0    Associated Diagnoses: Essential hypertension      pantoprazole (PROTONIX) 20 mg tablet Take 2 Tablets by mouth daily. TAKE 1 TABLET BY MOUTH DAILY  Qty: 90 Tablet, Refills: 1    Comments: **Patient requests 90 days supply**  Associated Diagnoses: Gastroesophageal reflux disease, unspecified whether esophagitis present      ferrous sulfate 325 mg (65 mg iron) tablet Take 1 Tablet by mouth daily (with breakfast). Qty: 30 Tablet, Refills: 0      Blood-Glucose Meter monitoring kit Use as directed 3 times daily to check blood sugars. Dx:E11.29  Qty: 1 Kit, Refills: 0    Associated Diagnoses: Type 2 diabetes mellitus with diabetic neuropathy, without long-term current use of insulin ([facility] Controlled type 2 diabetes mellitus with microalbuminuria, without long-term current use of insulin ([facility])      glucose blood VI test strips (blood glucose test) strip Use as directed 3 times daily to check blood sugars. Dx:E11.29  Qty: 100 Strip, Refills: 3    Comments: Please dispense any brand her insurance will cover. Associated Diagnoses: Type 2 diabetes mellitus with diabetic neuropathy, without long-term current use of insulin ([facility] Controlled type 2 diabetes mellitus with microalbuminuria, without long-term current use of insulin ([facility])      lancets misc Use as directed 3 times daily to check blood sugars. Dx:E11.29  Qty: 100 Each, Refills: 3    Comments: Please dispense any brand her insurance will cover.   Associated Diagnoses: Type 2 diabetes mellitus with diabetic neuropathy, without long-term current use of insulin ([address] Controlled type 2 diabetes mellitus with microalbuminuria, without long-term current use of insulin ([facility])      pravastatin (PRAVACHOL) 40 mg tablet TAKE 1 TABLET BY MOUTH EVERY NIGHT AT BEDTIME  Qty: 90 Tablet, Refills: 1    Associated Diagnoses: Controlled type 2 diabetes mellitus with microalbuminuria, without long-term current use of insulin ([address] Mixed hyperlipidemia      metFORMIN (GLUCOPHAGE) 500 mg tablet Take 1 Tablet by mouth two (2) times daily (with meals). Qty: 180 Tablet, Refills: 1    Associated Diagnoses: Controlled type 2 diabetes mellitus with microalbuminuria, without long-term current use of insulin (HCC)      gabapentin (NEURONTIN) 100 mg capsule Take 1 Capsule by mouth three (3) times daily. Qty: 270 Capsule, Refills: 1    Associated Diagnoses: Controlled type 2 diabetes mellitus with microalbuminuria, without long-term current use of insulin ([address] Neuropathy      amLODIPine (NORVASC) 10 mg tablet Take 1 Tablet by mouth daily. Qty: 90 Tablet, Refills: 1    Associated Diagnoses: Essential hypertension      fluticasone propion-salmeteroL (Advair Diskus) 250-50 mcg/dose diskus inhaler Take 1 Puff by inhalation every twelve (12) hours. Qty: 3 Inhaler, Refills: 1    Associated Diagnoses: Mild persistent asthma without complication      acetaminophen (TYLENOL ARTHRITIS PAIN) 650 mg TbER Take 650 mg by mouth every eight (8) hours. Tylenol arthritis as needed      VIT A/VIT C/VIT E/ZINC/COPPER (PRESERVISION AREDS PO) Take 1 Tab by mouth two (2) times a day. polyethylene glycol (MIRALAX) 17 gram/dose powder Take 17 g by mouth daily.  1 tablespoon with 8 oz of water daily  Qty: 119 g, Refills: 0         STOP taking these medications       VENTOLIN HFA 90 mcg/actuation inhaler Comments:   Reason for Stopping:         lidocaine 4 % patch Comments:   Reason for Stopping:         Wheel Chair kalyn Comments:   Reason for Stopping:         OTHER Comments: Reason for Stopping:               Procedures done this admission:  * No surgery found *    Consults this admission:  None    Echocardiogram/EKG results:  Results from Hospital Encounter encounter on 03/04/22    ECHO ADULT COMPLETE    Interpretation Summary    Left Ventricle: Left ventricle size is normal. Normal wall thickness. Mild global hypokinesis present. Mildly reduced left ventricular systolic function with a visually estimated EF of 40 - 50%. EF by 2D Simpsons Biplane is 46%.   Right Ventricle: Right ventricle size is normal. Normal systolic function.   Aortic Valve: Mild transvalvular regurgitation.   Mitral Valve: Mildly thickened leaflet. Moderate annular calcification of the mitral valve. Mild transvalvular regurgitation.   Pericardium: No pericardial effusion.   IVC diameter is greater than 21 mm and decreases greater than 50% during inspiration; therefore the estimated right atrial pressure is intermediate (~8 mmHg). EKG Results     Procedure 720 Value Units Date/Time    EKG [[id number]]     Order Status: Completed           Diagnostic Imaging/Tests:   XR CHEST PORT    Result Date: 3/14/2022  PORTABLE CHEST ONE VIEW HISTORY: Shortness of breath COMPARISON: Chest radiograph 3/4/2022 and chest CT 3/4/2022 FINDINGS: Edema-like interstitial and airspace opacities are present throughout the lungs. Lung volumes are diminished. Asymmetric interstitial and airspace opacities particularly in the right upper lobe. This may be caused by pulmonary edema or infectious infiltrates.       All Micro Results     Procedure Component Value Units Date/Time    CULTURE, BLOOD [[id number]] Collected: 03/14/22 1744    Order Status: Completed Specimen: Blood Updated: 03/19/22 0641     Special Requests: --        NO SPECIAL REQUESTS  RIGHT  FOREARM       Culture result: NO GROWTH 5 DAYS       CULTURE, BLOOD [[id number]]     Order Status: Canceled Specimen: Blood           Labs: Results:       BMP, Mg, Phos Recent Labs     03/19/22  1022      K 4.3   *   CO2 29   AGAP 1*   BUN 44*   CREA 1.30*   CA 9.6   *   MG 2.4   PHOS 2.7      CBC Recent Labs     03/19/22  1022   WBC 6.8   RBC 3.19*   HGB 9.1*   HCT 30.8*         LFT No results for input(s): ALT, TBIL, AP, TP, ALB, GLOB, AGRAT in the last 72 hours.     No lab exists for component: SGOT, GPT   Cardiac Testing Lab Results   Component Value Date/Time    BNP 38 09/10/2016 10:15 AM    BNP 54 04/19/2016 03:30 PM    BNP 22 03/01/2012 02:53 PM    CK 95 12/14/2021 07:31 PM    CK 64 08/21/2018 09:25 PM    CK 38 07/16/2010 04:05 PM    CK - MB 0.7 07/16/2010 04:05 PM    CK-MB Index 1.8 07/16/2010 04:05 PM    Troponin-I <0.05 03/01/2012 02:53 PM    Troponin-I, Qt. <0.02 (L) 08/06/2019 06:25 PM    Troponin-I, Qt. <0.02 (L) 08/21/2018 03:09 PM    Troponin-I, Qt. <0.04 (L) 07/17/2010 06:52 AM      Coagulation Tests Lab Results   Component Value Date/Time    Prothrombin time 12.6 01/23/2022 03:00 PM    Prothrombin time 13.1 07/27/2018 08:53 PM    Prothrombin time 10.4 03/01/2012 02:08 PM    INR 0.9 01/23/2022 03:00 PM    INR 1.0 07/27/2018 08:53 PM    INR 1.0 03/01/2012 02:08 PM    aPTT 34.6 03/04/2022 08:20 PM    aPTT 27.1 07/27/2018 08:53 PM    aPTT 27.3 07/12/2014 02:30 PM      A1c Lab Results   Component Value Date/Time    Hemoglobin A1c 8.9 (H) 01/23/2022 03:00 PM    Hemoglobin A1c 8.3 (H) 11/30/2021 03:41 PM    Hemoglobin A1c 6.1 (H) 08/31/2021 03:26 PM      Lipid Panel Lab Results   Component Value Date/Time    Cholesterol, total 153 11/30/2021 03:41 PM    HDL Cholesterol 53 11/30/2021 03:41 PM    LDL, calculated 70 11/30/2021 03:41 PM    LDL, calculated 72 10/07/2019 10:16 AM    VLDL, calculated 30 11/30/2021 03:41 PM    VLDL, calculated 15 10/07/2019 10:16 AM    Triglyceride 179 (H) 11/30/2021 03:41 PM    CHOL/HDL Ratio 2.8 02/22/2017 09:54 AM      Thyroid Panel Lab Results   Component Value Date/Time    TSH 1.080 11/30/2021 03:41 PM    TSH 1.960 07/08/2019 09:55 AM    TSH 0.888 08/22/2018 06:01 AM        Most Recent UA Lab Results   Component Value Date/Time    Color YELLOW 03/14/2022 09:59 PM    Appearance CLEAR 03/14/2022 09:59 PM    Specific gravity 1.035 (H) 02/22/2020 05:19 PM    pH (UA) 6.0 03/14/2022 09:59 PM    Protein 300 (A) 03/14/2022 09:59 PM    Glucose 100 03/14/2022 09:59 PM    Ketone Negative 03/14/2022 09:59 PM    Bilirubin Negative 03/14/2022 09:59 PM    Blood Negative 03/14/2022 09:59 PM    Urobilinogen 0.2 03/14/2022 09:59 PM    Nitrites Negative 03/14/2022 09:59 PM    Leukocyte Esterase Negative 03/14/2022 09:59 PM    WBC 0-3 03/14/2022 09:59 PM    RBC 0-3 03/14/2022 09:59 PM    Epithelial cells 0-3 03/14/2022 09:59 PM    Bacteria 0 03/14/2022 09:59 PM    Casts 3-5 03/14/2022 09:59 PM    Crystals, urine 0 02/21/2010 01:15 PM    Mucus 0 12/14/2021 10:08 PM          All Labs from Last 24 Hrs:  Recent Results (from the past 24 hour(s))   GLUCOSE, POC    Collection Time: 03/20/22 11:26 AM   Result Value Ref Range    Glucose (POC) 197 (H) 65 - 100 mg/dL    Performed by [address] [place] [address], POC    Collection Time: 03/20/22  4:16 PM   Result Value Ref Range    Glucose (POC) 154 (H) 65 - 100 mg/dL    Performed by [address] [place] [address], POC    Collection Time: 03/20/22  8:47 PM   Result Value Ref Range    Glucose (POC) 167 (H) 65 - 100 mg/dL    Performed by Sergey    GLUCOSE, POC    Collection Time: 03/21/22  7:26 AM   Result Value Ref Range    Glucose (POC) 151 (H) 65 - 100 mg/dL    Performed by [address] [place] [address], POC    Collection Time: 03/21/22  8:37 AM   Result Value Ref Range    Glucose (POC) 171 (H) 65 - 100 mg/dL    Performed by Los(AS)        Current Med List in Hospital:   Current Facility-Administered Medications   Medication Dose Route Frequency    metoprolol tartrate (LOPRESSOR) tablet 25 mg  25 mg Oral BID    docusate sodium (COLACE) capsule 100 mg  100 mg Oral DAILY    insulin glargine (LANTUS) injection 4 Units  4 Units SubCUTAneous QHS    insulin lispro (HUMALOG) injection 3 Units  3 Units SubCUTAneous TIDAC    sodium chloride (NS) flush 5-10 mL  5-10 mL IntraVENous Q8H    sodium chloride (NS) flush 5-10 mL  5-10 mL IntraVENous PRN    sodium chloride (NS) flush 5-40 mL  5-40 mL IntraVENous Q8H    sodium chloride (NS) flush 5-40 mL  5-40 mL IntraVENous PRN    acetaminophen (TYLENOL) tablet 650 mg  650 mg Oral Q6H PRN    Or    acetaminophen (TYLENOL) suppository 650 mg  650 mg Rectal Q6H PRN    polyethylene glycol (MIRALAX) packet 17 g  17 g Oral DAILY PRN    ondansetron (ZOFRAN ODT) tablet 4 mg  4 mg Oral Q8H PRN    Or    ondansetron (ZOFRAN) injection 4 mg  4 mg IntraVENous Q6H PRN    pantoprazole (PROTONIX) tablet 40 mg  40 mg Oral ACB&D    alum-mag hydroxide-simeth (MYLANTA) oral suspension 15 mL  15 mL Oral Q6H PRN    glucose chewable tablet 16 g  16 g Oral PRN    glucagon (GLUCAGEN) injection 1 mg  1 mg IntraMUSCular PRN    dextrose 10% infusion 125-250 mL  125-250 mL IntraVENous PRN    insulin lispro (HUMALOG) injection   SubCUTAneous AC&HS    arformoteroL (BROVANA) neb solution 15 mcg  15 mcg Nebulization BID RT    budesonide (PULMICORT) 500 mcg/2 ml nebulizer suspension  500 mcg Nebulization BID RT    albuterol-ipratropium (DUO-NEB) 2.5 MG-0.5 MG/3 ML  3 mL Nebulization Q4H PRN    gabapentin (NEURONTIN) capsule 100 mg  100 mg Oral TID    sertraline (ZOLOFT) tablet 100 mg  100 mg Oral DAILY    ferrous sulfate tablet 325 mg  1 Tablet Oral DAILY WITH BREAKFAST    apixaban (ELIQUIS) tablet 5 mg  5 mg Oral Q12H       No Known Allergies  Immunization History   Administered Date(s) Administered    (RETIRED) Pneumococcal Vaccine (Unspecified Type) 01/01/2006    Influenza High Dose Vaccine PF 09/14/2016, 10/25/2017, 12/13/2018    Influenza Vaccine 11/05/2013, 09/16/2014, 10/19/2015    Influenza Vaccine (Tri) Adjuvanted (>65 Yrs FLUAD TRI 00457) 10/14/2019    Influenza Vaccine PF 09/22/2015    Influenza Vaccine Whole 01/01/2011    Influenza, Quadrivalent, Adjuvanted (>65 Yrs FLUAD QUAD [id number]) 12/30/2020, 11/30/2021    Pneumococcal Conjugate (PCV-13) 07/14/2015    Pneumococcal Vaccine (Unspecified Type) 01/01/2006    TB Skin Test (PPD) Intradermal 07/31/2018, 08/21/2018, 12/15/2021, 01/23/2022, 03/06/2022, 03/15/2022    Tdap 11/04/2014       Recent Vital Data:  Patient Vitals for the past 24 hrs:   Temp Pulse Resp BP SpO2   03/21/22 0822     95 %   03/21/22 0821     95 %   03/21/22 0742 98.6 °F (37 °C) 75 18 (!) 186/76 95 %   03/21/22 0342 98.8 °F (37.1 °C) 75 16 (!) 169/61 94 %   03/20/22 2336 98.4 °F (36.9 °C) 78 18 (!) 148/62 92 %   03/20/22 2110     100 %   03/20/22 1946 98.7 °F (37.1 °C) 75 21 (!) 151/62 90 %   03/20/22 1615 98.4 °F (36.9 °C) 78 19 (!) 159/65 92 %   03/20/22 1154 97.5 °F (36.4 °C) 79 19 (!) 172/71 92 %     Oxygen Therapy  O2 Sat (%): 95 % (03/21/22 0822)  Pulse via Oximetry: 82 beats per minute (03/21/22 0822)  O2 Device: Nasal cannula (03/21/22 2810)  Skin Assessment: Clean, dry, & intact (03/21/22 0533)  Skin Protection for O2 Device: N/A (03/18/22 1135)  O2 Flow Rate (L/min): 0.5 l/min (03/21/22 0822)  FIO2 (%): 21 % (03/20/22 2110)    Estimated body mass index is 26.69 kg/m² as calculated from the following:    Height as of this encounter: 5' 2\" (1.575 m). Weight as of this encounter: 66.2 kg (145 lb 15.1 oz). Intake/Output Summary (Last 24 hours) at 3/21/2022 3038  Last data filed at 3/21/2022 7824  Gross per 24 hour   Intake 540 ml   Output 1050 ml   Net -510 ml         Physical Exam:    General:    Frail, pleasant cooperative  Head:  Normocephalic, atraumatic  Eyes:  Sclerae appear normal.  Pupils equally round. HENT:  Nares appear normal, no drainage. Moist mucous membranes  Neck:  No restricted ROM. Trachea midline  CV:   Rate controlled no tachycardia obvious. No new m/r/g. No JVD  Lungs:   CTAB.   No wheezing, rhonchi, or rales. Even, unlabored  Abdomen:   Soft, nontender, nondistended. Extremities: Warm and dry. Moves all extremities. Skin:     No rashes. Normal coloration  Neuro:  CN II-XII grossly intact. No tremor  Psych:  Normal mood and affect. Signed:  Kelle Parikh DO    Part of this note may have been written by using a voice dictation software. The note has been proof read but may still contain some grammatical/other typographical errors.

## 2022-03-21 NOTE — PROGRESS NOTES
Reviewed notes for any spiritual concerns    Good visit with patient and family    Noted she may be discharged soon        will follow as needed

## 2022-03-22 NOTE — PROGRESS NOTES
Pt sleeping but easily awakens when called. Pt on 1L NC with stable respirations. No pain, SOB or need expressed . No apparent distress noted. Call light in reach. Preparing report to oncoming RN.

## 2022-03-22 NOTE — PROGRESS NOTES
Patient resting in bed, but has sat in the chair for approx 3 hours. Denies any pain, or SOB at this time. Call light within reach. Will monitor.

## 2022-03-22 NOTE — PROGRESS NOTES
Problem: Pressure Injury - Risk of  Goal: *Prevention of pressure injury  Description: Document Carlos Scale and appropriate interventions in the flowsheet.   Outcome: Progressing Towards Goal  Note: Pressure Injury Interventions:  Sensory Interventions: Assess changes in LOC    Moisture Interventions: Absorbent underpads    Activity Interventions: Pressure redistribution bed/mattress(bed type),Increase time out of bed,PT/OT evaluation    Mobility Interventions: Pressure redistribution bed/mattress (bed type),PT/OT evaluation    Nutrition Interventions: Document food/fluid/supplement intake    Friction and Shear Interventions: Apply protective barrier, creams and emollients,Lift sheet

## 2022-03-22 NOTE — PROGRESS NOTES
ACUTE PHYSICAL THERAPY GOALS:  (Developed with and agreed upon by patient and/or caregiver. )  LTG:  (1.)Ms. Catherine Camp will move from supine to sit and sit to supine  in bed with MODIFIED INDEPENDENCE within 7 treatment day(s). (2.)Ms. Catherine Camp will transfer from bed to chair and chair to bed with SUPERVISION using the least restrictive device within 7 treatment day(s). (3.)Ms. Catherine Camp will ambulate with SUPERVISION for 100 feet with the least restrictive device within 7 treatment day(s). (4.)Ms. Catherine Camp will perform standing static and dynamic balance activities x 23 minutes with SUPERVISION to improve safety within 7 treatment day(s). (5.)Ms. Catherine Camp will ambulate and/or perform functional activities for  23 consecutive minutes with stable vital signs and no rests required to improve activity tolerance within 7 treatment days. ________________________________________________________________________________________________    PHYSICAL THERAPY: Daily Note and AM Treatment Day # 4    Aide Camp is a 80 y.o. female   PRIMARY DIAGNOSIS: Functional quadriplegia ([address]  Functional quadriplegia ([facility]) [R53.2]         ASSESSMENT:     REHAB RECOMMENDATIONS: CURRENT LEVEL OF FUNCTION:  (Most Recently Demonstrated)   Recommendation to date pending progress:  Setting:   Short-term Rehab  Equipment:    To Be Determined Bed Mobility:   Standby Assistance  Sit to Stand:   Minimal Assistance  Transfers:   Minimal Assistance to Mod A  Gait/Mobility:   Minimal Assistance to Moderate A     ASSESSMENT:  Ms. Catherine Camp continues to progress slowly toward goals. She remains slightly confused requiring additional cues to stay on task. She performed multiple STS transfers during self care. Ambulated 10' then increased distance to 25' w/ RW. Slow pace and unsteady during ambulation. SUBJECTIVE:   Ms. Catherine Camp states, \"I'll go to the door. \"    SOCIAL HISTORY/ LIVING ENVIRONMENT: See eval  Home Environment: Private residence  # Steps to Enter: 5 (2 steps on back, 4-5 in front)  One/Two Story Residence: One story  Living Alone: No  Support Systems: Child(edward)  OBJECTIVE:     PAIN: VITAL SIGNS: LINES/DRAINS:   Pre Treatment: Pain Screen  Pain Scale 1: Numeric (0 - 10)  Pain Intensity 1: 0  Post Treatment: 0   Purewick  O2 Device: Nasal cannula     MOBILITY: I Mod I S SBA CGA Min Mod Max Total  NT x2 Comments:   Bed Mobility    Rolling [] [] [] [] [] [] [] [] [] [x] []    Supine to Sit [] [] [] [x] [] [] [] [] [] [] []    Scooting [] [] [] [x] [] [] [] [] [] [] []    Sit to Supine [] [] [] [] [] [] [] [] [] [x] []    Transfers    Sit to Stand [] [] [] [] [] [x] [] [] [] [] []    Bed to Chair [] [] [] [] [] [x] [x] [] [] [] []    Stand to Sit [] [] [] [] [] [x] [] [] [] [] []    I=Independent, Mod I=Modified Independent, S=Supervision, SBA=Standby Assistance, CGA=Contact Guard Assistance,   Min=Minimal Assistance, Mod=Moderate Assistance, Max=Maximal Assistance, Total=Total Assistance, NT=Not Tested    BALANCE: Good Fair+ Fair Fair- Poor NT Comments   Sitting Static [] [x] [x] [] [] []    Sitting Dynamic [] [] [x] [] [] []              Standing Static [] [] [] [x] [] []    Standing Dynamic [] [] [] [x] [x] []      GAIT: I Mod I S SBA CGA Min Mod Max Total  NT x2 Comments:   Level of Assistance [] [] [] [] [] [x] [x] [] [] [] []    Distance 10 and 25'    DME Rolling Walker    Gait Quality Short step length, posterior lean, unsteady    Weightbearing  Status N/A     I=Independent, Mod I=Modified Independent, S=Supervision, SBA=Standby Assistance, CGA=Contact Guard Assistance,   Min=Minimal Assistance, Mod=Moderate Assistance, Max=Maximal Assistance, Total=Total Assistance, NT=Not Tested    PLAN:   FREQUENCY/DURATION: PT Plan of Care: 3 times/week for duration of hospital stay or until stated goals are met, whichever comes first.  TREATMENT:     TREATMENT:   ($$ Therapeutic Activity: 23-37 mins    )  Therapeutic Activity (26 Minutes): Therapeutic activity included Supine to Sit, Scooting, Transfer Training, Ambulation on level ground, Sitting balance  and Standing balance to improve functional Mobility, Strength and Activity tolerance.     TREATMENT GRID:   Date:  3/16/ Date:   Date:     Activity/Exercise Parameters Parameters Parameters   Ankle pumps 2x20     LAQ 2x20                                         AFTER TREATMENT POSITION/PRECAUTIONS:  Chair, Needs within reach and RN notified    INTERDISCIPLINARY COLLABORATION:  RN/PCT and PT/PTA    TOTAL TREATMENT DURATION:  PT Patient Time In/Time Out  Time In: 5763  Time Out: 1005 [address]

## 2022-03-22 NOTE — ROUTINE PROCESS
Assumed pt care. Pt alert and oriented in bed  On 1L NC. No apparent distress noted at this time. Pt denies pain or need. Call light in reach.

## 2022-03-22 NOTE — PROGRESS NOTES
Hospitalist Progress Note   Admit Date:  3/14/2022  4:22 PM   Name:  Lubna Liu   Age:  80 y.o. Sex:  female  :  1929   MRN:  [id number]   Room:  [facility]/    Presenting Complaint: Irregular Heart Beat and Shortness of Breath    Reason(s) for Admission: Functional quadriplegia [facility]) [R53.2]     Hospital Course & Interval History:     From prior note HPI:  80-year-old female with history of Covid 2022, COPD with chronic hypoxemic respiratory failure on 2 to 3 L home oxygen, CKD stage III, PE in 2022, GI bleed who was admitted on 314 for weakness. Which was just recently discharged on 310 after being hospitalized for hospital-acquired pneumonia, A. fib and PE. Placement was recommended but patient opted to go home. Patient had not been out of bed over several days. Home health once outpatient on 314 and was concerned and told her to go to the emergency room. Chest x-ray showed bilateral infiltrates and she was given antibiotics. Also given IV Lasix. Continue to work with physical therapy and now acknowledges the benefits of going to rehab.     Subjective/24hr Events (2022): Appears comfortablelow blood sugar this morning and Lantus was discontinued. Discharged yesterday to skilled nursing facility but apparently insurance did not approve yet and discharge held. No gross obvious distress. Baseline creatinine 1.3we will follow-up chronic normocytic anemia and creatinine prior to discharge hopefully will be approved for bed as soon as tomorrow. She does have chronic oxygen from home 2-3 L. Please see aboverefused recommended short-term rehab after hospital stay for pneumonia recent.   Review of systems negative for any new complaints       ROS:  10 systems reviewed and negative except as noted above.      Assessment & Plan:   Generalized weakness/functional quadriplegia  Continue PT/OT  Patient and daughter agreeable to rehab  Patient was accepted to [facility] rehab unfortunately no beds available  Discharge pending bed availability  3/22awaiting insurance approval for short-term rehabcontinue physical and occupational therapy while hospitalized. Normocytic anemia  March 20chronicreviewed multiple recent CBCssuspect most recent was outlier as stable around 9.1. March 22we will follow-up CBC tomorrow      Atrial fibrillation  NSR   Started on metoprolol  Continue Eliquis  March 22rate controlled, BP stable     Right upper lobe subsegmental PE  Noted on CT 3/4/2022  Continue Eliquis     Chronic hypoxemic respiratory failure  Discharged on 3 L after Covid hospitalization January to February 2022  Patient advised that she does not wear the oxygen all the time. Pt is saturating well on 2L.        Hypertension  Continue Lopressor      MDD, anxiety, mood disorder  Continue Zoloft     Type 2 diabetes  Continue Lantus 8 units  6 units premeal  Sliding scale insulin  3/22discontinue Lantus and observe.   Was only receiving 3-4 units of long-acting insulin risk/benefit regarding morning hypoglycemia     GERD  Continue PPI       Dispo/Discharge Planning:    Excepted to rehab and discharge March 21 but insurance approval pending and discharge canceled.     Diet:  ADULT DIET Regular; 4 carb choices (60 gm/meal)  ADULT ORAL NUTRITION SUPPLEMENT Breakfast, Lunch, Dinner; Diabetic Supplement  DVT PPx: Eliquis  Code status: DNR          Hospital Problems as of 3/22/2022 Date Reviewed: 3/9/2022          Codes Class Noted - Resolved POA    Normocytic anemia ICD-10-CM: D64.9  ICD-9-CM: 285.9  3/20/2022 - Present Unknown        * (Principal) Functional quadriplegia ([facility] ICD-10-CM: R53.2  ICD-9-CM: 780.72  3/14/2022 - Present Unknown        Moderate protein-calorie malnutrition ([address] ICD-10-CM: E44.0  ICD-9-CM: 263.0  12/16/2021 - Present Yes        Iron deficiency anemia due to chronic blood loss ICD-10-CM: D50.0  ICD-9-CM: 280.0  5/18/2020 - Present Yes Stage 3a chronic kidney disease (HCC) ICD-10-CM: N18.31  ICD-9-CM: 585.3  4/18/2019 - Present Unknown        COPD (chronic obstructive pulmonary disease) (HCC) (Chronic) ICD-10-CM: J44.9  ICD-9-CM: 496  8/22/2018 - Present Yes        Type 2 diabetes mellitus with diabetic neuropathy (HCC) (Chronic) ICD-10-CM: E11.40  ICD-9-CM: 250.60, 357.2  5/16/2018 - Present Yes        Neuropathy ICD-10-CM: G62.9  ICD-9-CM: 355.9  8/24/2012 - Present Yes    Overview Signed 8/24/2012 12:56 PM by Mirian Joyce     Lower limbs             PAD (peripheral artery disease) ([facility] ICD-10-CM: I73.9  ICD-9-CM: 443.9  8/24/2012 - Present Yes        Essential hypertension (Chronic) ICD-10-CM: I10  ICD-9-CM: 401.9  7/16/2010 - Present Yes        GERD (gastroesophageal reflux disease) (Chronic) ICD-10-CM: K21.9  ICD-9-CM: 530.81  7/16/2010 - Present Yes              Objective:     Patient Vitals for the past 24 hrs:   Temp Pulse Resp BP SpO2   03/22/22 1115 98.2 °F (36.8 °C) 73 18 (!) 162/63 94 %   03/22/22 0813     96 %   03/22/22 0810     96 %   03/22/22 0739 98.1 °F (36.7 °C) 71 18 (!) 171/65 95 %   03/22/22 0316 98.4 °F (36.9 °C) 74 21 (!) 147/77 92 %   03/21/22 2315 98 °F (36.7 °C) 80 21 (!) 140/68 93 %   03/21/22 2056     91 %   03/21/22 1929 98.3 °F (36.8 °C) 81 21 (!) 159/58 93 %   03/21/22 1529 99.1 °F (37.3 °C) 76 20 (!) 149/64 96 %     Oxygen Therapy  O2 Sat (%): 94 % (03/22/22 1115)  Pulse via Oximetry: 71 beats per minute (03/22/22 0813)  O2 Device: Nasal cannula (03/22/22 0813)  Skin Assessment: Clean, dry, & intact (03/22/22 0813)  Skin Protection for O2 Device: N/A (03/18/22 1628)  O2 Flow Rate (L/min): 1 l/min (03/22/22 0813)  FIO2 (%): 21 % (03/20/22 2110)    Estimated body mass index is 26.9 kg/m² as calculated from the following:    Height as of this encounter: 5' 2\" (1.575 m). Weight as of this encounter: 66.7 kg (147 lb 0.8 oz).     Intake/Output Summary (Last 24 hours) at 3/22/2022 1504  Last data filed at 3/22/2022 6660  Gross per 24 hour   Intake 476 ml   Output    Net 476 ml         Physical Exam:     Blood pressure (!) 162/63, pulse 73, temperature 98.2 °F (36.8 °C), resp. rate 18, height 5' 2\" (1.575 m), weight 66.7 kg (147 lb 0.8 oz), SpO2 94 %, not currently breastfeeding. General-alert and no acute distress  Eyes-conjunctive are clear pupils equal round react to light extraocular muscles intact  Ears-no deformity-no drainage  Nares-no nasal deformity-nares are patent  Heart-appears regular at time of exam no gross JVD or HJR. Lungs-clear auscultation palpation and percussion, symmetric excursion of the chest wall. No wheezing  Abdomen-soft, nontender, no gross percussible organomegaly. No gross distention. Bowel sounds present all 4 quadrants  Extremities-no significant edema, moves all extremities symmetrically. Weak diffuselydebility  Neurologic-cranial nerves II through XII grossly intact, no focal motor deficits grossly.   No tremor      I have reviewed ordered lab tests and independently visualized imaging below:    Recent Labs:  Recent Results (from the past 48 hour(s))   GLUCOSE, POC    Collection Time: 03/20/22  4:16 PM   Result Value Ref Range    Glucose (POC) 154 (H) 65 - 100 mg/dL    Performed by [address] [place] [address], POC    Collection Time: 03/20/22  8:47 PM   Result Value Ref Range    Glucose (POC) 167 (H) 65 - 100 mg/dL    Performed by Sergey    GLUCOSE, POC    Collection Time: 03/21/22  7:26 AM   Result Value Ref Range    Glucose (POC) 151 (H) 65 - 100 mg/dL    Performed by [facility] [employer], POC    Collection Time: 03/21/22  8:37 AM   Result Value Ref Range    Glucose (POC) 171 (H) 65 - 100 mg/dL    Performed by Heaven)    GLUCOSE, POC    Collection Time: 03/21/22 11:16 AM   Result Value Ref Range    Glucose (POC) 169 (H) 65 - 100 mg/dL    Performed by [facility] [employer], POC    Collection Time: 03/21/22  1:02 PM   Result Value Ref Range    Glucose (POC) 183 (H) 65 - 100 mg/dL    Performed by Los(AS)    GLUCOSE, POC    Collection Time: 03/21/22  5:08 PM   Result Value Ref Range    Glucose (POC) 215 (H) 65 - 100 mg/dL    Performed by [address] [place] [address], POC    Collection Time: 03/21/22  8:17 PM   Result Value Ref Range    Glucose (POC) 127 (H) 65 - 100 mg/dL    Performed by Sergey    GLUCOSE, POC    Collection Time: 03/22/22  7:37 AM   Result Value Ref Range    Glucose (POC) 104 (H) 65 - 100 mg/dL    Performed by [facility]    GLUCOSE, POC    Collection Time: 03/22/22 11:13 AM   Result Value Ref Range    Glucose (POC) 214 (H) 65 - 100 mg/dL    [facility]        All Micro Results     Procedure Component Value Units Date/Time    CULTURE, BLOOD [[id number]] Collected: 03/14/22 1744    Order Status: Completed Specimen: Blood Updated: 03/19/22 0641     Special Requests: --        NO SPECIAL REQUESTS  RIGHT  FOREARM       Culture result: NO GROWTH 5 DAYS       CULTURE, BLOOD [[id number]]     Order Status: Canceled Specimen: Blood           Other Studies:  No results found.     Current Meds:  Current Facility-Administered Medications   Medication Dose Route Frequency    metoprolol tartrate (LOPRESSOR) tablet 25 mg  25 mg Oral BID    docusate sodium (COLACE) capsule 100 mg  100 mg Oral DAILY    insulin lispro (HUMALOG) injection 3 Units  3 Units SubCUTAneous TIDAC    sodium chloride (NS) flush 5-10 mL  5-10 mL IntraVENous Q8H    sodium chloride (NS) flush 5-10 mL  5-10 mL IntraVENous PRN    sodium chloride (NS) flush 5-40 mL  5-40 mL IntraVENous Q8H    sodium chloride (NS) flush 5-40 mL  5-40 mL IntraVENous PRN    acetaminophen (TYLENOL) tablet 650 mg  650 mg Oral Q6H PRN    Or    acetaminophen (TYLENOL) suppository 650 mg  650 mg Rectal Q6H PRN    polyethylene glycol (MIRALAX) packet 17 g  17 g Oral DAILY PRN    ondansetron (ZOFRAN ODT) tablet 4 mg  4 mg Oral Q8H PRN    Or  ondansetron (ZOFRAN) injection 4 mg  4 mg IntraVENous Q6H PRN    pantoprazole (PROTONIX) tablet 40 mg  40 mg Oral ACB&D    alum-mag hydroxide-simeth (MYLANTA) oral suspension 15 mL  15 mL Oral Q6H PRN    glucose chewable tablet 16 g  16 g Oral PRN    glucagon (GLUCAGEN) injection 1 mg  1 mg IntraMUSCular PRN    dextrose 10% infusion 125-250 mL  125-250 mL IntraVENous PRN    insulin lispro (HUMALOG) injection   SubCUTAneous AC&HS    arformoteroL (BROVANA) neb solution 15 mcg  15 mcg Nebulization BID RT    budesonide (PULMICORT) 500 mcg/2 ml nebulizer suspension  500 mcg Nebulization BID RT    albuterol-ipratropium (DUO-NEB) 2.5 MG-0.5 MG/3 ML  3 mL Nebulization Q4H PRN    gabapentin (NEURONTIN) capsule 100 mg  100 mg Oral TID    sertraline (ZOLOFT) tablet 100 mg  100 mg Oral DAILY    ferrous sulfate tablet 325 mg  1 Tablet Oral DAILY WITH BREAKFAST    apixaban (ELIQUIS) tablet 5 mg  5 mg Oral Q12H       Signed:  Heriberto Tinsley DO    Part of this note may have been written by using a voice dictation software. The note has been proof read but may still contain some grammatical/other typographical errors.

## 2022-03-22 NOTE — PROGRESS NOTES
Pt in bed resting with stable respirations on 1L NC. No apparent distress noted. Daughter at bedside. No questions expressed at this time. Pt is currently alert and oriented with periodical confusion, according to daughter. Call light in reach.

## 2022-03-22 NOTE — PROGRESS NOTES
ACUTE OT GOALS:  (Developed with and agreed upon by patient and/or caregiver.)  1. Patient will complete lower body bathing and dressing with supervision and adaptive equipment as needed. 2. Patient will complete toileting with supervision. 3. Patient will tolerate 30 minutes of OT treatment with 1-2 rest breaks to increase activity tolerance for ADLs. 4. Patient will complete functional transfers with supervision and adaptive equipment as needed. 5. Patient will complete functional mobility for household distances with supervision and adaptive equipment as needed. 6. Patient will complete self-grooming while standing edge of sink with supervision and adaptive equipment as needed.     Timeframe: 7 visits     OCCUPATIONAL THERAPY: Daily Note OT Treatment Day # 3    Kali Medley is a 80 y.o. female   PRIMARY DIAGNOSIS: Functional quadriplegia ([address]  Functional quadriplegia ([address] [R53.2]       Payor: [employer] / Plan: [place] [employer] / Product Type: Managed Care Medicare /   ASSESSMENT:     REHAB RECOMMENDATIONS: CURRENT LEVEL OF FUNCTION:  (Most Recently Demonstrated)   Recommendation to date pending progress:  Setting:   Short-term Rehab  Equipment:    To Be Determined Bathing:   Not tested  Dressing:   Moderate Assistance  Feeding/Grooming:   Set Up  Toileting:   Moderate Assistance  Functional Mobility:   Minimal Assistance     ASSESSMENT:  Ms. Nora Stanley remains limited by generalized weakness and deficits in mobility, balance, strength, and cognition impacting ADLs. Demonstrated mild confusion and required min-mod cues to initiate and successfully complete functional tasks. Min A to stand and to maintain dynamic standing balance during ADLs, mod A for toileting and brief change. Pt is progressing towards goals, continue POC. SUBJECTIVE:   Ms. Nora Stanley states, \"I'm wet. \"    SOCIAL HISTORY/LIVING ENVIRONMENT:   Home Environment: Private residence  # Steps to Enter: 5 (2 steps on back, 4-5 in front)  One/Two Story Residence: One story  Living Alone: No  Support Systems: Child(edward)    OBJECTIVE:     PAIN: VITAL SIGNS: LINES/DRAINS:   Pre Treatment: Pain Screen  Pain Scale 1: Numeric (0 - 10)  Pain Intensity 1: 0  Post Treatment:    None  O2 Device: Nasal cannula     ACTIVITIES OF DAILY LIVING: I Mod I S SBA CGA Min Mod Max Total NT Comments   BASIC ADLs:              Bathing/ Showering [] [] [] [] [] [] [] [] [] [x]    Toileting [] [] [] [] [] [] [x] [] [] []    Dressing [] [] [] [] [] [] [x] [] [] []    Feeding [] [] [] [] [] [] [] [] [] [x]    Grooming [] [] [] [x] [] [] [] [] [] [] Brushing teeth, combing hair and washed face seated in chair   Personal Device Care [] [] [] [] [] [] [] [] [] [x]    Functional Mobility [] [] [] [] [] [x] [] [] [] []    I=Independent, Mod I=Modified Independent, S=Supervision, SBA=Standby Assistance, CGA=Contact Guard Assistance,   Min=Minimal Assistance, Mod=Moderate Assistance, Max=Maximal Assistance, Total=Total Assistance, NT=Not Tested    MOBILITY: I Mod I S SBA CGA Min Mod Max Total  NT x2 Comments:   Supine to sit [] [] [] [] [] [] [] [] [] [x] []    Sit to supine [] [] [] [] [] [] [] [] [] [x] []    Sit to stand [] [] [] [] [] [x] [] [] [] [] []    Bed to chair [] [] [] [] [] [] [] [] [] [x] []    I=Independent, Mod I=Modified Independent, S=Supervision, SBA=Standby Assistance, CGA=Contact Guard Assistance,   Min=Minimal Assistance, Mod=Moderate Assistance, Max=Maximal Assistance, Total=Total Assistance, NT=Not Tested    PLAN:   FREQUENCY/DURATION: OT Plan of Care: 3 times/week for duration of hospital stay or until stated goals are met, whichever comes first.    TREATMENT:   TREATMENT:   (     )  Self Care (23 Minutes): Self care including Toileting, Lower Body Dressing and Grooming to increase independence and decrease level of assistance required.     TREATMENT GRID:  N/A    AFTER TREATMENT POSITION/PRECAUTIONS:  Chair, Needs within reach and RN notified    INTERDISCIPLINARY COLLABORATION:  RN/PCT    TOTAL TREATMENT DURATION:  OT Patient Time In/Time Out  Time In: 1337  Time Out: [address], OT

## 2022-03-22 NOTE — PROGRESS NOTES
Comprehensive Nutrition Assessment    Type and Reason for Visit: Reassess    Nutrition Recommendations/Plan:   Meals and Snacks:  Continue current diet. Nutrition Supplement Therapy:   Medical food supplement therapy:  Change Glucerna Shake twice per day (this provides 220 kcal and 10 grams protein per bottle)     Malnutrition Assessment:  Malnutrition Status: Moderate malnutrition  Context: Chronic illness  Findings of clinical characteristics of malnutrition:   Energy Intake:  Mild decrease in energy intake (specify) (unable to quantify)  Weight Loss:  Mild weight loss (specify amount and time period) (14 lb, 8.6% wt loss x ~1 yr, 11 lb, 6.9% x 3 months)     Body Fat Loss:  1 - Mild body fat loss, Orbital   Muscle Mass Loss:  7 - Severe muscle mass loss, Clavicles (pectoralis &deltoids),Temples (temporalis)  Fluid Accumulation:  Unable to assess,     Strength:  Not performed     Nutrition Assessment:   Nutrition History: Pt seen by RD during recent admission. 3/7: \"Pt reports appetite and intake at baseline, states she obtains meals from [employer]. Pt states eating \"amost all the meals they provide me. \" Pt reports UBW of ~136lb. Pt also states prior use of ONS, however not consistently. \" Nutrition history discussed with pt and pt's daughter 3/15. Pt's daughter states that prior to this past admission, pt was living at home alone receivng meals on wheels and eating a lot of soups. She states that in-between the two admissions she was living with the pt and preparing meals. She states that the pt would eat a good breakfast (reports pt liking various breakfast foods), and a decent lunch (sandwich or soup). She states that she has received ensure during a stay at rehab previously but she did not have any at home. Pt's daughter states that the pt used to weigh ~180 lbs a year or so ago. Cultural/Anabaptism/Ethnic Food Preference(s): None   Nutrition Background: Pt admitted with SOB, volume overload. Recently admitted and discharged Thursday (3/10) w/ PNA + PEs. PMH notable for COPD, PE, CKD, DM, OSITO, HTN, Covid (january 2022). Nutrition Interval:  Pt resting at time of visit in recliner at time of visit. She reports having a good appetite and eating most to all of her meals. Intakes confirmed with RN and flowsheets. She states drinking 2 Glucerna shakes per day. Pt is pending placement at this time. Nutrition Related Findings:     Wound Type:  (see wound care note)    Current Nutrition Therapies:  ADULT DIET Regular; 4 carb choices (60 gm/meal)  ADULT ORAL NUTRITION SUPPLEMENT Breakfast, Lunch, Dinner; Diabetic Supplement    Current Intake:   Average Meal Intake: % Average Supplement Intake: 1-25%      Anthropometric Measures:  Height: 5' 2\" (157.5 cm)  Current Body Wt: 66.7 kg (147 lb 0.8 oz) (3/21), Weight source: Bed scale  BMI: 26.9, Overweight (BMI 25.0-29. 9) (BMI potentially skewed d/t fluid retention)  Admission Body Weight: 148 lb 2.4 oz  Ideal Body Weight (lbs) (Calculated): 110 lbs (50 kg), 134.7 %  Usual Body Wt:  (150-160 lbs per review of EMR), Percent weight change:            Edema: LLE: Non-pitting (3/21/2022  8:03 PM)  RLE: Non-pitting (3/21/2022  8:03 PM)     Estimated Daily Nutrient Needs:  Energy (kcal/day): 9341-3941 (Kcal/kg (25-30), Weight Used: Ideal (50 kg))  Protein (g/day): 50-60 (1-1.2) Weight Used: (Ideal (50 kg))  Fluid (ml/day):   (1 ml/kcal (or per MD))    Nutrition Diagnosis:   · Moderate malnutrition,In context of chronic illness related to  (presumed inadequate PO intake) as evidenced by  (criteria as noted above)    Nutrition Interventions:   Food and/or Nutrient Delivery: Continue current diet,Modify oral nutrition supplement     Coordination of Nutrition Care: Continue to monitor while inpatient  Plan of Care discussed with Cliff Ortiz RN    Goals:   Previous Goal Met: Progressing toward goal(s)  Active Goal: Meet >75% of estimated needs at time of nutrition follow up.    Nutrition Monitoring and Evaluation:      Food/Nutrient Intake Outcomes: Food and nutrient intake,Supplement intake  Physical Signs/Symptoms Outcomes: Meal time behavior    Discharge Planning:    Continue oral nutrition supplement    Electronically signed by Jaspreet Singh MS, RDN, LD 3/22/2022 at 3:33 PM  Contact: [phone]

## 2022-03-23 NOTE — PROGRESS NOTES
Patient being discharged to [facility]. This RN called and gave report over the phone to Leonardo.

## 2022-03-23 NOTE — PROGRESS NOTES
Patient discharged. AXO x4. No needs expressed. All lines removed. On 1 L NC. No signs of distress. Discharged via EMS stretcher. Discharged with all patient belongings.

## 2022-03-23 NOTE — PROGRESS NOTES
Problem: Falls - Risk of  Goal: *Absence of Falls  Description: Document Maurice Eaglepatrick Fall Risk and appropriate interventions in the flowsheet.   Outcome: Progressing Towards Goal  Note: Fall Risk Interventions:  Mobility Interventions: Communicate number of staff needed for ambulation/transfer         Medication Interventions: Evaluate medications/consider consulting pharmacy    Elimination Interventions: Call light in reach,Patient to call for help with toileting needs,Toileting schedule/hourly rounds    History of Falls Interventions: Evaluate medications/consider consulting pharmacy

## 2022-03-23 NOTE — DISCHARGE SUMMARY
Hospitalist Discharge Summary   Admit Date:  3/14/2022  4:22 PM   DC Note date: 3/23/2022  Name:  Alonso Hall   Age:  80 y.o.   Sex:  female  :  1929   MRN:  [id number]   Room:  [facility]  PCP:  Lilia Wright NP    Presenting Complaint: Irregular Heart Beat and Shortness of Breath    Initial Admission Diagnosis: Functional quadriplegia (HCC) [R53.2]     Problem List for this Hospitalization:  Hospital Problems as of 3/23/2022 Date Reviewed: 3/9/2022          Codes Class Noted - Resolved POA    Normocytic anemia ICD-10-CM: D64.9  ICD-9-CM: 285.9  3/20/2022 - Present Unknown        * (Principal) Functional quadriplegia ([facility] ICD-10-CM: R53.2  ICD-9-CM: 780.72  3/14/2022 - Present Unknown        Moderate protein-calorie malnutrition ([facility] ICD-10-CM: E44.0  ICD-9-CM: 263.0  2021 - Present Yes        Iron deficiency anemia due to chronic blood loss ICD-10-CM: D50.0  ICD-9-CM: 280.0  2020 - Present Yes        Stage 3a chronic kidney disease ([facility] ICD-10-CM: N18.31  ICD-9-CM: 585.3  2019 - Present Unknown        COPD (chronic obstructive pulmonary disease) ([facility] (Chronic) ICD-10-CM: J44.9  ICD-9-CM: 417  2018 - Present Yes        Type 2 diabetes mellitus with diabetic neuropathy ([facility] (Chronic) ICD-10-CM: E11.40  ICD-9-CM: 250.60, 357.2  2018 - Present Yes        Neuropathy ICD-10-CM: G62.9  ICD-9-CM: 355.9  2012 - Present Yes    Overview Signed 2012 12:56 PM by Mirian Joyce     Lower limbs             PAD (peripheral artery disease) ([facility] ICD-10-CM: I73.9  ICD-9-CM: 443.9  2012 - Present Yes        Essential hypertension (Chronic) ICD-10-CM: I10  ICD-9-CM: 401.9  2010 - Present Yes        GERD (gastroesophageal reflux disease) (Chronic) ICD-10-CM: K21.9  ICD-9-CM: 530.81  2010 - Present Yes            Did Patient have Sepsis (YES OR NO): no    Hospital Course:  80year-old female status post Covid in January of this year chronic hypoxemic respiratory failure secondary to COPD with 2 to 3 L nasal cannula chronic oxygen, stage III chronic kidney disease, pulmonary embolus January 2022 previous GI bleed. Recent admission hospital-acquired pneumonia atrial fibrillation and pulmonary embolus and was discharged on March 10 and short-term rehab recommended but patient refused and went home. Home health saw her 1 time and recommend she go to emergency room. She has functional quadriplegiashe will be discharged to skilled nursing facility for short-term rehab possible long-term placement pending clinical course. Patient's functionality has improved during hospitalization by working with PT OT and would recommend continued PT OT and is being discharged to skilled nursing facility. Disposition: Skilled Nursing Facility  Diet: ADULT DIET Regular; 4 carb choices (60 gm/meal)  ADULT ORAL NUTRITION SUPPLEMENT Breakfast, Lunch; Diabetic Supplement  Code Status: DNR    Follow Up Orders: Follow-up Appointments   Procedures    FOLLOW UP VISIT Appointment in: 3 - 5 Days Follow-up house providers as soon as able. Follow-up house providers as soon as able. Standing Status:   Standing     Number of Occurrences:   1     Order Specific Question:   Appointment in     Answer:   3 - 5 Days       Follow-up Information     Follow up With Specialties Details Why Contact Info    Guy Membreno NP Family Medicine, Nurse Practitioner On 3/24/2022 1:40 pm [address]  [address] [address]  [phone]          Follow up labs/diagnostics (ultimately defer to outpatient provider): Follow-up chronic anemia, intermittent monitoring basic metabolic panel    Time spent in patient discharge and coordination 38 minutes. Plan was discussed with patient and staff. All questions answered.   Patient was stable at time of discharge. Instructions given to call a physician or return if any concerns. Discharge Info:   Current Discharge Medication List      START taking these medications    Details   arformoteroL (BROVANA) 15 mcg/2 mL nebu neb solution 2 mL by Nebulization route two (2) times a day. Qty: 60 Each, Refills: 0  Start date: 3/21/2022      budesonide (PULMICORT) 0.5 mg/2 mL nbsp 2 mL by Nebulization route two (2) times a day. Qty: 60 Each, Refills: 0  Start date: 3/21/2022      !! insulin lispro (HUMALOG) 100 unit/mL injection INITIATE INSULIN CORRECTIVE PROTOCOL:  Normal Insulin Sensitivity   For Blood Sugar (mg/dL) of:     Less than 150 =   0 units           150 -199 =   2 units  200 -249 =   4 units  250 -299 =   6 units  300 -349 =   8 units  350 and above = 10 units and Call Physician     Initiate Hypoglycemia protocol if blood glucose is <70 mg/dL Fast Acting - Administer Immediately - or within 15 minutes of start of meal, if mealtime coverage. Qty: 1 Each, Refills: 0  Start date: 3/21/2022      !! insulin lispro (HUMALOG) 100 unit/mL injection 3 Units by SubCUTAneous route Before breakfast, lunch, and dinner. Qty: 1 Each, Refills: 0  Start date: 3/21/2022      !! metoprolol tartrate (LOPRESSOR) 25 mg tablet Take 0.5 Tablets by mouth two (2) times a day. Qty: 30 Tablet, Refills: 0  Start date: 3/21/2022      albuterol-ipratropium (DUO-NEB) 2.5 mg-0.5 mg/3 ml nebu 3 mL by Nebulization route every four (4) hours as needed for Shortness of Breath or Respiratory Distress. Qty: 30 Nebule, Refills: 0  Start date: 3/21/2022      !! metoprolol tartrate (LOPRESSOR) 25 mg tablet Take 0.5 Tablets by mouth two (2) times a day. Qty: 30 Tablet, Refills: 0  Start date: 3/21/2022       !! - Potential duplicate medications found. Please discuss with provider. CONTINUE these medications which have NOT CHANGED    Details   sertraline (ZOLOFT) 100 mg tablet Take 1 Tablet by mouth daily.  Indications: major depressive disorder, am  Qty: 1 Tablet, Refills: 0    Associated Diagnoses: Anxiety and depression      apixaban (ELIQUIS) 5 mg tablet Take 2 Tablets by mouth two (2) times a day for 7 days, THEN 1 Tablet two (2) times a day for 30 days. Refill at 5mg BID x 30 days Refills: 2  Qty: 88 Tablet, Refills: 2      multivit-min-FA-lycopen-lutein (Centrum Silver) 0.4-300-250 mg-mcg-mcg tab Take  by mouth daily. Iron Fum & P-FA-Vit B & C No.9 (Integra Plus) 125 mg iron- 1 mg cap Take 1 Capsule by mouth daily. hydrALAZINE (APRESOLINE) 25 mg tablet Take 1 Tablet by mouth three (3) times daily. Qty: 30 Tablet, Refills: 0    Associated Diagnoses: Essential hypertension      pantoprazole (PROTONIX) 20 mg tablet Take 2 Tablets by mouth daily. TAKE 1 TABLET BY MOUTH DAILY  Qty: 90 Tablet, Refills: 1    Comments: **Patient requests 90 days supply**  Associated Diagnoses: Gastroesophageal reflux disease, unspecified whether esophagitis present      ferrous sulfate 325 mg (65 mg iron) tablet Take 1 Tablet by mouth daily (with breakfast). Qty: 30 Tablet, Refills: 0      Blood-Glucose Meter monitoring kit Use as directed 3 times daily to check blood sugars. Dx:E11.29  Qty: 1 Kit, Refills: 0    Associated Diagnoses: Type 2 diabetes mellitus with diabetic neuropathy, without long-term current use of insulin ([address] Controlled type 2 diabetes mellitus with microalbuminuria, without long-term current use of insulin ([facility])      glucose blood VI test strips (blood glucose test) strip Use as directed 3 times daily to check blood sugars. Dx:E11.29  Qty: 100 Strip, Refills: 3    Comments: Please dispense any brand her insurance will cover. Associated Diagnoses: Type 2 diabetes mellitus with diabetic neuropathy, without long-term current use of insulin ([address] Controlled type 2 diabetes mellitus with microalbuminuria, without long-term current use of insulin ([facility])      lancets misc Use as directed 3 times daily to check blood sugars. Dx:E11.29  Qty: 100 Each, Refills: 3    Comments: Please dispense any brand her insurance will cover. Associated Diagnoses: Type 2 diabetes mellitus with diabetic neuropathy, without long-term current use of insulin ([address] Controlled type 2 diabetes mellitus with microalbuminuria, without long-term current use of insulin ([facility])      pravastatin (PRAVACHOL) 40 mg tablet TAKE 1 TABLET BY MOUTH EVERY NIGHT AT BEDTIME  Qty: 90 Tablet, Refills: 1    Associated Diagnoses: Controlled type 2 diabetes mellitus with microalbuminuria, without long-term current use of insulin ([address] Mixed hyperlipidemia      metFORMIN (GLUCOPHAGE) 500 mg tablet Take 1 Tablet by mouth two (2) times daily (with meals). Qty: 180 Tablet, Refills: 1    Associated Diagnoses: Controlled type 2 diabetes mellitus with microalbuminuria, without long-term current use of insulin ([facility])      gabapentin (NEURONTIN) 100 mg capsule Take 1 Capsule by mouth three (3) times daily. Qty: 270 Capsule, Refills: 1    Associated Diagnoses: Controlled type 2 diabetes mellitus with microalbuminuria, without long-term current use of insulin ([address] Neuropathy      amLODIPine (NORVASC) 10 mg tablet Take 1 Tablet by mouth daily. Qty: 90 Tablet, Refills: 1    Associated Diagnoses: Essential hypertension      fluticasone propion-salmeteroL (Advair Diskus) 250-50 mcg/dose diskus inhaler Take 1 Puff by inhalation every twelve (12) hours. Qty: 3 Inhaler, Refills: 1    Associated Diagnoses: Mild persistent asthma without complication      acetaminophen (TYLENOL ARTHRITIS PAIN) 650 mg TbER Take 650 mg by mouth every eight (8) hours. Tylenol arthritis as needed      VIT A/VIT C/VIT E/ZINC/COPPER (PRESERVISION AREDS PO) Take 1 Tab by mouth two (2) times a day. polyethylene glycol (MIRALAX) 17 gram/dose powder Take 17 g by mouth daily.  1 tablespoon with 8 oz of water daily  Qty: 119 g, Refills: 0         STOP taking these medications       VENTOLIN HFA 90 mcg/actuation inhaler Comments:   Reason for Stopping:         lidocaine 4 % patch Comments:   Reason for Stopping:         Wheel Chair kalyn Comments:   Reason for Stopping:         OTHER Comments:   Reason for Stopping:               Procedures done this admission:  * No surgery found *    Consults this admission:  None    Echocardiogram/EKG results:  Results from Hospital Encounter encounter on 03/04/22    ECHO ADULT COMPLETE    Interpretation Summary    Left Ventricle: Left ventricle size is normal. Normal wall thickness. Mild global hypokinesis present. Mildly reduced left ventricular systolic function with a visually estimated EF of 40 - 50%. EF by 2D Simpsons Biplane is 46%.   Right Ventricle: Right ventricle size is normal. Normal systolic function.   Aortic Valve: Mild transvalvular regurgitation.   Mitral Valve: Mildly thickened leaflet. Moderate annular calcification of the mitral valve. Mild transvalvular regurgitation.   Pericardium: No pericardial effusion.   IVC diameter is greater than 21 mm and decreases greater than 50% during inspiration; therefore the estimated right atrial pressure is intermediate (~8 mmHg). EKG Results     Procedure 720 Value Units Date/Time    EKG [[id number]]     Order Status: Completed           Diagnostic Imaging/Tests:   XR CHEST PORT    Result Date: 3/14/2022  PORTABLE CHEST ONE VIEW HISTORY: Shortness of breath COMPARISON: Chest radiograph 3/4/2022 and chest CT 3/4/2022 FINDINGS: Edema-like interstitial and airspace opacities are present throughout the lungs. Lung volumes are diminished. Asymmetric interstitial and airspace opacities particularly in the right upper lobe. This may be caused by pulmonary edema or infectious infiltrates.       All Micro Results     Procedure Component Value Units Date/Time    CULTURE, BLOOD [[id number]] Collected: 03/14/22 1744    Order Status: Completed Specimen: Blood Updated: 03/19/22 0641     Special Requests: --        NO SPECIAL REQUESTS  RIGHT  FOREARM       Culture result: NO GROWTH 5 DAYS       CULTURE, BLOOD [[id number]]     Order Status: Canceled Specimen: Blood           Labs: Results:       BMP, Mg, Phos No results for input(s): NA, K, CL, CO2, AGAP, BUN, CREA, CA, GLU, MG, PHOS in the last 72 hours. CBC No results for input(s): WBC, RBC, HGB, HCT, PLT, GRANS, LYMPH, EOS, MONOS, BASOS, IG, ANEU, ABL, DELL, ABM, ABB, AIG, HGBEXT, HCTEXT, PLTEXT, HGBEXT, HCTEXT, PLTEXT in the last 72 hours. LFT No results for input(s): ALT, TBIL, AP, TP, ALB, GLOB, AGRAT in the last 72 hours.     No lab exists for component: SGOT, GPT   Cardiac Testing Lab Results   Component Value Date/Time    BNP 38 09/10/2016 10:15 AM    BNP 54 04/19/2016 03:30 PM    BNP 22 03/01/2012 02:53 PM    CK 95 12/14/2021 07:31 PM    CK 64 08/21/2018 09:25 PM    CK 38 07/16/2010 04:05 PM    CK - MB 0.7 07/16/2010 04:05 PM    CK-MB Index 1.8 07/16/2010 04:05 PM    Troponin-I <0.05 03/01/2012 02:53 PM    Troponin-I, Qt. <0.02 (L) 08/06/2019 06:25 PM    Troponin-I, Qt. <0.02 (L) 08/21/2018 03:09 PM    Troponin-I, Qt. <0.04 (L) 07/17/2010 06:52 AM      Coagulation Tests Lab Results   Component Value Date/Time    Prothrombin time 12.6 01/23/2022 03:00 PM    Prothrombin time 13.1 07/27/2018 08:53 PM    Prothrombin time 10.4 03/01/2012 02:08 PM    INR 0.9 01/23/2022 03:00 PM    INR 1.0 07/27/2018 08:53 PM    INR 1.0 03/01/2012 02:08 PM    aPTT 34.6 03/04/2022 08:20 PM    aPTT 27.1 07/27/2018 08:53 PM    aPTT 27.3 07/12/2014 02:30 PM      A1c Lab Results   Component Value Date/Time    Hemoglobin A1c 8.9 (H) 01/23/2022 03:00 PM    Hemoglobin A1c 8.3 (H) 11/30/2021 03:41 PM    Hemoglobin A1c 6.1 (H) 08/31/2021 03:26 PM      Lipid Panel Lab Results   Component Value Date/Time    Cholesterol, total 153 11/30/2021 03:41 PM    HDL Cholesterol 53 11/30/2021 03:41 PM    LDL, calculated 70 11/30/2021 03:41 PM    LDL, calculated 72 10/07/2019 10:16 AM    VLDL, calculated 30 11/30/2021 03:41 PM    VLDL, calculated 15 10/07/2019 10:16 AM    Triglyceride 179 (H) 11/30/2021 03:41 PM    CHOL/HDL Ratio 2.8 02/22/2017 09:54 AM      Thyroid Panel Lab Results   Component Value Date/Time    TSH 1.080 11/30/2021 03:41 PM    TSH 1.960 07/08/2019 09:55 AM    TSH 0.888 08/22/2018 06:01 AM        Most Recent UA Lab Results   Component Value Date/Time    Color YELLOW 03/14/2022 09:59 PM    Appearance CLEAR 03/14/2022 09:59 PM    Specific gravity 1.035 (H) 02/22/2020 05:19 PM    pH (UA) 6.0 03/14/2022 09:59 PM    Protein 300 (A) 03/14/2022 09:59 PM    Glucose 100 03/14/2022 09:59 PM    Ketone Negative 03/14/2022 09:59 PM    Bilirubin Negative 03/14/2022 09:59 PM    Blood Negative 03/14/2022 09:59 PM    Urobilinogen 0.2 03/14/2022 09:59 PM    Nitrites Negative 03/14/2022 09:59 PM    Leukocyte Esterase Negative 03/14/2022 09:59 PM    WBC 0-3 03/14/2022 09:59 PM    RBC 0-3 03/14/2022 09:59 PM    Epithelial cells 0-3 03/14/2022 09:59 PM    Bacteria 0 03/14/2022 09:59 PM    Casts 3-5 03/14/2022 09:59 PM    Crystals, urine 0 02/21/2010 01:15 PM    Mucus 0 12/14/2021 10:08 PM          All Labs from Last 24 Hrs:  Recent Results (from the past 24 hour(s))   GLUCOSE, POC    Collection Time: 03/22/22  4:28 PM   Result Value Ref Range    Glucose (POC) 145 (H) 65 - 100 mg/dL    Performed by Laxmi    GLUCOSE, POC    Collection Time: 03/22/22  8:34 PM   Result Value Ref Range    Glucose (POC) 182 (H) 65 - 100 mg/dL    Performed by Kelechi    GLUCOSE, POC    Collection Time: 03/23/22  7:51 AM   Result Value Ref Range    Glucose (POC) 173 (H) 65 - 100 mg/dL    Performed by [address] [place], [place] [address]vard, POC    Collection Time: 03/23/22 10:56 AM   Result Value Ref Range    Glucose (POC) 230 (H) 65 - 100 mg/dL    Performed by PASQU[facility]        Current Med List in Hospital:   Current Facility-Administered Medications   Medication Dose Route Frequency    metoprolol tartrate (LOPRESSOR) tablet 25 mg  25 mg Oral BID    docusate sodium (COLACE) capsule 100 mg  100 mg Oral DAILY    insulin lispro (HUMALOG) injection 3 Units  3 Units SubCUTAneous TIDAC    sodium chloride (NS) flush 5-10 mL  5-10 mL IntraVENous Q8H    sodium chloride (NS) flush 5-10 mL  5-10 mL IntraVENous PRN    sodium chloride (NS) flush 5-40 mL  5-40 mL IntraVENous Q8H    sodium chloride (NS) flush 5-40 mL  5-40 mL IntraVENous PRN    acetaminophen (TYLENOL) tablet 650 mg  650 mg Oral Q6H PRN    Or    acetaminophen (TYLENOL) suppository 650 mg  650 mg Rectal Q6H PRN    polyethylene glycol (MIRALAX) packet 17 g  17 g Oral DAILY PRN    ondansetron (ZOFRAN ODT) tablet 4 mg  4 mg Oral Q8H PRN    Or    ondansetron (ZOFRAN) injection 4 mg  4 mg IntraVENous Q6H PRN    pantoprazole (PROTONIX) tablet 40 mg  40 mg Oral ACB&D    alum-mag hydroxide-simeth (MYLANTA) oral suspension 15 mL  15 mL Oral Q6H PRN    glucose chewable tablet 16 g  16 g Oral PRN    glucagon (GLUCAGEN) injection 1 mg  1 mg IntraMUSCular PRN    dextrose 10% infusion 125-250 mL  125-250 mL IntraVENous PRN    insulin lispro (HUMALOG) injection   SubCUTAneous AC&HS    arformoteroL (BROVANA) neb solution 15 mcg  15 mcg Nebulization BID RT    budesonide (PULMICORT) 500 mcg/2 ml nebulizer suspension  500 mcg Nebulization BID RT    albuterol-ipratropium (DUO-NEB) 2.5 MG-0.5 MG/3 ML  3 mL Nebulization Q4H PRN    gabapentin (NEURONTIN) capsule 100 mg  100 mg Oral TID    sertraline (ZOLOFT) tablet 100 mg  100 mg Oral DAILY    ferrous sulfate tablet 325 mg  1 Tablet Oral DAILY WITH BREAKFAST    apixaban (ELIQUIS) tablet 5 mg  5 mg Oral Q12H       No Known Allergies  Immunization History   Administered Date(s) Administered    (RETIRED) Pneumococcal Vaccine (Unspecified Type) 01/01/2006    Influenza High Dose Vaccine PF 09/14/2016, 10/25/2017, 12/13/2018    Influenza Vaccine 11/05/2013, 09/16/2014, 10/19/2015    Influenza Vaccine (Tri) Adjuvanted (>65 Yrs FLUAD TRI [id number]) 10/14/2019  Influenza Vaccine PF 09/22/2015    Influenza Vaccine Whole 01/01/2011    Influenza, Quadrivalent, Adjuvanted (>65 Yrs FLUAD QUAD [id number]) 12/30/2020, 11/30/2021    Pneumococcal Conjugate (PCV-13) 07/14/2015    Pneumococcal Vaccine (Unspecified Type) 01/01/2006    TB Skin Test (PPD) Intradermal 07/31/2018, 08/21/2018, 12/15/2021, 01/23/2022, 03/06/2022, 03/15/2022    Tdap 11/04/2014       Recent Vital Data:  Patient Vitals for the past 24 hrs:   Temp Pulse Resp BP SpO2   03/23/22 1059 97.8 °F (36.6 °C) 73 19 (!) 160/58 94 %   03/23/22 0818     96 %   03/23/22 0754 97.8 °F (36.6 °C) 69 19 (!) 168/64 92 %   03/23/22 0428  67  111/67    03/23/22 0309 97.7 °F (36.5 °C) 71 18 (!) 162/81 99 %   03/22/22 2312 98.5 °F (36.9 °C) 72 16 (!) 144/55 90 %   03/22/22 2123     97 %   03/22/22 1957 98.9 °F (37.2 °C) 65 13 132/69 94 %   03/22/22 1516 98.2 °F (36.8 °C) 64 18 139/60 95 %     Oxygen Therapy  O2 Sat (%): 94 % (03/23/22 1059)  Pulse via Oximetry: 86 beats per minute (03/23/22 0818)  O2 Device: Nasal cannula (03/23/22 0818)  Skin Assessment: Clean, dry, & intact (03/22/22 2123)  Skin Protection for O2 Device: N/A (03/18/22 1628)  O2 Flow Rate (L/min): 1 l/min (03/23/22 0818)  FIO2 (%): 24 % (03/22/22 2123)    Estimated body mass index is 26.61 kg/m² as calculated from the following:    Height as of this encounter: 5' 2\" (1.575 m). Weight as of this encounter: 66 kg (145 lb 8.1 oz). Intake/Output Summary (Last 24 hours) at 3/23/2022 1317  Last data filed at 3/22/2022 1829  Gross per 24 hour   Intake 236 ml   Output    Net 236 ml         Physical Exam:    General:    Frail, pleasant cooperative  Head:  Normocephalic, atraumatic  Eyes:  Sclerae appear normal.  Pupils equally round. HENT:  Nares appear normal, no drainage. Moist mucous membranes  Neck:  No restricted ROM. Trachea midline  CV:   Rate controlled no tachycardia obvious. No new m/r/g. No JVD  Lungs:   CTAB.   No wheezing, rhonchi, or rales.  Even, unlabored  Abdomen:   Soft, nontender, nondistended. Extremities: Warm and dry. Moves all extremities. Skin:     No rashes. Normal coloration  Neuro:  CN II-XII grossly intact. No tremor  Psych:  Normal mood and affect. Signed:  Maricel Escobar DO    Part of this note may have been written by using a voice dictation software. The note has been proof read but may still contain some grammatical/other typographical errors.

## 2022-03-23 NOTE — PROGRESS NOTES
ACUTE PHYSICAL THERAPY GOALS:  (Developed with and agreed upon by patient and/or caregiver. )  LTG:  (1.)Ms. Charles Bains will move from supine to sit and sit to supine  in bed with MODIFIED INDEPENDENCE within 7 treatment day(s). (2.)Ms. Charles Bains will transfer from bed to chair and chair to bed with SUPERVISION using the least restrictive device within 7 treatment day(s). (3.)Ms. Charles Bains will ambulate with SUPERVISION for 100 feet with the least restrictive device within 7 treatment day(s). (4.)Ms. Charles Bains will perform standing static and dynamic balance activities x 23 minutes with SUPERVISION to improve safety within 7 treatment day(s). (5.)Ms. Charles Bains will ambulate and/or perform functional activities for  23 consecutive minutes with stable vital signs and no rests required to improve activity tolerance within 7 treatment days. ________________________________________________________________________________________________    PHYSICAL THERAPY: Daily Note and AM Treatment Day # 5    Aide Bains is a 80 y.o. female   PRIMARY DIAGNOSIS: Functional quadriplegia ([address]  Functional quadriplegia ([facility]) [R53.2]         ASSESSMENT:     REHAB RECOMMENDATIONS: CURRENT LEVEL OF FUNCTION:  (Most Recently Demonstrated)   Recommendation to date pending progress:  Setting:   Short-term Rehab  Equipment:    To Be Determined Bed Mobility:   Standby Assistance  Sit to Stand:  [employer] Assistance  Transfers:   Minimal Assistance  Gait/Mobility:   Minimal Assistance     ASSESSMENT:  Ms. Charles Bains is making slow progress toward goals with increased gait distances and improved mobility. She performed STS and ambulation with RW in the room. Min A due to decreased safety awareness and additional cueing for maintaining focus on tasks.        SUBJECTIVE:   Ms. Charles Bains states, \"I'll guess I might need to go\"    SOCIAL HISTORY/ LIVING ENVIRONMENT: See eval  Home Environment: Private residence  # Steps to Enter: 5 (2 steps on back, 4-5 in front)  One/Two Story Residence: One story  Living Alone: No  Support Systems: Child(edward)  OBJECTIVE:     PAIN: VITAL SIGNS: LINES/DRAINS:   Pre Treatment: Pain Screen  Pain Scale 1: Numeric (0 - 10)  Pain Intensity 1: 0  Post Treatment: 0   None  O2 Device: Nasal cannula     MOBILITY: I Mod I S SBA CGA Min Mod Max Total  NT x2 Comments:   Bed Mobility    Rolling [] [] [] [] [] [] [] [] [] [x] []    Supine to Sit [] [] [] [x] [] [] [] [] [] [] []    Scooting [] [] [] [x] [] [] [] [] [] [] []    Sit to Supine [] [] [] [] [] [] [] [] [] [x] []    Transfers    Sit to Stand [] [] [] [] [x] [x] [] [] [] [] []    Bed to Chair [] [] [] [] [] [x] [] [] [] [] []    Stand to Sit [] [] [] [] [x] [x] [] [] [] [] []    I=Independent, Mod I=Modified Independent, S=Supervision, SBA=Standby Assistance, CGA=Contact Guard Assistance,   Min=Minimal Assistance, Mod=Moderate Assistance, Max=Maximal Assistance, Total=Total Assistance, NT=Not Tested    BALANCE: Good Fair+ Fair Fair- Poor NT Comments   Sitting Static [] [x] [x] [] [] []    Sitting Dynamic [] [] [x] [] [] []              Standing Static [] [] [x] [x] [] []    Standing Dynamic [] [] [] [x] [] []      GAIT: I Mod I S SBA CGA Min Mod Max Total  NT x2 Comments:   Level of Assistance [] [] [] [] [] [x] [] [] [] [] []    Distance  25'    DME Rolling Walker    Gait Quality Short step length, unsteady    Weightbearing  Status N/A     I=Independent, Mod I=Modified Independent, S=Supervision, SBA=Standby Assistance, CGA=Contact Guard Assistance,   Min=Minimal Assistance, Mod=Moderate Assistance, Max=Maximal Assistance, Total=Total Assistance, NT=Not Tested    PLAN:   FREQUENCY/DURATION: PT Plan of Care: 3 times/week for duration of hospital stay or until stated goals are met, whichever comes first.  TREATMENT:     TREATMENT:   ($$ Therapeutic Activity: 23-37 mins    )  Therapeutic Activity (24 Minutes):  Therapeutic activity included Supine to Sit, Scooting, Transfer Training, Ambulation on level ground, Sitting balance  and Standing balance to improve functional Mobility, Strength and Activity tolerance.     TREATMENT GRID:   Date:  3/16/ Date:   Date:     Activity/Exercise Parameters Parameters Parameters   Ankle pumps 2x20     LAQ 2x20                                         AFTER TREATMENT POSITION/PRECAUTIONS:  Chair, Needs within reach and RN notified    INTERDISCIPLINARY COLLABORATION:  RN/PCT and PT/PTA    TOTAL TREATMENT DURATION:  PT Patient Time In/Time Out  Time In: 1110  Time Out: [address] [facility], PTA

## 2022-03-23 NOTE — PROGRESS NOTES
Pt sleeping with respirations present, even and unlabored on 1L NC. No apparent distress noted. Call light in reach. Preparing report to oncoming RN.

## 2022-03-23 NOTE — DIABETES MGMT
Patient blood glucose ranged 104-214 yesterday with patient receiving Humalog 15 units. Blood glucose this morning was 173. Reviewed patient current regimen: Humalog 3 units with meals and Humalog correctional insulin. Patient has diet with 4 carb choices ordered with diabetic supplement at breakfast and lunch. Patient is 80years old. Given patient age and comorbidities, glycemic control overall stable on current regimen per ADA recommendations. Per chart review patient pending discharge to [facility] when insurance approves. Will follow along loosely.

## 2022-03-23 NOTE — PROGRESS NOTES
MSN, cM:  Patient to be discharged today to [facility] for rehab services. Patient and family agree with this discharge plan. Patient has met all milestones for this admission. Attempted to speak with Jill Burkitt and informed her of transfer today. VM left. [facility] to transport patient to facility. Care Management Interventions  PCP Verified by CM:  Yes Gabriela Cannon NP)  Mode of Transport at Discharge: BLS ([address] [facility])  Transition of Care Consult (CM Consult): SNF  Partner SNF: No  Reason Why Partner SNF Not Chosen: Location  Discharge Durable Medical Equipment: No  Physical Therapy Consult: Yes  Occupational Therapy Consult: Yes  Speech Therapy Consult: No  Support Systems: Child(edward)  Confirm Follow Up Transport: Family  The Plan for Transition of Care is Related to the Following Treatment Goals : Short term rehab to regain strenght back to baseline  The Patient and/or Patient Representative was Provided with a Choice of Provider and Agrees with the Discharge Plan?: Yes  Name of the Patient Representative Who was Provided with a Choice of Provider and Agrees with the Discharge Plan: Mrs. Efrain Sanchez (patient) and family  Freedom of Choice List was Provided with Basic Dialogue that Supports the Patient's Individualized Plan of Care/Goals, Treatment Preferences and Shares the Quality Data Associated with the Providers?: Yes  Discharge Location  Patient Expects to be Discharged to[de-identified] Skilled nursing facility

## 2022-03-23 NOTE — PROGRESS NOTES
Problem: Pressure Injury - Risk of  Goal: *Prevention of pressure injury  Description: Document Carlos Scale and appropriate interventions in the flowsheet. Outcome: Resolved/Met     Problem: Patient Education: Go to Patient Education Activity  Goal: Patient/Family Education  Outcome: Resolved/Met     Problem: Falls - Risk of  Goal: *Absence of Falls  Description: Document Torito Fall Risk and appropriate interventions in the flowsheet.   3/23/2022 1522 by Kitty De La Rosa  Outcome: Resolved/Met  3/23/2022 0833 by Kitty De La Rosa  Outcome: Progressing Towards Goal  Note: Fall Risk Interventions:  Mobility Interventions: Communicate number of staff needed for ambulation/transfer         Medication Interventions: Evaluate medications/consider consulting pharmacy    Elimination Interventions: Call light in reach,Patient to call for help with toileting needs,Toileting schedule/hourly rounds    History of Falls Interventions: Evaluate medications/consider consulting pharmacy         Problem: Patient Education: Go to Patient Education Activity  Goal: Patient/Family Education  Outcome: Resolved/Met     Problem: Patient Education: Go to Patient Education Activity  Goal: Patient/Family Education  Outcome: Resolved/Met     Problem: Patient Education: Go to Patient Education Activity  Goal: Patient/Family Education  Outcome: Resolved/Met

## 2022-03-24 PROBLEM — N18.31 STAGE 3A CHRONIC KIDNEY DISEASE (HCC): Status: ACTIVE | Noted: 2019-04-18

## 2022-03-24 PROBLEM — D64.9 NORMOCYTIC ANEMIA: Status: ACTIVE | Noted: 2022-01-01

## 2022-04-06 NOTE — ED PROVIDER NOTES
Per nurses notes: \"[facility] via EMS. N/V/D since last evening. No blood noted. 2 L 02 at baseline. Received Phenergan and Lamictal at facility\"    The history is provided by the patient, a relative and the EMS personnel. Vomiting   This is a new problem. The current episode started 12 to 24 hours ago. The problem occurs 2 to 4 times per day. The problem has not changed since onset. The emesis has an appearance of stomach contents and clear. There has been no fever. Associated symptoms include diarrhea. Pertinent negatives include no chills, no fever, no sweats, no abdominal pain, no headaches, no arthralgias, no myalgias, no cough, no URI and no headaches. Her pertinent negatives include no irritable bowel syndrome, no inflammatory bowel disease, no short gut syndrome, no bowel resection, no recent abdominal surgery, no malabsorption, no gastric bypass and no DM.         Past Medical History:   Diagnosis Date    Acute posthemorrhagic anemia 02/03/2022    per [facility]  Post Acute faxed information    Anemia in chronic kidney disease     per [facility]  Post Acute faxed information    Anxiety 2/1/2018    Arrhythmia     palpitations 2/10-went to [facility]    Arthritis     L leg- fell 2008    Asthma     adult onset x 20 yrs    B12 deficiency 8/24/2012    Body mass index 37.0-37.9, adult 2/5/2014    CAD (coronary artery disease)     cardiac work [id number]    Chronic kidney disease, stage 3 unspecified ([facility] 12/24/2021    per [facility] Acute faxed information    Cognitive communication deficit     per [facility]  Post Acute faxed information     Controlled type 2 diabetes mellitus with microalbuminuria, without long-term current use of insulin ([address] 11/22/2016    COPD     Corns and callosities 12/19/2016    CRI (chronic renal insufficiency) 8/24/2012    Depression 6/1/2012    Dermatophytosis of nail 12/19/2016    Diabetes ([address]     type II- home tests fasting-112    DJD (degenerative joint disease) of hip     DM (diabetes mellitus) type II controlled with renal manifestation ([address] 7/16/2010    Encounter for long-term (current) use of other medications 1/8/2013    Essential hypertension 7/16/2010    Gastrointestinal disorder     Gastrointestinal hemorrhage     per dx sheet from [facility] Acute - date sent 3/2/2022     GERD (gastroesophageal reflux disease)     without esophagitis; per [facility] Post Acute faxed information    GI bleed 7/28/2018    Heart failure ([address]     Hiatal hernia 8/24/2012    High cholesterol     History of COVID-19 02/03/2022    per medical hx information from [facility] History of falling 12/24/2021    per [facility] Post Acute faxed information    HLD (hyperlipidemia) 1/8/2013    Klebsiella pneumoniae (k. pneumoniae) as the cause of diseases classified elsewhere 12/24/2021    per [facility] information     Moderate protein-calorie malnutrition ([facility] [address] [address] 12/24/2021    per [facility] Acute faxed information    Muscle weakness (generalized)     per [facility] Post Acute faxed information    Neuropathy 8/24/2012    Lower limbs     Obesity (BMI 30.0-39. 9) BMI-43.8    Other and unspecified hyperlipidemia 7/16/2010    Other chest pain 7/16/2010    Other lack of coordination     per [facility] Post Acute faxed information    Oxygen dependent     4 L/NC    PAD (peripheral artery disease) ([address] 8/24/2012    PVD (peripheral vascular disease) ([facility] [address]     per [facility] Post Acute faxed information    Retinal hemorrhage     Type 2 diabetes mellitus with diabetic neuropathy ([address]     Unspecified asthma, uncomplicated [id number]    per [facility] Post Acute faxed information    Urinary incontinence     Urinary tract infection        Past Surgical History:   Procedure Laterality Date    FLEXIBLE SIGMOIDOSCOPY N/A 1/24/2022    SIGMOIDOSCOPY FLEXIBLE COVID POSITIVE PREP/RECOVER IN [place] Admit to room 503 performed by [facility] ENDOSCOPY    HX APPENDECTOMY      HX CHOLECYSTECTOMY  2000    HX ENDOSCOPY  02/27/2018    Dr Dot Shepherd HX GI      small intestine obstruction    HX GYN      hyst    HX HEART CATHETERIZATION      HX HYSTERECTOMY      prolapse    HX INTRACRANIAL ANEURYSM REPAIR      HX ORTHOPAEDIC      right wrist 2010    HX OTHER SURGICAL      aneurysm clip-cerebral-2000    HX OTHER SURGICAL      cerebral aneurysm   Dr. Susana Hansen UNLISTED           Family History:   Problem Relation Age of Onset   Taylor Cancer Mother     Diabetes Father     Diabetes Paternal Grandfather        Social History     Socioeconomic History    Marital status: SINGLE     Spouse name: Not on file    Number of children: Not on file    Years of education: Not on file    Highest education level: Not on file   Occupational History    Not on file   Tobacco Use    Smoking status: Never Smoker    Smokeless tobacco: Never Used   Vaping Use    Vaping Use: Never used   Substance and Sexual Activity    Alcohol use: No    Drug use: No    Sexual activity: Never   Other Topics Concern    Not on file   Social History Narrative     with four children     Social Determinants of Health     Financial Resource Strain:     Difficulty of Paying Living Expenses: Not on file   Food Insecurity:     Worried About Running Out of Food in the Last Year: Not on file    Alistair of Food in the Last Year: Not on file   Transportation Needs:     Lack of Transportation (Medical): Not on file    Lack of Transportation (Non-Medical):  Not on file   Physical Activity:     Days of Exercise per Week: Not on file    Minutes of Exercise per Session: Not on file   Stress:     Feeling of Stress : Not on file   Social Connections:     Frequency of Communication with Friends and Family: Not on file    Frequency of Social Gatherings with Friends and Family: Not on file    Attends Jainism Services: Not on file   [employer]s or Organizations: Not on file    Attends Club or Organization Meetings: Not on file    Marital Status: Not on file   Intimate Partner Violence:     Fear of Current or Ex-Partner: Not on file    Emotionally Abused: Not on file    Physically Abused: Not on file    Sexually Abused: Not on file   Housing Stability:     Unable to Pay for Housing in the Last Year: Not on file    Number of [place] in the Last Year: Not on file    Unstable Housing in the Last Year: Not on file         ALLERGIES: Patient has no known allergies. Review of Systems   Constitutional: Negative for chills and fever. HENT: Negative for congestion. Respiratory: Negative for cough. Gastrointestinal: Positive for diarrhea, nausea and vomiting. Negative for abdominal pain, blood in stool and constipation. Genitourinary: Negative for dysuria and frequency. Musculoskeletal: Negative for arthralgias and myalgias. Neurological: Negative for headaches. All other systems reviewed and are negative. Vitals:    04/05/22 2010   BP: (!) 184/82   Pulse: 85   Resp: 15   Temp: 98.4 °F (36.9 °C)   SpO2: 96%            Physical Exam  Vitals and nursing note reviewed. Constitutional:       General: She is not in acute distress. Appearance: She is well-developed. HENT:      Head: Normocephalic and atraumatic. Right Ear: External ear normal.      Left Ear: External ear normal.   Eyes:      Extraocular Movements: Extraocular movements intact. Conjunctiva/sclera: Conjunctivae normal.      Pupils: Pupils are equal, round, and reactive to light. Cardiovascular:      Rate and Rhythm: Normal rate and regular rhythm. Heart sounds: Normal heart sounds. No murmur heard. Pulmonary:      Effort: Pulmonary effort is normal.      Breath sounds: Normal breath sounds. No wheezing, rhonchi or rales. Abdominal:      General: Bowel sounds are normal.      Palpations: Abdomen is soft. Tenderness:  There is no abdominal tenderness. There is no right CVA tenderness or left CVA tenderness. Musculoskeletal:         General: Normal range of motion. Cervical back: Normal range of motion and neck supple. Right lower leg: No edema. Left lower leg: No edema. Skin:     General: Skin is warm and dry. Capillary Refill: Capillary refill takes less than 2 seconds. Neurological:      General: No focal deficit present. Mental Status: She is alert and oriented to person, place, and time. Psychiatric:         Mood and Affect: Mood normal.         Behavior: Behavior normal.          MDM  Number of Diagnoses or Management Options  Diarrhea, unspecified type: new and requires workup  Nausea and vomiting, unspecified vomiting type: new and requires workup     Amount and/or Complexity of Data Reviewed  Clinical lab tests: reviewed and ordered  Obtain history from someone other than the patient: yes  Review and summarize past medical records: yes (Excerpt from the patient's latest discharge summary:  Hospitalist Discharge Summary  Admit Date:     3/14/2022  4:22 PM   DC Note date: 3/23/2022  Name:              Adrianna Callejas   Age:                 80 y.o. Sex:                 female  :               1929   MRN:               [id number]   Room:              [facility]  PCP:                Mathew Ott NP     Presenting Complaint: Irregular Heart Beat and Shortness of Breath     Initial Admission Diagnosis: Functional quadriplegia (HCC) (R53.2)      Problem List for this Hospitalization:  Hospital Problems as of 3/23/2022 Date Reviewed: 3/9/2022          Codes Class Noted - Resolved POA    Normocytic anemia ICD-10-CM: D64.9  ICD-9-CM: 360. 9   3/20/2022 - Present Unknown         * (Principal) Functional quadriplegia (HCC) ICD-10-CM: R53.2  ICD-9-CM: 780.72   3/14/2022 - Present Unknown         Moderate protein-calorie malnutrition ([facility] [address] [address] ICD-10-CM: E44.0  ICD-9-CM: 263.0   2021 - Present Yes         Iron deficiency anemia due to chronic blood loss ICD-10-CM: D50.0  ICD-9-CM: 280.0   5/18/2020 - Present Yes         Stage 3a chronic kidney disease ([facility] ICD-10-CM: N18.31  ICD-9-CM: 585. 3   4/18/2019 - Present Unknown         COPD (chronic obstructive pulmonary disease) (HCC) (Chronic) ICD-10-CM: J44.9  ICD-9-CM: 496   8/22/2018 - Present Yes         Type 2 diabetes mellitus with diabetic neuropathy (HCC) (Chronic) ICD-10-CM: E11.40  ICD-9-CM: 250.60, 357.2   5/16/2018 - Present Yes         Neuropathy ICD-10-CM: G62.9  ICD-9-CM: 356. 9   8/24/2012 - Present Yes    Overview Signed 8/24/2012 12:56 PM by Mirian Joyce        Lower limbs              PAD (peripheral artery disease) ([facility] ICD-10-CM: I73.9  ICD-9-CM: 443. 9   8/24/2012 - Present Yes         Essential hypertension (Chronic) ICD-10-CM: I10  ICD-9-CM: 541. 9   7/16/2010 - Present Yes         GERD (gastroesophageal reflux disease) (Chronic) ICD-10-CM: K21.9  ICD-9-CM: 530.81   7/16/2010 - Present Yes             Did Patient have Sepsis (YES OR NO): no     Hospital Course:  80-year-old female status post Covid in January of this year chronic hypoxemic respiratory failure secondary to COPD with 2 to 3 L nasal cannula chronic oxygen, stage III chronic kidney disease, pulmonary embolus January 2022 previous GI bleed. Recent admission hospital-acquired pneumonia atrial fibrillation and pulmonary embolus and was discharged on March 10 and short-term rehab recommended but patient refused and went home. Home health saw her 1 time and recommend she go to emergency room. She has functional quadriplegia-she will be discharged to skilled nursing facility for short-term rehab possible long-term placement pending clinical course.   Patient's functionality has improved during hospitalization by working with PT OT and would recommend continued PT OT and is being discharged to skilled nursing facility.        )    Risk of Complications, Morbidity, and/or Mortality  Presenting problems: moderate  Diagnostic procedures: minimal  Management options: moderate    Patient Progress  Patient progress: stable    ED Course as of 04/05/22 2338   Tue Apr 05, 2022 2335 Patient has had 4 large movements of watery diarrhea since arrival.  On review of the [facility], the patient was apparently started on Imodium and given a dose of Zofran 8 mg p.o. at the facility, contrary to the EMS report. Patient is receiving a fluid bolus of LR, and will receive a dose of Lomotil as C. difficile was negative. [BB]      ED Course User Index  [BB] Willow Cranker, MD       Procedures    The patient was observed in the ED. patient was hydrated with LR, and bowel movements significantly slowed prior to discharge. Urine does show evidence of UTI, and the patient be given a 3-day course of Macrobid. Results Reviewed:      Recent Results (from the past 24 hour(s))   CBC WITH AUTOMATED DIFF    Collection Time: 04/05/22  8:27 PM   Result Value Ref Range    WBC 5.6 4.3 - 11.1 K/uL    RBC 3.28 (L) 4.05 - 5.2 M/uL    HGB 9.5 (L) 11.7 - 15.4 g/dL    HCT 32.5 (L) 35.8 - 46.3 %    MCV 99.1 (H) 79.6 - 97.8 FL    MCH 29.0 26.1 - 32.9 PG    MCHC 29.2 (L) 31.4 - 35.0 g/dL    RDW 19.0 (H) 11.9 - 14.6 %    PLATELET 138 381 - 586 K/uL    MPV 9.9 9.4 - 12.3 FL    ABSOLUTE NRBC 0.00 0.0 - 0.2 K/uL    DF AUTOMATED      NEUTROPHILS 74 43 - 78 %    LYMPHOCYTES 17 13 - 44 %    MONOCYTES 8 4.0 - 12.0 %    EOSINOPHILS 0 (L) 0.5 - 7.8 %    BASOPHILS 0 0.0 - 2.0 %    IMMATURE GRANULOCYTES 1 0.0 - 5.0 %    ABS. NEUTROPHILS 4.2 1.7 - 8.2 K/UL    ABS. LYMPHOCYTES 1.0 0.5 - 4.6 K/UL    ABS. MONOCYTES 0.4 0.1 - 1.3 K/UL    ABS. EOSINOPHILS 0.0 0.0 - 0.8 K/UL    ABS. BASOPHILS 0.0 0.0 - 0.2 K/UL    ABS. IMM.  GRANS. 0.0 0.0 - 0.5 K/UL   METABOLIC PANEL, COMPREHENSIVE    Collection Time: 04/05/22  8:27 PM   Result Value Ref Range    Sodium 144 136 - 145 mmol/L    Potassium 3.8 3.5 - 5.1 mmol/L    Chloride 116 (H) 98 - 107 mmol/L    CO2 25 21 - 32 mmol/L    Anion gap 3 (L) 7 - 16 mmol/L    Glucose 165 (H) 65 - 100 mg/dL    BUN 31 (H) 8 - 23 MG/DL    Creatinine 1.10 (H) 0.6 - 1.0 MG/DL    GFR est AA 60 (L) >60 ml/min/1.73m2    GFR est non-AA 49 (L) >60 ml/min/1.73m2    Calcium 9.4 8.3 - 10.4 MG/DL    Bilirubin, total 0.3 0.2 - 1.1 MG/DL    ALT (SGPT) 32 12 - 65 U/L    AST (SGOT) 21 15 - 37 U/L    Alk. phosphatase 78 50 - 136 U/L    Protein, total 6.4 6.3 - 8.2 g/dL    Albumin 2.5 (L) 3.2 - 4.6 g/dL    Globulin 3.9 (H) 2.3 - 3.5 g/dL    A-G Ratio 0.6 (L) 1.2 - 3.5     MAGNESIUM    Collection Time: 04/05/22  8:27 PM   Result Value Ref Range    Magnesium 2.3 1.8 - 2.4 mg/dL   LIPASE    Collection Time: 04/05/22  8:27 PM   Result Value Ref Range    Lipase 56 (L) 73 - 393 U/L   C. DIFFICILE/EPI PCR    Collection Time: 04/05/22  9:39 PM    Specimen: Stool   Result Value Ref Range    Special Requests: NO SPECIAL REQUESTS      Culture result:        Toxigenic C. diff NEGATIVE/ [id number] PRESUMPTIVE NEGATIVE   URINE MICROSCOPIC    Collection Time: 04/05/22  9:57 PM   Result Value Ref Range    WBC 10-20 0 /hpf    RBC 3-5 0 /hpf    Epithelial cells 0-3 0 /hpf    Bacteria 4+ (H) 0 /hpf    Casts 0 0 /lpf    Crystals, urine 0 0 /LPF    Mucus 0 0 /lpf    Other observations RESULTS VERIFIED MANUALLY         I discussed the results of all labs, procedures, radiographs, and treatments with the patient and available family. Treatment plan is agreed upon and the patient is ready for discharge. All voiced understanding of the discharge plan and medication instructions or changes as appropriate. Questions about treatment in the ED were answered. All were encouraged to return should symptoms worsen or new problems develop.

## 2022-04-06 NOTE — ED TRIAGE NOTES
[facility] via EMS. N/V/D since last evening. No blood noted. 2 L 02 at baseline.  Received Phenergan and Lamotal at facility per EMS

## 2022-04-06 NOTE — PROGRESS NOTES
I have reviewed discharge instructions with the patient and caregiver. The patient and caregiver verbalized understanding. Patient left ED via Discharge Method: wheelchair to Home with daughter. Opportunity for questions and clarification provided. Patient given 1 scripts. To continue your aftercare when you leave the hospital, you may receive an automated call from our care team to check in on how you are doing. This is a free service and part of our promise to provide the best care and service to meet your aftercare needs.  If you have questions, or wish to unsubscribe from this service please call [phone]. Thank you for Choosing our [address] [place] [address] Emergency Department.

## 2022-04-06 NOTE — DISCHARGE INSTRUCTIONS
Continue with Imodium and Zofran as prescribed for diarrhea and nausea. Consider adding some cultured yogurt to your diet to replace of the bacterial loss by the diarrhea.

## 2022-04-07 NOTE — ED NOTES
I have reviewed discharge instructions with the patient and caregiver. The patient and caregiver verbalized understanding. Patient left ED via Discharge Method: stretcher to Home with [facility]. Opportunity for questions and clarification provided. Patient given 1 scripts. Daughter left before transportation arrived to take patient home. Daughter took discharge and prescription home with her. To continue your aftercare when you leave the hospital, you may receive an automated call from our care team to check in on how you are doing. This is a free service and part of our promise to provide the best care and service to meet your aftercare needs.  If you have questions, or wish to unsubscribe from this service please call [phone]. Thank you for Choosing our [facility] Emergency Department.

## 2022-04-07 NOTE — DISCHARGE INSTRUCTIONS
Continue to orally hydrate with clear liquids at home. Take your antibiotics as prescribed. Use over-the-counter Imodium as needed for diarrhea. Follow-up with family physician early next week. Return to the ER for worsening or worrisome symptoms.

## 2022-04-07 NOTE — ED PROVIDER NOTES
59-year-old female presents to the emergency department complaining of continued diarrhea. Denies nausea, vomiting or abdominal pain. Denies fever chills. States she had multiple episodes of diarrhea was worse yesterday and last night, slightly improved today. Denies blood or melena. Patient reports her daughter was tired of wiping her so she sent her to the ER. Patient was seen here in emergency department 2 days ago with similar complaints. Head normal work-up at that time. Patient also had a urinary tract infection at that time reports completing her Macrobid. Patient denies any other complaints, states she feels well again denies melena or hematochezia, dysuria or increased urinary frequency.            Past Medical History:   Diagnosis Date    Acute posthemorrhagic anemia 02/03/2022    per [facility] faxed information    Anemia in chronic kidney disease     per [facility] faxed information    Anxiety 2/1/2018    Arrhythmia     palpitations 2/10-went to ER[facility]    Arthritis     L leg- fell 2008    Asthma     adult onset x 20 yrs    B12 deficiency 8/24/2012    Body mass index 37.0-37.9, adult 2/5/2014    CAD (coronary artery disease)     cardiac work up-9/0909-neg-Holter    Chronic kidney disease, stage 3 unspecified ([address] 12/24/2021    per [facility] faxed information    Cognitive communication deficit     per [facility] faxed information     Controlled type 2 diabetes mellitus with microalbuminuria, without long-term current use of insulin ([address] 11/22/2016    COPD     Corns and callosities 12/19/2016    CRI (chronic renal insufficiency) 8/24/2012    Depression 6/1/2012    Dermatophytosis of nail 12/19/2016    Diabetes ([address]     type II- home tests fasting-112    DJD (degenerative joint disease) of hip     DM (diabetes mellitus) type II controlled with renal manifestation ([address] 7/16/2010    Encounter for long-term (current) use of other medications 1/8/2013    Essential hypertension 7/16/2010    Gastrointestinal disorder     Gastrointestinal hemorrhage     per dx sheet from [facility] - date sent 3/2/2022     GERD (gastroesophageal reflux disease)     without esophagitis; per [facility] faxed information    GI bleed 7/28/2018    Heart failure ([address]     Hiatal hernia 8/24/2012    High cholesterol     History of COVID-19 02/03/2022    per medical hx information from [facility] History of falling 12/24/2021    per [facility] faxed information    HLD (hyperlipidemia) 1/8/2013    Klebsiella pneumoniae (k. pneumoniae) as the cause of diseases classified elsewhere 12/24/2021    per [facility] information     Moderate protein-calorie malnutrition ([facility] 12/24/2021    per [facility] faxed information    Muscle weakness (generalized)     per [facility] faxed information    Neuropathy 8/24/2012    Lower limbs     Obesity (BMI 30.0-39. 9) BMI-43.8    Other and unspecified hyperlipidemia 7/16/2010    Other chest pain 7/16/2010    Other lack of coordination     per [facility] faxed information    Oxygen dependent     4 L/NC    PAD (peripheral artery disease) ([facility] 8/24/2012    PVD (peripheral vascular disease) ([facility]     per [facility] faxed information    Retinal hemorrhage     Type 2 diabetes mellitus with diabetic neuropathy ([address]     Unspecified asthma, uncomplicated 82/24/6196    per [facility] faxed information    Urinary incontinence     Urinary tract infection        Past Surgical History:   Procedure Laterality Date    FLEXIBLE SIGMOIDOSCOPY N/A 1/24/2022    SIGMOIDOSCOPY FLEXIBLE COVID POSITIVE PREP/RECOVER IN [place] Admit to room 503 performed by [facility]ksey Terrazas [facility] at [facility]OSCOPY    HX APPENDECTOMY      HX CHOLECYSTECTOMY  2000    HX ENDOSCOPY  02/27/2018    Dr Tali Li    HX GI      small intestine obstruction    HX GYN      hyst  HX HEART CATHETERIZATION      HX HYSTERECTOMY      prolapse    HX INTRACRANIAL ANEURYSM REPAIR      HX ORTHOPAEDIC      right wrist 2010    HX OTHER SURGICAL      aneurysm clip-cerebral-2000    HX OTHER SURGICAL      cerebral aneurysm   Dr. Brooke Willingham UNLISTED           Family History:   Problem Relation Age of Onset   Taylor Cancer Mother     Diabetes Father     Diabetes Paternal Grandfather        Social History     Socioeconomic History    Marital status: SINGLE     Spouse name: Not on file    Number of children: Not on file    Years of education: Not on file    Highest education level: Not on file   Occupational History    Not on file   Tobacco Use    Smoking status: Never Smoker    Smokeless tobacco: Never Used   Vaping Use    Vaping Use: Never used   Substance and Sexual Activity    Alcohol use: No    Drug use: No    Sexual activity: Never   Other Topics Concern    Not on file   Social History Narrative     with four children     Social Determinants of Health     Financial Resource Strain:     Difficulty of Paying Living Expenses: Not on file   Food Insecurity:     Worried About Running Out of Food in the Last Year: Not on file    Alistair of Food in the Last Year: Not on file   Transportation Needs:     Lack of Transportation (Medical): Not on file    Lack of Transportation (Non-Medical):  Not on file   Physical Activity:     Days of Exercise per Week: Not on file    Minutes of Exercise per Session: Not on file   Stress:     Feeling of Stress : Not on file   Social Connections:     Frequency of Communication with Friends and Family: Not on file    Frequency of Social Gatherings with Friends and Family: Not on file    Attends [employer]ist Services: Not on file    Active Member of Clubs or Organizations: Not on file    Attends Club or Organization Meetings: Not on file    Marital Status: Not on file   Intimate Partner Violence:     Fear of Current or Ex-Partner: Not on file    Emotionally Abused: Not on file    Physically Abused: Not on file    Sexually Abused: Not on file   Housing Stability:     Unable to Pay for Housing in the Last Year: Not on file    Number of Places Lived in the Last Year: Not on file    Unstable Housing in the Last Year: Not on file         ALLERGIES: Patient has no known allergies. Review of Systems   Constitutional: Negative for chills and fever. HENT: Negative for sinus pressure and sore throat. Eyes: Negative for visual disturbance. Respiratory: Negative for cough and shortness of breath. Cardiovascular: Negative for chest pain. Gastrointestinal: Positive for diarrhea. Negative for abdominal pain, nausea and vomiting. Endocrine: Negative for polyuria. Genitourinary: Negative for difficulty urinating and dysuria. Musculoskeletal: Negative for neck pain and neck stiffness. Skin: Negative for rash. Neurological: Negative for weakness and headaches. Psychiatric/Behavioral: Negative for confusion. All other systems reviewed and are negative. Vitals:    04/07/22 1243   BP: (!) 159/75   Pulse: 70   Resp: 16   Temp: 98.3 °F (36.8 °C)   SpO2: 96%   Weight: 66 kg (145 lb 8 oz)   Height: 5' 2\" (1.575 m)            Physical Exam  Vitals and nursing note reviewed. Constitutional:       Appearance: Normal appearance. She is not ill-appearing or toxic-appearing. HENT:      Head: Normocephalic and atraumatic. Nose: Nose normal.      Mouth/Throat:      Mouth: Mucous membranes are dry. Eyes:      Extraocular Movements: Extraocular movements intact. Cardiovascular:      Rate and Rhythm: Normal rate and regular rhythm. Pulses: Normal pulses. Heart sounds: Normal heart sounds. Pulmonary:      Effort: Pulmonary effort is normal. No respiratory distress. Breath sounds: Normal breath sounds. Abdominal:      General: Abdomen is flat. There is no distension.       Palpations: Abdomen is soft.      Tenderness: There is no abdominal tenderness. There is no guarding or rebound. Musculoskeletal:         General: Normal range of motion. Cervical back: Normal range of motion. No rigidity. Skin:     General: Skin is warm and dry. Neurological:      General: No focal deficit present. Mental Status: She is alert and oriented to person, place, and time. Psychiatric:         Mood and Affect: Mood normal.          MDM  Number of Diagnoses or Management Options  Diarrhea, unspecified type  Diagnosis management comments: 72-year-old female presents to the emergency department with continued diarrhea. Denies taking any medication for this prior to arrival.  Dry mucosal membranes, will give 1 bolus of 500 cc of IV fluid. Patient's prior urine cultures were reviewed, history of Klebsiella, antibiotics which patient was prescribed 3 days ago is not adequate to treat this. Will change to Keflex twice daily for 7 days. Will give first dose here in the ER. Lab work shows normal white count, stable H&H, mildly elevated sodium at 148, normal GFR, Normal electrolytes and liver function, normal lipase. X-ray shows no emergent findings. Patient will be given prescription for Keflex to take for 7 days. Daughter is at bedside and plan was discussed with her as well. They will follow-up with primary care physician early next week. Given strict return precautions. They voiced understanding and agreement with this plan.        Amount and/or Complexity of Data Reviewed  Clinical lab tests: ordered and reviewed  Tests in the radiology section of CPT®: reviewed and ordered  Decide to obtain previous medical records or to obtain history from someone other than the patient: yes  Review and summarize past medical records: yes    Risk of Complications, Morbidity, and/or Mortality  Presenting problems: moderate  Diagnostic procedures: moderate  Management options: moderate           Procedures

## 2022-04-07 NOTE — ED NOTES
Assumed care of pt. Pt awaiting [facility] for transport home.

## 2022-04-07 NOTE — ED TRIAGE NOTES
Brought in by [facility] from home, daughter called out for diarrhea since Tuesday. Recently on antibiotics for 3 weeks per daughter. Seen Tuesday in ER, was discharged home instead of returning to rehab. /70 - refuses meds x 2 days   T 97.9       97% on 3L , pt on continuous O2 at home.

## 2022-04-07 NOTE — PROGRESS NOTES
Chart review complete, MASON met with pt and daughter Osman De La Paz at bedside, per daughter pt was dc to [facility] and rehab on 3/23/2022 and was brought into the ED on 4/6/22 for n/d and did not want to return to [facility] for rehab and daughter took her home, per daughter she wanted to know if pt could go to another facility, made daughter aware this will not be possible at this time d/t pt is being dc home. She (daughter) is concerned that [facility] did not give any paperwork to them when she did not return and pt does not have some of the medications she was dc on from the hospital, states pt  Has followup with PCP but is a few days away and the daughter feels pt needs her medications. MASON spoke with Dr Gabo Fonseca and he has agreed to look back at pts medications from DC summary on 3/23/22 and give pt rx for her meds needed until she can be seen by PCP. Daughter updated on information and now is waiting on EMS transport for transport home. Transport setup by ED staff for transport at 730 pm tonight. No further dc needs requested or noted at this time.

## 2022-04-08 NOTE — ED NOTES
Pt incontinent of stool/ urine. Candace care completed and linens changed by this RN. Pt awaiting EMS transport home. Daughter at bedside.

## 2022-04-08 NOTE — PROGRESS NOTES
ED pharmacist reviewed recent results of urine culture. The patient was treated with cephalexin in the emergency department and received a prescription for cephalexin upon discharge. Based on culture results, the patient is being adequately treated for the identified infection with existing antimicrobial therapy. No further intervention needed. Allergies as of 04/05/2022    (No Known Allergies)     James EllisD.   Emergency Medicine Clinical Pharmacist

## 2022-04-08 NOTE — ED NOTES
Pt  Assisted into vehicle by this RN and family. Pt's family would like to transport pt home d/t wait times. Pt has home o2 in car.    Pt left with all belongings

## 2022-05-23 NOTE — ED NOTES
Systems   Constitutional: Negative for fever. HENT: Negative. Eyes: Negative. Respiratory: Negative for shortness of breath. Cardiovascular: Negative for chest pain. Gastrointestinal: Negative for abdominal pain, diarrhea and nausea. Musculoskeletal: Positive for arthralgias (left hip, left knee, low back), back pain, gait problem and joint swelling. Neurological: Negative for headaches. All other systems reviewed and are negative. All other systems reviewed and are negative.       Past Medical History:   Diagnosis Date    Acute posthemorrhagic anemia 02/03/2022    per [facility] faxed information    Anemia in chronic kidney disease     per [facility] faxed information    Anxiety 2/1/2018    Arrhythmia     palpitations 2/10-went to [facility]    Arthritis     L leg- fell 2008    Asthma     adult onset x 20 yrs    B12 deficiency 8/24/2012    Body mass index 37.0-37.9, adult 2/5/2014    CAD (coronary artery disease)     cardiac work up-9/0909-neg-Holter    Chronic kidney disease, stage 3 unspecified ([address] 12/24/2021    per [facility] faxed information    Cognitive communication deficit     per [facility] faxed information     Controlled type 2 diabetes mellitus with microalbuminuria, without long-term current use of insulin ([address] 11/22/2016    Corns and callosities 12/19/2016    CRI (chronic renal insufficiency) 8/24/2012    Depression 6/1/2012    Dermatophytosis of nail 12/19/2016    Diabetes ([address]     type II- home tests fasting-112    DJD (degenerative joint disease) of hip     DM (diabetes mellitus) type II controlled with renal manifestation ([address] 7/16/2010    Encounter for long-term (current) use of other medications 1/8/2013    Essential hypertension 7/16/2010    Gastrointestinal disorder     Gastrointestinal hemorrhage     per dx sheet from [address] - date sent 3/2/2022     GERD (gastroesophageal reflux disease)     without esophagitis; per [facility] faxed information    GI bleed 7/28/2018    Heart failure ([address]     Hiatal hernia 8/24/2012    High cholesterol     History of COVID-19 02/03/2022    per medical hx information from [address] History of falling 12/24/2021    per [facility] faxed information    HLD (hyperlipidemia) 1/8/2013    Klebsiella pneumoniae (k. pneumoniae) as the cause of diseases classified elsewhere 12/24/2021    per [facility] information     Moderate protein-calorie malnutrition ([address] 12/24/2021    per [facility] faxed information    Muscle weakness (generalized)     per [facility] faxed information    Neuropathy 8/24/2012    Lower limbs     Other and unspecified hyperlipidemia 7/16/2010    Other chest pain 7/16/2010    Other lack of coordination     per [facility] faxed information    Oxygen dependent     4 L/NC    PAD (peripheral artery disease) [address] [facility] 8/24/2012    PVD (peripheral vascular disease) ([address]     per [facility] faxed information    Retinal hemorrhage     Type 2 diabetes mellitus with diabetic neuropathy ([address]     Unspecified asthma, uncomplicated 45/29/9385    per [facility] faxed information    Urinary incontinence     Urinary tract infection         Past Surgical History:   Procedure Laterality Date    APPENDECTOMY      BRAIN ANEURYSM SURGERY      CARDIAC CATHETERIZATION      CHOLECYSTECTOMY  2000    FLEXIBLE SIGMOIDOSCOPY N/A 1/24/2022    SIGMOIDOSCOPY FLEXIBLE COVID POSITIVE PREP/RECOVER IN [place] Admit to room 503 performed by Mercedes Garcia MD at [facility] ENDOSCOPY    GI      small intestine obstruction    GYN      hyst    HYSTERECTOMY      prolapse    ORTHOPEDIC SURGERY      right wrist 2010    OTHER SURGICAL HISTORY      cerebral aneurysm   Dr. Rosa Maria Bowman OTHER SURGICAL HISTORY      aneurysm clip-cerebral-2000    [place] ABDOMEN SURGERY PROC UNLISTED      UPPER GASTROINTESTINAL ENDOSCOPY  02/27/2018    Dr Avi Wasserman        Family History   Problem Relation Age of Onset    Cancer Mother     Diabetes Father     Diabetes Paternal Grandfather            Social Connections:     Frequency of Communication with Friends and Family: Not on file    Frequency of Social Gatherings with Friends and Family: Not on file    Attends Anabaptism Services: Not on file    Active Member of Clubs or Organizations: Not on file    Attends Club or Organization Meetings: Not on file    Marital Status: Not on file        No Known Allergies     Vitals signs and nursing note reviewed. Patient Vitals for the past 4 hrs:   Temp Pulse Resp BP SpO2   05/22/22 2215 -- -- -- (!) 160/69 92 %   05/22/22 2200 -- -- -- (!) 153/66 92 %   05/22/22 2152 -- -- -- (!) 150/70 --   05/22/22 2049 98.3 °F (36.8 °C) 78 18 138/67 97 %          Physical Exam  Vitals and nursing note reviewed. Constitutional:       Appearance: She is not ill-appearing. HENT:      Mouth/Throat:      Mouth: Mucous membranes are moist.      Pharynx: Oropharynx is clear. Cardiovascular:      Rate and Rhythm: Normal rate. Pulmonary:      Effort: Pulmonary effort is normal.      Breath sounds: Normal breath sounds. Comments: Respirations even and unlabored  Abdominal:      General: Abdomen is flat. Palpations: Abdomen is soft. Musculoskeletal:      Comments: Patient has tenderness to her low back down into her sacrum. There are no step-offs or deformities. No redness, warmth, bruising. No significant tenderness or bruising over the left lateral hip. She is able to lift her leg off of the bed. Able to bend the knee without any obvious deformity. Symmetrical to the right. Full range of motion of the left ankle and left knee. Skin:     General: Skin is warm and dry.    Psychiatric:         Mood and Affect: Mood normal.          Procedures    Labs Reviewed   URINALYSIS WITH MICROSCOPIC - Abnormal; Notable for the following components:       Result Value    Protein,  (*)     Leukocyte Esterase, Urine TRACE (*)     BACTERIA, URINE 4+ (*)     All other components within normal limits   CULTURE, URINE        XR LUMBAR SPINE (2-3 VIEWS)   Final Result   1. Limited assessment due to patient osteopenia and chronic appearing changes. No acute osseous abnormality is clearly demonstrated of the thoracic or lumbar   spine by plain film. XR THORACIC SPINE (3 VIEWS)   Final Result   1. Limited assessment due to patient osteopenia and chronic appearing changes. No acute osseous abnormality is clearly demonstrated of the thoracic or lumbar   spine by plain film. XR HIP LEFT (2-3 VIEWS)   Final Result   1. No acute osseous abnormality of the left hip or knee evident by plain film   imaging. 2. Osteopenia. XR KNEE LEFT (3 VIEWS)   Final Result   1. No acute osseous abnormality of the left hip or knee evident by plain film   imaging. 2. Osteopenia. Acute     MDM  Wali Orellana is a 80 y.o. female who presents to the Emergency Department with chief complaint of hip pain, knee pain and back pain. X-rays ordered based on patient's exam findings and complaints. There are no acute fractures noted. Tylenol provided. Her urine does look concerning for acute cystitis. Will start on Keflex, first dose now. I spoke with daughter and son who both note patient has had a decrease in her mobility and has been falling more regularly. Since her fall on Thursday, she has been able to walk on her legs although painfully. She does use a walker to help with mobility and has physical therapy, and later this week. Will recommend continued symptomatic management with heating pad, ice, Tylenol and home stretches. Daughter is agreeable to this plan. Strict return precautions given. Safe for discharge at this time. No red flag back signs at the time of discharge.     [facility] EVERETTE Nobles       Voice dictation software was used during the making of this note. This software is not perfect and grammatical and other typographical errors may be present. This note has not been completely proofread for errors.        CRIS Saleh NP  05/23/22 5852

## 2022-05-23 NOTE — ED NOTES
I have reviewed discharge instructions with the patient. The patient verbalized understanding. Patient left ED via Discharge Method: stretcher to Home with [facility]. Opportunity for questions and clarification provided. Patient given 1 scripts. To continue your aftercare when you leave the hospital, you may receive an automated call from our care team to check in on how you are doing. This is a free service and part of our promise to provide the best care and service to meet your aftercare needs.  If you have questions, or wish to unsubscribe from this service please call [phone]. Thank you for Choosing our [facility] Emergency Department.       Jose Harrison RN  05/23/22 0028

## 2022-05-23 NOTE — ED TRIAGE NOTES
Pt presents to triage in wheelchair BIB EMS. Pt reports fall 48 hours ago and a  mobile care unit was supposed to  for tansport to hospital, but family states they did not. Pt reports left hip pain, knee pain, and a headache. Pt states she hit her head, denies taking blood thinners. Pt is hard of hearing.

## 2022-06-02 PROBLEM — E11.10 DKA, TYPE 2, NOT AT GOAL (HCC): Status: ACTIVE | Noted: 2022-01-01

## 2022-06-02 PROBLEM — J18.9 CAP (COMMUNITY ACQUIRED PNEUMONIA): Status: ACTIVE | Noted: 2022-01-01

## 2022-06-02 PROBLEM — E11.40 TYPE 2 DIABETES MELLITUS WITH DIABETIC NEUROPATHY (HCC): Status: ACTIVE | Noted: 2018-05-16

## 2022-06-02 PROBLEM — R29.6 FREQUENT FALLS: Status: ACTIVE | Noted: 2021-01-01

## 2022-06-02 PROBLEM — N17.9 ACUTE KIDNEY INJURY (HCC): Status: ACTIVE | Noted: 2022-01-01

## 2022-06-02 PROBLEM — N39.0 UTI (URINARY TRACT INFECTION): Status: ACTIVE | Noted: 2020-02-25

## 2022-06-02 PROBLEM — G93.41 METABOLIC ENCEPHALOPATHY: Status: ACTIVE | Noted: 2022-01-01

## 2022-06-02 NOTE — ED TRIAGE NOTES
Patient arrives to ED via EMS from home. Patient reports trip and fall. Patient complains of right hip pain. Patient denies hitting head. Denies LOC. Patient is on blood thinners.

## 2022-06-02 NOTE — ED PROVIDER NOTES
[employer] Emergency Department Provider Note                   PCP:                Corey Rivera, APRN - ALEJANDRINA               Age: 80 y.o. Sex: female     No diagnosis found. DISPOSITION         New Prescriptions    No medications on file       Orders Placed This Encounter   Procedures    XR KNEE LEFT (1-2 VIEWS)    CT HEAD WO CONTRAST    CT CERVICAL SPINE WO CONTRAST    XR HIP 2-3 VW W PELVIS RIGHT        MDM  Number of Diagnoses or Management Options  Acute cystitis without hematuria  AMPARO (acute kidney injury) ([facility]  Diabetic ketoacidosis without coma associated with other specified diabetes mellitus ([facility]  Hyperkalemia  Diagnosis management comments: Patient is a 77-year-old female presenting via EMS for the complaint of left knee pain and right hip pain after mechanical fall. She is alert and oriented to person only, unsure of baseline we will attempt to get in contact with her daughter. Will obtain x-rays of the left knee and right hip as well as CT head and cervical spine given her history of Xarelto and recurrent falls. 3:00 PM  Spoke with patient's daughter, Costa Norton, states that she has been acting more confused since last night and spent all night moaning/groaning and acting like she is confused. Per daughter, she gest frequent UTIs and this typically causes her confusion. She has been on keflex but daughter notes that she threw her pills in the trash and did not want to take them. Will obtain urinalysis/labs at this time. 5:30 PM  Labs show white count of 17 with left shift, suspect urinary source, will order lactic acid and blood cultures x2.    6:17 PM  CMP resulted and pt appears to be in DKA. Will obtain ABG, and start on insulin drip with plan for admission. 7:01 PM  Contacted Dr. Rosa Maria Burgess, hospitalist, who accepts pt for admission.           Amount and/or Complexity of Data Reviewed  Clinical lab tests: reviewed  Discussion of test results with the performing providers: yes  Decide to obtain previous medical records or to obtain history from someone other than the patient: yes  Obtain history from someone other than the patient: yes  Discuss the patient with other providers: yes         Loyda Mark is a 80 y.o. female who presents to the Emergency Department with chief complaint of    Chief Complaint   Patient presents with    Fall      Patient is a 80-year-old female with multiple comorbidities including CKD, PAD, diabetes who presents with complaint of trip and fall. She states that she was trying to get out of a chair when she tripped and fell on the right side hitting her right hip. She denies hitting her head or any loss of consciousness however she does take blood thinners daily. She is complaining of pain in her right hip and pain in the left knee. She rates her pain as a 7 out of 10. He lives with daughter and is ambulatory with walker at baseline. She was seen here a week ago following a trip and fall. The history is provided by the patient. All other systems reviewed and are negative. Review of Systems   Constitutional: Negative for activity change, appetite change, chills, fatigue and fever. HENT: Negative for congestion, ear pain, rhinorrhea and sore throat. Eyes: Negative for redness. Respiratory: Negative for cough, chest tightness and shortness of breath. Cardiovascular: Negative for chest pain. Gastrointestinal: Negative for abdominal distention, diarrhea, nausea and vomiting. Musculoskeletal: Positive for arthralgias. Negative for back pain and neck pain. Right hip pain  Left knee pain   Skin: Negative for rash. Neurological: Negative for light-headedness and headaches.        Past Medical History:   Diagnosis Date    Acute posthemorrhagic anemia 02/03/2022    per [facility] faxed information    Anemia in chronic kidney disease     per [facility] faxed information    Anxiety 2/1/2018    Arrhythmia palpitations 2/10-went to [facility]    Arthritis     L leg- fell 2008    Asthma     adult onset x 20 yrs    B12 deficiency 8/24/2012    Body mass index 37.0-37.9, adult 2/5/2014    CAD (coronary artery disease)     cardiac work [id number]    Chronic kidney disease, stage 3 unspecified ([address] 12/24/2021    per [facility] faxed information    Cognitive communication deficit     per [facility] faxed information     Controlled type 2 diabetes mellitus with microalbuminuria, without long-term current use of insulin ([address] 11/22/2016    Corns and callosities 12/19/2016    CRI (chronic renal insufficiency) 8/24/2012    Depression 6/1/2012    Dermatophytosis of nail 12/19/2016    Diabetes ([address]     type II- home tests fasting-112    DJD (degenerative joint disease) of hip     DM (diabetes mellitus) type II controlled with renal manifestation ([address] 7/16/2010    Encounter for long-term (current) use of other medications 1/8/2013    Essential hypertension 7/16/2010    Gastrointestinal disorder     Gastrointestinal hemorrhage     per dx sheet from [facility] - date sent 3/2/2022     GERD (gastroesophageal reflux disease)     without esophagitis; per [facility] faxed information    GI bleed 7/28/2018    Heart failure ([address]     Hiatal hernia 8/24/2012    High cholesterol     History of COVID-19 02/03/2022    per medical hx information from [facility] History of falling 12/24/2021    per [facility] faxed information    HLD (hyperlipidemia) 1/8/2013    Klebsiella pneumoniae (k. pneumoniae) as the cause of diseases classified elsewhere 12/24/2021    per [facility] information     Moderate protein-calorie malnutrition ([address] 12/24/2021    per [facility] faxed information    Muscle weakness (generalized)     per [facility] faxed information    Neuropathy 8/24/2012    Lower limbs     Other and unspecified hyperlipidemia 7/16/2010    Other chest pain 7/16/2010    Other lack of coordination     per [facility] faxed information    Oxygen dependent     4 L/NC    PAD (peripheral artery disease) ([facility] [address] 8/24/2012    PVD (peripheral vascular disease) ([facility] [facility]     per [facility] faxed information    Retinal hemorrhage     Type 2 diabetes mellitus with diabetic neuropathy ([facility]     Unspecified asthma, uncomplicated 11/82/9800    per [facility] faxed information    Urinary incontinence     Urinary tract infection         Past Surgical History:   Procedure Laterality Date    APPENDECTOMY      BRAIN ANEURYSM SURGERY      CARDIAC CATHETERIZATION      CHOLECYSTECTOMY  2000    FLEXIBLE SIGMOIDOSCOPY N/A 1/24/2022    SIGMOIDOSCOPY FLEXIBLE COVID POSITIVE PREP/RECOVER IN [place] Admit to room 503 performed by Kelly Samson MD at [facility] ENDOSCOPY    GI      small intestine obstruction    GYN      hyst    HYSTERECTOMY      prolapse    ORTHOPEDIC SURGERY      right wrist 2010    OTHER SURGICAL HISTORY      cerebral aneurysm   Dr. Mica Ferraro OTHER SURGICAL HISTORY      aneurysm clip-cerebral-2000    [place] ABDOMEN SURGERY PROC UNLISTED      UPPER GASTROINTESTINAL ENDOSCOPY  02/27/2018    Dr Mona Diehl        Family History   Problem Relation Age of Onset    Cancer Mother     Diabetes Father     Diabetes Paternal Grandfather            Social Connections:     Frequency of Communication with Friends and Family: Not on file    Frequency of Social Gatherings with Friends and Family: Not on file    Attends Mosque Services: Not on file    Active Member of Clubs or Organizations: Not on file    Attends Club or Organization Meetings: Not on file    Marital Status: Not on file        No Known Allergies     Vitals signs and nursing note reviewed.    Patient Vitals for the past 4 hrs:   Temp Pulse Resp BP SpO2   06/02/22 1212 99 °F (37.2 °C) 98 24 (!) 175/86 93 %          Physical Exam  Vitals and nursing note reviewed. Constitutional:       Comments: Thin frail appearance   HENT:      Head: Normocephalic and atraumatic. Mouth/Throat:      Mouth: Mucous membranes are dry. Cardiovascular:      Rate and Rhythm: Normal rate. Pulses: Normal pulses. Pulmonary:      Effort: Tachypnea present. Breath sounds: Normal breath sounds. Abdominal:      General: There is no distension. Palpations: Abdomen is soft. Tenderness: There is no abdominal tenderness. There is no guarding. Musculoskeletal:      Right hip: Tenderness and bony tenderness present. Left hip: No tenderness. Normal range of motion. Left knee: No swelling or deformity. Normal range of motion. Tenderness present. Comments: Pain with log roll maneuver of the right hip  No lower extremity edema, 2+ distal pulses ELIZABETH   Neurological:      Mental Status: She is alert. Comments: Oriented to person, unable to state where she is at or the date          Procedures    Labs Reviewed - No data to display     XR KNEE LEFT (1-2 VIEWS)    (Results Pending)   CT HEAD WO CONTRAST    (Results Pending)   CT CERVICAL SPINE WO CONTRAST    (Results Pending)   XR HIP 2-3 VW W PELVIS RIGHT    (Results Pending)            [place]ñito Coma Scale  Eye Opening: Spontaneous  Best Verbal Response: Confused  Best Motor Response: Obeys commands  [place] Coma Scale Score: 14                     Voice dictation software was used during the making of this note. This software is not perfect and grammatical and other typographical errors may be present. This note has not been completely proofread for errors.        Alyssa PRADO  06/02/22 [id number]

## 2022-06-03 PROBLEM — A41.9 SEPSIS (HCC): Status: ACTIVE | Noted: 2022-01-01

## 2022-06-03 PROBLEM — F41.9 ANXIETY: Status: ACTIVE | Noted: 2018-02-01

## 2022-06-03 NOTE — PROGRESS NOTES
Interdisciplinary team rounds were held 6/3/2022 with the following team members:Care Management, Nursing, Nurse Practitioner, Pastoral Care, Pharmacy, Physical Therapy, Physician, Respiratory Therapy and Clinical Coordinator and the patient. Plan of care discussed. See clinical pathway and/or care plan for interventions and desired outcomes.

## 2022-06-03 NOTE — PROGRESS NOTES
Physical Therapy Note:    Patient admitted s/p mechanical fall in the home resulting in R hip pain. No imaging yet of R hip. Will hold mobilization and assessment of patient until cleared by imaging. Will follow and re-attempt as schedule permits/patient available.  Thank you,     Helene Adair, PT, DPT     Rehab Caseload Tracker

## 2022-06-03 NOTE — INTERDISCIPLINARY ROUNDS
Multi-D Rounds/Checklist (leapfrog):  Lines: can any be removed?: Yes   DVT Prophylaxis: Yes   Vent: HOB elevated? N/A              CC/Kg?: N/A              Vent Day?: N/A   Nutrition Ordered/appropriate: Yes   Can antibiotics or other drugs be stopped?: N/A   Consults needed: No   A: Is pain control adequate? Yes   B: Sedation break and SBT? N/A   C: Is sedation choice appropriate? N/A   D: Delirium/CAM-ICU? No   E: Mobility goals/appropriateness? Yes   F: Family update and plan? [facility] is primary contact and is being updated daily by primary attending and nursing staff.     Bhavna Leiva, APRN - CNP

## 2022-06-03 NOTE — PROGRESS NOTES
Comprehensive Nutrition Assessment    Type and Reason for Visit: Initial,Positive Nutrition Screen  Malnutrition Screening Tool: Malnutrition Screen  Have you recently lost weight without trying?: 14 to 23 pounds (2 points)  Have you been eating poorly because of a decreased appetite?: Yes (1 point) (last 3 days eating less)  Malnutrition Screening Tool Score: 3    Nutrition Recommendations/Plan:   Meals and Snacks:  Diet: Continue current order Modify to easy to chew  Nutrition Supplement Therapy:   Medical food supplement therapy:  Initiate Ensure High Protein twice per day (this provides 160 kcal and 16 grams protein per bottle)     Malnutrition Assessment:  Malnutrition Status: Insufficient data    Nutrition Assessment:  Nutrition History: History provided by patient and son at bedside. Patient lives with daughter who prepares meals. She has partial dentures. She states 2 meals per day. Daughter on phone states that she tolerates softer foods such as eggs, mac and cheese, soft vegetables. She states she drinks 1-2 Ensure per day. Her son states that she loves turkey subs but has difficulty with the bread. Patient endorses significant wt loss stating she use to weight ~250# nine years ago. When RD reviewing that she has been ~150# for at least the past year she agrees. Do You Have Any Cultural, Restorationism, or Ethnic Food Preferences?: No   Nutrition Background:   Patient with PMH significant for CKD, PAD, DM2, dementia, frequent falls. She presented s/p fall and was found to be in DKA with AMPARO. Noted decline in PO for ~1 week PTA. Nutrition Interval:  Patient seen sitting in bed with son and RN, [facility], at bedside. RN reports difficulty due to difficulty chewing. History of softer foods as above. Discussed starting nutrition supplement and changing to easy to chew diet. - diet changes by RN.     Nutrition Related Findings:   No gm wasting      Current Nutrition Therapies:  ADULT DIET; Easy to Chew    Current Intake:   Average Meal Intake: 1-25% Average Supplements Intake: None Ordered      Anthropometric Measures:  Height: 5' 2\" (157.5 cm)  Current Body Wt: 146 lb 6.2 oz (66.4 kg) (6/3), Weight source: Bed Scale  BMI: 26.8  Admission Body Weight: 142 lb (64.4 kg)  Ideal Body Weight (Kg) (Calculated): 50 kg (110 lbs), 133.1 %  Usual Body Wt: 154 lb (69.9 kg) (6/22/21 office visit), Percent weight change: -4.9 (not significant wt loss over 1 year - likely related to acute decline in PO)       Edema: No data recorded  BMI Category Overweight (BMI 25.0-29. 9)  Estimated Daily Nutrient Needs:  Energy (kcal/day): 4757-8650 (Kcal/kg (20-25) Weight used: 66.4 kg Current  Protein (g/day): 66-80 (1-1.2 g/kg) Weight Used: (Current) 66.4 kg  Fluid (ml/day):   (1 ml/kcal)    Nutrition Diagnosis:   · Predicted inadequate energy intake related to biting/chewing (masticatory) difficulty as evidenced by  (reported barrier to PO, recall)    Nutrition Interventions:   Food and/or Nutrient Delivery: Modify Current Diet,Start Oral Nutrition Supplement     Coordination of Nutrition Care: Continue to monitor while inpatient       Goals:       Active Goal: Meet at least 75% of estimated needs,by next RD assessment       Nutrition Monitoring and Evaluation:      Food/Nutrient Intake Outcomes: Food and Nutrient Intake,Supplement Intake  Physical Signs/Symptoms Outcomes: Biochemical Data,Meal Time [facility]    Discharge Planning:    Continue Oral Nutrition Supplement    Ruperto Almaraz, [address] [place] [address], [id number], [address]

## 2022-06-03 NOTE — H&P
UTI (urinary tract infection): On antibiotics. Type 2 diabetes mellitus with diabetic neuropathy [facility]): Looks like patient is noncompliant with her medications. Currently on insulin drip, will transition to subcu insulin once anion gap and bicarb improves. Essential hypertension: We will continue home medications. History of pulmonary embolism: On anticoagulation, she is on 5 mg twice daily, appropriate dose would be 2.5 for her age and her creatinine clearance. I will decrease the dose. Dispo/Discharge Planning: Likely home. Diet: Diet NPO  VTE ppx: SCD on Eliquis  Code status: Full Code       Patient is critically ill. Without intervention, there is a high probability of acute organ impairment or life-threatening deterioration in the patient's condition from: DKA  Critical care interventions: Insulin drip, fluid resuscitation  Total critical care time spent: 40 minutes minutes. Time is indicative of direct patient attendance at bedside and on the patient's floor nearby. Includes time spent at bedside performing history and exam, performing chart review, discussing findings and treatment plan with patient and/or family, discussing patient with nursing staff, consultants and colleagues, and ordering/reviewing pertinent laboratory and radiographic evaluations. Time excludes procedures. CPT:  97428: First 30-74 minutes  40910: Each block of 30 min. beyond 76     Hospital Problems:  Principal Problem:    DKA, type 2, not at goal [facility])  Active Problems:    Acute kidney injury ([facility] [address]    Metabolic encephalopathy    CAP (community acquired pneumonia)    Frequent falls    UTI (urinary tract infection)    Type 2 diabetes mellitus with diabetic neuropathy ([facility] [address]    Essential hypertension  Resolved Problems:    * No resolved hospital problems.  *       Past History:     Past Medical History:   Diagnosis Date    Acute posthemorrhagic anemia 02/03/2022    per [facility] faxed information  Anemia in chronic kidney disease     per [facility] faxed information    Anxiety 2/1/2018    Arrhythmia     palpitations 2/10-went to [facility]    Arthritis     L leg- fell 2008    Asthma     adult onset x 20 yrs    B12 deficiency 8/24/2012    Body mass index 37.0-37.9, adult 2/5/2014    CAD (coronary artery disease)     cardiac work [id number]    Chronic kidney disease, stage 3 unspecified ([address] 12/24/2021    per [facility] faxed information    Cognitive communication deficit     per [facility] faxed information     Controlled type 2 diabetes mellitus with microalbuminuria, without long-term current use of insulin ([address] 11/22/2016    Corns and callosities 12/19/2016    CRI (chronic renal insufficiency) 8/24/2012    Depression 6/1/2012    Dermatophytosis of nail 12/19/2016    Diabetes ([address]     type II- home tests fasting-112    DJD (degenerative joint disease) of hip     DM (diabetes mellitus) type II controlled with renal manifestation ([address] 7/16/2010    Encounter for long-term (current) use of other medications 1/8/2013    Essential hypertension 7/16/2010    Gastrointestinal disorder     Gastrointestinal hemorrhage     per dx sheet from [address] - date sent 3/2/2022     GERD (gastroesophageal reflux disease)     without esophagitis; per [facility] faxed information    GI bleed 7/28/2018    Heart failure ([address]     Hiatal hernia 8/24/2012    High cholesterol     History of COVID-19 02/03/2022    [facility] History of falling 12/24/2021    per [facility] faxed information    HLD (hyperlipidemia) 1/8/2013    Klebsiella pneumoniae (k. pneumoniae) as the cause of diseases classified elsewhere 12/24/2021    [facility]     Moderate protein-calorie malnutrition ([address] 12/24/2021    per [facility] faxed information    Muscle weakness (generalized) per [facility] faxed information    Neuropathy 8/24/2012    Lower limbs     Other and unspecified hyperlipidemia 7/16/2010    Other chest pain 7/16/2010    Other lack of coordination     per [facility] faxed information    Oxygen dependent     4 L/NC    PAD (peripheral artery disease) ([facility] [address] 8/24/2012    PVD (peripheral vascular disease) ([facility] [address]     [facility]    Retinal hemorrhage     Type 2 diabetes mellitus with diabetic neuropathy ([facility] [address]     Unspecified asthma, uncomplicated 71/44/1221    per [facility] faxed information    Urinary incontinence     Urinary tract infection      Past Surgical History:   Procedure Laterality Date    APPENDECTOMY      BRAIN ANEURYSM SURGERY      CARDIAC CATHETERIZATION      CHOLECYSTECTOMY  2000    FLEXIBLE SIGMOIDOSCOPY N/A 1/24/2022    SIGMOIDOSCOPY FLEXIBLE COVID POSITIVE PREP/RECOVER IN [place] Admit to room 503 performed by Janay Younger MD [facility]COPY    GI      small intestine obstruction    GYN      hyst    HYSTERECTOMY      prolapse    ORTHOPEDIC SURGERY      right wrist 2010    OTHER SURGICAL HISTORY      cerebral aneurysm   Dr. Jennifer Pickens OTHER SURGICAL HISTORY      aneurysm clip-cerebral-2000    UT ABDOMEN SURGERY PROC UNLISTED      UPPER GASTROINTESTINAL ENDOSCOPY  02/27/2018    Dr Julius Landers      No Known Allergies   Social History     Tobacco Use    Smoking status: Never Smoker    Smokeless tobacco: Never Used   Substance Use Topics    Alcohol use: No      Family History   Problem Relation Age of Onset    Cancer Mother     Diabetes Father     Diabetes Paternal Grandfather       Family history reviewed and negative except as noted above.       Immunization History   Administered Date(s) Administered    Influenza Trivalent 11/05/2013, 09/16/2014    Influenza Virus Vaccine 01/01/2011, 10/19/2015    Influenza, High Dose (Fluzone 65 yrs and older) 09/14/2016, 10/25/2017, 12/13/2018    Influenza, Quadv, adjuvanted, 65 yrs +, IM, PF (Fluad) 12/30/2020, 11/30/2021    Influenza, Triv, inactivated, subunit, adjuvanted, IM (Fluad 65 yrs and older) 10/14/2019    Influenza, Trivalent Pf 09/22/2015    PPD Test 07/31/2018, 08/21/2018, 12/15/2021, 01/23/2022, 03/06/2022, 03/15/2022, 03/15/2022    Pneumococcal Conjugate 13-valent Lauren Marion) 07/14/2015    Pneumococcal Vaccine 01/01/2006, 01/01/2006    Tdap (Boostrix, Adacel) 11/04/2014     Prior to Admit Medications:  Current Outpatient Medications   Medication Instructions    acetaminophen (TYLENOL) 650 mg, Oral, EVERY 8 HOURS    amLODIPine (NORVASC) 10 mg, Oral, DAILY    apixaban (ELIQUIS) 5 mg, Oral, 2 TIMES DAILY    Arformoterol Tartrate (BROVANA) 15 mcg, Inhalation, 2 TIMES DAILY    budesonide (PULMICORT) 500 mcg, Inhalation, 2 TIMES DAILY    ferrous sulfate (IRON 325) 325 mg, Oral, DAILY WITH BREAKFAST    fluticasone-salmeterol (ADVAIR) 250-50 MCG/DOSE AEPB 1 puff, Inhalation, EVERY 12 HOURS    gabapentin (NEURONTIN) 100 mg, Oral, 3 TIMES DAILY    hydrALAZINE (APRESOLINE) 25 mg, Oral, 3 TIMES DAILY    insulin lispro (HUMALOG) 100 UNIT/ML injection vial INITIATE INSULIN CORRECTIVE PROTOCOL:Normal Insulin Sensitivity For Blood Sugar (mg/dL) of: Less than 150 = 0 units 150 -199 = 2 [id number] -249 = 4 yrens607 -299 = 6 [id number] -349 = 8 [id number] and above = 10 units and Call PhysicianInitiate Hypoglycemia protocol if blood glucose is <70 mg/dL Fast Acting - Administer Immediately - or within 15 minutes of start of meal, if mealtime coverage.  ipratropium-albuterol (DUONEB) 0.5-2.5 (3) MG/3ML SOLN nebulizer solution 3 mLs, Inhalation, EVERY 4 HOURS PRN    Lancets MISC Use as directed 3 times daily to check blood sugars.  Dx:E11.29    metFORMIN (GLUCOPHAGE) 500 mg, Oral, 2 TIMES DAILY WITH MEALS    metoprolol tartrate (LOPRESSOR) 12.5 mg, Oral, 2 TIMES DAILY    pantoprazole (PROTONIX) 40 mg, Oral, DAILY    polyethylene glycol (GLYCOLAX) 17 g, Oral, DAILY    pravastatin (PRAVACHOL) 40 MG tablet TAKE 1 TABLET BY MOUTH EVERY NIGHT AT BEDTIME    sertraline (ZOLOFT) 100 mg, Oral, DAILY         Objective:     Patient Vitals for the past 24 hrs:   Temp Pulse Resp BP SpO2   06/02/22 1749 99.2 °F (37.3 °C) -- 22 -- --   06/02/22 1745 -- -- -- (!) 118/107 --   06/02/22 1733 -- -- -- (!) 134/113 --   06/02/22 1601 -- -- -- (!) 182/86 --   06/02/22 1531 -- -- -- (!) 151/100 95 %   06/02/22 1501 -- -- -- (!) 167/82 (!) 89 %   06/02/22 1431 -- -- -- (!) 172/120 90 %   06/02/22 1401 -- -- -- (!) 159/76 (!) 88 %   06/02/22 1331 -- -- -- (!) 147/80 (!) 86 %   06/02/22 1301 -- -- -- (!) 146/82 (!) 85 %   06/02/22 1212 99 °F (37.2 °C) 98 24 (!) 175/86 93 %       Oxygen Therapy  SpO2: 95 %  O2 Device: None (Room air)  O2 Flow Rate (L/min): 2 L/min    Estimated body mass index is 25.97 kg/m² as calculated from the following:    Height as of this encounter: 5' 2\" (1.575 m). Weight as of this encounter: 142 lb (64.4 kg). Intake/Output Summary (Last 24 hours) at 6/2/2022 2008  Last data filed at 6/2/2022 1928  Gross per 24 hour   Intake 550 ml   Output --   Net 550 ml         Physical Exam:    Blood pressure (!) 118/107, pulse 98, temperature 99.2 °F (37.3 °C), temperature source Oral, resp. rate 22, height 5' 2\" (1.575 m), weight 142 lb (64.4 kg), SpO2 95 %. General:    Confused. Head:  Normocephalic, atraumatic  Eyes:  Sclerae appear normal.  Pupils equally round. ENT:  Dry mucous membrane  Neck:  No restricted ROM. Trachea midline   CV:   RRR. No m/r/g. No jugular venous distension. Lungs:   CTAB. No wheezing, rhonchi, or rales. Respirations even, unlabored  Abdomen: Bowel sounds present. Soft, nontender, nondistended. Extremities: No cyanosis or clubbing. No edema  Skin:     No rashes and normal coloration. Warm and dry.     Neuro:  Very confused    I have reviewed ordered lab tests and independently visualized imaging below:    Last 24hr Labs:  Recent Results (from the past 24 hour(s))   POCT Glucose    Collection Time: 06/02/22  3:38 PM   Result Value Ref Range    POC Glucose 474 (HH) 65 - 100 mg/dL    Performed by: David Moore    CBC with Auto Differential    Collection Time: 06/02/22  4:20 PM   Result Value Ref Range    WBC 17.4 (H) 4.3 - 11.1 K/uL    RBC 3.36 (L) 4.05 - 5.2 M/uL    Hemoglobin 9.9 (L) 11.7 - 15.4 g/dL    Hematocrit 34.0 (L) 35.8 - 46.3 %    .2 (H) 79.6 - 97.8 FL    MCH 29.5 26.1 - 32.9 PG    MCHC 29.1 (L) 31.4 - 35.0 g/dL    RDW 14.3 11.9 - 14.6 %    Platelets 216 486 - 368 K/uL    MPV 12.0 9.4 - 12.3 FL    nRBC 0.00 0.0 - 0.2 K/uL    Differential Type AUTOMATED      Seg Neutrophils 86 (H) 43 - 78 %    Lymphocytes 9 (L) 13 - 44 %    Monocytes 4 4.0 - 12.0 %    Eosinophils % 0 (L) 0.5 - 7.8 %    Basophils 0 0.0 - 2.0 %    Immature Granulocytes 1 0.0 - 5.0 %    Segs Absolute 14.9 (H) 1.7 - 8.2 K/UL    Absolute Lymph # 1.5 0.5 - 4.6 K/UL    Absolute Mono # 0.7 0.1 - 1.3 K/UL    Absolute Eos # 0.0 0.0 - 0.8 K/UL    Basophils Absolute 0.0 0.0 - 0.2 K/UL    Absolute Immature Granulocyte 0.2 0.0 - 0.5 K/UL   CMP    Collection Time: 06/02/22  4:20 PM   Result Value Ref Range    Sodium 140 136 - 145 mmol/L    Potassium 5.2 (H) 3.5 - 5.1 mmol/L    Chloride 108 (H) 98 - 107 mmol/L    CO2 13 (L) 21 - 32 mmol/L    Anion Gap 19 (H) 7 - 16 mmol/L    Glucose 412 (H) 65 - 100 mg/dL    BUN 51 (H) 8 - 23 MG/DL    CREATININE 1.70 (H) 0.6 - 1.0 MG/DL    GFR  36 (L) >60 ml/min/1.73m2    GFR Non- 30 (L) >60 ml/min/1.73m2    Calcium 10.2 8.3 - 10.4 MG/DL    Total Bilirubin 0.5 0.2 - 1.1 MG/DL    ALT 58 12 - 65 U/L    AST 60 (H) 15 - 37 U/L    Alk Phosphatase 111 50 - 136 U/L    Total Protein 7.5 6.3 - 8.2 g/dL    Albumin 2.1 (L) 3.2 - 4.6 g/dL    Globulin 5.4 (H) 2.3 - 3.5 g/dL    Albumin/Globulin Ratio 0.4 (L) 1.2 - 3.5     Urinalysis w rflx microscopic    Collection Time: 06/02/22  4:31 PM Result Value Ref Range    Color, UA YELLOW      Appearance HAZY      Specific Gravity, UA 1.025 (H) 1.001 - 1.023      pH, Urine 5.5 5.0 - 9.0      Protein, UA >300 (A) NEG mg/dL    Glucose,  mg/dL    Ketones, Urine 15 (A) NEG mg/dL    Bilirubin Urine Negative NEG      Blood, Urine SMALL (A) NEG      Urobilinogen, Urine 0.2 0.2 - 1.0 EU/dL    Nitrite, Urine Negative NEG      Leukocyte Esterase, Urine TRACE (A) NEG     Urinalysis, Micro    Collection Time: 06/02/22  4:31 PM   Result Value Ref Range    WBC, UA 3-5 0 /hpf    RBC, UA 0-3 0 /hpf    Epithelial Cells UA 0-3 0 /hpf    BACTERIA, URINE 4+ (H) 0 /hpf    Casts HYALINE 0 /lpf    Crystals 0 0 /LPF    Mucus, UA 0 0 /lpf    OTHER OBSERVATIONS RESULTS VERIFIED MANUALLY     Lactic Acid    Collection Time: 06/02/22  6:22 PM   Result Value Ref Range    Lactic Acid, Plasma 2.1 (H) 0.4 - 2.0 MMOL/L   POCT Glucose    Collection Time: 06/02/22  7:03 PM   Result Value Ref Range    POC Glucose 408 (H) 65 - 100 mg/dL    Performed by: Ezekiel    POC Blood, Cord, Arterial    Collection Time: 06/02/22  7:12 PM   Result Value Ref Range    pH, Arterial, POC 7.45 7.35 - 7.45      pCO2, Arterial, POC 26.6 (L) 35 - 45 MMHG    pO2, Arterial, POC 78 75 - 100 MMHG    HCO3, Mixed 18.4 (L) 22 - 26 MMOL/L    SO2c, Arterial, POC 96.2 95 - 98 %    BASE DEFICIT (POC) 4.9 mmol/L    POC Calvin's Test Negative      Site RIGHT RADIAL      Specimen type: ARTERIAL      Performed by: Russell    POCT Glucose    Collection Time: 06/02/22  7:34 PM   Result Value Ref Range    POC Glucose 404 (H) 65 - 100 mg/dL    Performed by: Anu Estevez        Other Studies:  XR KNEE LEFT (1-2 VIEWS)    Result Date: 6/2/2022  Right hip series HISTORY: Pain after fall 3 views were obtained including an AP view of the pelvis. COMPARISON: 12/14/2021 FINDINGS: There is no evidence of acute fracture or dislocation. The joint space is well-preserved. There is no bony destruction.  There is diffuse low bone mineral density. Atherosclerotic changes are present. No evidence of acute bony abnormality. Left knee series HISTORY: Pain after fall 2 views were obtained. Comparison: 05/22/2022 Findings: There is no evidence of acute fracture or dislocation. The joint spaces are well-preserved. There is no joint effusion. There is no bony destruction. Atherosclerotic changes are present. There is diffuse low bone mineral density. IMPRESSION: No evidence of acute bony abnormality. CT HEAD WO CONTRAST    Result Date: 6/2/2022  CT head and cervical spine without contrast HISTORY: Fall TECHNIQUE: 5 mm axial images were obtained from the skull base to the vertex without intravenous contrast. Helically acquired images were obtained spine reconstructed at 2.5 mm thickness. Sagittal and coronal reformatted images were submitted. All CT scans at this facility are performed using dose optimization technique as appropriate to a performed exam, to include on automated exposure control, adjustment of the mA and/or KV according to patient's size (including appropriate matching for site-specific examinations), or use of iterative reconstruction technique. COMPARISON: 12/14/2021 CT head without contrast: Findings: The ventricles and sulci are appropriate for age. There are encephalomalacic changes within the right frontal and temporal lobes. There are no extra-axial fluid collections. Patchy areas of decreased attenuation are present within the supratentorial white matter. These are nonspecific findings but would be most compatible with moderate chronic small vessel ischemic change. No evidence of acute intracranial hemorrhage or mass effect is identified. There is no evidence to suggest an acute major territorial infarct. Right frontal temporal craniotomy defect is present. . There is an extensive left mastoid effusion representing an interval change. CT cervical spine without contrast: Findings:  There is motion artifact resulting present. . There is an extensive left mastoid effusion representing an interval change. CT cervical spine without contrast: Findings: There is motion artifact resulting in mild compromise in interpretation The vertebral body height and alignment are well maintained. There is moderately advanced degenerative disc disease at C5-6. There is no evidence of acute fracture or subluxation. The prevertebral soft tissues are unremarkable. Atherosclerotic changes are present involving the left carotid bifurcation. There is a partially right pleural effusion. 1. No evidence of acute intracranial hemorrhage. 2. Mildly compromised examination without evidence of acute cervical spine fracture. 3. Cervical spondylosis. 4. Chronic small vessel ischemic change. 5. Partial imaged right pleural effusion. XR CHEST PORTABLE    Result Date: 6/2/2022  Portable chest: History: Fall, altered mental status Comparison: 04/07/2022 Findings: A single view of the chest was obtained at 1502 hours. The cardiac silhouette is mildly enlarged. Bilateral groundglass and interstitial opacities are present. There are no significant pleural effusions. No significant change in bilateral interstitial and groundglass opacities. XR HIP 2-3 VW W PELVIS RIGHT    Result Date: 6/2/2022  Right hip series HISTORY: Pain after fall 3 views were obtained including an AP view of the pelvis. COMPARISON: 12/14/2021 FINDINGS: There is no evidence of acute fracture or dislocation. The joint space is well-preserved. There is no bony destruction. There is diffuse low bone mineral density. Atherosclerotic changes are present. No evidence of acute bony abnormality. Left knee series HISTORY: Pain after fall 2 views were obtained. Comparison: 05/22/2022 Findings: There is no evidence of acute fracture or dislocation. The joint spaces are well-preserved. There is no joint effusion. There is no bony destruction. Atherosclerotic changes are present.  There is diffuse low bone mineral density. IMPRESSION: No evidence of acute bony abnormality. Echocardiogram:  No results found for this or any previous visit.       Meds previously ordered:  Orders Placed This Encounter   Medications    DISCONTD: acetaminophen (TYLENOL) tablet 1,000 mg    0.9 % sodium chloride bolus    acetaminophen (TYLENOL) suppository 650 mg    insulin regular (HUMULIN R;NOVOLIN R) 100 Units in sodium chloride 0.9 % 100 mL infusion    DISCONTD: insulin regular (HUMULIN R;NOVOLIN R) injection 0-10 Units    FOLLOWED BY Linked Order Group     0.9 % sodium chloride bolus     0.9 % sodium chloride infusion    DISCONTD: dextrose 5 % and 0.45 % sodium chloride infusion    dextrose bolus 10% 250 mL    cefTRIAXone (ROCEPHIN) 1000 mg IVPB in NS 50ml minibag     Order Specific Question:   Antimicrobial Indications     Answer:   Urinary Tract Infection    insulin regular (HUMULIN R;NOVOLIN R) injection 0-10 Units    potassium chloride 10 mEq/100 mL IVPB (Peripheral Line)    magnesium sulfate 1000 mg in dextrose 5% 100 mL IVPB    OR Linked Order Group     sodium phosphate 10 mmol in sodium chloride 0.9 % 250 mL IVPB     sodium phosphate 15 mmol in dextrose 5 % 250 mL IVPB     sodium phosphate 20 mmol in dextrose 5 % 500 mL IVPB    polyethylene glycol (GLYCOLAX) packet 17 g    dextrose 5 % and 0.45 % sodium chloride infusion    0.45 % sodium chloride infusion    heparin (porcine) injection 5,000 Units    cefTRIAXone (ROCEPHIN) 2000 mg IVPB in NS 50ml minibag     Order Specific Question:   Antimicrobial Indications     Answer:   Pneumonia (CAP)     Order Specific Question:   CAP duration of therapy     Answer:   5 days    doxycycline hyclate (VIBRAMYCIN) capsule 100 mg     Order Specific Question:   Antimicrobial Indications     Answer:   Pneumonia (CAP)     Order Specific Question:   CAP duration of therapy     Answer:   5 days         Signed:  Trish Mota MD    Part of this note may have been written by using a voice dictation software. The note has been proof read but may still contain some grammatical/other typographical errors.

## 2022-06-03 NOTE — ACP (ADVANCE CARE PLANNING)
Advance Care Planning     General Advance Care Planning (ACP) Conversation    Date of Conversation: 6/3/2022    Healthcare Decision Maker:     All/majority of children (3) are legal NOK. Idris Ozielmaxi    No healthcare decision makers have been documented. Click here to complete [place] including selection of the Healthcare Decision Maker Relationship (ie \"Primary\")   Today we documented Decision Maker(s) consistent with Legal Next of Kin hierarchy. Content/Action Overview:  No LW/HCPOA. DNR per MD orders.      Length of Voluntary ACP Conversation in minutes:  <16 minutes (Non-Billable)    Eldon Manzo RN

## 2022-06-03 NOTE — PROGRESS NOTES
TRANSFER - IN REPORT:    Verbal report received from [place] on Kiley Forte  being received from ED for routine progression of patient care      Report consisted of patient's Situation, Background, Assessment and   Recommendations(SBAR). Information from the following report(s) Nurse Handoff Report, ED SBAR, Adult Overview, Intake/Output, MAR, Recent Results and Cardiac Rhythm NSR was reviewed with the receiving nurse. Opportunity for questions and clarification was provided. Assessment completed upon patient's arrival to unit and care assumed.

## 2022-06-03 NOTE — PROGRESS NOTES
VANCO DAILY FOLLOW UP RENAL INSUFFICIENCY PATIENT   [address] [facility] Pharmacokinetic Monitoring Service - Vancomycin    Consulting Provider: Cabrera Tesfaye   Indication: BSI  Target Concentration: Random level ? 15 mg/L  Day of Therapy: 1  Additional Antimicrobials: ceftriaxone/doxycycline     Pertinent Laboratory Values: Wt Readings from Last 1 Encounters:   06/03/22 146 lb 4.8 oz (66.4 kg)     Temp Readings from Last 1 Encounters:   06/03/22 98 °F (36.7 °C) (Oral)     Recent Labs     06/02/22  1620 06/02/22  1620 06/02/22  2151 06/03/22  0246 06/03/22  0613   BUN 51*   < > 53* 56* 60*   CREATININE 1.70*   < > 1.80* 1.70* 1.70*   WBC 17.4*  --   --   --   --     < > = values in this interval not displayed. Lab Results   Component Value Date    VANCORANDOM 25.4 03/06/2022       MRSA Nasal Swab: N/A. Non-respiratory infection. .    Assessment:  Date:  Dose/Freq Admin Times Level/Time:   6/3 1000 mg x 1 (1100)                              Plan:  Concentration-guided dosing due to renal impairment  Give vancomycin 1000 mg x 1  Vancomycin concentration ordered for 6/4 @ 0400    Pharmacy will continue to monitor patient and adjust therapy as indicated    Thank you for the consult,  Ranjan Mosquera, [facility]

## 2022-06-03 NOTE — PROGRESS NOTES
Hospitalist Progress Note   Admit Date:  2022 12:06 PM   Name:  Seema Dumont   Age:  80 y.o. Sex:  female  :  1929   MRN:  [id number]   Room:  310/    Presenting Complaint: Fall     Reason(s) for Admission: Hyperkalemia [E87.5]  AMPARO (acute kidney injury) ([facility] [address] [N17.9]  DKA, type 2, not at goal [facility]) [E11.10]  Acute cystitis without hematuria [N30.00]  Diabetic ketoacidosis without coma associated with other specified diabetes mellitus [facility]) [E13.10]     Hospital Course & Interval History:   80years old female with history of CKD stage III, peripheral artery disease, diabetes type 2, dementia, frequent falls presented to emergency room after trip and fall. Patient was trying to get out of chair when she tripped and fell on right side hitting her right hip without loss of consciousness as per family. Patient reported of right hip pain and pain in left knee. Patient was not eating and drinking fluids very well for past 1 week duration, patient was recently visited [facility] ER for UTI and was given prescription for antibiotics for UTI. Patient was evaluated by physician assistant in ER, further trauma work-up is negative, urinalysis shows mild UTI but patient is in DKA with serum bicarb of 13, creatinine of 1.78 count is elevated at 17.4. Chest x-ray shows bilateral groundglass interstitial opacities. Emergency room physician requested admission of this patient for further evaluation and management     Admitted for DKA, AMPARO, metabolic encephalopathy,, required pneumonia and frequent falls. Subjective/24hr Events (22): Patient presently on sliding scale insulin and Lantus, off insulin drip. Later on today speech evaluated and started the patient on regular, thin liquid and medication in purée.   Patient is on 2 L of oxygen, chronic at home  Says breathing okay  One of the blood culture-gram-positive cocci, ID panel strep      Assessment & Plan:     Sepsis present at admission  Elevated wbc, increased Resp rate, infection  Got rocephin and vanc in er  Presently on rocephin and doxycyclin    DKA   resolved   On sliding scale, with meals and lantus    One of the blood culture gram positive cocci- ID panel strep  Cont rocephin    PNA  Rocephin and zithromax    ?uti  Urine culture pending    Xavier  Improving- cont ivf      Afib on eliquis    Chronic hypoxic resp failure on 2 lit/min    htn  Hydralzine, metoprolol    Depression /anxiety  Cont zoloft    GERD  Cont famotidine    ?copd/asth,a cont advair    H/o previous covid. Frequent fall- debility - normally walks with walker at home, uses sheridan chair when she goes out  PT and OT consult        Diet:  ADULT DIET; Regular  DVT PPx: Eliquis  Code status: Full Code    Hospital Problems:  Principal Problem:    DKA, type 2, not at goal [facility])  Active Problems:    Acute kidney injury ([facility] [address]    Metabolic encephalopathy    CAP (community acquired pneumonia)    Sepsis ([facility] [address]    Frequent falls    Depression    UTI (urinary tract infection)    GERD (gastroesophageal reflux disease)    Type 2 diabetes mellitus with diabetic neuropathy (HCC)    Essential hypertension    Anxiety    Asthma    Neuropathy  Resolved Problems:    * No resolved hospital problems.  *      Objective:     Patient Vitals for the past 24 hrs:   Temp Pulse Resp BP SpO2   06/03/22 1305 -- 80 27 (!) 146/63 97 %   06/03/22 1223 98.3 °F (36.8 °C) -- -- -- --   06/03/22 1204 -- 74 17 (!) 118/59 98 %   06/03/22 1119 -- 74 (!) 32 (!) 120/58 96 %   06/03/22 0949 -- 87 19 139/82 96 %   06/03/22 0934 -- 79 22 125/74 97 %   06/03/22 0919 -- 73 19 119/62 100 %   06/03/22 0904 -- 74 14 118/63 99 %   06/03/22 0848 -- 75 17 128/62 95 %   06/03/22 0834 -- 80 19 (!) 146/78 92 %   06/03/22 0819 -- 74 15 125/60 98 %   06/03/22 0818 98 °F (36.7 °C) -- -- -- --   06/03/22 0804 -- 74 14 130/67 98 %   06/03/22 0748 -- 76 18 128/71 96 %   06/03/22 0735 -- 81 15 -- 97 %   06/03/22 0734 -- 74 14 124/68 100 %   06/03/22 0719 -- 74 16 126/63 100 %   06/03/22 0704 -- 78 24 134/67 97 %   06/03/22 0619 -- 76 19 120/64 96 %   06/03/22 0604 -- 80 20 126/73 94 %   06/03/22 0548 -- 74 14 (!) 112/59 99 %   06/03/22 0534 -- 75 15 (!) 120/59 98 %   06/03/22 0519 -- 75 14 (!) 122/56 98 %   06/03/22 0504 -- 77 17 (!) 140/63 97 %   06/03/22 0448 -- 75 26 (!) 113/58 98 %   06/03/22 0434 -- 75 29 121/62 98 %   06/03/22 0421 -- 78 29 133/63 98 %   06/03/22 0405 -- 78 30 (!) 171/59 98 %   06/03/22 0348 -- 72 -- (!) 105/57 99 %   06/03/22 0347 -- -- 29 -- --   06/03/22 0334 -- 72 29 (!) 104/55 100 %   06/03/22 0319 -- 72 30 104/60 100 %   06/03/22 0304 -- 74 26 115/64 100 %   06/03/22 0300 97.9 °F (36.6 °C) 74 21 115/64 100 %   06/03/22 0248 -- 76 26 117/64 98 %   06/03/22 0234 -- 72 14 102/60 100 %   06/03/22 0219 -- 72 14 (!) 100/55 100 %   06/03/22 0205 -- 72 16 (!) 99/57 100 %   06/03/22 0148 -- 72 17 (!) 102/55 100 %   06/03/22 0134 -- 72 15 (!) 104/56 100 %   06/03/22 0119 -- 74 19 (!) 104/59 100 %   06/03/22 0105 -- 75 30 112/63 99 %   06/03/22 0102 -- 72 29 -- 100 %   06/03/22 0048 -- 71 29 100/60 100 %   06/03/22 0033 -- 74 (!) 35 118/61 100 %   06/03/22 0032 -- -- 23 -- --   06/03/22 0019 -- 74 23 116/64 98 %   06/03/22 0005 -- 76 16 113/61 96 %   06/03/22 0002 -- -- -- 113/61 --   06/02/22 2349 -- -- 27 -- --   06/02/22 2348 -- 73 (!) 33 (!) 113/56 99 %   06/02/22 2335 -- 77 (!) 39 (!) 148/70 97 %   06/02/22 2320 -- 78 28 (!) 145/72 96 %   06/02/22 2304 -- 78 (!) 36 133/68 97 %   06/02/22 2248 -- 86 30 127/77 92 %   06/02/22 2234 -- 85 30 139/65 95 %   06/02/22 2214 -- 89 (!) 36 (!) 170/78 94 %   06/02/22 2204 -- 92 (!) 32 (!) 172/81 95 %   06/02/22 2158 -- 92 -- -- --   06/02/22 2148 -- 97 26 (!) 172/77 95 %   06/02/22 2139 97.6 °F (36.4 °C) (!) 105 29 (!) 206/95 93 %   06/02/22 2015 -- 92 20 (!) 149/78 99 %   06/02/22 2000 -- 95 -- (!) 152/86 97 %   06/02/22 1915 -- -- -- (!) 145/77 --   06/02/22 1900 -- -- -- Fany Numbers 160/77 --   06/02/22 1830 -- -- -- (!) 146/59 93 %   06/02/22 1815 -- -- -- (!) 146/95 --   06/02/22 1800 -- -- -- (!) 134/106 --   06/02/22 1749 99.2 °F (37.3 °C) -- 22 -- --   06/02/22 1745 -- -- -- (!) 118/107 --   06/02/22 1733 -- -- -- (!) 134/113 --   06/02/22 1601 -- -- -- (!) 182/86 --   06/02/22 1531 -- -- -- (!) 151/100 95 %   06/02/22 1501 -- -- -- (!) 167/82 (!) 89 %   06/02/22 1431 -- -- -- (!) 172/120 90 %   06/02/22 1401 -- -- -- (!) 159/76 (!) 88 %       Oxygen Therapy  SpO2: 97 %  Pulse Oximeter Device Mode: Continuous  Pulse Oximeter Device Location: Left,Finger  O2 Device: Nasal cannula  Skin Assessment: Clean, dry, & intact  Skin Protection for O2 Device: No  O2 Flow Rate (L/min): 2 L/min    Estimated body mass index is 26.76 kg/m² as calculated from the following:    Height as of this encounter: 5' 2\" (1.575 m). Weight as of this encounter: 146 lb 4.8 oz (66.4 kg). Intake/Output Summary (Last 24 hours) at 6/3/2022 1340  Last data filed at 6/3/2022 1035  Gross per 24 hour   Intake 2634.22 ml   Output 380 ml   Net 2254.22 ml         Physical Exam:     Blood pressure (!) 146/63, pulse 80, temperature 98.3 °F (36.8 °C), temperature source Oral, resp. rate 27, height 5' 2\" (1.575 m), weight 146 lb 4.8 oz (66.4 kg), SpO2 97 %. General:    Well nourished. Mild resp distress, improving , on 2 lit/min  Head:  Normocephalic, atraumatic  Eyes:  Sclerae appear normal.  Pupils equally round. ENT:  Nares appear normal, no drainage. Moist oral mucosa  Neck:  No restricted ROM. Trachea midline   CV:   RRR. No m/r/g. No jugular venous distension. Lungs:   Bilateral coarse breath sounds  Abdomen: Bowel sounds present. Soft, nontender, nondistended. Extremities: No cyanosis or clubbing. No edema  Skin:     No rashes and normal coloration. Warm and dry. Neuro:  CN II-XII grossly intact. Sensation intact. A&Ox3  Psych:  Normal mood and affect.       I have reviewed ordered lab tests and independently visualized imaging below:    Recent Labs:  Recent Results (from the past 48 hour(s))   POCT Glucose    Collection Time: 06/02/22  3:38 PM   Result Value Ref Range    POC Glucose 474 (HH) 65 - 100 mg/dL    Performed by: Zahira Garrett    CBC with Auto Differential    Collection Time: 06/02/22  4:20 PM   Result Value Ref Range    WBC 17.4 (H) 4.3 - 11.1 K/uL    RBC 3.36 (L) 4.05 - 5.2 M/uL    Hemoglobin 9.9 (L) 11.7 - 15.4 g/dL    Hematocrit 34.0 (L) 35.8 - 46.3 %    .2 (H) 79.6 - 97.8 FL    MCH 29.5 26.1 - 32.9 PG    MCHC 29.1 (L) 31.4 - 35.0 g/dL    RDW 14.3 11.9 - 14.6 %    Platelets 478 815 - 749 K/uL    MPV 12.0 9.4 - 12.3 FL    nRBC 0.00 0.0 - 0.2 K/uL    Differential Type AUTOMATED      Seg Neutrophils 86 (H) 43 - 78 %    Lymphocytes 9 (L) 13 - 44 %    Monocytes 4 4.0 - 12.0 %    Eosinophils % 0 (L) 0.5 - 7.8 %    Basophils 0 0.0 - 2.0 %    Immature Granulocytes 1 0.0 - 5.0 %    Segs Absolute 14.9 (H) 1.7 - 8.2 K/UL    Absolute Lymph # 1.5 0.5 - 4.6 K/UL    Absolute Mono # 0.7 0.1 - 1.3 K/UL    Absolute Eos # 0.0 0.0 - 0.8 K/UL    Basophils Absolute 0.0 0.0 - 0.2 K/UL    Absolute Immature Granulocyte 0.2 0.0 - 0.5 K/UL   CMP    Collection Time: 06/02/22  4:20 PM   Result Value Ref Range    Sodium 140 136 - 145 mmol/L    Potassium 5.2 (H) 3.5 - 5.1 mmol/L    Chloride 108 (H) 98 - 107 mmol/L    CO2 13 (L) 21 - 32 mmol/L    Anion Gap 19 (H) 7 - 16 mmol/L    Glucose 412 (H) 65 - 100 mg/dL    BUN 51 (H) 8 - 23 MG/DL    CREATININE 1.70 (H) 0.6 - 1.0 MG/DL    GFR  36 (L) >60 ml/min/1.73m2    GFR Non- 30 (L) >60 ml/min/1.73m2    Calcium 10.2 8.3 - 10.4 MG/DL    Total Bilirubin 0.5 0.2 - 1.1 MG/DL    ALT 58 12 - 65 U/L    AST 60 (H) 15 - 37 U/L    Alk Phosphatase 111 50 - 136 U/L    Total Protein 7.5 6.3 - 8.2 g/dL    Albumin 2.1 (L) 3.2 - 4.6 g/dL    Globulin 5.4 (H) 2.3 - 3.5 g/dL    Albumin/Globulin Ratio 0.4 (L) 1.2 - 3.5     Urinalysis w rflx microscopic Collection Time: 06/02/22  4:31 PM   Result Value Ref Range    Color, UA YELLOW      Appearance HAZY      Specific Gravity, UA 1.025 (H) 1.001 - 1.023      pH, Urine 5.5 5.0 - 9.0      Protein, UA >300 (A) NEG mg/dL    Glucose,  mg/dL    Ketones, Urine 15 (A) NEG mg/dL    Bilirubin Urine Negative NEG      Blood, Urine SMALL (A) NEG      Urobilinogen, Urine 0.2 0.2 - 1.0 EU/dL    Nitrite, Urine Negative NEG      Leukocyte Esterase, Urine TRACE (A) NEG     Urinalysis, Micro    Collection Time: 06/02/22  4:31 PM   Result Value Ref Range    WBC, UA 3-5 0 /hpf    RBC, UA 0-3 0 /hpf    Epithelial Cells UA 0-3 0 /hpf    BACTERIA, URINE 4+ (H) 0 /hpf    Casts HYALINE 0 /lpf    Crystals 0 0 /LPF    Mucus, UA 0 0 /lpf    OTHER OBSERVATIONS RESULTS VERIFIED MANUALLY     Culture, Urine    Collection Time: 06/02/22  4:31 PM    Specimen: URINE-CLEAN CATCH   Result Value Ref Range    Special Requests NO SPECIAL REQUESTS      Culture        No growth after short period of incubation. Further results to follow after overnight incubation.    Lactic Acid    Collection Time: 06/02/22  6:22 PM   Result Value Ref Range    Lactic Acid, Plasma 2.1 (H) 0.4 - 2.0 MMOL/L   Blood Culture 2    Collection Time: 06/02/22  6:22 PM    Specimen: Blood   Result Value Ref Range    Special Requests LEFT  ARM        Gram stain GRAM POS COCCI IN CHAINS      Gram stain AEROBIC AND ANAEROBIC BOTTLES      Gram stain        RESULTS VERIFIED, PHONED TO AND READ BACK BY Billie Rosenthal @2443 6.3.22 SC    Culture CULTURE IN PROGRESS,FURTHER UPDATES TO FOLLOW      Culture        Refer to Blood Culture ID Panel Accession  [id number]     Culture, Blood ID Sensitivity    Collection Time: 06/02/22  6:22 PM    Specimen: Blood   Result Value Ref Range    ACCESSION NUMBER, [id number]     STREPTOCOCCUS Detected (A) NOTDET      Streptococcus agalactiae (Group B) Detected (A) NOTDET      Interpretation        Gram Positive cocci, Identified by realtime PCR as Streptococcus agalactiae (Group B Strep)   POCT Glucose    Collection Time: 06/02/22  7:03 PM   Result Value Ref Range    POC Glucose 408 (H) 65 - 100 mg/dL    Performed by: Ezekiel    POC Blood, Cord, Arterial    Collection Time: 06/02/22  7:12 PM   Result Value Ref Range    pH, Arterial, POC 7.45 7.35 - 7.45      pCO2, Arterial, POC 26.6 (L) 35 - 45 MMHG    pO2, Arterial, POC 78 75 - 100 MMHG    HCO3, Mixed 18.4 (L) 22 - 26 MMOL/L    SO2c, Arterial, POC 96.2 95 - 98 %    BASE DEFICIT (POC) 4.9 mmol/L    POC Calvin's Test Negative      Site RIGHT RADIAL      Specimen type: ARTERIAL      Performed by: Russell    POCT Glucose    Collection Time: 06/02/22  7:34 PM   Result Value Ref Range    POC Glucose 404 (H) 65 - 100 mg/dL    Performed by: Herman    Blood Culture 1    Collection Time: 06/02/22  7:50 PM    Specimen: Blood   Result Value Ref Range    Special Requests RIGHT  FOREARM        Culture NO GROWTH AFTER 10 HOURS     EKG 12 Lead    Collection Time: 06/02/22  8:02 PM   Result Value Ref Range    Ventricular Rate 95 BPM    Atrial Rate 153 BPM    P-R Interval 162 ms    QRS Duration 137 ms    Q-T Interval 365 ms    QTc Calculation (Bazett) 459 ms    P Axis 55 degrees    R Axis -30 degrees    T Axis 43 degrees    Diagnosis Sinus tachycardia    POCT Glucose    Collection Time: 06/02/22  8:48 PM   Result Value Ref Range    POC Glucose 366 (H) 65 - 100 mg/dL    Performed by: Herman    POCT Glucose    Collection Time: 06/02/22  9:47 PM   Result Value Ref Range    POC Glucose 302 (H) 65 - 100 mg/dL    Performed by:  [address] [place] [address]    Basic Metabolic Panel    Collection Time: 06/02/22  9:51 PM   Result Value Ref Range    Sodium 144 136 - 145 mmol/L    Potassium 3.8 3.5 - 5.1 mmol/L    Chloride 113 (H) 98 - 107 mmol/L    CO2 20 (L) 21 - 32 mmol/L    Anion Gap 11 7 - 16 mmol/L    Glucose 243 (H) 65 - 100 mg/dL    BUN 53 (H) 8 - 23 MG/DL    CREATININE 1.80 (H) 0.6 - 1.0 MG/DL    GFR  American 34 (L) >60 ml/min/1.73m2    GFR Non- 28 (L) >60 ml/min/1.73m2    Calcium 9.5 8.3 - 10.4 MG/DL   Magnesium    Collection Time: 06/02/22  9:51 PM   Result Value Ref Range    Magnesium 1.9 1.8 - 2.4 mg/dL   Phosphorus    Collection Time: 06/02/22  9:51 PM   Result Value Ref Range    Phosphorus 3.0 2.3 - 3.7 MG/DL   POCT Glucose    Collection Time: 06/02/22 10:46 PM   Result Value Ref Range    POC Glucose 209 (H) 65 - 100 mg/dL    Performed by: Gisele    POCT Glucose    Collection Time: 06/02/22 11:49 PM   Result Value Ref Range    POC Glucose 142 (H) 65 - 100 mg/dL    Performed by: Gisele    POCT Glucose    Collection Time: 06/03/22 12:51 AM   Result Value Ref Range    POC Glucose 128 (H) 65 - 100 mg/dL    Performed by: Gisele    POCT Glucose    Collection Time: 06/03/22  1:55 AM   Result Value Ref Range    POC Glucose 126 (H) 65 - 100 mg/dL    Performed by: [address] [place] [address]    Basic Metabolic Panel    Collection Time: 06/03/22  2:46 AM   Result Value Ref Range    Sodium 144 136 - 145 mmol/L    Potassium 4.3 3.5 - 5.1 mmol/L    Chloride 113 (H) 98 - 107 mmol/L    CO2 22 21 - 32 mmol/L    Anion Gap 9 7 - 16 mmol/L    Glucose 124 (H) 65 - 100 mg/dL    BUN 56 (H) 8 - 23 MG/DL    CREATININE 1.70 (H) 0.6 - 1.0 MG/DL    GFR  36 (L) >60 ml/min/1.73m2    GFR Non- 30 (L) >60 ml/min/1.73m2    Calcium 9.4 8.3 - 10.4 MG/DL   Magnesium    Collection Time: 06/03/22  2:46 AM   Result Value Ref Range    Magnesium 1.7 (L) 1.8 - 2.4 mg/dL   Phosphorus    Collection Time: 06/03/22  2:46 AM   Result Value Ref Range    Phosphorus 2.9 2.3 - 3.7 MG/DL   POCT Glucose    Collection Time: 06/03/22  2:51 AM   Result Value Ref Range    POC Glucose 135 (H) 65 - 100 mg/dL    Performed by:  Gisele    POCT Glucose    Collection Time: 06/03/22  3:48 AM   Result Value Ref Range    POC Glucose 165 (H) 65 - 100 mg/dL    Performed by: Violetta Story    POCT Glucose    Collection Time: 06/03/22  4:52 AM   Result Value Ref Range    POC Glucose 185 (H) 65 - 100 mg/dL    Performed by: Gisele    POCT Glucose    Collection Time: 06/03/22  5:52 AM   Result Value Ref Range    POC Glucose 219 (H) 65 - 100 mg/dL    Performed by: Violetta Story    Basic Metabolic Panel    Collection Time: 06/03/22  6:13 AM   Result Value Ref Range    Sodium 140 136 - 145 mmol/L    Potassium 5.4 (H) 3.5 - 5.1 mmol/L    Chloride 112 (H) 98 - 107 mmol/L    CO2 21 21 - 32 mmol/L    Anion Gap 7 7 - 16 mmol/L    Glucose 198 (H) 65 - 100 mg/dL    BUN 60 (H) 8 - 23 MG/DL    CREATININE 1.70 (H) 0.6 - 1.0 MG/DL    GFR  36 (L) >60 ml/min/1.73m2    GFR Non- 30 (L) >60 ml/min/1.73m2    Calcium 9.3 8.3 - 10.4 MG/DL   Magnesium    Collection Time: 06/03/22  6:13 AM   Result Value Ref Range    Magnesium 2.1 1.8 - 2.4 mg/dL   Phosphorus    Collection Time: 06/03/22  6:13 AM   Result Value Ref Range    Phosphorus 3.2 2.3 - 3.7 MG/DL   POCT Glucose    Collection Time: 06/03/22 10:02 AM   Result Value Ref Range    POC Glucose 218 (H) 65 - 100 mg/dL    Performed by: Monique    CBC    Collection Time: 06/03/22 10:20 AM   Result Value Ref Range    WBC 14.8 (H) 4.3 - 11.1 K/uL    RBC 2.76 (L) 4.05 - 5.2 M/uL    Hemoglobin 8.2 (L) 11.7 - 15.4 g/dL    Hematocrit 26.8 (L) 35.8 - 46.3 %    MCV 97.1 79.6 - 97.8 FL    MCH 29.7 26.1 - 32.9 PG    MCHC 30.6 (L) 31.4 - 35.0 g/dL    RDW 14.5 11.9 - 14.6 %    Platelets 640 920 - 524 K/uL    MPV 12.1 9.4 - 12.3 FL    nRBC 0.02 0.0 - 0.2 K/uL   POCT Glucose    Collection Time: 06/03/22 12:00 PM   Result Value Ref Range    POC Glucose 244 (H) 65 - 100 mg/dL    Performed by: Monique        Other Studies:  XR KNEE LEFT (1-2 VIEWS)    Result Date: 6/2/2022  Right hip series HISTORY: Pain after fall 3 views were obtained including an AP view of the pelvis.  COMPARISON: 12/14/2021 FINDINGS: There is no evidence of acute fracture or dislocation. The joint space is well-preserved. There is no bony destruction. There is diffuse low bone mineral density. Atherosclerotic changes are present. No evidence of acute bony abnormality. Left knee series HISTORY: Pain after fall 2 views were obtained. Comparison: 05/22/2022 Findings: There is no evidence of acute fracture or dislocation. The joint spaces are well-preserved. There is no joint effusion. There is no bony destruction. Atherosclerotic changes are present. There is diffuse low bone mineral density. IMPRESSION: No evidence of acute bony abnormality. CT HEAD WO CONTRAST    Result Date: 6/2/2022  CT head and cervical spine without contrast HISTORY: Fall TECHNIQUE: 5 mm axial images were obtained from the skull base to the vertex without intravenous contrast. Helically acquired images were obtained spine reconstructed at 2.5 mm thickness. Sagittal and coronal reformatted images were submitted. All CT scans at this facility are performed using dose optimization technique as appropriate to a performed exam, to include on automated exposure control, adjustment of the mA and/or KV according to patient's size (including appropriate matching for site-specific examinations), or use of iterative reconstruction technique. COMPARISON: 12/14/2021 CT head without contrast: Findings: The ventricles and sulci are appropriate for age. There are encephalomalacic changes within the right frontal and temporal lobes. There are no extra-axial fluid collections. Patchy areas of decreased attenuation are present within the supratentorial white matter. These are nonspecific findings but would be most compatible with moderate chronic small vessel ischemic change. No evidence of acute intracranial hemorrhage or mass effect is identified. There is no evidence to suggest an acute major territorial infarct.  Right frontal temporal craniotomy defect is present. . There is an extensive left mastoid effusion representing an interval change. CT cervical spine without contrast: Findings: There is motion artifact resulting in mild compromise in interpretation The vertebral body height and alignment are well maintained. There is moderately advanced degenerative disc disease at C5-6. There is no evidence of acute fracture or subluxation. The prevertebral soft tissues are unremarkable. Atherosclerotic changes are present involving the left carotid bifurcation. There is a partially right pleural effusion. 1. No evidence of acute intracranial hemorrhage. 2. Mildly compromised examination without evidence of acute cervical spine fracture. 3. Cervical spondylosis. 4. Chronic small vessel ischemic change. 5. Partial imaged right pleural effusion. CT CERVICAL SPINE WO CONTRAST    Result Date: 6/2/2022  CT head and cervical spine without contrast HISTORY: Fall TECHNIQUE: 5 mm axial images were obtained from the skull base to the vertex without intravenous contrast. Helically acquired images were obtained spine reconstructed at 2.5 mm thickness. Sagittal and coronal reformatted images were submitted. All CT scans at this facility are performed using dose optimization technique as appropriate to a performed exam, to include on automated exposure control, adjustment of the mA and/or KV according to patient's size (including appropriate matching for site-specific examinations), or use of iterative reconstruction technique. COMPARISON: 12/14/2021 CT head without contrast: Findings: The ventricles and sulci are appropriate for age. There are encephalomalacic changes within the right frontal and temporal lobes. There are no extra-axial fluid collections. Patchy areas of decreased attenuation are present within the supratentorial white matter. These are nonspecific findings but would be most compatible with moderate chronic small vessel ischemic change.  No evidence of acute intracranial hemorrhage or mass effect is identified. There is no evidence to suggest an acute major territorial infarct. Right frontal temporal craniotomy defect is present. . There is an extensive left mastoid effusion representing an interval change. CT cervical spine without contrast: Findings: There is motion artifact resulting in mild compromise in interpretation The vertebral body height and alignment are well maintained. There is moderately advanced degenerative disc disease at C5-6. There is no evidence of acute fracture or subluxation. The prevertebral soft tissues are unremarkable. Atherosclerotic changes are present involving the left carotid bifurcation. There is a partially right pleural effusion. 1. No evidence of acute intracranial hemorrhage. 2. Mildly compromised examination without evidence of acute cervical spine fracture. 3. Cervical spondylosis. 4. Chronic small vessel ischemic change. 5. Partial imaged right pleural effusion. XR CHEST PORTABLE    Result Date: 6/2/2022  Portable chest: History: Fall, altered mental status Comparison: 04/07/2022 Findings: A single view of the chest was obtained at 1502 hours. The cardiac silhouette is mildly enlarged. Bilateral groundglass and interstitial opacities are present. There are no significant pleural effusions. No significant change in bilateral interstitial and groundglass opacities. XR HIP 2-3 VW W PELVIS RIGHT    Result Date: 6/2/2022  Right hip series HISTORY: Pain after fall 3 views were obtained including an AP view of the pelvis. COMPARISON: 12/14/2021 FINDINGS: There is no evidence of acute fracture or dislocation. The joint space is well-preserved. There is no bony destruction. There is diffuse low bone mineral density. Atherosclerotic changes are present. No evidence of acute bony abnormality. Left knee series HISTORY: Pain after fall 2 views were obtained. Comparison: 05/22/2022 Findings:  There is no evidence of acute fracture or dislocation. The joint spaces are well-preserved. There is no joint effusion. There is no bony destruction. Atherosclerotic changes are present. There is diffuse low bone mineral density. IMPRESSION: No evidence of acute bony abnormality.         Current Meds:  Current Facility-Administered Medications   Medication Dose Route Frequency    0.9 % sodium chloride infusion   IntraVENous Continuous    insulin glargine (LANTUS) injection vial 10 Units  10 Units SubCUTAneous Daily    insulin lispro (HUMALOG) injection vial 0-8 Units  0-8 Units SubCUTAneous TID WC    insulin lispro (HUMALOG) injection vial 0-4 Units  0-4 Units SubCUTAneous Nightly    glucose chewable tablet 16 g  4 tablet Oral PRN    dextrose bolus 10% 125 mL  125 mL IntraVENous PRN    Or    dextrose bolus 10% 250 mL  250 mL IntraVENous PRN    glucagon injection 1 mg  1 mg IntraMUSCular PRN    dextrose 5 % solution  100 mL/hr IntraVENous PRN    apixaban (ELIQUIS) tablet 5 mg  5 mg Oral BID    dextrose bolus 10% 250 mL  250 mL IntraVENous PRN    insulin regular (HUMULIN R;NOVOLIN R) injection 0-10 Units  0-10 Units IntraVENous PRN    potassium chloride 10 mEq/100 mL IVPB (Peripheral Line)  10 mEq IntraVENous PRN    magnesium sulfate 1000 mg in dextrose 5% 100 mL IVPB  1,000 mg IntraVENous PRN    sodium phosphate 10 mmol in sodium chloride 0.9 % 250 mL IVPB  10 mmol IntraVENous PRN    Or    sodium phosphate 15 mmol in dextrose 5 % 250 mL IVPB  15 mmol IntraVENous PRN    Or    sodium phosphate 20 mmol in dextrose 5 % 500 mL IVPB  20 mmol IntraVENous PRN    polyethylene glycol (GLYCOLAX) packet 17 g  17 g Oral Daily PRN    cefTRIAXone (ROCEPHIN) 2000 mg IVPB in NS 50ml minibag  2,000 mg IntraVENous Q24H    doxycycline hyclate (VIBRAMYCIN) capsule 100 mg  100 mg Oral 2 times per day    budesonide (PULMICORT) nebulizer suspension 500 mcg  500 mcg Nebulization BID    hydrALAZINE (APRESOLINE) tablet 25 mg  25 mg Oral TID    gabapentin (NEURONTIN) capsule 100 mg  100 mg Oral TID    metoprolol tartrate (LOPRESSOR) tablet 12.5 mg  12.5 mg Oral BID    pravastatin (PRAVACHOL) tablet 10 mg  10 mg Oral Nightly    sertraline (ZOLOFT) tablet 100 mg  100 mg Oral Daily    morphine injection 1 mg  1 mg IntraVENous Q4H PRN       Signed:  China Gatica MD

## 2022-06-03 NOTE — PROGRESS NOTES
SPEECH LANGUAGE PATHOLOGY: DYSPHAGIA  Initial Assessment and Discharge    NAME: Zulma Gudino  : 1929  MRN: [id number]    ADMISSION DATE: 2022  ADMITTING DIAGNOSIS: has Functional quadriplegia ([facility] Chronic bilateral low back pain without sciatica; Frequent falls; Pulmonary edema; PAD (peripheral artery disease) ([facility] Controlled type 2 diabetes mellitus with microalbuminuria, without long-term current use of insulin ([facility] Acute respiratory failure due to COVID-19 [facility]); Chronic renal disease, stage II; Acute blood loss anemia; Depression; Hearing loss; DNR (do not resuscitate); Abdominal wall hernia; UTI (urinary tract infection); Callus; Pneumonia due to COVID-19 virus; Iron deficiency anemia due to chronic blood loss; GERD (gastroesophageal reflux disease); Type 2 diabetes mellitus with diabetic neuropathy ([facility] Essential hypertension; Moderate protein-calorie malnutrition ([facility] Volume overload; Anxiety; B12 deficiency; HCAP (healthcare-associated pneumonia); Pulmonary embolism ([facility] Asthma; Neuropathy; HLD (hyperlipidemia); Hiatal hernia; Lower GI bleed; Claw toe; COPD (chronic obstructive pulmonary disease) ([facility] Physical debility; Stage 3a chronic kidney disease ([facility] Normocytic anemia; DKA, type 2, not at goal [facility]); Acute kidney injury ([facility] Metabolic encephalopathy; and CAP (community acquired pneumonia) on their problem list.    Date of Eval: 6/3/2022  ICD-10: Treatment Diagnosis: R13.12 Dysphagia, Oropharyngeal Phase    RECOMMENDATIONS   Diet:  Diet Solids Recommendation: Regular  Liquid Consistency Recommendation:  Thin    Medications: Whole with puree (Per patient request)     Compensatory Swallowing Strategies:Upright as possible for all oral intake;Small bites/sips     Therapeutic Intervention:Patient/Family education     Patient continues to require skilled intervention: No    D/C Recommendations: No follow up therapy recommended post discharge       ASSESSMENT    Dysphagia Diagnosis: Swallow function appears WFL  Dysphagia Impression : Functional oropharyngeal swallow function    Patient presents with oropharyngeal swallow function that is within normal limits. No dysphagia identified. Recommend regular diet/thin liquids. Medications as tolerated. Dysphagia treatment is not indicated at this time. GENERAL    History of Present Injury/Illness: Ms. Rock Wayne  has a past medical history of Acute posthemorrhagic anemia, Anemia in chronic kidney disease, Anxiety, Arrhythmia, Arthritis, Asthma, B12 deficiency, Body mass index 37.0-37.9, adult, CAD (coronary artery disease), Chronic kidney disease, stage 3 unspecified ([address] Cognitive communication deficit, Controlled type 2 diabetes mellitus with microalbuminuria, without long-term current use of insulin ([address] Corns and callosities, CRI (chronic renal insufficiency), Depression, Dermatophytosis of nail, Diabetes ([address] DJD (degenerative joint disease) of hip, DM (diabetes mellitus) type II controlled with renal manifestation ([address] Encounter for long-term (current) use of other medications, Essential hypertension, Gastrointestinal disorder, Gastrointestinal hemorrhage, GERD (gastroesophageal reflux disease), GI bleed, Heart failure ([address] Hiatal hernia, High cholesterol, History of COVID-19, History of falling, HLD (hyperlipidemia), Klebsiella pneumoniae (k. pneumoniae) as the cause of diseases classified elsewhere, Moderate protein-calorie malnutrition ([address] Muscle weakness (generalized), Neuropathy, Other and unspecified hyperlipidemia, Other chest pain, Other lack of coordination, Oxygen dependent, PAD (peripheral artery disease) ([address] PVD (peripheral vascular disease) ([address] Retinal hemorrhage, Type 2 diabetes mellitus with diabetic neuropathy ([address] Unspecified asthma, uncomplicated, Urinary incontinence, and Urinary tract infection. . She also  has a past surgical history that includes flexible sigmoidoscopy (N/A, 1/24/2022); Upper gastrointestinal endoscopy (02/27/2018); Cardiac catheterization; other surgical history; Hysterectomy; orthopedic surgery; pr abdomen surgery proc unlisted; gyn; other surgical history; Appendectomy; Brain aneurysm surgery; Cholecystectomy (2000); and gi. Subjective: Very pleasant. Oriented to person, place, and time. Patient Position: Upright in bed    Communication Observation: Functional  Follows Directions: Complex  Respiratory Status: O2 via nasual cannula              Prior Dysphagia History: Evaluated by speech in March 2022. Recommendations for regular diet/thin liquids at that time. Current Diet : NPO       Pain:   Patient does not c/o pain                                          OBJECTIVE    Oral Motor Exam      Oral Motor   Labial: No impairment  Dentition: Intact  Oral Hygiene: Moist;Clean  Lingual: No impairment  Mandible: No impairment    Oropharyngeal Phase:     Assessment Method(s): Observation;Palpation  Patient Position: Upright in bed  Vocal Quality: No Impairment  Consistency Presented: Mixed consistency; Regular;Thin;Pureed  How Presented: Self-fed/presented;Cup/sip;Cup/gulp; Spoon;Straw;Successive Swallows  Bolus Acceptance: No impairment  Bolus Formation/Control: No impairment  Propulsion: No impairment  Oral Residue: None  Initiation of Swallow: No impairment  Laryngeal Elevation: Functional  Aspiration Signs/Symptoms: None  Pharyngeal Phase Characteristics: No impairment, issues, or problems        Oral Phase - Comment: WFL  Pharyngeal Phase: WFL    PLAN    Duration/Frequency: No treatment indicated at this time    Dysphagia Outcome and Severity Scale (KLAUDIA)  Dysphagia Outcome Severity Scale: Level 7: Normal in all situations  Interpretation of Tool: The Dysphagia Outcome and Severity Scale (KLAUDIA) is a simple, easy-to-use, 7-point scale developed to systematically rate the functional severity of dysphagia based on objective assessment and make recommendations for diet level, independence level, and type of nutrition. Normal(7), Functional(6), Mild(5), Mild-Moderate(4), Moderate(3), Moderate-Severe(2), Severe(1)    Speech Therapy Prognosis  Prognosis: Good  Prognosis Considerations: Medical Diagnosis    Education: Hadley Santoyo Patient Education: Dysphagia, role of SLP   Patient Education Response: Verbalizes understanding    Current Medications:   No current facility-administered medications on file prior to encounter. Current Outpatient Medications on File Prior to Encounter   Medication Sig Dispense Refill    Lancets MISC Use as directed 3 times daily to check blood sugars. Dx:E11.29      acetaminophen (TYLENOL) 650 MG extended release tablet Take 650 mg by mouth every 8 hours      amLODIPine (NORVASC) 10 MG tablet Take 10 mg by mouth daily      apixaban (ELIQUIS) 5 MG TABS tablet Take 5 mg by mouth 2 times daily      Arformoterol Tartrate (BROVANA) 15 MCG/2ML NEBU Inhale 15 mcg into the lungs 2 times daily      budesonide (PULMICORT) 0.5 MG/2ML nebulizer suspension Inhale 500 mcg into the lungs 2 times daily      ferrous sulfate (IRON 325) 325 (65 Fe) MG tablet Take 325 mg by mouth daily (with breakfast)      fluticasone-salmeterol (ADVAIR) 250-50 MCG/DOSE AEPB Inhale 1 puff into the lungs every 12 hours      gabapentin (NEURONTIN) 100 MG capsule Take 100 mg by mouth 3 times daily.  hydrALAZINE (APRESOLINE) 25 MG tablet Take 25 mg by mouth 3 times daily      insulin lispro (HUMALOG) 100 UNIT/ML injection vial INITIATE INSULIN CORRECTIVE PROTOCOL:Normal Insulin Sensitivity For Blood Sugar (mg/dL) of: Less than 150 = 0 units 150 -199 = 2 [id number] -249 = 4 lbksl503 -299 = 6 hsifa367 -349 = 8 [id number] and above = 10 units and Call PhysicianInitiate Hypoglycemia protocol if blood glucose is <70 mg/dL Fast Acting - Administer Immediately - or within 15 minutes of start of meal, if mealtime coverage.       ipratropium-albuterol (DUONEB) 0.5-2.5 (3) MG/3ML SOLN nebulizer solution Inhale 3 mLs into the lungs every 4 hours as needed      metFORMIN (GLUCOPHAGE) 500 MG tablet Take 500 mg by mouth 2 times daily (with meals)      metoprolol tartrate (LOPRESSOR) 25 MG tablet Take 12.5 mg by mouth 2 times daily      pantoprazole (PROTONIX) 20 MG tablet Take 40 mg by mouth daily      polyethylene glycol (GLYCOLAX) 17 GM/SCOOP powder Take 17 g by mouth daily (Patient not taking: Reported on 6/2/2022)      pravastatin (PRAVACHOL) 40 MG tablet TAKE 1 TABLET BY MOUTH EVERY NIGHT AT BEDTIME      sertraline (ZOLOFT) 100 MG tablet Take 100 mg by mouth daily         PRECAUTIONS/ALLERGIES: Patient has no known allergies.    Safety Devices in place: Yes  Type of devices: Left in bed,Nurse notified    Therapy Time  SLP Individual Minutes  Time In: 8782  Time Out: 1645  Minutes: Gricel Romov [address], [address] [id number] [facility]  6/3/2022 10:02 AM

## 2022-06-03 NOTE — PROGRESS NOTES
Occupational Therapy Note:    Patient admitted s/p mechanical fall in the home resulting in R hip pain. No imaging yet of R hip. Will hold mobilization and assessment of patient until cleared by imaging. Will follow and re-attempt as schedule permits/patient available.  Thank you,    Sharri Garcia, OT    Rehab Caseload Tracker

## 2022-06-03 NOTE — CARE COORDINATION
Chart reveiwed and met with son at bedside, Paul Stanford, s/p admission to ICU for hyperkalemia AMPARO, DKA, acute cystitis. Awake and alert sitting up in bed. Paul Stanford confirms demographics. Pt uses walker at home to ambulate. Daughter assist with ADL's. No current HH or rehab, but requesting assist. Therapy recommending STR. List given to son. Explained would need to check if accepts [facility] MCR/ERNESTINA. He understands as went through this before. Pt has been to [facility]. Preference is [facility]. List of choices also given for [facility]. Discussed applying for CLTC with [facility] and information given. Unsure at present which they would prefer. He does know pt will need EMS transport for home or facility. PPD placed and PT/OT following. Aware CM to continue to follow for d/c needs/POC.        06/03/22 1511   Service Assessment   Cognition Alert   Primary [place] is: Legal Next of [address]   PCP Verified by CM Yes   Prior Functional Level Assistance with the following:;Bathing   Can patient return to prior living arrangement Yes   Ability to make needs known: Fair   Family able to assist with home care needs: Yes   Financial Resources Other (Comment)  Dania Glaserpipo MCR/ERNESTINA dual)   Social/Functional History   Lives With Daughter  Gustavo Brooke)   Type of [address] One level   Home Access   (5 steps front/2 steps back)   Bathroom Toilet Bedside commode   Home Equipment Walker, [address] Help From Family   ADL Assistance Needs assistance   Active  No   Patient's  Info family   Occupation Retired   Discharge Planning   Current Services Prior To Admission [address]   Patient expects to be discharged to:  Unknown   Condition of Participation: Discharge Planning   Freedom of Choice list was provided with basic dialogue that supports the patient's individualized plan of care/goals, treatment preferences, and shares the quality data associated with the providers?   Yes

## 2022-06-03 NOTE — PROGRESS NOTES
OCCUPATIONAL THERAPY Initial Assessment, Daily Note and AM       OT Visit Days: 1   Acknowledge Orders  Time  OT Charge Capture  Rehab Caseload Tracker      Lubna Panchal is a 80 y.o. female   PRIMARY DIAGNOSIS: DKA, type 2, not at goal [facility])  Hyperkalemia [E87.5]  AMPARO (acute kidney injury) ([facility] [N17.9]  DKA, type 2, not at goal [facility]) [E11.10]  Acute cystitis without hematuria [N30.00]  Diabetic ketoacidosis without coma associated with other specified diabetes mellitus ([facility] [E13.10]       Reason for Referral: Generalized Muscle Weakness (M62.81)  History of falling (Z91.81)  Inpatient: Payor: [facility] MEDICARE / Plan: Antwon SYKES DUAL / Product Type: *No Product type* /     ASSESSMENT:     REHAB RECOMMENDATIONS:   Recommendation to date pending progress:  Setting:   Short-term Rehab    Equipment:     To Be Determined     ASSESSMENT:    [employer] AM-PAC 6 Clicks Daily Activity Inpatient Short Form:    AM-PAC Daily Activity Inpatient   How much help for putting on and taking off regular lower body clothing?: A Lot  How much help for Bathing?: A Lot  How much help for Toileting?: A Lot  How much help for putting on and taking off regular upper body clothing?: A Little  How much help for taking care of personal grooming?: A Little  How much help for eating meals?: A Little  AM-[facility] Inpatient Daily Activity Raw Score: 15  AM-PAC Inpatient ADL T-Scale Score : 34.69  ADL Inpatient CMS 0-100% Score: 56.46  ADL Inpatient CMS G-Code Modifier : CK  Ms. Quan Szymanski presents with deficits in overall strength, activity tolerance, ADL performance, and functional mobility. Presents in ICU for DKA and UTI; s/p fall in the home with onset of R hip pain (imaging came back negative). BUE (4/5) are generally decreased but WFL. Min A for functional bed mobility/supine <> sit transfer to EOB; fair  EOB unsupported sitting balance.  Min A for sit <> stand; unsteady in static standing with use of RW with poor standing balance. Patient only able to take ~2 side steps before returning to sitting. Min A for return back to supine in bed. Endorses no pain with mobility today. At this time, Neel Miranda is functioning below baseline for ADL performance and functional mobility. Patient would benefit from skilled OT services to address goals and plan of care. SUBJECTIVE:     Ms. Remedios Wasserman states, \"I'm going to get stronger\"     Social/Functional    Lives with daughter in 1-story home with 2 steps to enter. Daughter assists with household tasks; pt reporting daughter provides set-up for bathing and dressing tasks otherwise she is independent with ADLs. Has SC. Uses RW for mobility. Hx of falls.    OBJECTIVE:     Rosemarie Muro / Naveed Batch / AIRWAY: Artis Catheter    RESTRICTIONS/PRECAUTIONS:  Restrictions/Precautions: Fall Risk    PAIN: VITALS / O2:   Pre Treatment:   Numeric 0-10     0/10    Post Treatment: 0/10       Vitals      WFL    Oxygen   on supplemental 02; but Sp02 WFL       GROSS EVALUATION:  BUEs INTACT IMPAIRED   (See Comments)   UE AROM [] [] generally decreased   UE PROM [] []generally decreased   Strength []    Generally decreased but WFL     Posture / Balance []    Fair sitting balance EOB with CGA provided  Poor standing balance with constant support   Sensation [x]       Coordination [x]         Tone [x]         Edema [x] NA    Activity Tolerance   []    Generally decreased on supplemental 02     COGNITION/  PERCEPTION: INTACT IMPAIRED   (See Comments)   Orientation []  mild confusion but oriented to person and place   Vision [x]     Hearing [x]     Cognition  [x] Mild confusion     MOBILITY:  I Mod I S SBA CGA Min Mod Max Total  NT x2 Comments:   Bed Mobility    Rolling [] [] [] [] [] [x] [] [] [] [] []    Supine to Sit [] [] [] [] [] [x] [] [] [] [] []    Scooting [] [] [] [] [] [x] [] [] [] [] []    Sit to Supine [] [] [] [] [] [] [x] [] [] [] []    Transfers    Sit to Stand [] [] [] [] [] [x] [] [] [] [] []    Bed to Chair [] [] [] [] [] [] [] [] [] [x] []    Stand to Sit [] [] [] [] [] [x] [] [] [] [] []    I=Independent, Mod I=Modified Independent, S=Supervision/Setup, SBA=Standby Assistance, CGA=Contact Guard Assistance, Min=Minimal Assistance, Mod=Moderate Assistance, Max=Maximal Assistance, Total=Total Assistance, NT=Not Tested    ACTIVITIES OF DAILY LIVING:  I Mod I S SBA CGA Min Mod Max Total NT Comments   BASIC ADLs:              Bathing/ Showering [] [] [] [] [] [] [] [] [] [x]    Toileting [] [] [] [] [] [] [] [] [] [x]    Upper Body Dressing [] [] [] [] [] [] [] [] [] [x]    Lower Body Dressing [] [] [] [] [] [] [] [] [] [x]    Feeding [] [] [] [] [] [] [] [] [] [x]    Grooming [] [] [] [] [] [] [] [] [] [x]    Personal Device Care [] [] [] [] [] [] [] [] [] [x]    Functional Mobility [] [] [] [] [] [x] [] [] [] [] Bed mobility; only able to take side step with min A x RW   I=Independent, Mod I=Modified Independent, S=Supervision/Setup, SBA=Standby Assistance, CGA=Contact Guard Assistance, Min=Minimal Assistance, Mod=Moderate Assistance, Max=Maximal Assistance, Total=Total Assistance, NT=Not Tested    PLAN:     FREQUENCY/DURATION   OT Plan of Care: 3 times/week for duration of hospital stay or until stated goals are met, whichever comes first.    ACUTE OCCUPATIONAL THERAPY GOALS:   (Developed with and agreed upon by patient and/or caregiver.)  1. Patient will complete lower body bathing and dressing with min A and adaptive equipment as needed. 2. Patient will complete toileting with min A.   3. Patient will tolerate 30 minutes of OT treatment with 1-2 rest breaks to increase activity tolerance for ADLs. 4. Patient will complete functional transfers with SBA and adaptive equipment as needed. 5. Patient will complete functional mobility for household distances with SBA and adaptive equipment as needed.   6. Patient will complete functional activity while seated EOB with SBA and adaptive equipment as needed. Timeframe: 7 visits         PROBLEM LIST:   (Skilled intervention is medically necessary to address:)  Decreased ADL/Functional Activities  Decreased Activity Tolerance  Decreased AROM/PROM  Decreased Balance  Decreased Cognition  Decreased Gait Ability  Decreased Safety Awareness  Decreased Strength  Decreased Transfer Abilities  Increased Pain   INTERVENTIONS PLANNED:  (Benefits and precautions of occupational therapy have been discussed with the patient.)  Self Care Training  Therapeutic Activity  Therapeutic Exercise/HEP  Neuromuscular Re-education  Manual Therapy  Education         TREATMENT:     EVALUATION: LOW COMPLEXITY: (Untimed Charge)    TREATMENT:   Therapeutic Activity (10 Minutes): Therapeutic activity included Rolling, Supine to Sit, Sit to Supine, Scooting, Lateral Scooting, Transfer Training, Sitting balance  and Standing balance to improve functional Activity tolerance, Balance, Coordination, Mobility and Strength. TREATMENT GRID:  N/A    AFTER TREATMENT PRECAUTIONS: Bed, Call light within reach and RN notified    INTERDISCIPLINARY COLLABORATION:  RN/ PCT and OT/ AREVALO    EDUCATION:   Education Given To: Patient; Family  Education Provided: Role of Therapy;Plan of Care    TOTAL TREATMENT DURATION AND TIME:  Time In: 1315  Time Out: 1335  Minutes: 20    Catrachita Hidden, OTR/L, CLT

## 2022-06-04 NOTE — PROGRESS NOTES
Hospitalist Progress Note   Admit Date:  2022 12:06 PM   Name:  Seema Dumont   Age:  80 y.o. Sex:  female  :  1929   MRN:  [id number]   Room:  [facility]/    Presenting Complaint: Fall     Reason(s) for Admission: Hyperkalemia [E87.5]  AMPARO (acute kidney injury) ([facility] [address] [N17.9]  DKA, type 2, not at goal [facility]) [E11.10]  Acute cystitis without hematuria [N30.00]  Diabetic ketoacidosis without coma associated with other specified diabetes mellitus [facility]) [E13.10]     Hospital Course & Interval History:   80years old female with history of CKD stage III, peripheral artery disease, diabetes type 2, dementia, frequent falls presented to emergency room after trip and fall. Patient was trying to get out of chair when she tripped and fell on right side hitting her right hip without loss of consciousness as per family. Patient reported of right hip pain and pain in left knee. Patient was not eating and drinking fluids very well for past 1 week duration, patient was recently visited [facility] ER for UTI and was given prescription for antibiotics for UTI. Patient was evaluated by physician assistant in ER, further trauma work-up is negative, urinalysis shows mild UTI but patient is in DKA with serum bicarb of 13, creatinine of 1.78 count is elevated at 17.4. Chest x-ray shows bilateral groundglass interstitial opacities. Emergency room physician requested admission of this patient for further evaluation and management     Admitted for DKA, AMPARO, metabolic encephalopathy,, required pneumonia and frequent falls. Later on developed mild wheezing, copd ex, requiring steroids and nebs. Increase oxygen requirement on , normally on 2 lit/min, presently on - on 4 lit/min. cxr- stable infiltrates. Subjective/24hr Events (22):  6/3  Patient presently on sliding scale insulin and Lantus, off insulin drip.   Later on today speech evaluated and started the patient on regular, thin liquid and medication in purée. Patient is on 2 L of oxygen, chronic at home  Says breathing okay  One of the blood culture-gram-positive cocci, ID panel strep    6/4  As per staff mild shortness of this morning, requiring 4 L/min, was given a dose of morphine. Left arm examination patient awake alert oriented x3 does not appear to be in significant respiratory stress, still requiring 4 L/min, says breathing better. Assessment & Plan:     Sepsis present at admission/ strep bacteremia  Elevated wbc, increased Resp rate, infection  Got rocephin and vanc in er  Presently on rocephin and doxycyclin  6/4- cont antibiotis    DKA   resolved   On sliding scale, with meals and lantus      PNA  Rocephin and zithromax    ?uti  Urine culture pending    Xavier  Improving- cont ivf  6/4- creat 1.8      Copd exa on 6/3  Added steroids and nebs  6/4- minimal wheezing      Afib ,h/o dvt -on eliquis    Acute on Chronic hypoxic resp failure , normally on 2 lit/min, on 6/4 - requiring 4 lit/min  Cxr, stable infiltrates    htn  Hydralzine, metoprolol    Depression /anxiety  Cont zoloft    GERD  Cont famotidine      H/o previous covid. Frequent fall- debility - normally walks with walker at home, uses sheridan chair when she goes out  PT and OT consult        Diet:  [address]; Breakfast, Dinner; Low Calorie/High Protein Oral Supplement  ADULT DIET; Easy to Chew; Likes unsweet tea with her meals  DVT PPx: Eliquis  Code status: DNR    Hospital Problems:  Principal Problem:    DKA, type 2, not at goal [facility])  Active Problems:    Acute kidney injury ([address]    Metabolic encephalopathy    CAP (community acquired pneumonia)    Sepsis ([facility] [address]    Frequent falls    Depression    UTI (urinary tract infection)    GERD (gastroesophageal reflux disease)    Type 2 diabetes mellitus with diabetic neuropathy (HCC)    Essential hypertension    Anxiety    Asthma    Neuropathy  Resolved Problems:    * No resolved hospital problems.  *      Objective: Patient Vitals for the past 24 hrs:   Temp Pulse Resp BP SpO2   06/04/22 0813 97.6 °F (36.4 °C) -- -- -- --   06/04/22 0800 -- 72 -- 125/63 --   06/04/22 0739 -- 81 14 -- 95 %   06/04/22 0636 -- 72 23 -- 91 %   06/04/22 0630 -- 74 20 -- 92 %   06/04/22 0625 -- 70 23 -- 97 %   06/04/22 0617 -- 68 13 -- 100 %   06/04/22 0616 -- 68 12 -- 100 %   06/04/22 0601 -- 69 18 138/67 100 %   06/04/22 0546 -- 70 15 -- 98 %   06/04/22 0531 -- 79 17 -- --   06/04/22 0516 -- 76 16 -- 96 %   06/04/22 0501 -- 75 22 -- 97 %   06/04/22 0446 -- 73 26 -- 97 %   06/04/22 0431 -- 80 27 -- 96 %   06/04/22 0416 -- 73 15 -- 95 %   06/04/22 0401 -- 82 21 (!) 148/75 91 %   06/04/22 0346 -- 81 18 -- 91 %   06/04/22 0342 -- 78 29 -- --   06/04/22 0338 -- 77 17 -- 95 %   06/04/22 0317 -- 72 13 -- 97 %   06/04/22 0302 98.4 °F (36.9 °C) 75 19 (!) 120/95 91 %   06/04/22 0247 -- -- -- -- (!) 83 %   06/04/22 0232 -- 71 15 -- 100 %   06/04/22 0217 -- 80 -- -- 90 %   06/04/22 0202 -- 72 27 (!) 115/94 98 %   06/04/22 0147 -- 73 14 -- 97 %   06/04/22 0134 -- 74 -- -- 95 %   06/04/22 0119 -- 74 20 -- 100 %   06/04/22 0104 -- 75 23 -- 93 %   06/04/22 0049 -- 75 -- -- 95 %   06/04/22 0034 -- 76 25 -- 96 %   06/04/22 0019 -- 74 17 -- 96 %   06/04/22 0004 -- 76 19 (!) 145/69 95 %   06/03/22 2349 -- 73 27 -- 97 %   06/03/22 2334 -- 77 18 -- 97 %   06/03/22 2319 -- 72 28 -- 98 %   06/03/22 2307 -- 74 -- 129/81 --   06/03/22 2304 98.4 °F (36.9 °C) 75 18 (!) 146/127 98 %   06/03/22 2249 -- 74 15 -- 95 %   06/03/22 2234 -- 78 -- -- (!) 85 %   06/03/22 2219 -- 73 11 -- 98 %   06/03/22 2204 -- 81 29 (!) 145/67 91 %   06/03/22 2156 -- 77 19 -- (!) 88 %   06/03/22 2141 -- 79 18 -- 99 %   06/03/22 2126 -- 78 21 -- 100 %   06/03/22 2111 -- 82 15 -- 97 %   06/03/22 2056 -- 86 -- -- (!) 89 %   06/03/22 2041 -- 82 16 -- 96 %   06/03/22 2026 -- 84 19 -- 94 %   06/03/22 2011 -- 83 -- -- 91 %   06/03/22 1954 -- 90 30 -- --   06/03/22 1944 -- 87 24 -- 93 %   06/03/22 1905 98.5 °F (36.9 °C) 79 17 (!) 124/58 97 %   06/03/22 1826 -- 83 30 134/66 94 %   06/03/22 1720 -- 87 19 -- 96 %   06/03/22 1704 -- 80 24 124/71 96 %   06/03/22 1604 -- 82 28 134/66 97 %   06/03/22 1503 98.8 °F (37.1 °C) 79 21 124/72 94 %   06/03/22 1404 -- 79 (!) 31 120/62 94 %   06/03/22 1305 -- 80 27 (!) 146/63 97 %   06/03/22 1223 98.3 °F (36.8 °C) -- -- -- --   06/03/22 1204 -- 74 17 (!) 118/59 98 %   06/03/22 1119 -- 74 (!) 32 (!) 120/58 96 %   06/03/22 0949 -- 87 19 139/82 96 %       Oxygen Therapy  SpO2: 95 %  Pulse Oximeter Device Mode: Continuous  Pulse Oximeter Device Location: Finger  O2 Device: Nasal cannula  Skin Assessment: Clean, dry, & intact  Skin Protection for O2 Device: No  O2 Flow Rate (L/min): 4 L/min    Estimated body mass index is 28.84 kg/m² as calculated from the following:    Height as of this encounter: 5' 2\" (1.575 m). Weight as of this encounter: 157 lb 11.2 oz (71.5 kg). Intake/Output Summary (Last 24 hours) at 6/4/2022 0940  Last data filed at 6/4/2022 0459  Gross per 24 hour   Intake 2045.42 ml   Output 230 ml   Net 1815.42 ml         Physical Exam:     Blood pressure 125/63, pulse 72, temperature 97.6 °F (36.4 °C), temperature source Axillary, resp. rate 14, height 5' 2\" (1.575 m), weight 157 lb 11.2 oz (71.5 kg), SpO2 95 %. General:    Well nourished. Mild resp distress,  on 4 lit/min  Head:  Normocephalic, atraumatic  Eyes:  Sclerae appear normal.  Pupils equally round. ENT:  Nares appear normal, no drainage. Moist oral mucosa  Neck:  No restricted ROM. Trachea midline   CV:   RRR. No m/r/g. No jugular venous distension. Lungs:   Bilateral coarse breath sounds  Abdomen: Bowel sounds present. Soft, nontender, nondistended. Extremities: No cyanosis or clubbing. No edema  Skin:     No rashes and normal coloration. Warm and dry. Neuro:  CN II-XII grossly intact. Sensation intact. A&Ox3  Psych:  Normal mood and affect.       I have reviewed ordered lab tests and independently visualized imaging below:    Recent Labs:  Recent Results (from the past 48 hour(s))   POCT Glucose    Collection Time: 06/02/22  3:38 PM   Result Value Ref Range    POC Glucose 474 (HH) 65 - 100 mg/dL    Performed by: Marsha Zuñiga    CBC with Auto Differential    Collection Time: 06/02/22  4:20 PM   Result Value Ref Range    WBC 17.4 (H) 4.3 - 11.1 K/uL    RBC 3.36 (L) 4.05 - 5.2 M/uL    Hemoglobin 9.9 (L) 11.7 - 15.4 g/dL    Hematocrit 34.0 (L) 35.8 - 46.3 %    .2 (H) 79.6 - 97.8 FL    MCH 29.5 26.1 - 32.9 PG    MCHC 29.1 (L) 31.4 - 35.0 g/dL    RDW 14.3 11.9 - 14.6 %    Platelets 034 929 - 864 K/uL    MPV 12.0 9.4 - 12.3 FL    nRBC 0.00 0.0 - 0.2 K/uL    Differential Type AUTOMATED      Seg Neutrophils 86 (H) 43 - 78 %    Lymphocytes 9 (L) 13 - 44 %    Monocytes 4 4.0 - 12.0 %    Eosinophils % 0 (L) 0.5 - 7.8 %    Basophils 0 0.0 - 2.0 %    Immature Granulocytes 1 0.0 - 5.0 %    Segs Absolute 14.9 (H) 1.7 - 8.2 K/UL    Absolute Lymph # 1.5 0.5 - 4.6 K/UL    Absolute Mono # 0.7 0.1 - 1.3 K/UL    Absolute Eos # 0.0 0.0 - 0.8 K/UL    Basophils Absolute 0.0 0.0 - 0.2 K/UL    Absolute Immature Granulocyte 0.2 0.0 - 0.5 K/UL   CMP    Collection Time: 06/02/22  4:20 PM   Result Value Ref Range    Sodium 140 136 - 145 mmol/L    Potassium 5.2 (H) 3.5 - 5.1 mmol/L    Chloride 108 (H) 98 - 107 mmol/L    CO2 13 (L) 21 - 32 mmol/L    Anion Gap 19 (H) 7 - 16 mmol/L    Glucose 412 (H) 65 - 100 mg/dL    BUN 51 (H) 8 - 23 MG/DL    CREATININE 1.70 (H) 0.6 - 1.0 MG/DL    GFR  36 (L) >60 ml/min/1.73m2    GFR Non- 30 (L) >60 ml/min/1.73m2    Calcium 10.2 8.3 - 10.4 MG/DL    Total Bilirubin 0.5 0.2 - 1.1 MG/DL    ALT 58 12 - 65 U/L    AST 60 (H) 15 - 37 U/L    Alk Phosphatase 111 50 - 136 U/L    Total Protein 7.5 6.3 - 8.2 g/dL    Albumin 2.1 (L) 3.2 - 4.6 g/dL    Globulin 5.4 (H) 2.3 - 3.5 g/dL    Albumin/Globulin Ratio 0.4 (L) 1.2 - 3.5     Urinalysis w rflx microscopic Collection Time: 06/02/22  4:31 PM   Result Value Ref Range    Color, UA YELLOW      Appearance HAZY      Specific Gravity, UA 1.025 (H) 1.001 - 1.023      pH, Urine 5.5 5.0 - 9.0      Protein, UA >300 (A) NEG mg/dL    Glucose,  mg/dL    Ketones, Urine 15 (A) NEG mg/dL    Bilirubin Urine Negative NEG      Blood, Urine SMALL (A) NEG      Urobilinogen, Urine 0.2 0.2 - 1.0 EU/dL    Nitrite, Urine Negative NEG      Leukocyte Esterase, Urine TRACE (A) NEG     Urinalysis, Micro    Collection Time: 06/02/22  4:31 PM   Result Value Ref Range    WBC, UA 3-5 0 /hpf    RBC, UA 0-3 0 /hpf    Epithelial Cells UA 0-3 0 /hpf    BACTERIA, URINE 4+ (H) 0 /hpf    Casts HYALINE 0 /lpf    Crystals 0 0 /LPF    Mucus, UA 0 0 /lpf    OTHER OBSERVATIONS RESULTS VERIFIED MANUALLY     Culture, Urine    Collection Time: 06/02/22  4:31 PM    Specimen: URINE-CLEAN CATCH   Result Value Ref Range    Special Requests NO SPECIAL REQUESTS      Culture        No growth after short period of incubation. Further results to follow after overnight incubation.    Lactic Acid    Collection Time: 06/02/22  6:22 PM   Result Value Ref Range    Lactic Acid, Plasma 2.1 (H) 0.4 - 2.0 MMOL/L   Blood Culture 2    Collection Time: 06/02/22  6:22 PM    Specimen: Blood   Result Value Ref Range    Special Requests LEFT  ARM        Gram stain GRAM POS COCCI IN CHAINS      Gram stain AEROBIC AND ANAEROBIC BOTTLES      Gram stain        RESULTS VERIFIED, PHONED TO AND READ BACK BY SHAINA FONSECA @1901 6.3.22 SC    Culture (A)       STREPTOCOCCI, BETA HEMOLYTIC IDENTIFICATION AND SUSCEPTIBILITY TO FOLLOW    Culture        Refer to Blood Culture ID Panel Accession  [id number]     Culture, Blood ID Sensitivity    Collection Time: 06/02/22  6:22 PM    Specimen: Blood   Result Value Ref Range    ACCESSION NUMBER, [id number]     STREPTOCOCCUS Detected (A) NOTDET      Streptococcus agalactiae (Group B) Detected (A) NOTDET      Interpretation        Gram Positive cocci, Identified by realtime PCR as  Streptococcus agalactiae (Group B Strep)   POCT Glucose    Collection Time: 06/02/22  7:03 PM   Result Value Ref Range    POC Glucose 408 (H) 65 - 100 mg/dL    Performed by: Ezekiel    POC Blood, Cord, Arterial    Collection Time: 06/02/22  7:12 PM   Result Value Ref Range    pH, Arterial, POC 7.45 7.35 - 7.45      pCO2, Arterial, POC 26.6 (L) 35 - 45 MMHG    pO2, Arterial, POC 78 75 - 100 MMHG    HCO3, Mixed 18.4 (L) 22 - 26 MMOL/L    SO2c, Arterial, POC 96.2 95 - 98 %    BASE DEFICIT (POC) 4.9 mmol/L    POC Calvin's Test Negative      Site RIGHT RADIAL      Specimen type: ARTERIAL      Performed by: Russell    POCT Glucose    Collection Time: 06/02/22  7:34 PM   Result Value Ref Range    POC Glucose 404 (H) 65 - 100 mg/dL    Performed by: Herman    Blood Culture 1    Collection Time: 06/02/22  7:50 PM    Specimen: Blood   Result Value Ref Range    Special Requests RIGHT  FOREARM        Culture NO GROWTH 2 DAYS     EKG 12 Lead    Collection Time: 06/02/22  8:02 PM   Result Value Ref Range    Ventricular Rate 95 BPM    Atrial Rate 153 BPM    P-R Interval 162 ms    QRS Duration 137 ms    Q-T Interval 365 ms    QTc Calculation (Bazett) 459 ms    P Axis 55 degrees    R Axis -30 degrees    T Axis 43 degrees    Diagnosis Sinus tachycardia    POCT Glucose    Collection Time: 06/02/22  8:48 PM   Result Value Ref Range    POC Glucose 366 (H) 65 - 100 mg/dL    Performed by: Herman    POCT Glucose    Collection Time: 06/02/22  9:47 PM   Result Value Ref Range    POC Glucose 302 (H) 65 - 100 mg/dL    Performed by:  [address] [place] [address]    Basic Metabolic Panel    Collection Time: 06/02/22  9:51 PM   Result Value Ref Range    Sodium 144 136 - 145 mmol/L    Potassium 3.8 3.5 - 5.1 mmol/L    Chloride 113 (H) 98 - 107 mmol/L    CO2 20 (L) 21 - 32 mmol/L    Anion Gap 11 7 - 16 mmol/L    Glucose 243 (H) 65 - 100 mg/dL    BUN 53 (H) 8 - 23 MG/DL    CREATININE 1.80 (H) 0.6 - 1.0 MG/DL    GFR  34 (L) >60 ml/min/1.73m2    GFR Non- 28 (L) >60 ml/min/1.73m2    Calcium 9.5 8.3 - 10.4 MG/DL   Magnesium    Collection Time: 06/02/22  9:51 PM   Result Value Ref Range    Magnesium 1.9 1.8 - 2.4 mg/dL   Phosphorus    Collection Time: 06/02/22  9:51 PM   Result Value Ref Range    Phosphorus 3.0 2.3 - 3.7 MG/DL   POCT Glucose    Collection Time: 06/02/22 10:46 PM   Result Value Ref Range    POC Glucose 209 (H) 65 - 100 mg/dL    Performed by: Gisele    POCT Glucose    Collection Time: 06/02/22 11:49 PM   Result Value Ref Range    POC Glucose 142 (H) 65 - 100 mg/dL    Performed by: Gisele    POCT Glucose    Collection Time: 06/03/22 12:51 AM   Result Value Ref Range    POC Glucose 128 (H) 65 - 100 mg/dL    Performed by: Gisele    POCT Glucose    Collection Time: 06/03/22  1:55 AM   Result Value Ref Range    POC Glucose 126 (H) 65 - 100 mg/dL    Performed by: [address] [place] [address]    Basic Metabolic Panel    Collection Time: 06/03/22  2:46 AM   Result Value Ref Range    Sodium 144 136 - 145 mmol/L    Potassium 4.3 3.5 - 5.1 mmol/L    Chloride 113 (H) 98 - 107 mmol/L    CO2 22 21 - 32 mmol/L    Anion Gap 9 7 - 16 mmol/L    Glucose 124 (H) 65 - 100 mg/dL    BUN 56 (H) 8 - 23 MG/DL    CREATININE 1.70 (H) 0.6 - 1.0 MG/DL    GFR  36 (L) >60 ml/min/1.73m2    GFR Non- 30 (L) >60 ml/min/1.73m2    Calcium 9.4 8.3 - 10.4 MG/DL   Magnesium    Collection Time: 06/03/22  2:46 AM   Result Value Ref Range    Magnesium 1.7 (L) 1.8 - 2.4 mg/dL   Phosphorus    Collection Time: 06/03/22  2:46 AM   Result Value Ref Range    Phosphorus 2.9 2.3 - 3.7 MG/DL   POCT Glucose    Collection Time: 06/03/22  2:51 AM   Result Value Ref Range    POC Glucose 135 (H) 65 - 100 mg/dL    Performed by:  Gisele    POCT Glucose    Collection Time: 06/03/22  3:48 AM   Result Value Ref Range    POC Glucose 165 (H) 65 - 100 mg/dL    Performed by: Gisele    POCT Glucose    Collection Time: 06/03/22  4:52 AM   Result Value Ref Range    POC Glucose 185 (H) 65 - 100 mg/dL    Performed by: Gisele    POCT Glucose    Collection Time: 06/03/22  5:52 AM   Result Value Ref Range    POC Glucose 219 (H) 65 - 100 mg/dL    Performed by:  [address] [place] [address]    Basic Metabolic Panel    Collection Time: 06/03/22  6:13 AM   Result Value Ref Range    Sodium 140 136 - 145 mmol/L    Potassium 5.4 (H) 3.5 - 5.1 mmol/L    Chloride 112 (H) 98 - 107 mmol/L    CO2 21 21 - 32 mmol/L    Anion Gap 7 7 - 16 mmol/L    Glucose 198 (H) 65 - 100 mg/dL    BUN 60 (H) 8 - 23 MG/DL    CREATININE 1.70 (H) 0.6 - 1.0 MG/DL    GFR  36 (L) >60 ml/min/1.73m2    GFR Non- 30 (L) >60 ml/min/1.73m2    Calcium 9.3 8.3 - 10.4 MG/DL   Magnesium    Collection Time: 06/03/22  6:13 AM   Result Value Ref Range    Magnesium 2.1 1.8 - 2.4 mg/dL   Phosphorus    Collection Time: 06/03/22  6:13 AM   Result Value Ref Range    Phosphorus 3.2 2.3 - 3.7 MG/DL   POCT Glucose    Collection Time: 06/03/22 10:02 AM   Result Value Ref Range    POC Glucose 218 (H) 65 - 100 mg/dL    Performed by: Monique    CBC    Collection Time: 06/03/22 10:20 AM   Result Value Ref Range    WBC 14.8 (H) 4.3 - 11.1 K/uL    RBC 2.76 (L) 4.05 - 5.2 M/uL    Hemoglobin 8.2 (L) 11.7 - 15.4 g/dL    Hematocrit 26.8 (L) 35.8 - 46.3 %    MCV 97.1 79.6 - 97.8 FL    MCH 29.7 26.1 - 32.9 PG    MCHC 30.6 (L) 31.4 - 35.0 g/dL    RDW 14.5 11.9 - 14.6 %    Platelets 067 472 - 742 K/uL    MPV 12.1 9.4 - 12.3 FL    nRBC 0.02 0.0 - 0.2 K/uL   Magnesium    Collection Time: 06/03/22 10:20 AM   Result Value Ref Range    Magnesium 2.5 (H) 1.8 - 2.4 mg/dL   POCT Glucose    Collection Time: 06/03/22 12:00 PM   Result Value Ref Range    POC Glucose 244 (H) 65 - 100 mg/dL    Performed by: Monique    POCT Glucose    Collection Time: 06/03/22  4:56 PM Result Value Ref Range    POC Glucose 317 (H) 65 - 100 mg/dL    Performed by: Anatoliy    POCT Glucose    Collection Time: 06/03/22  9:08 PM   Result Value Ref Range    POC Glucose 342 (H) 65 - 100 mg/dL    [facility]    POCT Glucose    Collection Time: 06/04/22  3:37 AM   Result Value Ref Range    POC Glucose 206 (H) 65 - 100 mg/dL    [facility]:  [facility]    Basic Metabolic Panel    Collection Time: 06/04/22  3:38 AM   Result Value Ref Range    Sodium 140 136 - 145 mmol/L    Potassium 4.5 3.5 - 5.1 mmol/L    Chloride 110 (H) 98 - 107 mmol/L    CO2 19 (L) 21 - 32 mmol/L    Anion Gap 11 7 - 16 mmol/L    Glucose 188 (H) 65 - 100 mg/dL    BUN 63 (H) 8 - 23 MG/DL    CREATININE 1.80 (H) 0.6 - 1.0 MG/DL    GFR  34 (L) >60 ml/min/1.73m2    GFR Non- 28 (L) >60 ml/min/1.73m2    Calcium 8.7 8.3 - 10.4 MG/DL   Magnesium    Collection Time: 06/04/22  3:38 AM   Result Value Ref Range    Magnesium 2.2 1.8 - 2.4 mg/dL   Phosphorus    Collection Time: 06/04/22  3:38 AM   Result Value Ref Range    Phosphorus 2.8 2.3 - 3.7 MG/DL   CBC with Auto Differential    Collection Time: 06/04/22  3:38 AM   Result Value Ref Range    WBC 12.8 (H) 4.3 - 11.1 K/uL    RBC 2.54 (L) 4.05 - 5.2 M/uL    Hemoglobin 7.5 (L) 11.7 - 15.4 g/dL    Hematocrit 25.2 (L) 35.8 - 46.3 %    MCV 99.2 (H) 79.6 - 97.8 FL    MCH 29.5 26.1 - 32.9 PG    MCHC 29.8 (L) 31.4 - 35.0 g/dL    RDW 14.6 11.9 - 14.6 %    Platelets 820 034 - 848 K/uL    MPV 12.1 9.4 - 12.3 FL    nRBC 0.02 0.0 - 0.2 K/uL    Differential Type AUTOMATED      Seg Neutrophils 79 (H) 43 - 78 %    Lymphocytes 13 13 - 44 %    Monocytes 6 4.0 - 12.0 %    Eosinophils % 0 (L) 0.5 - 7.8 %    Basophils 0 0.0 - 2.0 %    Immature Granulocytes 2 0.0 - 5.0 %    Segs Absolute 10.2 (H) 1.7 - 8.2 K/UL    Absolute Lymph # 1.6 0.5 - 4.6 K/UL    Absolute Mono # 0.7 0.1 - 1.3 K/UL    Absolute Eos # 0.0 0.0 - 0.8 K/UL    Basophils Absolute 0.0 0.0 - 0.2 K/UL    Absolute Immature Granulocyte 0.3 0.0 - 0.5 K/UL   POCT Glucose    Collection Time: 06/04/22  8:21 AM   Result Value Ref Range    POC Glucose 217 (H) 65 - 100 mg/dL    Performed by: Isiah Severin        Other Studies:  XR KNEE LEFT (1-2 VIEWS)    Result Date: 6/2/2022  Right hip series HISTORY: Pain after fall 3 views were obtained including an AP view of the pelvis. COMPARISON: 12/14/2021 FINDINGS: There is no evidence of acute fracture or dislocation. The joint space is well-preserved. There is no bony destruction. There is diffuse low bone mineral density. Atherosclerotic changes are present. No evidence of acute bony abnormality. Left knee series HISTORY: Pain after fall 2 views were obtained. Comparison: 05/22/2022 Findings: There is no evidence of acute fracture or dislocation. The joint spaces are well-preserved. There is no joint effusion. There is no bony destruction. Atherosclerotic changes are present. There is diffuse low bone mineral density. IMPRESSION: No evidence of acute bony abnormality. CT HEAD WO CONTRAST    Result Date: 6/2/2022  CT head and cervical spine without contrast HISTORY: Fall TECHNIQUE: 5 mm axial images were obtained from the skull base to the vertex without intravenous contrast. Helically acquired images were obtained spine reconstructed at 2.5 mm thickness. Sagittal and coronal reformatted images were submitted. All CT scans at this facility are performed using dose optimization technique as appropriate to a performed exam, to include on automated exposure control, adjustment of the mA and/or KV according to patient's size (including appropriate matching for site-specific examinations), or use of iterative reconstruction technique. COMPARISON: 12/14/2021 CT head without contrast: Findings: The ventricles and sulci are appropriate for age. There are encephalomalacic changes within the right frontal and temporal lobes.  There are no extra-axial Findings: The ventricles and sulci are appropriate for age. There are encephalomalacic changes within the right frontal and temporal lobes. There are no extra-axial fluid collections. Patchy areas of decreased attenuation are present within the supratentorial white matter. These are nonspecific findings but would be most compatible with moderate chronic small vessel ischemic change. No evidence of acute intracranial hemorrhage or mass effect is identified. There is no evidence to suggest an acute major territorial infarct. Right frontal temporal craniotomy defect is present. . There is an extensive left mastoid effusion representing an interval change. CT cervical spine without contrast: Findings: There is motion artifact resulting in mild compromise in interpretation The vertebral body height and alignment are well maintained. There is moderately advanced degenerative disc disease at C5-6. There is no evidence of acute fracture or subluxation. The prevertebral soft tissues are unremarkable. Atherosclerotic changes are present involving the left carotid bifurcation. There is a partially right pleural effusion. 1. No evidence of acute intracranial hemorrhage. 2. Mildly compromised examination without evidence of acute cervical spine fracture. 3. Cervical spondylosis. 4. Chronic small vessel ischemic change. 5. Partial imaged right pleural effusion. XR CHEST PORTABLE    Result Date: 6/2/2022  Portable chest: History: Fall, altered mental status Comparison: 04/07/2022 Findings: A single view of the chest was obtained at 1502 hours. The cardiac silhouette is mildly enlarged. Bilateral groundglass and interstitial opacities are present. There are no significant pleural effusions. No significant change in bilateral interstitial and groundglass opacities. XR HIP 2-3 VW W PELVIS RIGHT    Result Date: 6/2/2022  Right hip series HISTORY: Pain after fall 3 views were obtained including an AP view of the pelvis. COMPARISON: 12/14/2021 FINDINGS: There is no evidence of acute fracture or dislocation. The joint space is well-preserved. There is no bony destruction. There is diffuse low bone mineral density. Atherosclerotic changes are present. No evidence of acute bony abnormality. Left knee series HISTORY: Pain after fall 2 views were obtained. Comparison: 05/22/2022 Findings: There is no evidence of acute fracture or dislocation. The joint spaces are well-preserved. There is no joint effusion. There is no bony destruction. Atherosclerotic changes are present. There is diffuse low bone mineral density. IMPRESSION: No evidence of acute bony abnormality.         Current Meds:  Current Facility-Administered Medications   Medication Dose Route Frequency    acetaminophen (TYLENOL) tablet 650 mg  650 mg Oral Q4H PRN    0.9 % sodium chloride infusion   IntraVENous Continuous    insulin glargine (LANTUS) injection vial 10 Units  10 Units SubCUTAneous Daily    glucose chewable tablet 16 g  4 tablet Oral PRN    dextrose bolus 10% 125 mL  125 mL IntraVENous PRN    Or    dextrose bolus 10% 250 mL  250 mL IntraVENous PRN    glucagon injection 1 mg  1 mg IntraMUSCular PRN    dextrose 5 % solution  100 mL/hr IntraVENous PRN    apixaban (ELIQUIS) tablet 5 mg  5 mg Oral BID    famotidine (PEPCID) tablet 20 mg  20 mg Oral Daily    tuberculin injection 5 Units  5 Units IntraDERmal Once    albuterol (PROVENTIL) nebulizer solution 2.5 mg  2.5 mg Nebulization Q6H PRN    methylPREDNISolone sodium (SOLU-MEDROL) injection 40 mg  40 mg IntraVENous Q12H    insulin lispro (HUMALOG) injection vial 0-8 Units  0-8 Units SubCUTAneous 4x Daily AC & HS    ipratropium-albuterol (DUONEB) nebulizer solution 1 ampule  1 ampule Inhalation Q4H WA    dextrose bolus 10% 250 mL  250 mL IntraVENous PRN    insulin regular (HUMULIN R;NOVOLIN R) injection 0-10 Units  0-10 Units IntraVENous PRN    potassium chloride 10 mEq/100 mL IVPB (Peripheral Line) 10 mEq IntraVENous PRN    magnesium sulfate 1000 mg in dextrose 5% 100 mL IVPB  1,000 mg IntraVENous PRN    sodium phosphate 10 mmol in sodium chloride 0.9 % 250 mL IVPB  10 mmol IntraVENous PRN    Or    sodium phosphate 15 mmol in dextrose 5 % 250 mL IVPB  15 mmol IntraVENous PRN    Or    sodium phosphate 20 mmol in dextrose 5 % 500 mL IVPB  20 mmol IntraVENous PRN    polyethylene glycol (GLYCOLAX) packet 17 g  17 g Oral Daily PRN    cefTRIAXone (ROCEPHIN) 2000 mg IVPB in NS 50ml minibag  2,000 mg IntraVENous Q24H    doxycycline hyclate (VIBRAMYCIN) capsule 100 mg  100 mg Oral 2 times per day    budesonide (PULMICORT) nebulizer suspension 500 mcg  500 mcg Nebulization BID    hydrALAZINE (APRESOLINE) tablet 25 mg  25 mg Oral TID    gabapentin (NEURONTIN) capsule 100 mg  100 mg Oral TID    metoprolol tartrate (LOPRESSOR) tablet 12.5 mg  12.5 mg Oral BID    pravastatin (PRAVACHOL) tablet 10 mg  10 mg Oral Nightly    sertraline (ZOLOFT) tablet 100 mg  100 mg Oral Daily    morphine injection 1 mg  1 mg IntraVENous Q4H PRN       Signed:  Daya Ralph MD

## 2022-06-05 PROBLEM — B95.5 STREPTOCOCCAL BACTEREMIA: Status: ACTIVE | Noted: 2022-01-01

## 2022-06-05 PROBLEM — R78.81 STREPTOCOCCAL BACTEREMIA: Status: ACTIVE | Noted: 2022-01-01

## 2022-06-05 NOTE — PROGRESS NOTES
Bedside and verbal shift change report received from  [facility] (offgoing nurse). Report included the following information SBAR, Kardex, Intake/Output, MAR, Recent Results, Cardiac Rhythm NSR, Alarm Parameters  and Quality Measures.      Dual skin assessment completed at bedside: scattered ecchymosis (list pertinent skin assessment findings)    Dual verification of gtts completed (name of gtts verified): n/a

## 2022-06-05 NOTE — PROGRESS NOTES
TRANSFER - OUT REPORT:    Verbal report given to Maria A(name) on Andrew Hernandez  being transferred to [facility](unit) for routine progression of patient care       Report consisted of patients Situation, Background, Assessment and   Recommendations(SBAR). Information from the following report(s) Nurse Handoff Report was reviewed with the receiving nurse. Opportunity for questions and clarification was provided.       Patient transported with:   Monitor  O2 @ 2 liters

## 2022-06-05 NOTE — PROGRESS NOTES
[facility] CRITICAL CARE OUTREACH NURSE PROGRESS REPORT      SUBJECTIVE: Called to assess patient secondary to  [facility] ICU [id number]. @[facility]([id number])@  Vitals:    06/05/22 1303 06/05/22 1304 06/05/22 1403 06/05/22 1512   BP:  (!) 145/96 132/67 123/61   Pulse: 82  71 70   Resp:  17 21 17   Temp:    98.1 °F (36.7 °C)   TempSrc:    Oral   SpO2:  96% 100% 94%   Weight:       Height:          EKG: normal EKG, normal sinus rhythm, unchanged from previous tracings. LAB DATA:    Recent Labs     06/03/22  0246 06/03/22  0246 06/03/22  7635 06/03/22  1020 06/04/22 0338 06/05/22 0327      < > 140  --  140 140   K 4.3   < > 5.4*  --  4.5 4.9   *   < > 112*  --  110* 112*   CO2 22   < > 21  --  19* 19*   BUN 56*   < > 60*  --  63* 78*   GFRAA 36*   < > 36*  --  34* 32*   MG 1.7*   < > 2.1 2.5* 2.2  --    PHOS 2.9  --  3.2  --  2.8  --     < > = values in this interval not displayed. Recent Labs     06/03/22  1020 06/04/22 0338 06/05/22 0327   WBC 14.8* 12.8* 11.3*   HGB 8.2* 7.5* 8.0*   HCT 26.8* 25.2* 27.0*    185 155          OBJECTIVE: On arrival to room, I found patient to be sitting up in bed.   ASSESSMENT:  Pt doing well after transfer/ remains on 2L/ A/O x3 following commands/ no acute needs at this time/ denies pain/ RR even and unlabored    PLAN:  Will continue to follow per outreach protocol

## 2022-06-05 NOTE — PROGRESS NOTES
PHYSICAL THERAPY Initial Assessment and AM  (Link to Caseload Tracking: PT Visit Days : 1  Acknowledge Orders  Time In/Out  PT Charge Capture  Rehab Caseload Tracker    Tami Barker is a 80 y.o. female   PRIMARY DIAGNOSIS: DKA, type 2, not at goal [facility])  Hyperkalemia [E87.5]  AMPARO (acute kidney injury) ([facility] [address] [N17.9]  DKA, type 2, not at goal [facility]) [E11.10]  Acute cystitis without hematuria [N30.00]  Diabetic ketoacidosis without coma associated with other specified diabetes mellitus ([facility] [address] [E13.10]       Reason for Referral: Generalized Muscle Weakness (M62.81)  Other lack of cordination (R27.8)  Difficulty in walking, Not elsewhere classified (R26.2)  Other abnormalities of gait and mobility (R26.89)  Inpatient: Payor: [facility] MEDICARE / Plan: Alverto SMITH [facility] DUAL / Product Type: *No Product type* /     ASSESSMENT:     REHAB RECOMMENDATIONS:   Recommendation to date pending progress:  Setting:   Short-term Rehab    Equipment:     To Be Determined     ASSESSMENT:  Ms. Jason Bowling is a pleasant frail elderly female with above diagnosis who demonstrates with decreased transfers, ambulation and mobility below her prior functional baseline. All transfers are currently limited at [facility] AT [facility] - MOD A x 1 out of bed to sit and stand with very slow transfer performance followed by close guard PT assistance and standing 3 step pivot transfer to the bedside chair. Her sitting balance and standing balance are fair. She remains seated in the bedside chair. Skilled PT is indicated for this patient's functional mobility deficits.         [address] [facility] [address] [address] AM-PAC 6 Clicks Basic Mobility Inpatient Short Form  AM-PAC Mobility Inpatient   How much difficulty turning over in bed?: A Lot  How much difficulty sitting down on / standing up from a chair with arms?: A Lot  How much difficulty moving from lying on back to sitting on side of bed?: A Lot  How much help from another person moving to and from a bed to a chair?: A Little  How much help from another person needed to walk in hospital room?: A Little  How much help from another person for climbing 3-5 steps with a railing?: A Little  AM-PAC Inpatient Mobility Raw Score : 15  AM-PAC Inpatient T-Scale Score : 39.45  Mobility Inpatient CMS 0-100% Score: 57.7  Mobility Inpatient CMS G-Code Modifier : CK    SUBJECTIVE:   Ms. Eid Ranks states, \"I will sit up with you. \"     Social/Functional Lives With: Daughter Floyd Ariza  Type of Home: House  Home Layout: One level  Home Access:  (5 steps front/2 steps back)  Bathroom Toilet: Bedside commode  Home Equipment: Walker, [address] Help From: Family  ADL Assistance: Needs assistance  Active : No  Patient's  Info: family  Occupation: Retired    OBJECTIVE:     PAIN: VITALS / O2: PRECAUTION / Keo Courtney / Jannetta Hun:   Pre Treatment:     NONE       Post Treatment: none  Vitals        Oxygen 4 liters 02       Continuous Pulse Oximetry, IV and Telemetry     RESTRICTIONS/PRECAUTIONS:  Restrictions/Precautions: Fall Risk                 GROSS EVALUATION: Intact Impaired (Comments):   AROM [x]     PROM [x]    Strength [x]   3/5 or above    Balance [x]  FAIR   Posture [] Forward Head  Rounded Shoulders  Thoracic Kyphosis   Sensation [x]     Coordination [x]      Tone [x]     Edema [x]    Activity Tolerance [] Patient limited by fatigue    []      COGNITION/  PERCEPTION: Intact Impaired (Comments):   Orientation [x]     Vision [x]     Hearing [x]   speak in left ear    Cognition  [x]       MOBILITY: I Mod I S SBA CGA Min Mod Max Total  NT x2 Comments:   Bed Mobility    Rolling [] [] [] [] [] [x] [x] [] [] [] []    Supine to Sit [] [] [] [] [] [x] [x] [] [] [] []    Scooting [] [] [] [] [] [x] [x] [] [] [] []    Sit to Supine [] [] [] [] [] [x] [x] [] [] [] []    Transfers    Sit to Stand [] [] [] [] [] [x] [x] [] [] [] []    Bed to Chair [] [] [] [] [] [x] [x] [] [] [] []    Stand to Sit [] [] [] [] [] [x] [x] [] [] [] [] [] [] [] [] [] [x] [x] [] [] [] []    I=Independent, Mod I=Modified Independent, S=Supervision, SBA=Standby Assistance, CGA=Contact Guard Assistance,   Min=Minimal Assistance, Mod=Moderate Assistance, Max=Maximal Assistance, Total=Total Assistance, NT=Not Tested    GAIT: I Mod I S SBA CGA Min Mod Max Total  NT x2 Comments:   Level of Assistance [] [] [] [] [] [x] [x] [] [] [] []    Distance 3 feet    DME Rolling Walker    Gait Quality Decreased giovani , Decreased step clearance, Decreased step length, Decreased stance, Narrow base of support and Trunk sway increased    Weightbearing Status      Stairs      I=Independent, Mod I=Modified Independent, S=Supervision, SBA=Standby Assistance, CGA=Contact Guard Assistance,   Min=Minimal Assistance, Mod=Moderate Assistance, Max=Maximal Assistance, Total=Total Assistance, NT=Not Tested    PLAN:   ACUTE PHYSICAL THERAPY GOALS:   (Developed with and agreed upon by patient and/or caregiver.)  STG:  (1.)Ms. Bill Webster will move from supine to sit and sit to supine , scoot up and down and roll side to side with CONTACT GUARD ASSIST within 4 treatment day(s). (2.)Ms. Bill Webster will transfer from bed to chair and chair to bed with CONTACT GUARD ASSIST using the least restrictive device within 4 treatment day(s). (3.)Ms. Bill Webster will ambulate with CONTACT GUARD ASSIST for 50 feet with the least restrictive device within 4 treatment day(s). LTG:  (1.)Ms. Bill Webster will move from supine to sit and sit to supine , scoot up and down and roll side to side in bed with STAND BY ASSIST within 7 treatment day(s). (2.)Ms. Bill Webster will transfer from bed to chair and chair to bed with STAND BY ASSIST using the least restrictive device within 7 treatment day(s). (3.)Ms. Bill Webster will ambulate with STAND BY ASSIST for 100 feet with the least restrictive device within 7 treatment day(s). ________________________________________________________________________________________________    FREQUENCY AND DURATION: 3 times/week for duration of hospital stay or until stated goals are met, whichever comes first.    THERAPY PROGNOSIS: Good    PROBLEM LIST:   (Skilled intervention is medically necessary to address:)  Decreased Balance  Decreased Cognition  Decreased Coordination  Decreased Gait Ability  Decreased Safety Awareness  Decreased Strength  Decreased Transfer Abilities INTERVENTIONS PLANNED:   (Benefits and precautions of physical therapy have been discussed with the patient.)  Therapeutic Activity  Therapeutic Exercise/HEP  Neuromuscular Re-education  Gait Training  Modalities       TREATMENT:   EVALUATION: LOW COMPLEXITY: (Untimed Charge)    TREATMENT:   Therapeutic Activity (23 Minutes): Therapeutic activity included Rolling, Supine to Sit, Sit to Supine, Scooting, Lateral Scooting, Transfer Training, Ambulation on level ground, Sitting balance  and Standing balance to improve functional Activity tolerance, Balance, Coordination, Mobility, Strength and ROM.     TREATMENT GRID:  N/A    AFTER TREATMENT PRECAUTIONS: Call light within reach, Chair and RN notified    INTERDISCIPLINARY COLLABORATION:  RN/ PCT and PT/ PTA    EDUCATION: Education Given To: Patient  Education Provided: Plan of Care  Education Method: Demonstration  Barriers to Learning: None  Education Outcome: Verbalized understanding    TIME IN/OUT:  Time In: 1115  Time Out: 5732  Minutes: [facility], [facility]

## 2022-06-05 NOTE — PROGRESS NOTES
Hospitalist Progress Note   Admit Date:  2022 12:06 PM   Name:  Carlos Liang   Age:  80 y.o. Sex:  female  :  1929   MRN:  [id number]   Room:  [facility]/    Presenting Complaint: Fall     Reason(s) for Admission: Hyperkalemia [E87.5]  AMPARO (acute kidney injury) ([facility] [address] [N17.9]  DKA, type 2, not at goal [facility]) [E11.10]  Acute cystitis without hematuria [N30.00]  Diabetic ketoacidosis without coma associated with other specified diabetes mellitus [facility]) [E13.10]     Hospital Course & Interval History:   80years old female with history of CKD stage III, peripheral artery disease, diabetes type 2, dementia, frequent falls presented to emergency room after trip and fall. Patient was trying to get out of chair when she tripped and fell on right side hitting her right hip without loss of consciousness as per family. Patient reported of right hip pain and pain in left knee. Patient was not eating and drinking fluids very well for past 1 week duration, patient was recently visited [facility] ER for UTI and was given prescription for antibiotics for UTI. Patient was evaluated by physician assistant in ER, further trauma work-up is negative, urinalysis shows mild UTI but patient is in DKA with serum bicarb of 13, creatinine of 1.78 count is elevated at 17.4. Chest x-ray shows bilateral groundglass interstitial opacities. Emergency room physician requested admission of this patient for further evaluation and management     Admitted for DKA, AMPARO, metabolic encephalopathy,, required pneumonia and frequent falls. Later on developed mild wheezing, copd ex, requiring steroids and nebs. Increase oxygen requirement on , normally on 2 lit/min, presently on - on 4 lit/min. cxr- stable infiltrates. Blood culture, strep and coag negative, ID was consulted for .uti Gram negative rods. Subjective/24hr Events (22):  6/3  Patient presently on sliding scale insulin and Lantus, off insulin drip.   Later on today speech evaluated and started the patient on regular, thin liquid and medication in purée. Patient is on 2 L of oxygen, chronic at home  Says breathing okay  One of the blood culture-gram-positive cocci, ID panel strep    6/4  As per staff mild shortness of this morning, requiring 4 L/min, was given a dose of morphine. Left arm examination patient awake alert oriented x3 does not appear to be in significant respiratory stress, still requiring 4 L/min, says breathing better. 6/5  Awake alert on 4 L of oxygen nasal cannula  Decreased urine output  Creatinine of 1.9    Assessment & Plan:     Sepsis present at admission/ strep bacteremia  Elevated wbc, increased Resp rate, infection  Got rocephin and vanc in er  Presently on rocephin and doxycyclin  6/4- cont antibiotis  6/5-repeat blood cultures negative for now, previous culture strep agalactiae and coag negative  Continue antibiotics, will consult ID tomorrow. Acute on Chronic hypoxic resp failure , normally on 2 lit/min, on 6/4 - requiring 4 lit/min  Cxr, stable infiltrates  6/5-stable at 4 L/min    DKA   resolved   On sliding scale, with meals and lantus    PNA  Rocephin and zithromax    UTI  Urine culture gram-negative rods    Xavier  Improving- cont ivf  6/4- creat 1.8  6/5-creatinine 1.9, increased fluids from 50 to 75 cc/h      Copd exa on 6/3  Added steroids and nebs  6/4- minimal wheezing  6/5-improved wheezing, will decrease Solu-Medrol to daily      Afib ,h/o dvt -on eliquis    htn  Hydralzine, metoprolol    Depression /anxiety  Cont zoloft    GERD  Cont famotidine      H/o previous covid. Frequent fall- debility - normally walks with walker at home, uses sheridan chair when she goes out  PT and OT consult        Diet:  [address]; Breakfast, Dinner;  Low Calorie/High Protein Oral Supplement  ADULT DIET; Easy to Chew; Likes unsweet tea with her meals  DVT PPx: Eliquis  Code status: DNR    Hospital Problems:  Principal Problem:    DKA, type 2, not at goal [facility])  Active Problems:    Acute kidney injury ([facility] [address]    Metabolic encephalopathy    CAP (community acquired pneumonia)    Sepsis ([facility] [address]    Frequent falls    Depression    UTI (urinary tract infection)    GERD (gastroesophageal reflux disease)    Type 2 diabetes mellitus with diabetic neuropathy (HCC)    Essential hypertension    Anxiety    Asthma    Neuropathy  Resolved Problems:    * No resolved hospital problems.  *      Objective:     Patient Vitals for the past 24 hrs:   Temp Pulse Resp BP SpO2   06/05/22 0840 -- 67 -- 136/68 --   06/05/22 0742 97.6 °F (36.4 °C) -- -- -- --   06/05/22 0740 -- 70 14 -- 98 %   06/05/22 0704 -- 66 30 113/75 100 %   06/05/22 0600 -- 58 (!) 8 117/64 100 %   06/05/22 0500 -- 57 12 (!) 110/58 100 %   06/05/22 0400 -- 62 10 132/66 100 %   06/05/22 0318 97.5 °F (36.4 °C) 61 11 124/66 100 %   06/05/22 0300 -- 62 16 134/73 100 %   06/05/22 0200 -- 59 11 119/64 100 %   06/05/22 0100 -- 61 (!) 9 117/79 100 %   06/05/22 0000 -- 61 (!) 8 123/64 100 %   06/04/22 2316 97.6 °F (36.4 °C) 69 13 (!) 114/59 93 %   06/04/22 2300 -- 59 (!) 9 -- 100 %   06/04/22 2200 -- 60 (!) 9 110/61 100 %   06/04/22 2028 -- 67 12 123/71 97 %   06/04/22 2027 -- 67 -- 123/71 --   06/04/22 2004 -- 66 15 129/63 98 %   06/04/22 1918 -- 73 -- -- --   06/04/22 1910 97.5 °F (36.4 °C) 77 24 (!) 153/95 100 %   06/04/22 1805 -- 75 (!) 33 (!) 126/102 --   06/04/22 1614 -- 69 16 130/71 --   06/04/22 1600 97.7 °F (36.5 °C) -- -- -- --   06/04/22 1538 -- 72 20 -- 100 %   06/04/22 1453 -- 73 24 120/82 93 %   06/04/22 1404 -- 65 21 127/62 98 %   06/04/22 1254 97.5 °F (36.4 °C) -- -- -- --   06/04/22 1230 -- -- -- -- 92 %   06/04/22 1205 -- 68 21 136/71 100 %   06/04/22 1155 -- 68 21 -- 100 %   06/04/22 1145 -- -- -- -- 92 %   06/04/22 1004 -- 63 14 (!) 116/57 100 %       Oxygen Therapy  SpO2: 98 %  Pulse Oximeter Device Mode: Continuous  Pulse Oximeter Device Location: Forehead  O2 Device: Nasal cannula  Oximetry Probe Site Changed: Yes  Skin Assessment: Clean, dry, & intact  Skin Protection for O2 Device: N/A  O2 Flow Rate (L/min): 4 L/min    Estimated body mass index is 27.44 kg/m² as calculated from the following:    Height as of this encounter: 5' 2\" (1.575 m). Weight as of this encounter: 150 lb (68 kg). Intake/Output Summary (Last 24 hours) at 6/5/2022 0905  Last data filed at 6/5/2022 0900  Gross per 24 hour   Intake 1747.99 ml   Output --   Net 1747.99 ml         Physical Exam:     Blood pressure 136/68, pulse 67, temperature 97.6 °F (36.4 °C), temperature source Oral, resp. rate 14, height 5' 2\" (1.575 m), weight 150 lb (68 kg), SpO2 98 %. General:    Well nourished. Mild resp distress,  on 4 lit/min  Head:  Normocephalic, atraumatic  Eyes:  Sclerae appear normal.  Pupils equally round. ENT:  Nares appear normal, no drainage. Moist oral mucosa  Neck:  No restricted ROM. Trachea midline   CV:   RRR. ? Systolic murmur  Lungs:   Bilateral coarse breath sounds  Abdomen: Bowel sounds present. Soft, nontender, nondistended. Chronic abdominal wall ventral hernia  Extremities: No cyanosis or clubbing. No edema  Skin:     No rashes and normal coloration. Warm and dry. Neuro:  CN II-XII grossly intact. Sensation intact. A&Ox3  Psych:  Normal mood and affect.       I have reviewed ordered lab tests and independently visualized imaging below:    Recent Labs:  Recent Results (from the past 48 hour(s))   POCT Glucose    Collection Time: 06/03/22 10:02 AM   Result Value Ref Range    POC Glucose 218 (H) 65 - 100 mg/dL    Performed by: Monique    CBC    Collection Time: 06/03/22 10:20 AM   Result Value Ref Range    WBC 14.8 (H) 4.3 - 11.1 K/uL    RBC 2.76 (L) 4.05 - 5.2 M/uL    Hemoglobin 8.2 (L) 11.7 - 15.4 g/dL    Hematocrit 26.8 (L) 35.8 - 46.3 %    MCV 97.1 79.6 - 97.8 FL    MCH 29.7 26.1 - 32.9 PG    MCHC 30.6 (L) 31.4 - 35.0 g/dL    RDW 14.5 11.9 - 14.6 %    Platelets 907 150 - 450 K/uL    MPV 12.1 9.4 - 12.3 FL    nRBC 0.02 0.0 - 0.2 K/uL   Magnesium    Collection Time: 06/03/22 10:20 AM   Result Value Ref Range    Magnesium 2.5 (H) 1.8 - 2.4 mg/dL   POCT Glucose    Collection Time: 06/03/22 12:00 PM   Result Value Ref Range    POC Glucose 244 (H) 65 - 100 mg/dL    Performed by: Monique    POCT Glucose    Collection Time: 06/03/22  4:56 PM   Result Value Ref Range    POC Glucose 317 (H) 65 - 100 mg/dL    Performed by: Anatoliy    POCT Glucose    Collection Time: 06/03/22  9:08 PM   Result Value Ref Range    POC Glucose 342 (H) 65 - 100 mg/dL    Performed by: Boyce (Sarah)KELLIE    POCT Glucose    Collection Time: 06/04/22  3:37 AM   Result Value Ref Range    POC Glucose 206 (H) 65 - 100 mg/dL    Per[facility]:  [facility]    Basic Metabolic Panel    Collection Time: 06/04/22  3:38 AM   Result Value Ref Range    Sodium 140 136 - 145 mmol/L    Potassium 4.5 3.5 - 5.1 mmol/L    Chloride 110 (H) 98 - 107 mmol/L    CO2 19 (L) 21 - 32 mmol/L    Anion Gap 11 7 - 16 mmol/L    Glucose 188 (H) 65 - 100 mg/dL    BUN 63 (H) 8 - 23 MG/DL    CREATININE 1.80 (H) 0.6 - 1.0 MG/DL    GFR  34 (L) >60 ml/min/1.73m2    GFR Non- 28 (L) >60 ml/min/1.73m2    Calcium 8.7 8.3 - 10.4 MG/DL   Magnesium    Collection Time: 06/04/22  3:38 AM   Result Value Ref Range    Magnesium 2.2 1.8 - 2.4 mg/dL   Phosphorus    Collection Time: 06/04/22  3:38 AM   Result Value Ref Range    Phosphorus 2.8 2.3 - 3.7 MG/DL   CBC with Auto Differential    Collection Time: 06/04/22  3:38 AM   Result Value Ref Range    WBC 12.8 (H) 4.3 - 11.1 K/uL    RBC 2.54 (L) 4.05 - 5.2 M/uL    Hemoglobin 7.5 (L) 11.7 - 15.4 g/dL    Hematocrit 25.2 (L) 35.8 - 46.3 %    MCV 99.2 (H) 79.6 - 97.8 FL    MCH 29.5 26.1 - 32.9 PG    MCHC 29.8 (L) 31.4 - 35.0 g/dL    RDW 14.6 11.9 - 14.6 %    Platelets 719 475 - 101 K/uL    MPV 12.1 9.4 - 12.3 FL    nRBC 0.02 0.0 - 0.2 K/uL    Differential Type AUTOMATED      Seg Neutrophils 79 (H) 43 - 78 %    Lymphocytes 13 13 - 44 %    Monocytes 6 4.0 - 12.0 %    Eosinophils % 0 (L) 0.5 - 7.8 %    Basophils 0 0.0 - 2.0 %    Immature Granulocytes 2 0.0 - 5.0 %    Segs Absolute 10.2 (H) 1.7 - 8.2 K/UL    Absolute Lymph # 1.6 0.5 - 4.6 K/UL    Absolute Mono # 0.7 0.1 - 1.3 K/UL    Absolute Eos # 0.0 0.0 - 0.8 K/UL    Basophils Absolute 0.0 0.0 - 0.2 K/UL    Absolute Immature Granulocyte 0.3 0.0 - 0.5 K/UL   POCT Glucose    Collection Time: 06/04/22  8:21 AM   Result Value Ref Range    POC Glucose 217 (H) 65 - 100 mg/dL    Performed by: Monique    Culture, Blood 1    Collection Time: 06/04/22  8:46 AM    Specimen: Blood   Result Value Ref Range    Special Requests RIGHT  HAND        Culture NO GROWTH AFTER 20 HOURS     Culture, Blood 1    Collection Time: 06/04/22  8:46 AM    Specimen: Blood   Result Value Ref Range    Special Requests LEFT  HAND        Culture NO GROWTH AFTER 20 HOURS     POCT Glucose    Collection Time: 06/04/22 11:59 AM   Result Value Ref Range    POC Glucose 328 (H) 65 - 100 mg/dL    Performed by: Monique    POCT Glucose    Collection Time: 06/04/22  5:43 PM   Result Value Ref Range    POC Glucose 367 (H) 65 - 100 mg/dL    Performed by: Monique    PLEASE READ & DOCUMENT PPD TEST IN 24 HRS    Collection Time: 06/04/22  6:41 PM   Result Value Ref Range    PPD, (POC) Negative Negative    mm Induration 0 0 - 5 mm   POCT Glucose    Collection Time: 06/04/22  8:31 PM   Result Value Ref Range    POC Glucose 278 (H) 65 - 100 mg/dL    Performed by: Joan    Basic Metabolic Panel    Collection Time: 06/05/22  3:27 AM   Result Value Ref Range    Sodium 140 136 - 145 mmol/L    Potassium 4.9 3.5 - 5.1 mmol/L    Chloride 112 (H) 98 - 107 mmol/L    CO2 19 (L) 21 - 32 mmol/L    Anion Gap 9 7 - 16 mmol/L    Glucose 149 (H) 65 - 100 mg/dL    BUN 78 (H) 8 - 23 MG/DL    CREATININE 1.90 (H) 0.6 - 1.0 MG/DL    GFR  American 32 (L) >60 ml/min/1.73m2    GFR Non- 26 (L) >60 ml/min/1.73m2    Calcium 9.5 8.3 - 10.4 MG/DL   CBC with Auto Differential    Collection Time: 06/05/22  3:27 AM   Result Value Ref Range    WBC 11.3 (H) 4.3 - 11.1 K/uL    RBC 2.73 (L) 4.05 - 5.2 M/uL    Hemoglobin 8.0 (L) 11.7 - 15.4 g/dL    Hematocrit 27.0 (L) 35.8 - 46.3 %    MCV 98.9 (H) 79.6 - 97.8 FL    MCH 29.3 26.1 - 32.9 PG    MCHC 29.6 (L) 31.4 - 35.0 g/dL    RDW 14.6 11.9 - 14.6 %    Platelets 560 160 - 327 K/uL    MPV 12.4 (H) 9.4 - 12.3 FL    nRBC 0.04 0.0 - 0.2 K/uL    Differential Type AUTOMATED      Seg Neutrophils 74 43 - 78 %    Lymphocytes 17 13 - 44 %    Monocytes 6 4.0 - 12.0 %    Eosinophils % 0 (L) 0.5 - 7.8 %    Basophils 0 0.0 - 2.0 %    Immature Granulocytes 3 0.0 - 5.0 %    Segs Absolute 8.5 (H) 1.7 - 8.2 K/UL    Absolute Lymph # 1.9 0.5 - 4.6 K/UL    Absolute Mono # 0.7 0.1 - 1.3 K/UL    Absolute Eos # 0.0 0.0 - 0.8 K/UL    Basophils Absolute 0.0 0.0 - 0.2 K/UL    Absolute Immature Granulocyte 0.3 0.0 - 0.5 K/UL   POCT Glucose    Collection Time: 06/05/22  8:33 AM   Result Value Ref Range    POC Glucose 131 (H) 65 - 100 mg/dL    Performed by: Monique        Other Studies:  XR KNEE LEFT (1-2 VIEWS)    Result Date: 6/2/2022  Right hip series HISTORY: Pain after fall 3 views were obtained including an AP view of the pelvis. COMPARISON: 12/14/2021 FINDINGS: There is no evidence of acute fracture or dislocation. The joint space is well-preserved. There is no bony destruction. There is diffuse low bone mineral density. Atherosclerotic changes are present. No evidence of acute bony abnormality. Left knee series HISTORY: Pain after fall 2 views were obtained. Comparison: 05/22/2022 Findings: There is no evidence of acute fracture or dislocation. The joint spaces are well-preserved. There is no joint effusion. There is no bony destruction. Atherosclerotic changes are present.  There is diffuse low bone mineral density. IMPRESSION: No evidence of acute bony abnormality. CT HEAD WO CONTRAST    Result Date: 6/2/2022  CT head and cervical spine without contrast HISTORY: Fall TECHNIQUE: 5 mm axial images were obtained from the skull base to the vertex without intravenous contrast. Helically acquired images were obtained spine reconstructed at 2.5 mm thickness. Sagittal and coronal reformatted images were submitted. All CT scans at this facility are performed using dose optimization technique as appropriate to a performed exam, to include on automated exposure control, adjustment of the mA and/or KV according to patient's size (including appropriate matching for site-specific examinations), or use of iterative reconstruction technique. COMPARISON: 12/14/2021 CT head without contrast: Findings: The ventricles and sulci are appropriate for age. There are encephalomalacic changes within the right frontal and temporal lobes. There are no extra-axial fluid collections. Patchy areas of decreased attenuation are present within the supratentorial white matter. These are nonspecific findings but would be most compatible with moderate chronic small vessel ischemic change. No evidence of acute intracranial hemorrhage or mass effect is identified. There is no evidence to suggest an acute major territorial infarct. Right frontal temporal craniotomy defect is present. . There is an extensive left mastoid effusion representing an interval change. CT cervical spine without contrast: Findings: There is motion artifact resulting in mild compromise in interpretation The vertebral body height and alignment are well maintained. There is moderately advanced degenerative disc disease at C5-6. There is no evidence of acute fracture or subluxation. The prevertebral soft tissues are unremarkable. Atherosclerotic changes are present involving the left carotid bifurcation. There is a partially right pleural effusion.      1. No evidence of acute unremarkable. Atherosclerotic changes are present involving the left carotid bifurcation. There is a partially right pleural effusion. 1. No evidence of acute intracranial hemorrhage. 2. Mildly compromised examination without evidence of acute cervical spine fracture. 3. Cervical spondylosis. 4. Chronic small vessel ischemic change. 5. Partial imaged right pleural effusion. XR CHEST PORTABLE    Result Date: 6/2/2022  Portable chest: History: Fall, altered mental status Comparison: 04/07/2022 Findings: A single view of the chest was obtained at 1502 hours. The cardiac silhouette is mildly enlarged. Bilateral groundglass and interstitial opacities are present. There are no significant pleural effusions. No significant change in bilateral interstitial and groundglass opacities. XR HIP 2-3 VW W PELVIS RIGHT    Result Date: 6/2/2022  Right hip series HISTORY: Pain after fall 3 views were obtained including an AP view of the pelvis. COMPARISON: 12/14/2021 FINDINGS: There is no evidence of acute fracture or dislocation. The joint space is well-preserved. There is no bony destruction. There is diffuse low bone mineral density. Atherosclerotic changes are present. No evidence of acute bony abnormality. Left knee series HISTORY: Pain after fall 2 views were obtained. Comparison: 05/22/2022 Findings: There is no evidence of acute fracture or dislocation. The joint spaces are well-preserved. There is no joint effusion. There is no bony destruction. Atherosclerotic changes are present. There is diffuse low bone mineral density. IMPRESSION: No evidence of acute bony abnormality.         Current Meds:  Current Facility-Administered Medications   Medication Dose Route Frequency    acetaminophen (TYLENOL) tablet 650 mg  650 mg Oral Q4H PRN    0.9 % sodium chloride infusion   IntraVENous Continuous    insulin glargine (LANTUS) injection vial 10 Units  10 Units SubCUTAneous Daily    glucose chewable tablet 16 g 4 tablet Oral PRN    dextrose bolus 10% 125 mL  125 mL IntraVENous PRN    Or    dextrose bolus 10% 250 mL  250 mL IntraVENous PRN    glucagon injection 1 mg  1 mg IntraMUSCular PRN    dextrose 5 % solution  100 mL/hr IntraVENous PRN    apixaban (ELIQUIS) tablet 5 mg  5 mg Oral BID    famotidine (PEPCID) tablet 20 mg  20 mg Oral Daily    albuterol (PROVENTIL) nebulizer solution 2.5 mg  2.5 mg Nebulization Q6H PRN    methylPREDNISolone sodium (SOLU-MEDROL) injection 40 mg  40 mg IntraVENous Q12H    insulin lispro (HUMALOG) injection vial 0-8 Units  0-8 Units SubCUTAneous 4x Daily AC & HS    ipratropium-albuterol (DUONEB) nebulizer solution 1 ampule  1 ampule Inhalation Q4H WA    dextrose bolus 10% 250 mL  250 mL IntraVENous PRN    insulin regular (HUMULIN R;NOVOLIN R) injection 0-10 Units  0-10 Units IntraVENous PRN    potassium chloride 10 mEq/100 mL IVPB (Peripheral Line)  10 mEq IntraVENous PRN    magnesium sulfate 1000 mg in dextrose 5% 100 mL IVPB  1,000 mg IntraVENous PRN    sodium phosphate 10 mmol in sodium chloride 0.9 % 250 mL IVPB  10 mmol IntraVENous PRN    Or    sodium phosphate 15 mmol in dextrose 5 % 250 mL IVPB  15 mmol IntraVENous PRN    Or    sodium phosphate 20 mmol in dextrose 5 % 500 mL IVPB  20 mmol IntraVENous PRN    polyethylene glycol (GLYCOLAX) packet 17 g  17 g Oral Daily PRN    cefTRIAXone (ROCEPHIN) 2000 mg IVPB in NS 50ml minibag  2,000 mg IntraVENous Q24H    doxycycline hyclate (VIBRAMYCIN) capsule 100 mg  100 mg Oral 2 times per day    budesonide (PULMICORT) nebulizer suspension 500 mcg  500 mcg Nebulization BID    hydrALAZINE (APRESOLINE) tablet 25 mg  25 mg Oral TID    gabapentin (NEURONTIN) capsule 100 mg  100 mg Oral TID    metoprolol tartrate (LOPRESSOR) tablet 12.5 mg  12.5 mg Oral BID    pravastatin (PRAVACHOL) tablet 10 mg  10 mg Oral Nightly    sertraline (ZOLOFT) tablet 100 mg  100 mg Oral Daily    morphine injection 1 mg  1 mg IntraVENous Q4H PRN Signed:  Marcus Thibodeaux MD

## 2022-06-06 NOTE — PROGRESS NOTES
Hospitalist Progress Note   Admit Date:  2022 12:06 PM   Name:  Teddy Cohen   Age:  80 y.o. Sex:  female  :  1929   MRN:  [id number]   Room:  221/01    Reason(s) for Admission: Hyperkalemia [E87.5]  AMPARO (acute kidney injury) ([facility] [address] [N17.9]  DKA, type 2, not at goal [facility]) [E11.10]  Acute cystitis without hematuria [N30.00]  Diabetic ketoacidosis without coma associated with other specified diabetes mellitus [facility]) [E13.10]     Hospital Course & Interval History:   Ms. Judy Valles is a 79 y/o WF with a h/o CKD3, PAD, DM2, dementia and frequent falls who was admitted to our service on  after a fall at home. She was trying to get out of chair then tripped and fell on R hip. Poor PO intake for past 1 week, patient was recently visited [facility] ER for UTI and was given Rx for abx. Labs showed DKA, Cr 1.78, WBCs 17.4.  CXR with bilateral GGOs.   She was admitted for DKA, AMPARO, acute metabolic encephalopathy. Later on developed mild wheezing so she was started on steroids and nebs. Increase oxygen requirement on , normally on 2 lit/min, presently on - on 4 lit/min. CXR showed stable infiltrates. Blood cultures growing strep agalactiae and CoNS, ID was consulted for . UTI with GNRs. Subjective/24hr Events (22): In bed, pleasantly confused, unable to get ROS. Assessment & Plan:   # Sepsis 2/2 CAP, GBS bacteremia   - Stable CXR. Wean O2 as able. Finishes doxy today, con't Rocephin 2g daily, ID consulted. Repeat blood cultures from  negative. # Acute on chronic hypoxemic respiratory failure   - Normally on 2L, stable on 4L, repeat CXR showed stable findings. Wean as able. # DKA   - Resolved, con't current insulin regimen. # Acute cystitis   - Already on abx as above. GNRs in cluture. # AMPARO   - Baseline sCr low to mid 1s, stable high 1s since admission. Stop IVFs today with development of some peripheral edema. # AECOPD?   - No wheezing today, stop steroids.     # Frequent falls    # HTN   - Con't home meds    # MDD/anxiety   - Zoloft    # GERD   - H2RA    # H/o DVT   - Reduce Eliquis to 2.5mg BID today 6/6    # H/o COVID   - Noted. Discharge Planning: Pending, placement recommended. Diet:  ADULT ORAL NUTRITION SUPPLEMENT; Breakfast, Dinner;  Low Calorie/High Protein Oral Supplement  ADULT DIET; Easy to Chew; Likes unsweet tea with her meals  DVT PPx: Eliquis  Code status: DNR    Hospital Problems           Last Modified POA    * (Principal) DKA, type 2, not at goal [facility]) 6/2/2022 Yes    Acute kidney injury ([facility] [address] 1/1/8630 Yes    Metabolic encephalopathy 4/0/0020 Yes    CAP (community acquired pneumonia) 6/2/2022 Yes    Sepsis ([facility] [address] 6/3/2022 Yes    Streptococcal bacteremia 6/5/2022 Yes    Frequent falls 6/2/2022 Yes    Depression 6/3/2022 Yes    UTI (urinary tract infection) 6/2/2022 Yes    GERD (gastroesophageal reflux disease) 6/3/2022 Yes    Type 2 diabetes mellitus with diabetic neuropathy ([facility] [address] 6/2/2022 Yes    Essential hypertension 6/2/2022 Yes    Anxiety 6/3/2022 Yes    Asthma 6/3/2022 Yes    Neuropathy 6/3/2022 Yes    Overview Signed 3/24/2022  3:44 PM by Rajwinder Jones DO     Lower limbs                 Objective:     Patient Vitals for the past 24 hrs:   Temp Pulse Resp BP SpO2   06/06/22 0752 97.6 °F (36.4 °C) 72 14 131/86 95 %   06/06/22 0721 -- 77 16 -- 96 %   06/06/22 0412 98.6 °F (37 °C) 68 16 114/70 95 %   06/06/22 0028 98.4 °F (36.9 °C) 80 16 (!) 147/71 100 %   06/05/22 2013 98.6 °F (37 °C) 74 16 128/69 99 %   06/05/22 2006 -- 72 16 -- 94 %   06/05/22 1512 98.1 °F (36.7 °C) 70 17 123/61 94 %   06/05/22 1403 -- 71 21 132/67 100 %   06/05/22 1304 -- -- 17 (!) 145/96 96 %   06/05/22 1303 -- 82 -- -- --   06/05/22 1204 -- 66 29 (!) 141/64 100 %   06/05/22 1200 97.5 °F (36.4 °C) -- -- -- --   06/05/22 1129 -- 69 15 -- 100 %   06/05/22 1104 -- 66 -- 138/66 91 %   06/05/22 1005 -- 74 24 (!) 140/85 --   06/05/22 0905 -- 75 11 (!) 166/76 100 %   06/05/22 0843 -- -- 10 -- 100 %   06/05/22 0840 -- 67 27 136/68 100 %       Estimated body mass index is 30.2 kg/m² as calculated from the following:    Height as of this encounter: 5' 2\" (1.575 m). Weight as of this encounter: 165 lb 1.6 oz (74.9 kg). Intake/Output Summary (Last 24 hours) at 6/6/2022 0819  Last data filed at 6/6/2022 0532  Gross per 24 hour   Intake 1605.84 ml   Output --   Net 1605.84 ml         Physical Exam:   Blood pressure 131/86, pulse 72, temperature 97.6 °F (36.4 °C), temperature source Oral, resp. rate 14, height 5' 2\" (1.575 m), weight 165 lb 1.6 oz (74.9 kg), SpO2 95 %. General:    Well nourished but thin. No overt distress. Appears stated age. Head:  Normocephalic, atraumatic. Eyes:  Sclerae appear normal. Pupils equally round. ENT:  Nares appear normal, no drainage. Moist oral mucosa  Neck:  No restricted ROM. Trachea midline. CV:   RRR. No m/r/g. No jugular venous distension. Lungs:   CTAB. No wheezing, rhonchi, or rales. Respirations even, unlabored. Abdomen: Bowel sounds present. Soft, nontender, nondistended. Extremities: No cyanosis or clubbing. No edema. Skin:     No rashes and normal coloration. Warm and dry. Neuro:  CN II-XII grossly intact. Sensation intact. A&Ox3  Psych:  Normal mood and affect.       I have reviewed ordered lab tests and independently visualized imaging below:    Recent Labs:  Recent Results (from the past 48 hour(s))   POCT Glucose    Collection Time: 06/04/22  8:21 AM   Result Value Ref Range    POC Glucose 217 (H) 65 - 100 mg/dL    Performed by: Monique    Culture, Blood 1    Collection Time: 06/04/22  8:46 AM    Specimen: Blood   Result Value Ref Range    Special Requests RIGHT  HAND        Culture NO GROWTH 2 DAYS     Culture, Blood 1    Collection Time: 06/04/22  8:46 AM    Specimen: Blood   Result Value Ref Range    Special Requests LEFT  HAND        Culture NO GROWTH 2 DAYS     POCT Glucose    Collection Time: 06/04/22 11:59 AM   Result Value Ref Range    POC Glucose 328 (H) 65 - 100 mg/dL    Performed by: Monique    POCT Glucose    Collection Time: 06/04/22  5:43 PM   Result Value Ref Range    POC Glucose 367 (H) 65 - 100 mg/dL    Performed by: Mercedes Rudolph PPD TEST IN 24 HRS    Collection Time: 06/04/22  6:41 PM   Result Value Ref Range    PPD, (POC) Negative Negative    mm Induration 0 0 - 5 mm   POCT Glucose    Collection Time: 06/04/22  8:31 PM   Result Value Ref Range    POC Glucose 278 (H) 65 - 100 mg/dL    Performed by: Joan    PLEASE READ & DOCUMENT PPD TEST IN 48 HRS    Collection Time: 06/05/22 12:00 AM   Result Value Ref Range    PPD, (POC) Negative Negative    mm Induration 0 0 - 5 mm   Basic Metabolic Panel    Collection Time: 06/05/22  3:27 AM   Result Value Ref Range    Sodium 140 136 - 145 mmol/L    Potassium 4.9 3.5 - 5.1 mmol/L    Chloride 112 (H) 98 - 107 mmol/L    CO2 19 (L) 21 - 32 mmol/L    Anion Gap 9 7 - 16 mmol/L    Glucose 149 (H) 65 - 100 mg/dL    BUN 78 (H) 8 - 23 MG/DL    CREATININE 1.90 (H) 0.6 - 1.0 MG/DL    GFR  32 (L) >60 ml/min/1.73m2    GFR Non- 26 (L) >60 ml/min/1.73m2    Calcium 9.5 8.3 - 10.4 MG/DL   CBC with Auto Differential    Collection Time: 06/05/22  3:27 AM   Result Value Ref Range    WBC 11.3 (H) 4.3 - 11.1 K/uL    RBC 2.73 (L) 4.05 - 5.2 M/uL    Hemoglobin 8.0 (L) 11.7 - 15.4 g/dL    Hematocrit 27.0 (L) 35.8 - 46.3 %    MCV 98.9 (H) 79.6 - 97.8 FL    MCH 29.3 26.1 - 32.9 PG    MCHC 29.6 (L) 31.4 - 35.0 g/dL    RDW 14.6 11.9 - 14.6 %    Platelets 549 591 - 195 K/uL    MPV 12.4 (H) 9.4 - 12.3 FL    nRBC 0.04 0.0 - 0.2 K/uL    Differential Type AUTOMATED      Seg Neutrophils 74 43 - 78 %    Lymphocytes 17 13 - 44 %    Monocytes 6 4.0 - 12.0 %    Eosinophils % 0 (L) 0.5 - 7.8 %    Basophils 0 0.0 - 2.0 %    Immature Granulocytes 3 0.0 - 5.0 %    Segs Absolute 8.5 (H) 1.7 - 8.2 K/UL    Absolute Lymph # 1.9 0.5 - 4.6 K/UL Absolute Mono # 0.7 0.1 - 1.3 K/UL    Absolute Eos # 0.0 0.0 - 0.8 K/UL    Basophils Absolute 0.0 0.0 - 0.2 K/UL    Absolute Immature Granulocyte 0.3 0.0 - 0.5 K/UL   POCT Glucose    Collection Time: 06/05/22  8:33 AM   Result Value Ref Range    POC Glucose 131 (H) 65 - 100 mg/dL    Performed by: Monique    POCT Glucose    Collection Time: 06/05/22 12:46 PM   Result Value Ref Range    POC Glucose 240 (H) 65 - 100 mg/dL    Performed by: Monique    Transthoracic echocardiogram (TTE) complete with contrast, bubble, strain, and 3D PRN    Collection Time: 06/05/22  4:31 PM   Result Value Ref Range    LV EDV A2C 100 mL    LV EDV A4C 92 mL    LV ESV A2C 38 mL    LV ESV A4C 31 mL    IVSd 1.1 (A) 0.6 - 0.9 cm    LVIDd 5.0 3.9 - 5.3 cm    LVIDs 2.7 cm    LVOT Diameter 1.9 cm    LVOT Mean Gradient 2 mmHg    LVOT VTI 20.5 cm    LVOT Peak Velocity 1.1 m/s    LVOT Peak Gradient 5 mmHg    LVPWd 1.0 (A) 0.6 - 0.9 cm    LV E' Lateral Velocity 12 cm/s    LV E' Septal Velocity 9 cm/s    LV Ejection Fraction A2C 62 %    LV Ejection Fraction A4C 66 %    EF BP 64 55 - 100 %    LVOT Area 2.8 cm2    LVOT SV 58.1 ml    LA Minor Axis 6.4 cm    LA Major Axis 7.6 cm    LA Area 2C 26.8 cm2    LA Area 4C 33.1 cm2    LA Volume  (A) 22 - 52 mL    LA Diameter 4.7 cm    AV Mean Velocity 1.2 m/s    AV Mean Gradient 6 mmHg    AV Mean Gradient 6 mmHg    AV VTI 30.5 cm    AV Peak Velocity 1.5 m/s    AV Peak Gradient 9 mmHg    AV Area by VTI 1.9 cm2    AV Area by Peak Velocity 2.0 cm2    Aortic Root 3.4 cm    Ascending Aorta 3.0 cm    IVC Proxmal 2.4 cm    MV Nyquist Velocity 22 cm/s    MR Radius PISA 0.90 cm    MV Mean Gradient 5 mmHg    MV VTI 46.8 cm    MV Mean Velocity 1.0 m/s    MR .0 cm    MR Peak Velocity 5.6 m/s    MR Peak Gradient 125 mmHg    MV Max Velocity 1.8 m/s    MV Peak Gradient 13 mmHg    MV E Wave Deceleration Time 261.0 ms    MV A Velocity 1.15 m/s    MV E Velocity 1.51 m/s    MV EROA PISA 20.0 cm2 MV Area by VTI 1.2 cm2    PV AT 92.0 ms    PV Max Velocity 1.0 m/s    PV Peak Gradient 4 mmHg    RVIDd 3.7 cm    RV Basal Dimension 4.1 cm    TAPSE 2.0 1.7 cm    TR Max Velocity 4.56 m/s    TR Peak Gradient 83 mmHg    Body Surface Area 1.67 m2    Fractional Shortening 2D 46 28 - 44 %    LV ESV Index A4C 18 mL/m2    LV EDV Index A4C 54 mL/m2    LV ESV Index A2C 22 mL/m2    LV EDV Index A2C 59 mL/m2    LVIDd Index 2.96 cm/m2    LVIDs Index 1.60 cm/m2    LV RWT Ratio 0.40     LV Mass 2D 194.4 (A) 67 - 162 g    LV Mass 2D Index 115.0 (A) 43 - 95 g/m2    MV E/A 1.31     E/E' Ratio (Averaged) 14.68     E/E' Lateral 12.58     E/E' Septal 16.78     LA Volume Index BP 66 (A) 16 - 34 ml/m2    LVOT Stroke Volume Index 34.4 mL/m2    LA Size Index 2.78 cm/m2    LA/AO Root Ratio 1.38     Ao Root Index 2.01 cm/m2    Ascending Aorta Index 1.78 cm/m2    AV Velocity Ratio 0.73     LVOT:AV VTI Index 0.67     NATE/BSA VTI 1.1 cm2/m2    NATE/BSA Peak Velocity 1.2 cm2/m2    MR Regurg Volume PISA 3,697.01 mL    MV:LVOT VTI Index 2.28     Est. RA Pressure 8 mmHg    RVSP 91 mmHg   POCT Glucose    Collection Time: 06/05/22  4:37 PM   Result Value Ref Range    POC Glucose 321 (H) 65 - 100 mg/dL    Performed by: JoseRYAN    POCT Glucose    Collection Time: 06/05/22  9:11 PM   Result Value Ref Range    POC Glucose 285 (H) 65 - 100 mg/dL    Performed by: Magnolia    Basic Metabolic Panel    Collection Time: 06/06/22  4:31 AM   Result Value Ref Range    Sodium 140 136 - 145 mmol/L    Potassium 4.5 3.5 - 5.1 mmol/L    Chloride 112 (H) 98 - 107 mmol/L    CO2 21 21 - 32 mmol/L    Anion Gap 7 7 - 16 mmol/L    Glucose 112 (H) 65 - 100 mg/dL    BUN 85 (H) 8 - 23 MG/DL    CREATININE 1.90 (H) 0.6 - 1.0 MG/DL    GFR  32 (L) >60 ml/min/1.73m2    GFR Non- 26 (L) >60 ml/min/1.73m2    Calcium 9.0 8.3 - 10.4 MG/DL   CBC with Auto Differential    Collection Time: 06/06/22  4:31 AM   Result Value Ref Range    WBC 12. 2 (H) 4.3 - 11.1 K/uL    RBC 2.36 (L) 4.05 - 5.2 M/uL    Hemoglobin 7.0 (L) 11.7 - 15.4 g/dL    Hematocrit 23.5 (L) 35.8 - 46.3 %    MCV 99.6 (H) 79.6 - 97.8 FL    MCH 29.7 26.1 - 32.9 PG    MCHC 29.8 (L) 31.4 - 35.0 g/dL    RDW 14.6 11.9 - 14.6 %    Platelets 471 232 - 710 K/uL    MPV 12.2 9.4 - 12.3 FL    nRBC 0.07 0.0 - 0.2 K/uL    Differential Type AUTOMATED      Seg Neutrophils 77 43 - 78 %    Lymphocytes 15 13 - 44 %    Monocytes 6 4.0 - 12.0 %    Eosinophils % 0 (L) 0.5 - 7.8 %    Basophils 0 0.0 - 2.0 %    Immature Granulocytes 2 0.0 - 5.0 %    Segs Absolute 9.2 (H) 1.7 - 8.2 K/UL    Absolute Lymph # 1.9 0.5 - 4.6 K/UL    Absolute Mono # 0.8 0.1 - 1.3 K/UL    Absolute Eos # 0.0 0.0 - 0.8 K/UL    Basophils Absolute 0.0 0.0 - 0.2 K/UL    Absolute Immature Granulocyte 0.3 0.0 - 0.5 K/UL   POCT Glucose    Collection Time: 06/06/22  7:40 AM   Result Value Ref Range    POC Glucose 88 65 - 100 mg/dL    Performed by: WallacePreeyaPCT          Other Studies:  XR CHEST 1 VIEW    Result Date: 6/4/2022  History: Hypoxia Exam: portable chest Comparison: 6/2/2022 Findings: Bilateral interstitial opacities seen throughout the lungs, stable. No new parenchymal density demonstrated. No change in the appearance of the mediastinal contour or osseous structures. IMPRESSIONs: Stable portable chest     Transthoracic echocardiogram (TTE) complete with contrast, bubble, strain, and 3D PRN    Result Date: 6/5/2022    Left Ventricle: Normal left ventricular systolic function. EF by 2D Simpsons Biplane is 64%. Left ventricle size is normal. Mildly increased wall thickness. Normal wall motion. Abnormal diastolic function. Average E/e' ratio is 14.68.   Right Ventricle: Right ventricle size is normal. Normal systolic function.   Aortic Valve: Moderately thickened cusps. Mild regurgitation.   Mitral Valve: Severe annular calcification of the mitral valve. Severe regurgitation. Mild stenosis noted.    Tricuspid Valve: Valve structure is normal. Moderate to severe regurgitation. No stenosis noted. Severely elevated RVSP. The estimated RVSP is 91 mmHg.   Pulmonic Valve: The pulmonic valve was not well visualized.   Right Atrium: Right atrium is mildly dilated.   IVC/SVC: IVC diameter is greater than 21 mm and decreases greater than 50% during inspiration; therefore the estimated right atrial pressure is intermediate (~8 mmHg).   No valvular vegetations visualized on this study.        Current Meds:  Current Facility-Administered Medications   Medication Dose Route Frequency    apixaban (ELIQUIS) tablet 2.5 mg  2.5 mg Oral BID    budesonide (PULMICORT) nebulizer suspension 500 mcg  500 mcg Nebulization BID    acetaminophen (TYLENOL) tablet 650 mg  650 mg Oral Q4H PRN    insulin glargine (LANTUS) injection vial 10 Units  10 Units SubCUTAneous Daily    glucose chewable tablet 16 g  4 tablet Oral PRN    dextrose bolus 10% 125 mL  125 mL IntraVENous PRN    Or    dextrose bolus 10% 250 mL  250 mL IntraVENous PRN    glucagon injection 1 mg  1 mg IntraMUSCular PRN    dextrose 5 % solution  100 mL/hr IntraVENous PRN    famotidine (PEPCID) tablet 20 mg  20 mg Oral Daily    albuterol (PROVENTIL) nebulizer solution 2.5 mg  2.5 mg Nebulization Q6H PRN    insulin lispro (HUMALOG) injection vial 0-8 Units  0-8 Units SubCUTAneous 4x Daily AC & HS    ipratropium-albuterol (DUONEB) nebulizer solution 1 ampule  1 ampule Inhalation Q4H WA    dextrose bolus 10% 250 mL  250 mL IntraVENous PRN    insulin regular (HUMULIN R;NOVOLIN R) injection 0-10 Units  0-10 Units IntraVENous PRN    potassium chloride 10 mEq/100 mL IVPB (Peripheral Line)  10 mEq IntraVENous PRN    magnesium sulfate 1000 mg in dextrose 5% 100 mL IVPB  1,000 mg IntraVENous PRN    sodium phosphate 10 mmol in sodium chloride 0.9 % 250 mL IVPB  10 mmol IntraVENous PRN    Or    sodium phosphate 15 mmol in dextrose 5 % 250 mL IVPB  15 mmol IntraVENous PRN    Or    sodium phosphate 20 mmol in dextrose 5 % 500 mL IVPB  20 mmol IntraVENous PRN    polyethylene glycol (GLYCOLAX) packet 17 g  17 g Oral Daily PRN    cefTRIAXone (ROCEPHIN) 2000 mg IVPB in NS 50ml minibag  2,000 mg IntraVENous Q24H    doxycycline hyclate (VIBRAMYCIN) capsule 100 mg  100 mg Oral 2 times per day    hydrALAZINE (APRESOLINE) tablet 25 mg  25 mg Oral TID    gabapentin (NEURONTIN) capsule 100 mg  100 mg Oral TID    metoprolol tartrate (LOPRESSOR) tablet 12.5 mg  12.5 mg Oral BID    pravastatin (PRAVACHOL) tablet 10 mg  10 mg Oral Nightly    sertraline (ZOLOFT) tablet 100 mg  100 mg Oral Daily    morphine injection 1 mg  1 mg IntraVENous Q4H PRN       Signed:  Ricardo Mejia MD    Part of this note may have been written by using a voice dictation software. The note has been proof read but may still contain some grammatical/other typographical errors.

## 2022-06-06 NOTE — PROGRESS NOTES
Date of Outreach Update:  Joseph Zheng was seen and assessed. @[facility] [address]([address])@  Vitals:    06/05/22 1403 06/05/22 1512 06/05/22 2006 06/05/22 2013   BP: 132/67 123/61  128/69   Pulse: 71 70 72 74   Resp: 21 17 16    Temp:  98.1 °F (36.7 °C)     TempSrc:  Oral     SpO2: 100% 94% 94% 99%   Weight:       Height:            Previous Outreach assessment has been reviewed. Alert and oriented, very [facility]. VSS, lung sounds diminished, respirations even and unlabored. Neb treatments, no sob or pain. Labs and chart reviewed. Spoke with primary RN, no concerns at this time. Will continue to follow up per outreach protocol.     Signed By:   Anish Bonds RN    June 5, 2022 9:41 PM

## 2022-06-06 NOTE — CARE COORDINATION
UPDATE 3:12PM: Family and pt would like SNF referrals made to [facility], [address] [facility]. Referrals all made this day. Will await response from facilities. 2:17PM:  This CM met with pt, son, and daughter this day to discuss discharge planning and therapy recommendation that pt would benefit from STR at discharge. They were interested in [facility] or [facility] - unfortunately neither facility is in network with pt insurance. Provided SNF list to pt daughter and requested they review to see where else they would like referrals made. No additional CM needs at this time. Will continue to follow and update as needed.

## 2022-06-06 NOTE — PROGRESS NOTES
PHYSICAL THERAPY Daily Note and AM  (Link to Caseload Tracking: PT Visit Days : 1  Time In/Out PT Charge Capture  Rehab Caseload Tracker  Orders      Ester Marte is a 80 y.o. female   PRIMARY DIAGNOSIS: DKA, type 2, not at goal [facility])  Hyperkalemia [E87.5]  AMPARO (acute kidney injury) ([facility] [address] [N17.9]  DKA, type 2, not at goal [facility]) [E11.10]  Acute cystitis without hematuria [N30.00]  Diabetic ketoacidosis without coma associated with other specified diabetes mellitus ([facility] [address] [E13.10]       Inpatient: Payor: [employer] MEDICARE / Plan: Lashanda [facility] DUAL / Product Type: *No Product type* /     ASSESSMENT:     REHAB RECOMMENDATIONS:   Recommendation to date pending progress:  Setting:   Short-term Rehab    Equipment:     To Be Determined     ASSESSMENT:  Ms. Mary Singh is supine in bed and agreeable to therapy. [place] but agreeable to therapy. Bed mobility is with mod assist x 1. The patient has decreased upright posture and is leaning to the left, when asked to sit up the patient c/o pain in her center back and right hip, however eventually the patient is able to get to an upright position and sit to stand and transfer tot he recliner with mod assist x 1. Noted weight bearing on her LE's but appears a little fearful during the transfer. Patient is left in the recliner and positioned for comfort with needs within reach. Student nurse visits during the treatment session. Good session and a little progress demonstrated.   Needs rehab      SUBJECTIVE:   Ms. Mary Singh states, \"My spine hurts\"     Social/Functional Lives With: Daughter Keyon Crew)  Type of Home: House  Home Layout: One level  Home Access:  (5 steps front/2 steps back)  Bathroom Toilet: Bedside commode  Home Equipment: Marisol Maria, [address] Help From: Family  ADL Assistance: Needs assistance  Active : No  Patient's  Info: family  Occupation: Retired  OBJECTIVE:     PAIN: Yumiko Anne / O2: Nevaeh Shannan / Armand Brar / Luna Li:   Pre Treatment: no number given         Post Treatment: no number given Vitals        Oxygen    None    RESTRICTIONS/PRECAUTIONS:        MOBILITY: I Mod I S SBA CGA Min Mod Max Total  NT x2 Comments:   Bed Mobility    Rolling [] [] [] [] [] [] [x] [] [] [] []    Supine to Sit [] [] [] [] [] [] [x] [] [] [] []    Scooting [] [] [] [] [] [] [x] [] [] [] []    Sit to Supine [] [] [] [] [] [] [] [] [] [x] []    Transfers    Sit to Stand [] [] [] [] [] [] [x] [] [] [] []    Bed to Chair [] [] [] [] [] [] [x] [] [] [] []    Stand to Sit [] [] [] [] [] [] [x] [] [] [] []     [] [] [] [] [] [] [] [] [] [] []    I=Independent, Mod I=Modified Independent, S=Supervision, SBA=Standby Assistance, CGA=Contact Guard Assistance,   Min=Minimal Assistance, Mod=Moderate Assistance, Max=Maximal Assistance, Total=Total Assistance, NT=Not Tested    BALANCE: Good Fair+ Fair Fair- Poor NT Comments   Sitting Static [] [x] [] [] [] []    Sitting Dynamic [] [] [x] [] [] []              Standing Static [] [] [] [x] [] []    Standing Dynamic [] [] [] [x] [] []      GAIT: I Mod I S SBA CGA Min Mod Max Total  NT x2 Comments:   Level of Assistance [] [] [] [] [] [] [x] [] [] [] []    Distance 2  feet    DME None    Gait Quality decreased upright posture     Weightbearing Status      Stairs      I=Independent, Mod I=Modified Independent, S=Supervision, SBA=Standby Assistance, CGA=Contact Guard Assistance,   Min=Minimal Assistance, Mod=Moderate Assistance, Max=Maximal Assistance, Total=Total Assistance, NT=Not Tested    PLAN:   ACUTE PHYSICAL THERAPY GOALS:   (Developed with and agreed upon by patient and/or caregiver.)  STG:  (1.)Ms. Malcom Louis will move from supine to sit and sit to supine , scoot up and down and roll side to side with CONTACT GUARD ASSIST within 4 treatment day(s). (2.)Ms. Malcom Louis will transfer from bed to chair and chair to bed with CONTACT GUARD ASSIST using the least restrictive device within 4 treatment day(s). (3.)Ms. Luana Whitman will ambulate with CONTACT GUARD ASSIST for 50 feet with the least restrictive device within 4 treatment day(s).      LTG:  (1.)Ms. Luana Whitman will move from supine to sit and sit to supine , scoot up and down and roll side to side in bed with STAND BY ASSIST within 7 treatment day(s). (2.)Ms. Luana Whitman will transfer from bed to chair and chair to bed with STAND BY ASSIST using the least restrictive device within 7 treatment day(s). (3.)Ms. Luana Whitman will ambulate with STAND BY ASSIST for 100 feet with the least restrictive device within 7 treatment day(s). ________________________________________________________________________________________________      FREQUENCY AND DURATION: 3 times/week for duration of hospital stay or until stated goals are met, whichever comes first.    TREATMENT:   TREATMENT:   Therapeutic Activity (23 Minutes): Therapeutic activity included Rolling, Supine to Sit, Scooting, Transfer Training, Ambulation on level ground, Sitting balance  and Standing balance to improve functional Activity tolerance, Balance, Coordination, Mobility and Strength.     TREATMENT GRID:  N/A    AFTER TREATMENT PRECAUTIONS: Bed/Chair Locked, Call light within reach, Chair, Needs within reach and RN at bedside    INTERDISCIPLINARY COLLABORATION:  RN/ PCT and PT/ PTA    EDUCATION:      TIME IN/OUT:  Time In: 1136  Time Out: [address] [facility] [address]  Minutes: 23    Evangelina Sharp, PTA

## 2022-06-06 NOTE — PROGRESS NOTES
Patient denies any needs at this time. Bed in low position, locked and call light/personal items within reach. Patient passing flatus, voiding and is appears comfortable at this time. Will continue to monitor and give report to day shift nurse.

## 2022-06-06 NOTE — CONSULTS
Infectious Disease Consult    Today's Date: 6/6/2022   Admit Date: 6/2/2022    Impression:   · Group B Strep bacteremia  · ? UTI with klebsiella. At present she is asymptomatic  · S/p fall  · CKD3; CrCL 32  · Diabetes (hgb a1c 5.9)  · Loud precordial systolic murmur; TTE reviewed    Plan:   · narrow to cefazolin  · Follow repeat blood cultures  · Stop doxycycline. · Source of bacteremia is not clear. · If repeat cultures again grown strep then pursue KATHY. Anti-infectives:   · Ceftriaxone (6/2 - )  · doxycycline    Subjective:   Date of Consultation:  June 6, 2022  Referring Physician: Kwame Sutton    Patient is a 80 y.o. female with CKD 3, PAD, DM2 who was admitted on 6/2/2022 after a fall. She had right hip and left knee pain as a result. She was in ketoacidosis as well. CXR demonstrated bilateral ground glass opacities. Blood cultures grew CoNS and GBS. Urine culture grew K pneumoniae. Urine culture also grew K pneumoniae and P mirabilis on 5/22/2022. She has been afebrile since admission. Leukocytosis is improving on ceftriaxone. She has also been treated with doxycycline since 6/3/2022. She denies tooth pain and dysuria. The only thing that she complains of is pain in her left leg. She knows that she is in the hospital but is not well oriented as to the circumstances surrounding admission.       Patient Active Problem List   Diagnosis    Functional quadriplegia (HCC)    Chronic bilateral low back pain without sciatica    Frequent falls    Pulmonary edema    PAD (peripheral artery disease) (HCC)    Controlled type 2 diabetes mellitus with microalbuminuria, without long-term current use of insulin ([facility])    Acute respiratory failure due to COVID-19 [facility])    Chronic renal disease, stage II    Acute blood loss anemia    Depression    Hearing loss    DNR (do not resuscitate)    Abdominal wall hernia    UTI (urinary tract infection)    Callus    Pneumonia due to COVID-19 virus    Iron deficiency anemia due to chronic blood loss    GERD (gastroesophageal reflux disease)    Type 2 diabetes mellitus with diabetic neuropathy (HCC)    Essential hypertension    Moderate protein-calorie malnutrition (HCC)    Volume overload    Anxiety    B12 deficiency    HCAP (healthcare-associated pneumonia)    Pulmonary embolism (HCC)    Asthma    Neuropathy    HLD (hyperlipidemia)    Hiatal hernia    Lower GI bleed    Claw toe    COPD (chronic obstructive pulmonary disease) (HCC)    Physical debility    Stage 3a chronic kidney disease (HCC)    Normocytic anemia    DKA, type 2, not at goal [facility])    Acute kidney injury ([address]    Metabolic encephalopathy    CAP (community acquired pneumonia)    Sepsis ([address]    Streptococcal bacteremia     Past Medical History:   Diagnosis Date    Acute posthemorrhagic anemia 02/03/2022    [email or web address]    Anemia in chronic kidney disease     per [facility] Acute faxed information    Anxiety 2/1/2018    Arrhythmia     palpitations 2/10-went to [facility]    Arthritis     L leg- fell 2008    Asthma     adult onset x 20 yrs    B12 deficiency 8/24/2012    Body mass index 37.0-37.9, adult 2/5/2014    CAD (coronary artery disease)     cardiac work [id number]    Chronic kidney disease, stage 3 unspecified ([facility] 12/24/2021    per [employer] Post Acute faxed information    Cognitive communication deficit     per [facility] faxed information     Controlled type 2 diabetes mellitus with microalbuminuria, without long-term current use of insulin ([address] 11/22/2016    Corns and callosities 12/19/2016    CRI (chronic renal insufficiency) 8/24/2012    Depression 6/1/2012    Dermatophytosis of nail 12/19/2016    Diabetes ([address]     type II- home tests fasting-112    DJD (degenerative joint disease) of hip     DM (diabetes mellitus) type II controlled with renal manifestation ([address] 7/16/2010    Encounter for long-term (current) use of other medications 1/8/2013    Essential hypertension 7/16/2010    Gastrointestinal disorder     Gastrointestinal hemorrhage     per dx sheet from [facility] Acute - date sent 3/2/2022     GERD (gastroesophageal reflux disease)     without esophagitis; per [facility] Post Acute faxed information    GI bleed 7/28/2018    Heart failure ([address]     Hiatal hernia 8/24/2012    High cholesterol     History of COVID-19 02/03/2022    per medical hx information from [facility] History of falling 12/24/2021    per [facility] faxed information    HLD (hyperlipidemia) 1/8/2013    Klebsiella pneumoniae (k. pneumoniae) as the cause of diseases classified elsewhere 12/24/2021    per [facility] Post Acute information     Moderate protein-calorie malnutrition ([facility] 12/24/2021    per [facility] Acute faxed information    Muscle weakness (generalized)     per [facility] faxed information    Neuropathy 8/24/2012    Lower limbs     Other and unspecified hyperlipidemia 7/16/2010    Other chest pain 7/16/2010    Other lack of coordination     per [facility] Post Acute faxed information    Oxygen dependent     4 L/NC    PAD (peripheral artery disease) ([address] 8/24/2012    PVD (peripheral vascular disease) ([address]     per [facility] faxed information    Retinal hemorrhage     Type 2 diabetes mellitus with diabetic neuropathy ([address]     Unspecified asthma, uncomplicated 55/16/2759    per [facility] faxed information    Urinary incontinence     Urinary tract infection       Family History   Problem Relation Age of Onset    Cancer Mother     Diabetes Father     Diabetes Paternal Grandfather       Social History     Tobacco Use    Smoking status: Never Smoker    Smokeless tobacco: Never Used   Substance Use Topics    Alcohol use: No     Past Surgical History:   Procedure Laterality Date    APPENDECTOMY      BRAIN ANEURYSM SURGERY      CARDIAC CATHETERIZATION      CHOLECYSTECTOMY  2000    FLEXIBLE SIGMOIDOSCOPY N/A 1/24/2022    SIGMOIDOSCOPY FLEXIBLE COVID POSITIVE PREP/RECOVER IN [place] Admit to room 503 performed by [facility] at [facility] ENDOSCOPY    GI      small intestine obstruction    GYN      hyst    HYSTERECTOMY      prolapse    ORTHOPEDIC SURGERY      right wrist 2010    OTHER SURGICAL HISTORY      cerebral aneurysm   Dr. Dior Wilcox OTHER SURGICAL HISTORY      aneurysm clip-cerebral-2000    [place] ABDOMEN SURGERY PROC UNLISTED      UPPER GASTROINTESTINAL ENDOSCOPY  02/27/2018    Dr Faiza Tian      Prior to Admission medications    Medication Sig Start Date End Date Taking? Authorizing Provider   Lancets MISC Use as directed 3 times daily to check blood sugars. Dx:E11.29 4/7/22   Ar Automatic Reconciliation   acetaminophen (TYLENOL) 650 MG extended release tablet Take 650 mg by mouth every 8 hours    Ar Automatic Reconciliation   amLODIPine (NORVASC) 10 MG tablet Take 10 mg by mouth daily 11/30/21   Ar Automatic Reconciliation   apixaban (ELIQUIS) 5 MG TABS tablet Take 5 mg by mouth 2 times daily 4/7/22 5/7/22  Ar Automatic Reconciliation   Arformoterol Tartrate (BROVANA) 15 MCG/2ML NEBU Inhale 15 mcg into the lungs 2 times daily 4/7/22   Ar Automatic Reconciliation   budesonide (PULMICORT) 0.5 MG/2ML nebulizer suspension Inhale 500 mcg into the lungs 2 times daily 4/7/22   Ar Automatic Reconciliation   ferrous sulfate (IRON 325) 325 (65 Fe) MG tablet Take 325 mg by mouth daily (with breakfast) 2/2/22   Ar Automatic Reconciliation   fluticasone-salmeterol (ADVAIR) 250-50 MCG/DOSE AEPB Inhale 1 puff into the lungs every 12 hours 4/27/21   Ar Automatic Reconciliation   gabapentin (NEURONTIN) 100 MG capsule Take 100 mg by mouth 3 times daily.  11/30/21   Ar Automatic Reconciliation   hydrALAZINE (APRESOLINE) 25 MG tablet Take 25 mg by mouth 3 times daily 4/7/22   Ar Automatic Reconciliation   insulin lispro (HUMALOG) 100 UNIT/ML injection vial INITIATE INSULIN CORRECTIVE PROTOCOL:Normal Insulin Sensitivity For Blood Sugar (mg/dL) of: Less than 150 = 0 units 150 -199 = 2 [id number] -249 = 4 pwpul555 -299 = 6 [id number] -349 = 8 [id number] and above = 10 units and Call PhysicianInitiate Hypoglycemia protocol if blood glucose is <70 mg/dL Fast Acting - Administer Immediately - or within 15 minutes of start of meal, if mealtime coverage. 3/21/22   Ar Automatic Reconciliation   ipratropium-albuterol (DUONEB) 0.5-2.5 (3) MG/3ML SOLN nebulizer solution Inhale 3 mLs into the lungs every 4 hours as needed 22   Ar Automatic Reconciliation   metFORMIN (GLUCOPHAGE) 500 MG tablet Take 500 mg by mouth 2 times daily (with meals) 22   Ar Automatic Reconciliation   metoprolol tartrate (LOPRESSOR) 25 MG tablet Take 12.5 mg by mouth 2 times daily 3/21/22   Ar Automatic Reconciliation   pantoprazole (PROTONIX) 20 MG tablet Take 40 mg by mouth daily 22   Ar Automatic Reconciliation   polyethylene glycol (GLYCOLAX) 17 GM/SCOOP powder Take 17 g by mouth daily  Patient not taking: Reported on 2022   Ar Automatic Reconciliation   pravastatin (PRAVACHOL) 40 MG tablet TAKE 1 TABLET BY MOUTH EVERY NIGHT AT BEDTIME 21   Ar Automatic Reconciliation   sertraline (ZOLOFT) 100 MG tablet Take 100 mg by mouth daily 22   Ar Automatic Reconciliation       No Known Allergies     Review of Systems:    See HPI. Otherwise complete system review unremarkable. Objective:     Visit Vitals  /86   Pulse 72   Temp 97.6 °F (36.4 °C) (Oral)   Resp 14   Ht 5' 2\" (1.575 m)   Wt 165 lb 1.6 oz (74.9 kg)   SpO2 95%   BMI 30.20 kg/m²     Temp (24hrs), Av.1 °F (36.7 °C), Min:97.5 °F (36.4 °C), Max:98.6 °F (37 °C)       Lines:  Peripheral IV:       Physical Exam:    General:  Alert, cooperative, well nourished, well developed, appears younger than stated age   Eyes:  Sclera anicteric. Pupils equally round and reactive to light.    Mouth/Throat: Mucous membranes normal, oral pharynx clear; missing a few teeth but otherwise teeth look healthy   Neck: Supple   Lungs:   Clear to auscultation bilaterally, good effort   CV:  Loud precordial systolic murmur   Abdomen:   Soft, non-tender. bowel sounds normal. non-distended   Extremities: No cyanosis or edema   Skin: Skin color, texture, turgor normal. no acute rash or lesions   Lymph nodes: Cervical and supraclavicular normal   Musculoskeletal: No swelling or deformity   Lines/Devices:  Intact, no erythema, drainage or tenderness   Psych: Alert; not well oriented to situation but able to answer direct ROS questions. Data Review:     CBC:  Recent Labs     06/04/22 0338 06/05/22 0327 06/06/22  0431   WBC 12.8* 11.3* 12.2*   HGB 7.5* 8.0* 7.0*   HCT 25.2* 27.0* 23.5*    155 176       BMP:  Recent Labs     06/04/22 0338 06/05/22 0327 06/06/22  0431   BUN 63* 78* 85*    140 140   K 4.5 4.9 4.5   * 112* 112*   CO2 19* 19* 21       LFTS:  No results for input(s): ALT, TP, ALB in the last 72 hours. Invalid input(s): TBILI, SGOT, AP    Microbiology:   Blood cultures (6/2) GBS, CoNS  Urine culture (6/2): K pneumonia    Imaging:   CXR (6/4/2022)  Findings: Bilateral interstitial opacities seen throughout the lungs, stable. No   new parenchymal density demonstrated.  No change in the appearance of the   mediastinal contour or osseous structures         Signed By: Davin Roman MD     June 6, 2022

## 2022-06-06 NOTE — PLAN OF CARE
Problem: Discharge Planning  Goal: Discharge to home or other facility with appropriate resources  Outcome: Progressing     Problem: Pain  Goal: Verbalizes/displays adequate comfort level or baseline comfort level  Outcome: Progressing     Problem: Safety - Adult  Goal: Free from fall injury  Outcome: Progressing  Flowsheets (Taken 6/5/2022 1520 by Elli Padron, RN)  Free From Fall Injury: Based on caregiver fall risk screen, instruct family/caregiver to ask for assistance with transferring infant if caregiver noted to have fall risk factors     Problem: ABCDS Injury Assessment  Goal: Absence of physical injury  Outcome: Progressing  Flowsheets (Taken 6/5/2022 0905 by John Pastrana RN)  Absence of Physical Injury: Implement safety measures based on patient assessment     Problem: Skin/Tissue Integrity  Goal: Absence of new skin breakdown  Description: 1. Monitor for areas of redness and/or skin breakdown  2. Assess vascular access sites hourly  3. Every 4-6 hours minimum:  Change oxygen saturation probe site  4. Every 4-6 hours:  If on nasal continuous positive airway pressure, respiratory therapy assess nares and determine need for appliance change or resting period.   Outcome: Progressing     Problem: Chronic Conditions and Co-morbidities  Goal: Patient's chronic conditions and co-morbidity symptoms are monitored and maintained or improved  Outcome: Progressing

## 2022-06-06 NOTE — PROGRESS NOTES
Physician Progress Note      PATIENT:               Fernanda Randhawa  CSN #:                  [id number]  :                       1929  ADMIT DATE:       2022 12:06 PM  [address] [place] DATE:  RESPONDING  PROVIDER #:        Abdulaziz La MD          QUERY TEXT:    Pt admitted with DM2 with DKA. Pt noted to have CAP. If possible, please   document in the progress notes and discharge summary if you are evaluating   and/or treating any of the following:    Note: CAP and HCAP indicate where the pneumonia was acquired, not a specific   type. The medical record reflects the following:  Risk Factors: 80 YOF, Dementia, DM2, CKD3 with Anemia, Chronic respiratory   failure and O2 dependent 4L NC  Clinical Indicators: presented after a fall with R hip pain/L knee pain  AMS. on admission :  99 -8 24 175/86 93% BMI 26.76   WBC 12.4---14.8  Max HR 39,   85-93% RA improved to % on 4L NC,  Treatment: ICU Monitoring, 500cc IV Bolus, NS @ 50cc//HR, Rocephin 2gm IV QD,   Doxycycline PO, IV Solumedrol 20mg, IV Vancomycin 2-4 L NC,    Thank you,  Dahiana Longo RN, C BSN  Clinical   [email or web address]  Options provided:  -- Bacterial pneumonia  -- Viral pneumonia  -- Aspiration pneumonia  -- Other - I will add my own diagnosis  -- Disagree - Not applicable / Not valid  -- Disagree - Clinically unable to determine / Unknown  -- Refer to Clinical Documentation Reviewer    PROVIDER RESPONSE TEXT:    This patient has bacterial pneumonia. Query created by:  Eryn Johnson on 6/3/2022 6:50 PM      Electronically signed by:  Abdulaziz La MD 2022 7:48 AM

## 2022-06-06 NOTE — PROGRESS NOTES
Physical Therapy Note:    Attempted to see patient this AM for physical therapy treatment  session. Patient is being cared for by the student nurses. Will follow and re-attempt as schedule permits/patient available.  Thank you,    Brody Pina PTA     Rehab Caseload Tracker

## 2022-06-07 NOTE — PROGRESS NOTES
PHYSICAL THERAPY Daily Note and AM  (Link to Caseload Tracking: PT Visit Days : 1  Time In/Out PT Charge Capture  Rehab Caseload Tracker  Orders      Tami Barker is a 80 y.o. female   PRIMARY DIAGNOSIS: DKA, type 2, not at goal [facility])  Hyperkalemia [E87.5]  AMPARO (acute kidney injury) ([facility] [N17.9]  DKA, type 2, not at goal [facility]) [E11.10]  Acute cystitis without hematuria [N30.00]  Diabetic ketoacidosis without coma associated with other specified diabetes mellitus ([facility] [E13.10]       Inpatient: Payor: [employer] [place] MEDICARE / Plan: Alverto SMITH [facility] DUAL / Product Type: *No Product type* /     ASSESSMENT:     REHAB RECOMMENDATIONS:   Recommendation to date pending progress:  Setting:   Short-term Rehab    Equipment:     To Be Determined     ASSESSMENT:  Ms. Jason Bowling is supine in bed and agreeable to therapy. [place] but agreeable to therapy. Bed mobility is with mod assist x 1 but she seems to express pain/discomfort when moved. MD visits and is going to get the right hip xrayed again as her right leg is externally rotated, with this the patient is left in bed. Tolerated movement of the left LE but not so much the repositioning but she does assist with is and when she tries vs being moved passively . Patient is left in the bed with breakfast set up. No progress this session. Continue PT efforts.   Needs rehab      SUBJECTIVE:   Ms. Jason Bowling states, \"OK\"     Social/Functional Lives With: Daughter Oscar Gomes)  Type of Home: House  Home Layout: One level  Home Access:  (5 steps front/2 steps back)  Bathroom Toilet: Bedside commode  Home Equipment: [employer], [address] Help From: Family  ADL Assistance: Needs assistance  Active : No  Patient's  Info: family  Occupation: Retired  OBJECTIVE:     PAIN: VITALS / O2: PRECAUTION / Namita Hero / DRAINS:   Pre Treatment: no number given         Post Treatment: no number given Vitals        Oxygen    None    RESTRICTIONS/PRECAUTIONS: MOBILITY: I Mod I S SBA CGA Min Mod Max Total  NT x2 Comments:   Bed Mobility    Rolling [] [] [] [] [] [] [] [] [] [x] []    Supine to Sit [] [] [] [] [] [] [] [] [] [x] []    Scooting [] [] [] [] [] [] [] [] [] [x] []    Sit to Supine [] [] [] [] [] [] [] [] [] [x] []    Transfers    Sit to Stand [] [] [] [] [] [] [] [] [] [x] []    Bed to Chair [] [] [] [] [] [] [] [] [] [x] []    Stand to Sit [] [] [] [] [] [] [] [] [] [x] []     [] [] [] [] [] [] [] [] [] [x] []    I=Independent, Mod I=Modified Independent, S=Supervision, SBA=Standby Assistance, CGA=Contact Guard Assistance,   Min=Minimal Assistance, Mod=Moderate Assistance, Max=Maximal Assistance, Total=Total Assistance, NT=Not Tested    BALANCE: Good Fair+ Fair Fair- Poor NT Comments   Sitting Static [] [] [] [] [] [x]    Sitting Dynamic [] [] [] [] [] [x]              Standing Static [] [] [] [] [] [x]    Standing Dynamic [] [] [] [] [] [x]      GAIT: I Mod I S SBA CGA Min Mod Max Total  NT x2 Comments:   Level of Assistance [] [] [] [] [] [] [x] [] [] [x] []    Distance  feet    DME None    Gait Quality decreased upright posture     Weightbearing Status      Stairs      I=Independent, Mod I=Modified Independent, S=Supervision, SBA=Standby Assistance, CGA=Contact Guard Assistance,   Min=Minimal Assistance, Mod=Moderate Assistance, Max=Maximal Assistance, Total=Total Assistance, NT=Not Tested    PLAN:   ACUTE PHYSICAL THERAPY GOALS:   (Developed with and agreed upon by patient and/or caregiver.)  STG:  (1.)Ms. Eid Ranks will move from supine to sit and sit to supine , scoot up and down and roll side to side with CONTACT GUARD ASSIST within 4 treatment day(s). (2.)Ms. Ragini De La Torre will transfer from bed to chair and chair to bed with CONTACT GUARD ASSIST using the least restrictive device within 4 treatment day(s). (3.)Ms. Ragini De La Torre will ambulate with CONTACT GUARD ASSIST for 50 feet with the least restrictive device within 4 treatment day(s).    LTG:  (1.)Ms. Cas Lerma will move from supine to sit and sit to supine , scoot up and down and roll side to side in bed with STAND BY ASSIST within 7 treatment day(s). (2.)Ms. Cas Lerma will transfer from bed to chair and chair to bed with STAND BY ASSIST using the least restrictive device within 7 treatment day(s). (3.)Ms. Cas Lerma will ambulate with STAND BY ASSIST for 100 feet with the least restrictive device within 7 treatment day(s). ________________________________________________________________________________________________      FREQUENCY AND DURATION: 3 times/week for duration of hospital stay or until stated goals are met, whichever comes first.    TREATMENT:   TREATMENT:   Therapeutic Activity (24 Minutes): Therapeutic activity included Scooting to improve functional Activity tolerance, Balance, Coordination, Mobility and Strength.     TREATMENT GRID:  N/A    AFTER TREATMENT PRECAUTIONS: Bed, Bed/Chair Locked, Call light within reach, Needs within reach and RN notified    INTERDISCIPLINARY COLLABORATION:  RN/ PCT and PT/ PTA    EDUCATION:      TIME IN/OUT:  Time In: 9798  Time Out: [address]  Minutes: 24    Evangelina Sharp, PTA

## 2022-06-07 NOTE — PROGRESS NOTES
Infectious Disease Progress Note    Today's Date: 2022   Admit Date: 2022    Impression:   · Group B Strep bacteremia; repeat cultures  NGTD, source unclear  · ? UTI with klebsiella. At present she is asymptomatic  · S/p fall  · CKD3; CrCL 32  · Diabetes (hgb a1c 5.9)  · Loud precordial systolic murmur; TTE reviewed    Plan:   · Continue Cefazolin as ordered. If repeat blood cultures remain negative, would likely treat for 2 weeks total, EOT 22. · Dispo: likely SNF     Anti-infectives:   · Ceftriaxone ( - )  · Cefazolin -  · doxycycline    Subjective: Interval History: Seen sitting up in bed eating breakfast. Denies any complaints. Patient is a 80 y.o. female with CKD 3, PAD, DM2 who was admitted on 2022 after a fall. She had right hip and left knee pain as a result. She was in ketoacidosis as well. CXR demonstrated bilateral ground glass opacities. Blood cultures grew CoNS and GBS. Urine culture grew K pneumoniae. Urine culture also grew K pneumoniae and P mirabilis on 2022. She has been afebrile since admission. Leukocytosis is improving on ceftriaxone. She has also been treated with doxycycline since 6/3/2022. She denies tooth pain and dysuria. The only thing that she complains of is pain in her left leg. She knows that she is in the hospital but is not well oriented as to the circumstances surrounding admission. No Known Allergies     Review of Systems:    See HPI. Otherwise complete system review unremarkable.     Objective:     Visit Vitals  /66   Pulse 70   Temp 97.7 °F (36.5 °C)   Resp 16   Ht 5' 2\" (1.575 m)   Wt 165 lb 1.6 oz (74.9 kg)   SpO2 95%   BMI 30.20 kg/m²     Temp (24hrs), Av.8 °F (36.6 °C), Min:97.3 °F (36.3 °C), Max:98.6 °F (37 °C)     Patient was seen and examined on 2022 and physical exam remains unchanged since yesterday, unless noted below:    Lines:  Peripheral IV:       Physical Exam:    General:  Alert, cooperative, well nourished, well developed, appears younger than stated age   Eyes:  Sclera anicteric. Pupils equally round and reactive to light. Mouth/Throat: Mucous membranes normal, oral pharynx clear; missing a few teeth but otherwise teeth look healthy   Neck: Supple   Lungs:   Clear to auscultation bilaterally, good effort   CV:  Loud precordial systolic murmur   Abdomen:   Soft, non-tender. bowel sounds normal. non-distended   Extremities: No cyanosis or edema   Skin: Skin color, texture, turgor normal. no acute rash or lesions   Lymph nodes: Cervical and supraclavicular normal   Musculoskeletal: No swelling or deformity   Lines/Devices:  Intact, no erythema, drainage or tenderness   Psych: Alert; not well oriented to situation but able to answer direct ROS questions. Data Review:     CBC:  Recent Labs     06/05/22 0327 06/06/22  0431 06/07/22  0506   WBC 11.3* 12.2* 15.0*   HGB 8.0* 7.0* 7.6*   HCT 27.0* 23.5* 25.6*    176 182       BMP:  Recent Labs     06/05/22 0327 06/06/22  0431 06/07/22  0506   BUN 78* 85* 83*    140 142   K 4.9 4.5 4.6   * 112* 113*   CO2 19* 21 18*       LFTS:  No results for input(s): ALT, TP, ALB in the last 72 hours. Invalid input(s): TBILI, SGOT, AP    Microbiology:   Blood cultures (6/2) GBS, CoNS  Urine culture (6/2): K pneumonia    Imaging:   CXR (6/4/2022)  Findings: Bilateral interstitial opacities seen throughout the lungs, stable. No   new parenchymal density demonstrated.  No change in the appearance of the   mediastinal contour or osseous structures         Signed By: Marino Jacob, APRN - CNP     June 7, 2022

## 2022-06-07 NOTE — PROGRESS NOTES
Hospitalist Progress Note   Admit Date:  2022 12:06 PM   Name:  Arno Klinefelter   Age:  80 y.o. Sex:  female  :  1929   MRN:  [id number]   Room:      Reason(s) for Admission: Hyperkalemia [E87.5]  AMPARO (acute kidney injury) ([facility] [address] [N17.9]  DKA, type 2, not at goal [facility]) [E11.10]  Acute cystitis without hematuria [N30.00]  Diabetic ketoacidosis without coma associated with other specified diabetes mellitus [facility]) [E13.10]     Hospital Course & Interval History:   Ms. Pam Ling is a 79 y/o WF with a h/o CKD3, PAD, DM2, dementia and frequent falls who was admitted to our service on  after a fall at home. She was trying to get out of chair then tripped and fell on R hip. Poor PO intake for past 1 week, patient was recently visited [place] ER for UTI and was given Rx for abx. Labs showed DKA, Cr 1.78, WBCs 17.4.  CXR with bilateral GGOs. She was admitted for DKA, AMPARO, acute metabolic encephalopathy. Later on developed mild wheezing so she was started on steroids and nebs. Increase oxygen requirement on , normally on 2 lit/min, presently on - on 4 lit/min. CXR showed stable infiltrates. Blood cultures growing strep agalactiae and CoNS, ID was consulted for . UTI with klebsiella pna. Subjective/24hr Events (22): Eating breakfast, feels well. Worked with therapy yesterday, did weight bear some on the RLE but apparently had lots of difficulty with that. Cr stable, glucose improved. No chest pain or SOB. Assessment & Plan:   # R hip pain   - Fall PTA, negative X-rays. Limited with therapy yesterday due to pain. RLE looks rotated and shortened, will get CT hip to eval for occult fracture. # Sepsis 2/2 CAP, GBS bacteremia   - Sepsis resolved. - Changed to Ancef . Repeat blood cxs from  are negative. # AMPARO              - Baseline sCr low to mid 1s, stable high 1s since admission. Off IVFs now. Con't PO intake, Cr is stable.      # Acute on chronic hypoxemic respiratory failure              - Stable CXR findings.     # DKA              - Resolved, con't current insulin regimen.     # Acute cystitis              - Resolved.     # AECOPD?              - Stopped steroids.     # Frequent falls   - Therapy, needs placement, CT hip as above.     # HTN              - Con't home meds     # MDD/anxiety              - Zoloft     # GERD              - H2RA     # H/o DVT              - Reduce Eliquis to 2.5mg BID     # H/o COVID              - Noted. Discharge Planning: Placement. Diet:  ADULT ORAL NUTRITION SUPPLEMENT; Breakfast, Dinner;  Low Calorie/High Protein Oral Supplement  ADULT DIET; Easy to Chew; Likes unsweet tea with her meals  DVT PPx: Eliquis  Code status: DNR    Hospital Problems           Last Modified POA    * (Principal) DKA, type 2, not at goal [facility]) 6/2/2022 Yes    Acute kidney injury ([facility] [address] 3/5/3429 Yes    Metabolic encephalopathy 3/4/1077 Yes    CAP (community acquired pneumonia) 6/2/2022 Yes    Sepsis ([facility] [address] 6/3/2022 Yes    Streptococcal bacteremia 6/5/2022 Yes    Frequent falls 6/2/2022 Yes    Depression 6/3/2022 Yes    UTI (urinary tract infection) 6/2/2022 Yes    GERD (gastroesophageal reflux disease) 6/3/2022 Yes    Type 2 diabetes mellitus with diabetic neuropathy ([facility] [address] 6/2/2022 Yes    Essential hypertension 6/2/2022 Yes    Anxiety 6/3/2022 Yes    Asthma 6/3/2022 Yes    Neuropathy 6/3/2022 Yes    Overview Signed 3/24/2022  3:44 PM by Renea Barrera, DO     Lower limbs                 Objective:     Patient Vitals for the past 24 hrs:   Temp Pulse Resp BP SpO2   06/07/22 0805 -- 70 16 -- 95 %   06/07/22 0713 97.7 °F (36.5 °C) -- -- -- --   06/07/22 0712 -- 64 18 126/66 97 %   06/07/22 0310 97.8 °F (36.6 °C) 66 17 108/80 100 %   06/06/22 2319 97.3 °F (36.3 °C) 72 17 (!) 141/68 96 %   06/06/22 2035 -- 77 -- -- --   06/06/22 2020 -- 77 -- -- --   06/06/22 2006 97.7 °F (36.5 °C) 75 20 139/67 96 %   06/06/22 1942 -- 74 16 -- 90 %   06/06/22 1940 -- 74 16 -- 90 %   06/06/22 1511 -- 68 16 -- 95 %   06/06/22 1500 98.6 °F (37 °C) 62 12 104/70 --   06/06/22 1145 97.8 °F (36.6 °C) 72 11 128/60 --   06/06/22 1107 -- 70 16 -- 95 %       Estimated body mass index is 30.2 kg/m² as calculated from the following:    Height as of this encounter: 5' 2\" (1.575 m). Weight as of this encounter: 165 lb 1.6 oz (74.9 kg). No intake or output data in the 24 hours ending 06/07/22 1010      Physical Exam:   Blood pressure 126/66, pulse 70, temperature 97.7 °F (36.5 °C), resp. rate 16, height 5' 2\" (1.575 m), weight 165 lb 1.6 oz (74.9 kg), SpO2 95 %. General:    Well nourished, thin. No overt distress. Pleasantly confused, eating breakfast.  Head:  Normocephalic, atraumatic  Eyes:  Sclerae appear normal. Pupils equally round. ENT:  Nares appear normal, no drainage. Moist oral mucosa  Neck:  No restricted ROM. Trachea midline. CV:   RRR. No m/r/g. No jugular venous distension. Lungs:   CTAB. No wheezing, rhonchi, or rales. Respirations even, unlabored. Abdomen: Bowel sounds present. Soft, nontender, nondistended. Extremities: No cyanosis or clubbing. No edema. RLE does appear externally rotated and slightly shortened. Skin:     No rashes and normal coloration. Warm and dry. Neuro:  CN II-XII grossly intact. Sensation intact. A&Ox3  Psych:  Normal mood and affect.       I have reviewed ordered lab tests and independently visualized imaging below:    Recent Labs:  Recent Results (from the past 48 hour(s))   POCT Glucose    Collection Time: 06/05/22 12:46 PM   Result Value Ref Range    POC Glucose 240 (H) 65 - 100 mg/dL    Performed by: Monique    Transthoracic echocardiogram (TTE) complete with contrast, bubble, strain, and 3D PRN    Collection Time: 06/05/22  4:31 PM   Result Value Ref Range    LV EDV A2C 100 mL    LV EDV A4C 92 mL    LV ESV A2C 38 mL    LV ESV A4C 31 mL    IVSd 1.1 (A) 0.6 - 0.9 cm    LVIDd 5.0 3.9 - 5.3 cm    LVIDs 2.7 cm    LVOT Diameter 1.9 cm    LVOT Mean Gradient 2 mmHg    LVOT VTI 20.5 cm    LVOT Peak Velocity 1.1 m/s    LVOT Peak Gradient 5 mmHg    LVPWd 1.0 (A) 0.6 - 0.9 cm    LV E' Lateral Velocity 12 cm/s    LV E' Septal Velocity 9 cm/s    LV Ejection Fraction A2C 62 %    LV Ejection Fraction A4C 66 %    EF BP 64 55 - 100 %    LVOT Area 2.8 cm2    LVOT SV 58.1 ml    LA Minor Axis 6.4 cm    LA Major Axis 7.6 cm    LA Area 2C 26.8 cm2    LA Area 4C 33.1 cm2    LA Volume  (A) 22 - 52 mL    LA Diameter 4.7 cm    AV Mean Velocity 1.2 m/s    AV Mean Gradient 6 mmHg    AV Mean Gradient 6 mmHg    AV VTI 30.5 cm    AV Peak Velocity 1.5 m/s    AV Peak Gradient 9 mmHg    AV Area by VTI 1.9 cm2    AV Area by Peak Velocity 2.0 cm2    Aortic Root 3.4 cm    Ascending Aorta 3.0 cm    IVC Proxmal 2.4 cm    MV Nyquist Velocity 22 cm/s    MR Radius PISA 0.90 cm    MV Mean Gradient 5 mmHg    MV VTI 46.8 cm    MV Mean Velocity 1.0 m/s    MR .0 cm    MR Peak Velocity 5.6 m/s    MR Peak Gradient 125 mmHg    MV Max Velocity 1.8 m/s    MV Peak Gradient 13 mmHg    MV E Wave Deceleration Time 261.0 ms    MV A Velocity 1.15 m/s    MV E Velocity 1.51 m/s    MV EROA PISA 20.0 cm2    MV Area by VTI 1.2 cm2    PV AT 92.0 ms    PV Max Velocity 1.0 m/s    PV Peak Gradient 4 mmHg    RVIDd 3.7 cm    RV Basal Dimension 4.1 cm    TAPSE 2.0 1.7 cm    TR Max Velocity 4.56 m/s    TR Peak Gradient 83 mmHg    Body Surface Area 1.67 m2    Fractional Shortening 2D 46 28 - 44 %    LV ESV Index A4C 18 mL/m2    LV EDV Index A4C 54 mL/m2    LV ESV Index A2C 22 mL/m2    LV EDV Index A2C 59 mL/m2    LVIDd Index 2.96 cm/m2    LVIDs Index 1.60 cm/m2    LV RWT Ratio 0.40     LV Mass 2D 194.4 (A) 67 - 162 g    LV Mass 2D Index 115.0 (A) 43 - 95 g/m2    MV E/A 1.31     E/E' Ratio (Averaged) 14.68     E/E' Lateral 12.58     E/E' Septal 16.78     LA Volume Index BP 66 (A) 16 - 34 ml/m2    LVOT Stroke Volume Index 34.4 mL/m2    LA Size Index 2.78 cm/m2    LA/AO Root Ratio 1.38     Ao Root Index 2.01 cm/m2    Ascending Aorta Index 1.78 cm/m2    AV Velocity Ratio 0.73     LVOT:AV VTI Index 0.67     NATE/BSA VTI 1.1 cm2/m2    NATE/BSA Peak Velocity 1.2 cm2/m2    MR Regurg Volume PISA 3,697.01 mL    MV:LVOT VTI Index 2.28     Est. RA Pressure 8 mmHg    RVSP 91 mmHg   POCT Glucose    Collection Time: 06/05/22  4:37 PM   Result Value Ref Range    POC Glucose 321 (H) 65 - 100 mg/dL    Performed by: [email or web address]    POCT Glucose    Collection Time: 06/05/22  9:11 PM   Result Value Ref Range    POC Glucose 285 (H) 65 - 100 mg/dL    Performed by: Magnolia    Basic Metabolic Panel    Collection Time: 06/06/22  4:31 AM   Result Value Ref Range    Sodium 140 136 - 145 mmol/L    Potassium 4.5 3.5 - 5.1 mmol/L    Chloride 112 (H) 98 - 107 mmol/L    CO2 21 21 - 32 mmol/L    Anion Gap 7 7 - 16 mmol/L    Glucose 112 (H) 65 - 100 mg/dL    BUN 85 (H) 8 - 23 MG/DL    CREATININE 1.90 (H) 0.6 - 1.0 MG/DL    GFR  32 (L) >60 ml/min/1.73m2    GFR Non- 26 (L) >60 ml/min/1.73m2    Calcium 9.0 8.3 - 10.4 MG/DL   CBC with Auto Differential    Collection Time: 06/06/22  4:31 AM   Result Value Ref Range    WBC 12.2 (H) 4.3 - 11.1 K/uL    RBC 2.36 (L) 4.05 - 5.2 M/uL    Hemoglobin 7.0 (L) 11.7 - 15.4 g/dL    Hematocrit 23.5 (L) 35.8 - 46.3 %    MCV 99.6 (H) 79.6 - 97.8 FL    MCH 29.7 26.1 - 32.9 PG    MCHC 29.8 (L) 31.4 - 35.0 g/dL    RDW 14.6 11.9 - 14.6 %    Platelets 900 506 - 700 K/uL    MPV 12.2 9.4 - 12.3 FL    nRBC 0.07 0.0 - 0.2 K/uL    Differential Type AUTOMATED      Seg Neutrophils 77 43 - 78 %    Lymphocytes 15 13 - 44 %    Monocytes 6 4.0 - 12.0 %    Eosinophils % 0 (L) 0.5 - 7.8 %    Basophils 0 0.0 - 2.0 %    Immature Granulocytes 2 0.0 - 5.0 %    Segs Absolute 9.2 (H) 1.7 - 8.2 K/UL    Absolute Lymph # 1.9 0.5 - 4.6 K/UL    Absolute Mono # 0.8 0.1 - 1.3 K/UL    Absolute Eos # 0.0 0.0 - 0.8 K/UL    Basophils Absolute 0.0 0.0 - 0.2 K/UL    Absolute Immature Granulocyte 0.3 0.0 - 0.5 K/UL   POCT Glucose    Collection Time: 06/06/22  7:40 AM   Result Value Ref Range    POC Glucose 88 65 - 100 mg/dL    Performed by: WallacePreeyaPCT    POCT Glucose    Collection Time: 06/06/22 11:46 AM   Result Value Ref Range    POC Glucose 164 (H) 65 - 100 mg/dL    Performed by: LennielyPCT    POCT Glucose    Collection Time: 06/06/22  3:32 PM   Result Value Ref Range    POC Glucose 245 (H) 65 - 100 mg/dL    Performed by: WallacePreeyaPCT    POCT Glucose    Collection Time: 06/06/22  9:16 PM   Result Value Ref Range    POC Glucose 193 (H) 65 - 100 mg/dL    Performed by: [facility] [place]    Basic Metabolic Panel    Collection Time: 06/07/22  5:06 AM   Result Value Ref Range    Sodium 142 136 - 145 mmol/L    Potassium 4.6 3.5 - 5.1 mmol/L    Chloride 113 (H) 98 - 107 mmol/L    CO2 18 (L) 21 - 32 mmol/L    Anion Gap 11 7 - 16 mmol/L    Glucose 116 (H) 65 - 100 mg/dL    BUN 83 (H) 8 - 23 MG/DL    CREATININE 1.90 (H) 0.6 - 1.0 MG/DL    GFR  32 (L) >60 ml/min/1.73m2    GFR Non- 26 (L) >60 ml/min/1.73m2    Calcium 9.2 8.3 - 10.4 MG/DL   CBC    Collection Time: 06/07/22  5:06 AM   Result Value Ref Range    WBC 15.0 (H) 4.3 - 11.1 K/uL    RBC 2.57 (L) 4.05 - 5.2 M/uL    Hemoglobin 7.6 (L) 11.7 - 15.4 g/dL    Hematocrit 25.6 (L) 35.8 - 46.3 %    MCV 99.6 (H) 79.6 - 97.8 FL    MCH 29.6 26.1 - 32.9 PG    MCHC 29.7 (L) 31.4 - 35.0 g/dL    RDW 14.7 (H) 11.9 - 14.6 %    Platelets 313 401 - 718 K/uL    MPV 12.2 9.4 - 12.3 FL    nRBC 0.10 0.0 - 0.2 K/uL   POCT Glucose    Collection Time: 06/07/22  7:24 AM   Result Value Ref Range    POC Glucose 89 65 - 100 mg/dL    Performed by: Ivan          Other Studies:  Transthoracic echocardiogram (TTE) complete with contrast, bubble, strain, and 3D PRN    Result Date: 6/5/2022    Left Ventricle: Normal left ventricular systolic function. EF by 2D Simpsons Biplane is 64%. Left ventricle size is normal. Mildly increased wall thickness. Normal wall motion. Abnormal diastolic function. Average E/e' ratio is 14.68.   Right Ventricle: Right ventricle size is normal. Normal systolic function.   Aortic Valve: Moderately thickened cusps. Mild regurgitation.   Mitral Valve: Severe annular calcification of the mitral valve. Severe regurgitation. Mild stenosis noted.   Tricuspid Valve: Valve structure is normal. Moderate to severe regurgitation. No stenosis noted. Severely elevated RVSP. The estimated RVSP is 91 mmHg.   Pulmonic Valve: The pulmonic valve was not well visualized.   Right Atrium: Right atrium is mildly dilated.   IVC/SVC: IVC diameter is greater than 21 mm and decreases greater than 50% during inspiration; therefore the estimated right atrial pressure is intermediate (~8 mmHg).   No valvular vegetations visualized on this study.        Current Meds:  Current Facility-Administered Medications   Medication Dose Route Frequency    apixaban (ELIQUIS) tablet 2.5 mg  2.5 mg Oral BID    ceFAZolin (ANCEF) 2000 mg in sterile water 20 mL IV syringe  2,000 mg IntraVENous Q12H    traMADol (ULTRAM) tablet 50 mg  50 mg Oral Q6H PRN    budesonide (PULMICORT) nebulizer suspension 500 mcg  500 mcg Nebulization BID    acetaminophen (TYLENOL) tablet 650 mg  650 mg Oral Q4H PRN    insulin glargine (LANTUS) injection vial 10 Units  10 Units SubCUTAneous Daily    glucose chewable tablet 16 g  4 tablet Oral PRN    dextrose bolus 10% 125 mL  125 mL IntraVENous PRN    Or    dextrose bolus 10% 250 mL  250 mL IntraVENous PRN    glucagon injection 1 mg  1 mg IntraMUSCular PRN    dextrose 5 % solution  100 mL/hr IntraVENous PRN    famotidine (PEPCID) tablet 20 mg  20 mg Oral Daily    albuterol (PROVENTIL) nebulizer solution 2.5 mg  2.5 mg Nebulization Q6H PRN    insulin lispro (HUMALOG) injection vial 0-8 Units  0-8 Units SubCUTAneous 4x Daily AC & HS    ipratropium-albuterol (DUONEB) nebulizer solution 1 ampule  1 ampule Inhalation Q4H WA    dextrose bolus 10% 250 mL  250 mL IntraVENous PRN    insulin regular (HUMULIN R;NOVOLIN R) injection 0-10 Units  0-10 Units IntraVENous PRN    potassium chloride 10 mEq/100 mL IVPB (Peripheral Line)  10 mEq IntraVENous PRN    magnesium sulfate 1000 mg in dextrose 5% 100 mL IVPB  1,000 mg IntraVENous PRN    sodium phosphate 10 mmol in sodium chloride 0.9 % 250 mL IVPB  10 mmol IntraVENous PRN    Or    sodium phosphate 15 mmol in dextrose 5 % 250 mL IVPB  15 mmol IntraVENous PRN    Or    sodium phosphate 20 mmol in dextrose 5 % 500 mL IVPB  20 mmol IntraVENous PRN    polyethylene glycol (GLYCOLAX) packet 17 g  17 g Oral Daily PRN    hydrALAZINE (APRESOLINE) tablet 25 mg  25 mg Oral TID    gabapentin (NEURONTIN) capsule 100 mg  100 mg Oral TID    metoprolol tartrate (LOPRESSOR) tablet 12.5 mg  12.5 mg Oral BID    pravastatin (PRAVACHOL) tablet 10 mg  10 mg Oral Nightly    sertraline (ZOLOFT) tablet 100 mg  100 mg Oral Daily    morphine injection 1 mg  1 mg IntraVENous Q4H PRN       Signed:  Deanne Almonte MD    Part of this note may have been written by using a voice dictation software. The note has been proof read but may still contain some grammatical/other typographical errors.

## 2022-06-08 NOTE — PROGRESS NOTES
PHYSICAL THERAPY Daily Note and AM  (Link to Caseload Tracking: PT Visit Days : 3  Time In/Out PT Charge Capture  Rehab Caseload Tracker  Orders      Seema Dumont is a 80 y.o. female   PRIMARY DIAGNOSIS: DKA, type 2, not at goal [facility])  Hyperkalemia [E87.5]  AMPARO (acute kidney injury) ([facility] [address] [N17.9]  DKA, type 2, not at goal [facility]) [E11.10]  Acute cystitis without hematuria [N30.00]  Diabetic ketoacidosis without coma associated with other specified diabetes mellitus ([facility] [address] [E13.10]       Inpatient: Payor: [employer] MEDICARE / Plan: Meri Sourneto SMITH [facility] DUAL / Product Type: *No Product type* /     ASSESSMENT:     REHAB RECOMMENDATIONS:   Recommendation to date pending progress:  Setting:   Short-term Rehab    Equipment:     To Be Determined     ASSESSMENT:  Ms. Socorro Duke is supine in bed and agreeable to therapy though somewhat lethargic. On first attempt pt's BS was 61 so RN asked to hold at that time. Returned later. BS normal per RN. Pt is [place]. Bed mobility is with mod assist x 1. She expresses increased pain. She sat for a while leaning heavily to left off of right hip. MD came in and is going to order an MRI . MD assisted in getting pt back to bed total a x 2. No progress this session. Continue PT efforts. Needs rehab      SUBJECTIVE:   Ms. Socorro Duke states \"It hurts\".      Social/Functional Lives With: Daughter Gina Earing)  Type of Home: House  Home Layout: One level  Home Access:  (5 steps front/2 steps back)  Bathroom Toilet: Bedside commode  Home Equipment: Lorrie Hammond, [address] Help From: Family  ADL Assistance: Needs assistance  Active : No  Patient's  Info: family  Occupation: Retired  OBJECTIVE:     PAIN: VITALS / O2: Isaiah Glory / Iver Risser / Vondebbiee Conine:   Pre Treatment: no number given         Post Treatment: no number given Vitals        Oxygen    None    RESTRICTIONS/PRECAUTIONS:        MOBILITY: I Mod I S SBA CGA Min Mod Max Total  NT x2 Comments:   Bed Mobility Rolling [] [] [] [] [] [] [] [] [] [x] []    Supine to Sit [] [] [] [] [] [] [x] [x] [] [] []    Scooting [] [] [] [] [] [] [] [x] [] [] []    Sit to Supine [] [] [] [] [] [] [] [] [x] [] [x]    Transfers    Sit to Stand [] [] [] [] [] [] [] [] [] [x] []    Bed to Chair [] [] [] [] [] [] [] [] [] [x] []    Stand to Sit [] [] [] [] [] [] [] [] [] [x] []     [] [] [] [] [] [] [] [] [] [x] []    I=Independent, Mod I=Modified Independent, S=Supervision, SBA=Standby Assistance, CGA=Contact Guard Assistance,   Min=Minimal Assistance, Mod=Moderate Assistance, Max=Maximal Assistance, Total=Total Assistance, NT=Not Tested    BALANCE: Good Fair+ Fair Fair- Poor NT Comments   Sitting Static [] [] [] [] [] [x]    Sitting Dynamic [] [] [] [] [] [x]              Standing Static [] [] [] [] [] [x]    Standing Dynamic [] [] [] [] [] [x]      GAIT: I Mod I S SBA CGA Min Mod Max Total  NT x2 Comments:   Level of Assistance [] [] [] [] [] [] [x] [] [] [x] []    Distance  feet    DME None    Gait Quality decreased upright posture     Weightbearing Status      Stairs      I=Independent, Mod I=Modified Independent, S=Supervision, SBA=Standby Assistance, CGA=Contact Guard Assistance,   Min=Minimal Assistance, Mod=Moderate Assistance, Max=Maximal Assistance, Total=Total Assistance, NT=Not Tested    PLAN:   ACUTE PHYSICAL THERAPY GOALS:   (Developed with and agreed upon by patient and/or caregiver.)  STG:  (1.)Ms. Tripp Durand will move from supine to sit and sit to supine , scoot up and down and roll side to side with CONTACT GUARD ASSIST within 4 treatment day(s). (2.)Ms. Tripp Durand will transfer from bed to chair and chair to bed with CONTACT GUARD ASSIST using the least restrictive device within 4 treatment day(s). (3.)Ms. Tripp Durand will ambulate with CONTACT GUARD ASSIST for 50 feet with the least restrictive device within 4 treatment day(s).      LTG:  (1.)Ms. Tripp Durand will move from supine to sit and sit to supine , scoot up and down and roll side to side in bed with STAND BY ASSIST within 7 treatment day(s). (2.)Ms. Socorro Duke will transfer from bed to chair and chair to bed with STAND BY ASSIST using the least restrictive device within 7 treatment day(s). (3.)Ms. Socorro Duke will ambulate with STAND BY ASSIST for 100 feet with the least restrictive device within 7 treatment day(s). ________________________________________________________________________________________________      FREQUENCY AND DURATION: 3 times/week for duration of hospital stay or until stated goals are met, whichever comes first.    TREATMENT:   TREATMENT:   Therapeutic Activity (23 Minutes): Therapeutic activity included Supine to Sit, Sit to Supine, Scooting and Sitting balance  to improve functional Activity tolerance, Balance, Coordination, Mobility and Strength.     TREATMENT GRID:  N/A    AFTER TREATMENT PRECAUTIONS: Bed, Bed/Chair Locked, Call light within reach, Needs within reach and RN notified    INTERDISCIPLINARY COLLABORATION:  MD/ PA/ NP , RN/ PCT and PT/ PTA    EDUCATION:      TIME IN/OUT:  Time In: 1126  Time Out: 9020 [place]  Minutes: 23    KODY AVALOS PTA

## 2022-06-08 NOTE — DIABETES MGMT
Patient admitted with DKA. Admitting blood glucose 474. Current HbA1c not available in [facility]. Blood glucose ranged  yesterday with patient receiving Lantus 10 units and Humalog 12 units. Blood glucose this morning was 49. Patient received D10 250mL and was started on D5 fluids. Most recent FSBS 192. Creatinine 1.90. GFR 32. Reviewed patient current regimen: Lantus 10 units daily and Humalog correctional insulin. Noted patient was on Solumedrol but this was d/c on 6/6/22. Patient would likely benefit from d/c of basal insulin to reduce risk of hypoglycemia. Previously patient seen by diabetes management at that time during hospitalization patient was on Humalog 3 units with meals and correctional insulin. Once patient glycemic control is stable off D5 fluids patient would likely benefit from low dose prandial insulin. Provider updated via [employer] regarding recommendations and patient glycemic control.

## 2022-06-08 NOTE — PROGRESS NOTES
Infectious Disease Chart Review Note    Today's Date: 6/8/2022   Admit Date: 6/2/2022    Patient not seen today, however full chart reviewed. ID plan as listed below. Impression:   · Group B Strep bacteremia; repeat cultures 6/4 NGTD, source unclear  · ? UTI with klebsiella. At present she is asymptomatic  · S/p fall  · CKD3; CrCL 32  · Diabetes (hgb a1c 5.9)  · Loud precordial systolic murmur; TTE reviewed    Plan:   · Repeat blood cultures remain negative. Will discontinue Cefazolin and start Keflex 500 mg PO QID for remainder of therapy, EOT 6/18/22. · *The ID team will sign off today. Please call with any questions or concerns. Anti-infectives:   · Ceftriaxone (6/2 -6/6 )  · Cefazolin 6/6-6/8  · Keflex 6/8-    Data Review:     CBC:  Recent Labs     06/06/22  0431 06/07/22  0506 06/08/22  0438   WBC 12.2* 15.0* 19.9*   HGB 7.0* 7.6* 8.3*   HCT 23.5* 25.6* 28.5*    182 172       BMP:  Recent Labs     06/06/22  0431 06/07/22  0506 06/08/22  0438   BUN 85* 83* 83*    142 145   K 4.5 4.6 4.4   * 113* 114*   CO2 21 18* 21       LFTS:  No results for input(s): ALT, TP, ALB in the last 72 hours. Invalid input(s): TBILI, SGOT, AP    Microbiology:   Blood cultures (6/2) GBS, CoNS  Urine culture (6/2): K pneumonia    Imaging:   CXR (6/4/2022)  Findings: Bilateral interstitial opacities seen throughout the lungs, stable. No   new parenchymal density demonstrated.  No change in the appearance of the   mediastinal contour or osseous structures         Signed By: CRIS Frankel - CNP     June 8, 2022

## 2022-06-08 NOTE — PROGRESS NOTES
MRI called and informed that the pt could not have MRI because a hx of brain aneurysm with clips. Instructed to try and obtain copy of the implant card and/or serial number. Night hospitalist alerted and instructed to day time  hosptialist for a decision.   Passed along in report to night nurse

## 2022-06-08 NOTE — PROGRESS NOTES
Hospitalist Progress Note   Admit Date:  2022 12:06 PM   Name:  Tami Barker   Age:  80 y.o. Sex:  female  :  1929   MRN:  [id number]   Room:  221/    Presenting Complaint: Fall     Reason(s) for Admission: Hyperkalemia [E87.5]  AMPARO (acute kidney injury) ([facility] [address] [N17.9]  DKA, type 2, not at goal [facility]) [E11.10]  Acute cystitis without hematuria [N30.00]  Diabetic ketoacidosis without coma associated with other specified diabetes mellitus [facility]) [facility] Course & Interval History:     Ms. Jason Bowling is a 89 y/o WF with a h/o CKD3, PAD, DM2, dementia and frequent falls who was admitted to our service on  after a fall at home. She was trying to get out of chair then tripped and fell on R hip. Poor PO intake for past 1 week, patient was recently visited [place] [facility] for UTI and was given Rx for abx. Labs showed DKA, Cr 1.78, [id number]. 4.  CXR with bilateral GGOs. She was admitted for DKA, AMPARO, acute metabolic encephalopathy. Later on developed mild wheezing so she was started on steroids and nebs. Increase oxygen requirement on , normally on 2 lit/min, presently on - on 4 lit/min. CXR showed stable infiltrates. Blood cultures growing strep agalactiae and CoNS, ID was consulted for . UTI with klebsiella pna. Subjective/24hr Events (22): Hypoglycemic this morning, had to get D10 still low normal was started on D5. Complaining of right hip pain  Mildly elevated white blood count, discussed with ID nurse practitioner, advised continue to follow WBC      Assessment & Plan:   # R hip pain              - Fall PTA, negative X-rays. Limited with therapy yesterday due to pain. RLE looks rotated and shortened, will get CT hip to eval for occult fracture.     # Sepsis 2/2 CAP, GBS bacteremia              - Sepsis resolved.              - Changed to Ancef . Repeat blood cxs from  are negative.   -elevated white blood count, on Keflex, spoke to ID nurse practitioner, continue to follow WBC     # AMPARO              - Baseline sCr low to mid 1s, stable high 1s since admission. Off IVFs now. Con't PO intake, Cr is stable.      # Acute on chronic hypoxemic respiratory failure              - Stable CXR findings.     # DKA              - Resolved, con't current insulin regimen. 6/8-hypoglycemic, steroid discontinued yesterday, restarted on D5 today  Later on this afternoon glucose 192, D5 was stopped and changed Accu-Cheks to every 4  DC'd Lantus, continue sliding scale insulin     # Acute cystitis              - Resolved.     # AECOPD?              - Stopped steroids.     # Frequent falls              - Therapy, needs placement, CT hip as above. 6/8-right hip pain, previous x-ray and CT no acute findings, will order MRI right hip.     # HTN              - Con't home meds     # MDD/anxiety              - Zoloft     # GERD              - H2RA     # H/o DVT              - Reduce Eliquis to 2.5mg BID     # H/o COVID              - Noted.     Discharge Planning: Placement. Diet:  ADULT ORAL NUTRITION SUPPLEMENT; Breakfast, Dinner; Low Calorie/High Protein Oral Supplement  ADULT DIET; Easy to Chew; Likes unsweet tea with her meals  DVT PPx: Eliquis  Code status: DNR      Diet:  ADULT DIET; Easy to Chew; Likes unsweet tea with her meals  ADULT ORAL NUTRITION SUPPLEMENT; Breakfast, Dinner, Lunch; Standard High Calorie/High Protein Oral Supplement  DVT PPx:   Code status: DNR    Hospital Problems:  Principal Problem:    DKA, type 2, not at goal [facility])  Active Problems:    Acute kidney injury ([facility] [address]    Metabolic encephalopathy    CAP (community acquired pneumonia)    Sepsis ([facility] [address]    Streptococcal bacteremia    Frequent falls    Depression    UTI (urinary tract infection)    GERD (gastroesophageal reflux disease)    Type 2 diabetes mellitus with diabetic neuropathy ([facility] [address]    Essential hypertension    Anxiety    Asthma    Neuropathy  Resolved Problems:    * No resolved hospital problems.  *      Objective: Patient Vitals for the past 24 hrs:   Temp Pulse Resp BP SpO2   06/08/22 1157 97.3 °F (36.3 °C) 67 18 (!) 144/66 99 %   06/08/22 0755 -- 66 18 134/64 97 %   06/08/22 0320 97.4 °F (36.3 °C) 73 16 139/65 97 %   06/07/22 2343 98.6 °F (37 °C) 74 16 (!) 145/66 98 %   06/07/22 2058 -- 56 16 -- 99 %   06/07/22 2022 98.8 °F (37.1 °C) 76 18 134/66 96 %   06/07/22 1950 -- 75 -- -- --   06/07/22 1545 -- 74 18 138/64 95 %   06/07/22 1509 -- 69 16 -- 94 %       Oxygen Therapy  SpO2: 99 %  Pulse Oximeter Device Mode: Intermittent  Pulse Oximeter Device Location: Finger  O2 Device: None (Room air)  Oximetry Probe Site Changed: Yes  Skin Assessment: Clean, dry, & intact  Skin Protection for O2 Device: N/A  O2 Flow Rate (L/min): 0 L/min    Estimated body mass index is 30 kg/m² as calculated from the following:    Height as of this encounter: 5' 2\" (1.575 m). Weight as of this encounter: 164 lb (74.4 kg). No intake or output data in the 24 hours ending 06/08/22 1359      Physical Exam:     Blood pressure (!) 144/66, pulse 67, temperature 97.3 °F (36.3 °C), temperature source Oral, resp. rate 18, height 5' 2\" (1.575 m), weight 164 lb (74.4 kg), SpO2 99 %. General:    Well nourished. Awake  Head:  Normocephalic, atraumatic  Eyes:  Sclerae appear normal.  Pupils equally round. ENT:  Nares appear normal, no drainage. Moist oral mucosa  Neck:  No restricted ROM. Trachea midline   CV:   RRR. No m/r/g. No jugular venous distension. Lungs:   CTAB. No wheezing, rhonchi, or rales. Respirations even, unlabored  Abdomen: Bowel sounds present. Soft, nontender, nondistended. Extremities: Complaining pain right hip on passive right hip flexion movements. Skin:     No rashes and normal coloration. Warm and dry. Neuro:  CN II-XII grossly intact. Sensation intact. A&Ox3  Psych:  Normal mood and affect.       I have reviewed ordered lab tests and independently visualized imaging below:    Recent Labs:  Recent Results (from the past 48 hour(s))   POCT Glucose    Collection Time: 06/06/22  3:32 PM   Result Value Ref Range    POC Glucose 245 (H) 65 - 100 mg/dL    Performed by:  AngeloeyaPCT    POCT Glucose    Collection Time: 06/06/22  9:16 PM   Result Value Ref Range    POC Glucose 193 (H) 65 - 100 mg/dL    Performed by: Telma Moreno    Basic Metabolic Panel    Collection Time: 06/07/22  5:06 AM   Result Value Ref Range    Sodium 142 136 - 145 mmol/L    Potassium 4.6 3.5 - 5.1 mmol/L    Chloride 113 (H) 98 - 107 mmol/L    CO2 18 (L) 21 - 32 mmol/L    Anion Gap 11 7 - 16 mmol/L    Glucose 116 (H) 65 - 100 mg/dL    BUN 83 (H) 8 - 23 MG/DL    CREATININE 1.90 (H) 0.6 - 1.0 MG/DL    GFR  32 (L) >60 ml/min/1.73m2    GFR Non- 26 (L) >60 ml/min/1.73m2    Calcium 9.2 8.3 - 10.4 MG/DL   CBC    Collection Time: 06/07/22  5:06 AM   Result Value Ref Range    WBC 15.0 (H) 4.3 - 11.1 K/uL    RBC 2.57 (L) 4.05 - 5.2 M/uL    Hemoglobin 7.6 (L) 11.7 - 15.4 g/dL    Hematocrit 25.6 (L) 35.8 - 46.3 %    MCV 99.6 (H) 79.6 - 97.8 FL    MCH 29.6 26.1 - 32.9 PG    MCHC 29.7 (L) 31.4 - 35.0 g/dL    RDW 14.7 (H) 11.9 - 14.6 %    Platelets 207 492 - 196 K/uL    MPV 12.2 9.4 - 12.3 FL    nRBC 0.10 0.0 - 0.2 K/uL   POCT Glucose    Collection Time: 06/07/22  7:24 AM   Result Value Ref Range    POC Glucose 89 65 - 100 mg/dL    Performed by: AbelardoCT    POCT Glucose    Collection Time: 06/07/22 11:18 AM   Result Value Ref Range    POC Glucose 141 (H) 65 - 100 mg/dL    Performed by: Spencer Mayer    POCT Glucose    Collection Time: 06/07/22  4:05 PM   Result Value Ref Range    POC Glucose 276 (H) 65 - 100 mg/dL    Performed by: Ivan    POCT Glucose    Collection Time: 06/07/22  9:06 PM   Result Value Ref Range    POC Glucose 282 (H) 65 - 100 mg/dL    Performed by: Magnolia    Basic Metabolic Panel    Collection Time: 06/08/22  4:38 AM   Result Value Ref Range    Sodium 145 136 - 145 mmol/L    Potassium 4.4 3.5 - 5.1 mmol/L    Chloride 114 (H) 98 - 107 mmol/L    CO2 21 21 - 32 mmol/L    Anion Gap 10 7 - 16 mmol/L    Glucose 49 (L) 65 - 100 mg/dL    BUN 83 (H) 8 - 23 MG/DL    CREATININE 1.90 (H) 0.6 - 1.0 MG/DL    GFR  32 (L) >60 ml/min/1.73m2    GFR Non- 26 (L) >60 ml/min/1.73m2    Calcium 9.5 8.3 - 10.4 MG/DL   CBC with Auto Differential    Collection Time: 06/08/22  4:38 AM   Result Value Ref Range    WBC 19.9 (H) 4.3 - 11.1 K/uL    RBC 2.79 (L) 4.05 - 5.2 M/uL    Hemoglobin 8.3 (L) 11.7 - 15.4 g/dL    Hematocrit 28.5 (L) 35.8 - 46.3 %    .2 (H) 79.6 - 97.8 FL    MCH 29.7 26.1 - 32.9 PG    MCHC 29.1 (L) 31.4 - 35.0 g/dL    RDW 15.1 (H) 11.9 - 14.6 %    Platelets 808 148 - 698 K/uL    MPV 12.2 9.4 - 12.3 FL    nRBC 0.05 0.0 - 0.2 K/uL    Differential Type AUTOMATED      Seg Neutrophils 73 43 - 78 %    Lymphocytes 19 13 - 44 %    Monocytes 6 4.0 - 12.0 %    Eosinophils % 0 (L) 0.5 - 7.8 %    Basophils 0 0.0 - 2.0 %    Immature Granulocytes 2 0.0 - 5.0 %    Segs Absolute 14.5 (H) 1.7 - 8.2 K/UL    Absolute Lymph # 3.8 0.5 - 4.6 K/UL    Absolute Mono # 1.1 0.1 - 1.3 K/UL    Absolute Eos # 0.1 0.0 - 0.8 K/UL    Basophils Absolute 0.0 0.0 - 0.2 K/UL    Absolute Immature Granulocyte 0.3 0.0 - 0.5 K/UL   POCT Glucose    Collection Time: 06/08/22  7:57 AM   Result Value Ref Range    POC Glucose 41 (L) 65 - 100 mg/dL    Performed by: Ivan    POCT Glucose    Collection Time: 06/08/22  8:21 AM   Result Value Ref Range    POC Glucose 54 (L) 65 - 100 mg/dL    Performed by: [employer]    POCT Glucose    Collection Time: 06/08/22  8:42 AM   Result Value Ref Range    POC Glucose 70 65 - 100 mg/dL    Performed by: [employer]    POCT Glucose    Collection Time: 06/08/22  9:12 AM   Result Value Ref Range    POC Glucose 76 65 - 100 mg/dL    Performed by: DropTh[employer]    POCT Glucose    Collection Time: 06/08/22 10:12 AM   Result Value Ref Range    POC Glucose 68 65 - 100 mg/dL    Performed by: Marybeth    POCT Glucose    Collection Time: 06/08/22 11:22 AM   Result Value Ref Range    POC Glucose 133 (H) 65 - 100 mg/dL    Performed by: Marybeth    POCT Glucose    Collection Time: 06/08/22 11:59 AM   Result Value Ref Range    POC Glucose 152 (H) 65 - 100 mg/dL    Performed by: Ivan    POCT Glucose    Collection Time: 06/08/22  1:47 PM   Result Value Ref Range    POC Glucose 192 (H) 65 - 100 mg/dL    Performed by: Marybeth        Other Studies:  CT HIP RIGHT WO CONTRAST   Final Result   1. No acute displaced fracture of the right hip or evidence or dislocation. A   nondisplaced fracture could be missed by CT. If occult fracture remains   suspected, then further evaluation can be performed with a bone scan, or   noncontrast MRI. 2. Distended loop of bowel in the anterior mid abdomen which is incompletely   characterized. Although not clearly abnormal, this can be initially assessed   with a KUB if clinically indicated. 3. Anasarca suggesting fluid overload. XR CHEST 1 VIEW   Final Result      XR CHEST PORTABLE   Final Result   No significant change in bilateral interstitial and groundglass   opacities. CT HEAD WO CONTRAST   Final Result   1. No evidence of acute intracranial hemorrhage. 2. Mildly compromised examination without evidence of acute cervical spine   fracture. 3. Cervical spondylosis. 4. Chronic small vessel ischemic change. 5. Partial imaged right pleural effusion. CT CERVICAL SPINE WO CONTRAST   Final Result   1. No evidence of acute intracranial hemorrhage. 2. Mildly compromised examination without evidence of acute cervical spine   fracture. 3. Cervical spondylosis. 4. Chronic small vessel ischemic change. 5. Partial imaged right pleural effusion. XR KNEE LEFT (1-2 VIEWS)   Final Result   No evidence of acute bony abnormality.       Left knee series HISTORY: Pain after fall      2 views were obtained. Comparison: 05/22/2022      Findings: There is no evidence of acute fracture or dislocation. The joint   spaces are well-preserved. There is no joint effusion. There is no bony   destruction. Atherosclerotic changes are present. There is diffuse low bone   mineral density. IMPRESSION: No evidence of acute bony abnormality. XR HIP 2-3 VW W PELVIS RIGHT   Final Result   No evidence of acute bony abnormality. Left knee series      HISTORY: Pain after fall      2 views were obtained. Comparison: 05/22/2022      Findings: There is no evidence of acute fracture or dislocation. The joint   spaces are well-preserved. There is no joint effusion. There is no bony   destruction. Atherosclerotic changes are present. There is diffuse low bone   mineral density. IMPRESSION: No evidence of acute bony abnormality.              MRI HIP RIGHT WO CONTRAST    (Results Pending)       Current Meds:  Current Facility-Administered Medications   Medication Dose Route Frequency    cephALEXin (KEFLEX) capsule 500 mg  500 mg Oral 3 times per day    dextrose 5 % solution   IntraVENous Continuous    apixaban (ELIQUIS) tablet 2.5 mg  2.5 mg Oral BID    traMADol (ULTRAM) tablet 50 mg  50 mg Oral Q6H PRN    budesonide (PULMICORT) nebulizer suspension 500 mcg  500 mcg Nebulization BID    acetaminophen (TYLENOL) tablet 650 mg  650 mg Oral Q4H PRN    insulin glargine (LANTUS) injection vial 10 Units  10 Units SubCUTAneous Daily    glucose chewable tablet 16 g  4 tablet Oral PRN    dextrose bolus 10% 125 mL  125 mL IntraVENous PRN    Or    dextrose bolus 10% 250 mL  250 mL IntraVENous PRN    glucagon injection 1 mg  1 mg IntraMUSCular PRN    dextrose 5 % solution  100 mL/hr IntraVENous PRN    famotidine (PEPCID) tablet 20 mg  20 mg Oral Daily    albuterol (PROVENTIL) nebulizer solution 2.5 mg  2.5 mg Nebulization Q6H PRN    insulin lispro (HUMALOG) injection vial 0-8 Units  0-8 Units SubCUTAneous 4x Daily AC & HS    ipratropium-albuterol (DUONEB) nebulizer solution 1 ampule  1 ampule Inhalation Q4H WA    dextrose bolus 10% 250 mL  250 mL IntraVENous PRN    insulin regular (HUMULIN R;NOVOLIN R) injection 0-10 Units  0-10 Units IntraVENous PRN    potassium chloride 10 mEq/100 mL IVPB (Peripheral Line)  10 mEq IntraVENous PRN    magnesium sulfate 1000 mg in dextrose 5% 100 mL IVPB  1,000 mg IntraVENous PRN    sodium phosphate 10 mmol in sodium chloride 0.9 % 250 mL IVPB  10 mmol IntraVENous PRN    Or    sodium phosphate 15 mmol in dextrose 5 % 250 mL IVPB  15 mmol IntraVENous PRN    Or    sodium phosphate 20 mmol in dextrose 5 % 500 mL IVPB  20 mmol IntraVENous PRN    polyethylene glycol (GLYCOLAX) packet 17 g  17 g Oral Daily PRN    hydrALAZINE (APRESOLINE) tablet 25 mg  25 mg Oral TID    gabapentin (NEURONTIN) capsule 100 mg  100 mg Oral TID    metoprolol tartrate (LOPRESSOR) tablet 12.5 mg  12.5 mg Oral BID    pravastatin (PRAVACHOL) tablet 10 mg  10 mg Oral Nightly    sertraline (ZOLOFT) tablet 100 mg  100 mg Oral Daily    morphine injection 1 mg  1 mg IntraVENous Q4H PRN       Signed:  Alonzo Soares MD

## 2022-06-08 NOTE — PROGRESS NOTES
Comprehensive Nutrition Assessment    Type and Reason for Visit: Reassess  Malnutrition Screening Tool: Malnutrition Screen  Have you recently lost weight without trying?: 14 to 23 pounds (2 points)  Have you been eating poorly because of a decreased appetite?: Yes (1 point) (last 3 days eating less)  Malnutrition Screening Tool Score: 3    Nutrition Recommendations/Plan:   Meals and Snacks:  Diet: Continue current order ; easy to chew  Nutrition Supplement Therapy:   Medical food supplement therapy:  Change Ensure Enlive three times per day (this provides 350 kcal and 20 grams protein per bottle) r/t hypoglycemia and variable PO intake. Can be modified back to Ensure High Protein if hyperglycemia trend occurs     Malnutrition Assessment:  Malnutrition Status: At risk for malnutrition (Comment) (advanced age, variable intake/appetite, reported wt loss)    Nutrition Assessment:  Nutrition History: History provided by patient and son at bedside. Patient lives with daughter who prepares meals. She has partial dentures. She states 2 meals per day. Daughter on phone states that she tolerates softer foods such as eggs, mac and cheese, soft vegetables. She states she drinks 1-2 Ensure per day. Her son states that she loves turkey subs but has difficulty with the bread. Patient endorses significant wt loss stating she use to weight ~250# nine years ago. When RD reviewing that she has been ~150# for at least the past year she agrees. Do You Have Any Cultural, Pentecostal, or Ethnic Food Preferences?: No   Nutrition Background:   Patient with PMH significant for CKD, PAD, DM2, dementia, frequent falls. She presented s/p fall and was found to be in DKA with AMPARO. Noted decline in PO for ~1 week PTA. Nutrition Interval:  Pt seen at bedside w/ RN. Pt fatigued and ate minimal breakfast this AM, did consume 100% of ensure high protein. Pt with hypoglycemia this AM, corrected at time of RD assessment.  Pt reports she will attempt to eat lunch, however states \"I just eat when I feel like it, and I haven't felt like it lately. \"     Nutrition Related Findings:   No gm wasting      Current Nutrition Therapies:  ADULT ORAL NUTRITION SUPPLEMENT; Breakfast, Dinner; Low Calorie/High Protein Oral Supplement  ADULT DIET; Easy to Chew; Likes unsweet tea with her meals    Current Intake:   Average Meal Intake: 26-50% Average Supplements Intake: 51-75%      Anthropometric Measures:  Height: 5' 2\" (157.5 cm)  Current Body Wt: 164 lb (74.4 kg) (6/8), Weight source: Bed Scale  BMI: 30  Admission Body Weight: 142 lb (64.4 kg)  Ideal Body Weight (Kg) (Calculated): 50 kg (110 lbs), 133.1 %  Usual Body Wt: 154 lb (69.9 kg) (6/22/21 office visit), Percent weight change: -4.9 (not significant wt loss over 1 year - likely related to acute decline in PO)       Edema: No data recorded  BMI Category Obese Class 1 (BMI 30.0-34. 9)  Estimated Daily Nutrient Needs:  Energy (kcal/day): 6279-3205 (Kcal/kg (20-25) Weight used: 66.4 kg Current  Protein (g/day): 66-80 (1-1.2 g/kg) Weight Used: (Current) 66.4 kg  Fluid (ml/day):   (1 ml/kcal)    Nutrition Diagnosis:   · Inadequate oral intake related to  (waxing and waning appetite) as evidenced by  (pt reported barriers to intake as above)    Nutrition Interventions:   Food and/or Nutrient Delivery: Continue Current Diet,Modify Oral Nutrition Supplement     Coordination of Nutrition Care: Continue to monitor while inpatient,Interdisciplinary Rounds  Plan of Care discussed with: Miri Mccall RN    Goals:   Previous Goal Met: Progressing toward Goal(s)  Active Goal: Meet at least 75% of estimated needs,by next RD assessment       Nutrition Monitoring and Evaluation:      Food/Nutrient Intake Outcomes: Food and Nutrient Intake,Supplement Intake  Physical Signs/Symptoms Outcomes: Biochemical Data,Meal Time Behavior,Weight    Discharge Planning:    Continue Oral Nutrition Supplement    Cyndi Brown RD (Abby), LD  Contact: [phone]

## 2022-06-08 NOTE — CARE COORDINATION
This CM spoke with pt daughter and son this day. Informed them that unfortunately [address], [facility] [address] [facility] are all out of network with pt insurance. [facility] is in network with her insurance and able to offer STR bed. They are all agreeable to bed offer from [address] [place] [address]. This CM spoke with Gary Pressley at [facility] and she is going to initiate prior authorization this day. No additional CM needs at this time. Will continue to follow and update as needed.

## 2022-06-08 NOTE — PROGRESS NOTES
Pt's Bs low this morning and pt had to be given D10 and D5.   Below is a list of events  7:41 BS= 41  8:01 D10 started per order  8:21 BS= 54  8:23 D10 infused an additional 15 minutes  8:42  BS= 70  Informed MD and instructed to run D10 an additional 15 minutes  9:12 BS= 76  10:12 BS=68  10:25 D5 started at 50cc/hr per MD order  11:22 BS= 133  11:26 D5 reduced to 25cc/hr per MD order  11:59 BS= 152  13:47 BS= 192  13:50 D5 stopped  16:21 BS= 163

## 2022-06-08 NOTE — PROGRESS NOTES
Occupational Therapy Note:    OT attempted to see pt today. RN requested OT to hold pt for therapy today due to increased pain, confusion, and unstable blood glucose levels. OT will see at a later time/date as permitted.        Thank you,    Nando HOLLEY/L

## 2022-06-09 NOTE — PROGRESS NOTES
At 420 received phone call from Anne Vora CM who states family wants to know about hospice services. Met with daughter Adithya White [phone], and Charanjit Alejandra [phone]. Discussed hospice benefit and family would like to know if pt is GIP appropriate to get pain under control, and they want to take patient home with hospice if she perks up. Thus far pt is not eating since yesterday, pills are crushed and taken in pudding. MARILEE placed referral order with Dr Aime Lyons as referring provider.  Spoke with Latosha Azevedo CM after discussion and she placed order for hospice referral.

## 2022-06-09 NOTE — PROGRESS NOTES
Pt has been awake and sleeping in intervals. Mood is very labile. Pt will smile one minute and within a few minutes will push staff away saying \"leave me alone\". Have been able to convince the pt to take her medicine crushed in pudding. Medicated with ultram x 1 and tylenol this afternoon. Very poor appetite today. Daughter at bedside with the pt.

## 2022-06-09 NOTE — PROGRESS NOTES
PHYSICAL THERAPY Daily Note and AM  (Link to Caseload Tracking: PT Visit Days : 4  Time In/Out PT Charge Capture  Rehab Caseload Tracker  Orders      Indery Sender is a 80 y.o. female   PRIMARY DIAGNOSIS: DKA, type 2, not at goal [facility])  Hyperkalemia [E87.5]  AMPARO (acute kidney injury) ([facility] [address] [N17.9]  DKA, type 2, not at goal [facility]) [E11.10]  Acute cystitis without hematuria [N30.00]  Diabetic ketoacidosis without coma associated with other specified diabetes mellitus ([facility] [address] [E13.10]       Inpatient: Payor: [employer] MEDICARE / Plan: Evelyn Spinmanuel [facility] DUAL / Product Type: *No Product type* /     ASSESSMENT:     REHAB RECOMMENDATIONS:   Recommendation to date pending progress:  Setting:   Short-term Rehab    Equipment:     To Be Determined     ASSESSMENT:  Ms. Nickolas Machado was supine in bed    Pt was in a great deal of pain with R LE. Did not sit pt up d/ t this. Worked on bed mobility working on getting her comfortable. Continue PT efforts. SUBJECTIVE:   Ms. Raheem Purcell in pain.       Social/Functional Lives With: Daughter Leon Magallon)  Type of Home: House  Home Layout: One level  Home Access:  (5 steps front/2 steps back)  Bathroom Toilet: Bedside commode  Home Equipment: Pat Hay, [address] Help From: Family  ADL Assistance: Needs assistance  Active : No  Patient's  Info: family  Occupation: Retired  OBJECTIVE:     PAIN: VITALS / O2: PRECAUTION / La Dougherty / Ruth Rodriguez:   Pre Treatment: no number given         Post Treatment: no number given Vitals        Oxygen    None    RESTRICTIONS/PRECAUTIONS:  Restrictions/Precautions  Restrictions/Precautions: Fall Risk  Restrictions/Precautions: Fall Risk     MOBILITY: I Mod I S SBA CGA Min Mod Max Total  NT x2 Comments:   Bed Mobility    Rolling [] [] [] [] [] [] [] [x] [] [] [x]    Supine to Sit [] [] [] [] [] [] [] [] [] [x] []    Scooting [] [] [] [] [] [] [] [] [] [x] []    Sit to Supine [] [] [] [] [] [] [] [] [] [x] []    Transfers Sit to Stand [] [] [] [] [] [] [] [] [] [x] []    Bed to Chair [] [] [] [] [] [] [] [] [] [x] []    Stand to Sit [] [] [] [] [] [] [] [] [] [x] []     [] [] [] [] [] [] [] [] [] [x] []    I=Independent, Mod I=Modified Independent, S=Supervision, SBA=Standby Assistance, CGA=Contact Guard Assistance,   Min=Minimal Assistance, Mod=Moderate Assistance, Max=Maximal Assistance, Total=Total Assistance, NT=Not Tested    BALANCE: Good Fair+ Fair Fair- Poor NT Comments   Sitting Static [] [] [] [] [] [x]    Sitting Dynamic [] [] [] [] [] [x]              Standing Static [] [] [] [] [] [x]    Standing Dynamic [] [] [] [] [] [x]      GAIT: I Mod I S SBA CGA Min Mod Max Total  NT x2 Comments:   Level of Assistance [] [] [] [] [] [] [x] [] [] [x] []    Distance  feet    DME None    Gait Quality decreased upright posture     Weightbearing Status      Stairs      I=Independent, Mod I=Modified Independent, S=Supervision, SBA=Standby Assistance, CGA=Contact Guard Assistance,   Min=Minimal Assistance, Mod=Moderate Assistance, Max=Maximal Assistance, Total=Total Assistance, NT=Not Tested    PLAN:   ACUTE PHYSICAL THERAPY GOALS:   (Developed with and agreed upon by patient and/or caregiver.)  STG:  (1.)Ms. Mateusz White will move from supine to sit and sit to supine , scoot up and down and roll side to side with CONTACT GUARD ASSIST within 4 treatment day(s). (2.)Ms. Mateusz White will transfer from bed to chair and chair to bed with CONTACT GUARD ASSIST using the least restrictive device within 4 treatment day(s). (3.)Ms. Mateusz White will ambulate with CONTACT GUARD ASSIST for 50 feet with the least restrictive device within 4 treatment day(s).      LTG:  (1.)Ms. Mateusz White will move from supine to sit and sit to supine , scoot up and down and roll side to side in bed with STAND BY ASSIST within 7 treatment day(s). (2.)Ms. Mateusz White will transfer from bed to chair and chair to bed with STAND BY ASSIST using the least restrictive device within 7 treatment day(s). (3.)Ms. Ileana Crabtree will ambulate with STAND BY ASSIST for 100 feet with the least restrictive device within 7 treatment day(s). ________________________________________________________________________________________________      FREQUENCY AND DURATION: 3 times/week for duration of hospital stay or until stated goals are met, whichever comes first.    TREATMENT:   TREATMENT:   Therapeutic Activity (12 Minutes): Therapeutic activity included Rolling and bed mobility - attempted to sit up without success to improve functional Activity tolerance, Balance, Coordination, Mobility and Strength.     TREATMENT GRID:  N/A    AFTER TREATMENT PRECAUTIONS: Bed, Bed/Chair Locked, Call light within reach, Needs within reach and RN notified    INTERDISCIPLINARY COLLABORATION:  RN/ PCT, PT/ PTA and OT/ AREVALO    EDUCATION:      TIME IN/OUT:  Time In: 1038  Time Out: 1050  Minutes: 12    KODY AVALOS PTA

## 2022-06-09 NOTE — CARE COORDINATION
Pt son and daughter requested to speak with this CM this day. Due to change in pt's medical condition, they are interested in hospice. Reviewed hospice agencies and they are agreeable to [id number] Referral.  MD placed hospice consult. This CM spoke with Giovani Jose with [facility] and updated on consult placed for this pt. She reports she is going to reach out to family this day. Will await determination of conversation between [facility] and family. Will continue to monitor and update as needed.

## 2022-06-09 NOTE — PROGRESS NOTES
Hospitalist Progress Note   Admit Date:  2022 12:06 PM   Name:  Fabio Nyhan   Age:  80 y.o. Sex:  female  :  1929   MRN:  [id number]   Room:  221/01    Presenting Complaint: Fall     Reason(s) for Admission: Hyperkalemia [E87.5]  AMPARO (acute kidney injury) ([facility] [address] [N17.9]  DKA, type 2, not at goal [facility]) [E11.10]  Acute cystitis without hematuria [N30.00]  Diabetic ketoacidosis without coma associated with other specified diabetes mellitus [facility]) [facility] Course & Interval History:     Ms. Shelby Gunter is a 89 y/o WF with a h/o CKD3, PAD, DM2, dementia and frequent falls who was admitted to our service on  after a fall at home. She was trying to get out of chair then tripped and fell on R hip. Poor PO intake for past 1 week, patient was recently visited [place] [facility] for UTI and was given Rx for abx. Labs showed DKA, Cr 1.78, [id number]. 4.  CXR with bilateral GGOs. She was admitted for DKA, AMPARO, acute metabolic encephalopathy. Later on developed mild wheezing so she was started on steroids and nebs. Increase oxygen requirement on , normally on 2 lit/min, presently on - on 4 lit/min. CXR showed stable infiltrates. Blood cultures growing strep agalactiae and CoNS, ID was consulted for . UTI with klebsiella pna. Subjective/24hr Events (22):   6/8  Hypoglycemic this morning, had to get D10 still low normal was started on D5. Complaining of right hip pain  Mildly elevated white blood count, discussed with ID nurse practitioner, advised continue to follow WBC      As per staff decreased p.o. intake  Off D5 infusion  Did not talk much, mild confusion  Right hip pain    Later on today family wanted hospice consult      Assessment & Plan:   # R hip pain              - Fall PTA, negative X-rays. Limited with therapy yesterday due to pain. RLE looks rotated and shortened, will get CT hip to eval for occult fracture.   -right hip pain, x-ray and CT hip no fracture     # Sepsis / CAP, GBS bacteremia              - Sepsis resolved.              - Changed to Ancef 6/6. Repeat blood cxs from 6/4 are negative. 6/8-elevated white blood count, on Keflex, spoke to ID nurse practitioner, continue to follow WBC    #dementia       # AMPARO              - Baseline sCr low to mid 1s, stable high 1s since admission. Off IVFs now. Con't PO intake, Cr is stable.      # Acute on chronic hypoxemic respiratory failure              - Stable CXR findings.     # DKA              - Resolved, con't current insulin regimen. 6/8-hypoglycemic, steroid discontinued yesterday, restarted on D5 today  Later on this afternoon glucose 192, D5 was stopped and changed Accu-Cheks to every 4  DC'd Lantus, continue sliding scale insulin     # Acute cystitis              - Resolved.     # AECOPD?              - Stopped steroids.     # Frequent falls              - Therapy, needs placement, CT hip as above. 6/8-right hip pain, previous x-ray and CT no acute findings, will order MRI right hip.     # HTN              - Con't home meds     # MDD/anxiety              - Zoloft     # GERD              - H2RA     # H/o DVT              - Reduce Eliquis to 2.5mg BID     # H/o COVID              - Noted.     Discharge Planning: Placement. Diet:  ADULT ORAL NUTRITION SUPPLEMENT; Breakfast, Dinner; Low Calorie/High Protein Oral Supplement  ADULT DIET; Easy to Chew; Likes unsweet tea with her meals  DVT PPx: Eliquis  Code status: DNR      Diet:  ADULT ORAL NUTRITION SUPPLEMENT; Breakfast, Dinner, Lunch; Standard High Calorie/High Protein Oral Supplement  ADULT DIET; Easy to Chew; 5 carb choices (75 gm/meal);  Likes unsweet tea with her meals  DVT PPx:   Code status: DNR    Hospital Problems:  Principal Problem:    DKA, type 2, not at goal [facility])  Active Problems:    Acute kidney injury ([facility] [address]    Metabolic encephalopathy    CAP (community acquired pneumonia)    Sepsis ([facility] [address]    Streptococcal bacteremia    Frequent falls    Depression    UTI (urinary tract infection)    GERD (gastroesophageal reflux disease)    Type 2 diabetes mellitus with diabetic neuropathy (HCC)    Essential hypertension    Anxiety    Asthma    Neuropathy  Resolved Problems:    * No resolved hospital problems. *      Objective:     Patient Vitals for the past 24 hrs:   Temp Pulse Resp BP SpO2   06/09/22 1130 97.3 °F (36.3 °C) 75 18 136/82 93 %   06/09/22 0858 -- 72 16 -- 96 %   06/09/22 0717 97.7 °F (36.5 °C) 76 18 (!) 142/83 97 %   06/09/22 0327 98.2 °F (36.8 °C) 73 16 136/76 --   06/08/22 2321 97.7 °F (36.5 °C) 71 16 (!) 149/70 96 %   06/08/22 2152 -- -- -- (!) 171/79 --   06/08/22 2011 -- 79 16 -- 100 %   06/08/22 1935 97.7 °F (36.5 °C) 73 18 (!) 152/72 97 %   06/08/22 1920 -- 72 -- -- --   06/08/22 1622 -- 71 17 -- 100 %   06/08/22 1523 97.3 °F (36.3 °C) 73 17 (!) 150/68 99 %       Oxygen Therapy  SpO2: 93 %  Pulse Oximeter Device Mode: Intermittent  Pulse Oximeter Device Location: Finger  O2 Device: None (Room air)  Oximetry Probe Site Changed: Yes  Skin Assessment: Clean, dry, & intact  Skin Protection for O2 Device: N/A  O2 Flow Rate (L/min): 0 L/min    Estimated body mass index is 29.63 kg/m² as calculated from the following:    Height as of this encounter: 5' 2\" (1.575 m). Weight as of this encounter: 162 lb (73.5 kg). Intake/Output Summary (Last 24 hours) at 6/9/2022 1345  Last data filed at 6/9/2022 0717  Gross per 24 hour   Intake 477.36 ml   Output --   Net 477.36 ml         Physical Exam:     Blood pressure 136/82, pulse 75, temperature 97.3 °F (36.3 °C), temperature source Axillary, resp. rate 18, height 5' 2\" (1.575 m), weight 162 lb (73.5 kg), SpO2 93 %. General:    Well nourished. Awake, didn't talk much,says wants go home, at times confused  Head:  Normocephalic, atraumatic  Eyes:  Sclerae appear normal.  Pupils equally round. ENT:  Nares appear normal, no drainage. Moist oral mucosa  Neck:  No restricted ROM. Trachea midline   CV:   RRR. No m/r/g.   No jugular venous distension. Lungs:   Bilateral coarse breath sounds  Abdomen: Bowel sounds present. Soft, nontender, nondistended. Extremities: Complaining pain right hip on passive right hip flexion movements. Skin:     No rashes and normal coloration. Warm and dry. Neuro: Awake, moves all ext except rt lower secondary to pain  Psych:  calm.       I have reviewed ordered lab tests and independently visualized imaging below:    Recent Labs:  Recent Results (from the past 48 hour(s))   POCT Glucose    Collection Time: 06/07/22  4:05 PM   Result Value Ref Range    POC Glucose 276 (H) 65 - 100 mg/dL    Performed by: [employer]    POCT Glucose    Collection Time: 06/07/22  9:06 PM   Result Value Ref Range    POC Glucose 282 (H) 65 - 100 mg/dL    Performed by: [employer]    Basic Metabolic Panel    Collection Time: 06/08/22  4:38 AM   Result Value Ref Range    Sodium 145 136 - 145 mmol/L    Potassium 4.4 3.5 - 5.1 mmol/L    Chloride 114 (H) 98 - 107 mmol/L    CO2 21 21 - 32 mmol/L    Anion Gap 10 7 - 16 mmol/L    Glucose 49 (L) 65 - 100 mg/dL    BUN 83 (H) 8 - 23 MG/DL    CREATININE 1.90 (H) 0.6 - 1.0 MG/DL    GFR  32 (L) >60 ml/min/1.73m2    GFR Non- 26 (L) >60 ml/min/1.73m2    Calcium 9.5 8.3 - 10.4 MG/DL   CBC with Auto Differential    Collection Time: 06/08/22  4:38 AM   Result Value Ref Range    WBC 19.9 (H) 4.3 - 11.1 K/uL    RBC 2.79 (L) 4.05 - 5.2 M/uL    Hemoglobin 8.3 (L) 11.7 - 15.4 g/dL    Hematocrit 28.5 (L) 35.8 - 46.3 %    .2 (H) 79.6 - 97.8 FL    MCH 29.7 26.1 - 32.9 PG    MCHC 29.1 (L) 31.4 - 35.0 g/dL    RDW 15.1 (H) 11.9 - 14.6 %    Platelets 084 222 - 116 K/uL    MPV 12.2 9.4 - 12.3 FL    nRBC 0.05 0.0 - 0.2 K/uL    Differential Type AUTOMATED      Seg Neutrophils 73 43 - 78 %    Lymphocytes 19 13 - 44 %    Monocytes 6 4.0 - 12.0 %    Eosinophils % 0 (L) 0.5 - 7.8 %    Basophils 0 0.0 - 2.0 %    Immature Granulocytes 2 0.0 - 5.0 %    Segs Absolute 14.5 (H) 1.7 - 8.2 K/UL    Absolute Lymph # 3.8 0.5 - 4.6 K/UL    Absolute Mono # 1.1 0.1 - 1.3 K/UL    Absolute Eos # 0.1 0.0 - 0.8 K/UL    Basophils Absolute 0.0 0.0 - 0.2 K/UL    Absolute Immature Granulocyte 0.3 0.0 - 0.5 K/UL   POCT Glucose    Collection Time: 06/08/22  7:57 AM   Result Value Ref Range    POC Glucose 41 (L) 65 - 100 mg/dL    Performed by: [employer]    POCT Glucose    Collection Time: 06/08/22  8:21 AM   Result Value Ref Range    POC Glucose 54 (L) 65 - 100 mg/dL    Performed by: Judys Bookle    POCT Glucose    Collection Time: 06/08/22  8:42 AM   Result Value Ref Range    POC Glucose 70 65 - 100 mg/dL    Performed by: NaphCarerancele    POCT Glucose    Collection Time: 06/08/22  9:12 AM   Result Value Ref Range    POC Glucose 76 65 - 100 mg/dL    Performed by: [employer]    POCT Glucose    Collection Time: 06/08/22 10:12 AM   Result Value Ref Range    POC Glucose 68 65 - 100 mg/dL    Performed by: Judys Bookle    POCT Glucose    Collection Time: 06/08/22 11:22 AM   Result Value Ref Range    POC Glucose 133 (H) 65 - 100 mg/dL    Performed by: NaphCarerancele    POCT Glucose    Collection Time: 06/08/22 11:59 AM   Result Value Ref Range    POC Glucose 152 (H) 65 - 100 mg/dL    Performed by: [employer]    POCT Glucose    Collection Time: 06/08/22  1:47 PM   Result Value Ref Range    POC Glucose 192 (H) 65 - 100 mg/dL    Performed by: Judys Bookle    POCT Glucose    Collection Time: 06/08/22  4:21 PM   Result Value Ref Range    POC Glucose 163 (H) 65 - 100 mg/dL    Performed by: [employer]    POCT Glucose    Collection Time: 06/08/22  7:37 PM   Result Value Ref Range    POC Glucose 146 (H) 65 - 100 mg/dL    Performed by: [employer]    POCT Glucose    Collection Time: 06/08/22 11:33 PM   Result Value Ref Range    POC Glucose 191 (H) 65 - 100 mg/dL    Performed by: [employer]    POCT Glucose    Collection Time: 06/09/22  3:29 AM   Result Value Ref Range    POC Glucose 199 (H) 65 - 100 mg/dL    Performed by: [employer]    Basic Metabolic Panel    Collection Time: 06/09/22  6:03 AM   Result Value Ref Range    Sodium 143 136 - 145 mmol/L    Potassium 4.4 3.5 - 5.1 mmol/L    Chloride 114 (H) 98 - 107 mmol/L    CO2 19 (L) 21 - 32 mmol/L    Anion Gap 10 7 - 16 mmol/L    Glucose 182 (H) 65 - 100 mg/dL    BUN 75 (H) 8 - 23 MG/DL    CREATININE 1.60 (H) 0.6 - 1.0 MG/DL    GFR  39 (L) >60 ml/min/1.73m2    GFR Non- 32 (L) >60 ml/min/1.73m2    Calcium 9.5 8.3 - 10.4 MG/DL   POCT Glucose    Collection Time: 06/09/22  7:26 AM   Result Value Ref Range    POC Glucose 180 (H) 65 - 100 mg/dL    Performed by: [employer]CT    CBC with Auto Differential    Collection Time: 06/09/22  7:35 AM   Result Value Ref Range    WBC 17.9 (H) 4.3 - 11.1 K/uL    RBC 2.60 (L) 4.05 - 5.2 M/uL    Hemoglobin 7.7 (L) 11.7 - 15.4 g/dL    Hematocrit 25.5 (L) 35.8 - 46.3 %    MCV 98.1 (H) 79.6 - 97.8 FL    MCH 29.6 26.1 - 32.9 PG    MCHC 30.2 (L) 31.4 - 35.0 g/dL    RDW 15.0 (H) 11.9 - 14.6 %    Platelets 991 408 - 681 K/uL    MPV 12.5 (H) 9.4 - 12.3 FL    nRBC 0.00 0.0 - 0.2 K/uL    Differential Type AUTOMATED      Seg Neutrophils 82 (H) 43 - 78 %    Lymphocytes 12 (L) 13 - 44 %    Monocytes 5 4.0 - 12.0 %    Eosinophils % 0 (L) 0.5 - 7.8 %    Basophils 0 0.0 - 2.0 %    Immature Granulocytes 2 0.0 - 5.0 %    Segs Absolute 14.7 (H) 1.7 - 8.2 K/UL    Absolute Lymph # 2.1 0.5 - 4.6 K/UL    Absolute Mono # 0.8 0.1 - 1.3 K/UL    Absolute Eos # 0.0 0.0 - 0.8 K/UL    Basophils Absolute 0.0 0.0 - 0.2 K/UL    Absolute Immature Granulocyte 0.3 0.0 - 0.5 K/UL   POCT Glucose    Collection Time: 06/09/22 12:02 PM   Result Value Ref Range    POC Glucose 251 (H) 65 - 100 mg/dL    Performed by: Ivan        Other Studies:  CT HIP RIGHT WO CONTRAST   Final Result   1. No acute displaced fracture of the right hip or evidence or dislocation.  A   nondisplaced fracture could be missed by CT. If occult fracture remains   suspected, then further evaluation can be performed with a bone scan, or   noncontrast MRI. 2. Distended loop of bowel in the anterior mid abdomen which is incompletely   characterized. Although not clearly abnormal, this can be initially assessed   with a KUB if clinically indicated. 3. Anasarca suggesting fluid overload. XR CHEST 1 VIEW   Final Result      XR CHEST PORTABLE   Final Result   No significant change in bilateral interstitial and groundglass   opacities. CT HEAD WO CONTRAST   Final Result   1. No evidence of acute intracranial hemorrhage. 2. Mildly compromised examination without evidence of acute cervical spine   fracture. 3. Cervical spondylosis. 4. Chronic small vessel ischemic change. 5. Partial imaged right pleural effusion. CT CERVICAL SPINE WO CONTRAST   Final Result   1. No evidence of acute intracranial hemorrhage. 2. Mildly compromised examination without evidence of acute cervical spine   fracture. 3. Cervical spondylosis. 4. Chronic small vessel ischemic change. 5. Partial imaged right pleural effusion. XR KNEE LEFT (1-2 VIEWS)   Final Result   No evidence of acute bony abnormality. Left knee series      HISTORY: Pain after fall      2 views were obtained. Comparison: 05/22/2022      Findings: There is no evidence of acute fracture or dislocation. The joint   spaces are well-preserved. There is no joint effusion. There is no bony   destruction. Atherosclerotic changes are present. There is diffuse low bone   mineral density. IMPRESSION: No evidence of acute bony abnormality. XR HIP 2-3 VW W PELVIS RIGHT   Final Result   No evidence of acute bony abnormality. Left knee series      HISTORY: Pain after fall      2 views were obtained. Comparison: 05/22/2022      Findings:  There is no evidence of acute fracture or dislocation. The joint   spaces are well-preserved. There is no joint effusion. There is no bony   destruction. Atherosclerotic changes are present. There is diffuse low bone   mineral density. IMPRESSION: No evidence of acute bony abnormality.              MRI HIP RIGHT WO CONTRAST    (Results Pending)       Current Meds:  Current Facility-Administered Medications   Medication Dose Route Frequency    insulin lispro (HUMALOG) injection vial 0-8 Units  0-8 Units SubCUTAneous TID WC    insulin lispro (HUMALOG) injection vial 0-4 Units  0-4 Units SubCUTAneous Nightly    sodium bicarbonate tablet 1,300 mg  1,300 mg Oral BID    cephALEXin (KEFLEX) capsule 500 mg  500 mg Oral 3 times per day    apixaban (ELIQUIS) tablet 2.5 mg  2.5 mg Oral BID    traMADol (ULTRAM) tablet 50 mg  50 mg Oral Q6H PRN    budesonide (PULMICORT) nebulizer suspension 500 mcg  500 mcg Nebulization BID    acetaminophen (TYLENOL) tablet 650 mg  650 mg Oral Q4H PRN    glucose chewable tablet 16 g  4 tablet Oral PRN    dextrose bolus 10% 125 mL  125 mL IntraVENous PRN    Or    dextrose bolus 10% 250 mL  250 mL IntraVENous PRN    glucagon injection 1 mg  1 mg IntraMUSCular PRN    dextrose 5 % solution  100 mL/hr IntraVENous PRN    famotidine (PEPCID) tablet 20 mg  20 mg Oral Daily    albuterol (PROVENTIL) nebulizer solution 2.5 mg  2.5 mg Nebulization Q6H PRN    ipratropium-albuterol (DUONEB) nebulizer solution 1 ampule  1 ampule Inhalation Q4H WA    potassium chloride 10 mEq/100 mL IVPB (Peripheral Line)  10 mEq IntraVENous PRN    magnesium sulfate 1000 mg in dextrose 5% 100 mL IVPB  1,000 mg IntraVENous PRN    sodium phosphate 10 mmol in sodium chloride 0.9 % 250 mL IVPB  10 mmol IntraVENous PRN    Or    sodium phosphate 15 mmol in dextrose 5 % 250 mL IVPB  15 mmol IntraVENous PRN    Or    sodium phosphate 20 mmol in dextrose 5 % 500 mL IVPB  20 mmol IntraVENous PRN    polyethylene glycol (GLYCOLAX) packet 17 g  17 g Oral Daily PRN    hydrALAZINE (APRESOLINE) tablet 25 mg  25 mg Oral TID    gabapentin (NEURONTIN) capsule 100 mg  100 mg Oral TID    metoprolol tartrate (LOPRESSOR) tablet 12.5 mg  12.5 mg Oral BID    pravastatin (PRAVACHOL) tablet 10 mg  10 mg Oral Nightly    sertraline (ZOLOFT) tablet 100 mg  100 mg Oral Daily    morphine injection 1 mg  1 mg IntraVENous Q4H PRN       Signed:  Shanique Thomas MD

## 2022-06-09 NOTE — PROGRESS NOTES
1937:     2333: . Messaged MD. Orders to hold fluids. Recheck BG in 4 hours and report back to MD for further orders.      1502:

## 2022-06-09 NOTE — PROGRESS NOTES
Pt resting quietly at this time with daughter at [facility]. Pt very withdrawn today. Refused to eat most of her meals but would take a few bites and had to be convinced to take her medication. Tears noted in pt's eyes but pt would not verbalize. Pt's daughter and son spoke with doctor and . A hospice consult was placed.   Daughter has been very tearful but remains at pt's bedside

## 2022-06-09 NOTE — PROGRESS NOTES
OCCUPATIONAL THERAPY Daily Note     OT Visit Days: 2   Time  OT Charge Capture  Rehab Caseload Tracker  OT Orders    Juliette Kimbrough is a 80 y.o. female   PRIMARY DIAGNOSIS: DKA, type 2, not at goal [facility])  Hyperkalemia [E87.5]  AMPARO (acute kidney injury) ([facility] [N17.9]  DKA, type 2, not at goal [facility]) [E11.10]  Acute cystitis without hematuria [N30.00]  Diabetic ketoacidosis without coma associated with other specified diabetes mellitus ([facility] [E13.10]       Inpatient: Payor: [employer] MEDICARE / Plan: [facility] DUAL / Product Type: *No Product type* /     ASSESSMENT:     REHAB RECOMMENDATIONS: CURRENT LEVEL OF FUNCTION:  (Most Recently Demonstrated)   Recommendation to date pending progress:  Setting:   LTC    Equipment:     To Be Determined Bathing:   Not Tested  Dressing:   Not Tested  Feeding/Grooming:   Not Tested  Toileting:   Not Tested  Functional Mobility:   Total Assist     ASSESSMENT:  Ms. Elizabeth Magallanes received supine in bed not oriented and intermittently alert. Pt moaned and groaned in pain upon all movement today. Attempted BUE exercise in bed with HHA assist but caused increased pain. Pt could not verbalize pain with number. Rolled to L for pillow placement to prevent wounds. Not sure pt is appropriate for therapy anymore at this time. OT will attempt therapy one more time and if no improvement will DC. May be close to end of life comfort care. SUBJECTIVE:     Ms. Elizabeth Magallanes states, \"On the porch. \"     Social/Functional Lives With: Daughter Kostas Osei)  Type of Home: House  Home Layout: One level  Home Access:  (5 steps front/2 steps back)  Bathroom Toilet: Bedside commode  Home Equipment: Robert Adkins, [address] Help From: Family  ADL Assistance: Needs assistance  Active : No  Patient's  Info: family  Occupation: Retired    OBJECTIVE:     Rubén Jernigan / Yifan Laurent / Kaity Mention: None    RESTRICTIONS/PRECAUTIONS:  Restrictions/Precautions  Restrictions/Precautions: Fall Risk        PAIN: VITALS / O2:   Pre Treatment:   Pain Assessment:  (unable to verbalize pain)          Post Treatment: unable to verbalize pain Vitals          Oxygen            MOBILITY: I Mod I S SBA CGA Min Mod Max Total  NT x2 Comments:   Bed Mobility    Rolling [] [] [] [] [] [] [] [] [x] [] []    Supine to Sit [] [] [] [] [] [] [] [] [] [x] []    Scooting [] [] [] [] [] [] [] [] [] [x] []    Sit to Supine [] [] [] [] [] [] [] [] [] [x] []    Transfers    Sit to Stand [] [] [] [] [] [] [] [] [] [x] []    Bed to Chair [] [] [] [] [] [] [] [] [] [x] []    Stand to Sit [] [] [] [] [] [] [] [] [] [x] []    Tub/Shower [] [] [] [] [] [] [] [] [] [x] []     Toilet [] [] [] [] [] [] [] [] [] [x] []      [] [] [] [] [] [] [] [] [] [] []    I=Independent, Mod I=Modified Independent, S=Supervision/Setup, SBA=Standby Assistance, CGA=Contact Guard Assistance, Min=Minimal Assistance, Mod=Moderate Assistance, Max=Maximal Assistance, Total=Total Assistance, NT=Not Tested    ACTIVITIES OF DAILY LIVING: I Mod I S SBA CGA Min Mod Max Total NT Comments   BASIC ADLs:              Bathing/ Showering [] [] [] [] [] [] [] [] [] [x]    Toileting [] [] [] [] [] [] [] [] [] [x]    Upper Body Dressing [] [] [] [] [] [] [] [] [] [x]    Lower Body Dressing [] [] [] [] [] [] [] [] [] [x]    Feeding [] [] [] [] [] [] [] [] [] [x]    Grooming [] [] [] [] [] [] [] [] [] [x]    Personal Device Care [] [] [] [] [] [] [] [] [] [x]    Functional Mobility [] [] [] [] [] [] [] [] [x] []    I=Independent, Mod I=Modified Independent, S=Supervision/Setup, SBA=Standby Assistance, CGA=Contact Guard Assistance, Min=Minimal Assistance, Mod=Moderate Assistance, Max=Maximal Assistance, Total=Total Assistance, NT=Not Tested    BALANCE: Good Fair+ Fair Fair- Poor NT Comments   Sitting Static [] [] [] [] [] [x]    Sitting Dynamic [] [] [] [] [] [x]              Standing Static [] [] [] [] [] [x]    Standing Dynamic [] [] [] [] [] [x]        PLAN: FREQUENCY/DURATION   OT Plan of Care: 3 times/week for duration of hospital stay or until stated goals are met, whichever comes first.    ACUTE OCCUPATIONAL THERAPY GOALS:   (Developed with and agreed upon by patient and/or caregiver.)  1. Patient will complete lower body bathing and dressing with min A and adaptive equipment as needed. 2. Patient will complete toileting with min A.   3. Patient will tolerate 30 minutes of OT treatment with 1-2 rest breaks to increase activity tolerance for ADLs. 4. Patient will complete functional transfers with SBA and adaptive equipment as needed. 5. Patient will complete functional mobility for household distances with SBA and adaptive equipment as needed. 6. Patient will complete functional activity while seated EOB with SBA and adaptive equipment as needed. TREATMENT:     TREATMENT:   Co-Treatment PT/OT necessary due to patient's decreased overall endurance/tolerance levels, as well as need for high level skilled assistance to complete functional transfers/mobility and functional tasks  Therapeutic Activity (12 Minutes): Therapeutic activity included Rolling to improve functional Activity tolerance and Mobility.     TREATMENT GRID:  N/A    AFTER TREATMENT PRECAUTIONS: Alarm Activated, Bed, Bed/Chair Locked, Call light within reach, Needs within reach, RN notified and Side rails x3    INTERDISCIPLINARY COLLABORATION:  MD/ PA/ NP , RN/ PCT, PT/ PTA and OT/ AREVALO    EDUCATION:       TOTAL TREATMENT DURATION AND TIME:  Time In: Mukund  Time Out: Kimber 38  Minutes: [id number] [facility], OT

## 2022-06-09 NOTE — DIABETES MGMT
Patient admitted with DKA. Blood glucose ranged  yesterday with patient receiving D10 250mL. Blood glucose this morning was 180. Creatinine 1.60. GFR 39. Reviewed patient current regimen: Humalog correctional insulin resistant scale. Patient had easy to chew regular diet ordered now has easy to chew diet with 5 carb choices. Patient would likely benefit from a change in correctional insulin from resistant to medium dose. Patient will likely benefit from low dose prandial insulin to help offset hyperglycemia during the day related to caloric intake. Provider updated via [employer] regarding recommendations and patient glycemic control.

## 2022-06-10 PROBLEM — R53.81 PHYSICAL DEBILITY: Status: ACTIVE | Noted: 2021-01-01

## 2022-06-10 PROBLEM — M19.90 OSTEOARTHROSIS: Status: ACTIVE | Noted: 2022-01-01

## 2022-06-10 PROBLEM — I48.91 ATRIAL FIBRILLATION (HCC): Status: ACTIVE | Noted: 2022-01-01

## 2022-06-10 PROBLEM — K57.92 DIVERTICULITIS: Status: ACTIVE | Noted: 2022-01-01

## 2022-06-10 PROBLEM — N39.0 RECURRENT URINARY TRACT INFECTION: Status: ACTIVE | Noted: 2022-01-01

## 2022-06-10 PROBLEM — I72.9 ANEURYSM (HCC): Status: ACTIVE | Noted: 2022-01-01

## 2022-06-10 NOTE — PROGRESS NOTES
Spiritual Care Visit, initial visit. Attempted to visit; staff was attending. Visit by Navneet Miguel, Staff .  Ajay, Lashaun.B., B.A.

## 2022-06-10 NOTE — CARE COORDINATION
Pt with discharge orders this day. Pt meets criteria for GIP at [facility]. This CM spoke with Rufus Ray, liaison with [address]. She confirms pt meets criteria and is able to admit to hospice house this day. This CM set up transport for 3:00pm via [place].  This CM met with pt daughter in pt room to alert to transport time - she is agreeable with no voiced concerns. No additional CM needs at discharge. 06/03/22 1511   Service Assessment   Cognition Alert   Primary [address]   Patient's Healthcare Decision Maker is: Legal Next of [address]   PCP Verified by CM Yes   Prior Functional Level Assistance with the following:;Bathing   Can patient return to prior living arrangement Yes   Ability to make needs known: Fair   Family able to assist with home care needs: Yes   Financial Resources Other (Comment)  Magdaleno GIANG/ERNESTINA dual)   Social/Functional History   Lives With Daughter  Leon Magallon)   Type of [address] One level   Home Access   (5 steps front/2 steps back)   Bathroom Toilet Bedside commode   Home Equipment Walker, [address] Help From Family   ADL Assistance Needs assistance   Active  No   Patient's  Info family   Occupation Retired   Discharge Planning   Current Services Prior To Admission Oxygen Therapy   Potential Assistance Needed Other (Comment)  (Hospice house)   Patient expects to be discharged to: Hospice (comment)  ([facility])   [address] Discharge   Services At/After Discharge Hospice   Mode of Transport at Discharge [address] [facility] Time of Discharge 1500   Confirm Follow Up Transport Other (see comment)  ([facility] ayala)   Condition of Participation: Discharge Planning   The Plan for Transition of Care is related to the following treatment goals: Hospice house   The Patient and/or Patient Representative was provided with a Choice of Provider?  Patient   The Patient and/Or Patient Representative agree with the Discharge Plan? Yes   Freedom of Choice list was provided with basic dialogue that supports the patient's individualized plan of care/goals, treatment preferences, and shares the quality data associated with the providers?   Yes

## 2022-06-10 NOTE — PROGRESS NOTES
Hospitalist Progress Note   Admit Date:  2022 12:06 PM   Name:  Jackson Mart   Age:  80 y.o. Sex:  female  :  1929   MRN:  [id number]   Room:  221    Presenting Complaint: Fall     Reason(s) for Admission: Hyperkalemia [E87.5]  AMPARO (acute kidney injury) ([facility] [N17.9]  DKA, type 2, not at goal [facility]) [E11.10]  Acute cystitis without hematuria [N30.00]  Diabetic ketoacidosis without coma associated with other specified diabetes mellitus [facility]) [facility] History:     Ms. Elmira Tidwell is a 89 y/o WF with a h/o CKD3, PAD, DM2, dementia and frequent falls who was admitted to our service on  after a fall at home. She was trying to get out of chair then tripped and fell on R hip. Poor PO intake for past 1 week, patient was recently visited [place] [facility] for UTI and was given Rx for abx. Labs showed DKA, Cr 1.78, [id number]. 4.  CXR with bilateral GGOs. She was admitted for DKA, AMPARO, acute metabolic encephalopathy. Later on developed mild wheezing so she was started on steroids and nebs. Increase oxygen requirement on , normally on 2 lit/min, presently on - on 4 lit/min. CXR showed stable infiltrates. Blood cultures growing strep agalactiae and CoNS, ID was consulted for . UTI with klebsiella pna. Subjective/24hr Events (06/10/22):   6/8  Hypoglycemic this morning, had to get D10 still low normal was started on D5. Complaining of right hip pain  Mildly elevated white blood count, discussed with ID nurse practitioner, advised continue to follow WBC      As per staff decreased p.o. intake  Off D5 infusion  Did not talk much, mild confusion  Right hip pain    Later on today family wanted hospice consult    6/10  Opens her eyes on calling her name, nonverbal  Spoke to daughter and granddaughter today        Assessment & Plan:   # R hip pain              - Fall PTA, negative X-rays. Limited with therapy yesterday due to pain.  RLE looks rotated and shortened, will get CT hip to eval for occult fracture. 6/9-right hip pain, x-ray and CT hip no fracture     # Sepsis 2/2 CAP, GBS bacteremia              - Sepsis resolved.              - Changed to Ancef 6/6. Repeat blood cxs from 6/4 are negative. 6/8-elevated white blood count, on Keflex, spoke to ID nurse practitioner, continue to follow WBC    #dementia       # AMPARO              - Baseline sCr low to mid 1s, stable high 1s since admission. Off IVFs now. Con't PO intake, Cr is stable.      # Acute on chronic hypoxemic respiratory failure              - Stable CXR findings.     # DKA              - Resolved, con't current insulin regimen. 6/8-hypoglycemic, steroid discontinued yesterday, restarted on D5 today  Later on this afternoon glucose 192, D5 was stopped and changed Accu-Cheks to every 4  DC'd Lantus, continue sliding scale insulin     # Acute cystitis              - Resolved.     # AECOPD?              - Stopped steroids.     # Frequent falls              - Therapy, needs placement, CT hip as above. 6/8-right hip pain, previous x-ray and CT no acute findings, will order MRI right hip. 6/10-spoke to daughter, ordered bone scan right hip     # HTN              - Con't home meds     # MDD/anxiety              - Zoloft     # GERD              - H2RA     # H/o DVT              - Reduce Eliquis to 2.5mg BID     # H/o COVID              - Noted.     Discharge Planning: Placement. Diet:  ADULT ORAL NUTRITION SUPPLEMENT; Breakfast, Dinner;  Low Calorie/High Protein Oral Supplement  ADULT DIET; Easy to Chew; Likes unsweet tea with her meals  DVT PPx: Eliquis  Code status: DNR    Guarded prognosis        Hospital Problems:  Principal Problem:    DKA, type 2, not at goal [facility])  Active Problems:    Acute kidney injury ([facility]    Metabolic encephalopathy    CAP (community acquired pneumonia)    Sepsis ([facility]    Streptococcal bacteremia    Frequent falls    Depression    UTI (urinary tract infection)    GERD (gastroesophageal reflux disease)    Type 2 diabetes mellitus with diabetic neuropathy (HCC)    Essential hypertension    Anxiety    Asthma    Neuropathy  Resolved Problems:    * No resolved hospital problems. *      Objective:     Patient Vitals for the past 24 hrs:   Temp Pulse Resp BP SpO2   06/10/22 0858 -- 82 -- (!) 149/69 --   06/10/22 0724 97.7 °F (36.5 °C) 82 16 (!) 149/69 94 %   06/10/22 0717 -- 84 16 -- 97 %   06/10/22 0418 98 °F (36.7 °C) 81 16 139/86 94 %   06/10/22 0056 -- -- 18 -- --   06/10/22 0026 -- -- 18 -- --   06/09/22 2337 97.6 °F (36.4 °C) 77 16 (!) 148/71 93 %   06/09/22 2130 -- 84 -- -- --   06/09/22 2115 -- -- -- (!) 144/65 --   06/09/22 2003 97.7 °F (36.5 °C) 77 16 137/66 94 %   06/09/22 1925 -- 77 -- -- --   06/09/22 1644 -- 75 19 -- 97 %   06/09/22 1537 -- 77 18 (!) 140/72 --   06/09/22 1130 97.3 °F (36.3 °C) 75 18 136/82 93 %       Oxygen Therapy  SpO2: 94 %  Pulse Oximeter Device Mode: Intermittent  Pulse Oximeter Device Location: Finger  O2 Device: None (Room air)  Oximetry Probe Site Changed: Yes  Skin Assessment: Clean, dry, & intact  Skin Protection for O2 Device: N/A  O2 Flow Rate (L/min): 0 L/min    Estimated body mass index is 29.81 kg/m² as calculated from the following:    Height as of this encounter: 5' 2\" (1.575 m). Weight as of this encounter: 163 lb (73.9 kg). Intake/Output Summary (Last 24 hours) at 6/10/2022 1019  Last data filed at 6/9/2022 1800  Gross per 24 hour   Intake 340 ml   Output --   Net 340 ml         Physical Exam:     Blood pressure (!) 149/69, pulse 82, temperature 97.7 °F (36.5 °C), temperature source Axillary, resp. rate 16, height 5' 2\" (1.575 m), weight 163 lb (73.9 kg), SpO2 94 %. General:    Sleeping, opens her eyes on calling her name, nonverbal  Head:  Normocephalic, atraumatic  Eyes:  Sclerae appear normal.  Pupils equally round. ENT:  Nares appear normal, no drainage. Moist oral mucosa  Neck:  No restricted ROM. Trachea midline   CV:   RRR. No m/r/g.   No jugular venous distension. Lungs:   Bilateral coarse breath sounds  Abdomen: Bowel sounds present. Soft, nontender, nondistended. Extremities: Complaining pain right hip on passive right hip flexion movements. Skin:     No rashes and normal coloration. Warm and dry. Neuro: Sleeping, arousable, nonverbal   psych:  calm.       I have reviewed ordered lab tests and independently visualized imaging below:    Recent Labs:  Recent Results (from the past 48 hour(s))   POCT Glucose    Collection Time: 06/08/22 11:22 AM   Result Value Ref Range    POC Glucose 133 (H) 65 - 100 mg/dL    Performed by: [employer]    POCT Glucose    Collection Time: 06/08/22 11:59 AM   Result Value Ref Range    POC Glucose 152 (H) 65 - 100 mg/dL    Performed by: [employer] [facility]    POCT Glucose    Collection Time: 06/08/22  1:47 PM   Result Value Ref Range    POC Glucose 192 (H) 65 - 100 mg/dL    Performed by: [employer]    POCT Glucose    Collection Time: 06/08/22  4:21 PM   Result Value Ref Range    POC Glucose 163 (H) 65 - 100 mg/dL    Performed by: [employer]    POCT Glucose    Collection Time: 06/08/22  7:37 PM   Result Value Ref Range    POC Glucose 146 (H) 65 - 100 mg/dL    Performed by: [employer] ([employer])    POCT Glucose    Collection Time: 06/08/22 11:33 PM   Result Value Ref Range    POC Glucose 191 (H) 65 - 100 mg/dL    Performed by: [employer] ([employer])    POCT Glucose    Collection Time: 06/09/22  3:29 AM   Result Value Ref Range    POC Glucose 199 (H) 65 - 100 mg/dL    Performed by: [employer] ([employer])    Basic Metabolic Panel    Collection Time: 06/09/22  6:03 AM   Result Value Ref Range    Sodium 143 136 - 145 mmol/L    Potassium 4.4 3.5 - 5.1 mmol/L    Chloride 114 (H) 98 - 107 mmol/L    CO2 19 (L) 21 - 32 mmol/L    Anion Gap 10 7 - 16 mmol/L    Glucose 182 (H) 65 - 100 mg/dL    BUN 75 (H) 8 - 23 MG/DL    CREATININE 1.60 (H) 0.6 - 1.0 MG/DL    GFR  39 (L) >60 ml/min/1.73m2    GFR Non-African American 32 (L) >60 ml/min/1.73m2    Calcium 9.5 8.3 - 10.4 MG/DL   POCT Glucose    Collection Time: 06/09/22  7:26 AM   Result Value Ref Range    POC Glucose 180 (H) 65 - 100 mg/dL    Performed by: Ivan    CBC with Auto Differential    Collection Time: 06/09/22  7:35 AM   Result Value Ref Range    WBC 17.9 (H) 4.3 - 11.1 K/uL    RBC 2.60 (L) 4.05 - 5.2 M/uL    Hemoglobin 7.7 (L) 11.7 - 15.4 g/dL    Hematocrit 25.5 (L) 35.8 - 46.3 %    MCV 98.1 (H) 79.6 - 97.8 FL    MCH 29.6 26.1 - 32.9 PG    MCHC 30.2 (L) 31.4 - 35.0 g/dL    RDW 15.0 (H) 11.9 - 14.6 %    Platelets 465 816 - 461 K/uL    MPV 12.5 (H) 9.4 - 12.3 FL    nRBC 0.00 0.0 - 0.2 K/uL    Differential Type AUTOMATED      Seg Neutrophils 82 (H) 43 - 78 %    Lymphocytes 12 (L) 13 - 44 %    Monocytes 5 4.0 - 12.0 %    Eosinophils % 0 (L) 0.5 - 7.8 %    Basophils 0 0.0 - 2.0 %    Immature Granulocytes 2 0.0 - 5.0 %    Segs Absolute 14.7 (H) 1.7 - 8.2 K/UL    Absolute Lymph # 2.1 0.5 - 4.6 K/UL    Absolute Mono # 0.8 0.1 - 1.3 K/UL    Absolute Eos # 0.0 0.0 - 0.8 K/UL    Basophils Absolute 0.0 0.0 - 0.2 K/UL    Absolute Immature Granulocyte 0.3 0.0 - 0.5 K/UL   POCT Glucose    Collection Time: 06/09/22 12:02 PM   Result Value Ref Range    POC Glucose 251 (H) 65 - 100 mg/dL    Performed by: [facility]    POCT Glucose    Collection Time: 06/09/22  2:37 PM   Result Value Ref Range    POC Glucose 221 (H) 65 - 100 mg/dL    Performed by: Marybeth    POCT Glucose    Collection Time: 06/09/22  5:10 PM   Result Value Ref Range    POC Glucose 196 (H) 65 - 100 mg/dL    Performed by: Ivan    POCT Glucose    Collection Time: 06/09/22  9:45 PM   Result Value Ref Range    POC Glucose 277 (H) 65 - 100 mg/dL    Performed by: [employer]    Basic Metabolic Panel    Collection Time: 06/10/22  4:12 AM   Result Value Ref Range    Sodium 145 136 - 145 mmol/L    Potassium 4.6 3.5 - 5.1 mmol/L    Chloride 114 (H) 98 - 107 mmol/L    CO2 20 (L) 21 - 32 mmol/L    Anion Gap 11 7 - 16 mmol/L    Glucose 278 (H) 65 - 100 mg/dL    BUN 72 (H) 8 - 23 MG/DL    CREATININE 1.70 (H) 0.6 - 1.0 MG/DL    GFR  36 (L) >60 ml/min/1.73m2    GFR Non- 30 (L) >60 ml/min/1.73m2    Calcium 9.6 8.3 - 10.4 MG/DL   POCT Glucose    Collection Time: 06/10/22  8:57 AM   Result Value Ref Range    POC Glucose 357 (H) 65 - 100 mg/dL    Performed by: Soraida Mckeon        Other Studies:  CT HIP RIGHT WO CONTRAST   Final Result   1. No acute displaced fracture of the right hip or evidence or dislocation. A   nondisplaced fracture could be missed by CT. If occult fracture remains   suspected, then further evaluation can be performed with a bone scan, or   noncontrast MRI. 2. Distended loop of bowel in the anterior mid abdomen which is incompletely   characterized. Although not clearly abnormal, this can be initially assessed   with a KUB if clinically indicated. 3. Anasarca suggesting fluid overload. XR CHEST 1 VIEW   Final Result      XR CHEST PORTABLE   Final Result   No significant change in bilateral interstitial and groundglass   opacities. CT HEAD WO CONTRAST   Final Result   1. No evidence of acute intracranial hemorrhage. 2. Mildly compromised examination without evidence of acute cervical spine   fracture. 3. Cervical spondylosis. 4. Chronic small vessel ischemic change. 5. Partial imaged right pleural effusion. CT CERVICAL SPINE WO CONTRAST   Final Result   1. No evidence of acute intracranial hemorrhage. 2. Mildly compromised examination without evidence of acute cervical spine   fracture. 3. Cervical spondylosis. 4. Chronic small vessel ischemic change. 5. Partial imaged right pleural effusion. XR KNEE LEFT (1-2 VIEWS)   Final Result   No evidence of acute bony abnormality. Left knee series      HISTORY: Pain after fall      2 views were obtained.        Comparison: 05/22/2022      Findings: There is no evidence of acute fracture or dislocation. The joint   spaces are well-preserved. There is no joint effusion. There is no bony   destruction. Atherosclerotic changes are present. There is diffuse low bone   mineral density. IMPRESSION: No evidence of acute bony abnormality. XR HIP 2-3 VW W PELVIS RIGHT   Final Result   No evidence of acute bony abnormality. Left knee series      HISTORY: Pain after fall      2 views were obtained. Comparison: 05/22/2022      Findings: There is no evidence of acute fracture or dislocation. The joint   spaces are well-preserved. There is no joint effusion. There is no bony   destruction. Atherosclerotic changes are present. There is diffuse low bone   mineral density. IMPRESSION: No evidence of acute bony abnormality.              NM BONE SCAN LIMITED    (Results Pending)       Current Meds:  Current Facility-Administered Medications   Medication Dose Route Frequency    insulin glargine (LANTUS) injection vial 3 Units  3 Units SubCUTAneous Nightly    insulin lispro (HUMALOG) injection vial 0-8 Units  0-8 Units SubCUTAneous TID WC    insulin lispro (HUMALOG) injection vial 0-4 Units  0-4 Units SubCUTAneous Nightly    sodium bicarbonate tablet 1,300 mg  1,300 mg Oral BID    cephALEXin (KEFLEX) capsule 500 mg  500 mg Oral 3 times per day    apixaban (ELIQUIS) tablet 2.5 mg  2.5 mg Oral BID    traMADol (ULTRAM) tablet 50 mg  50 mg Oral Q6H PRN    budesonide (PULMICORT) nebulizer suspension 500 mcg  500 mcg Nebulization BID    acetaminophen (TYLENOL) tablet 650 mg  650 mg Oral Q4H PRN    glucose chewable tablet 16 g  4 tablet Oral PRN    dextrose bolus 10% 125 mL  125 mL IntraVENous PRN    Or    dextrose bolus 10% 250 mL  250 mL IntraVENous PRN    glucagon injection 1 mg  1 mg IntraMUSCular PRN    dextrose 5 % solution  100 mL/hr IntraVENous PRN    famotidine (PEPCID) tablet 20 mg  20 mg Oral Daily    albuterol (PROVENTIL) nebulizer solution 2.5 mg  2.5 mg Nebulization Q6H PRN    ipratropium-albuterol (DUONEB) nebulizer solution 1 ampule  1 ampule Inhalation Q4H WA    potassium chloride 10 mEq/100 mL IVPB (Peripheral Line)  10 mEq IntraVENous PRN    magnesium sulfate 1000 mg in dextrose 5% 100 mL IVPB  1,000 mg IntraVENous PRN    sodium phosphate 10 mmol in sodium chloride 0.9 % 250 mL IVPB  10 mmol IntraVENous PRN    Or    sodium phosphate 15 mmol in dextrose 5 % 250 mL IVPB  15 mmol IntraVENous PRN    Or    sodium phosphate 20 mmol in dextrose 5 % 500 mL IVPB  20 mmol IntraVENous PRN    polyethylene glycol (GLYCOLAX) packet 17 g  17 g Oral Daily PRN    hydrALAZINE (APRESOLINE) tablet 25 mg  25 mg Oral TID    gabapentin (NEURONTIN) capsule 100 mg  100 mg Oral TID    metoprolol tartrate (LOPRESSOR) tablet 12.5 mg  12.5 mg Oral BID    pravastatin (PRAVACHOL) tablet 10 mg  10 mg Oral Nightly    sertraline (ZOLOFT) tablet 100 mg  100 mg Oral Daily    morphine injection 1 mg  1 mg IntraVENous Q4H PRN       Signed:  Scott Lockett MD

## 2022-06-10 NOTE — DISCHARGE SUMMARY
MCG/2ML NEBU Inhale 15 mcg into the lungs 2 times daily      budesonide (PULMICORT) 0.5 MG/2ML nebulizer suspension Inhale 500 mcg into the lungs 2 times daily      ferrous sulfate (IRON 325) 325 (65 Fe) MG tablet Take 325 mg by mouth daily (with breakfast)      fluticasone-salmeterol (ADVAIR) 250-50 MCG/DOSE AEPB Inhale 1 puff into the lungs every 12 hours      gabapentin (NEURONTIN) 100 MG capsule Take 100 mg by mouth 3 times daily. hydrALAZINE (APRESOLINE) 25 MG tablet Take 25 mg by mouth 3 times daily      insulin lispro (HUMALOG) 100 UNIT/ML injection vial INITIATE INSULIN CORRECTIVE PROTOCOL:Normal Insulin Sensitivity For Blood Sugar (mg/dL) of: Less than 150 = 0 units 150 -199 = 2 [id number] -249 = 4 [id number] -299 = 6 [id number] -349 = 8 [id number] and above = 10 units and Call PhysicianInitiate Hypoglycemia protocol if blood glucose is <70 mg/dL Fast Acting - Administer Immediately - or within 15 minutes of start of meal, if mealtime coverage.       ipratropium-albuterol (DUONEB) 0.5-2.5 (3) MG/3ML SOLN nebulizer solution Inhale 3 mLs into the lungs every 4 hours as needed      metoprolol tartrate (LOPRESSOR) 25 MG tablet Take 12.5 mg by mouth 2 times daily      pantoprazole (PROTONIX) 20 MG tablet Take 40 mg by mouth daily      pravastatin (PRAVACHOL) 40 MG tablet TAKE 1 TABLET BY MOUTH EVERY NIGHT AT BEDTIME      sertraline (ZOLOFT) 100 MG tablet Take 100 mg by mouth daily         STOP taking these medications       metFORMIN (GLUCOPHAGE) 500 MG tablet Comments:   Reason for Stopping:         polyethylene glycol (GLYCOLAX) 17 GM/SCOOP powder Comments:   Reason for Stopping:                 Consults this admission:  IP CONSULT TO PHARMACY  IP CONSULT TO INFECTIOUS DISEASES  IP CONSULT TO DIABETES MANAGEMENT  IP CONSULT TO HOSPICE    Echocardiogram results:  06/02/22    TRANSTHORACIC ECHOCARDIOGRAM (TTE) COMPLETE (CONTRAST/BUBBLE/3D PRN) 06/05/2022  5:31 PM (Final)    Interpretation Summary    Left Ventricle: Normal left ventricular systolic function. EF by 2D Simpsons Biplane is 64%. Left ventricle size is normal. Mildly increased wall thickness. Normal wall motion. Abnormal diastolic function. Average E/e' ratio is 14.68.   Right Ventricle: Right ventricle size is normal. Normal systolic function.   Aortic Valve: Moderately thickened cusps. Mild regurgitation.   Mitral Valve: Severe annular calcification of the mitral valve. Severe regurgitation. Mild stenosis noted.   Tricuspid Valve: Valve structure is normal. Moderate to severe regurgitation. No stenosis noted. Severely elevated RVSP. The estimated RVSP is 91 mmHg.   Pulmonic Valve: The pulmonic valve was not well visualized.   Right Atrium: Right atrium is mildly dilated.   IVC/SVC: IVC diameter is greater than 21 mm and decreases greater than 50% during inspiration; therefore the estimated right atrial pressure is intermediate (~8 mmHg).   No valvular vegetations visualized on this study. Signed by: Yuli Martinez DO on 6/5/2022  5:31 PM      Diagnostic Imaging/Tests:   XR KNEE LEFT (1-2 VIEWS)    Result Date: 6/2/2022  Right hip series HISTORY: Pain after fall 3 views were obtained including an AP view of the pelvis. COMPARISON: 12/14/2021 FINDINGS: There is no evidence of acute fracture or dislocation. The joint space is well-preserved. There is no bony destruction. There is diffuse low bone mineral density. Atherosclerotic changes are present. No evidence of acute bony abnormality. Left knee series HISTORY: Pain after fall 2 views were obtained. Comparison: 05/22/2022 Findings: There is no evidence of acute fracture or dislocation. The joint spaces are well-preserved. There is no joint effusion. There is no bony destruction. Atherosclerotic changes are present. There is diffuse low bone mineral density. IMPRESSION: No evidence of acute bony abnormality.       CT HEAD WO CONTRAST    Result Date: 6/2/2022  CT head and cervical spine without contrast HISTORY: Fall TECHNIQUE: 5 mm axial images were obtained from the skull base to the vertex without intravenous contrast. Helically acquired images were obtained spine reconstructed at 2.5 mm thickness. Sagittal and coronal reformatted images were submitted. All CT scans at this facility are performed using dose optimization technique as appropriate to a performed exam, to include on automated exposure control, adjustment of the mA and/or KV according to patient's size (including appropriate matching for site-specific examinations), or use of iterative reconstruction technique. COMPARISON: 12/14/2021 CT head without contrast: Findings: The ventricles and sulci are appropriate for age. There are encephalomalacic changes within the right frontal and temporal lobes. There are no extra-axial fluid collections. Patchy areas of decreased attenuation are present within the supratentorial white matter. These are nonspecific findings but would be most compatible with moderate chronic small vessel ischemic change. No evidence of acute intracranial hemorrhage or mass effect is identified. There is no evidence to suggest an acute major territorial infarct. Right frontal temporal craniotomy defect is present. . There is an extensive left mastoid effusion representing an interval change. CT cervical spine without contrast: Findings: There is motion artifact resulting in mild compromise in interpretation The vertebral body height and alignment are well maintained. There is moderately advanced degenerative disc disease at C5-6. There is no evidence of acute fracture or subluxation. The prevertebral soft tissues are unremarkable. Atherosclerotic changes are present involving the left carotid bifurcation. There is a partially right pleural effusion. 1. No evidence of acute intracranial hemorrhage. 2. Mildly compromised examination without evidence of acute cervical spine fracture. 3. Cervical spondylosis.  4. Chronic small vessel ischemic change. 5. Partial imaged right pleural effusion. CT CERVICAL SPINE WO CONTRAST    Result Date: 6/2/2022  CT head and cervical spine without contrast HISTORY: Fall TECHNIQUE: 5 mm axial images were obtained from the skull base to the vertex without intravenous contrast. Helically acquired images were obtained spine reconstructed at 2.5 mm thickness. Sagittal and coronal reformatted images were submitted. All CT scans at this facility are performed using dose optimization technique as appropriate to a performed exam, to include on automated exposure control, adjustment of the mA and/or KV according to patient's size (including appropriate matching for site-specific examinations), or use of iterative reconstruction technique. COMPARISON: 12/14/2021 CT head without contrast: Findings: The ventricles and sulci are appropriate for age. There are encephalomalacic changes within the right frontal and temporal lobes. There are no extra-axial fluid collections. Patchy areas of decreased attenuation are present within the supratentorial white matter. These are nonspecific findings but would be most compatible with moderate chronic small vessel ischemic change. No evidence of acute intracranial hemorrhage or mass effect is identified. There is no evidence to suggest an acute major territorial infarct. Right frontal temporal craniotomy defect is present. . There is an extensive left mastoid effusion representing an interval change. CT cervical spine without contrast: Findings: There is motion artifact resulting in mild compromise in interpretation The vertebral body height and alignment are well maintained. There is moderately advanced degenerative disc disease at C5-6. There is no evidence of acute fracture or subluxation. The prevertebral soft tissues are unremarkable. Atherosclerotic changes are present involving the left carotid bifurcation. There is a partially right pleural effusion. 1. No evidence of acute intracranial hemorrhage. 2. Mildly compromised examination without evidence of acute cervical spine fracture. 3. Cervical spondylosis. 4. Chronic small vessel ischemic change. 5. Partial imaged right pleural effusion. XR CHEST PORTABLE    Result Date: 6/2/2022  Portable chest: History: Fall, altered mental status Comparison: 04/07/2022 Findings: A single view of the chest was obtained at 1502 hours. The cardiac silhouette is mildly enlarged. Bilateral groundglass and interstitial opacities are present. There are no significant pleural effusions. No significant change in bilateral interstitial and groundglass opacities. XR HIP 2-3 VW W PELVIS RIGHT    Result Date: 6/2/2022  Right hip series HISTORY: Pain after fall 3 views were obtained including an AP view of the pelvis. COMPARISON: 12/14/2021 FINDINGS: There is no evidence of acute fracture or dislocation. The joint space is well-preserved. There is no bony destruction. There is diffuse low bone mineral density. Atherosclerotic changes are present. No evidence of acute bony abnormality. Left knee series HISTORY: Pain after fall 2 views were obtained. Comparison: 05/22/2022 Findings: There is no evidence of acute fracture or dislocation. The joint spaces are well-preserved. There is no joint effusion. There is no bony destruction. Atherosclerotic changes are present. There is diffuse low bone mineral density. IMPRESSION: No evidence of acute bony abnormality. Labs: Results:       BMP, Mg, Phos Recent Labs     06/08/22  0438 06/09/22  0603 06/10/22  0412    143 145   K 4.4 4.4 4.6   * 114* 114*   CO2 21 19* 20*   BUN 83* 75* 72*      CBC Recent Labs     06/08/22  0438 06/09/22  0735   WBC 19.9* 17.9*   RBC 2.79* 2.60*   HGB 8.3* 7.7*   HCT 28.5* 25.5*    185      LFT No results for input(s): ALT, TP, ALB, GLOB in the last 72 hours.     Invalid input(s): SGOT, TBIL, AP, AGRAT, GPT   Cardiac Testing Lab Results   Component Value Date    BNP 3,697 03/14/2022    BNP 3,899 03/04/2022      Coagulation Tests Lab Results   Component Value Date    INR 0.9 01/23/2022    APTT 34.6 03/04/2022      A1c No results found for: HBA1C   Lipid Panel Lab Results   Component Value Date    CHOL 153 11/30/2021    HDL 53 11/30/2021    VLDL 30 11/30/2021      Thyroid Panel Lab Results   Component Value Date    TSH 1.080 11/30/2021    TSH 1.960 07/08/2019        Most Recent UA Lab Results   Component Value Date    MUCUS 0 06/02/2022    UCOM RESULTS VERIFIED MANUALLY 06/02/2022          All Labs from Last 24 Hrs:  Recent Results (from the past 24 hour(s))   POCT Glucose    Collection Time: 06/09/22  2:37 PM   Result Value Ref Range    POC Glucose 221 (H) 65 - 100 mg/dL    Performed by: Marybeth    POCT Glucose    Collection Time: 06/09/22  5:10 PM   Result Value Ref Range    POC Glucose 196 (H) 65 - 100 mg/dL    Performed by: Ivan    POCT Glucose    Collection Time: 06/09/22  9:45 PM   Result Value Ref Range    POC Glucose 277 (H) 65 - 100 mg/dL    Performed by: Magnolia    Basic Metabolic Panel    Collection Time: 06/10/22  4:12 AM   Result Value Ref Range    Sodium 145 136 - 145 mmol/L    Potassium 4.6 3.5 - 5.1 mmol/L    Chloride 114 (H) 98 - 107 mmol/L    CO2 20 (L) 21 - 32 mmol/L    Anion Gap 11 7 - 16 mmol/L    Glucose 278 (H) 65 - 100 mg/dL    BUN 72 (H) 8 - 23 MG/DL    CREATININE 1.70 (H) 0.6 - 1.0 MG/DL    GFR  36 (L) >60 ml/min/1.73m2    GFR Non- 30 (L) >60 ml/min/1.73m2    Calcium 9.6 8.3 - 10.4 MG/DL   POCT Glucose    Collection Time: 06/10/22  8:57 AM   Result Value Ref Range    POC Glucose 357 (H) 65 - 100 mg/dL    Performed by: Favian Choi    POCT Glucose    Collection Time: 06/10/22 11:59 AM   Result Value Ref Range    POC Glucose 242 (H) 65 - 100 mg/dL    Performed by: Favian Choi        No Known Allergies  Immunization History   Administered Date(s) Administered    Influenza Trivalent 11/05/2013, 09/16/2014    Influenza Virus Vaccine 01/01/2011, 10/19/2015    Influenza, High Dose (Fluzone 65 yrs and older) 09/14/2016, 10/25/2017, 12/13/2018    Influenza, Norva Misael, adjuvanted, 65 yrs +, IM, PF (Fluad) 12/30/2020, 11/30/2021    Influenza, Triv, inactivated, subunit, adjuvanted, IM (Fluad 65 yrs and older) 10/14/2019    Influenza, Trivalent Pf 09/22/2015    PPD Test 07/31/2018, 08/21/2018, 12/15/2021, 01/23/2022, 03/06/2022, 03/15/2022, 03/15/2022, 06/03/2022    Pneumococcal Conjugate 13-valent ([id number]) 07/14/2015    Pneumococcal Vaccine 01/01/2006, 01/01/2006    Tdap (Boostrix, Adacel) 11/04/2014       Recent Vital Data:  Patient Vitals for the past 24 hrs:   Temp Pulse Resp BP SpO2   06/10/22 1111 -- 77 16 -- 100 %   06/10/22 1051 97.3 °F (36.3 °C) 82 20 (!) 145/66 100 %   06/10/22 0858 -- 82 -- (!) 149/69 --   06/10/22 0724 97.7 °F (36.5 °C) 82 16 (!) 149/69 94 %   06/10/22 0717 -- 84 16 -- 97 %   06/10/22 0418 98 °F (36.7 °C) 81 16 139/86 94 %   06/10/22 0056 -- -- 18 -- --   06/10/22 0026 -- -- 18 -- --   06/09/22 2337 97.6 °F (36.4 °C) 77 16 (!) 148/71 93 %   06/09/22 2130 -- 84 -- -- --   06/09/22 2115 -- -- -- (!) 144/65 --   06/09/22 2003 97.7 °F (36.5 °C) 77 16 137/66 94 %   06/09/22 1925 -- 77 -- -- --   06/09/22 1644 -- 75 19 -- 97 %   06/09/22 1537 -- 77 18 (!) 140/72 --       Oxygen Therapy  SpO2: 100 %  Pulse Oximeter Device Mode: Intermittent  Pulse Oximeter Device Location: Finger  O2 Device: None (Room air)  Oximetry Probe Site Changed: Yes  Skin Assessment: Clean, dry, & intact  Skin Protection for O2 Device: N/A  O2 Flow Rate (L/min): 0 L/min    Estimated body mass index is 29.81 kg/m² as calculated from the following:    Height as of this encounter: 5' 2\" (1.575 m). Weight as of this encounter: 163 lb (73.9 kg).     Intake/Output Summary (Last 24 hours) at 6/10/2022 1414  Last data filed at 6/9/2022 1800  Gross per 24 hour Intake 240 ml   Output --   Net 240 ml         Physical Exam:    General:    Nonverbal, opens her eyes on calling her name  Head:  Normocephalic, atraumatic  Eyes:  Sclerae appear normal.  Pupils equally round. HENT:  Nares appear normal, no drainage. Moist mucous membranes  Neck:  No restricted ROM. Trachea midline  CV:   RRR. No m/r/g. No JVD systolic murmur  Lungs:   Bilateral coarse breath sounds  Abdomen:   Soft, nontender, nondistended. Ventral hernia chronic, nontender  Extremities: Warm and dry. No cyanosis or clubbing. Rt hip pain on passive movement  Skin:     No rashes.   Normal coloration  Neuro:  Opens on calling her name  Psych:  Sleeping, opens eyes on calling her name    Signed:  Alicia Anthony MD

## 2022-06-10 NOTE — PROGRESS NOTES
Occupational Therapy Note:    OT will DC pt at this time. Pt is transitioning to hospice care.     Thank you,    Gina Gama OTR/L

## 2022-06-10 NOTE — PROGRESS NOTES
Received approval for GIP at [facility]. Liaison to meet with daughter Kendy at 200 pm for consent signing. Updated Pepe Correia.

## 2022-06-10 NOTE — PROGRESS NOTES
TRANSFER - OUT REPORT:    Verbal report given to Laurent Davis on [address]  being transferred to [facility] ([phone]) for routine progression of patient care       Report consisted of patient's Situation, Background, Assessment and   Recommendations(SBAR). Information from the following report(s) Nurse Handoff Report, Adult Overview, Intake/Output, MAR and Recent Results was reviewed with the receiving nurse. Lines:   Peripheral IV 06/02/22 Right Forearm (Active)   Site Assessment Clean, dry & intact 06/10/22 0722   Line Status Capped 06/10/22 0722   Line Care Ports disinfected 06/10/22 0000   Phlebitis Assessment No symptoms 06/10/22 0722   Infiltration Assessment 0 06/10/22 0722   Alcohol Cap Used Yes 06/10/22 0722   Dressing Status Clean, dry & intact 06/10/22 0722   Dressing Type Transparent 06/10/22 2093        Opportunity for questions and clarification was provided. Patient transported via EMS. Will call Mica Patel RN when EMS is here to transport pt to hospice house.

## 2022-06-10 NOTE — PROGRESS NOTES
Siobhan Billy: SFD 221presented hospice benefit in pt room yesterday with daughter and son present and before referral was in the system. This morning via phone spoke with son Mauri Tao who states they want to move forward with [facility] hoping for GIP and to take pt home afterwards if she perks up. They are prepared to take pt home with hospice if not GIP appropriate. Daughter Liz Bhatti will call me to set up appointment to sign consents.

## 2022-06-10 NOTE — PROGRESS NOTES
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 Please reorder if safety information is obtained and relayed to MRI dept.

## 2022-06-10 NOTE — DIABETES MGMT
Patient admitted with DKA. Blood glucose ranged 180-277 yesterday with patient receiving Humalog 4 units. Lab blood glucose this morning was 278. Creatinine 1.70. GFR 36. Reviewed patient current regimen: Humalog correctional insulin. Patient would likely benefit from initiation of small dose basal insulin as fasting glucose is not at goal.  Provider updated via [employer]ie regarding recommendation and glycemic control.

## 2022-06-10 NOTE — PROGRESS NOTES
Physical therapy note: Will d/c pt from  PT at this time 2° pt transitioning to hospice.     Dylan Jones PTA    Rehab Caseload Tracker

## 2022-06-10 NOTE — PROGRESS NOTES
Spiritual Care Visit, Follow up visit attempted. Patient had been discharged to hospice. Visit by Sebastian Vegas, Staff .  Pedro., Lashaun.REVA., B.A.

## 2022-06-10 NOTE — PROGRESS NOTES
Crushed meds and put in applesauce. Pt purposefully spit up the last bite and said she was \"done taking meds\" and for me to come back later.

## 2025-03-05 NOTE — ED NOTES
I have reviewed discharge instructions with the patient. The patient verbalized understanding. Patient left ED via Discharge Method: stretcher to Home via [facility]. Opportunity for questions and clarification provided. Patient given 1 scripts. To continue your aftercare when you leave the hospital, you may receive an automated call from our care team to check in on how you are doing. This is a free service and part of our promise to provide the best care and service to meet your aftercare needs.  If you have questions, or wish to unsubscribe from this service please call [phone]. Thank you for Choosing our [facility] Emergency Department.
Incontinent care given. Patient changed and repositioned. Patient's /86. Patient has hydralazine medication with her from home. Medication given as prescribed. Patient on continuous monitoring. Call light within reach.
Patient agreeable to stay overnight in ER to discuss living situation with social work in the am.  Patient has been provided sandwich tray and diet soda. Patient denies having any other needs at this time. Will continue to monitor.
Patient ambulatory to and from restroom with cane and this RN. Patient has voided 400ml at this time. Urine specimen collected. Patient resting in stretcher with cycling vitals in place. Will continue to monitor.
Patient has voided into brief and clothing. Patient has been cleaned, placed in new brief, linens changed. Patient's clothing placed in belongings bag. Patient tolerated procedure well. Patient resting in stretcher with cycling vitals in place, side rails x2.
Patient report to [facility], RN. Patient care to be assumed at this time.
Patient resting no distress noted.
Patient to CT and xray in stretcher with transport.
Pt awoke and requested brief change. Pt taken to room 7 for privacy, brief and bedding changed, pt clean and dry, and bed placed back in J. Pt denies needs at this time. Eyes now closed, easy, non-labored breathing noted. Will continue to monitor.
Pt report given to NEMO Magaña, pt care assumed by her at this time.
Pt resting with eyes closed, even, non-labored breathing noted. Will continue to monitor.
Report from Segundo Hutchinson RN for continuation of care.
Report given to NEMO Jensen to assume care at this time.
poc lactic acid has been collected and is running at this time.
no

## (undated) DEVICE — CONNECTOR TBNG OD5-7MM O2 END DISP

## (undated) DEVICE — NDL PRT INJ NSAF BLNT 18GX1.5 --

## (undated) DEVICE — SYR 5ML 1/5 GRAD LL NSAF LF --

## (undated) DEVICE — SYR 3ML LL TIP 1/10ML GRAD --

## (undated) DEVICE — CANNULA NSL ORAL AD FOR CAPNOFLEX CO2 O2 AIRLFE

## (undated) DEVICE — KENDALL RADIOLUCENT FOAM MONITORING ELECTRODE RECTANGULAR SHAPE: Brand: KENDALL

## (undated) DEVICE — BLOCK BITE AD 60FR W/ VELC STRP ADDRESSES MOST PT AND